# Patient Record
Sex: FEMALE | Race: OTHER | NOT HISPANIC OR LATINO | Employment: OTHER | URBAN - METROPOLITAN AREA
[De-identification: names, ages, dates, MRNs, and addresses within clinical notes are randomized per-mention and may not be internally consistent; named-entity substitution may affect disease eponyms.]

---

## 2017-01-24 ENCOUNTER — TELEPHONE (OUTPATIENT)
Dept: RHEUMATOLOGY | Facility: CLINIC | Age: 62
End: 2017-01-24

## 2017-01-31 ENCOUNTER — TELEPHONE (OUTPATIENT)
Dept: RHEUMATOLOGY | Facility: CLINIC | Age: 62
End: 2017-01-31

## 2017-02-01 ENCOUNTER — PATIENT MESSAGE (OUTPATIENT)
Dept: NEUROLOGY | Facility: CLINIC | Age: 62
End: 2017-02-01

## 2017-03-17 DIAGNOSIS — E55.9 VITAMIN D DEFICIENCY: ICD-10-CM

## 2017-03-17 DIAGNOSIS — H54.7 VISUAL LOSS: ICD-10-CM

## 2017-03-17 DIAGNOSIS — H20.9 UVEITIS: ICD-10-CM

## 2017-03-17 DIAGNOSIS — H54.10 BLINDNESS AND LOW VISION: ICD-10-CM

## 2017-03-17 DIAGNOSIS — H46.9 OPTIC NEURITIS: ICD-10-CM

## 2017-03-17 DIAGNOSIS — E66.01 MORBID OBESITY WITH BMI OF 40.0-44.9, ADULT: ICD-10-CM

## 2017-03-17 DIAGNOSIS — K76.0 FATTY LIVER: ICD-10-CM

## 2017-03-17 DIAGNOSIS — R10.13 EPIGASTRIC ABDOMINAL PAIN: ICD-10-CM

## 2017-03-17 DIAGNOSIS — M31.5 GIANT CELL ARTERITIS WITH POLYMYALGIA RHEUMATICA: ICD-10-CM

## 2017-03-17 RX ORDER — ERGOCALCIFEROL 1.25 MG/1
CAPSULE ORAL
Qty: 4 CAPSULE | Refills: 5 | Status: SHIPPED | OUTPATIENT
Start: 2017-03-17 | End: 2017-05-08 | Stop reason: SDUPTHER

## 2017-03-22 ENCOUNTER — TELEPHONE (OUTPATIENT)
Dept: GASTROENTEROLOGY | Facility: CLINIC | Age: 62
End: 2017-03-22

## 2017-03-23 ENCOUNTER — PATIENT MESSAGE (OUTPATIENT)
Dept: GASTROENTEROLOGY | Facility: CLINIC | Age: 62
End: 2017-03-23

## 2017-03-31 ENCOUNTER — INITIAL CONSULT (OUTPATIENT)
Dept: NEUROLOGY | Facility: CLINIC | Age: 62
End: 2017-03-31
Payer: MEDICARE

## 2017-03-31 DIAGNOSIS — R41.3 MEMORY LOSS: ICD-10-CM

## 2017-03-31 DIAGNOSIS — F32.A DEPRESSION, UNSPECIFIED DEPRESSION TYPE: ICD-10-CM

## 2017-03-31 PROCEDURE — 96118 PR NEUROPSYCH TESTING BY PSYCH/PHYS: CPT | Mod: S$PBB,,, | Performed by: PSYCHIATRY & NEUROLOGY

## 2017-03-31 PROCEDURE — 90791 PSYCH DIAGNOSTIC EVALUATION: CPT | Mod: PBBFAC | Performed by: PSYCHIATRY & NEUROLOGY

## 2017-03-31 PROCEDURE — 96119 PR NEUROPSYCH TESTING BY TECHNICIAN: CPT | Mod: 59,S$PBB,, | Performed by: PSYCHIATRY & NEUROLOGY

## 2017-03-31 PROCEDURE — 96119 PR NEUROPSYCH TESTING BY TECHNICIAN: CPT | Mod: PBBFAC | Performed by: PSYCHIATRY & NEUROLOGY

## 2017-03-31 PROCEDURE — 96118 PR NEUROPSYCH TESTING BY PSYCH/PHYS: CPT | Mod: PBBFAC | Performed by: PSYCHIATRY & NEUROLOGY

## 2017-03-31 PROCEDURE — 99499 UNLISTED E&M SERVICE: CPT | Mod: S$PBB,,, | Performed by: PSYCHIATRY & NEUROLOGY

## 2017-03-31 PROCEDURE — 90791 PSYCH DIAGNOSTIC EVALUATION: CPT | Mod: S$PBB,,, | Performed by: PSYCHIATRY & NEUROLOGY

## 2017-04-04 ENCOUNTER — TELEPHONE (OUTPATIENT)
Dept: NEUROLOGY | Facility: CLINIC | Age: 62
End: 2017-04-04

## 2017-04-04 NOTE — TELEPHONE ENCOUNTER
NEUROPSYCHOLOGICAL EVALUATION - CONFIDENTIAL  FEEDBACK NOTE    On 4/4/17, I provided Ms. Genesis Ugalde the results of her neuropsychological evaluation. Please see her full report for a comprehensive overview of the findings. She was provided a copy of the report and invited to call with additional questions.      Pb Meek Psy.D., ABPP  Board Certified in Clinical Neuropsychology  [facility] - Department of Neurology
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
## 2017-04-13 ENCOUNTER — TELEPHONE (OUTPATIENT)
Dept: RHEUMATOLOGY | Facility: CLINIC | Age: 62
End: 2017-04-13

## 2017-04-13 NOTE — TELEPHONE ENCOUNTER
Received from  [facility]: Gustavo MEDRANO: annual wellness exam echo, accucheck, urine microalbumin    [facility] 3/18/17:      HDL 80  LDL79      Cmp: nl except ALT 34(29) eGFR 85  HbA1c 5.7  TSH 4.14((4.5)  WBC 11.7(10.8)   ANC 6915  ALC 3744    Please tell pt labs are fine except for mildly elevated ALT similar to previous here.Mildly elevated WBC similar to previous.  Await the echo and urinalysis ordered not resulted.

## 2017-04-13 NOTE — TELEPHONE ENCOUNTER
Note      Received from [facility]: Gustavo MEDRANO: annual wellness exam echo, accucheck, urine microalbumin     [facility] labs 3/18/17:       HDL 80  LDL79       Cmp: nl except ALT 34(29) eGFR 85  HbA1c 5.7  TSH 4.14((4.5)  WBC 11.7(10.8) ANC 6915 ALC 3744     Please tell pt labs are fine except for mildly elevated ALT similar to previous here.Mildly elevated WBC similar to previous.  Await the echo and urinalysis ordered not resulted.

## 2017-04-25 RX ORDER — DORZOLAMIDE HCL 20 MG/ML
SOLUTION/ DROPS OPHTHALMIC
Qty: 10 ML | Refills: 7 | OUTPATIENT
Start: 2017-04-25

## 2017-05-08 DIAGNOSIS — E66.01 MORBID OBESITY WITH BMI OF 40.0-44.9, ADULT: ICD-10-CM

## 2017-05-08 DIAGNOSIS — E55.9 VITAMIN D DEFICIENCY: ICD-10-CM

## 2017-05-08 DIAGNOSIS — M31.5 GIANT CELL ARTERITIS WITH POLYMYALGIA RHEUMATICA: ICD-10-CM

## 2017-05-08 DIAGNOSIS — R10.13 EPIGASTRIC ABDOMINAL PAIN: ICD-10-CM

## 2017-05-08 DIAGNOSIS — H54.10 BLINDNESS AND LOW VISION: ICD-10-CM

## 2017-05-08 DIAGNOSIS — H54.7 VISUAL LOSS: ICD-10-CM

## 2017-05-08 DIAGNOSIS — H46.9 OPTIC NEURITIS: ICD-10-CM

## 2017-05-08 DIAGNOSIS — K76.0 FATTY LIVER: ICD-10-CM

## 2017-05-08 DIAGNOSIS — H20.9 UVEITIS: ICD-10-CM

## 2017-05-09 RX ORDER — ERGOCALCIFEROL 1.25 MG/1
CAPSULE ORAL
Qty: 12 CAPSULE | Refills: 2 | Status: SHIPPED | OUTPATIENT
Start: 2017-05-09 | End: 2017-07-10 | Stop reason: SDUPTHER

## 2017-05-18 ENCOUNTER — PATIENT MESSAGE (OUTPATIENT)
Dept: GASTROENTEROLOGY | Facility: CLINIC | Age: 62
End: 2017-05-18

## 2017-05-28 RX ORDER — GABAPENTIN 300 MG/1
300 CAPSULE ORAL 2 TIMES DAILY
Qty: 60 CAPSULE | Refills: 11 | Status: SHIPPED | OUTPATIENT
Start: 2017-05-28 | End: 2018-05-25 | Stop reason: ALTCHOICE

## 2017-06-13 ENCOUNTER — TELEPHONE (OUTPATIENT)
Dept: GASTROENTEROLOGY | Facility: CLINIC | Age: 62
End: 2017-06-13

## 2017-06-13 NOTE — TELEPHONE ENCOUNTER
----- Message from Caitlyn Barrera sent at 6/13/2017 12:52 PM CDT -----  Contact: self - [phone]  Jania - is calling to reschedule appt - please call patient at

## 2017-06-19 ENCOUNTER — OFFICE VISIT (OUTPATIENT)
Dept: OPHTHALMOLOGY | Facility: CLINIC | Age: 62
End: 2017-06-19
Payer: MEDICARE

## 2017-06-19 DIAGNOSIS — H54.10 BLINDNESS AND LOW VISION: ICD-10-CM

## 2017-06-19 DIAGNOSIS — M31.6 TEMPORAL ARTERITIS: ICD-10-CM

## 2017-06-19 DIAGNOSIS — H54.7 VISUAL LOSS: ICD-10-CM

## 2017-06-19 DIAGNOSIS — H40.1131 PRIMARY OPEN ANGLE GLAUCOMA OF BOTH EYES, MILD STAGE: Primary | ICD-10-CM

## 2017-06-19 DIAGNOSIS — H40.033 ANATOMICAL NARROW ANGLE BORDERLINE GLAUCOMA, BILATERAL: ICD-10-CM

## 2017-06-19 DIAGNOSIS — H40.043 STEROID RESPONDER, BILATERAL: ICD-10-CM

## 2017-06-19 DIAGNOSIS — H46.9 INFLAMMATORY OPTIC NEUROPATHY: ICD-10-CM

## 2017-06-19 DIAGNOSIS — M31.5 GIANT CELL ARTERITIS WITH POLYMYALGIA RHEUMATICA: ICD-10-CM

## 2017-06-19 PROCEDURE — 99212 OFFICE O/P EST SF 10 MIN: CPT | Mod: PBBFAC | Performed by: OPHTHALMOLOGY

## 2017-06-19 PROCEDURE — 99999 PR PBB SHADOW E&M-EST. PATIENT-LVL II: CPT | Mod: PBBFAC,,, | Performed by: OPHTHALMOLOGY

## 2017-06-19 PROCEDURE — 92012 INTRM OPH EXAM EST PATIENT: CPT | Mod: S$PBB,,, | Performed by: OPHTHALMOLOGY

## 2017-06-19 RX ORDER — DORZOLAMIDE HCL 20 MG/ML
1 SOLUTION/ DROPS OPHTHALMIC 3 TIMES DAILY
Qty: 10 ML | Refills: 12 | Status: SHIPPED | OUTPATIENT
Start: 2017-06-19 | End: 2018-05-25 | Stop reason: SDUPTHER

## 2017-06-19 RX ORDER — BRIMONIDINE TARTRATE 2 MG/ML
1 SOLUTION/ DROPS OPHTHALMIC 3 TIMES DAILY
Qty: 5 ML | Refills: 12 | Status: SHIPPED | OUTPATIENT
Start: 2017-06-19 | End: 2018-05-25 | Stop reason: SDUPTHER

## 2017-06-19 NOTE — PROGRESS NOTES
HPI     DLS: 9/16/16    Pt here for IOP check;  Pt states Brimonidine was denied by her insurance she is totally out of   it. Pt states she has off and on pain in OS she rates her pain in the OS   today a 5.     Meds: Brimonidine tid ou             Latanoprost qhs ou            Dorzolamide tid ou     1. Inflammatory optic neuropathy  2. Giant cell arteritis with polymyalgia rheumatica  3. Visual field loss  4. Blindness and low vision  5. Nuclear sclerosis, bilateral  6. Primary open angle glaucoma of both eyes, mild stage  7. Anatomical narrow angle borderline glaucoma, bilateral  8. Steroid responder, bilateral     Last edited by Doris Clayton on 6/19/2017  9:46 AM. (History)            Assessment /Plan     For exam results, see Encounter Report.    Primary open angle glaucoma of both eyes, mild stage    Anatomical narrow angle borderline glaucoma, bilateral    Inflammatory optic neuropathy    Giant cell arteritis with polymyalgia rheumatica    Visual loss    Blindness and low vision    Steroid responder, bilateral      1. Autoimmune optic neuropathy OU,   - OS 2004  - OD 2012  - unknown antibiody  - several treatments with IV steroid and IVIG  - vision and VF per [place] records  - PO steroid daily - dose increased by rheum recently to 20mg po daily  - sural nerve and muscle biopsy done - results pending - pt to obtain results form [facility]  - xal qhs ou   - monitor  - rheum monitoring esr/crp  - all previous mri's and lp negative per rheum note  Last ESR 72    2.  Steroid responder in past  - on chronic steroids for #1  iop good today   Continue xal qhs ou    3. POAG - mild vs OHT ou      First HVF   2004   First photos   2004   Treatment / Drops started   2004           Family history    neg        Glaucoma meds    Latanoprost ou        H/O adverse rxn to glaucoma drops    none        LASERS    none        GLAUCOMA SURGERIES    none        OTHER EYE SURGERIES    none        CDR    0.3 w/ pallor / 0.8 w/ pallor         "Tbase    25-27 / 25-27          Tmax    27/27            Ttarget    20/20             HVF    12 test 2004 to 2012  - SALT / IAD od // Ext os                    4 10-2 stim V OD 2012 - 2014 - [facility]    (+ prog from 2012 to 2014 - when followed at [facility])    5 10-2 stim V test done od 12/2014 to 4/2016 - dense SALT and IAD (+prog)         Gonio    +2-3 ou        CCT    591/612        OCT    7 test 2006 to 2016 - RNFL - dec throughout od // dec thru out  os        HRT    5 test 2004 to 2016 - MR - nl od // off-center os // new baseline 1/25/2016 - nl ou -"too good to be true"        Disc photos    2004 - slides // 2008, 2010, 2012, , 2012, 2012, 2012, 2015  - [facility]     - Ttoday    11/10  - Test done today    IOP check     PLAN  CSM - IOP good   C/o left head ace  In and around left eye and with left sided headache and left shoulder pain (9/16/2016)  Last sed rate and c-react prot both very low  Has an appt in 1 week with quinet and will get more testing then   No ocular etiology for eye pain and head ache - ?? Sinus vs the [facility]  HVF 10-2 stim V od (only seeing eye) - progression from 11/2015    Sees Benjie   He is monitoring sed rate and c react prot   Cont PO pred 10 mg a day   Cont actemra  (tocilizumab) 162 mg sc weekly  Cont prilosec (omeprazole)  20mg daily - to protect stomach     Cont brimonidine tid ou  Cont latanoprost qhs ou  Cont dorzolamide tid ou  - IOP was better     -would rec PI's prior to doing and slt's - has some ant bowing and narrowing   - avoid BB - H/O asthma as a child     Currently on prednisone 10 mg q day and decreasing- Benjie  States vision is dimmer and more constricted, has to search for letters on the keyboard unlike before  Also C/O pain in and around the left eye - no iritis or sceritis and IOP good - ?? Sinus vs GCA  Keep appt with quinet in 1 week - he will get sed rate and c react protien then     F/u4 mo 10-2 stim V HVF OD//photos/DFE     I have seen and personally " examined the patient.  I agree with the findings, assessment and plan of the resident and/or fellow.     Tanna Ro MD

## 2017-06-23 DIAGNOSIS — M31.5 GIANT CELL ARTERITIS WITH POLYMYALGIA RHEUMATICA: ICD-10-CM

## 2017-06-23 RX ORDER — PRAVASTATIN SODIUM 40 MG/1
40 TABLET ORAL DAILY
Qty: 90 TABLET | Refills: 3 | Status: SHIPPED | OUTPATIENT
Start: 2017-06-23 | End: 2017-07-10 | Stop reason: SDUPTHER

## 2017-07-10 ENCOUNTER — PATIENT MESSAGE (OUTPATIENT)
Dept: RHEUMATOLOGY | Facility: CLINIC | Age: 62
End: 2017-07-10

## 2017-07-10 ENCOUNTER — LAB VISIT (OUTPATIENT)
Dept: LAB | Facility: HOSPITAL | Age: 62
End: 2017-07-10
Attending: INTERNAL MEDICINE
Payer: MEDICARE

## 2017-07-10 ENCOUNTER — OFFICE VISIT (OUTPATIENT)
Dept: RHEUMATOLOGY | Facility: CLINIC | Age: 62
End: 2017-07-10
Payer: MEDICARE

## 2017-07-10 VITALS
BODY MASS INDEX: 40.97 KG/M2 | SYSTOLIC BLOOD PRESSURE: 142 MMHG | HEIGHT: 65 IN | HEART RATE: 73 BPM | DIASTOLIC BLOOD PRESSURE: 77 MMHG | WEIGHT: 245.88 LBS

## 2017-07-10 DIAGNOSIS — R10.13 EPIGASTRIC ABDOMINAL PAIN: ICD-10-CM

## 2017-07-10 DIAGNOSIS — Z79.899 ENCOUNTER FOR MONITORING TOCILIZUMAB THERAPY: ICD-10-CM

## 2017-07-10 DIAGNOSIS — R41.3 TRANSIENT MEMORY LOSS: ICD-10-CM

## 2017-07-10 DIAGNOSIS — E55.9 VITAMIN D DEFICIENCY: ICD-10-CM

## 2017-07-10 DIAGNOSIS — E24.0 CUSHING'S DISEASE: ICD-10-CM

## 2017-07-10 DIAGNOSIS — M31.5 GIANT CELL ARTERITIS WITH POLYMYALGIA RHEUMATICA: ICD-10-CM

## 2017-07-10 DIAGNOSIS — M25.551 PAIN OF RIGHT HIP JOINT: ICD-10-CM

## 2017-07-10 DIAGNOSIS — M31.6 GIANT CELL ARTERITIS: ICD-10-CM

## 2017-07-10 DIAGNOSIS — G47.33 OSA (OBSTRUCTIVE SLEEP APNEA): ICD-10-CM

## 2017-07-10 DIAGNOSIS — M31.6 GCA (GIANT CELL ARTERITIS): Primary | ICD-10-CM

## 2017-07-10 DIAGNOSIS — K76.0 FATTY LIVER: ICD-10-CM

## 2017-07-10 DIAGNOSIS — Z51.81 ENCOUNTER FOR MONITORING TOCILIZUMAB THERAPY: ICD-10-CM

## 2017-07-10 DIAGNOSIS — D84.9 IMMUNOSUPPRESSION: ICD-10-CM

## 2017-07-10 DIAGNOSIS — E78.5 HYPERLIPIDEMIA, UNSPECIFIED HYPERLIPIDEMIA TYPE: ICD-10-CM

## 2017-07-10 DIAGNOSIS — H46.9 OPTIC NEURITIS: ICD-10-CM

## 2017-07-10 DIAGNOSIS — R93.89 ABNORMAL CHEST CT: ICD-10-CM

## 2017-07-10 DIAGNOSIS — I10 ESSENTIAL HYPERTENSION: ICD-10-CM

## 2017-07-10 DIAGNOSIS — H54.10 BLINDNESS AND LOW VISION: ICD-10-CM

## 2017-07-10 DIAGNOSIS — R16.2 HEPATOSPLENOMEGALY: ICD-10-CM

## 2017-07-10 DIAGNOSIS — E66.01 MORBID OBESITY WITH BMI OF 40.0-44.9, ADULT: ICD-10-CM

## 2017-07-10 DIAGNOSIS — M31.6 TEMPORAL ARTERITIS: ICD-10-CM

## 2017-07-10 DIAGNOSIS — Z79.52 ON PREDNISONE THERAPY: ICD-10-CM

## 2017-07-10 DIAGNOSIS — R91.1 LUNG NODULE: ICD-10-CM

## 2017-07-10 DIAGNOSIS — H54.7 VISUAL LOSS: ICD-10-CM

## 2017-07-10 DIAGNOSIS — M54.31 RIGHT SIDED SCIATICA: ICD-10-CM

## 2017-07-10 DIAGNOSIS — H20.9 UVEITIS: ICD-10-CM

## 2017-07-10 PROCEDURE — 99215 OFFICE O/P EST HI 40 MIN: CPT | Mod: S$PBB,,, | Performed by: INTERNAL MEDICINE

## 2017-07-10 PROCEDURE — 86480 TB TEST CELL IMMUN MEASURE: CPT

## 2017-07-10 PROCEDURE — 99214 OFFICE O/P EST MOD 30 MIN: CPT | Mod: PBBFAC | Performed by: INTERNAL MEDICINE

## 2017-07-10 PROCEDURE — 99999 PR PBB SHADOW E&M-EST. PATIENT-LVL IV: CPT | Mod: PBBFAC,,, | Performed by: INTERNAL MEDICINE

## 2017-07-10 RX ORDER — PREDNISONE 5 MG/1
10 TABLET ORAL DAILY
Qty: 180 TABLET | Refills: 1 | Status: SHIPPED | OUTPATIENT
Start: 2017-07-10 | End: 2017-07-11 | Stop reason: SDUPTHER

## 2017-07-10 RX ORDER — ALENDRONATE SODIUM 35 MG/1
35 TABLET ORAL WEEKLY
Qty: 12 TABLET | Refills: 3 | Status: SHIPPED | OUTPATIENT
Start: 2017-07-10 | End: 2018-06-15 | Stop reason: SDUPTHER

## 2017-07-10 RX ORDER — VALACYCLOVIR HYDROCHLORIDE 500 MG/1
500 TABLET, FILM COATED ORAL DAILY
Qty: 90 TABLET | Refills: 3 | Status: SHIPPED | OUTPATIENT
Start: 2017-07-10 | End: 2018-07-09 | Stop reason: SDUPTHER

## 2017-07-10 RX ORDER — SULFAMETHOXAZOLE AND TRIMETHOPRIM 800; 160 MG/1; MG/1
1 TABLET ORAL
Qty: 36 TABLET | Refills: 1 | Status: SHIPPED | OUTPATIENT
Start: 2017-07-10 | End: 2018-03-06 | Stop reason: SDUPTHER

## 2017-07-10 RX ORDER — PRAVASTATIN SODIUM 40 MG/1
40 TABLET ORAL DAILY
Qty: 90 TABLET | Refills: 3 | Status: SHIPPED | OUTPATIENT
Start: 2017-07-10 | End: 2017-09-26 | Stop reason: SDUPTHER

## 2017-07-10 RX ORDER — ERGOCALCIFEROL 1.25 MG/1
CAPSULE ORAL
Qty: 12 CAPSULE | Refills: 3 | Status: SHIPPED | OUTPATIENT
Start: 2017-07-10 | End: 2018-01-16 | Stop reason: SDUPTHER

## 2017-07-10 ASSESSMENT — ROUTINE ASSESSMENT OF PATIENT INDEX DATA (RAPID3)
TOTAL RAPID3 SCORE: 4.33
MDHAQ FUNCTION SCORE: .6
WHEN YOU AWAKENED IN THE MORNING OVER THE LAST WEEK, PLEASE INDICATE THE AMOUNT OF TIME IT TAKES UNTIL YOU ARE AS LIMBER AS YOU WILL BE FOR THE DAY: 5 MINS
PATIENT GLOBAL ASSESSMENT SCORE: 5
PAIN SCORE: 6
PSYCHOLOGICAL DISTRESS SCORE: 3.3
FATIGUE SCORE: 8
AM STIFFNESS SCORE: 1, YES

## 2017-07-10 NOTE — PROGRESS NOTES
Subjective:       Patient ID: Genesis Ugalde is a 61 y.o. female.    Chief Complaint: No chief complaint on file.    HPI   She tapered off prednisone from 17.5mg since November by 2.5mg q several weeks, until 4 wks ago, stopped completely for 2.5 wks, couldn't move, then resumed prednisone 25mg immediately better, then decreased to 10mg daily which she continues. Had concurrent with this severe HSV. Wasn't taking Valtrex prophylaxis x months prior to outbreak. Needs new Rx.  No fever, + chills. Headaches not as severe, still daily. Rarely has to take anything. Above going on when she saw Dr. Ro. + myalgias(fibromyalgia) Had episode of dehydration with syncope, didn't go to ED.         Saw Dr. Ro 6/19: PLAN  CSM - IOP good   C/o left head ace  In and around left eye and with left sided headache and left shoulder pain (9/16/2016)  Last sed rate and c-react prot both very low  Has an appt in 1 week with quinet and will get more testing then   No ocular etiology for eye pain and head ache - ?? Sinus vs the GCA  HVF 10-2 stim V od (only seeing eye) - progression from 11/2015     Sees Benjie   He is monitoring sed rate and c react prot   Cont PO pred 10 mg a day   Cont actemra  (tocilizumab) 162 mg sc weekly  Cont prilosec (omeprazole)  20mg daily - to protect stomach      Cont brimonidine tid ou  Cont latanoprost qhs ou  Cont dorzolamide tid ou  - IOP was better      -would rec PI's prior to doing and slt's - has some ant bowing and narrowing   - avoid BB - H/O asthma as a child      Currently on prednisone 10 mg q day and decreasing- Quinet  States vision is dimmer and more constricted, has to search for letters on the keyboard unlike before  Also C/O pain in and around the left eye - no iritis or sceritis and IOP good - ?? Sinus vs GCA  Keep appt with quinet in 1 week - he will get sed rate and c react protien then      F/u4 mo 10-2 stim V HVF OD//photos/DFE      I have seen and personally examined the  "patient.  I agree with the findings, assessment and plan of the resident and/or fellow.      Tanna Ro MD  Review of Systems   Constitutional: Negative for appetite change, fatigue, fever and unexpected weight change.   HENT: Positive for trouble swallowing. Negative for mouth sores.         Dry mouth   Eyes: Negative for visual disturbance.   Respiratory: Negative for cough, shortness of breath and wheezing.    Cardiovascular: Negative for chest pain and palpitations.   Gastrointestinal: Positive for diarrhea. Negative for abdominal pain, anal bleeding, blood in stool, constipation, nausea and vomiting.        Heartburn   Genitourinary: Positive for dysuria and genital sores. Negative for frequency and urgency.   Musculoskeletal: Negative for arthralgias, back pain, gait problem, joint swelling, myalgias, neck pain and neck stiffness.   Skin: Negative for rash.   Neurological: Positive for headaches. Negative for weakness and numbness.   Hematological: Negative for adenopathy. Does not bruise/bleed easily.   Psychiatric/Behavioral: Negative for sleep disturbance. The patient is not nervous/anxious.          Objective:   BP (!) 142/77   Pulse 73   Ht 5' 4.8" (1.646 m)   Wt 111.5 kg (245 lb 14.4 oz)   BMI 41.17 kg/m²      Physical Exam   Constitutional: She is oriented to person, place, and time and well-developed, well-nourished, and in no distress.   HENT:   Head: Normocephalic and atraumatic.   Mouth/Throat: Oropharynx is clear and moist.   Tenderness both TAs   Eyes: Conjunctivae and EOM are normal.   External strabismus, blind OS   Neck: Normal range of motion. Neck supple. No thyromegaly present.   Skin tags   Cardiovascular: Normal rate, regular rhythm, normal heart sounds and intact distal pulses.  Exam reveals no gallop and no friction rub.    No murmur heard.  Pulmonary/Chest: Breath sounds normal. She has no wheezes. She has no rales. She exhibits no tenderness.   Abdominal: Soft. She " exhibits no distension and no mass. There is no tenderness.   obese       Right Side Rheumatological Exam     Examination finds the shoulder, elbow, wrist, knee, 1st PIP, 1st MCP, 2nd PIP, 2nd MCP, 3rd PIP, 3rd MCP, 4th PIP, 4th MCP, 5th PIP and 5th MCP normal.    Left Side Rheumatological Exam     Examination finds the shoulder, elbow, wrist, knee, 1st PIP, 1st MCP, 2nd PIP, 2nd MCP, 3rd PIP, 3rd MCP, 4th PIP, 4th MCP, 5th PIP and 5th MCP normal.      Lymphadenopathy:     She has no cervical adenopathy.   Neurological: She is alert and oriented to person, place, and time. She displays normal reflexes. Gait normal.   Nl motor strength UE and LE bilateral   Skin: No rash noted. No erythema. No pallor.     Psychiatric: Mood, memory, affect and judgment normal.   Musculoskeletal: She exhibits no edema.           The bone density remains normal but has decreased compared with prior. Would therefore continue alendronate(Fosamax) until prednisone dose much lower. RJQ   External Result Report     External Result Report   Narrative     : 1955 ORDERING PHYSICIAN: JOSE Bonilla LOCATION: [facility]    HISTORY: 60 y/o female with a hx of several fractures. She had surgical menopause at 37 y/o. Pts Mother had a wrist fx. Pt is taking Calcium, Vit D, Fosamax and 17.5mg of Prednisone. She is taking 50,000 units of Vit D once a week. She has a hx of Rheumatoid Arthritis, Asthma, Diabetes and Kidney Stones. She does not exercise or smoke.    TECHNIQUE: Bone Mineral Density performed using Hologic Horizon A (S/N [id number]) reveals good positioning of lumbar spine and hip.    BONE MINERAL DENSITY RESULTS:  Lumbar Spine: Lumbar bone mineral density L1-L4 is 0.933g/cm2, which is a t-score of -1.0. The z-score is 0.3.    Total Hip: The total hip bone mineral density is 0.900g/cm2.  The t-score is -0.3, and the z-score is 0.5.  Femoral neck BMD is 0.748g/cm2 and the t-score is -0.9.    COMPARISONS:  Date Location BMD  T-score  12/19/14 L-spine 0.944 -0.9  Total Hip 0.949 0.1   Impression      Normal BMD of the hip with a significant decrease of 5.1% compared with the prior study. Normal BMD of the lumbar spine without significant change compared with the prior study. The estimated 10 year risk of hip fracture is 0.84%(low risk)  and of a major osteoporotic fracture, 13.8%(moderate risk) respectively using FRAX to decide on GIOP therapy.    RECOMMENDATIONS:  1. Adequate calcium and Vitamin D, 1200 mg/day(elemental dietary only, not tablets,  given h/o kidney stones) and 800 units/day, respectively.  2. Consider continuing alendronate based on FRAX if no contraindications. Keep prednisone dose to a minimum.  3. Repeat BMD in 2 years.    EXPLANATION OF RESULTS:  The t-score compares this results to the bone density of a 25 year old of the same gender. The z-score compares this result to the average bone density to people of the same age and gender. The amounts indicate the number of standard deviations above or below the mean.  * Osteoporosis is generally defined as having a t-score between less than -2.5.  * Osteopenia is generally defined as having a t-score between -1 and -2.5.  * The normal range is generally defined as having a t-score between -1 to 1.      Electronically signed by: AKHIL GARCIA MD  Date: 12/27/16  Time: 12:51     Encounter     View Encounter          Reviewed By     Gray Bonilla DO on 1/4/2017 14:34   Received from [facility]: Gustavo MEDRANO: annual wellness exam echo, accucheck, urine microalbumin     [facility] 3/18/17:       HDL 80  LDL79       Cmp: nl except ALT 34(29) eGFR 85  HbA1c 5.7  TSH 4.14((4.5)  WBC 11.7(10.8) ANC 6915 ALC 3744     Please tell pt labs are fine except for mildly elevated ALT similar to previous here.Mildly elevated WBC similar to previous.  Await the echo and urinalysis ordered not resulted.  The hip x-rays appear normal. There is some  degenerative arthritis of the right L4-5 facet joint. RJQ   External Result Report     External Result Report   Narrative     2 views of the right hip were obtained, with an AP pelvis and a lateral projection of the right hip submitted.      Hip joint spaces appear adequately maintained, without significant narrowing on either side.  No conventional radiographic evidence to specifically suggest avascular necrosis of either femoral head.  Remaining osseous structures are unremarkable as well, with no evidence of recent or healing fracture or lytic destructive process noted.  SI joints appear unremarkable.  Note is made of some particularly prominent facet arthropathy at L4-5 on the right side.   Impression      As above.      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 Uveitis. Tocilixumab acting as steroid sparing agent as she feels uncomfortable taking < 20mg prednisone daily and usually incurs flare in right eye doing so. She has failed methotrexate, azathioprine,, pulse SoluMedrol, plasma exchange, and cyclophosphamide IV. Remaining options: tocilizumab I would favor based on small series(RCT ongoing) in refractory GCA cases, rather than mycophenolate or rituximab which would be other options. 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 Lujan 8/27/15, plans f/u CT chest in 6months as ordered  S/p EGD + H. Pylori, treated, epigastric discomfort much improved  Episodic memory loss, ? Absence attack ? Petit mal  Right sciatica, agrees to PT, not interested in injections, surgery  Morbid obesity, lost another 12#  LOYDA EJH, no CPAP  Plan:     cbc, cmp, esr, crp, u/a upcr today and q 3 months   QG-TB today and q 12 months   vitamin D  f/u Dr. Lujan after f/u CT chest he ordered 8/27/15  Cont prednisone 10mg daily for now.  Cont tocilizumab 162mg sc weekly  Cont alendronate 35mg po once a week  Vitamin D3 2000 units daily and vitamiin D2 50,000 units weekly  F/u Dr. Dykes for f/u H. Pylori has appt 7/11/17(tomorrow)  Ref to Sleep Disorders for LOYDA, previously diagnosed EJH, never treated  RTC 3 months with standing labs

## 2017-07-11 ENCOUNTER — TELEPHONE (OUTPATIENT)
Dept: RHEUMATOLOGY | Facility: CLINIC | Age: 62
End: 2017-07-11

## 2017-07-11 ENCOUNTER — LAB VISIT (OUTPATIENT)
Dept: LAB | Facility: HOSPITAL | Age: 62
End: 2017-07-11
Attending: INTERNAL MEDICINE
Payer: MEDICARE

## 2017-07-11 ENCOUNTER — OFFICE VISIT (OUTPATIENT)
Dept: GASTROENTEROLOGY | Facility: CLINIC | Age: 62
End: 2017-07-11
Payer: MEDICARE

## 2017-07-11 ENCOUNTER — CLINICAL SUPPORT (OUTPATIENT)
Dept: INFECTIOUS DISEASES | Facility: CLINIC | Age: 62
End: 2017-07-11
Payer: MEDICARE

## 2017-07-11 VITALS
BODY MASS INDEX: 41.67 KG/M2 | DIASTOLIC BLOOD PRESSURE: 80 MMHG | SYSTOLIC BLOOD PRESSURE: 144 MMHG | HEART RATE: 65 BPM | WEIGHT: 244.06 LBS | HEIGHT: 64 IN

## 2017-07-11 DIAGNOSIS — K76.0 FATTY LIVER: Primary | ICD-10-CM

## 2017-07-11 DIAGNOSIS — K76.0 FATTY LIVER: ICD-10-CM

## 2017-07-11 DIAGNOSIS — N39.0 URINARY TRACT INFECTION WITHOUT HEMATURIA, SITE UNSPECIFIED: Primary | ICD-10-CM

## 2017-07-11 DIAGNOSIS — N32.9 BLADDER DISORDER: ICD-10-CM

## 2017-07-11 DIAGNOSIS — M31.5 GIANT CELL ARTERITIS WITH POLYMYALGIA RHEUMATICA: ICD-10-CM

## 2017-07-11 DIAGNOSIS — R30.0 DYSURIA: ICD-10-CM

## 2017-07-11 LAB
MITOGEN NIL: 7.92 IU/ML
NIL: 0.03 IU/ML
TB ANTIGEN NIL: 0.03 IU/ML
TB ANTIGEN: 0.06 IU/ML
TB GOLD: NEGATIVE

## 2017-07-11 PROCEDURE — 99999 PR PBB SHADOW E&M-EST. PATIENT-LVL IV: CPT | Mod: PBBFAC,,, | Performed by: INTERNAL MEDICINE

## 2017-07-11 PROCEDURE — 99213 OFFICE O/P EST LOW 20 MIN: CPT | Mod: S$PBB,,, | Performed by: INTERNAL MEDICINE

## 2017-07-11 PROCEDURE — 99999 PR PBB SHADOW E&M-EST. PATIENT-LVL III: CPT | Mod: PBBFAC,,,

## 2017-07-11 PROCEDURE — 36415 COLL VENOUS BLD VENIPUNCTURE: CPT

## 2017-07-11 RX ORDER — NITROFURANTOIN 25; 75 MG/1; MG/1
100 CAPSULE ORAL 2 TIMES DAILY
Qty: 10 CAPSULE | Refills: 0 | Status: SHIPPED | OUTPATIENT
Start: 2017-07-11 | End: 2017-07-16

## 2017-07-11 RX ORDER — PREDNISONE 5 MG/1
10 TABLET ORAL DAILY
Qty: 180 TABLET | Refills: 1 | Status: SHIPPED | OUTPATIENT
Start: 2017-07-11 | End: 2017-11-30 | Stop reason: SDUPTHER

## 2017-07-11 NOTE — TELEPHONE ENCOUNTER
Please tell pt the prednisone dose is 10mg: two 5mg tablets daily, new Rx sent to pharmacy. For the urine, suggest she hold the Bactrim this week, and taking Macrobid 100mg twice daily for 5 days. Please double check on allergies. Will also place referral to Urology, please schedule.  Next week resume Bactrim DS M-W-F.  No Actemra until the urinary symptoms resolved and antibiotics completed. Thanks. BELIA

## 2017-07-11 NOTE — PROGRESS NOTES
CHIEF COMPLAINT: follow up dysphagia and diarrhea.      HISTORY OF PRESENT ILLNESS:  This is a 61-year-old female who is blind.  She    said she had temporal arteritis.  She also has fatty liver followed by hepatotogy.  She knows that    fatty liver in some patients can progress to liver cancer, liver cirrhosis and a   need for liver transplant.  She has a small lesion in her liver, which is 1.7    cm.  It has been stable.  It is suspected hemangioma.  The patient knows that    fatty liver can result in liver cirrhosis, death and need for transplant.     Recommend gradual weight loss, avoid raw oysters and raw clams.  Otherwise, she    is doing well. Recently she has had intermittent dysphagia for solid which improved with last EGD and loose stools. No blood in stool.      REVIEW OF SYSTEMS:  CONSTITUTIONAL:  No fever, fatigue but positive for about 15 pounds of intentional weight loss.  ENT:  No odynophagia and no sore throat.  CARDIOVASCULAR:  No chest pain or palpitations.  RESPIRATORY:  No shortness of breath or cough.  GENITOURINARY:  No dysuria, urgency or frequency.  MUSCULOSKELETAL:  Arthritis in the knees.  SKIN:  No itching or rash.  NEUROLOGIC:  No syncope or stroke.  PSYCHIATRIC:  No uncontrolled depression or anxiety.  ENDOCRINE:  No cold or heat intolerance.  LYMPHATICS:  No lymphadenopathy.  GASTROINTESTINAL:  No vomiting, no heartburn, no abdominal pain.  Has two or   three bowel movements a week.  No change in bowel habits.      RISK FACTORS FOR LIVER DISEASE:  No blood transfusion, no IV drugs, no tattoos,    no nasal drug use.      PAST MEDICAL HISTORY:  Type 2 diabetes since 2012, asthma, hypertension and    hyperlipidemia.      PAST SURGICAL HISTORY:  Cholecystectomy, hysterectomy, cataract surgery, ankle    surgery, colonoscopy.      FAMILY HISTORY:  Negative for celiac sprue.  Nobody with colon cancer.  Nobody    with advanced colon adenomatous polyps before the age of 60.  No FAP, no  "   attenuated FAP, no MAP, no Walker syndrome.  Nobody with celiac sprue or    inflammatory bowel disease.  A brother with stomach cancer at 58.  The patient    has a recent EGD looking for H. pylori.      SOCIAL HISTORY:  Nonsmoker, nondrinker, disabled.      PHYSICAL EXAMINATION:  BP (!) 144/80   Pulse 65   Ht 5' 4" (1.626 m)   Wt 110.7 kg (244 lb 0.8 oz)   BMI 41.89 kg/m²   GENERAL APPEARANCE:  Well nourished, well developed, not in any acute distress.  ABDOMEN:  Soft, no guarding, no rebound, no tenderness, no palpable    organomegaly, no bruits, no pulsatile masses, no stigmata of chronic liver    disease, no appreciative ascites or hernias.  Normoactive bowel sounds.  Abdomen   is obese.  CARDIOVASCULAR:  S1 and S2 without murmurs.  RESPIRATORY:  Clear to auscultation bilaterally without wheezes, rhonchi or    rales.  SKIN:  No petechiae or rash on exposed skin areas.  NEUROLOGIC:  Alert and oriented x4.  PSYCHIATRIC:  Normal speech, mentation and affect.  LYMPHATICS:  No cervical or supraclavicular lymphadenopathy.      MEDICAL DECISION MAKING:  As above.  Fatty liver talk given.  GERD talk given.     Vaccination talk given, EGD talk discuss.EGD and pathology reviewed and endoscopy images reviewed.      IMPRESSION AND PLAN:  1. Fatty liver followed by hepatology. Continue gradual weight loss and vaccinate for Hepatitis A and B again since she didn't not achieve immunity.  2. History of colon polyps in past next colonoscopy in 9/2018  3. RTC once yearly.  "

## 2017-07-12 ENCOUNTER — TELEPHONE (OUTPATIENT)
Dept: PHARMACY | Facility: CLINIC | Age: 62
End: 2017-07-12

## 2017-07-20 LAB
UGT, FULL GENE SEQUENCING: NORMAL
UGT1A1 GENE MUT ANL BLD/T: NORMAL
UGT1A1 INTERPRETATION: NORMAL
UGT1A1 REVIEWED BY: NORMAL

## 2017-07-25 ENCOUNTER — PATIENT MESSAGE (OUTPATIENT)
Dept: RHEUMATOLOGY | Facility: CLINIC | Age: 62
End: 2017-07-25

## 2017-07-25 NOTE — TELEPHONE ENCOUNTER
Actemra Assistance Approved   7/24/17-7/23/18  [facility] to [facility]  Case #: [id number]

## 2017-08-07 ENCOUNTER — TELEPHONE (OUTPATIENT)
Dept: GASTROENTEROLOGY | Facility: CLINIC | Age: 62
End: 2017-08-07

## 2017-08-07 ENCOUNTER — PATIENT MESSAGE (OUTPATIENT)
Dept: GASTROENTEROLOGY | Facility: CLINIC | Age: 62
End: 2017-08-07

## 2017-08-07 NOTE — TELEPHONE ENCOUNTER
"----- Message from Alok Dykes MD sent at 8/5/2017  4:43 PM CDT -----  Dorothea - please tell Genesis that her slight elevation of her bilirubin is from a benign condition called Gilbert's.  Below is some information if she would like to read about it.    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     Patient information: Gilbert syndrome (Beyond the Basics)   Authors  Abigail Nichole, PhD  Quincy Nichole MD, [facility]  Section   Pedro Bolaños MD  [place]   Chanell Lin MD, MSc, FAC, AGA  Contributor disclosures    All topics are updated as new evidence becomes available and our peer review process is complete.   Literature review current through: Apr 2016.  This topic last updated: Sep 23, 2015.     GILBERT SYNDROME OVERVIEW  Gilbert syndrome, also known as constitutional hepatic dysfunction or familial nonhemolytic jaundice, is an inherited disorder of the liver that results in an overabundance of a substance known as bilirubin. While some people with Gilbert syndrome develop yellowing of the skin or eyes, most people have no symptoms at all. Gilbert syndrome is not dangerous and does not cause long-term problems, so it is not necessary to treat Gilbert syndrome.    WHAT IS BILIRUBIN?  Bilirubin is normally present in the blood in small amounts. It is a by-product of the breakdown of hemoglobin in old red blood cells, and it is usually converted by the liver into a form that can be excreted from the body in stool. Abnormalities in this process can cause blood levels of bilirubin to rise above normal.  People with Gilbert syndrome have an inherited abnormality that causes reduced production of an enzyme involved in processing bilirubin. (See "Gilbert syndrome and unconjugated hyperbilirubinemia due to bilirubin overproduction".)  Every affected person has two " "copies of the abnormal gene responsible for Gilbert syndrome. Because over half of the people in the general population have at least one abnormal copy of the gene, inheriting two abnormal copies (one from the mother and one from the father) is not uncommon. Interestingly, people who have only one copy may have slightly higher levels of unconjugated bilirubin but do not have Gilbert syndrome. For reasons that are not entirely understood, not all people who have two copies of the abnormal gene develop blood bilirubin levels that are high enough for the diagnosis of Gilbert syndrome.    GILBERT SYNDROME SYMPTOMS  Most patients with Gilbert syndrome have no symptoms. The disorder is frequently diagnosed incidentally when a lab test done for another reason (such as a life insurance examination) shows an abnormally high level of unconjugated bilirubin.  When the level of unconjugated bilirubin rises beyond a certain point, the bilirubin pigment begins to discolor the whites of the eyes (making them appear light yellow). With even higher levels, the skin may also become yellow (jaundice). This potentially alarming appearance makes many people with Gilbert syndrome see their healthcare provider.  Levels of bilirubin can fluctuate in people with Gilbert syndrome. They may be highest during an infection (such as the flu), following periods of fasting, and during menstrual periods in some women. In addition, newborns with Gilbert syndrome may have higher levels of bilirubin and more persistent jaundice than newborns without. (See "Patient information: Jaundice in  infants (Beyond the Basics)".)    Gilbert syndrome may also become apparent when an affected person takes certain drugs that require the enzyme involved in bilirubin processing in the liver. A cancer drug called irinotecan is one example.    GILBERT SYNDROME DIAGNOSIS  The diagnosis of Gilbert syndrome is suspected in people who have persistently slightly " elevated levels of unconjugated bilirubin without any other apparent cause. A clinician may order several blood tests and possibly an ultrasound of the liver to make sure that there is no other cause of the high bilirubin levels.  Genetic testing can also confirm the diagnosis; however, genetic testing is not widely available and is generally not required.    GILBERT SYNDROME TREATMENT  No specific treatment is required for people with Gilbert syndrome. However, there is an increased risk of side effects from certain drugs that are broken down by the liver (such as acetaminophen and the chemotherapy drug, irinotecan). Check with a healthcare provider before taking any new medications. Do not take more than the recommended amount of acetaminophen (Tylenol).    WHERE TO GET MORE INFORMATION  Your healthcare provider is the best source of information for questions and concerns related to your medical problem.  This article will be updated as needed on our website ([email or web address]). Related topics for patients, as well as selected articles written for healthcare professionals, are also available. Some of the most relevant are listed below.  Patient level information  [employer] offers two types of patient education materials.    Gilbert syndrome and unconjugated hyperbilirubinemia due to bilirubin overproduction  The following organizations also provide reliable health information:  ?National Library of Medicine  (www.nlm.nih.gov/medlineplus/healthtopics.html)  ?[place] [employer]  (www.[place]ishlivertrust.org.uk)  ?[employer] [place] [employer]  (www.liver.ca/liver-disease/types/gilberts-syndrome.aspx)

## 2017-08-11 ENCOUNTER — CLINICAL SUPPORT (OUTPATIENT)
Dept: INFECTIOUS DISEASES | Facility: CLINIC | Age: 62
End: 2017-08-11
Payer: MEDICARE

## 2017-08-11 ENCOUNTER — PATIENT MESSAGE (OUTPATIENT)
Dept: RHEUMATOLOGY | Facility: CLINIC | Age: 62
End: 2017-08-11

## 2017-08-11 DIAGNOSIS — K76.0 FATTY LIVER: ICD-10-CM

## 2017-08-11 PROCEDURE — 90636 HEP A/HEP B VACC ADULT IM: CPT | Mod: PBBFAC

## 2017-09-25 ENCOUNTER — OFFICE VISIT (OUTPATIENT)
Dept: OPHTHALMOLOGY | Facility: CLINIC | Age: 62
End: 2017-09-25
Payer: MEDICARE

## 2017-09-25 ENCOUNTER — CLINICAL SUPPORT (OUTPATIENT)
Dept: OPHTHALMOLOGY | Facility: CLINIC | Age: 62
End: 2017-09-25
Payer: MEDICARE

## 2017-09-25 DIAGNOSIS — H46.9 INFLAMMATORY OPTIC NEUROPATHY: Primary | ICD-10-CM

## 2017-09-25 DIAGNOSIS — H46.9 OPTIC NEURITIS: ICD-10-CM

## 2017-09-25 DIAGNOSIS — H40.1131 PRIMARY OPEN ANGLE GLAUCOMA OF BOTH EYES, MILD STAGE: ICD-10-CM

## 2017-09-25 DIAGNOSIS — H40.043 STEROID RESPONDER, BILATERAL: ICD-10-CM

## 2017-09-25 DIAGNOSIS — H54.10 BLINDNESS AND LOW VISION: ICD-10-CM

## 2017-09-25 DIAGNOSIS — H25.13 NUCLEAR SCLEROSIS, BILATERAL: ICD-10-CM

## 2017-09-25 DIAGNOSIS — M31.5 GIANT CELL ARTERITIS WITH POLYMYALGIA RHEUMATICA: ICD-10-CM

## 2017-09-25 PROCEDURE — 92014 COMPRE OPH EXAM EST PT 1/>: CPT | Mod: S$PBB,,, | Performed by: OPHTHALMOLOGY

## 2017-09-25 PROCEDURE — 99999 PR PBB SHADOW E&M-EST. PATIENT-LVL II: CPT | Mod: PBBFAC,,, | Performed by: OPHTHALMOLOGY

## 2017-09-25 PROCEDURE — 92083 EXTENDED VISUAL FIELD XM: CPT | Mod: 26,S$PBB,, | Performed by: OPHTHALMOLOGY

## 2017-09-25 PROCEDURE — 92083 EXTENDED VISUAL FIELD XM: CPT | Mod: PBBFAC

## 2017-09-25 PROCEDURE — 99212 OFFICE O/P EST SF 10 MIN: CPT | Mod: PBBFAC | Performed by: OPHTHALMOLOGY

## 2017-09-25 PROCEDURE — 92250 FUNDUS PHOTOGRAPHY W/I&R: CPT | Mod: PBBFAC | Performed by: OPHTHALMOLOGY

## 2017-09-25 NOTE — PROGRESS NOTES
HPI     Glaucoma    Additional comments: HVF review today           Comments   DLS: 6/19/17    1) Autoimmune Optic Neuropathy OU  2) POAG  3) GCA with PMR  4) Type 2 DM  5) Steroid Responder    MEDS:  Dorzolamide TID OU  Brimonidine TID OU  Latanoprost QHS OU  Prednisone 10mg QDAY PO       Last edited by Sydnee Galvez MA on 9/25/2017 10:04 AM. (History)            Assessment /Plan     For exam results, see Encounter Report.    Inflammatory optic neuropathy    Giant cell arteritis with polymyalgia rheumatica    Optic neuritis    Primary open angle glaucoma of both eyes, mild stage    Blindness and low vision    Steroid responder, bilateral    Nuclear sclerosis, bilateral        1. Autoimmune optic neuropathy OU,   - OS 2004  - OD 2012  - unknown antibiody  - several treatments with IV steroid and IVIG  - vision and VF per [place] records  - PO steroid daily - dose increased by rheum recently to 20mg po daily  - sural nerve and muscle biopsy done - results pending - pt to obtain results form [facility]  - xal qhs ou   - monitor  - rheum monitoring esr/crp  - all previous mri's and lp negative per rheum note  Last ESR 72    2.  Steroid responder in past  - on chronic steroids for #1  iop good today   Continue xal qhs ou    3. POAG - mild vs OHT ou      First HVF   2004   First photos   2004   Treatment / Drops started   2004           Family history    neg        Glaucoma meds    Latanoprost ou        H/O adverse rxn to glaucoma drops    none        LASERS    none        GLAUCOMA SURGERIES    none        OTHER EYE SURGERIES    none        CDR    0.3 w/ pallor / 0.8 w/ pallor        Tbase    25-27 / 25-27          Tmax    27/27            Ttarget    20/20             HVF    12 test 2004 to 2012  - SALT / IAD od // Ext os                    4 10-2 stim V OD 2012 - 2014 - [facility]    (+ prog from 2012 to 2014 - when followed at [facility])    7- test-  10-2 stim V test done od 12/2014 to 2017 - dense SALT and IAD (+stable)  "        Gonio    +2-3 ou        CCT    591/612        OCT    7 test 2006 to 2016 - RNFL - dec throughout od // dec thru out  os        HRT    5 test 2004 to 2016 - MR - nl od // off-center os // new baseline 1/25/2016 - nl ou -"too good to be true"        Disc photos    2004 - slides // 2008, 2010, 2012, , 2012, 2012, 2012, 2015, 2017   - OIS     - Ttoday    12/13  - Test done today    HVF - 10-2 stim V OD only // DFE // photos     PLAN  CSM - IOP good     Sees Benjie   He is monitoring sed rate and c react prot   Cont PO pred 10 mg a day   Cont actemra  (tocilizumab) 162 mg sc weekly  Cont prilosec (omeprazole)  20mg daily - to protect stomach     Cont brimonidine tid ou  Cont latanoprost qhs ou  Cont dorzolamide tid ou  - IOP was better     -would rec PI's prior to doing and slt's - has some mild  ant bowing and narrowing   - avoid BB - H/O asthma as a child     Currently on prednisone 10 mg q day and decreasing- Benjie  States vision is dimmer and more constricted, has to search for letters on the keyboard unlike before  Also C/O pain in and around the left eye - no iritis or sceritis and IOP good - ?? Sinus vs GCA - on going intermittent pain     F/u 4 mo with HRT / gonio     I have seen and personally examined the patient.  I agree with the findings, assessment and plan of the resident and/or fellow.     Tanna Ro MD          "

## 2017-09-26 DIAGNOSIS — M31.5 GIANT CELL ARTERITIS WITH POLYMYALGIA RHEUMATICA: ICD-10-CM

## 2017-09-26 RX ORDER — PRAVASTATIN SODIUM 40 MG/1
40 TABLET ORAL NIGHTLY
Qty: 90 TABLET | Refills: 3 | Status: SHIPPED | OUTPATIENT
Start: 2017-09-26 | End: 2018-09-25 | Stop reason: SDUPTHER

## 2017-10-03 DIAGNOSIS — F51.01 PRIMARY INSOMNIA: Primary | ICD-10-CM

## 2017-10-23 ENCOUNTER — PATIENT MESSAGE (OUTPATIENT)
Dept: RHEUMATOLOGY | Facility: CLINIC | Age: 62
End: 2017-10-23

## 2017-11-03 ENCOUNTER — PATIENT MESSAGE (OUTPATIENT)
Dept: RHEUMATOLOGY | Facility: CLINIC | Age: 62
End: 2017-11-03

## 2017-11-30 ENCOUNTER — TELEPHONE (OUTPATIENT)
Dept: RHEUMATOLOGY | Facility: CLINIC | Age: 62
End: 2017-11-30

## 2017-11-30 DIAGNOSIS — M31.5 GIANT CELL ARTERITIS WITH POLYMYALGIA RHEUMATICA: ICD-10-CM

## 2017-11-30 RX ORDER — PREDNISONE 5 MG/1
10 TABLET ORAL DAILY
Qty: 180 TABLET | Refills: 1 | Status: SHIPPED | OUTPATIENT
Start: 2017-11-30 | End: 2018-03-06 | Stop reason: SDUPTHER

## 2017-11-30 NOTE — TELEPHONE ENCOUNTER
Please schedule overdue standing labs this week or early next and overdue f/u visit with fellow and myself. Thanks BELIA

## 2017-12-07 ENCOUNTER — PATIENT MESSAGE (OUTPATIENT)
Dept: RHEUMATOLOGY | Facility: CLINIC | Age: 62
End: 2017-12-07

## 2017-12-26 ENCOUNTER — PATIENT MESSAGE (OUTPATIENT)
Dept: RHEUMATOLOGY | Facility: CLINIC | Age: 62
End: 2017-12-26

## 2017-12-27 ENCOUNTER — LAB VISIT (OUTPATIENT)
Dept: LAB | Facility: HOSPITAL | Age: 62
End: 2017-12-27
Attending: INTERNAL MEDICINE
Payer: MEDICARE

## 2017-12-27 DIAGNOSIS — M31.6 GCA (GIANT CELL ARTERITIS): ICD-10-CM

## 2017-12-27 LAB
ALBUMIN SERPL BCP-MCNC: 3.6 G/DL
ALP SERPL-CCNC: 50 U/L
ALT SERPL W/O P-5'-P-CCNC: 40 U/L
ANION GAP SERPL CALC-SCNC: 9 MMOL/L
AST SERPL-CCNC: 26 U/L
BASOPHILS # BLD AUTO: 0.05 K/UL
BASOPHILS NFR BLD: 0.5 %
BILIRUB SERPL-MCNC: 1.2 MG/DL
BUN SERPL-MCNC: 15 MG/DL
CALCIUM SERPL-MCNC: 9.4 MG/DL
CHLORIDE SERPL-SCNC: 111 MMOL/L
CO2 SERPL-SCNC: 23 MMOL/L
CREAT SERPL-MCNC: 0.8 MG/DL
CRP SERPL-MCNC: 1.4 MG/L
DIFFERENTIAL METHOD: ABNORMAL
EOSINOPHIL # BLD AUTO: 0.4 K/UL
EOSINOPHIL NFR BLD: 3.9 %
ERYTHROCYTE [DISTWIDTH] IN BLOOD BY AUTOMATED COUNT: 12.7 %
ERYTHROCYTE [SEDIMENTATION RATE] IN BLOOD BY WESTERGREN METHOD: 1 MM/HR
EST. GFR  (AFRICAN AMERICAN): >60 ML/MIN/1.73 M^2
EST. GFR  (NON AFRICAN AMERICAN): >60 ML/MIN/1.73 M^2
GLUCOSE SERPL-MCNC: 100 MG/DL
HCT VFR BLD AUTO: 38.6 %
HGB BLD-MCNC: 12.7 G/DL
IMM GRANULOCYTES # BLD AUTO: 0.07 K/UL
IMM GRANULOCYTES NFR BLD AUTO: 0.7 %
LYMPHOCYTES # BLD AUTO: 3 K/UL
LYMPHOCYTES NFR BLD: 27.6 %
MCH RBC QN AUTO: 31.9 PG
MCHC RBC AUTO-ENTMCNC: 32.9 G/DL
MCV RBC AUTO: 97 FL
MONOCYTES # BLD AUTO: 0.8 K/UL
MONOCYTES NFR BLD: 7 %
NEUTROPHILS # BLD AUTO: 6.5 K/UL
NEUTROPHILS NFR BLD: 60.3 %
NRBC BLD-RTO: 0 /100 WBC
PLATELET # BLD AUTO: 239 K/UL
PMV BLD AUTO: 11.5 FL
POTASSIUM SERPL-SCNC: 4.2 MMOL/L
PROT SERPL-MCNC: 6.2 G/DL
RBC # BLD AUTO: 3.98 M/UL
SODIUM SERPL-SCNC: 143 MMOL/L
WBC # BLD AUTO: 10.72 K/UL

## 2017-12-27 PROCEDURE — 80053 COMPREHEN METABOLIC PANEL: CPT

## 2017-12-27 PROCEDURE — 86140 C-REACTIVE PROTEIN: CPT

## 2017-12-27 PROCEDURE — 85025 COMPLETE CBC W/AUTO DIFF WBC: CPT

## 2017-12-27 PROCEDURE — 85651 RBC SED RATE NONAUTOMATED: CPT

## 2017-12-27 PROCEDURE — 36415 COLL VENOUS BLD VENIPUNCTURE: CPT | Mod: PO

## 2017-12-28 ENCOUNTER — TELEPHONE (OUTPATIENT)
Dept: RHEUMATOLOGY | Facility: CLINIC | Age: 62
End: 2017-12-28

## 2018-01-08 ENCOUNTER — PATIENT MESSAGE (OUTPATIENT)
Dept: RHEUMATOLOGY | Facility: CLINIC | Age: 63
End: 2018-01-08

## 2018-01-09 ENCOUNTER — PATIENT MESSAGE (OUTPATIENT)
Dept: RHEUMATOLOGY | Facility: CLINIC | Age: 63
End: 2018-01-09

## 2018-01-11 ENCOUNTER — CLINICAL SUPPORT (OUTPATIENT)
Dept: INFECTIOUS DISEASES | Facility: CLINIC | Age: 63
End: 2018-01-11
Payer: MEDICARE

## 2018-01-11 DIAGNOSIS — K76.0 FATTY LIVER: ICD-10-CM

## 2018-01-11 PROCEDURE — 90471 IMMUNIZATION ADMIN: CPT | Mod: PBBFAC

## 2018-01-11 NOTE — PROGRESS NOTES
Pt received the final dose of her Twinrix vaccination. Pt tolerated the injection well. Pt left the unit in NAD.

## 2018-01-15 DIAGNOSIS — H20.9 UVEITIS: ICD-10-CM

## 2018-01-15 DIAGNOSIS — M31.5 GIANT CELL ARTERITIS WITH POLYMYALGIA RHEUMATICA: ICD-10-CM

## 2018-01-15 DIAGNOSIS — H46.9 OPTIC NEURITIS: ICD-10-CM

## 2018-01-15 DIAGNOSIS — E66.01 MORBID OBESITY WITH BMI OF 40.0-44.9, ADULT: ICD-10-CM

## 2018-01-15 DIAGNOSIS — H54.10 BLINDNESS AND LOW VISION: ICD-10-CM

## 2018-01-15 DIAGNOSIS — M31.6 TEMPORAL ARTERITIS: ICD-10-CM

## 2018-01-15 DIAGNOSIS — M31.6 GCA (GIANT CELL ARTERITIS): ICD-10-CM

## 2018-01-15 DIAGNOSIS — K76.0 FATTY LIVER: ICD-10-CM

## 2018-01-15 DIAGNOSIS — R10.13 EPIGASTRIC ABDOMINAL PAIN: ICD-10-CM

## 2018-01-15 DIAGNOSIS — H54.7 VISUAL LOSS: ICD-10-CM

## 2018-01-15 DIAGNOSIS — E55.9 VITAMIN D DEFICIENCY: ICD-10-CM

## 2018-01-16 ENCOUNTER — TELEPHONE (OUTPATIENT)
Dept: RHEUMATOLOGY | Facility: CLINIC | Age: 63
End: 2018-01-16

## 2018-01-16 RX ORDER — ERGOCALCIFEROL 1.25 MG/1
CAPSULE ORAL
Qty: 12 CAPSULE | Refills: 3 | Status: SHIPPED | OUTPATIENT
Start: 2018-01-16 | End: 2018-11-21 | Stop reason: SDUPTHER

## 2018-01-18 NOTE — PROGRESS NOTES
"        Assessment /Plan     For exam results, see Encounter Report.    Inflammatory optic neuropathy    Giant cell arteritis with polymyalgia rheumatica    Optic neuritis    Blindness and low vision    Steroid responder, bilateral    Nuclear sclerosis, bilateral        1. Autoimmune optic neuropathy OU,   - OS 2004  - OD 2012  - unknown antibiody  - several treatments with IV steroid and IVIG  - vision and VF per [place] records  - PO steroid daily - dose increased by rheum recently to 20mg po daily  - sural nerve and muscle biopsy done - results pending - pt [facility] obtain results form [facility]  - xal qhs ou   - monitor  - rheum monitoring esr/crp  - all previous mri's and lp negative per rheum note  Last ESR 70    2.  Steroid responder in past  - on chronic steroids for #1  iop good today   Continue xal qhs ou    3. POAG - mild vs OHT ou      First HVF   2004   First photos   2004   Treatment / Drops started   2004           Family history    neg        Glaucoma meds    Latanoprost ou        H/O adverse rxn to glaucoma drops    none        LASERS    None         GLAUCOMA SURGERIES    none        OTHER EYE SURGERIES    none        CDR    0.3 w/ pallor / 0.8 w/ pallor        Tbase    25-27 / 25-27          Tmax    27/27            Ttarget    20/20             HVF    12 test 2004 to 2012  - SALT / IAD od // Ext os                    4 10-2 stim V OD 2012 - 2014 - [facility]    (+ prog from 2012 to 2014 - when followed at [facility])    7- test-  10-2 stim V test done od 12/2014 to 2017 - dense SALT and IAD (+stable)         Gonio    +2-3 ou        CCT    591/612        OCT    7 test 2006 to 2016 - RNFL - dec throughout od // dec thru out  os        HRT    6 test 2004 to 2018 - MR - nl od // off-center os // new baseline 1/25/2016 - nl ou -"too good to be true"        Disc photos    2004 - slides // 2008, 2010, 2012, , 2012, 2012, 2012, 2015, 2017   - OIS     - Ttoday    14/14  - Test done today   Attempted HRT - poor " "study "too good to be true od " // high std dev os // gonio     PLAN  CSM - IOP good     Sees Benjie   He is monitoring sed rate and c react prot   Cont PO pred 10 mg a day   Cont actemra  (tocilizumab) 162 mg sc weekly  Cont prilosec (omeprazole)  20mg daily - to protect stomach     Cont brimonidine tid ou  Cont latanoprost qhs ou  Cont dorzolamide tid ou  - IOP was better     -would rec PI's prior to doing and slt's - has some mild  ant bowing and narrowing   - avoid BB - H/O asthma as a child     Currently on prednisone 10 mg q day and decreasing- Benjie  States vision is dimmer and more constricted, has to search for letters on the keyboard unlike before  Also C/O pain in and around the left eye - no iritis or sceritis and IOP good - ?? Sinus vs GCA - on going intermittent pain     F/u 4 mo gonio - monitor - phacomorphic narrowing - may need PI's if narrows further     I have seen and personally examined the patient.  I agree with the findings, assessment and plan of the resident and/or fellow.     Tanna Ro MD  "

## 2018-01-19 ENCOUNTER — OFFICE VISIT (OUTPATIENT)
Dept: OPHTHALMOLOGY | Facility: CLINIC | Age: 63
End: 2018-01-19
Payer: MEDICARE

## 2018-01-19 ENCOUNTER — CLINICAL SUPPORT (OUTPATIENT)
Dept: OPHTHALMOLOGY | Facility: CLINIC | Age: 63
End: 2018-01-19
Payer: MEDICARE

## 2018-01-19 DIAGNOSIS — H40.043 STEROID RESPONDER, BILATERAL: ICD-10-CM

## 2018-01-19 DIAGNOSIS — H40.033 ANATOMICAL NARROW ANGLE BORDERLINE GLAUCOMA, BILATERAL: ICD-10-CM

## 2018-01-19 DIAGNOSIS — H25.13 NUCLEAR SCLEROSIS, BILATERAL: ICD-10-CM

## 2018-01-19 DIAGNOSIS — H40.1131 PRIMARY OPEN ANGLE GLAUCOMA OF BOTH EYES, MILD STAGE: ICD-10-CM

## 2018-01-19 DIAGNOSIS — M31.5 GIANT CELL ARTERITIS WITH POLYMYALGIA RHEUMATICA: ICD-10-CM

## 2018-01-19 DIAGNOSIS — H54.10 BLINDNESS AND LOW VISION: ICD-10-CM

## 2018-01-19 DIAGNOSIS — H46.9 OPTIC NEURITIS: ICD-10-CM

## 2018-01-19 DIAGNOSIS — H46.9 INFLAMMATORY OPTIC NEUROPATHY: Primary | ICD-10-CM

## 2018-01-19 PROCEDURE — 99999 PR PBB SHADOW E&M-EST. PATIENT-LVL II: CPT | Mod: PBBFAC,,, | Performed by: OPHTHALMOLOGY

## 2018-01-19 PROCEDURE — 92012 INTRM OPH EXAM EST PATIENT: CPT | Mod: S$PBB,,, | Performed by: OPHTHALMOLOGY

## 2018-01-19 PROCEDURE — 92134 CPTRZ OPH DX IMG PST SGM RTA: CPT | Mod: 26,S$PBB,, | Performed by: OPHTHALMOLOGY

## 2018-01-19 PROCEDURE — 99212 OFFICE O/P EST SF 10 MIN: CPT | Mod: PBBFAC,25 | Performed by: OPHTHALMOLOGY

## 2018-01-19 PROCEDURE — 92020 GONIOSCOPY: CPT | Mod: PBBFAC | Performed by: OPHTHALMOLOGY

## 2018-01-19 PROCEDURE — 92134 CPTRZ OPH DX IMG PST SGM RTA: CPT | Mod: 50,PBBFAC

## 2018-01-19 PROCEDURE — 92020 GONIOSCOPY: CPT | Mod: S$PBB,,, | Performed by: OPHTHALMOLOGY

## 2018-01-19 RX ORDER — LATANOPROST 50 UG/ML
1 SOLUTION/ DROPS OPHTHALMIC NIGHTLY
Qty: 1 BOTTLE | Refills: 12 | Status: SHIPPED | OUTPATIENT
Start: 2018-01-19 | End: 2018-05-25 | Stop reason: SDUPTHER

## 2018-03-02 ENCOUNTER — LAB VISIT (OUTPATIENT)
Dept: LAB | Facility: HOSPITAL | Age: 63
End: 2018-03-02
Attending: INTERNAL MEDICINE
Payer: MEDICARE

## 2018-03-02 DIAGNOSIS — E55.9 VITAMIN D DEFICIENCY: ICD-10-CM

## 2018-03-02 DIAGNOSIS — M31.6 GCA (GIANT CELL ARTERITIS): ICD-10-CM

## 2018-03-02 LAB
25(OH)D3+25(OH)D2 SERPL-MCNC: 25 NG/ML
ALBUMIN SERPL BCP-MCNC: 3.5 G/DL
ALP SERPL-CCNC: 45 U/L
ALT SERPL W/O P-5'-P-CCNC: 35 U/L
ANION GAP SERPL CALC-SCNC: 12 MMOL/L
AST SERPL-CCNC: 23 U/L
BASOPHILS # BLD AUTO: 0.03 K/UL
BASOPHILS NFR BLD: 0.3 %
BILIRUB SERPL-MCNC: 1.4 MG/DL
BUN SERPL-MCNC: 11 MG/DL
CALCIUM SERPL-MCNC: 9 MG/DL
CHLORIDE SERPL-SCNC: 112 MMOL/L
CO2 SERPL-SCNC: 24 MMOL/L
CREAT SERPL-MCNC: 0.8 MG/DL
CRP SERPL-MCNC: 1.1 MG/L
DIFFERENTIAL METHOD: ABNORMAL
EOSINOPHIL # BLD AUTO: 0.3 K/UL
EOSINOPHIL NFR BLD: 3.4 %
ERYTHROCYTE [DISTWIDTH] IN BLOOD BY AUTOMATED COUNT: 13.3 %
ERYTHROCYTE [SEDIMENTATION RATE] IN BLOOD BY WESTERGREN METHOD: 0 MM/HR
EST. GFR  (AFRICAN AMERICAN): >60 ML/MIN/1.73 M^2
EST. GFR  (NON AFRICAN AMERICAN): >60 ML/MIN/1.73 M^2
GLUCOSE SERPL-MCNC: 93 MG/DL
HCT VFR BLD AUTO: 36.9 %
HGB BLD-MCNC: 12.2 G/DL
IMM GRANULOCYTES # BLD AUTO: 0.04 K/UL
IMM GRANULOCYTES NFR BLD AUTO: 0.4 %
LYMPHOCYTES # BLD AUTO: 3.3 K/UL
LYMPHOCYTES NFR BLD: 35.3 %
MCH RBC QN AUTO: 31.4 PG
MCHC RBC AUTO-ENTMCNC: 33.1 G/DL
MCV RBC AUTO: 95 FL
MONOCYTES # BLD AUTO: 0.7 K/UL
MONOCYTES NFR BLD: 6.9 %
NEUTROPHILS # BLD AUTO: 5 K/UL
NEUTROPHILS NFR BLD: 53.7 %
NRBC BLD-RTO: 0 /100 WBC
PLATELET # BLD AUTO: 228 K/UL
PMV BLD AUTO: 11.3 FL
POTASSIUM SERPL-SCNC: 3.7 MMOL/L
PROT SERPL-MCNC: 5.9 G/DL
RBC # BLD AUTO: 3.88 M/UL
SODIUM SERPL-SCNC: 148 MMOL/L
WBC # BLD AUTO: 9.36 K/UL

## 2018-03-02 PROCEDURE — 85025 COMPLETE CBC W/AUTO DIFF WBC: CPT

## 2018-03-02 PROCEDURE — 82306 VITAMIN D 25 HYDROXY: CPT

## 2018-03-02 PROCEDURE — 85651 RBC SED RATE NONAUTOMATED: CPT

## 2018-03-02 PROCEDURE — 86140 C-REACTIVE PROTEIN: CPT

## 2018-03-02 PROCEDURE — 36415 COLL VENOUS BLD VENIPUNCTURE: CPT | Mod: PO

## 2018-03-02 PROCEDURE — 80053 COMPREHEN METABOLIC PANEL: CPT

## 2018-03-06 ENCOUNTER — OFFICE VISIT (OUTPATIENT)
Dept: RHEUMATOLOGY | Facility: CLINIC | Age: 63
End: 2018-03-06
Payer: MEDICARE

## 2018-03-06 VITALS
BODY MASS INDEX: 42.57 KG/M2 | SYSTOLIC BLOOD PRESSURE: 133 MMHG | HEIGHT: 65 IN | HEART RATE: 82 BPM | WEIGHT: 255.5 LBS | DIASTOLIC BLOOD PRESSURE: 80 MMHG

## 2018-03-06 DIAGNOSIS — E55.9 VITAMIN D DEFICIENCY: ICD-10-CM

## 2018-03-06 DIAGNOSIS — E24.0 CUSHING'S DISEASE: ICD-10-CM

## 2018-03-06 DIAGNOSIS — Z09 ENCOUNTER FOR FOLLOW-UP EXAMINATION AFTER COMPLETED TREATMENT FOR CONDITIONS OTHER THAN MALIGNANT NEOPLASM: ICD-10-CM

## 2018-03-06 DIAGNOSIS — I77.9 AORTIC DISEASE: ICD-10-CM

## 2018-03-06 DIAGNOSIS — Z86.79 PERSONAL HISTORY OF OTHER DISEASES OF THE CIRCULATORY SYSTEM: ICD-10-CM

## 2018-03-06 DIAGNOSIS — M31.5 GIANT CELL ARTERITIS WITH POLYMYALGIA RHEUMATICA: ICD-10-CM

## 2018-03-06 DIAGNOSIS — E66.01 MORBID OBESITY: Primary | ICD-10-CM

## 2018-03-06 PROCEDURE — 99215 OFFICE O/P EST HI 40 MIN: CPT | Mod: PBBFAC | Performed by: INTERNAL MEDICINE

## 2018-03-06 PROCEDURE — 99214 OFFICE O/P EST MOD 30 MIN: CPT | Mod: S$PBB,,, | Performed by: INTERNAL MEDICINE

## 2018-03-06 PROCEDURE — 99999 PR PBB SHADOW E&M-EST. PATIENT-LVL V: CPT | Mod: PBBFAC,,, | Performed by: INTERNAL MEDICINE

## 2018-03-06 RX ORDER — SULFAMETHOXAZOLE AND TRIMETHOPRIM 800; 160 MG/1; MG/1
1 TABLET ORAL
Qty: 36 TABLET | Refills: 1 | Status: SHIPPED | OUTPATIENT
Start: 2018-03-07 | End: 2018-07-17 | Stop reason: SDUPTHER

## 2018-03-06 RX ORDER — PREDNISONE 5 MG/1
7.5 TABLET ORAL DAILY
Qty: 135 TABLET | Refills: 1 | Status: SHIPPED | OUTPATIENT
Start: 2018-03-06 | End: 2018-06-19 | Stop reason: SDUPTHER

## 2018-03-06 ASSESSMENT — ROUTINE ASSESSMENT OF PATIENT INDEX DATA (RAPID3)
MDHAQ FUNCTION SCORE: .7
PAIN SCORE: 4
AM STIFFNESS SCORE: 1, YES
PATIENT GLOBAL ASSESSMENT SCORE: 8
PSYCHOLOGICAL DISTRESS SCORE: 4.4
WHEN YOU AWAKENED IN THE MORNING OVER THE LAST WEEK, PLEASE INDICATE THE AMOUNT OF TIME IT TAKES UNTIL YOU ARE AS LIMBER AS YOU WILL BE FOR THE DAY: 10 MINS
FATIGUE SCORE: 3
TOTAL RAPID3 SCORE: 4.78

## 2018-03-06 NOTE — ASSESSMENT & PLAN NOTE
ESR 0 CRP 1.1      MRA chest as her insurance will not cover PET-CT to look for large vessel vasculitis  TA ultrasound to help gauge activity of GCA on tocilizumab  Cont Actemra 162mg sc once  A week  Decrease prednisone 7.5mg daily x 1 month,  Then 5mg daily x 1 month, then 2.5mg daily and cont  RTC 3 months with standing labs

## 2018-03-06 NOTE — PROGRESS NOTES
"Subjective:       Patient ID: Genesis Ugalde is a 62 y.o. female.    Chief Complaint: GCA, inflammatory optic neuropathy  HPI continues on tocilizumab 162mg sc weekly through combination [employer] and [employer]. Remains on prednisone 10mg daily, and alendronate 35mg weekly still pressure sensations in head, no severe headaches. Eyes stable but very limited vision. No fever, PMR Symptoms.   Review of Systems   Constitutional: Positive for fatigue. Negative for appetite change, fever and unexpected weight change.   HENT: Positive for mouth sores.    Eyes: Negative for visual disturbance.   Respiratory: Positive for cough. Negative for shortness of breath and wheezing.    Cardiovascular: Negative for chest pain and palpitations.   Gastrointestinal: Positive for diarrhea. Negative for abdominal pain, anal bleeding, blood in stool, constipation, nausea and vomiting.        Heartburn   Genitourinary: Negative for dysuria, frequency and urgency.   Musculoskeletal: Negative for arthralgias, back pain, gait problem, joint swelling, myalgias, neck pain and neck stiffness.   Skin: Negative for rash.   Neurological: Positive for headaches. Negative for weakness and numbness.   Hematological: Negative for adenopathy. Does not bruise/bleed easily.   Psychiatric/Behavioral: Negative for sleep disturbance. The patient is not nervous/anxious.          Objective:   /80   Pulse 82   Ht 5' 4.8" (1.646 m)   Wt 115.9 kg (255 lb 8 oz)   BMI 42.78 kg/m²      Physical Exam   Constitutional: She is oriented to person, place, and time and well-developed, well-nourished, and in no distress.   HENT:   Head: Normocephalic and atraumatic.   Mouth/Throat: Oropharynx is clear and moist.   Eyes: Conjunctivae and EOM are normal.   blind   Neck: Normal range of motion. Neck supple. No thyromegaly present.   Cardiovascular: Normal rate, regular rhythm, normal heart sounds and intact distal pulses.  Exam reveals no gallop and no friction rub.  "   No murmur heard.  Pulmonary/Chest: Breath sounds normal. She has no wheezes. She has no rales. She exhibits no tenderness.   Abdominal: Soft. She exhibits no distension and no mass. There is no tenderness.   obese       Right Side Rheumatological Exam     Examination finds the shoulder, elbow, wrist, knee, 1st MCP, 2nd PIP, 2nd MCP, 3rd PIP, 3rd MCP, 4th PIP, 4th MCP, 5th PIP and 5th MCP normal.    The patient has an enlarged 1st PIP, 1st CMC, 2nd DIP, 3rd DIP and 4th DIP    Left Side Rheumatological Exam     Examination finds the shoulder, elbow, wrist, knee, 1st MCP, 2nd PIP, 2nd MCP, 3rd PIP, 3rd MCP, 4th PIP, 4th MCP, 5th PIP and 5th MCP normal.    The patient has an enlarged 1st PIP, 1st CMC, 2nd DIP, 3rd DIP, 4th DIP and 5th DIP.      Lymphadenopathy:     She has no cervical adenopathy.   Neurological: She is alert and oriented to person, place, and time. She displays normal reflexes. Gait normal.   Nl motor strength UE and LE bilateral   Skin: No rash noted. No erythema. No pallor.     Psychiatric: Mood, memory, affect and judgment normal.   Musculoskeletal: She exhibits no edema.         Results for [facility], ANN V (MRN [id number]) as of 3/6/2018 08:15   Ref. Range 3/2/2018 09:12   WBC Latest Ref Range: 3.90 - 12.70 K/uL 9.36   RBC Latest Ref Range: 4.00 - 5.40 M/uL 3.88 (L)   Hemoglobin Latest Ref Range: 12.0 - 16.0 g/dL 12.2   Hematocrit Latest Ref Range: 37.0 - 48.5 % 36.9 (L)   MCV Latest Ref Range: 82 - 98 fL 95   MCH Latest Ref Range: 27.0 - 31.0 pg 31.4 (H)   MCHC Latest Ref Range: 32.0 - 36.0 g/dL 33.1   RDW Latest Ref Range: 11.5 - 14.5 % 13.3   Platelets Latest Ref Range: 150 - 350 K/uL 228   MPV Latest Ref Range: 9.2 - 12.9 fL 11.3   Gran% Latest Ref Range: 38.0 - 73.0 % 53.7   Gran # (ANC) Latest Ref Range: 1.8 - 7.7 K/uL 5.0   Immature Granulocytes Latest Ref Range: 0.0 - 0.5 % 0.4   Immature Grans (Abs) Latest Ref Range: 0.00 - 0.04 K/uL 0.04   Lymph% Latest Ref Range: 18.0 - 48.0 % 35.3    Lymph # Latest Ref Range: 1.0 - 4.8 K/uL 3.3   Mono% Latest Ref Range: 4.0 - 15.0 % 6.9   Mono # Latest Ref Range: 0.3 - 1.0 K/uL 0.7   Eosinophil% Latest Ref Range: 0.0 - 8.0 % 3.4   Eos # Latest Ref Range: 0.0 - 0.5 K/uL 0.3   Basophil% Latest Ref Range: 0.0 - 1.9 % 0.3   Baso # Latest Ref Range: 0.00 - 0.20 K/uL 0.03   nRBC Latest Ref Range: 0 /100 WBC 0   Sed Rate Latest Ref Range: 0 - 20 mm/Hr 0   Sodium Latest Ref Range: 136 - 145 mmol/L 148 (H)   Potassium Latest Ref Range: 3.5 - 5.1 mmol/L 3.7   Chloride Latest Ref Range: 95 - 110 mmol/L 112 (H)   CO2 Latest Ref Range: 23 - 29 mmol/L 24   Anion Gap Latest Ref Range: 8 - 16 mmol/L 12   BUN, Bld Latest Ref Range: 6 - 20 mg/dL 11   Creatinine Latest Ref Range: 0.5 - 1.4 mg/dL 0.8   eGFR if non African American Latest Ref Range: >60 mL/min/1.73 m^2 >60.0   eGFR if African American Latest Ref Range: >60 mL/min/1.73 m^2 >60.0   Glucose Latest Ref Range: 70 - 110 mg/dL 93   Calcium Latest Ref Range: 8.7 - 10.5 mg/dL 9.0   Alkaline Phosphatase Latest Ref Range: 55 - 135 U/L 45 (L)   Total Protein Latest Ref Range: 6.0 - 8.4 g/dL 5.9 (L)   Albumin Latest Ref Range: 3.5 - 5.2 g/dL 3.5   Total Bilirubin Latest Ref Range: 0.1 - 1.0 mg/dL 1.4 (H)   AST Latest Ref Range: 10 - 40 U/L 23   ALT Latest Ref Range: 10 - 44 U/L 35   CRP Latest Ref Range: 0.0 - 8.2 mg/L 1.1   Vit D, 25-Hydroxy Latest Ref Range: 30 - 96 ng/mL 25 (L)   Differential Method Unknown Automated     Assessment/Plan         Problem List Items Addressed This Visit     Giant cell arteritis with polymyalgia rheumatica    Overview     CSM - IOP good      Sees Benjie   He is monitoring sed rate and c react prot   Cont PO pred 10 mg a day   Cont actemra  (tocilizumab) 162 mg sc weekly  Cont prilosec (omeprazole)  20mg daily - to protect stomach      Cont brimonidine tid ou  Cont latanoprost qhs ou  Cont dorzolamide tid ou  - IOP was better      -would rec PI's prior to doing and slt's - has some mild  ant  bowing and narrowing   - avoid BB - H/O asthma as a child      Currently on prednisone 10 mg q day and decreasing- Benjie  States vision is dimmer and more constricted, has to search for letters on the keyboard unlike before  Also C/O pain in and around the left eye - no iritis or sceritis and IOP good - ?? Sinus vs GCA - on going intermittent pain      F/u 4 mo gonio - monitor - phacomorphic narrowing - may need PI's if narrows further             Current Assessment & Plan     ESR 0 CRP 1.1      MRA chest as her insurance will not cover PET-CT to look for large vessel vasculitis  TA ultrasound to help gauge activity of GCA on tocilizumab  Cont Actemra 162mg sc once  A week  Decrease prednisone 7.5mg daily x 1 month,  Then 5mg daily x 1 month, then 2.5mg daily and cont  RTC 3 months with standing labs         Relevant Medications    sulfamethoxazole-trimethoprim 800-160mg (BACTRIM DS) 800-160 mg Tab (Start on 3/7/2018)    predniSONE (DELTASONE) 5 MG tablet    Other Relevant Orders    US Temporal Artery Bilateral    Vitamin D deficiency    Current Assessment & Plan     Cont Vitamin D2 50,000 units once a week         Cushing's disease    Overview     The bone density remains normal but has decreased compared with prior. Would therefore continue alendronate(Fosamax) until prednisone dose much lower. RJQ   External Result Report     External Result Report   Narrative     : 1955 ORDERING PHYSICIAN: JOSE Bonilla LOCATION: [facility]    HISTORY: 60 y/o female with a hx of several fractures. She had surgical menopause at 39 y/o. Pts Mother had a wrist fx. Pt is taking Calcium, Vit D, Fosamax and 17.5mg of Prednisone. She is taking 50,000 units of Vit D once a week. She has a hx of Rheumatoid Arthritis, Asthma, Diabetes and Kidney Stones. She does not exercise or smoke.    TECHNIQUE: Bone Mineral Density performed using Hologic Horizon A (S/N [id number]) reveals good positioning of lumbar spine and hip.    BONE  MINERAL DENSITY RESULTS:  Lumbar Spine: Lumbar bone mineral density L1-L4 is 0.933g/cm2, which is a t-score of -1.0. The z-score is 0.3.    Total Hip: The total hip bone mineral density is 0.900g/cm2.  The t-score is -0.3, and the z-score is 0.5.  Femoral neck BMD is 0.748g/cm2 and the t-score is -0.9.    COMPARISONS:  Date Location BMD T-score  12/19/14 L-spine 0.944 -0.9  Total Hip 0.949 0.1   Impression      Normal BMD of the hip with a significant decrease of 5.1% compared with the prior study. Normal BMD of the lumbar spine without significant change compared with the prior study. The estimated 10 year risk of hip fracture is 0.84%(low risk)  and of a major osteoporotic fracture, 13.8%(moderate risk) respectively using FRAX to decide on GIOP therapy.    RECOMMENDATIONS:  1. Adequate calcium and Vitamin D, 1200 mg/day(elemental dietary only, not tablets,  given h/o kidney stones) and 800 units/day, respectively.  2. Consider continuing alendronate based on FRAX if no contraindications. Keep prednisone dose to a minimum.  3. Repeat BMD in 2 years.    EXPLANATION OF RESULTS:  The t-score compares this results to the bone density of a 25 year old of the same gender. The z-score compares this result to the average bone density to people of the same age and gender. The amounts indicate the number of standard deviations above or below the mean.  * Osteoporosis is generally defined as having a t-score between less than -2.5.  * Osteopenia is generally defined as having a t-score between -1 and -2.5.  * The normal range is generally defined as having a t-score between -1 to 1.      Electronically signed by: AKHIL GARCIA MD  Date: 12/27/16  Time: 12:51     Encounter     View Encounter                     Current Assessment & Plan     Cont alendronate 35mg po weekly until prednisone tapered to 2.5mg daily  Cont vitamin D2 50,000 units once a week             Other Visit Diagnoses     Morbid obesity    -  Primary     Relevant Orders    Ambulatory Referral to Bariatric Medicine    Encounter for follow-up examination after completed treatment for conditions other than malignant neoplasm         Relevant Orders    US Temporal Artery Bilateral    Aortic disease        Relevant Orders    MRA Chest    Personal history of other diseases of the circulatory system         Relevant Orders    MRA Chest

## 2018-03-06 NOTE — ASSESSMENT & PLAN NOTE
Cont alendronate 35mg po weekly until prednisone tapered to 2.5mg daily  Cont vitamin D2 50,000 units once a week

## 2018-03-20 ENCOUNTER — HOSPITAL ENCOUNTER (OUTPATIENT)
Dept: RADIOLOGY | Facility: HOSPITAL | Age: 63
Discharge: HOME | End: 2018-03-20
Attending: INTERNAL MEDICINE
Payer: MEDICARE

## 2018-03-20 DIAGNOSIS — I77.9 AORTIC DISEASE: ICD-10-CM

## 2018-03-20 DIAGNOSIS — Z86.79 PERSONAL HISTORY OF OTHER DISEASES OF THE CIRCULATORY SYSTEM: ICD-10-CM

## 2018-03-20 PROCEDURE — 25500020 PHARM REV CODE 255: Performed by: INTERNAL MEDICINE

## 2018-03-20 PROCEDURE — 71552 MRI CHEST W/O & W/DYE: CPT | Mod: TC

## 2018-03-20 PROCEDURE — 71552 MRI CHEST W/O & W/DYE: CPT | Mod: 26,,, | Performed by: RADIOLOGY

## 2018-03-20 PROCEDURE — A9585 GADOBUTROL INJECTION: HCPCS | Performed by: INTERNAL MEDICINE

## 2018-03-20 PROCEDURE — 71555 MRI ANGIO CHEST W OR W/O DYE: CPT | Mod: TC

## 2018-03-20 PROCEDURE — 71555 MRI ANGIO CHEST W OR W/O DYE: CPT | Mod: 26,,, | Performed by: RADIOLOGY

## 2018-03-20 RX ORDER — GADOBUTROL 604.72 MG/ML
14 INJECTION INTRAVENOUS
Status: COMPLETED | OUTPATIENT
Start: 2018-03-20 | End: 2018-03-20

## 2018-03-20 RX ADMIN — GADOBUTROL 14 ML: 604.72 INJECTION INTRAVENOUS at 09:03

## 2018-04-02 ENCOUNTER — TELEPHONE (OUTPATIENT)
Dept: RHEUMATOLOGY | Facility: CLINIC | Age: 63
End: 2018-04-02

## 2018-04-03 ENCOUNTER — PATIENT MESSAGE (OUTPATIENT)
Dept: RHEUMATOLOGY | Facility: CLINIC | Age: 63
End: 2018-04-03

## 2018-04-03 DIAGNOSIS — M31.6 TEMPORAL ARTERITIS: ICD-10-CM

## 2018-04-03 DIAGNOSIS — M31.6 GCA (GIANT CELL ARTERITIS): ICD-10-CM

## 2018-05-14 ENCOUNTER — TELEPHONE (OUTPATIENT)
Dept: RHEUMATOLOGY | Facility: CLINIC | Age: 63
End: 2018-05-14

## 2018-05-14 ENCOUNTER — PATIENT MESSAGE (OUTPATIENT)
Dept: RHEUMATOLOGY | Facility: CLINIC | Age: 63
End: 2018-05-14

## 2018-05-14 DIAGNOSIS — M17.0 PRIMARY OSTEOARTHRITIS OF BOTH KNEES: ICD-10-CM

## 2018-05-14 DIAGNOSIS — M47.816 SPONDYLOSIS OF LUMBAR SPINE: Primary | ICD-10-CM

## 2018-05-14 DIAGNOSIS — M47.812 SPONDYLOSIS OF CERVICAL REGION WITHOUT MYELOPATHY OR RADICULOPATHY: ICD-10-CM

## 2018-05-17 ENCOUNTER — TELEPHONE (OUTPATIENT)
Dept: RHEUMATOLOGY | Facility: CLINIC | Age: 63
End: 2018-05-17

## 2018-05-17 RX ORDER — OMEPRAZOLE 20 MG/1
CAPSULE, DELAYED RELEASE ORAL
Qty: 60 CAPSULE | Refills: 11 | Status: SHIPPED | OUTPATIENT
Start: 2018-05-17 | End: 2019-05-27 | Stop reason: SDUPTHER

## 2018-05-18 ENCOUNTER — PATIENT MESSAGE (OUTPATIENT)
Dept: RHEUMATOLOGY | Facility: CLINIC | Age: 63
End: 2018-05-18

## 2018-05-21 ENCOUNTER — LAB VISIT (OUTPATIENT)
Dept: LAB | Facility: HOSPITAL | Age: 63
End: 2018-05-21
Attending: INTERNAL MEDICINE
Payer: MEDICARE

## 2018-05-21 ENCOUNTER — TELEPHONE (OUTPATIENT)
Dept: RHEUMATOLOGY | Facility: CLINIC | Age: 63
End: 2018-05-21

## 2018-05-21 DIAGNOSIS — M31.6 GCA (GIANT CELL ARTERITIS): ICD-10-CM

## 2018-05-21 DIAGNOSIS — R74.01 ELEVATED TRANSAMINASE LEVEL: Primary | ICD-10-CM

## 2018-05-21 LAB
ALBUMIN SERPL BCP-MCNC: 3.8 G/DL
ALP SERPL-CCNC: 52 U/L
ALT SERPL W/O P-5'-P-CCNC: 56 U/L
ANION GAP SERPL CALC-SCNC: 9 MMOL/L
AST SERPL-CCNC: 45 U/L
BASOPHILS # BLD AUTO: 0.04 K/UL
BASOPHILS NFR BLD: 0.3 %
BILIRUB SERPL-MCNC: 1.1 MG/DL
BUN SERPL-MCNC: 11 MG/DL
CALCIUM SERPL-MCNC: 9.5 MG/DL
CHLORIDE SERPL-SCNC: 113 MMOL/L
CO2 SERPL-SCNC: 24 MMOL/L
CREAT SERPL-MCNC: 0.8 MG/DL
CRP SERPL-MCNC: 1.1 MG/L
DIFFERENTIAL METHOD: ABNORMAL
EOSINOPHIL # BLD AUTO: 0.6 K/UL
EOSINOPHIL NFR BLD: 4.9 %
ERYTHROCYTE [DISTWIDTH] IN BLOOD BY AUTOMATED COUNT: 12.8 %
ERYTHROCYTE [SEDIMENTATION RATE] IN BLOOD BY WESTERGREN METHOD: 2 MM/HR
EST. GFR  (AFRICAN AMERICAN): >60 ML/MIN/1.73 M^2
EST. GFR  (NON AFRICAN AMERICAN): >60 ML/MIN/1.73 M^2
GLUCOSE SERPL-MCNC: 94 MG/DL
HCT VFR BLD AUTO: 39.1 %
HGB BLD-MCNC: 12.9 G/DL
IMM GRANULOCYTES # BLD AUTO: 0.05 K/UL
IMM GRANULOCYTES NFR BLD AUTO: 0.4 %
LYMPHOCYTES # BLD AUTO: 3.8 K/UL
LYMPHOCYTES NFR BLD: 30 %
MCH RBC QN AUTO: 32.2 PG
MCHC RBC AUTO-ENTMCNC: 33 G/DL
MCV RBC AUTO: 98 FL
MONOCYTES # BLD AUTO: 0.8 K/UL
MONOCYTES NFR BLD: 6.5 %
NEUTROPHILS # BLD AUTO: 7.3 K/UL
NEUTROPHILS NFR BLD: 57.9 %
NRBC BLD-RTO: 0 /100 WBC
PLATELET # BLD AUTO: 250 K/UL
PMV BLD AUTO: 11.6 FL
POTASSIUM SERPL-SCNC: 3.9 MMOL/L
PROT SERPL-MCNC: 6.1 G/DL
RBC # BLD AUTO: 4.01 M/UL
SODIUM SERPL-SCNC: 146 MMOL/L
WBC # BLD AUTO: 12.54 K/UL

## 2018-05-21 PROCEDURE — 85651 RBC SED RATE NONAUTOMATED: CPT

## 2018-05-21 PROCEDURE — 80053 COMPREHEN METABOLIC PANEL: CPT

## 2018-05-21 PROCEDURE — 36415 COLL VENOUS BLD VENIPUNCTURE: CPT | Mod: PO

## 2018-05-21 PROCEDURE — 85025 COMPLETE CBC W/AUTO DIFF WBC: CPT

## 2018-05-21 PROCEDURE — 86140 C-REACTIVE PROTEIN: CPT

## 2018-05-25 ENCOUNTER — OFFICE VISIT (OUTPATIENT)
Dept: OPHTHALMOLOGY | Facility: CLINIC | Age: 63
End: 2018-05-25
Payer: MEDICARE

## 2018-05-25 DIAGNOSIS — H46.9 OPTIC NEURITIS: ICD-10-CM

## 2018-05-25 DIAGNOSIS — H25.13 NUCLEAR SCLEROSIS, BILATERAL: ICD-10-CM

## 2018-05-25 DIAGNOSIS — H40.043 STEROID RESPONDER, BILATERAL: ICD-10-CM

## 2018-05-25 DIAGNOSIS — H54.7 VISUAL LOSS: ICD-10-CM

## 2018-05-25 DIAGNOSIS — H54.10 BLINDNESS AND LOW VISION: Primary | ICD-10-CM

## 2018-05-25 DIAGNOSIS — M31.5 GIANT CELL ARTERITIS WITH POLYMYALGIA RHEUMATICA: ICD-10-CM

## 2018-05-25 DIAGNOSIS — H46.9 INFLAMMATORY OPTIC NEUROPATHY: ICD-10-CM

## 2018-05-25 DIAGNOSIS — H40.1131 PRIMARY OPEN ANGLE GLAUCOMA OF BOTH EYES, MILD STAGE: ICD-10-CM

## 2018-05-25 PROCEDURE — 99212 OFFICE O/P EST SF 10 MIN: CPT | Mod: PBBFAC | Performed by: OPHTHALMOLOGY

## 2018-05-25 PROCEDURE — 92012 INTRM OPH EXAM EST PATIENT: CPT | Mod: S$PBB,,, | Performed by: OPHTHALMOLOGY

## 2018-05-25 PROCEDURE — 99999 PR PBB SHADOW E&M-EST. PATIENT-LVL II: CPT | Mod: PBBFAC,,, | Performed by: OPHTHALMOLOGY

## 2018-05-25 RX ORDER — LATANOPROST 50 UG/ML
1 SOLUTION/ DROPS OPHTHALMIC NIGHTLY
Qty: 1 BOTTLE | Refills: 12 | Status: SHIPPED | OUTPATIENT
Start: 2018-05-25 | End: 2019-06-15 | Stop reason: SDUPTHER

## 2018-05-25 RX ORDER — GABAPENTIN 600 MG/1
600 TABLET ORAL 3 TIMES DAILY
COMMUNITY
End: 2019-06-03

## 2018-05-25 RX ORDER — DORZOLAMIDE HCL 20 MG/ML
1 SOLUTION/ DROPS OPHTHALMIC 3 TIMES DAILY
Qty: 10 ML | Refills: 12 | Status: SHIPPED | OUTPATIENT
Start: 2018-05-25 | End: 2019-06-15 | Stop reason: SDUPTHER

## 2018-05-25 RX ORDER — BRIMONIDINE TARTRATE 2 MG/ML
1 SOLUTION/ DROPS OPHTHALMIC 3 TIMES DAILY
Qty: 5 ML | Refills: 12 | Status: SHIPPED | OUTPATIENT
Start: 2018-05-25 | End: 2019-02-01 | Stop reason: SDUPTHER

## 2018-05-25 NOTE — PROGRESS NOTES
HPI     Glaucoma    Additional comments: IOP ck today           Comments   DLS: 1/19/18  **Pt states she had a flare up about 3 weeks ago which caused swelling   painful left eye.    1) Autoimmune Optic Neuropathy OU  2) POAG vs OHT  3) GCA with PMR  4) Type 2 DM   5) Steroid Responder  6) NS OU    MEDS:  Dorzolamide TID OU  Brimonidine TID OU  Latanoprost QHS OU  Prednisone 5mg QDAY PO       Last edited by Sydnee Galvez MA on 5/25/2018  9:28 AM. (History)            Assessment /Plan     For exam results, see Encounter Report.    Blindness and low vision    Inflammatory optic neuropathy    Giant cell arteritis with polymyalgia rheumatica    Primary open angle glaucoma of both eyes, mild stage    Optic neuritis    Steroid responder, bilateral    Nuclear sclerosis, bilateral    Visual loss          1. Autoimmune optic neuropathy OU,   - OS 2004  - OD 2012  - unknown antibiody  - several treatments with IV steroid and IVIG  - vision and VF per [place] records  - PO steroid daily - dose increased by rheum recently to 20mg po daily  - sural nerve and muscle biopsy done - results pending - [facility]  - xal qhs ou   - monitor  - rheum monitoring esr/crp  - all previous mri's and lp negative per rheum note  Last ESR 70    2.  Steroid responder in past  - on chronic steroids for #1  iop good today   Continue xal qhs ou    3. POAG - mild vs OHT ou      First HVF   2004   First photos   2004   Treatment / Drops started   2004           Family history    neg        Glaucoma meds    Latanoprost ou        H/O adverse rxn to glaucoma drops    none        LASERS    None         GLAUCOMA SURGERIES    none        OTHER EYE SURGERIES    none        CDR    0.3 w/ pallor / 0.8 w/ pallor        Tbase    25-27 / 25-27          Tmax    27/27            Ttarget    20/20             HVF    12 test 2004 to 2012  - SALT / IAD od // Ext os                    4 10-2 stim V OD 2012 - 2014 - [facility]    (+ prog from  "2012 to 2014 - when followed at [facility])    7- test-  10-2 stim V test done od 12/2014 to 2017 - dense SALT and IAD (+stable)         Gonio    +2-3 ou (very slight narrowing)         CCT    591/612        OCT    7 test 2006 to 2016 - RNFL - dec throughout od // dec thru out  os        HRT    6 test 2004 to 2018 - MR - nl od // off-center os // new baseline 1/25/2016 - nl ou -"too good to be true"        Disc photos    2004 - slides // 2008, 2010, 2012, , 2012, 2012, 2012, 2015, 2017   - OIS     - Ttoday    14/14  - Test done today   IOP check // fill out disability forms     PLAN  CSM - IOP good     Sees Benjie   He is monitoring sed rate and c react prot   Cont PO pred 10 mg a day   Cont actemra  (tocilizumab) 162 mg sc weekly  Cont prilosec (omeprazole)  20mg daily - to protect stomach     Cont brimonidine tid ou  Cont latanoprost qhs ou  Cont dorzolamide tid ou  - IOP was better     -would rec PI's prior to doing and slt's - has some mild  ant bowing and narrowing   - avoid BB - H/O asthma as a child     Currently on prednisone 10 mg q day and decreasing- Benjie  States vision is dimmer and more constricted, has to search for letters on the keyboard unlike before  Also C/O pain in and around the left eye - no iritis or sceritis and IOP good - ?? Sinus vs GCA - on going intermittent pain     F/u 4 mo HVF 10-2 stim V od only // DFE // OCT     I have seen and personally examined the patient.  I agree with the findings, assessment and plan of the resident and/or fellow.     Tanna Ro MD  "

## 2018-06-14 ENCOUNTER — LAB VISIT (OUTPATIENT)
Dept: LAB | Facility: HOSPITAL | Age: 63
End: 2018-06-14
Attending: INTERNAL MEDICINE
Payer: MEDICARE

## 2018-06-14 DIAGNOSIS — M31.6 GCA (GIANT CELL ARTERITIS): ICD-10-CM

## 2018-06-14 LAB
ALBUMIN SERPL BCP-MCNC: 4 G/DL
ALP SERPL-CCNC: 48 U/L
ALT SERPL W/O P-5'-P-CCNC: 55 U/L
ANION GAP SERPL CALC-SCNC: 10 MMOL/L
AST SERPL-CCNC: 38 U/L
BASOPHILS # BLD AUTO: 0.04 K/UL
BASOPHILS NFR BLD: 0.4 %
BILIRUB SERPL-MCNC: 1.2 MG/DL
BUN SERPL-MCNC: 15 MG/DL
CALCIUM SERPL-MCNC: 9.4 MG/DL
CHLORIDE SERPL-SCNC: 112 MMOL/L
CO2 SERPL-SCNC: 22 MMOL/L
CREAT SERPL-MCNC: 0.8 MG/DL
CRP SERPL-MCNC: 1.3 MG/L
DIFFERENTIAL METHOD: ABNORMAL
EOSINOPHIL # BLD AUTO: 0.4 K/UL
EOSINOPHIL NFR BLD: 3.7 %
ERYTHROCYTE [DISTWIDTH] IN BLOOD BY AUTOMATED COUNT: 13.1 %
ERYTHROCYTE [SEDIMENTATION RATE] IN BLOOD BY WESTERGREN METHOD: 0 MM/HR
EST. GFR  (AFRICAN AMERICAN): >60 ML/MIN/1.73 M^2
EST. GFR  (NON AFRICAN AMERICAN): >60 ML/MIN/1.73 M^2
GLUCOSE SERPL-MCNC: 114 MG/DL
HCT VFR BLD AUTO: 38.6 %
HGB BLD-MCNC: 12.9 G/DL
IMM GRANULOCYTES # BLD AUTO: 0.05 K/UL
IMM GRANULOCYTES NFR BLD AUTO: 0.5 %
LYMPHOCYTES # BLD AUTO: 2.3 K/UL
LYMPHOCYTES NFR BLD: 23.1 %
MCH RBC QN AUTO: 32.4 PG
MCHC RBC AUTO-ENTMCNC: 33.4 G/DL
MCV RBC AUTO: 97 FL
MONOCYTES # BLD AUTO: 0.6 K/UL
MONOCYTES NFR BLD: 5.8 %
NEUTROPHILS # BLD AUTO: 6.5 K/UL
NEUTROPHILS NFR BLD: 66.5 %
NRBC BLD-RTO: 0 /100 WBC
PLATELET # BLD AUTO: 240 K/UL
PMV BLD AUTO: 11.8 FL
POTASSIUM SERPL-SCNC: 4.5 MMOL/L
PROT SERPL-MCNC: 6.2 G/DL
RBC # BLD AUTO: 3.98 M/UL
SODIUM SERPL-SCNC: 144 MMOL/L
WBC # BLD AUTO: 9.75 K/UL

## 2018-06-14 PROCEDURE — 80053 COMPREHEN METABOLIC PANEL: CPT

## 2018-06-14 PROCEDURE — 85651 RBC SED RATE NONAUTOMATED: CPT

## 2018-06-14 PROCEDURE — 86140 C-REACTIVE PROTEIN: CPT

## 2018-06-14 PROCEDURE — 85025 COMPLETE CBC W/AUTO DIFF WBC: CPT

## 2018-06-14 PROCEDURE — 36415 COLL VENOUS BLD VENIPUNCTURE: CPT | Mod: PO

## 2018-06-15 ENCOUNTER — TELEPHONE (OUTPATIENT)
Dept: RHEUMATOLOGY | Facility: CLINIC | Age: 63
End: 2018-06-15

## 2018-06-15 DIAGNOSIS — Z79.52 ON PREDNISONE THERAPY: ICD-10-CM

## 2018-06-15 DIAGNOSIS — K76.0 NAFLD (NONALCOHOLIC FATTY LIVER DISEASE): Primary | ICD-10-CM

## 2018-06-15 RX ORDER — ALENDRONATE SODIUM 35 MG/1
35 TABLET ORAL WEEKLY
Qty: 12 TABLET | Refills: 3 | Status: SHIPPED | OUTPATIENT
Start: 2018-06-15 | End: 2019-06-03

## 2018-06-19 ENCOUNTER — OFFICE VISIT (OUTPATIENT)
Dept: RHEUMATOLOGY | Facility: CLINIC | Age: 63
End: 2018-06-19
Payer: MEDICARE

## 2018-06-19 VITALS
DIASTOLIC BLOOD PRESSURE: 85 MMHG | WEIGHT: 251.5 LBS | HEART RATE: 78 BPM | BODY MASS INDEX: 41.9 KG/M2 | HEIGHT: 65 IN | SYSTOLIC BLOOD PRESSURE: 139 MMHG

## 2018-06-19 DIAGNOSIS — M31.6 TEMPORAL ARTERITIS: ICD-10-CM

## 2018-06-19 DIAGNOSIS — K76.0 FATTY LIVER: ICD-10-CM

## 2018-06-19 DIAGNOSIS — I10 ESSENTIAL HYPERTENSION: ICD-10-CM

## 2018-06-19 DIAGNOSIS — M31.5 GIANT CELL ARTERITIS WITH POLYMYALGIA RHEUMATICA: ICD-10-CM

## 2018-06-19 DIAGNOSIS — E55.9 VITAMIN D DEFICIENCY: ICD-10-CM

## 2018-06-19 DIAGNOSIS — M31.6 GCA (GIANT CELL ARTERITIS): ICD-10-CM

## 2018-06-19 DIAGNOSIS — E78.5 HYPERLIPIDEMIA, UNSPECIFIED HYPERLIPIDEMIA TYPE: ICD-10-CM

## 2018-06-19 DIAGNOSIS — E78.5 DYSLIPIDEMIA: ICD-10-CM

## 2018-06-19 DIAGNOSIS — I77.6 VASCULITIS, CNS: ICD-10-CM

## 2018-06-19 DIAGNOSIS — E66.01 MORBID OBESITY WITH BMI OF 40.0-44.9, ADULT: ICD-10-CM

## 2018-06-19 DIAGNOSIS — Z79.52 CURRENT CHRONIC USE OF SYSTEMIC STEROIDS: ICD-10-CM

## 2018-06-19 DIAGNOSIS — H54.7 VISUAL LOSS: ICD-10-CM

## 2018-06-19 PROCEDURE — 99215 OFFICE O/P EST HI 40 MIN: CPT | Mod: PBBFAC | Performed by: INTERNAL MEDICINE

## 2018-06-19 PROCEDURE — 99999 PR PBB SHADOW E&M-EST. PATIENT-LVL V: CPT | Mod: PBBFAC,,, | Performed by: INTERNAL MEDICINE

## 2018-06-19 PROCEDURE — 99214 OFFICE O/P EST MOD 30 MIN: CPT | Mod: S$PBB,,, | Performed by: INTERNAL MEDICINE

## 2018-06-19 RX ORDER — PREDNISONE 5 MG/1
10 TABLET ORAL DAILY
Qty: 180 TABLET | Refills: 1 | Status: SHIPPED | OUTPATIENT
Start: 2018-06-19 | End: 2018-11-21

## 2018-06-19 ASSESSMENT — ROUTINE ASSESSMENT OF PATIENT INDEX DATA (RAPID3)
WHEN YOU AWAKENED IN THE MORNING OVER THE LAST WEEK, PLEASE INDICATE THE AMOUNT OF TIME IT TAKES UNTIL YOU ARE AS LIMBER AS YOU WILL BE FOR THE DAY: 1 HR
PATIENT GLOBAL ASSESSMENT SCORE: 2.5
TOTAL RAPID3 SCORE: 3.17
PAIN SCORE: 3
PSYCHOLOGICAL DISTRESS SCORE: 5.5
MDHAQ FUNCTION SCORE: 1.2
FATIGUE SCORE: 2.5
AM STIFFNESS SCORE: 1, YES

## 2018-06-19 NOTE — PROGRESS NOTES
Rapid3 Question Responses and Scores 6/16/2018   MDHAQ Score 1.2   Psychologic Score 5.5   Pain Score 3   When you awakened in the morning OVER THE LAST WEEK, did you feel stiff? Yes   If Yes, please indicate the number of hours until you are as limber as you will be for the day 1   Fatigue Score 2.5   Global Health Score 2.5   RAPID3 Score 3.16       Answers for HPI/ROS submitted by the patient on 6/16/2018   fever: No  eye redness: No  headaches: Yes  shortness of breath: Yes  chest pain: Yes  trouble swallowing: No  diarrhea: Yes  constipation: No  unexpected weight change: No  genital sore: Yes  dysuria: No  During the last 3 days, have you had a skin rash?: No  Bruises or bleeds easily: No  cough: No

## 2018-06-19 NOTE — PROGRESS NOTES
"Subjective:       Patient ID: Genesis Ugalde is a 62 y.o. female.    Chief Complaint: GCA  HPI she had severe pain neck, shoulder girdle and temporal  headache several weeks ago which responded greatly to increasing prednisone from 2.5mg daily to 5mg daily but not completely. Felt better when on prednisone 10mg daily. Tocilizumab weekly sc, no co-pay. Saw Dr. Ro last month, note reviewed. No fever. + some jaw pain before increasing prednisone dose. Arthralgias and "knots" pips and dips.  Review of Systems   Constitutional: Negative for appetite change, fatigue, fever and unexpected weight change.   HENT: Negative for mouth sores and trouble swallowing.    Eyes: Negative for redness and visual disturbance.   Respiratory: Positive for shortness of breath. Negative for cough and wheezing.    Cardiovascular: Positive for chest pain. Negative for palpitations.   Gastrointestinal: Positive for diarrhea. Negative for abdominal pain, anal bleeding, blood in stool, constipation, nausea and vomiting.   Genitourinary: Positive for genital sores. Negative for dysuria, frequency and urgency.   Musculoskeletal: Negative for arthralgias, back pain, gait problem, joint swelling, myalgias, neck pain and neck stiffness.   Skin: Negative for rash.   Neurological: Positive for headaches. Negative for weakness and numbness.   Hematological: Negative for adenopathy. Does not bruise/bleed easily.   Psychiatric/Behavioral: Negative for sleep disturbance. The patient is not nervous/anxious.          Objective:   /85   Pulse 78   Ht 5' 4.8" (1.646 m)   Wt 114.1 kg (251 lb 8 oz)   BMI 42.11 kg/m²      Physical Exam   Constitutional: She is oriented to person, place, and time and well-developed, well-nourished, and in no distress.   Morbidly obese   HENT:   Head: Normocephalic and atraumatic.   Mouth/Throat: Oropharynx is clear and moist.   Pulsatile, non-tender TAs   Eyes: Conjunctivae and EOM are normal.   Strabismus, blind " OS   Neck: Normal range of motion. Neck supple. No thyromegaly present.   Cardiovascular: Normal rate, regular rhythm, normal heart sounds and intact distal pulses.  Exam reveals no gallop and no friction rub.    No murmur heard.  Pulmonary/Chest: Breath sounds normal. She has no wheezes. She has no rales. She exhibits no tenderness.   Abdominal: Soft. She exhibits no distension and no mass. There is no tenderness.   obese       Right Side Rheumatological Exam     Examination finds the shoulder, elbow, wrist, knee, 1st PIP, 1st MCP, 2nd PIP, 2nd MCP, 3rd PIP, 3rd MCP, 4th PIP, 4th MCP, 5th PIP and 5th MCP normal.    Left Side Rheumatological Exam     Examination finds the shoulder, elbow, wrist, knee, 1st PIP, 1st MCP, 2nd PIP, 2nd MCP, 3rd PIP, 3rd MCP, 4th PIP, 4th MCP, 5th PIP and 5th MCP normal.      Lymphadenopathy:     She has no cervical adenopathy.   Neurological: She is alert and oriented to person, place, and time. She displays normal reflexes. Gait normal.   Nl motor strength UE and LE bilateral   Skin: No rash noted. No erythema. No pallor.     Psychiatric: Mood, memory, affect and judgment normal.   Musculoskeletal: She exhibits no edema.       Results for EGJERSON, ANN RIDLEY (MRN [id number]) as of 6/19/2018 08:18   Ref. Range 6/14/2018 10:10   WBC Latest Ref Range: 3.90 - 12.70 K/uL 9.75   RBC Latest Ref Range: 4.00 - 5.40 M/uL 3.98 (L)   Hemoglobin Latest Ref Range: 12.0 - 16.0 g/dL 12.9   Hematocrit Latest Ref Range: 37.0 - 48.5 % 38.6   MCV Latest Ref Range: 82 - 98 fL 97   MCH Latest Ref Range: 27.0 - 31.0 pg 32.4 (H)   MCHC Latest Ref Range: 32.0 - 36.0 g/dL 33.4   RDW Latest Ref Range: 11.5 - 14.5 % 13.1   Platelets Latest Ref Range: 150 - 350 K/uL 240   MPV Latest Ref Range: 9.2 - 12.9 fL 11.8   Gran% Latest Ref Range: 38.0 - 73.0 % 66.5   Gran # (ANC) Latest Ref Range: 1.8 - 7.7 K/uL 6.5   Immature Granulocytes Latest Ref Range: 0.0 - 0.5 % 0.5   Immature Grans (Abs) Latest Ref Range: 0.00 - 0.04  K/uL 0.05 (H)   Lymph% Latest Ref Range: 18.0 - 48.0 % 23.1   Lymph # Latest Ref Range: 1.0 - 4.8 K/uL 2.3   Mono% Latest Ref Range: 4.0 - 15.0 % 5.8   Mono # Latest Ref Range: 0.3 - 1.0 K/uL 0.6   Eosinophil% Latest Ref Range: 0.0 - 8.0 % 3.7   Eos # Latest Ref Range: 0.0 - 0.5 K/uL 0.4   Basophil% Latest Ref Range: 0.0 - 1.9 % 0.4   Baso # Latest Ref Range: 0.00 - 0.20 K/uL 0.04   nRBC Latest Ref Range: 0 /100 WBC 0   Sed Rate Latest Ref Range: 0 - 20 mm/Hr 0   Sodium Latest Ref Range: 136 - 145 mmol/L 144   Potassium Latest Ref Range: 3.5 - 5.1 mmol/L 4.5   Chloride Latest Ref Range: 95 - 110 mmol/L 112 (H)   CO2 Latest Ref Range: 23 - 29 mmol/L 22 (L)   Anion Gap Latest Ref Range: 8 - 16 mmol/L 10   BUN, Bld Latest Ref Range: 6 - 20 mg/dL 15   Creatinine Latest Ref Range: 0.5 - 1.4 mg/dL 0.8   eGFR if non African American Latest Ref Range: >60 mL/min/1.73 m^2 >60.0   eGFR if African American Latest Ref Range: >60 mL/min/1.73 m^2 >60.0   Glucose Latest Ref Range: 70 - 110 mg/dL 114 (H)   Calcium Latest Ref Range: 8.7 - 10.5 mg/dL 9.4   Alkaline Phosphatase Latest Ref Range: 55 - 135 U/L 48 (L)   Total Protein Latest Ref Range: 6.0 - 8.4 g/dL 6.2   Albumin Latest Ref Range: 3.5 - 5.2 g/dL 4.0   Total Bilirubin Latest Ref Range: 0.1 - 1.0 mg/dL 1.2 (H)   AST Latest Ref Range: 10 - 40 U/L 38   ALT Latest Ref Range: 10 - 44 U/L 55 (H)   CRP Latest Ref Range: 0.0 - 8.2 mg/L 1.3   Differential Method Unknown Automated        Assessment/Plan         Problem List Items Addressed This Visit     Giant cell arteritis with polymyalgia rheumatica    Overview          The MRA of the chest is normal, no evidence of large vessel vasculitis.RJQ          PACS Images     Show images for MRA Chest   Reviewed By Tonya Waldrop MD on 3/20/2018 14:21   Patient Result Comments     Viewed by Genesis Ugalde V on 3/20/2018  5:33 PM   Written by Forrest Waldrop MD on 3/20/2018  2:20 PM   The MRA of the chest is normal, no  evidence of large vessel vasculitis.RJQ   External Result Report     External Result Report   Narrative     EXAMINATION:  MRA CHEST    CLINICAL HISTORY:  Aortic dz, non-traumatic, known or suspectPersonal history of other diseases of the circulatory system    TECHNIQUE:  Multiplanar, multisequence imaging was performed through the chest to evaluate the aorta.  This exam is performed prior to and following the administration of 14 mL of intravenous Gadavist.    COMPARISON:  CT chest 02/19/2016    FINDINGS:  The thoracic aorta is normal in caliber, contour, and course.  There are 3 branch vessels from the aortic arch.  No evidence of stenosis or aneurysm.  No evidence of abnormal wall thickening or surrounding inflammatory change.    The visualized lungs are without significant abnormality.  The heart is within normal limits.    The visualized upper abdominal structures demonstrate hepatomegaly.    The osseous structures are without evidence for acute fracture.  Subcutaneous tissues are unremarkable.   Impression       Unremarkable MRA chest exam.    Electronically signed by resident: Jeannie Hdz  Date: 03/20/2018  Time: 10:36    Electronically signed by: Herrera Rodriguez MD  Date: 03/20/2018  Time: 11:16    Encounter     View Encounter          Signed by Resident          Cont brimonidine tid ou  Cont latanoprost qhs ou  Cont dorzolamide tid ou  - IOP was better      -would rec PI's prior to doing and slt's - has some mild  ant bowing and narrowing   - avoid BB - H/O asthma as a child      Currently on prednisone 10 mg q day and decreasing- Quinet  States vision is dimmer and more constricted, has to search for letters on the keyboard unlike before  Also C/O pain in and around the left eye - no iritis or sceritis and IOP good - ?? Sinus vs GCA - on going intermittent pain      F/u 4 mo HVF 10-2 stim V od only // DFE // OCT      I have seen and personally examined the patient.  I agree with the findings, assessment  and plan of the resident and/or fellow.      Tanna Ro MD    CSM - IOP good      Sees Benjie   He is monitoring sed rate and c react prot   Cont PO pred 10 mg a day   Cont actemra  (tocilizumab) 162 mg sc weekly  Cont prilosec (omeprazole)  20mg daily - to protect stomach      Cont brimonidine tid ou  Cont latanoprost qhs ou  Cont dorzolamide tid ou  - IOP was better      -would rec PI's prior to doing and slt's - has some mild  ant bowing and narrowing   - avoid BB - H/O asthma as a child      Currently on prednisone 10 mg q day and decreasing- Quinet  States vision is dimmer and more constricted, has to search for letters on the keyboard unlike before  Also C/O pain in and around the left eye - no iritis or sceritis and IOP good - ?? Sinus vs GCA - on going intermittent pain      F/u 4 mo gonio - monitor - phacomorphic narrowing - may need PI's if narrows further            Current Assessment & Plan     ESR 0 CRP 1.3  MRI/MRA chest 3/20/18 neg    *Shingrix,   Schedule previously ordered TA US  CTA neck and head   No metformin  For 48 hrs after study  Cont tocilizumab 162mg sc weekly  Increase prednisone back to 10mg daily  Cont asa 325mg daily  Consult with Vascular Neurology other etiologies of vascular headache.  RTC 3 months with standing labs         Relevant Medications    predniSONE (DELTASONE) 5 MG tablet    Other Relevant Orders    (In Office Administered) Zoster Recombinant Vaccine    CTA Head and Neck (xpd)    Ambulatory Referral to Vascular Neurology    Morbid obesity with BMI of 40.0-44.9, adult    Current Assessment & Plan     Lost 4#  Re- Ref to Bariatric Medicine         Relevant Orders    Ambulatory Referral to Bariatric Medicine    Hypertension    Current Assessment & Plan     139/85    Cont lisinopril 40mg daily  Cont metoprolol succinate 200mg daily         Visual loss    Current Assessment & Plan     F/u Dr. Ro         Vitamin D deficiency    Overview     Results for EGBI,  ANN RIDLEY (MRN [id number]) as of 2018 08:18   Ref. Range 3/2/2018 09:12   Vit D, 25-Hydroxy Latest Ref Range: 30 - 96 ng/mL 25 (L)            Current Assessment & Plan     Vitamin D ordered  Cont vitamin D2 50,000 units once  A week         Relevant Orders    Vitamin D    Fatty liver    Current Assessment & Plan     F/u Dr. Blackwell         Dyslipidemia    Overview     Results for ANN WHITNEY (MRN [id number]) as of 2018 08:18   Ref. Range 7/10/2017 11:00   Cholesterol Latest Ref Range: 120 - 199 mg/dL 168   HDL Latest Ref Range: 40 - 75 mg/dL 72   LDL Cholesterol Latest Ref Range: 63.0 - 159.0 mg/dL 66.4   Total Cholesterol/HDL Ratio Latest Ref Range: 2.0 - 5.0  2.3   Triglycerides Latest Ref Range: 30 - 150 mg/dL 148            Current Assessment & Plan     Lipid panel ordered  Cont pravastatin 40mg nightly         Current chronic use of systemic steroids    Overview     The bone density remains normal but has decreased compared with prior. Would therefore continue alendronate(Fosamax) until prednisone dose much lower. RJQ   External Result Report     External Result Report   Narrative     : 1955 ORDERING PHYSICIAN: JOSE Bonilla LOCATION: [facility]    HISTORY: 62 y/o female with a hx of several fractures. She had surgical menopause at 39 y/o. Pts Mother had a wrist fx. Pt is taking Calcium, Vit D, Fosamax and 17.5mg of Prednisone. She is taking 50,000 units of Vit D once a week. She has a hx of Rheumatoid Arthritis, Asthma, Diabetes and Kidney Stones. She does not exercise or smoke.    TECHNIQUE: Bone Mineral Density performed using Hologic Horizon A (S/N [id number]) reveals good positioning of lumbar spine and hip.    BONE MINERAL DENSITY RESULTS:  Lumbar Spine: Lumbar bone mineral density L1-L4 is 0.933g/cm2, which is a t-score of -1.0. The z-score is 0.3.    Total Hip: The total hip bone mineral density is 0.900g/cm2.  The t-score is -0.3, and the z-score is 0.5.  Femoral neck BMD is  0.748g/cm2 and the t-score is -0.9.    COMPARISONS:  Date Location BMD T-score  12/19/14 L-spine 0.944 -0.9  Total Hip 0.949 0.1   Impression      Normal BMD of the hip with a significant decrease of 5.1% compared with the prior study. Normal BMD of the lumbar spine without significant change compared with the prior study. The estimated 10 year risk of hip fracture is 0.84%(low risk)  and of a major osteoporotic fracture, 13.8%(moderate risk) respectively using FRAX to decide on GIOP therapy.    RECOMMENDATIONS:  1. Adequate calcium and Vitamin D, 1200 mg/day(elemental dietary only, not tablets,  given h/o kidney stones) and 800 units/day, respectively.  2. Consider continuing alendronate based on FRAX if no contraindications. Keep prednisone dose to a minimum.        FRAX major 12% hip 0.2%(moderate risk)         Current Assessment & Plan     Cont alendronate 35 mg once a week   Repeat DXA 12/27/18         Hyperlipidemia    Relevant Orders    (In Office Administered) Zoster Recombinant Vaccine    LIPID PANEL      Other Visit Diagnoses     Vasculitis, CNS        Relevant Orders    CTA Head and Neck (xpd)    Temporal arteritis        Relevant Medications    tocilizumab (ACTEMRA) 162 mg/0.9 mL Syrg    GCA (giant cell arteritis)        Relevant Medications    tocilizumab (ACTEMRA) 162 mg/0.9 mL Syrg

## 2018-06-19 NOTE — ASSESSMENT & PLAN NOTE
ESR 0 CRP 1.3  MRI/MRA chest 3/20/18 neg    *Shingrix,   Schedule previously ordered TA US  CTA neck and head   No metformin  For 48 hrs after study  Cont tocilizumab 162mg sc weekly  Increase prednisone back to 10mg daily  Cont asa 325mg daily  Consult with Vascular Neurology other etiologies of vascular headache.  RTC 3 months with standing labs

## 2018-06-21 ENCOUNTER — TELEPHONE (OUTPATIENT)
Dept: RHEUMATOLOGY | Facility: CLINIC | Age: 63
End: 2018-06-21

## 2018-06-21 ENCOUNTER — HOSPITAL ENCOUNTER (OUTPATIENT)
Dept: RADIOLOGY | Facility: HOSPITAL | Age: 63
Discharge: HOME | End: 2018-06-21
Attending: INTERNAL MEDICINE
Payer: MEDICARE

## 2018-06-21 DIAGNOSIS — Z09 ENCOUNTER FOR FOLLOW-UP EXAMINATION AFTER COMPLETED TREATMENT FOR CONDITIONS OTHER THAN MALIGNANT NEOPLASM: ICD-10-CM

## 2018-06-21 DIAGNOSIS — M31.5 GIANT CELL ARTERITIS WITH POLYMYALGIA RHEUMATICA: ICD-10-CM

## 2018-06-21 PROCEDURE — 93880 EXTRACRANIAL BILAT STUDY: CPT | Mod: TC

## 2018-06-21 PROCEDURE — 93880 EXTRACRANIAL BILAT STUDY: CPT | Mod: 26,,, | Performed by: RADIOLOGY

## 2018-06-27 NOTE — PROGRESS NOTES
Subjective:       Patient ID: Genesis Ugalde is a 62 y.o. female.    Chief Complaint: Consult    CC:Weight     Current attempts at weight loss: New pt to me, referred by Forrest Waldrop MD  [address] [place] [address] , with Patient Active Problem List:     Giant cell arteritis with polymyalgia rheumatica     Morbid obesity with BMI of 40.0-44.9, adult     Hypertension     Visual loss     Optic neuritis     Type 2 diabetes mellitus     Kidney stones     Uveitis     Vitamin D deficiency     Hx of colonic polyps     Blindness and low vision- reason she is disabled.      Cushing's disease     Recurrent genital herpes     Hepatosplenomegaly     Fatty liver     Primary open angle glaucoma, both eyes     Lung nodule     Epigastric abdominal pain     Absence attack     Transient memory loss     Right sided sciatica     Diarrhea     Esophageal dysphagia     Immunosuppression     Loose stools     Dyslipidemia     Medication monitoring encounter     Claustrophobia     Pain of right hip joint     LOYDA (obstructive sleep apnea)     Current chronic use of systemic steroids     Hyperlipidemia     No results found for: HGBA1C  Lab Results       Component                Value               Date                       GLUF                     79                  01/06/2011                 LDLCALC                  66.4                07/10/2017                 CREATININE               0.8                 06/14/2018               Her prednisone dose has been decreased, on 10 mg daily currently.  No other efforts currently.     Previous diet attempts: Starvation diet to get into  when younger.     History of medication for loss: Denies.   checked today     Heaviest weight: 292#    Lightest weight: 135#    Goal weight: Under 200#      Last eye exam:    + glaucoma.                         Provider:    Typical eating patterns:  Disabled. Lives with 2 brothers that disabled, and niece and son. Pt cooks some.  "  Breakfast: eggs, grits and meat-ham, duarte, sausage. Grilled cheese.     Lunch: skips.     Dinner: 3-4 pm. Hamburger, rice and beans, baked chicken, jambalaya, spaghetti, gumbo, sandwiches.     Snacks: fruit.     Beverages: water, milk, gatorade. Denies ETOH.     Willingness to change:  8/10    EKG:Normal sinus rhythm  Normal ECG  When compared with ECG of 17-JAN-2012 08:44,  No significant change was found    BMR: [id number]      Review of Systems   Constitutional: Positive for chills. Negative for fever.   Respiratory: Negative for shortness of breath.         Does not use CPAP. Says mix up about getting machine, and has not followed up about it.    Cardiovascular: Positive for chest pain. Negative for leg swelling.        Mostly after meals.    Gastrointestinal: Positive for diarrhea. Negative for constipation.        + GERD   Genitourinary: Negative for difficulty urinating and dysuria.   Musculoskeletal: Positive for arthralgias and myalgias.   Neurological: Negative for dizziness and light-headedness.   Psychiatric/Behavioral: Positive for dysphoric mood. The patient is nervous/anxious.        Objective:     /62   Pulse 96   Ht 5' 3" (1.6 m)   Wt 113.3 kg (249 lb 12.5 oz)   BMI 44.25 kg/m²     Physical Exam   Constitutional: She is oriented to person, place, and time. She appears well-developed. No distress.   Morbidly obese     HENT:   Head: Normocephalic and atraumatic.   Eyes: EOM are normal. Pupils are equal, round, and reactive to light. No scleral icterus.   Neck: Normal range of motion. Neck supple. No thyromegaly present.   Cardiovascular: Normal rate and normal heart sounds.  Exam reveals no gallop and no friction rub.    No murmur heard.  Pulmonary/Chest: Effort normal and breath sounds normal. No respiratory distress. She has no wheezes.   Abdominal: Soft. Bowel sounds are normal. She exhibits no distension. There is tenderness in the right upper quadrant.   Musculoskeletal: Normal range of " motion. She exhibits no edema.   Neurological: She is alert and oriented to person, place, and time. No cranial nerve deficit.   Skin: Skin is warm and dry. No erythema.   Psychiatric: She has a normal mood and affect. Her behavior is normal. Judgment normal.   Vitals reviewed.      Assessment:       1. Class 3 severe obesity due to excess calories with serious comorbidity and body mass index (BMI) of 40.0 to 44.9 in adult    2. LOYDA (obstructive sleep apnea)    3. Fatty liver    4. Type 2 diabetes mellitus without complication, without long-term current use of insulin    5. Giant cell arteritis with polymyalgia rheumatica    6. Dyslipidemia    7. Kidney stones    8. Primary open angle glaucoma (POAG) of both eyes, mild stage    9. Transient memory loss        Plan:         1. Class 3 severe obesity due to excess calories with serious comorbidity and body mass index (BMI) of 40.0 to 44.9 in adult  Unlimited green vegetables, tomatoes, mushrooms, spaghetti squash, cauliflower, meat, poultry, seafood, eggs and hard cheeses.   Milk and plain yogurt  Dressings, seasonings, condiments, etc should have less than 2 g sugars.   Beans (1-1.5 cups) or nuts (1/4 cup) can have 1 x a day.   1-2 servings of citrus fruits, berries, pineapple or melon a day (1/2 cup)  Avoid fried foods    No grains, rice, pasta, potatoes, bread, corn, peas, oatmeal, grits, tortillas, crackers, chips    No soda, sweet tea, juices, sports drinks or lemonade    [email or web address] for recipes. Moderate carb intake.        Protein and fiber in every meal.       Sit and Be Fit exercise    Meal ideas and spring recipes given    2. LOYDA (obstructive sleep apnea)  Discussed importance of compliance with treatment, and that LOYDA does require getting closer to normal BMI range to see improvement that some other co-morbidities. Continue with CPAP.     3. Fatty liver  Discussed with patient that the only treatment for fatty liver is to lose weight.  Without doing  so, it may progress to cirrhosis, permanent liver damage and possibly liver failure. Expect improvement with weight loss.      4. Type 2 diabetes mellitus without complication, without long-term current use of insulin  Start Trulicity once a week.     Decrease portions as soon as you start Trulicity. Some nausea in the first 2 weeks is not unusual.     If you get pain across the upper abdomen and around to your back, please call the office.      5. Giant cell arteritis with polymyalgia rheumatica      6. Dyslipidemia  Expect improvement with weight loss. Recheck after 10% TBW lost.      7. Kidney stones  Avoid topiramate    8. Primary open angle glaucoma (POAG) of both eyes, mild stage  Avoid Topiramate    9. Transient memory loss  Avoid Topiramate

## 2018-06-28 ENCOUNTER — HOSPITAL ENCOUNTER (OUTPATIENT)
Dept: RADIOLOGY | Facility: HOSPITAL | Age: 63
Discharge: HOME | End: 2018-06-28
Attending: INTERNAL MEDICINE
Payer: MEDICARE

## 2018-06-28 ENCOUNTER — INITIAL CONSULT (OUTPATIENT)
Dept: BARIATRICS | Facility: CLINIC | Age: 63
End: 2018-06-28
Payer: MEDICARE

## 2018-06-28 VITALS
BODY MASS INDEX: 44.26 KG/M2 | HEART RATE: 96 BPM | HEIGHT: 63 IN | SYSTOLIC BLOOD PRESSURE: 130 MMHG | WEIGHT: 249.81 LBS | DIASTOLIC BLOOD PRESSURE: 62 MMHG

## 2018-06-28 DIAGNOSIS — N20.0 KIDNEY STONES: ICD-10-CM

## 2018-06-28 DIAGNOSIS — M31.5 GIANT CELL ARTERITIS WITH POLYMYALGIA RHEUMATICA: ICD-10-CM

## 2018-06-28 DIAGNOSIS — E78.5 DYSLIPIDEMIA: ICD-10-CM

## 2018-06-28 DIAGNOSIS — G47.33 OSA (OBSTRUCTIVE SLEEP APNEA): ICD-10-CM

## 2018-06-28 DIAGNOSIS — R41.3 TRANSIENT MEMORY LOSS: ICD-10-CM

## 2018-06-28 DIAGNOSIS — E66.01 CLASS 3 SEVERE OBESITY DUE TO EXCESS CALORIES WITH SERIOUS COMORBIDITY AND BODY MASS INDEX (BMI) OF 40.0 TO 44.9 IN ADULT: Primary | ICD-10-CM

## 2018-06-28 DIAGNOSIS — H40.1131 PRIMARY OPEN ANGLE GLAUCOMA (POAG) OF BOTH EYES, MILD STAGE: ICD-10-CM

## 2018-06-28 DIAGNOSIS — I77.6 VASCULITIS, CNS: ICD-10-CM

## 2018-06-28 DIAGNOSIS — K76.0 FATTY LIVER: ICD-10-CM

## 2018-06-28 DIAGNOSIS — E11.9 TYPE 2 DIABETES MELLITUS WITHOUT COMPLICATION, WITHOUT LONG-TERM CURRENT USE OF INSULIN: ICD-10-CM

## 2018-06-28 PROCEDURE — 70496 CT ANGIOGRAPHY HEAD: CPT | Mod: 26,,, | Performed by: RADIOLOGY

## 2018-06-28 PROCEDURE — 25500020 PHARM REV CODE 255: Performed by: INTERNAL MEDICINE

## 2018-06-28 PROCEDURE — 99205 OFFICE O/P NEW HI 60 MIN: CPT | Mod: S$PBB,,, | Performed by: INTERNAL MEDICINE

## 2018-06-28 PROCEDURE — 70496 CT ANGIOGRAPHY HEAD: CPT | Mod: TC

## 2018-06-28 PROCEDURE — 70498 CT ANGIOGRAPHY NECK: CPT | Mod: 26,,, | Performed by: RADIOLOGY

## 2018-06-28 PROCEDURE — 99213 OFFICE O/P EST LOW 20 MIN: CPT | Mod: PBBFAC,25 | Performed by: INTERNAL MEDICINE

## 2018-06-28 PROCEDURE — 99999 PR PBB SHADOW E&M-EST. PATIENT-LVL III: CPT | Mod: PBBFAC,,, | Performed by: INTERNAL MEDICINE

## 2018-06-28 RX ADMIN — IOHEXOL 100 ML: 350 INJECTION, SOLUTION INTRAVENOUS at 08:06

## 2018-06-28 NOTE — PATIENT INSTRUCTIONS
program on WLAE, GreenerU or youtube 3-4 times a week this month Start Trulicity once a week.     Decrease portions as soon as you start Trulicity. Some nausea in the first 2 weeks is not unusual.     If you get pain across the upper abdomen and around to your back, please call the office.     3-4 small meals a day made up of the following:  Unlimited green vegetables, tomatoes, mushrooms, spaghetti squash, cauliflower, meat, poultry, seafood, eggs and hard cheeses.   Milk and plain yogurt  Dressings, seasonings, condiments, etc should have less than 2 g sugars.   Beans (1-1.5 cups) or nuts (1/4 cup) can have 1 x a day.   1-2 servings of citrus fruits, berries, pineapple or melon a day (1/2 cup)  Avoid fried foods    No grains, rice, pasta, potatoes, bread, corn, peas, oatmeal, grits, tortillas, crackers, chips    No soda, sweet tea, juices, sports drinks or lemonade    [email or web address] for recipes. Moderate carb intake.        Protein and fiber in every meal.       Sit and Be Fit exercise      Meal Ideas for Regular Bariatric Diet  *Recipes and products available at [email or web address]      Breakfast: (15-20g protein)    - Egg white omelet: 2 egg whites or ½ cup Egg Beaters. (Optional proteins: cheese, shrimp, black beans, chicken, sliced turkey) (Optional veggies: tomatoes, salsa, spinach, mushrooms, onions, green peppers, or small slice avocado)     - Egg and sausage: 1 egg or ¼ cup Egg Beaters (any variety), with 1 mt or 2 links of Turkey sausage or Veggie breakfast sausage ([employer] or [employer])    - Crust-less breakfast quiche: To make a glass pie dish, mix 4oz part skim Ricotta, 1 cup skim milk, and 2 eggs as your base. Add protein: shredded cheese, sliced lean ham or turkey, turkey duarte/sausage. Add veggies: tomato, onion, green onion, mushroom, green pepper, spinach, etc.    - Yogurt parfait: Mix 1 - 6oz container Dannon Light N Fit vanilla yogurt, with ¼ cup Kashi Go Lean cereal    - Cottage  cheese and fruit: ½ cup part-skim cottage cheese or ricotta cheese topped with fresh fruit or sugar free preserves     - Grace Mercer's Vanilla Egg custard* (add 2 Tbsp instant coffee granules to make Cappuccino Custard*)    - Hi-Protein café latte (skim milk, decaf coffee, 1 scoop protein powder). Optional to add Sugar free syrup or extract flavoring.    Lunch: (20-30g protein)    - ½ cup Black bean soup (Homemade or Progresso), with ¼ cup shredded low-fat cheese. Top with chopped tomato or fresh salsa.     - Lean deli turkey breast and low-fat sliced cheese, mustard or light villatoro to moisten, rolled up together, or wrapped in a Flaco lettuce leaf    - Chicken salad made from dinner leftovers, moisten with low-fat salad dressing or light villatoro. Also try leftover salmon, shrimp, tuna or boiled eggs. Serve ½ cup over dark green salad    - Fat-free canned refried beans, topped with ¼ cup shredded low-fat cheese. Top with chopped tomato or fresh salsa.     - Greek salad: Top mixed greens with 1-2oz grilled chicken, tomatoes, red onions, 2-3 kalamata olives, and sprinkle lightly with feta cheese. Spritz with Balsamic vinegar to taste.     - Crust-less lunch quiche: To make a glass pie dish, mix 4oz part skim Ricotta, 1 cup skim milk, and 2 eggs as your base. Add protein: shredded cheese, sliced lean ham or turkey, shrimp, chicken. Add veggies: tomato, onion, green onion, mushroom, green pepper, spinach, artichoke, broccoli, etc.    - Pizza bake: tomato sauce, low-fat shredded mozzarella and turkey pepperoni or Chilean duarte. Add any veggies.    - Cucumber crab bites: Spread ¼ cup crab dip (lump crabmeat + light cream cheese and green onions) over sliced cucumber.     - Chicken with light spinach and artichoke dip*: Puree in : 6oz cooked and drained spinach, 2 cloves garlic, 1 can cannelloni beans, ½ cup chopped green onions, 1 can drained artichoke hearts (not marinated in oil), lemon juice and basil. Mix  in 2oz chopped up chicken.    Supper: (20-30g protein)    - Serve grilled fish over dark green salad tossed with low-fat dressing, served with grilled asparagus rivero     - Rotisserie chicken salad: served with sliced strawberries, walnuts, fat-free feta cheese crumbles and 1 tbsp Seamans Own Light Raspberry [place] Vinaigrette    - Shrimp cocktail: Dip cold boiled shrimp in homemade low-sugar cocktail sauce (1/2 cup Eleanor One Carb ketchup, 2 tbsp horseradish, 1/4 tsp hot sauce, 1 tsp Worcestershire sauce, 1 tbsp freshly-squeezed lemon juice). Serve with dark green salad, walnuts, and crumbled blue cheese drizzled with olive oil and Balsamic vinegar    - Tuna Melt: Spread tuna salad onto 2 thick slices of tomato. Top with low-fat cheese and broil until cheese is melted. May also be made with chicken salad of shrimp salad. [place] with different types of cheeses.    - Homemade low-fat Chili using extra lean ground beef or ground turkey. Top with shredded cheese and salsa as desired. May add dollop fat-free sour cream if desired    - Dinner Omelet with shrimp or chicken and onion, green peppers and chives.    - No noodle lasagna: Use sliced zucchini or eggplant in place of noodles.  Layer with part skim ricotta cheese and low sugar meat sauce (use very lean ground beef or ground turkey).    - Mexican chicken bake: Bake chunks of chicken breast or thigh with taco seasoning, Pace brand enchilada sauce, green onions and low-fat cheese. Serve with ¼ cup black beans or fat free refried beans topped with chopped tomatoes or salsa.    - Jeovany frozen meatballs, simmered in Classico Marinara sauce. Different flavors of salsa or spaghetti sauce create different dishes! Sprinkle with parmesan cheese. Serve with grilled or steamed veggies, or a dark green salad.    - Simmer boneless skinless chicken thigh chunks in Classico Marinara sauce or roasted salsa until tender with chopped onion, bell pepper, garlic, mushrooms,  spinach, etc.     - Hamburger, without the bun, dressed the way you like. Served with grilled or steamed veggies.    - Eggplant parmesan: Bake slices of eggplant at 350 degrees for 15 minutes. Layer tomato sauce, sliced eggplant and low-fat mozzarella cheese in a baking dish and cover with foil. Bake 30-40 more minutes or until bubbly. Uncover and bake at 400 degrees for about 15 more minutes, or until top is slightly crisp.    - Fish tacos: grilled/baked white fish, wrapped in Flaco lettuce leaf, topped with salsa, shredded low-fat cheese, and light coleslaw.    Snacks: (100-200 calories; >5g protein)    - 1 low-fat cheese stick with 8 cherry tomatoes or 1 serving fresh fruit  - 4 thin slices fat-free turkey breast and 1 slice low-fat cheese  - 4 thin slices fat-free honey ham with wedge of melon  - 1/4 cup unsalted nuts with ½ cup fruit  - 6-oz container Dannon Light n Fit vanilla yogurt, topped with 1oz unsalted nuts         - apple, celery or baby carrots spread with 2 Tbsp natural peanut butter or almond butter   - apple slices with 1 oz slice low-fat cheese  - celery, cucumber, bell pepper or baby carrots dipped in ¼ cup hummus bean spread or light spinach and artichoke dip (*recipe in lunch section)  - 100 calorie bag microwave light popcorn with 3 tbsp grated parmesan cheese  - Brian Links Beef Steak - 14g protein! (similar to beef jerky)  - 2 wedges [place] - Light Herb & Garlic Cheese with sliced cucumber or green bell pepper  - 1/2 cup low-fat cottage cheese with ¼ cup fruit or ¼ cup salsa  - RTD Protein drinks: Atkins, Low Carb Slim Fast, EAS light, Muscle Milk Light, etc.  - Homemade Protein drinks: GNC Soy95, Isopure, Nectar, UNJURY, Whey Gourmet, etc. Mix 1 scoop powder with 8oz skim/1% milk or light soymilk.  - Protein bars: Atkins, EAS, Pure Protein, Think Thin, Detour, etc. Must have 0-4 grams sugar - Read the label.    Takeout Options: No more than twice/week  Deli - Salads (no pasta or  "rice), meats, cheeses. Roasted chicken. Lox (salmon)    Mexican - Platters which don't include tortillas, chips, or rice. Go easy on the beans. Example: Fajitas without the tortillas. Ask the  not to bring chips to the table if they are too tempting.    Greek - Meat or fish and vegetable, but no bread or rice. Including hummus, baba ganoush, etc, is OK. Most sit-down Greek restaurants can provide you with cucumber slices for dipping instead of raman bread.    Fast Food (Avoid as much as possible) - Salads (no croutons and limit salad dressing to 2 tbsp), grilled chicken sandwich without the bun and ask for no villatoro. Vickis low fat chili or Taco Bell pintos and cheese.    BBQ - The meats are fine if you ask for sauces on the side, but most of the traditional side dishes are loaded with carbs. Ramirez slaw, baked beans and BBQ sauce are typically made with sugar.    Chinese - Nothing deep-fried, no rice or noodles. Many Chinese sauces have starch and sugar in them, so you'll have to use your judgement. If you find that these sauces trigger cravings, or cause Dumping, you can ask for the sauce to be made without sugar or just use soy sauce.        Spring Recipes:    [place] Shake:     Ingredients  ½ cup low fat cottage cheese  ¼ cup vanilla protein powder  1/8 tsp mint extract  2-3 packets of stevia or artificial sweetener of choice  5-10 ice cubes (more or less depending on how thick you would like it)  4 oz of water  2-3 drops of green food coloring OR a small handful of spinach to make it green    Put all ingredients in  and  until desired consistency.      "Unstuffed" Cabbage Rolls:    Ingredients:  1 1/2 to 2 pounds lean ground beef or turkey  1 large onion, chopped  1 clove garlic, minced  1 small cabbage, chopped  2 cans (14.5 ounces each) diced tomatoes  1 can (8 ounces) tomato sauce  ¼ - ½ cup water depending on desired consistency  1 teaspoon ground black pepper, salt to taste    Spray large " skillet with nonstick cookspray. Heat pan on medium heat. Sauté the onions until tender, and then add the ground beef (or turkey) and cook until the meat is browned.    Add the garlic, cook an additional minute before adding the remaining ingredients. Cover, reduce the heat and simmer about 25 minutes (or until the cabbage is  fork tender)        Not so Chan's Pie:    Ingredients  2 (or more) pounds of lean ground beef, or turkey  2 heads of cauliflower or 3 bags of frozen cauliflower, chopped  1 bag of frozen mixed veggies          (NO Corn, Peas or Potatoes!)  1-2 onions  1 egg  1 teaspoon each of basil, garlic powder, pepper, oregano and a little cayenne  4-5 sprays of I cant believe its not butter spray  4 ounces of fat free cream cheese (optional)  Low fat Cheese to top (optional)    Roast cauliflower in oven on 350* F for about 15-20 minutes, until browned and fork tender. (begin lisa meat while cauliflower is cooking). Place cooked cauliflower in . Add 4 ounces of fat free cream cheese, 4-5 sprays of I cant believe its not butter SPRAY, and spices and puree until creamy.     While cauliflower is roasting, brown meat in large skillet and season to taste. Set aside. Sauté diced onion in skillet until somewhat soft. Set aside with meat. Pour mixed veggies in the skillet to heat on low heat.     Mix the meat, onions, mixed veggies, raw egg and any additional seasonings and put in bottom of 9x13 baking dish. Spread mashed cauliflower mixture over it until smooth.    Bake at 350 for approximately 30 minutes. Add cheese and bake 5 additional minutes (optional). Serve warm (or reheat later).    Crock Pot Balsamic Pork Tenderloin:    Ingredients:  1 tsp dried thyme  1 tsp ground pepper  ½ tsp salt  3 tbsp dried minced onion  2 tsp dried basil  ¼ cup low sodium broth  ½ cup balsamic vinegar  2 cloves garlic, minced  2 lbs Pork Tenderloin    Mix first 5 ingredients together. Rub pork tenderloin with  dry seasoning mixture.  In a large sauté pan, heat ¼ cup balsamic vinegar and garlic on medium heat. Add pork to sauté pagan and sear, lisa all sides. Add low sodium broth, 1 tbsp at a time to keep tenderloin from sticking or use nonstick cookspray.     Transfer meat to crock pot. Pour remaining balsamic vinegar into sauté pan and continue  to stir for a few minutes to deglaze the pan. Pour juices over the top of the tenderloin in crockpot. Cook on high for 3 hours or until meat thermometer says 170*. Let rest 5-10 minutes and then slice.       Side Dish: Steamed carrots w/ garlic and noemi    Ingredients:  2 garlic cloves, minced  1 lb baby carrots  5-6 sprays of I cant believe its not butter SPRAY  1 tsp minced peeled fresh noemi  1 tbsp chopped fresh cilantro  ½ tsp grated lime rind  1 tbsp fresh lime juice   ¼ tsp salt    Prepare garlic; let stand 10 minutes. Steam carrots, covered in pot, about 10 minutes or until tender.     In a nonstick skillet over medium heat, spray pan w/ I cant believe its not butter SPRAY. Add garlic and noemi and cook 1 minute, stirring constantly. Remove from  heat; stir in carrots, cilantro and remaining ingredients.         Side Dish: Green Bean Almondine    Ingredients:  1 pound fresh green beans, trimmed  1 tbsp ezra vinegar  1 ½ tsp water  1 tsp Anthony Mustard  ¼ tsp salt  ¼ tsp freshly ground black pepper  1 tbsp sliced almonds, toasted    Cook green beans in boiling water for 4 minutes or until crisp-tender. Drain and plunge beans into ice water. Drain well. Pat beans dry w/ paper towel.     Combine vinegar, water, mustard, salt and pepper in a medium bowl. Add beans to vinegar mixture; toss to coat well. Sprinkle with toasted almonds.      How to Cook the Perfect Hard Boiled Egg:    Steam in water: Fill a large pot with 1 inch of water. Place steamer insert inside, cover, and bring to a boil over high heat. Add eggs to steamer basket, cover, and continue cooking 12  minute. If serving cold, immediately place eggs in a bowl of ice water and allow to cool for at least 15 minutes before peeling under cool running water. Store in the refrigerator for up to 5 days.    OR    Purchase Dash Go Rapid Egg Boiler: available @ Amazon, [employer], [employer], [employer] for ~$15-$20      Classic Egg Salad Recipe:    Ingredients:  8 hard cooked eggs, peeled and coarsely chopped  4-6 tbsp of low fat or fat free villatoro  2 tbsp celery, chopped  2 tsp feliz mustard  Dash of cayenne pepper (for spice)  Black pepper, salt to taste    In a medium bowl, combine villatoro, celery, mustard and cayenne pepper with chopped eggs. Season w/ pepper and salt. Serve over Lettuce leaves.     Get Creative! Add chopped turkey duarte and tomato and serve on lettuce leaves for an egg-cellent BLT egg salad or mix lean diced ham, low fat cheddar and tomatoes for  egg salad    Tuna Deviled Eggs:    Ingredients:  12 hard boiled eggs  1 can of tuna packed in water  1 rib celery, diced  ¼ cup low fat villatoro  1 tsp mustard  Garlic powder, black pepper to taste  Dash of paprika (for finish)    Cut eggs in half lengthwise. Remove yolk and mash in bowl. In separate bowl, mix tuna, celery, low fat villatoro, mustard and seasonings.  Stir in egg yolks until blended. Stuff eggs and sprinkle dash of paprika      Duarte Jalapeno Deviled Eggs:    Ingredients:  12 hard boiled eggs, peeled  ½ cup low fat villatoro  1 ½ tsp rice vinegar  ¾ tsp ground mustard  ½ packet splenda  2 jalapenos, seeded and chopped, 6 pieces turkey duarte, cooked crisp and crumbled  Dash of paprika (for finish)    Cut eggs in half lengthwise. Remove yolk and mash in bowl. Add villatoro, vinegar, spices and Splenda until well combined. Mix in jalapenos and duarte. Stuff eggs and sprinkle w/ dash of paprika      Sugar-Free High Protein Brownie Recipe:    Ingredients:  2 cups of pureed zucchini  4 oz fat free cream cheese  1 large egg  2 scoops chocolate protein powder  1  tbsp pure vanilla extract  1/3 cup unsweetened cocoa powder    ¼ tsp salt  2 tbsp chopped nuts  *8-9 packets of splenda or artificial sweetener of choice    Preheat oven to 350* F.     Line an 8x8 pan w/ parchment paper or spray with nonstick cookspray.     In a medium bowl, blend the fat free cream cheese with egg until creamy. Add chocolate protein powder and cocoa powder until creamy. Add vanilla extract. Stir in pureed zucchini and salt.     The batter will be thick, but not dry. If batter seems dry, add 1-2 tbsp water. Fold in Nuts. Pour batter in prepared pan.     Bake for 30 minutes. Allow to cool completely. Cut into squares and chill to semi-set.     *best if eaten within 2 days but may last 3-4 days in fridge.         Lemon Whip:    Ingredients:  Small box of sugar-free vanilla instant pudding mix  1 small packet of crystal light lemonade mix  2 cups skim milk or fat free fairlife milk  Sugar-free, fat free cool whip     Make sugar-free pudding using 2 cups skim milk according to package. Stir in 1 small packet of crystal light lemonade mix.  Fold in a dollop of sugar-free, fat free cool whip and stir to combine.     Home-made Sugar-free Pudding Pops:    Ingredients:  1 small pkg of sugar-free instant pudding mix (flavor of choice!)  2 cups fat free milk     Beat ingredients with whisk for 2 minutes. Pour into 6 paper or plastic cups or into a popsicle mold. Insert wooden pop stick in center of each cup.     Freeze for 5 hours or until firm. Peel off paper or pull out of popsicle mold.     Variations: add sugar-free syrups to change up the flavor, such as sugar-free chocolate pudding + sugar free raspberry syrup = chocolate raspberry fudgesicle.

## 2018-06-28 NOTE — LETTER
July 1, 2018      Forrest Waldrop MD  [address]  [facility] [address]           [facility]  [address]  [facility] [address]  Phone: [phone]  Fax: [phone]          Patient: Genesis Ugalde   MR Number: [id number]   YOB: 1955   Date of Visit: 6/28/2018       Dear Dr. Forrest Waldrop:    Thank you for referring Genesis Ugalde to me for evaluation. Attached you will find relevant portions of my assessment and plan of care.    If you have questions, please do not hesitate to call me. I look forward to following Genesis Ugalde along with you.    Sincerely,    Jaylyn Hanna MD    Enclosure  CC:  No Recipients    If you would like to receive this communication electronically, please contact [email or web address] or [phone] to request more information on [employer] Link access.    For providers and/or their staff who would like to refer a patient to [facility], please contact us through our one-stop-shop provider referral line, [facility], at [phone].    If you feel you have received this communication in error or would no longer like to receive these types of communications, please e-mail [email or web address]

## 2018-06-30 ENCOUNTER — TELEPHONE (OUTPATIENT)
Dept: RHEUMATOLOGY | Facility: CLINIC | Age: 63
End: 2018-06-30

## 2018-06-30 DIAGNOSIS — E04.1 RIGHT THYROID NODULE: ICD-10-CM

## 2018-06-30 DIAGNOSIS — R91.8 GROUND GLASS OPACITY PRESENT ON IMAGING OF LUNG: ICD-10-CM

## 2018-06-30 DIAGNOSIS — R06.02 SHORTNESS OF BREATH: ICD-10-CM

## 2018-06-30 DIAGNOSIS — E04.1 THYROID NODULE: Primary | ICD-10-CM

## 2018-07-01 ENCOUNTER — PATIENT MESSAGE (OUTPATIENT)
Dept: RHEUMATOLOGY | Facility: CLINIC | Age: 63
End: 2018-07-01

## 2018-07-02 ENCOUNTER — TELEPHONE (OUTPATIENT)
Dept: RHEUMATOLOGY | Facility: CLINIC | Age: 63
End: 2018-07-02

## 2018-07-09 ENCOUNTER — HOSPITAL ENCOUNTER (OUTPATIENT)
Dept: RADIOLOGY | Facility: HOSPITAL | Age: 63
Discharge: HOME | End: 2018-07-09
Attending: INTERNAL MEDICINE
Payer: MEDICARE

## 2018-07-09 DIAGNOSIS — E04.1 THYROID NODULE: ICD-10-CM

## 2018-07-09 DIAGNOSIS — M31.5 GIANT CELL ARTERITIS WITH POLYMYALGIA RHEUMATICA: ICD-10-CM

## 2018-07-09 DIAGNOSIS — E04.1 RIGHT THYROID NODULE: ICD-10-CM

## 2018-07-09 DIAGNOSIS — R91.8 GROUND GLASS OPACITY PRESENT ON IMAGING OF LUNG: ICD-10-CM

## 2018-07-09 DIAGNOSIS — R06.02 SHORTNESS OF BREATH: ICD-10-CM

## 2018-07-09 DIAGNOSIS — M25.551 PAIN OF RIGHT HIP JOINT: ICD-10-CM

## 2018-07-09 DIAGNOSIS — M31.6 GIANT CELL ARTERITIS: ICD-10-CM

## 2018-07-09 PROCEDURE — 76536 US EXAM OF HEAD AND NECK: CPT | Mod: 26,,, | Performed by: RADIOLOGY

## 2018-07-09 PROCEDURE — 71250 CT THORAX DX C-: CPT | Mod: TC

## 2018-07-09 PROCEDURE — 76536 US EXAM OF HEAD AND NECK: CPT | Mod: TC

## 2018-07-09 PROCEDURE — 71250 CT THORAX DX C-: CPT | Mod: 26,,, | Performed by: RADIOLOGY

## 2018-07-09 RX ORDER — VALACYCLOVIR HYDROCHLORIDE 500 MG/1
500 TABLET, FILM COATED ORAL DAILY
Qty: 90 TABLET | Refills: 3 | Status: SHIPPED | OUTPATIENT
Start: 2018-07-09 | End: 2019-02-21

## 2018-07-10 ENCOUNTER — OFFICE VISIT (OUTPATIENT)
Dept: NEUROLOGY | Facility: CLINIC | Age: 63
End: 2018-07-10
Payer: MEDICARE

## 2018-07-10 VITALS
DIASTOLIC BLOOD PRESSURE: 89 MMHG | SYSTOLIC BLOOD PRESSURE: 141 MMHG | BODY MASS INDEX: 43.86 KG/M2 | HEIGHT: 63 IN | HEART RATE: 77 BPM | WEIGHT: 247.56 LBS

## 2018-07-10 DIAGNOSIS — G89.29 CHRONIC NONINTRACTABLE HEADACHE, UNSPECIFIED HEADACHE TYPE: Primary | ICD-10-CM

## 2018-07-10 DIAGNOSIS — R51.9 CHRONIC NONINTRACTABLE HEADACHE, UNSPECIFIED HEADACHE TYPE: Primary | ICD-10-CM

## 2018-07-10 PROCEDURE — 99999 PR PBB SHADOW E&M-EST. PATIENT-LVL IV: CPT | Mod: PBBFAC,,, | Performed by: PSYCHIATRY & NEUROLOGY

## 2018-07-10 PROCEDURE — 99214 OFFICE O/P EST MOD 30 MIN: CPT | Mod: PBBFAC,PO | Performed by: PSYCHIATRY & NEUROLOGY

## 2018-07-10 PROCEDURE — 99214 OFFICE O/P EST MOD 30 MIN: CPT | Mod: S$PBB,,, | Performed by: PSYCHIATRY & NEUROLOGY

## 2018-07-10 NOTE — LETTER
July 12, 2018      Forrest Waldrop MD  [address]  [facility] [address]           [facility]  [address] [place] [address] Melani  Sean MARTINEZ [address]  Phone: [phone]  Fax: [phone]          Patient: Genesis Ugalde   MR Number: [id number]   YOB: 1955   Date of Visit: 7/10/2018       Dear Dr. Forrest Waldrop:    Thank you for referring Genesis Ugalde to me for evaluation. Attached you will find relevant portions of my assessment and plan of care.    If you have questions, please do not hesitate to call me. I look forward to following Genesis Ugalde along with you.    Sincerely,    Dustin Kearney MD    Enclosure  CC:  No Recipients    If you would like to receive this communication electronically, please contact [email or web address] or [phone] to request more information on [employer] Link access.    For providers and/or their staff who would like to refer a patient to [facility], please contact us through our one-stop-shop provider referral line, [facility], at [phone].    If you feel you have received this communication in error or would no longer like to receive these types of communications, please e-mail [email or web address]

## 2018-07-12 ENCOUNTER — TELEPHONE (OUTPATIENT)
Dept: RHEUMATOLOGY | Facility: CLINIC | Age: 63
End: 2018-07-12

## 2018-07-12 ENCOUNTER — PATIENT MESSAGE (OUTPATIENT)
Dept: RHEUMATOLOGY | Facility: CLINIC | Age: 63
End: 2018-07-12

## 2018-07-12 DIAGNOSIS — G47.33 OSA (OBSTRUCTIVE SLEEP APNEA): Primary | ICD-10-CM

## 2018-07-12 NOTE — PROGRESS NOTES
Genesis Ugalde is a 62 y.o. year old female that  presents with complains of HA.  Chief Complaint   Patient presents with    rheumyalgia   .     HPI:  Mrs Ugalde has Giant cell arteritis, Polymyalgia rheumatica, HTN, DM, asthma, LOYDA no compliant with CPAP, and anxiety.  She comes in complaining of daily, mainly R sided HA, mild in intensity, dull/sharp in quality, no associated with nausea, vomiting, photophobia, phonophobia, vertigo, focal weakness, confusion, slurred speech, or language impairment. Further, denies periorbital swelling, tearing, nasal congestion, or conjunctival injection. No recent fever, chills, head or neck trauma.  The HA typically appears early in the morning and persists off and on throughout the day. No specific provoking or alleviating factors.   She indicated that the HA really becomes quite severe when she experiences a flare up of GCA.  Denies excessive use of analgesics.   CTA head and neck done 6/19 showed no arterial involvement intracranially and no hemodynamically significant R ICA stenosis.  She is prednisone 10 mg daily and Tocilizumab weekly sc.    Past Medical History:   Diagnosis Date    Anxiety     Asthma     Cataract     Depression     Diabetes mellitus     Disease of immune system     Giant cell arteritis     Glaucoma     Hypertension     Polymyalgia rheumatica     Uveitis      Social History     Social History    Marital status: Single     Spouse name: N/A    Number of children: N/A    Years of education: N/A     Occupational History    Not on file.     Social History Main Topics    Smoking status: Never Smoker    Smokeless tobacco: Never Used    Alcohol use No    Drug use: No    Sexual activity: Not on file     Other Topics Concern    Not on file     Social History Narrative    No narrative on file     Past Surgical History:   Procedure Laterality Date    ANKLE SURGERY      CHOLECYSTECTOMY      COLONOSCOPY N/A 9/28/2015    Procedure: COLONOSCOPY;  " Surgeon: Alok Dykes MD;  Location: [facility] ([address] [place] [address]);  Service: Endoscopy;  Laterality: N/A;    CYST REMOVAL      right lung    HYSTERECTOMY       Family History   Problem Relation Age of Onset    Coronary artery disease Mother     Diabetes Mother     Hypertension Mother     Stroke Mother     Coronary artery disease Father     Emphysema Father     Diabetes Father     Emphysema Sister     Heart attack Sister     Cancer Brother 58        stomach    Stomach cancer Brother     Glaucoma Neg Hx     Asthma Neg Hx     Celiac disease Neg Hx     Colon cancer Neg Hx     Colon polyps Neg Hx     Esophageal cancer Neg Hx     Inflammatory bowel disease Neg Hx     Rectal cancer Neg Hx     Ulcerative colitis Neg Hx     Macular degeneration Neg Hx     Retinal detachment Neg Hx     Strabismus Neg Hx     Amblyopia Neg Hx     Blindness Neg Hx     Cataracts Neg Hx            Review of Systems  General ROS: negative for chills, fever or weight loss  Psychological ROS: negative for hallucination, depression or suicidal ideation  Ophthalmic ROS: negative for blurry vision, photophobia or eye pain  ENT ROS: negative for epistaxis, sore throat or rhinorrhea  Respiratory ROS: no cough, shortness of breath, or wheezing  Cardiovascular ROS: no chest pain or dyspnea on exertion  Gastrointestinal ROS: no abdominal pain, change in bowel habits, or black/ bloody stools  Genito-Urinary ROS: no dysuria, trouble voiding, or hematuria  Musculoskeletal ROS: negative for gait disturbance or muscular weakness  Neurological ROS: no syncope or seizures; no ataxia  Dermatological ROS: negative for pruritis, rash and jaundice      Physical Exam:  BP (!) 141/89   Pulse 77   Ht 5' 3" (1.6 m)   Wt 112.3 kg (247 lb 9.2 oz)   BMI 43.86 kg/m²   General appearance: alert, cooperative, no distress  Constitutional:Oriented to person, place, and time.appears well-developed and well-nourished.   HEENT: Normocephalic, atraumatic, " neck symmetrical, no nasal discharge   Eyes: conjunctivae/corneas clear, PERRL, EOM's intact  Lungs: clear to auscultation bilaterally, no dullness to percussion bilaterally  Heart: regular rate and rhythm without rub; no displacement of the PMI   Abdomen: soft, non-tender; bowel sounds normoactive; no organomegaly  Extremities: extremities symmetric; no clubbing, cyanosis, or edema  Integument: Skin color, texture, turgor normal; no rashes; hair distrubution normal  Neurologic:   Mental status: alert and awake, oriented x 4, comprehension, naming, and repetition intact. No right to left confusion. Performs serial 7's without difficulty .No dysarthria.  CN 2-12: pupils 4 mm bilaterally, reactive to light on the R only. Fundi without papilledema. 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 VF impaired bilaterally . EOM full without nystagmus. Face sensation normal in all distributions. Face symmetric. Hearing grossly intact. Palate elevates well. Tongue midline without atrophy or fasciculations.  Motor: 5/5 all over  Sensory: intact in all modalities.  DTR's: 2+ all over.  Plantars: no tested.  Coordination: finger to nose and heel-knee-shin intact.  Gait: no ataxia or bradykinesia  Psychiatric: no pressured speech; normal affect; no evidence of impaired cognition     LABS:    Complete Blood Count  Lab Results   Component Value Date    RBC 3.98 (L) 06/14/2018    HGB 12.9 06/14/2018    HCT 38.6 06/14/2018    MCV 97 06/14/2018    MCH 32.4 (H) 06/14/2018    MCHC 33.4 06/14/2018    RDW 13.1 06/14/2018     06/14/2018    MPV 11.8 06/14/2018    GRAN 6.5 06/14/2018    GRAN 66.5 06/14/2018    LYMPH 2.3 06/14/2018    LYMPH 23.1 06/14/2018    MONO 0.6 06/14/2018    MONO 5.8 06/14/2018    EOS 0.4 06/14/2018    BASO 0.04 06/14/2018    EOSINOPHIL 3.7 06/14/2018    BASOPHIL 0.4 06/14/2018    DIFFMETHOD Automated 06/14/2018       Comprehensive Metabolic Panel  Lab Results   Component Value Date     (H)  06/14/2018    BUN 15 06/14/2018    CREATININE 0.8 06/14/2018     06/14/2018    K 4.5 06/14/2018     (H) 06/14/2018    PROT 6.2 06/14/2018    ALBUMIN 4.0 06/14/2018    BILITOT 1.2 (H) 06/14/2018    AST 38 06/14/2018    ALKPHOS 48 (L) 06/14/2018    CO2 22 (L) 06/14/2018    ALT 55 (H) 06/14/2018    ANIONGAP 10 06/14/2018    EGFRNONAA >60.0 06/14/2018    ESTGFRAFRICA >60.0 06/14/2018       TSH  Lab Results   Component Value Date    TSH 1.655 02/22/2016         Assessment: 61 y/o with Giant cell arteritis, Polymyalgia rheumatica, HTN, DM, asthma, morbid obesity, LOYDA no compliant with CPAP, and anxiety.  Has persistent HA, mildly intense, no lateralizing neurological signs on exam, recent CTA head showed no intracranial arterial involvement or other vascular abnormality, CTA head with no hemodynamically significant carotid disease.  She said that she hasn't been using her CPAP, and certainly untreated LOYDA could be a contributing factor to her HA.  She has no evidence of lateralizing neurological signs or increased ICP on fundoscopic exam.   No diagnosis found.  There were no encounter diagnoses.      Plan:  1)Persistent HA, most likely due to underlying GCA, but concern that untreated LOYDA may be a contributing factor to perpetuation of her HA and thus recommended resuming CPAP.  Further, recommended MRI/MRV brain searching for another cause of secondary HA.  2) GCA, follow by rheumatology, on prednisone and Tocilizumab.  3) OK, as per rheumatology  4) HTN, managed by IM  5) DM, as per IM  6) LOYDA, strongly recommended resuming CPAP  7) Asthma, as per IM  8) Anxiety  9) Morbid obesity, as per bariatrics.          No orders of the defined types were placed in this encounter.          Dustin Kearney MD

## 2018-07-17 DIAGNOSIS — M31.5 GIANT CELL ARTERITIS WITH POLYMYALGIA RHEUMATICA: ICD-10-CM

## 2018-07-17 RX ORDER — SULFAMETHOXAZOLE AND TRIMETHOPRIM 800; 160 MG/1; MG/1
1 TABLET ORAL
Qty: 36 TABLET | Refills: 1 | Status: SHIPPED | OUTPATIENT
Start: 2018-07-18 | End: 2018-10-09 | Stop reason: ALTCHOICE

## 2018-08-01 ENCOUNTER — OFFICE VISIT (OUTPATIENT)
Dept: HEPATOLOGY | Facility: CLINIC | Age: 63
End: 2018-08-01
Payer: MEDICARE

## 2018-08-01 VITALS
HEART RATE: 68 BPM | SYSTOLIC BLOOD PRESSURE: 147 MMHG | BODY MASS INDEX: 44.1 KG/M2 | OXYGEN SATURATION: 95 % | DIASTOLIC BLOOD PRESSURE: 67 MMHG | RESPIRATION RATE: 18 BRPM | TEMPERATURE: 98 F | WEIGHT: 248.88 LBS | HEIGHT: 63 IN

## 2018-08-01 DIAGNOSIS — K76.9 LIVER LESION: Primary | ICD-10-CM

## 2018-08-01 DIAGNOSIS — K76.0 FATTY LIVER: ICD-10-CM

## 2018-08-01 DIAGNOSIS — E66.01 MORBID OBESITY WITH BMI OF 40.0-44.9, ADULT: ICD-10-CM

## 2018-08-01 PROCEDURE — 99999 PR PBB SHADOW E&M-EST. PATIENT-LVL V: CPT | Mod: PBBFAC,,, | Performed by: INTERNAL MEDICINE

## 2018-08-01 PROCEDURE — 99215 OFFICE O/P EST HI 40 MIN: CPT | Mod: PBBFAC | Performed by: INTERNAL MEDICINE

## 2018-08-01 PROCEDURE — 99214 OFFICE O/P EST MOD 30 MIN: CPT | Mod: S$PBB,,, | Performed by: INTERNAL MEDICINE

## 2018-08-01 NOTE — PROGRESS NOTES
Subjective:       Patient ID: Genesis Ugalde is a 62 y.o. female.    Chief Complaint: Fatty Liver    HPI  I saw this 62 y.o. lady who came o the hepatology clinic with her son. 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  I last saw her in 2016.    She has mildly elevated LFTs but I note a mildly elevated naveed. Her imaging shows a small liver lesion likely to be a hemangioma but also showed fatty liver disease.  She is well apart from RUQ pain.    On trulicity and having weight loss    HBV/HCV neg  Ferritin normal.  Body mass index is 44.09 kg/m².     Platelets- normal  Fibroscan 2016- stage 3 fibrosis and fibrosure stage 1      CT abdo: 5/25/2015  The liver is normal in size demonstrating diffuse hypoattenuation in keeping with hepatic steatosis. Additionally, there is a stable 1.7-cm area of hyperenhancement within the inferior right hepatic lobe which is nonspecific, however such stability over  time reflects a benign etiology, perhaps hemangioma. The portal, splenic, and proximal mesenteric veins are patent. The patient is status post cholecystectomy. No evidence of biliary ductal dilatation.    MRI 12/21/2015  The posterior right lobe liver lesion is vascular but not a typical hemangioma. The second one is not seen. There has been no change. Spleen, stomach, duodenum, pancreas, bile ducts, and adrenal glands show nothing unusual. No adenopathy or masses seen. Lung bases are clear and the heart shows nothing unusual. No change in size     PMH:  Hypertension  Giant cell arteritis- blindness  PMR  DM- 2012  Hysterectomy  Cholecystectomy- open- 1993    SH:  Non smoker  No alcohol    FH:  Nil liver  Ca stomach  IHD    Review of Systems   Constitutional: Negative for fever, chills, activity change, appetite change, fatigue and unexpected weight change.   HENT: Negative for ear pain, hearing loss, nosebleeds, sore throat and trouble swallowing.    Eyes: Negative for redness and visual disturbance.    Respiratory: Negative for cough, chest tightness, shortness of breath and wheezing.    Cardiovascular: Negative for chest pain and palpitations.   Gastrointestinal: Negative for nausea, vomiting, abdominal pain, diarrhea, constipation, blood in stool and abdominal distention.   Genitourinary: Negative for dysuria, urgency, frequency, hematuria and difficulty urinating.   Musculoskeletal: Negative for myalgias, back pain, joint swelling, arthralgias and gait problem.   Skin: Negative for rash.   Neurological: Negative for tremors, seizures, speech difficulty, weakness and headaches.   Hematological: Negative for adenopathy.   Psychiatric/Behavioral: Negative for confusion, sleep disturbance and decreased concentration. The patient is not nervous/anxious.          Lab Results   Component Value Date    ALT 55 (H) 06/14/2018    AST 38 06/14/2018    GGT 32 02/04/2016    ALKPHOS 48 (L) 06/14/2018    BILITOT 1.2 (H) 06/14/2018     Past Medical History:   Diagnosis Date    Anxiety     Asthma     Cataract     Depression     Diabetes mellitus     Disease of immune system     Giant cell arteritis     Glaucoma     Hypertension     Polymyalgia rheumatica     Uveitis      Past Surgical History:   Procedure Laterality Date    ANKLE SURGERY      CHOLECYSTECTOMY      COLONOSCOPY N/A 9/28/2015    Procedure: COLONOSCOPY;  Surgeon: Alok Dykes MD;  Location: [address]);  Service: Endoscopy;  Laterality: N/A;    CYST REMOVAL      right lung    HYSTERECTOMY       Current Outpatient Prescriptions   Medication Sig    alendronate (FOSAMAX) 35 MG tablet TAKE 1 TABLET (35 MG TOTAL) BY MOUTH ONCE A WEEK.    aspirin (ECOTRIN) 325 MG EC tablet Take 325 mg by mouth once daily.    brimonidine 0.2% (ALPHAGAN) 0.2 % Drop Place 1 drop into both eyes 3 (three) times daily.    clonazePAM (KLONOPIN) 0.5 MG tablet Take 0.5 mg by mouth once daily.    dorzolamide (TRUSOPT) 2 % ophthalmic solution Place 1 drop into both  eyes 3 (three) times daily.    dulaglutide (TRULICITY) 0.75 mg/0.5 mL PnIj Inject 0.5 mLs (0.75 mg total) into the skin every 7 days.    ergocalciferol (VITAMIN D2) 50,000 unit Cap TAKE 1 CAPSULE (50,000 UNITS TOTAL) BY MOUTH EVERY 7 DAYS.    gabapentin (NEURONTIN) 600 MG tablet Take 600 mg by mouth 3 (three) times daily.    latanoprost 0.005 % ophthalmic solution Place 1 drop into both eyes every evening.    lisinopril (PRINIVIL,ZESTRIL) 40 MG tablet Take 40 mg by mouth once daily.    metformin (GLUCOPHAGE) 1000 MG tablet Take 1,000 mg by mouth 2 (two) times daily.    metoprolol succinate (TOPROL-XL) 200 MG 24 hr tablet Take 200 mg by mouth once daily.    omeprazole (PRILOSEC) 20 MG capsule TAKE 1 CAPSULE (20 MG TOTAL) BY MOUTH 2 (TWO) TIMES DAILY.    pravastatin (PRAVACHOL) 40 MG tablet Take 1 tablet (40 mg total) by mouth every evening.    predniSONE (DELTASONE) 5 MG tablet Take 2 tablets (10 mg total) by mouth once daily.    sulfamethoxazole-trimethoprim 800-160mg (BACTRIM DS) 800-160 mg Tab TAKE 1 TABLET BY MOUTH EVERY MON, WED, FRI.    tocilizumab (ACTEMRA) 162 mg/0.9 mL Syrg Inject 162 mg into the skin every 7 days.    tramadol (ULTRAM) 50 mg tablet Take 50 mg by mouth every 6 (six) hours as needed for Pain.    valACYclovir (VALTREX) 500 MG tablet TAKE 1 TABLET (500 MG TOTAL) BY MOUTH ONCE DAILY.     Current Facility-Administered Medications   Medication    diphenhydrAMINE injection 50 mg       Objective:      Physical Exam   Constitutional: She appears well-nourished. No distress.   HENT:   Head: Normocephalic.   Eyes: Pupils are equal, round, and reactive to light.   Neck: No JVD present. No tracheal deviation present. No thyromegaly present.   Cardiovascular: Normal rate, regular rhythm and normal heart sounds.    No murmur heard.  Pulmonary/Chest: Effort normal and breath sounds normal. No stridor.   Abdominal: Soft.   Lymphadenopathy:        Head (right side): No submental, no  submandibular, no tonsillar, no preauricular, no posterior auricular and no occipital adenopathy present.        Head (left side): No submental, no submandibular, no tonsillar, no preauricular, no posterior auricular and no occipital adenopathy present.     She has no cervical adenopathy.     She has no axillary adenopathy.   Neurological: She is alert. She has normal strength. She is not disoriented. No cranial nerve deficit or sensory deficit.   Skin: Skin is intact. No cyanosis.       Assessment:       1. Liver lesion    2. Morbid obesity with BMI of 40.0-44.9, adult    3. Fatty liver        Plan:   Her liver lesion is almost certainly a hemangioma and has demonstrated size stability on imaging. However she has not had a scan for over 2 years and I therfore think we should repeat her MRI.    She does have NAFLD and non-invasive testing has shown some degree of liver fibrosis although there was disagreement between the fibroscan and fibrosure (F3 vs F1)    - repeat MRI  - clinic in 3 months with fibroscan

## 2018-08-01 NOTE — LETTER
August 1, 2018      Forrest Waldrop MD  [address] [facility] [address]           [facility] - Hepatology  [address]  [place] [address]  Phone: [phone]  Fax: [phone]          Patient: Genesis Ugalde   MR Number: [id number]   YOB: 1955   Date of Visit: 8/1/2018       Dear Dr. Forrest Waldrop:    Thank you for referring Genesis Ugalde to me for evaluation. Attached you will find relevant portions of my assessment and plan of care.    If you have questions, please do not hesitate to call me. I look forward to following Genesis Ugalde along with you.    Sincerely,    Esteban Blackwell MD    Enclosure  CC:  No Recipients    If you would like to receive this communication electronically, please contact [email or web address] or [phone] to request more information on "[employer] Link access.    For providers and/or their staff who would like to refer a patient to [facility], please contact us through our one-stop-shop provider referral line, [facility], at [phone].    If you feel you have received this communication in error or would no longer like to receive these types of communications, please e-mail [email or web address]

## 2018-08-08 ENCOUNTER — HOSPITAL ENCOUNTER (OUTPATIENT)
Dept: RADIOLOGY | Facility: HOSPITAL | Age: 63
Discharge: HOME | End: 2018-08-08
Attending: INTERNAL MEDICINE
Payer: MEDICARE

## 2018-08-08 DIAGNOSIS — K76.9 LIVER LESION: ICD-10-CM

## 2018-08-08 LAB
CREAT SERPL-MCNC: 0.7 MG/DL (ref 0.5–1.4)
SAMPLE: NORMAL

## 2018-08-08 PROCEDURE — 25500020 PHARM REV CODE 255: Performed by: INTERNAL MEDICINE

## 2018-08-08 PROCEDURE — 74183 MRI ABD W/O CNTR FLWD CNTR: CPT | Mod: TC

## 2018-08-08 PROCEDURE — 74183 MRI ABD W/O CNTR FLWD CNTR: CPT | Mod: 26,,, | Performed by: RADIOLOGY

## 2018-08-08 PROCEDURE — A9585 GADOBUTROL INJECTION: HCPCS | Performed by: INTERNAL MEDICINE

## 2018-08-08 RX ORDER — GADOBUTROL 604.72 MG/ML
10 INJECTION INTRAVENOUS
Status: COMPLETED | OUTPATIENT
Start: 2018-08-08 | End: 2018-08-08

## 2018-08-08 RX ADMIN — GADOBUTROL 10 ML: 604.72 INJECTION INTRAVENOUS at 11:08

## 2018-08-24 ENCOUNTER — TELEPHONE (OUTPATIENT)
Dept: OPHTHALMOLOGY | Facility: CLINIC | Age: 63
End: 2018-08-24

## 2018-09-05 ENCOUNTER — OFFICE VISIT (OUTPATIENT)
Dept: SLEEP MEDICINE | Facility: CLINIC | Age: 63
End: 2018-09-05
Payer: MEDICARE

## 2018-09-05 ENCOUNTER — LAB VISIT (OUTPATIENT)
Dept: LAB | Facility: HOSPITAL | Age: 63
End: 2018-09-05
Attending: PSYCHIATRY & NEUROLOGY
Payer: MEDICARE

## 2018-09-05 VITALS
DIASTOLIC BLOOD PRESSURE: 74 MMHG | SYSTOLIC BLOOD PRESSURE: 120 MMHG | BODY MASS INDEX: 43.57 KG/M2 | HEART RATE: 84 BPM | HEIGHT: 63 IN | WEIGHT: 245.88 LBS

## 2018-09-05 DIAGNOSIS — G25.81 RLS (RESTLESS LEGS SYNDROME): ICD-10-CM

## 2018-09-05 DIAGNOSIS — E61.1 IRON DEFICIENCY: ICD-10-CM

## 2018-09-05 DIAGNOSIS — G47.30 SLEEP APNEA, UNSPECIFIED TYPE: Primary | ICD-10-CM

## 2018-09-05 LAB
FERRITIN SERPL-MCNC: 39 NG/ML
IRON SERPL-MCNC: 121 UG/DL
SATURATED IRON: 28 %
TOTAL IRON BINDING CAPACITY: 435 UG/DL
TRANSFERRIN SERPL-MCNC: 294 MG/DL

## 2018-09-05 PROCEDURE — 99999 PR PBB SHADOW E&M-EST. PATIENT-LVL III: CPT | Mod: PBBFAC,,, | Performed by: PSYCHIATRY & NEUROLOGY

## 2018-09-05 PROCEDURE — 99215 OFFICE O/P EST HI 40 MIN: CPT | Mod: S$PBB,,, | Performed by: PSYCHIATRY & NEUROLOGY

## 2018-09-05 PROCEDURE — 99213 OFFICE O/P EST LOW 20 MIN: CPT | Mod: PBBFAC,PO | Performed by: PSYCHIATRY & NEUROLOGY

## 2018-09-05 PROCEDURE — 83540 ASSAY OF IRON: CPT

## 2018-09-05 PROCEDURE — 36415 COLL VENOUS BLD VENIPUNCTURE: CPT | Mod: PO

## 2018-09-05 PROCEDURE — 82728 ASSAY OF FERRITIN: CPT

## 2018-09-05 NOTE — LETTER
September 5, 2018      Forrest Waldrop MD  [address]  [facility] [address]           [facility]  [address] [facility] [address]  Phone: [phone]  Fax: [phone]          Patient: Genesis Ugalde   MR Number: [id number]   YOB: 1955   Date of Visit: 9/5/2018       Dear Dr. Forrest Waldrop:    Thank you for referring Genesis Ugalde to me for evaluation. Attached you will find relevant portions of my assessment and plan of care.    If you have questions, please do not hesitate to call me. I look forward to following Genesis Ugalde along with you.    Sincerely,    Azucena Lares MD    Enclosure  CC:  No Recipients    If you would like to receive this communication electronically, please contact [email or web address] or [phone] to request more information on [employer] Link access.    For providers and/or their staff who would like to refer a patient to [facility], please contact us through our one-stop-shop provider referral line, [facility] , at [phone].    If you feel you have received this communication in error or would no longer like to receive these types of communications, please e-mail [email or web address]

## 2018-09-05 NOTE — PROGRESS NOTES
Genesis Ugalde  was seen at the request of  Forrest Waldrop MD for sleep evaluation.    09/05/2018 INITIAL HISTORY OF PRESENT ILLNESS:  Genesis Ugalde is a 62 y.o. female is here to be evaluated for a sleep disorder.       CHIEF COMPLAINT:      The patient's complaints include excessive daytime sleepiness, excessive daytime fatigue, snoring,  witnessed breathing pauses,  gasping for air in sleep and interrupted sleep since  [id number] when she was diagnosed with LOYDA EJ - never treated.    Still taking Prednisone for Giant Cell arteritis for 12 years. Gained wt in the process.    Her biggest concerned - ongoing headache, worse in AM despite successful GCA treatment.   Gabapentin 600 mg for DM neuropathy and Clonazepam as needed for anxiety.    Lost vision in both eyes.      EPWORTH SLEEPINESS SCALE 8/31/2018   Sitting and reading 2   Watching TV 2   Sitting, inactive in a public place (e.g. a theatre or a meeting) 2   As a passenger in a car for an hour without a break 1   Lying down to rest in the afternoon when circumstances permit 3   Sitting and talking to someone 1   Sitting quietly after a lunch without alcohol 1   In a car, while stopped for a few minutes in traffic 1   Total score 13       SLEEP ROUTINE AND LIFESTYLE 09/05/2018 :  Sleep Clinic New Patient 8/31/2018   What time do you go to bed on a week day? (Give a range) 8 p.m.  - 10 p.m.   What time do you go to bed on a day off? (Give a range) N/A   How long does it take you to fall asleep? (Give a range) 1 hour+   On average, how many times per night do you wake up? 5 - 10   How long does it take you to fall back into sleep? (Give a range) 15 - 30 minutes   What time do you wake up to start your day on a week day? (Give a range) 5 a.m. - 7 a.m.   What time do you wake up to start your day on a day off? (Give a range) 5 a.m. - 7 a.m.   What time do you get out of bed? (Give a range) 5 a.m. - 7 a.m.   On average, how many hours do you sleep? 4 - 5  hours   On average, how many naps do you take per day? One   Rate your sleep quality from 0 to 5 (0-poor, 5-great). 0   Have you experienced:  N/a   How much weight have you lost or gained (in lbs.) in the last year? N/A   On average, how many times per night do you go to the bathroom?  1 - 2   Have you ever had a sleep study/CPAP machine/surgery for sleep apnea? Yes, but no rx   Have you ever had a CPAP machine for sleep apnea? No   Have you ever had surgery for sleep apnea? No       Sleep Clinic ROS  8/31/2018   Difficulty breathing through the nose?  No   Sore throat or dry mouth in the morning? Sometimes   Irregular or very fast heart beat?  Yes - at night   Shortness of breath?  Sometimes   Acid reflux? Yes   Body aches and pains?  Yes   Morning headaches? Yes   Dizziness? Yes   Mood changes?  Yes   Do you exercise?  Sometimes   Do you feel like moving your legs a lot?  Sometimes; urge to move legs in the evening; worse with insensitivity; Gabapentin takes the edge off.          Denies symptoms concerning for parasomnia            Social History:      Occupationdisability  Bed partner:   Exercise routine: no - used to walk - vision limiting  Caffeine:       PREVIOUS SLEEP STUDIES:   EJ - 2012 -2015      DME:       PAST MEDICAL HISTORY:    Active Ambulatory Problems     Diagnosis Date Noted    Giant cell arteritis with polymyalgia rheumatica 09/04/2014    Morbid obesity with BMI of 40.0-44.9, adult 09/04/2014    Hypertension 09/04/2014    Visual loss 09/04/2014    Optic neuritis 09/04/2014    Type 2 diabetes mellitus 09/04/2014    Kidney stones 09/04/2014    Uveitis 09/04/2014    Vitamin D deficiency 09/04/2014    Hx of colonic polyps 09/04/2014    Blindness and low vision 09/04/2014    Cushing's disease 09/04/2014    Recurrent genital herpes 09/04/2014    Hepatosplenomegaly 09/04/2014    Fatty liver 09/04/2014    Primary open angle glaucoma, both eyes 10/06/2014    Lung nodule 08/27/2015     Epigastric abdominal pain 10/26/2015    Absence attack 02/01/2016    Transient memory loss 02/01/2016    Right sided sciatica 02/01/2016    Diarrhea 04/08/2016    Esophageal dysphagia 04/08/2016    Immunosuppression 05/04/2016    Loose stools 09/06/2016    Dyslipidemia 09/27/2016    Medication monitoring encounter 09/27/2016    Claustrophobia 10/06/2016    Pain of right hip joint 11/29/2016    LOYDA (obstructive sleep apnea) 07/10/2017    Current chronic use of systemic steroids 06/19/2018    Hyperlipidemia 06/19/2018     Resolved Ambulatory Problems     Diagnosis Date Noted    PMR (polymyalgia rheumatica) 09/04/2014    Transaminitis 09/04/2014     Past Medical History:   Diagnosis Date    Anxiety     Asthma     Cataract     Depression     Diabetes mellitus     Disease of immune system     Giant cell arteritis     Glaucoma     Hypertension     Polymyalgia rheumatica     Uveitis                 PAST SURGICAL HISTORY:    Past Surgical History:   Procedure Laterality Date    ANKLE SURGERY      CHOLECYSTECTOMY      CYST REMOVAL      right lung    HYSTERECTOMY           FAMILY HISTORY:                Family History   Problem Relation Age of Onset    Coronary artery disease Mother     Diabetes Mother     Hypertension Mother     Stroke Mother     Coronary artery disease Father     Emphysema Father     Diabetes Father     Emphysema Sister     Heart attack Sister     Cancer Brother 58        stomach    Stomach cancer Brother     Glaucoma Neg Hx     Asthma Neg Hx     Celiac disease Neg Hx     Colon cancer Neg Hx     Colon polyps Neg Hx     Esophageal cancer Neg Hx     Inflammatory bowel disease Neg Hx     Rectal cancer Neg Hx     Ulcerative colitis Neg Hx     Macular degeneration Neg Hx     Retinal detachment Neg Hx     Strabismus Neg Hx     Amblyopia Neg Hx     Blindness Neg Hx     Cataracts Neg Hx        SOCIAL HISTORY:          Tobacco:   Social History     Tobacco Use    Smoking Status Never Smoker   Smokeless Tobacco Never Used       alcohol use:    Social History     Substance and Sexual Activity   Alcohol Use No    Alcohol/week: 0.0 oz                   ALLERGIES:    Review of patient's allergies indicates:   Allergen Reactions    Percocet [oxycodone-acetaminophen] Shortness Of Breath    Seroquel [quetiapine] Shortness Of Breath     Akathisia    Percodan [oxycodone hcl-oxycodone-asa] Rash       CURRENT MEDICATIONS:    Current Outpatient Medications   Medication Sig Dispense Refill    alendronate (FOSAMAX) 35 MG tablet TAKE 1 TABLET (35 MG TOTAL) BY MOUTH ONCE A WEEK. 12 tablet 3    aspirin (ECOTRIN) 325 MG EC tablet Take 325 mg by mouth once daily.      brimonidine 0.2% (ALPHAGAN) 0.2 % Drop Place 1 drop into both eyes 3 (three) times daily. 5 mL 12    clonazePAM (KLONOPIN) 0.5 MG tablet Take 0.5 mg by mouth once daily.      dorzolamide (TRUSOPT) 2 % ophthalmic solution Place 1 drop into both eyes 3 (three) times daily. 10 mL 12    dulaglutide (TRULICITY) 0.75 mg/0.5 mL PnIj Inject 0.5 mLs (0.75 mg total) into the skin every 7 days. 2 mL 3    ergocalciferol (VITAMIN D2) 50,000 unit Cap TAKE 1 CAPSULE (50,000 UNITS TOTAL) BY MOUTH EVERY 7 DAYS. 12 capsule 3    gabapentin (NEURONTIN) 600 MG tablet Take 600 mg by mouth 3 (three) times daily.      latanoprost 0.005 % ophthalmic solution Place 1 drop into both eyes every evening. 1 Bottle 12    lisinopril (PRINIVIL,ZESTRIL) 40 MG tablet Take 40 mg by mouth once daily.      metformin (GLUCOPHAGE) 1000 MG tablet Take 1,000 mg by mouth 2 (two) times daily.  1    metoprolol succinate (TOPROL-XL) 200 MG 24 hr tablet Take 200 mg by mouth once daily.      omeprazole (PRILOSEC) 20 MG capsule TAKE 1 CAPSULE (20 MG TOTAL) BY MOUTH 2 (TWO) TIMES DAILY. 60 capsule 11    pravastatin (PRAVACHOL) 40 MG tablet Take 1 tablet (40 mg total) by mouth every evening. 90 tablet 3    predniSONE (DELTASONE) 5 MG tablet Take 2 tablets (10  "mg total) by mouth once daily. 180 tablet 1    sulfamethoxazole-trimethoprim 800-160mg (BACTRIM DS) 800-160 mg Tab TAKE 1 TABLET BY MOUTH EVERY MON, WED, FRI. 36 tablet 1    tocilizumab (ACTEMRA) 162 mg/0.9 mL Syrg Inject 162 mg into the skin every 7 days. 4 Syringe 2    tramadol (ULTRAM) 50 mg tablet Take 50 mg by mouth every 6 (six) hours as needed for Pain.      valACYclovir (VALTREX) 500 MG tablet TAKE 1 TABLET (500 MG TOTAL) BY MOUTH ONCE DAILY. 90 tablet 3     Current Facility-Administered Medications   Medication Dose Route Frequency Provider Last Rate Last Dose    diphenhydrAMINE injection 50 mg  50 mg Intravenous PRN Forrest Waldrop MD   50 mg at 01/16/15 1010                        PHYSICAL EXAM:  /74   Pulse 84   Ht 5' 3" (1.6 m)   Wt 111.5 kg (245 lb 14.4 oz)   BMI 43.56 kg/m²   GENERAL: Overweight body habitus, well groomed.  HEENT:   HEENT:  Conjunctivae are non-erythematous; Pupils equal, round, and reactive to light; Nose is symmetrical; Nasal mucosa is pink and moist; Septum is midline; Inferior turbinates are hypertrophied; Nasal airflow is diminished on the right; Posterior pharynx is pink; Modified Mallampati:III-IV; Posterior palate is low; Tonsils not visualized; Uvula is wide and elongated;Tongue is enlarged; Dentition is fair; No TMJ tenderness; Jaw opening and protrusion without click and without discomfort.  NECK: Supple. Neck circumference is 16 inches. No thyromegaly. No palpable nodes.     SKIN: On face and neck: No abrasions, no rashes, no lesions.  No subcutaneous nodules are palpable.  RESPIRATORY: Chest is clear to auscultation.  Normal chest expansion and non-labored breathing at rest.  CARDIOVASCULAR: Normal S1, S2.  No murmurs, gallops or rubs. No carotid bruits bilaterally.  No edema. No clubbing. No cyanosis.    NEURO: Oriented to time, place and person. Normal attention span and concentration. Gait normal.    PSYCH: Affect is full. Mood is " "normal  MUSCULOSKELETAL: Moves 4 extremities. Gait normal.         Using [facility]: yes      ASSESSMENT:    1. Sleep Apnea NEC. The patient symptomatically has  excessive daytime sleepiness, snoring,  witnessed breathing pauses, excessive daytime fatigue, gasping for air in sleep, interrupted sleep and nocturia  with exam findings of "a crowded oral airway and elevated body mass index. The patient has medical co-morbidities of cognitive dysfunction,  which can be worsened by LOYDA. This warrants further investigation for possible obstructive sleep apnea.      2. RLS - iron status unknown; Gabapentin 600 mg TID also for DM Nueropathy    PLAN:    Diagnostic: Polysomnogram in lab - try to Split. The nature of this procedure and its indication was discussed with the patient. she would  like to come discuss PSG results.    Weight loss strategies were discussed in detail     For RLS - will check Iron and Ferritin  Will still try to get PSG from     More than 25 minutes of this 45 minutes visit was spent in counseling: during our discussion today, we talked about the etiology of  LOYDA as well as the potential ramifications of untreated sleep apnea, which could include daytime sleepiness, hypertension, heart disease and/or stroke.  We discussed potential treatment options, which could include weight loss, body positioning, continuous positive airway pressure (CPAP), or referral for surgical consideration. Meanwhile, she  is urged to avoid supine sleep, weight gain and alcoholic beverages since all of these can worsen LOYDA.     Precautions: The patient was advised to abstain from driving should he feel sleepy or drowsy.    Follow up: MD/NP  after the sleep study has been completed.     Thank you for allowing me the opportunity to participate in the care of your patient.    This visit summary will be sent to referring provider via inbasket      "

## 2018-09-05 NOTE — PATIENT INSTRUCTIONS
SLEEP LAB ([facility] or [facility]) will contact you to schedulethe sleep study. Their number is [phone] (ext 2). Please call them if you do not hear from them in 10 business days from now.  The [facility] is located on 7th floor of the [facility]; [place] lab is located in [facility].    SLEEP CLINIC (my assistant) will call you when the sleep study results are ready - if you have not heard from us by 2 weeks from the date of the study, please call [phone] (ext 1) or you can use [facility] to contact me.    You are advised to abstain from driving should you feel sleepy or drowsy.

## 2018-09-07 ENCOUNTER — TELEPHONE (OUTPATIENT)
Dept: RHEUMATOLOGY | Facility: CLINIC | Age: 63
End: 2018-09-07

## 2018-09-07 DIAGNOSIS — M31.6 TEMPORAL ARTERITIS: ICD-10-CM

## 2018-09-07 DIAGNOSIS — M31.6 GCA (GIANT CELL ARTERITIS): ICD-10-CM

## 2018-09-07 RX ORDER — TOCILIZUMAB 180 MG/ML
INJECTION, SOLUTION SUBCUTANEOUS
Qty: 12 SYRINGE | Refills: 0 | Status: SHIPPED | OUTPATIENT
Start: 2018-09-07 | End: 2018-11-21 | Stop reason: SDUPTHER

## 2018-09-10 ENCOUNTER — TELEPHONE (OUTPATIENT)
Dept: SLEEP MEDICINE | Facility: CLINIC | Age: 63
End: 2018-09-10

## 2018-09-10 NOTE — TELEPHONE ENCOUNTER
"Patricia,    Please informt he patient that her iron test results are slightly low for RLS - I would suggest a 3 months course for iron replacement with OTC.    I will send the following text on her [facility]    Please inform her to look for it.    TY!        Dear Ms. Ugalde      Your iron and Ferritin was borderline-low on your recent test,  please start 3 months iron supplementation.  If regular iron is to hard on your stomack, please get  organic (anything but Iron Sulfate) - i.e. You may use Iron Gluconate, Iron  Byglycinnate, Iron Fumarate and follow the instructions on the bottle.      Sincerely,    Azucena Lares MD        ---------------------------    If  you need iron (I will call you in that case), please get organic (anything but Iron Sulfate) - i.e. You may use Iron Gluconate, Iron  Byglycinnate, Iron Fumarate...   I.e [facility] has "slow iron" - already has vitamin C in it to improve absorption.       ---------------------------------------------------------------------------------------------      Your iron was on the lower side (not enough to cause anemia, but low enough to aggravate RLS).  Consider daily iron - if you usually get gastrointestinal disturbance form using regular iron, consider  organic iron (anything but Iron Sulfate) - i.e. You may use Iron Gluconate, Iron  Byglycinnate, Iron Fumarate...     I.e [facility] has "slow iron" - already has vitamin C in it to improve absorption.           "

## 2018-09-14 ENCOUNTER — LAB VISIT (OUTPATIENT)
Dept: LAB | Facility: HOSPITAL | Age: 63
End: 2018-09-14
Attending: INTERNAL MEDICINE
Payer: MEDICARE

## 2018-09-14 DIAGNOSIS — M31.6 GCA (GIANT CELL ARTERITIS): ICD-10-CM

## 2018-09-14 LAB
ALBUMIN SERPL BCP-MCNC: 4 G/DL
ALP SERPL-CCNC: 52 U/L
ALT SERPL W/O P-5'-P-CCNC: 41 U/L
ANION GAP SERPL CALC-SCNC: 11 MMOL/L
AST SERPL-CCNC: 20 U/L
BASOPHILS # BLD AUTO: 0.04 K/UL
BASOPHILS NFR BLD: 0.3 %
BILIRUB SERPL-MCNC: 1.5 MG/DL
BUN SERPL-MCNC: 15 MG/DL
CALCIUM SERPL-MCNC: 9.8 MG/DL
CHLORIDE SERPL-SCNC: 111 MMOL/L
CO2 SERPL-SCNC: 23 MMOL/L
CREAT SERPL-MCNC: 0.9 MG/DL
CRP SERPL-MCNC: 0.7 MG/L
DIFFERENTIAL METHOD: ABNORMAL
EOSINOPHIL # BLD AUTO: 0.4 K/UL
EOSINOPHIL NFR BLD: 2.9 %
ERYTHROCYTE [DISTWIDTH] IN BLOOD BY AUTOMATED COUNT: 13.2 %
ERYTHROCYTE [SEDIMENTATION RATE] IN BLOOD BY WESTERGREN METHOD: <2 MM/HR
EST. GFR  (AFRICAN AMERICAN): >60 ML/MIN/1.73 M^2
EST. GFR  (NON AFRICAN AMERICAN): >60 ML/MIN/1.73 M^2
GLUCOSE SERPL-MCNC: 99 MG/DL
HCT VFR BLD AUTO: 41.1 %
HGB BLD-MCNC: 13.6 G/DL
IMM GRANULOCYTES # BLD AUTO: 0.07 K/UL
IMM GRANULOCYTES NFR BLD AUTO: 0.6 %
LYMPHOCYTES # BLD AUTO: 4.3 K/UL
LYMPHOCYTES NFR BLD: 34.2 %
MCH RBC QN AUTO: 32.7 PG
MCHC RBC AUTO-ENTMCNC: 33.1 G/DL
MCV RBC AUTO: 99 FL
MONOCYTES # BLD AUTO: 0.7 K/UL
MONOCYTES NFR BLD: 5.6 %
NEUTROPHILS # BLD AUTO: 7 K/UL
NEUTROPHILS NFR BLD: 56.4 %
NRBC BLD-RTO: 0 /100 WBC
PLATELET # BLD AUTO: 245 K/UL
PMV BLD AUTO: 11.5 FL
POTASSIUM SERPL-SCNC: 4 MMOL/L
PROT SERPL-MCNC: 6.5 G/DL
RBC # BLD AUTO: 4.16 M/UL
SODIUM SERPL-SCNC: 145 MMOL/L
WBC # BLD AUTO: 12.43 K/UL

## 2018-09-14 PROCEDURE — 85025 COMPLETE CBC W/AUTO DIFF WBC: CPT

## 2018-09-14 PROCEDURE — 36415 COLL VENOUS BLD VENIPUNCTURE: CPT | Mod: PO

## 2018-09-14 PROCEDURE — 80053 COMPREHEN METABOLIC PANEL: CPT

## 2018-09-14 PROCEDURE — 85652 RBC SED RATE AUTOMATED: CPT

## 2018-09-14 PROCEDURE — 86140 C-REACTIVE PROTEIN: CPT

## 2018-09-20 ENCOUNTER — TELEPHONE (OUTPATIENT)
Dept: SLEEP MEDICINE | Facility: OTHER | Age: 63
End: 2018-09-20

## 2018-09-21 ENCOUNTER — TELEPHONE (OUTPATIENT)
Dept: SLEEP MEDICINE | Facility: OTHER | Age: 63
End: 2018-09-21

## 2018-09-25 DIAGNOSIS — M31.5 GIANT CELL ARTERITIS WITH POLYMYALGIA RHEUMATICA: ICD-10-CM

## 2018-09-25 RX ORDER — PRAVASTATIN SODIUM 40 MG/1
40 TABLET ORAL NIGHTLY
Qty: 90 TABLET | Refills: 3 | Status: SHIPPED | OUTPATIENT
Start: 2018-09-25 | End: 2019-09-16 | Stop reason: SDUPTHER

## 2018-09-28 ENCOUNTER — OFFICE VISIT (OUTPATIENT)
Dept: OPHTHALMOLOGY | Facility: CLINIC | Age: 63
End: 2018-09-28
Payer: MEDICARE

## 2018-09-28 ENCOUNTER — CLINICAL SUPPORT (OUTPATIENT)
Dept: OPHTHALMOLOGY | Facility: CLINIC | Age: 63
End: 2018-09-28
Payer: MEDICARE

## 2018-09-28 DIAGNOSIS — H46.9 OPTIC NEURITIS: ICD-10-CM

## 2018-09-28 DIAGNOSIS — H46.9 INFLAMMATORY OPTIC NEUROPATHY: ICD-10-CM

## 2018-09-28 DIAGNOSIS — H40.1131 PRIMARY OPEN ANGLE GLAUCOMA OF BOTH EYES, MILD STAGE: ICD-10-CM

## 2018-09-28 DIAGNOSIS — H40.033 ANATOMICAL NARROW ANGLE BORDERLINE GLAUCOMA, BILATERAL: ICD-10-CM

## 2018-09-28 DIAGNOSIS — H40.043 STEROID RESPONDER, BILATERAL: ICD-10-CM

## 2018-09-28 DIAGNOSIS — H54.7 VISUAL LOSS: ICD-10-CM

## 2018-09-28 DIAGNOSIS — H54.10 BLINDNESS AND LOW VISION: Primary | ICD-10-CM

## 2018-09-28 DIAGNOSIS — M31.5 GIANT CELL ARTERITIS WITH POLYMYALGIA RHEUMATICA: ICD-10-CM

## 2018-09-28 DIAGNOSIS — H25.13 NUCLEAR SCLEROSIS, BILATERAL: ICD-10-CM

## 2018-09-28 PROCEDURE — 99212 OFFICE O/P EST SF 10 MIN: CPT | Mod: PBBFAC,25 | Performed by: OPHTHALMOLOGY

## 2018-09-28 PROCEDURE — 92014 COMPRE OPH EXAM EST PT 1/>: CPT | Mod: S$PBB,,, | Performed by: OPHTHALMOLOGY

## 2018-09-28 PROCEDURE — 92083 EXTENDED VISUAL FIELD XM: CPT | Mod: 26,S$PBB,, | Performed by: OPHTHALMOLOGY

## 2018-09-28 PROCEDURE — 92083 EXTENDED VISUAL FIELD XM: CPT | Mod: PBBFAC

## 2018-09-28 PROCEDURE — 99999 PR PBB SHADOW E&M-EST. PATIENT-LVL II: CPT | Mod: PBBFAC,,, | Performed by: OPHTHALMOLOGY

## 2018-09-28 PROCEDURE — 92133 CPTRZD OPH DX IMG PST SGM ON: CPT | Mod: PBBFAC | Performed by: OPHTHALMOLOGY

## 2018-09-28 RX ORDER — METOPROLOL SUCCINATE 100 MG/1
100 TABLET, EXTENDED RELEASE ORAL DAILY
Refills: 3 | COMMUNITY
Start: 2018-07-09 | End: 2020-01-27

## 2018-09-28 NOTE — PROGRESS NOTES
"        Assessment /Plan     For exam results, see Encounter Report.    Blindness and low vision    Inflammatory optic neuropathy    Giant cell arteritis with polymyalgia rheumatica    Primary open angle glaucoma of both eyes, mild stage    Optic neuritis    Steroid responder, bilateral    Nuclear sclerosis, bilateral    Visual loss    Anatomical narrow angle borderline glaucoma, bilateral            1. Autoimmune optic neuropathy OU,   - OS 2004  - OD 2012  - unknown antibiody  - several treatments with IV steroid and IVIG  - vision and VF per [place] records  - PO steroid daily - 10 mg a day   - sural nerve and muscle biopsy done - results pending - pt to obtain results form [facility]  - xal qhs ou   - monitor  - rheum monitoring esr/crp  - all previous mri's and lp negative per rheum note  Last ESR / and c-react prot - both very low     2.  Steroid responder in past  - on chronic steroids for #1  iop good today   Continue xal qhs ou    3. POAG - mild vs OHT ou      First HVF   2004   First photos   2004   Treatment / Drops started   2004           Family history    neg        Glaucoma meds    Latanoprost ou        H/O adverse rxn to glaucoma drops    none        LASERS    None         GLAUCOMA SURGERIES    none        OTHER EYE SURGERIES    none        CDR    0.9 / 0.9 - +++ pallor ou         Tbase    25-27 / 25-27          Tmax    27/27            Ttarget    20/20             HVF    12 test 2004 to 2012  - SALT / IAD od // Ext os                    4 10-2 stim V OD 2012 - 2014 - [place]    (+ prog from 2012 to 2014 - when followed at [facility])    7- test-  10-2 stim V test done od 12/2014 to 2017 - dense SALT and IAD (+stable)         Gonio    +2-3 ou (very slight narrowing)         CCT    591/612        OCT    7 test 2006 to 2016 - RNFL - dec throughout od // dec thru out  os        HRT    6 test 2004 to 2018 - MR - nl od // off-center os // new baseline 1/25/2016 - nl ou -"too good to be true"        Disc " photos    2004 - slides // 2008, 2010, 2012, , 2012, 2012, 2012, 2015, 2017   - OIS     - Ttoday    12/13  - Test done today   IOP check // HVF - 10-2 stim V od - nearly ext // DFE // OCT     PLAN  CSM - IOP good     Sees Luhet   He is monitoring sed rate and c react prot   Cont PO pred 10 mg a day   Cont actemra  (tocilizumab) 162 mg sc weekly  Cont prilosec (omeprazole)  20mg daily - to protect stomach     Cont brimonidine tid ou  Cont latanoprost qhs ou  Cont dorzolamide tid ou  - IOP was better     -would rec PI's prior to doing and slt's - has some mild  ant bowing and narrowing   - avoid BB - H/O asthma as a child     Currently on prednisone 10 mg q day and decreasing- Benjie  States vision is dimmer and more constricted, has to search for letters on the keyboard unlike before  Also C/O pain in and around the left eye - no iritis or sceritis and IOP good - ?? Sinus vs GCA - on going intermittent pain   Continues with slow decline in vision - with good IOP's and low sed rate and c react protein  Nothing more to be done to try and stabilize things     NO MORE VF testing - essentially  EXT ou      F/u 4 mo - monitor IOP and VA - but IOP is good and there is not anything more I cand do to stablize the vision     I have seen and personally examined the patient.  I agree with the findings, assessment and plan of the resident and/or fellow.     Tanna Ro MD

## 2018-10-09 ENCOUNTER — OFFICE VISIT (OUTPATIENT)
Dept: RHEUMATOLOGY | Facility: CLINIC | Age: 63
End: 2018-10-09
Payer: MEDICARE

## 2018-10-09 VITALS
WEIGHT: 246 LBS | HEART RATE: 82 BPM | DIASTOLIC BLOOD PRESSURE: 75 MMHG | BODY MASS INDEX: 40.98 KG/M2 | SYSTOLIC BLOOD PRESSURE: 133 MMHG | HEIGHT: 65 IN

## 2018-10-09 DIAGNOSIS — K76.0 FATTY LIVER: ICD-10-CM

## 2018-10-09 DIAGNOSIS — R22.41 ANKLE MASS, RIGHT: Primary | ICD-10-CM

## 2018-10-09 DIAGNOSIS — R91.1 LUNG NODULE: ICD-10-CM

## 2018-10-09 DIAGNOSIS — Z86.010 HX OF COLONIC POLYPS: ICD-10-CM

## 2018-10-09 DIAGNOSIS — M31.5 GIANT CELL ARTERITIS WITH POLYMYALGIA RHEUMATICA: ICD-10-CM

## 2018-10-09 DIAGNOSIS — E55.9 VITAMIN D DEFICIENCY: ICD-10-CM

## 2018-10-09 DIAGNOSIS — E04.2 MULTINODULAR GOITER: ICD-10-CM

## 2018-10-09 PROCEDURE — 99214 OFFICE O/P EST MOD 30 MIN: CPT | Mod: S$PBB,,, | Performed by: INTERNAL MEDICINE

## 2018-10-09 PROCEDURE — 99999 PR PBB SHADOW E&M-EST. PATIENT-LVL IV: CPT | Mod: PBBFAC,,, | Performed by: INTERNAL MEDICINE

## 2018-10-09 PROCEDURE — 99214 OFFICE O/P EST MOD 30 MIN: CPT | Mod: PBBFAC | Performed by: INTERNAL MEDICINE

## 2018-10-09 ASSESSMENT — ROUTINE ASSESSMENT OF PATIENT INDEX DATA (RAPID3)
WHEN YOU AWAKENED IN THE MORNING OVER THE LAST WEEK, PLEASE INDICATE THE AMOUNT OF TIME IT TAKES UNTIL YOU ARE AS LIMBER AS YOU WILL BE FOR THE DAY: 4 HR
PATIENT GLOBAL ASSESSMENT SCORE: 3
PAIN SCORE: 2
AM STIFFNESS SCORE: 1, YES
TOTAL RAPID3 SCORE: 3.33
PSYCHOLOGICAL DISTRESS SCORE: 3.3
MDHAQ FUNCTION SCORE: 1.5
FATIGUE SCORE: 3

## 2018-10-09 NOTE — ASSESSMENT & PLAN NOTE
Due for colonoscopy per Dr. Dykes given h/o polyps recommended surveillance in 3 years which is now

## 2018-10-09 NOTE — PROGRESS NOTES
"Subjective:       Patient ID: Genesis Ugalde is a 62 y.o. female.    Chief Complaint: GCA; optic neuritis/atrophy  HPI saw Dr. Ro last week, who felt she will continue gradually progressing to blindness right eye as well. No uveitis or scleritis. Had flare of pmr past week, resolved today. Remains on tocilizumab weekly and prednisone 10mg daily Notes small mass right lateral Achilles x 7-10 days which is tender  Review of Systems   Constitutional: Negative for fever and unexpected weight change.   HENT: Positive for trouble swallowing.    Eyes: Negative for redness.   Respiratory: Negative for cough and shortness of breath.    Cardiovascular: Positive for chest pain.   Gastrointestinal: Negative for constipation and diarrhea.   Genitourinary: Negative for dysuria and genital sores.   Skin: Negative for rash.   Neurological: Positive for headaches.   Hematological: Does not bruise/bleed easily.         Objective:   /75   Pulse 82   Ht 5' 4.8" (1.646 m)   Wt 111.6 kg (246 lb)   BMI 41.19 kg/m²      Physical Exam   Constitutional: She is oriented to person, place, and time and well-developed, well-nourished, and in no distress.   obese   HENT:   Head: Normocephalic and atraumatic.   Mouth/Throat: Oropharynx is clear and moist.   Eyes: Conjunctivae and EOM are normal.   Blind with external strabismus   Neck: Normal range of motion. Neck supple. No thyromegaly present.   Cardiovascular: Normal rate, regular rhythm, normal heart sounds and intact distal pulses.  Exam reveals no gallop and no friction rub.    No murmur heard.  Pulmonary/Chest: Breath sounds normal. She has no wheezes. She has no rales. She exhibits no tenderness.   Abdominal: Soft. She exhibits no distension and no mass. There is no tenderness.   obese   Lymphadenopathy:     She has no cervical adenopathy.   Neurological: She is alert and oriented to person, place, and time. She displays normal reflexes. Gait normal.   Nl motor strength " UE and LE bilateral   Musculoskeletal: She exhibits no edema.   Tender 1.5 cm nodule lateral to retrocalcaneal bursa         Results for ANN WHITNEY (MRN [id number]) as of 10/9/2018 11:41   Ref. Range 9/5/2018 10:03 9/14/2018 07:55   WBC Latest Ref Range: 3.90 - 12.70 K/uL  12.43   RBC Latest Ref Range: 4.00 - 5.40 M/uL  4.16   Hemoglobin Latest Ref Range: 12.0 - 16.0 g/dL  13.6   Hematocrit Latest Ref Range: 37.0 - 48.5 %  41.1   MCV Latest Ref Range: 82 - 98 fL  99 (H)   MCH Latest Ref Range: 27.0 - 31.0 pg  32.7 (H)   MCHC Latest Ref Range: 32.0 - 36.0 g/dL  33.1   RDW Latest Ref Range: 11.5 - 14.5 %  13.2   Platelets Latest Ref Range: 150 - 350 K/uL  245   MPV Latest Ref Range: 9.2 - 12.9 fL  11.5   Gran% Latest Ref Range: 38.0 - 73.0 %  56.4   Gran # (ANC) Latest Ref Range: 1.8 - 7.7 K/uL  7.0   Immature Granulocytes Latest Ref Range: 0.0 - 0.5 %  0.6 (H)   Immature Grans (Abs) Latest Ref Range: 0.00 - 0.04 K/uL  0.07 (H)   Lymph% Latest Ref Range: 18.0 - 48.0 %  34.2   Lymph # Latest Ref Range: 1.0 - 4.8 K/uL  4.3   Mono% Latest Ref Range: 4.0 - 15.0 %  5.6   Mono # Latest Ref Range: 0.3 - 1.0 K/uL  0.7   Eosinophil% Latest Ref Range: 0.0 - 8.0 %  2.9   Eos # Latest Ref Range: 0.0 - 0.5 K/uL  0.4   Basophil% Latest Ref Range: 0.0 - 1.9 %  0.3   Baso # Latest Ref Range: 0.00 - 0.20 K/uL  0.04   nRBC Latest Ref Range: 0 /100 WBC  0   Iron Latest Ref Range: 30 - 160 ug/dL 121    TIBC Latest Ref Range: 250 - 450 ug/dL 435    Saturated Iron Latest Ref Range: 20 - 50 % 28    Transferrin Latest Ref Range: 200 - 375 mg/dL 294    Ferritin Latest Ref Range: 20.0 - 300.0 ng/mL 39    Sed Rate Latest Ref Range: 0 - 36 mm/Hr  <2   Sodium Latest Ref Range: 136 - 145 mmol/L  145   Potassium Latest Ref Range: 3.5 - 5.1 mmol/L  4.0   Chloride Latest Ref Range: 95 - 110 mmol/L  111 (H)   CO2 Latest Ref Range: 23 - 29 mmol/L  23   Anion Gap Latest Ref Range: 8 - 16 mmol/L  11   BUN, Bld Latest Ref Range: 6 - 20 mg/dL  15    Creatinine Latest Ref Range: 0.5 - 1.4 mg/dL  0.9   eGFR if non African American Latest Ref Range: >60 mL/min/1.73 m^2  >60.0   eGFR if African American Latest Ref Range: >60 mL/min/1.73 m^2  >60.0   Glucose Latest Ref Range: 70 - 110 mg/dL  99   Calcium Latest Ref Range: 8.7 - 10.5 mg/dL  9.8   Alkaline Phosphatase Latest Ref Range: 55 - 135 U/L  52 (L)   Total Protein Latest Ref Range: 6.0 - 8.4 g/dL  6.5   Albumin Latest Ref Range: 3.5 - 5.2 g/dL  4.0   Total Bilirubin Latest Ref Range: 0.1 - 1.0 mg/dL  1.5 (H)   AST Latest Ref Range: 10 - 40 U/L  20   ALT Latest Ref Range: 10 - 44 U/L  41   CRP Latest Ref Range: 0.0 - 8.2 mg/L  0.7   Differential Method Unknown  Automated     Assessment/Plan         Problem List Items Addressed This Visit     Giant cell arteritis with polymyalgia rheumatica    Overview          The MRA of the chest is normal, no evidence of large vessel vasculitis.[facility]          PACS Images     Show images for MRA Chest   Reviewed By Tonya Waldrop MD on 3/20/2018 14:21   Patient Result Comments     Viewed by Genesis Ugalde V on 3/20/2018  5:33 PM   Written by Forrest Waldrop MD on 3/20/2018  2:20 PM   The MRA of the chest is normal, no evidence of large vessel vasculitis.RJQ   External Result Report     External Result Report   Narrative     EXAMINATION:  MRA CHEST    CLINICAL HISTORY:  Aortic dz, non-traumatic, known or suspectPersonal history of other diseases of the circulatory system    TECHNIQUE:  Multiplanar, multisequence imaging was performed through the chest to evaluate the aorta.  This exam is performed prior to and following the administration of 14 mL of intravenous Gadavist.    COMPARISON:  CT chest 02/19/2016    FINDINGS:  The thoracic aorta is normal in caliber, contour, and course.  There are 3 branch vessels from the aortic arch.  No evidence of stenosis or aneurysm.  No evidence of abnormal wall thickening or surrounding inflammatory change.    The visualized  lungs are without significant abnormality.  The heart is within normal limits.    The visualized upper abdominal structures demonstrate hepatomegaly.    The osseous structures are without evidence for acute fracture.  Subcutaneous tissues are unremarkable.   Impression       Unremarkable MRA chest exam.    Electronically signed by resident: Jeannie Hdz  Date: 03/20/2018  Time: 10:36    Electronically signed by: Herrera Rodriguez MD  Date: 03/20/2018  Time: 11:16    Encounter     View Encounter          Signed by Resident   The CTA head and neck shows no evidence of active vasculitis. There is moderate atherosclerotic disease right carotid. Continue aspirin 325mg daily and pravastatin. There is incidentally noted thryoid nodule and some changes in the lung bases.Will need thyroid ultrasound and CT chest.Cristina will schedule. [facility]          PACS Images      Show images for CTA Head and Neck (xpd)   Reviewed By Tonya Miller MD on 6/28/2018 21:58   Result Notes for CTA Head and Neck (xpd)     Notes recorded by Forrest Waldrop MD on 6/30/2018 at 9:33 PM CDT  The CTA head and neck shows no evidence of active vasculitis. There is moderate atherosclerotic disease right carotid. Continue aspirin 325mg daily and pravastatin. There is incidentally noted thryoid nodule and some changes in the lung bases.Will need thyroid ultrasound and CT chest.Cristina will schedule. [facility]   CTA Head and Neck (xpd)   Order: [id number]   Status:  Final result   Visible to patient:  Yes (Patient Portal)   Next appt:  10/11/2018 at 07:30 PM in Sleep Medicine (LAB, SLEEP [place])   Dx:  Vasculitis, CNS; Giant cell arteritis...   Details     Reading Physician Reading Date Result Priority   Diomedes Morris DO 6/28/2018       Narrative     EXAMINATION:  CTA HEAD AND NECK (XPD)    CLINICAL HISTORY:  Acute visual deficit;Vasculitis, CNS;h/o giant cell arteritis, r/o cervical vessel involvement;Arteritis, unspecified    TECHNIQUE:  5 mm  axial images of the head pre and post contrast with 0.625 mm axial CTA images of the head neck postcontrast.  Coronal and sagittal MPR and MIP imaging was performed 100 ml of Omnipaque 350 contrast was injected intravenously    COMPARISON:  CT head 03/27/2016    FINDINGS:  CT head with and  without contrast: There is no evidence for acute intracranial hemorrhage or sulcal effacement.  The ventricles are normal in size and configuration without evidence for hydrocephalus.  There is no midline shift or mass effect.  Allowing for CT technique there is no abnormal parenchymal enhancement.  The visualized paranasal sinuses and mastoid air cells are clear.    CTA head:    Anterior circulation: The bilateral distal cervical, petrous, cavernous, and supraclinoid segments of the ICAs are patent without significant focal stenosis or aneurysm.    The anterior middle cerebral arteries are patent without focal stenosis or aneurysm.    Posterior circulation: The distal vertebral arteries, basilar artery and posterior cerebral arteries are patent without focal stenosis or aneurysm.    CTA neck: Origin the right brachiocephalic, left common carotid and left subclavian arteries from the arch are within normal limits..    The right vertebral artery origin and proximal course is obscured by venous contamination.  The left vertebral artery origin is within normal limits.  Remaining vertebral arteries are patent throughout their course without focal stenosis...    Right carotid: The right common carotid artery, carotid bifurcation and extracranial portions of the internal carotid artery are patent there is atherosclerotic plaquing in the right carotid bifurcation and proximal ICA with less than 50% proximal ICA stenosis by NASCET criteria..    Left carotid: The left common carotid artery, carotid bifurcation and extracranial portions of the internal carotid arteries are patent without significant focal stenosis.    There is  medialization the carotid arteries in the retropharyngeal soft tissues slightly greater on the right.    Pharynx/larynx: Evaluation limited by scatter artifact from dental metal and motion allowing for limitation the nasopharynx, oropharynx, hypopharynx larynx and proximal trachea are within normal limits allowing for motion limitation    Oral cavity    Glands: Bilateral parotid and submandibular glands are within normal limits. Subcentimeter hypodense nodule medial aspect of the mid right thyroid measuring 5 mm overall indeterminate.    No evidence for adenopathy throughout the neck by size criteria.    There is grade 1 anterolisthesis of C3 on C4 and C4 on C5.  There is no evidence for acute fracture cervical spine..  Ill-defined ground-glass patchy opacification in the visualized lungs which may represent in part atelectasis underlying small airway disease to be considered.      Impression       CTA head: Unremarkable CTA of the head specifically without evidence for proximal significant stenosis or occlusion.    CTA neck: Atherosclerotic plaquing right carotid bifurcation and proximal ICA with less than 50% proximal ICA stenosis by NASCET criteria..    CT head: No evidence for acute intracranial hemorrhage or definite abnormal parenchymal enhancement.    Please see above for additional details.      Electronically signed by: Diomedes Morris DO  Date: 06/28/2018  Time: 09:33             Last Resulted: 06/28/18 09:33 Order Details View Encounter Lab and Collection Details Routing Result History            Patient Result Comments     Viewed by Genesis Ugalde on 7/1/2018  7:47 PM   Written by Forrest Waldrop MD on 6/30/2018  9:33 PM   The CTA head and neck shows no evidence of active vasculitis. There is moderate atherosclerotic disease right carotid. Continue aspirin 325mg daily and pravastatin. There is incidentally noted thryoid nodule and some changes in the lung bases.Will need thyroid ultrasound and CT  chest.Cristina will schedule. RJQ   Notes recorded by Forrest Waldrop MD on 6/21/2018 at 2:52 PM CDT  The temporal artery ultrasound is normal [facility]          PACS Images      Show images for US Temporal Artery Bilateral   Reviewed By Tonya Waldrop MD on 6/21/2018 14:52   Result Notes for US Temporal Artery Bilateral     Notes recorded by Forrest Waldrop MD on 6/21/2018 at 2:52 PM CDT  The temporal artery ultrasound is normal [facility]   US Temporal Artery Bilateral   Order: [id number]   Status:  Final result   Visible to patient:  Yes (Patient Portal)   Next appt:  10/11/2018 at 07:30 PM in Sleep Medicine (LAB, SLEEP [place])   Dx:  Giant cell arteritis with polymyalgia...   Details     Reading Physician Reading Date Result Priority   Herrera Rodriguez MD 6/21/2018    Steven Rodrigues MD 6/21/2018       Narrative     EXAMINATION:  US TEMPORAL ARTERY BILATERAL    CLINICAL HISTORY:  Encounter for follow-up examination after completed treatment for conditions other than malignant neoplasm    TECHNIQUE:  Limited grayscale and color flow ultrasound evaluation of the bilateral temporal arteries.    COMPARISON:  None    FINDINGS:  Bilateral temporal arteries are normal in diameter without focal stenosis or dilatation.  No hypoechoic wall thickening (halo sign).      Impression       Normal temporal arteries without evidence to suggest temporal arteritis.    Electronically signed by resident: Steven Rodrigues  Date: 06/21/2018  Time: 14:27    Electronically signed by: Herrera Rodriguez MD  Date: 06/21/2018  Time: 14:37              The MRA of the chest is normal, no evidence of large vessel vasculitis.[facility]          PACS Images      Show images for MRA Chest   Reviewed By Tonya Waldrop MD on 3/20/2018 14:21   Result Notes for MRA Chest     Notes recorded by Forrest Waldrop MD on 3/20/2018 at 2:20 PM CDT  The MRA of the chest is normal, no evidence of large vessel vasculitis.[facility]   MRA Chest   Order: [id number]    Status:  Final result   Visible to patient:  Yes (Patient Portal)   Next appt:  10/11/2018 at 07:30 PM in Sleep Medicine (LAB, SLEEP [facility])   Dx:  Aortic disease; Personal history of o...   Details     Reading Physician Reading Date Result Priority   Jeannie Hdz MD 3/20/2018    Herrera Rodriguez MD 3/20/2018       Narrative     EXAMINATION:  MRA CHEST    CLINICAL HISTORY:  Aortic dz, non-traumatic, known or suspectPersonal history of other diseases of the circulatory system    TECHNIQUE:  Multiplanar, multisequence imaging was performed through the chest to evaluate the aorta.  This exam is performed prior to and following the administration of 14 mL of intravenous Gadavist.    COMPARISON:  CT chest 02/19/2016    FINDINGS:  The thoracic aorta is normal in caliber, contour, and course.  There are 3 branch vessels from the aortic arch.  No evidence of stenosis or aneurysm.  No evidence of abnormal wall thickening or surrounding inflammatory change.    The visualized lungs are without significant abnormality.  The heart is within normal limits.    The visualized upper abdominal structures demonstrate hepatomegaly.    The osseous structures are without evidence for acute fracture.  Subcutaneous tissues are unremarkable.      Impression       Unremarkable MRA chest exam.    Electronically signed by resident: Jeannie Hdz  Date: 03/20/2018  Time: 10:36    Electronically signed by: Herrera Rodriguez MD  Date: 03/20/2018  Time: 11:16                          Current Assessment & Plan     ESR <2 CRP 0.7      Flu shot today  *Shingrix as previously ordered  Continue tocilizumab 162mg sc weekly  Cont asa 81mg daily  Cont pravastatin 40mg nightly  Can stop Bactrim-DS prophylaxis  Was planning to resume prednisone taper but given recent pmr flare will keep @ 10mg daily and reassess in 6 wks           Vitamin D deficiency    Overview     Results for EGBI, ANN V (MRN [id number]) as of 6/19/2018 08:18   Ref. Range 3/2/2018  09:12   Vit D, 25-Hydroxy Latest Ref Range: 30 - 96 ng/mL 25 (L)            Current Assessment & Plan     Vitamin D level    Cont vitamin D2 50,000 units once a week         Hx of colonic polyps    Current Assessment & Plan     Due for colonoscopy per Dr. Dykes given h/o polyps recommended surveillance in 3 years which is now         Fatty liver    Overview     Elroy Reddy MD 8/8/2018    Cooper Jansen MD 8/8/2018       Narrative     EXAMINATION:  MRI ABDOMEN W WO CONTRAST    CLINICAL HISTORY:  Liver lesion, >1cm, normal liver, no known malignancy;  Liver disease, unspecified    TECHNIQUE:  Multiplanar, multi-sequence MR imaging of the abdomen was performed before and after administration of 10 cc of Gadavist gadolinium-based intravenous contrast per the liver protocol.    COMPARISON:  MRI abdomen with and without contrast dated 04/18/2016    FINDINGS:  Pulmonary Bases: No pleural effusion.  7 mm right lower lobe nodule (series 3, image 16).    Liver: The liver is grossly steatotic appears normal morphology.  There is a stable 1.3 cm hyperenhancing focus within the posterior right hepatic segment VI (series 11, image 39).  This focus demonstrates intrinsic T2 hyperintensity and demonstrates persistent enhancement on subsequent post-contrast sequences.  Hepatic vasculature is patent.    Bile Ducts: normal    Gallbladder: Surgically absent    Pancreas: normal.    Spleen: normal.    Proximal Gastrointestinal tract: normal.    Mesentery: normal    Adrenal Glands: normal.    Kidneys: normal.    Aorta and Abdominal Vasculature: normal.    Lymph nodes: no pathologically enlarged lymph nodes are seen.    Body wall: normal    Other: No free fluid.  No abnormal marrow enhancing lesion.      Impression       Findings suggestive of a small right hepatic lobe hemangioma, not significantly changed from the 04/18/2016 exam.  No new liver findings.    Hepatic steatosis.    Right basilar 7 mm nodule, better  characterized on recent prior CT chest.    Electronically signed by resident: Cooper Jansen  Date: 08/08/2018  Time: 12:39    Electronically signed by: Elroy Reddy MD  Date: 08/08/2018  Time: 15:04              Lung nodule    Overview     Notes recorded by Forrest Waldrop MD on 7/9/2018 at 5:49 PM CDT  The CT chest shows no evidence of lung disease  2 nodules right lower lung, one of which is smaller than previous, the other of which is stable. Radiology feels no further surveillance of these needed. [facility]          PACS Images      Show images for CT Chest Without Contrast   Reviewed By Tonya Waldrop MD on 7/9/2018 17:49   Result Notes for CT Chest Without Contrast     Notes recorded by Forrest Waldrop MD on 7/9/2018 at 5:49 PM CDT  The CT chest shows no evidence of lung disease  2 nodules right lower lung, one of which is smaller than previous, the other of which is stable. Radiology feels no further surveillance of these needed. [facility]   CT Chest Without Contrast   Order: [id number]   Status:  Final result   Visible to patient:  Yes (Patient Portal)   Next appt:  10/11/2018 at 07:30 PM in Sleep Medicine (LAB, SLEEP [facility])   Dx:  Shortness of breath; Ground glass opa...   Details     Reading Physician Reading Date Result Priority   Jin Pizano MD 7/9/2018    Veto Meléndez MD 7/9/2018       Narrative     EXAMINATION:  CT chest without contrast    CLINICAL HISTORY:  Shortness of breath    TECHNIQUE:  Low dose axial images, sagittal and coronal reformations were obtained from the thoracic inlet to the lung bases. Contrast was not administered.    COMPARISON:  CT chest without contrast: 02/19/2016, 07/24/2003.    FINDINGS:  The structures at the base of the neck reveal no convincing evidence of disease.    There is a left-sided aortic arch with three branch vessels.  The thoracic aorta maintains normal caliber, contour, and course without significant atherosclerotic calcification within  its course.    The heart is not enlarged and there is no evidence of pericardial effusion.    The pulmonary arteries distribute normally.  There are four pulmonary veins.  Systemic and pulmonary venoatrial connections are concordant.    There is no axillary or mediastinal lymph node enlargement.  The hilar contours are unremarkable on this non-contrast examination.    The esophagus maintains a normal course and caliber.    There is sclerosis of the 5th rib. Retrospectively this has been present since chest CT dated 07/24/2003. Given that this has been stable for over a decade, this is likely of benign etiology which could represent a bone island or previous bone infarct. There is age-appropriate degenerative joint disease.    Limited views of the abdomen demonstrate surgical clips in the gallbladder fossa consistent with prior cholecystectomy. There is hepatic steatosis.    The trachea and proximal airways are patent. The lungs are symmetrically expanded and without evidence of consolidation, pneumothorax, pleural fluid or significant pleural thickening. There is a stable 0.9 cm nodule (axial series 2, image 67) in the posterior basal segment of the right lower lobe, previously 1.3 cm on CT chest dated 02/19/2016. There is a stable micronodule in the superior aspect of the right lower lobe. Given that these nodules have been stable for over 2 years, these are likely of benign etiology and no further surveillance is warranted.  The high-resolution lung images do not show emphysema or diffuse interstitial lung disease.      Impression       There is no evidence of interstitial lung disease as clinically questioned.    There is a stable 0.9 cm nodule in the posterior basal segment of the right lower lobe, previously 1.3 cm on CT chest dated 02/19/2016.  There is also a stable micro nodule in the superior aspect of the right lower lobe.  Given that these nodules have been stable for over 24 months, these are likely of  benign etiology and no further surveillance is warranted.    Additional findings as above.    Electronically signed by resident: Veto Meléndez  Date: 07/09/2018  Time: 17:18    Electronically signed by: Jin Pizano MD  Date: 07/09/2018  Time: 17:28             Last Resulted: 07/09/18 17:28 Order Details View Encounter Lab and Collection Details Routing Result History            Patient Result Comments     Viewed by Genesis Ugalde on 7/10/2018  4:24 PM   Written by Forrest Waldrop MD on 7/9/2018  5:49 PM   The CT chest shows no evidence of lung disease  2 nodules right lower lung, one of which is smaller than previous, the other of which is stable. Radiology feels no further surveillance of these needed. RJQ   External Result Report     External Result Report   Narrative     EXAMINATION:  CT chest without contrast    CLINICAL HISTORY:  Shortness of breath    TECHNIQUE:  Low dose axial images, sagittal and coronal reformations were obtained from the thoracic inlet to the lung bases. Contrast was not administered.    COMPARISON:  CT chest without contrast: 02/19/2016, 07/24/2003.    FINDINGS:  The structures at the base of the neck reveal no convincing evidence of disease.    There is a left-sided aortic arch with three branch vessels.  The thoracic aorta maintains normal caliber, contour, and course without significant atherosclerotic calcification within its course.    The heart is not enlarged and there is no evidence of pericardial effusion.    The pulmonary arteries distribute normally.  There are four pulmonary veins.  Systemic and pulmonary venoatrial connections are concordant.    There is no axillary or mediastinal lymph node enlargement.  The hilar contours are unremarkable on this non-contrast examination.    The esophagus maintains a normal course and caliber.    There is sclerosis of the 5th rib. Retrospectively this has been present since chest CT dated 07/24/2003. Given that this has been  stable for over a decade, this is likely of benign etiology which could represent a bone island or previous bone infarct. There is age-appropriate degenerative joint disease.    Limited views of the abdomen demonstrate surgical clips in the gallbladder fossa consistent with prior cholecystectomy. There is hepatic steatosis.    The trachea and proximal airways are patent. The lungs are symmetrically expanded and without evidence of consolidation, pneumothorax, pleural fluid or significant pleural thickening. There is a stable 0.9 cm nodule (axial series 2, image 67) in the posterior basal segment of the right lower lobe, previously 1.3 cm on CT chest dated 02/19/2016. There is a stable micronodule in the superior aspect of the right lower lobe. Given that these nodules have been stable for over 2 years, these are likely of benign etiology and no further surveillance is warranted.  The high-resolution lung images do not show emphysema or diffuse interstitial lung disease.   Impression       There is no evidence of interstitial lung disease as clinically questioned.    There is a stable 0.9 cm nodule in the posterior basal segment of the right lower lobe, previously 1.3 cm on CT chest dated 02/19/2016.  There is also a stable micro nodule in the superior aspect of the right lower lobe.  Given that these nodules have been stable for over 24 months, these are likely of benign etiology and no further surveillance is warranted.    Additional findings as above.    Electronically signed by resident: Veto Meléndez  Date: 07/09/2018  Time: 17:18    Electronically signed by: Jin Pizano MD  Date: 07/09/2018  Time: 17:28    Encounter     View Encounter          Signed by Resident     Veto Meléndez MD          Attestation     As the supervising and teaching physician, I personally reviewed the images and resident interpretation and I agree with the findings.   Signed by     Signed Credentials Date/Time  Phone Pager    FRANCI CASAREZ MD 7/09/2018 17:28 [phone]   Reviewed By     Forrest Waldrop MD on 7/9/2018 17:49   Exam Details     Performed Procedure Technologist Supporting Staff Performing Physician   CT Chest Without Contrast Jeannie Cyr        Appointment Date/Status Modality Department    7/9/2018     Completed [facility] OIC-CT1 500 LB LIMIT [facility] CT [facility]       Begin Exam End Exam  End Exam Questionnaires   7/9/2018  3:11 PM 7/9/2018  5:09 PM  IMAGING END ALL      Reason For Exam   Priority: Routine   Shortness of breath; bibasilar densities, ? small airways disease; giant cell arteritis   Dx: Shortness of breath [R06.02 (ICD-10-CM)]; Ground glass opacity present on imaging of lung [R91.8 (ICD-10-CM)]   Comments: Please confirm pregnancy status if applicable.  Please do HIGH RESOLUTION protocol for this study.  Please USE CONTRAST if radiologist thinks it is indicated.   Order Report     Order Details            Multinodular goiter    Overview     The thyroid ultrasound shows multiple nodules, none of which meet criteria for fine needle aspiration and no additional f/u is recommended. [facility]          PACS Images      Show images for US Soft Tissue Head Neck Thyroid   Reviewed By List     Forrest Waldrop MD on 7/9/2018 18:41   Result Notes for US Soft Tissue Head Neck Thyroid     Notes recorded by Forrest Waldrop MD on 7/9/2018 at 6:41 PM CDT  The thyroid ultrasound shows multiple nodules, none of which meet criteria for fine needle aspiration and no additional f/u is recommended. [facility]   US Soft Tissue Head Neck Thyroid   Order: [id number]   Status:  Final result   Visible to patient:  Yes (Patient Portal)   Next appt:  10/11/2018 at 07:30 PM in Sleep Medicine (LAB, [facility])   Dx:  Thyroid nodule; Right thyroid nodule   Details     Reading Physician Reading Date Result Priority   Lonnie Alexis MD 7/9/2018    Daniel Whitaker MD 7/9/2018       Narrative     EXAMINATION:  US SOFT  TISSUE HEAD NECK THYROID    CLINICAL HISTORY:  .  Nontoxic single thyroid nodule    TECHNIQUE:  Ultrasound of the thyroid and cervical lymph nodes was performed.    COMPARISON:  CTA head neck 06/28/2018.  Ultrasound head neck thyroid 06/10/2008.    FINDINGS:  The thyroid is normal in size.  Right lobe of the thyroid measures 3.8 x 1.5 x 1.4 cm.  Left lobe of the thyroid measures 3.5 x 0.9 x 1.4 cm.    Stable cystic nodule in the right lobe of the thyroid gland measuring 0.7 x 0.6 x 0.5 cm.  Additional tiny hypoechoic/cystic nodule measuring 0.2 x 0.1 x 0.1 cm in the right lobe of the thyroid gland.    Two nodules are seen in the left lobe of the thyroid gland.  The larger nodule is mixed solid and predominantly cystic and measures 1.4 x 1.4 x 0.8 cm.  The 2nd nodule is solid, isoechoic and measures 0.9 x 0.4 x 0.4 cm.    Cervical lymph nodes demonstrate normal morphology and size.      Impression       Multinodular thyroid gland, none of which meet criteria for fine-needle aspiration at this time.  No further follow-up is recommended.    Electronically signed by resident: Daniel Whitaker  Date: 07/09/2018  Time: 15:23    Electronically signed by: Lonnie Alexis MD  Date: 07/09/2018  Time: 15:26                    Ankle mass, right - Primary    Relevant Orders    MRI Ankle W WO Contrast Right

## 2018-10-09 NOTE — ASSESSMENT & PLAN NOTE
ESR <2 CRP 0.7      Flu shot today  *Shingrix as previously ordered  Continue tocilizumab 162mg sc weekly  Cont asa 81mg daily  Cont pravastatin 40mg nightly  Can stop Bactrim-DS prophylaxis  Was planning to resume prednisone taper but given recent pmr flare will keep @ 10mg daily and reassess in 6 wks

## 2018-10-10 ENCOUNTER — TELEPHONE (OUTPATIENT)
Dept: SLEEP MEDICINE | Facility: OTHER | Age: 63
End: 2018-10-10

## 2018-10-11 ENCOUNTER — HOSPITAL ENCOUNTER (OUTPATIENT)
Dept: SLEEP MEDICINE | Facility: HOSPITAL | Age: 63
Discharge: HOME | End: 2018-10-11
Attending: PSYCHIATRY & NEUROLOGY
Payer: MEDICARE

## 2018-10-11 DIAGNOSIS — G47.30 SLEEP APNEA, UNSPECIFIED TYPE: ICD-10-CM

## 2018-10-11 PROCEDURE — 95811 POLYSOM 6/>YRS CPAP 4/> PARM: CPT

## 2018-10-12 PROBLEM — G47.30 SLEEP APNEA: Status: ACTIVE | Noted: 2017-07-10

## 2018-10-12 NOTE — PROGRESS NOTES
"Education was done via explanation of sleep study process and procedure. All questions were observed.    Pt. tolerated CPAP well. Optimal pressure of 9 was obtained in side position and appeared to have eliminated most respiratory events. Supine sleep was encouraged and requested    Low sat of 93% was observed in study. EKG revealed rare PAC and rare PVC. Small Simplus Full Face Mask was used during CPAP titration. Pt. response to titration in a.m.  "That's not going to work"             Thank you letter was given in a.m.  "

## 2018-10-17 ENCOUNTER — TELEPHONE (OUTPATIENT)
Dept: SLEEP MEDICINE | Facility: CLINIC | Age: 63
End: 2018-10-17

## 2018-10-17 DIAGNOSIS — G47.30 SLEEP APNEA, UNSPECIFIED TYPE: Primary | ICD-10-CM

## 2018-10-17 NOTE — TELEPHONE ENCOUNTER
I have discussed study results with the patient and the fact that titration was done instead of diagnostic study by mistake.    She is willing to re-do baseline PSG in [place] lab.     Will place a new order.

## 2018-10-22 ENCOUNTER — TELEPHONE (OUTPATIENT)
Dept: SLEEP MEDICINE | Facility: OTHER | Age: 63
End: 2018-10-22

## 2018-10-22 ENCOUNTER — HOSPITAL ENCOUNTER (OUTPATIENT)
Dept: SLEEP MEDICINE | Facility: HOSPITAL | Age: 63
Discharge: HOME | End: 2018-10-22
Attending: PSYCHIATRY & NEUROLOGY
Payer: MEDICARE

## 2018-10-22 DIAGNOSIS — G47.30 SLEEP APNEA, UNSPECIFIED TYPE: ICD-10-CM

## 2018-10-22 DIAGNOSIS — G47.33 OSA (OBSTRUCTIVE SLEEP APNEA): ICD-10-CM

## 2018-10-22 PROCEDURE — 95810 POLYSOM 6/> YRS 4/> PARAM: CPT | Mod: 26,,, | Performed by: PSYCHIATRY & NEUROLOGY

## 2018-10-22 PROCEDURE — 95810 POLYSOM 6/> YRS 4/> PARAM: CPT

## 2018-10-22 PROCEDURE — 95810 PR POLYSOMNOGRAPHY, 4 OR MORE: ICD-10-PCS | Mod: 26,,, | Performed by: PSYCHIATRY & NEUROLOGY

## 2018-10-23 NOTE — PROGRESS NOTES
This is a Screen study for 62 year old Genesis Ugalde.  The procedure was explained to the patient upon arrival. This included the set-up process and what to expect during the night.   It was also explained to the patient that the test will be interpreted by a physician and the results will be sent to her PCP.  Her EKG appeared to be NSR.  Sleep disorder breathing most significant in REM sleep.   She did not meet the medicare criteria in time for a split night study.  A thank you letter was given to the patient upon leaving the sleep lab that explains the next steps regarding the sleep study and the treatment, if needed.

## 2018-10-25 ENCOUNTER — TELEPHONE (OUTPATIENT)
Dept: SLEEP MEDICINE | Facility: CLINIC | Age: 63
End: 2018-10-25

## 2018-10-25 NOTE — TELEPHONE ENCOUNTER
Patricia,    Please inform Genesis Ugalde  - recent study was positive for significant sleep apnea.    Does she was  follow up with MD / NP  to review sleep study results, or shall we order CPAP machine for her?      Thanks!

## 2018-10-26 ENCOUNTER — TELEPHONE (OUTPATIENT)
Dept: SLEEP MEDICINE | Facility: CLINIC | Age: 63
End: 2018-10-26

## 2018-10-26 NOTE — TELEPHONE ENCOUNTER
----- Message from Lamar Fitzgerald sent at 10/26/2018  9:16 AM CDT -----  Contact: pt            Name of Who is Calling: pt      What is the request in detail: is returning a call      Can the clinic reply by [facility]: no      [facility] [phone]

## 2018-10-26 NOTE — TELEPHONE ENCOUNTER
Spoke with the patient and informed her of the sleep study results. Patient stated that she didn't mind having the CPAP ordered, but she wanted to see the DrAime Also. Schedule patient, who is blind, for 11/07.

## 2018-10-29 ENCOUNTER — PATIENT MESSAGE (OUTPATIENT)
Dept: RHEUMATOLOGY | Facility: CLINIC | Age: 63
End: 2018-10-29

## 2018-10-29 ENCOUNTER — TELEPHONE (OUTPATIENT)
Dept: RHEUMATOLOGY | Facility: CLINIC | Age: 63
End: 2018-10-29

## 2018-10-29 DIAGNOSIS — M79.661 RIGHT CALF PAIN: Primary | ICD-10-CM

## 2018-11-02 ENCOUNTER — HOSPITAL ENCOUNTER (OUTPATIENT)
Dept: VASCULAR SURGERY | Facility: CLINIC | Age: 63
Discharge: HOME | End: 2018-11-02
Attending: INTERNAL MEDICINE
Payer: MEDICARE

## 2018-11-02 ENCOUNTER — TELEPHONE (OUTPATIENT)
Dept: RHEUMATOLOGY | Facility: CLINIC | Age: 63
End: 2018-11-02

## 2018-11-02 DIAGNOSIS — I82.541: Primary | ICD-10-CM

## 2018-11-02 DIAGNOSIS — I82.551 CHRONIC DEEP VEIN THROMBOSIS (DVT) OF RIGHT PERONEAL VEIN: ICD-10-CM

## 2018-11-02 DIAGNOSIS — M79.661 RIGHT CALF PAIN: ICD-10-CM

## 2018-11-02 DIAGNOSIS — I77.6 VASCULITIS: Primary | ICD-10-CM

## 2018-11-02 PROCEDURE — 93923 UPR/LXTR ART STDY 3+ LVLS: CPT | Mod: 26,S$PBB,, | Performed by: SURGERY

## 2018-11-02 PROCEDURE — 93971 EXTREMITY STUDY: CPT | Mod: 26,S$PBB,, | Performed by: SURGERY

## 2018-11-02 PROCEDURE — 93923 UPR/LXTR ART STDY 3+ LVLS: CPT | Mod: PBBFAC | Performed by: SURGERY

## 2018-11-02 PROCEDURE — 93971 EXTREMITY STUDY: CPT | Mod: PBBFAC | Performed by: SURGERY

## 2018-11-02 NOTE — TELEPHONE ENCOUNTER
Please tell pt the venous ultrasound shows chronic nonocclusive old thrombus right peroneal and posterior tibial veins and would like her to see  Vasc Med, Please schedule. Thanks BELIA

## 2018-11-04 ENCOUNTER — PATIENT MESSAGE (OUTPATIENT)
Dept: RHEUMATOLOGY | Facility: CLINIC | Age: 63
End: 2018-11-04

## 2018-11-06 ENCOUNTER — LAB VISIT (OUTPATIENT)
Dept: LAB | Facility: HOSPITAL | Age: 63
End: 2018-11-06
Attending: INTERNAL MEDICINE
Payer: MEDICARE

## 2018-11-06 ENCOUNTER — HOSPITAL ENCOUNTER (OUTPATIENT)
Dept: RADIOLOGY | Facility: HOSPITAL | Age: 63
Discharge: HOME | End: 2018-11-06
Attending: INTERNAL MEDICINE
Payer: MEDICARE

## 2018-11-06 DIAGNOSIS — R22.41 ANKLE MASS, RIGHT: ICD-10-CM

## 2018-11-06 DIAGNOSIS — I77.6 VASCULITIS: ICD-10-CM

## 2018-11-06 LAB
CREAT SERPL-MCNC: 0.7 MG/DL (ref 0.5–1.4)
CREAT SERPL-MCNC: 0.8 MG/DL
EST. GFR  (AFRICAN AMERICAN): >60 ML/MIN/1.73 M^2
EST. GFR  (NON AFRICAN AMERICAN): >60 ML/MIN/1.73 M^2
SAMPLE: NORMAL

## 2018-11-06 PROCEDURE — 82565 ASSAY OF CREATININE: CPT

## 2018-11-06 PROCEDURE — A9585 GADOBUTROL INJECTION: HCPCS | Performed by: INTERNAL MEDICINE

## 2018-11-06 PROCEDURE — 73723 MRI JOINT LWR EXTR W/O&W/DYE: CPT | Mod: TC,RT

## 2018-11-06 PROCEDURE — 36415 COLL VENOUS BLD VENIPUNCTURE: CPT | Mod: PO

## 2018-11-06 PROCEDURE — 25500020 PHARM REV CODE 255: Performed by: INTERNAL MEDICINE

## 2018-11-06 PROCEDURE — 73723 MRI JOINT LWR EXTR W/O&W/DYE: CPT | Mod: 26,RT,, | Performed by: RADIOLOGY

## 2018-11-06 RX ORDER — GADOBUTROL 604.72 MG/ML
10 INJECTION INTRAVENOUS
Status: COMPLETED | OUTPATIENT
Start: 2018-11-06 | End: 2018-11-06

## 2018-11-06 RX ADMIN — GADOBUTROL 10 ML: 604.72 INJECTION INTRAVENOUS at 04:11

## 2018-11-12 ENCOUNTER — OFFICE VISIT (OUTPATIENT)
Dept: VASCULAR SURGERY | Facility: CLINIC | Age: 63
End: 2018-11-12
Payer: MEDICARE

## 2018-11-12 VITALS
SYSTOLIC BLOOD PRESSURE: 124 MMHG | TEMPERATURE: 98 F | HEIGHT: 63 IN | DIASTOLIC BLOOD PRESSURE: 60 MMHG | WEIGHT: 246.94 LBS | HEART RATE: 81 BPM | BODY MASS INDEX: 43.75 KG/M2

## 2018-11-12 DIAGNOSIS — R22.41 ANKLE MASS, RIGHT: Primary | ICD-10-CM

## 2018-11-12 DIAGNOSIS — E66.01 MORBID OBESITY WITH BMI OF 40.0-44.9, ADULT: ICD-10-CM

## 2018-11-12 PROCEDURE — 99999 PR PBB SHADOW E&M-EST. PATIENT-LVL IV: CPT | Mod: PBBFAC,,, | Performed by: SURGERY

## 2018-11-12 PROCEDURE — 99214 OFFICE O/P EST MOD 30 MIN: CPT | Mod: PBBFAC | Performed by: SURGERY

## 2018-11-12 PROCEDURE — 99204 OFFICE O/P NEW MOD 45 MIN: CPT | Mod: S$PBB,,, | Performed by: SURGERY

## 2018-11-12 NOTE — PROGRESS NOTES
Patient ID: Genesis Ugalde is a 62 y.o. female.    I. HISTORY     Chief Complaint: No chief complaint on file.      HPI Genesis Ugalde is a 62 y.o. female referred from Dr. Forrest Waldrop for evaluation and management of chronic right tibial vein thrombosis and right ankle pain. Right calf pain has been present for 2 months and is stable. No calf muscle pain with walking. No leg swelling. No claudication or history of DVT. Has an autoimmune disorder and underwent a sural nerve and muscle biopsy from her right ankle in . Noted a tender mass near her scar 2-3 weeks ago. It has not changed over the past few weeks.    Has progressive blindness and follows with Dr Waldrop.    Father and Brother  of complications from DM and PAD    Review of Systems   Eyes: Positive for blurred vision, vision loss in left eye and vision loss in right eye.   Cardiovascular: Negative for claudication and leg swelling.   Respiratory: Negative.    Hematologic/Lymphatic: Negative.    Skin: Negative.    Musculoskeletal: Positive for muscle cramps, myalgias and stiffness.   Neurological: Negative.    Psychiatric/Behavioral: Negative.        II. PHYSICAL EXAM   Right Arm BP - Sittin/60 (18 0905)  Left Arm BP - Sittin/58 (18 09)  Pulse: 81  Temp: 98.3 °F (36.8 °C)  Body mass index is 43.74 kg/m².      Physical Exam   Constitutional: She is oriented to person, place, and time. She appears well-developed and well-nourished.   HENT:   Head: Normocephalic and atraumatic.   Cardiovascular: Normal rate and intact distal pulses.   Pulses:       Dorsalis pedis pulses are 2+ on the right side, and 2+ on the left side.   Pulmonary/Chest: Effort normal. No respiratory distress.   Musculoskeletal: Normal range of motion. She exhibits no edema.   Neurological: She is alert and oriented to person, place, and time.   Skin: Skin is warm and dry.   Psychiatric: She has a normal mood and affect.   Vitals reviewed.    Firm,  slightly tender nodule palpated on the medial right ankle just lateral to her achilles and just deep to biopsy scar.    III. ASSESSMENT & PLAN (MEDICAL DECISION MAKING)     1. Ankle mass, right    2. Morbid obesity with BMI of 40.0-44.9, adult        Imaging Results:  Venous duplex - chronic, non-occlusive tibial DVT    MRI - reviewed, possible peroneal vein thrombosis at ankle      Assessment/Diagnosis and Plan:  Genesis Ugalde is a 62 y.o. female with a small right ankle mass and non-occlusive chronic tibial DVT     - ankle mass is in the region of her Sural nerve biopsy. This may represent scar tissue. Monitor for growth or changes. Soft tissue US is mass persists   - tibial DVT are chronic. Would not recommend anticoagulation. Continue exercise as tolerated.     See me back as needed.    Maria D Hagan MD FACS [facility]  Vascular & Endovascular Surgery

## 2018-11-12 NOTE — LETTER
November 12, 2018      Forrest Waldrop MD  [address]  [facility] [address]           [facility]  [address]  [facility] [address]  Phone: [phone]  Fax: [phone]          Patient: Genesis Ugalde   MR Number: [id number]   YOB: 1955   Date of Visit: 11/12/2018       Dear Dr. Forrest Waldrop:    Thank you for referring Genesis Ugalde to me for evaluation. Attached you will find relevant portions of my assessment and plan of care.    If you have questions, please do not hesitate to call me. I look forward to following Genesis Ugalde along with you.    Sincerely,    Maria D Hagan MD    Enclosure  CC:  No Recipients    If you would like to receive this communication electronically, please contact [email or web address] or [phone] to request more information on [employer] access.    For providers and/or their staff who would like to refer a patient to [facility], please contact us through our one-stop-shop provider referral line, [facility] , at [phone].    If you feel you have received this communication in error or would no longer like to receive these types of communications, please e-mail [email or web address]

## 2018-11-19 ENCOUNTER — LAB VISIT (OUTPATIENT)
Dept: LAB | Facility: HOSPITAL | Age: 63
End: 2018-11-19
Attending: INTERNAL MEDICINE
Payer: MEDICARE

## 2018-11-19 DIAGNOSIS — M31.6 GCA (GIANT CELL ARTERITIS): ICD-10-CM

## 2018-11-19 LAB
CRP SERPL-MCNC: 0.8 MG/L
ERYTHROCYTE [SEDIMENTATION RATE] IN BLOOD BY WESTERGREN METHOD: <2 MM/HR

## 2018-11-19 PROCEDURE — 85652 RBC SED RATE AUTOMATED: CPT

## 2018-11-19 PROCEDURE — 86140 C-REACTIVE PROTEIN: CPT

## 2018-11-19 PROCEDURE — 36415 COLL VENOUS BLD VENIPUNCTURE: CPT | Mod: PO

## 2018-11-21 ENCOUNTER — OFFICE VISIT (OUTPATIENT)
Dept: RHEUMATOLOGY | Facility: CLINIC | Age: 63
End: 2018-11-21
Payer: MEDICARE

## 2018-11-21 VITALS
BODY MASS INDEX: 40.98 KG/M2 | DIASTOLIC BLOOD PRESSURE: 83 MMHG | HEART RATE: 79 BPM | SYSTOLIC BLOOD PRESSURE: 140 MMHG | HEIGHT: 65 IN | WEIGHT: 246 LBS

## 2018-11-21 DIAGNOSIS — H20.9 UVEITIS: ICD-10-CM

## 2018-11-21 DIAGNOSIS — E55.9 VITAMIN D DEFICIENCY: ICD-10-CM

## 2018-11-21 DIAGNOSIS — M31.6 GCA (GIANT CELL ARTERITIS): ICD-10-CM

## 2018-11-21 DIAGNOSIS — H46.9 OPTIC NEURITIS: ICD-10-CM

## 2018-11-21 DIAGNOSIS — H54.7 VISUAL LOSS: ICD-10-CM

## 2018-11-21 DIAGNOSIS — R22.41 ANKLE MASS, RIGHT: ICD-10-CM

## 2018-11-21 DIAGNOSIS — K76.0 FATTY LIVER: ICD-10-CM

## 2018-11-21 DIAGNOSIS — M31.6 TEMPORAL ARTERITIS: ICD-10-CM

## 2018-11-21 DIAGNOSIS — E66.01 MORBID OBESITY WITH BMI OF 40.0-44.9, ADULT: ICD-10-CM

## 2018-11-21 DIAGNOSIS — M17.11 PRIMARY OSTEOARTHRITIS OF RIGHT KNEE: Primary | ICD-10-CM

## 2018-11-21 DIAGNOSIS — I10 ESSENTIAL HYPERTENSION: ICD-10-CM

## 2018-11-21 DIAGNOSIS — R10.13 EPIGASTRIC ABDOMINAL PAIN: ICD-10-CM

## 2018-11-21 DIAGNOSIS — H54.10 BLINDNESS AND LOW VISION: ICD-10-CM

## 2018-11-21 DIAGNOSIS — M31.5 GIANT CELL ARTERITIS WITH POLYMYALGIA RHEUMATICA: ICD-10-CM

## 2018-11-21 PROCEDURE — 99214 OFFICE O/P EST MOD 30 MIN: CPT | Mod: S$PBB,,, | Performed by: INTERNAL MEDICINE

## 2018-11-21 PROCEDURE — 99999 PR PBB SHADOW E&M-EST. PATIENT-LVL IV: CPT | Mod: PBBFAC,,, | Performed by: INTERNAL MEDICINE

## 2018-11-21 PROCEDURE — 99214 OFFICE O/P EST MOD 30 MIN: CPT | Mod: PBBFAC | Performed by: INTERNAL MEDICINE

## 2018-11-21 RX ORDER — PREDNISONE 5 MG/1
5 TABLET ORAL DAILY
Qty: 90 TABLET | Refills: 1 | Status: SHIPPED | OUTPATIENT
Start: 2018-11-21 | End: 2019-04-01

## 2018-11-21 RX ORDER — PREDNISONE 1 MG/1
4 TABLET ORAL DAILY
Qty: 360 TABLET | Refills: 1 | Status: SHIPPED | OUTPATIENT
Start: 2018-11-21 | End: 2019-05-10 | Stop reason: SDUPTHER

## 2018-11-21 RX ORDER — ERGOCALCIFEROL 1.25 MG/1
CAPSULE ORAL
Qty: 12 CAPSULE | Refills: 3 | Status: SHIPPED | OUTPATIENT
Start: 2018-11-21 | End: 2019-09-16 | Stop reason: SDUPTHER

## 2018-11-21 ASSESSMENT — ROUTINE ASSESSMENT OF PATIENT INDEX DATA (RAPID3)
TOTAL RAPID3 SCORE: 2
PAIN SCORE: 3
PSYCHOLOGICAL DISTRESS SCORE: 6.6
FATIGUE SCORE: 5
PATIENT GLOBAL ASSESSMENT SCORE: 0
AM STIFFNESS SCORE: 1, YES
WHEN YOU AWAKENED IN THE MORNING OVER THE LAST WEEK, PLEASE INDICATE THE AMOUNT OF TIME IT TAKES UNTIL YOU ARE AS LIMBER AS YOU WILL BE FOR THE DAY: 1 HR
MDHAQ FUNCTION SCORE: .9

## 2018-11-21 NOTE — PROGRESS NOTES
"Subjective:       Patient ID: Genesis Ugalde is a 62 y.o. female.    Chief Complaint: GCA/PMR ; h/o optic neuritis/atrophy  HPI had flare PMR symptoms first week of Oct and prednisone increased to 10mg daily and continued  tocilizumab 162mg sc.weekly. Felt much better when prednisone increased to 10mg. Still some headaches. No recent changes in vision.  Review of Systems   Constitutional: Positive for fatigue. Negative for appetite change, fever and unexpected weight change.   HENT: Positive for mouth sores and trouble swallowing.         Dry mouth   Eyes: Negative for visual disturbance.   Respiratory: Negative for cough, shortness of breath and wheezing.    Cardiovascular: Positive for chest pain. Negative for palpitations.   Gastrointestinal: Positive for diarrhea. Negative for abdominal pain, anal bleeding, blood in stool, constipation, nausea and vomiting.        Heartburn     Genitourinary: Negative for dysuria, frequency and urgency.   Musculoskeletal: Negative for arthralgias, back pain, gait problem, joint swelling, myalgias, neck pain and neck stiffness.   Skin: Negative for rash.   Neurological: Positive for headaches. Negative for weakness and numbness.   Hematological: Negative for adenopathy. Does not bruise/bleed easily.   Psychiatric/Behavioral: Negative for sleep disturbance. The patient is not nervous/anxious.          Objective:   BP (!) 140/83   Pulse 79   Ht 5' 4.8" (1.646 m)   Wt 111.6 kg (246 lb)   BMI 41.19 kg/m²      Physical Exam   Constitutional: She is oriented to person, place, and time and well-developed, well-nourished, and in no distress.   HENT:   Head: Normocephalic and atraumatic.   Mouth/Throat: Oropharynx is clear and moist.   Non-tender TAs   Eyes: Conjunctivae and EOM are normal.   Strabismus no pupillary reaction to light   Neck: Normal range of motion. Neck supple. No thyromegaly present.   No carotid or supraclavicular bruits   Cardiovascular: Normal rate, regular " rhythm, normal heart sounds and intact distal pulses.  Exam reveals no gallop and no friction rub.    No murmur heard.  Pulmonary/Chest: Breath sounds normal. She has no wheezes. She has no rales. She exhibits no tenderness.   Abdominal: Soft. She exhibits no distension and no mass. There is no tenderness.   obese       Right Side Rheumatological Exam     Examination finds the shoulder, elbow, wrist, knee, 1st PIP, 1st MCP, 2nd PIP, 2nd MCP, 3rd PIP, 3rd MCP, 4th PIP, 4th MCP, 5th PIP and 5th MCP normal.    Left Side Rheumatological Exam     Examination finds the shoulder, elbow, wrist, knee, 1st PIP, 1st MCP, 2nd PIP, 2nd MCP, 3rd PIP, 3rd MCP, 4th PIP, 4th MCP, 5th PIP and 5th MCP normal.      Lymphadenopathy:     She has no cervical adenopathy.   Neurological: She is alert and oriented to person, place, and time. She displays normal reflexes. Gait normal.   Nl motor strength UE and LE bilateral   Skin: No rash noted. No erythema. No pallor.     Psychiatric: Mood, memory, affect and judgment normal.   Musculoskeletal: She exhibits no edema.           Assessment/Plan         Problem List Items Addressed This Visit     Giant cell arteritis with polymyalgia rheumatica    Overview          The MRA of the chest is normal, no evidence of large vessel vasculitis.[facility]          PACS Images     Show images for MRA Chest   Reviewed By Tonya Waldrop MD on 3/20/2018 14:21   Patient Result Comments     Viewed by Genesis Ugalde V on 3/20/2018  5:33 PM   Written by Forrest Waldrop MD on 3/20/2018  2:20 PM   The MRA of the chest is normal, no evidence of large vessel vasculitis.Q   External Result Report     External Result Report   Narrative     EXAMINATION:  MRA CHEST    CLINICAL HISTORY:  Aortic dz, non-traumatic, known or suspectPersonal history of other diseases of the circulatory system    TECHNIQUE:  Multiplanar, multisequence imaging was performed through the chest to evaluate the aorta.  This exam is  performed prior to and following the administration of 14 mL of intravenous Gadavist.    COMPARISON:  CT chest 02/19/2016    FINDINGS:  The thoracic aorta is normal in caliber, contour, and course.  There are 3 branch vessels from the aortic arch.  No evidence of stenosis or aneurysm.  No evidence of abnormal wall thickening or surrounding inflammatory change.    The visualized lungs are without significant abnormality.  The heart is within normal limits.    The visualized upper abdominal structures demonstrate hepatomegaly.    The osseous structures are without evidence for acute fracture.  Subcutaneous tissues are unremarkable.   Impression       Unremarkable MRA chest exam.    Electronically signed by resident: Jeannie Hdz  Date: 03/20/2018  Time: 10:36    Electronically signed by: Herrera Rodriguez MD  Date: 03/20/2018  Time: 11:16    Encounter     View Encounter          Signed by Resident   The CTA head and neck shows no evidence of active vasculitis. There is moderate atherosclerotic disease right carotid. Continue aspirin 325mg daily and pravastatin. There is incidentally noted thryoid nodule and some changes in the lung bases.Will need thyroid ultrasound and CT chest.Cristina will schedule. Algramo          PACS Images      Show images for CTA Head and Neck (xpd)   Reviewed By Tonya Miller MD on 6/28/2018 21:58   Result Notes for CTA Head and Neck (xpd)     Notes recorded by Forrest Waldrop MD on 6/30/2018 at 9:33 PM CDT  The CTA head and neck shows no evidence of active vasculitis. There is moderate atherosclerotic disease right carotid. Continue aspirin 325mg daily and pravastatin. There is incidentally noted thryoid nodule and some changes in the lung bases.Will need thyroid ultrasound and CT chest.Cristina will schedule. Bitbar   CTA Head and Neck (xpd)   Order: [id number]   Status:  Final result   Visible to patient:  Yes (Patient Portal)   Next appt:  10/11/2018 at 07:30 PM in Sleep  Medicine (LAB, SLEEP [place])   Dx:  Vasculitis, CNS; Giant cell arteritis...   Details     Reading Physician Reading Date Result Priority   Diomedes Morris, DO 6/28/2018       Narrative     EXAMINATION:  CTA HEAD AND NECK (XPD)    CLINICAL HISTORY:  Acute visual deficit;Vasculitis, CNS;h/o giant cell arteritis, r/o cervical vessel involvement;Arteritis, unspecified    TECHNIQUE:  5 mm axial images of the head pre and post contrast with 0.625 mm axial CTA images of the head neck postcontrast.  Coronal and sagittal MPR and MIP imaging was performed 100 ml of Omnipaque 350 contrast was injected intravenously    COMPARISON:  CT head 03/27/2016    FINDINGS:  CT head with and  without contrast: There is no evidence for acute intracranial hemorrhage or sulcal effacement.  The ventricles are normal in size and configuration without evidence for hydrocephalus.  There is no midline shift or mass effect.  Allowing for CT technique there is no abnormal parenchymal enhancement.  The visualized paranasal sinuses and mastoid air cells are clear.    CTA head:    Anterior circulation: The bilateral distal cervical, petrous, cavernous, and supraclinoid segments of the ICAs are patent without significant focal stenosis or aneurysm.    The anterior middle cerebral arteries are patent without focal stenosis or aneurysm.    Posterior circulation: The distal vertebral arteries, basilar artery and posterior cerebral arteries are patent without focal stenosis or aneurysm.    CTA neck: Origin the right brachiocephalic, left common carotid and left subclavian arteries from the arch are within normal limits..    The right vertebral artery origin and proximal course is obscured by venous contamination.  The left vertebral artery origin is within normal limits.  Remaining vertebral arteries are patent throughout their course without focal stenosis...    Right carotid: The right common carotid artery, carotid bifurcation and extracranial portions  of the internal carotid artery are patent there is atherosclerotic plaquing in the right carotid bifurcation and proximal ICA with less than 50% proximal ICA stenosis by NASCET criteria..    Left carotid: The left common carotid artery, carotid bifurcation and extracranial portions of the internal carotid arteries are patent without significant focal stenosis.    There is medialization the carotid arteries in the retropharyngeal soft tissues slightly greater on the right.    Pharynx/larynx: Evaluation limited by scatter artifact from dental metal and motion allowing for limitation the nasopharynx, oropharynx, hypopharynx larynx and proximal trachea are within normal limits allowing for motion limitation    Oral cavity    Glands: Bilateral parotid and submandibular glands are within normal limits. Subcentimeter hypodense nodule medial aspect of the mid right thyroid measuring 5 mm overall indeterminate.    No evidence for adenopathy throughout the neck by size criteria.    There is grade 1 anterolisthesis of C3 on C4 and C4 on C5.  There is no evidence for acute fracture cervical spine..  Ill-defined ground-glass patchy opacification in the visualized lungs which may represent in part atelectasis underlying small airway disease to be considered.      Impression       CTA head: Unremarkable CTA of the head specifically without evidence for proximal significant stenosis or occlusion.    CTA neck: Atherosclerotic plaquing right carotid bifurcation and proximal ICA with less than 50% proximal ICA stenosis by NASCET criteria..    CT head: No evidence for acute intracranial hemorrhage or definite abnormal parenchymal enhancement.    Please see above for additional details.      Electronically signed by: Diomedes Morris DO  Date: 06/28/2018  Time: 09:33             Last Resulted: 06/28/18 09:33 Order Details View Encounter Lab and Collection Details Routing Result History            Patient Result Comments     Viewed by  Genesis Arceobi on 7/1/2018  7:47 PM   Written by Forrest Waldrop MD on 6/30/2018  9:33 PM   The CTA head and neck shows no evidence of active vasculitis. There is moderate atherosclerotic disease right carotid. Continue aspirin 325mg daily and pravastatin. There is incidentally noted thryoid nodule and some changes in the lung bases.Will need thyroid ultrasound and CT chest.Cristina will schedule. RJQ   Notes recorded by Forrest Waldrop MD on 6/21/2018 at 2:52 PM CDT  The temporal artery ultrasound is normal RJQ          PACS Images      Show images for US Temporal Artery Bilateral   Reviewed By Tonya Waldrop MD on 6/21/2018 14:52   Result Notes for US Temporal Artery Bilateral     Notes recorded by Forrest Waldrop MD on 6/21/2018 at 2:52 PM CDT  The temporal artery ultrasound is normal RJ   US Temporal Artery Bilateral   Order: [id number]   Status:  Final result   Visible to patient:  Yes (Patient Portal)   Next appt:  10/11/2018 at 07:30 PM in Sleep Medicine (LAB, SLEEP [place])   Dx:  Giant cell arteritis with polymyalgia...   Details     Reading Physician Reading Date Result Priority   Herrera Rodriguez MD 6/21/2018    Steven Rodrigues MD 6/21/2018       Narrative     EXAMINATION:  US TEMPORAL ARTERY BILATERAL    CLINICAL HISTORY:  Encounter for follow-up examination after completed treatment for conditions other than malignant neoplasm    TECHNIQUE:  Limited grayscale and color flow ultrasound evaluation of the bilateral temporal arteries.    COMPARISON:  None    FINDINGS:  Bilateral temporal arteries are normal in diameter without focal stenosis or dilatation.  No hypoechoic wall thickening (halo sign).      Impression       Normal temporal arteries without evidence to suggest temporal arteritis.    Electronically signed by resident: Steven Rodrigues  Date: 06/21/2018  Time: 14:27    Electronically signed by: Herrera Rodriguez MD  Date: 06/21/2018  Time: 14:37              The MRA of the chest is  normal, no evidence of large vessel vasculitis.[facility]          PACS Images      Show images for MRA Chest   Reviewed By List     Forrest Waldrop MD on 3/20/2018 14:21   Result Notes for MRA Chest     Notes recorded by Forrest Waldrop MD on 3/20/2018 at 2:20 PM CDT  The MRA of the chest is normal, no evidence of large vessel vasculitis.[facility]   MRA Chest   Order: [id number]   Status:  Final result   Visible to patient:  Yes (Patient Portal)   Next appt:  10/11/2018 at 07:30 PM in Sleep Medicine (LAB, SLEEP [place])   Dx:  Aortic disease; Personal history of o...   Details     Reading Physician Reading Date Result Priority   Jeannie Hdz MD 3/20/2018    Herrera Rodriguez MD 3/20/2018       Narrative     EXAMINATION:  MRA CHEST    CLINICAL HISTORY:  Aortic dz, non-traumatic, known or suspectPersonal history of other diseases of the circulatory system    TECHNIQUE:  Multiplanar, multisequence imaging was performed through the chest to evaluate the aorta.  This exam is performed prior to and following the administration of 14 mL of intravenous Gadavist.    COMPARISON:  CT chest 02/19/2016    FINDINGS:  The thoracic aorta is normal in caliber, contour, and course.  There are 3 branch vessels from the aortic arch.  No evidence of stenosis or aneurysm.  No evidence of abnormal wall thickening or surrounding inflammatory change.    The visualized lungs are without significant abnormality.  The heart is within normal limits.    The visualized upper abdominal structures demonstrate hepatomegaly.    The osseous structures are without evidence for acute fracture.  Subcutaneous tissues are unremarkable.      Impression       Unremarkable MRA chest exam.    Electronically signed by resident: Jeannie Hdz  Date: 03/20/2018  Time: 10:36    Electronically signed by: Herrera Rodriguez MD  Date: 03/20/2018  Time: 11:16                          Current Assessment & Plan     Results for EGBI, ANN V (MRN [id number]) as of 11/21/2018 08:43    Ref. Range 11/19/2018 10:26   Sed Rate Latest Ref Range: 0 - 36 mm/Hr <2   CRP Latest Ref Range: 0.0 - 8.2 mg/L 0.8     Cont tocilizumab 162 mg sc weekly, refill sent to [facility]  Decrease prednisone 9mg daily x 1 mo., then 8mg daily x 1 mo. Then 7mg daily and cont Rx sent to local [facility]  Cont alendronate 35mg po once a week  Resume vitamin D2 50,000 units once a week  *flu vaccine @ [facility]           Relevant Medications    ergocalciferol (VITAMIN D2) 50,000 unit Cap    predniSONE (DELTASONE) 5 MG tablet    Blindness and low vision    Relevant Medications    ergocalciferol (VITAMIN D2) 50,000 unit Cap    Morbid obesity with BMI of 40.0-44.9, adult    Current Assessment & Plan     No change in weight         Relevant Medications    ergocalciferol (VITAMIN D2) 50,000 unit Cap    Hypertension    Current Assessment & Plan     140/83  F/u Dr. Mercado her primary         Visual loss    Relevant Medications    ergocalciferol (VITAMIN D2) 50,000 unit Cap    Optic neuritis    Relevant Medications    ergocalciferol (VITAMIN D2) 50,000 unit Cap    Uveitis    Relevant Medications    ergocalciferol (VITAMIN D2) 50,000 unit Cap    Vitamin D deficiency    Overview     Results for EGBI, ANN V (MRN [id number]) as of 6/19/2018 08:18   Ref. Range 3/2/2018 09:12   Vit D, 25-Hydroxy Latest Ref Range: 30 - 96 ng/mL 25 (L)            Relevant Medications    ergocalciferol (VITAMIN D2) 50,000 unit Cap    Fatty liver    Overview     Elroy Reddy MD 8/8/2018    Cooper Jansen MD 8/8/2018       Narrative     EXAMINATION:  MRI ABDOMEN W WO CONTRAST    CLINICAL HISTORY:  Liver lesion, >1cm, normal liver, no known malignancy;  Liver disease, unspecified    TECHNIQUE:  Multiplanar, multi-sequence MR imaging of the abdomen was performed before and after administration of 10 cc of Gadavist gadolinium-based intravenous contrast per the liver protocol.    COMPARISON:  MRI abdomen with and without contrast dated  04/18/2016    FINDINGS:  Pulmonary Bases: No pleural effusion.  7 mm right lower lobe nodule (series 3, image 16).    Liver: The liver is grossly steatotic appears normal morphology.  There is a stable 1.3 cm hyperenhancing focus within the posterior right hepatic segment VI (series 11, image 39).  This focus demonstrates intrinsic T2 hyperintensity and demonstrates persistent enhancement on subsequent post-contrast sequences.  Hepatic vasculature is patent.    Bile Ducts: normal    Gallbladder: Surgically absent    Pancreas: normal.    Spleen: normal.    Proximal Gastrointestinal tract: normal.    Mesentery: normal    Adrenal Glands: normal.    Kidneys: normal.    Aorta and Abdominal Vasculature: normal.    Lymph nodes: no pathologically enlarged lymph nodes are seen.    Body wall: normal    Other: No free fluid.  No abnormal marrow enhancing lesion.      Impression       Findings suggestive of a small right hepatic lobe hemangioma, not significantly changed from the 04/18/2016 exam.  No new liver findings.    Hepatic steatosis.    Right basilar 7 mm nodule, better characterized on recent prior CT chest.    Electronically signed by resident: Cooper Jansen  Date: 08/08/2018  Time: 12:39    Electronically signed by: Elroy Reddy MD  Date: 08/08/2018  Time: 15:04              Relevant Medications    ergocalciferol (VITAMIN D2) 50,000 unit Cap    Epigastric abdominal pain    Relevant Medications    ergocalciferol (VITAMIN D2) 50,000 unit Cap    Ankle mass, right    Overview     TECHNIQUE:  Multiplanar multi sequences images of the right ankle.  Pre and post-contrast images acquired.  The patient received 10 cc of Gadavist IV contrast.   marker in the region of interest is seen.    COMPARISON:  None    FINDINGS:  The tibiotalar joint appears normal.  The distal tibia, distal fibula, talus, navicular, cuboid and cuneiforms appear normal.  Minimal subchondral cystic changes at the subtalar joint within the calcaneus  consistent with degenerative change.  The Achilles tendon and plantar fascia appear normal.  The anterior and posterior tibiofibular ligaments and talofibular ligaments are intact.  The deltoid ligament complex appear normal.  The flexor and extensor tendons are normal.  The peroneus brevis and longus tendons are normal.  Post-contrast images demonstrate no areas of abnormal enhancement to suggest an active inflammatory, infectious or tumor process.  Focal dilation of  what appears to be a small superficial vessel possibly a small vein about 6.5 cm proximal to the insertion of the Achilles tendon, just underneath the marker, lateral to the Achilles tendon, it measures about 8 mm diameter possibly a superficial venous thrombosis or small venous varix.      Impression       Minimal degenerative change at the subtalar joint.    Small oval soft tissue signal just lateral to the Achilles tendon 6.5 cm proximal to its insertion on the calcaneus could represent a small superficial venous thrombosis or small venous varix.      Electronically signed by: Macrina Flores MD  Date: 2018  Time: 08:15     The venous ultrasound shows old chronic non-occluding thrombosis of the posterior tibial and peroneal veins of the calf. Would suggest consult with Vascular Medicine. Cristina malcolm schedule.RJQ   VAS US Venous Leg Right   Order: [id number]   Status:  Final result   Visible to patient:  Yes (Patient Portal) Next appt:  2019 at 02:00 PM in Sleep Medicine (Azucena Lares MD) Dx:  Right calf pain   Details        Narrative     PAT NAME: ANN WHITNEY  MED REC#: [id number]  :      1955  SEX:      F  EXAM PATRICIO: 2018 09:46  REF PHYS: AKHIL GARCIA      Indication:  right calf pain.  Results:  Right Leg:        Compression       Color fill        Thrombosis    Common Femoral    complete          complete          none  Femoral prox      complete          complete          none  Femoral mid       complete           complete          none  Femoral distal    complete          complete          none  Popliteal AK      complete          complete          none  Popliteal Fossa   complete          complete          none  Popliteal BK      complete          complete          none  Posterior Tibial  partial           partial           old, chronic non occlusive thrombosis  Peroneal          partial           partial           old, chronic non occlusive thrombosis  GSV               complete          complete          none  SFJ               complete          complete          none    Left Leg:         Compression       Color fill        Thrombosis    Common Femoral    complete          complete          none  SFJ               complete          complete          none      Report Summary:  Impression:   Right Leg: Color flow evaluation of the lower extremity demonstrates old, chronic non-occlusive thrombus in the peroneals veins and one of the paired PTVs. There is no evidence of venous thrombosis in the remaining deep veins.     Left Leg: CFV and SFJ patent.    Sonographer: Sylvie Guzmán  RVS    Electronically Signed by:  Inderjit Merida MD [[id number]]                        On: 11/02/2018 13:16                      Current Assessment & Plan     Saw Maria D Hagan MD 11/12/18 in [facility] :    Genesis Ugalde is a 62 y.o. female with a small right ankle mass and non-occlusive chronic tibial DVT      - ankle mass is in the region of her Sural nerve biopsy. This may represent scar tissue. Monitor for growth or changes. Soft tissue US is mass persists   - tibial DVT are chronic. Would not recommend anticoagulation. Continue exercise as tolerated.      See me back as needed.     Maria D Hagan MD FACS [facility]  Vascular & Endovascular Surgery              Other Visit Diagnoses     Primary osteoarthritis of right knee    -  Primary    Temporal arteritis        Relevant Medications    tocilizumab (ACTEMRA) 162 mg/0.9 mL Syrg    predniSONE  (DELTASONE) 1 MG tablet    GCA (giant cell arteritis)        Relevant Medications    tocilizumab (ACTEMRA) 162 mg/0.9 mL Syrg    predniSONE (DELTASONE) 1 MG tablet

## 2018-11-29 DIAGNOSIS — M31.6 GCA (GIANT CELL ARTERITIS): ICD-10-CM

## 2018-11-29 DIAGNOSIS — M31.6 TEMPORAL ARTERITIS: ICD-10-CM

## 2018-11-29 RX ORDER — TOCILIZUMAB 180 MG/ML
INJECTION, SOLUTION SUBCUTANEOUS
Qty: 12 SYRINGE | Refills: 1 | Status: SHIPPED | OUTPATIENT
Start: 2018-11-29 | End: 2019-05-15 | Stop reason: SDUPTHER

## 2019-01-23 ENCOUNTER — PATIENT MESSAGE (OUTPATIENT)
Dept: SLEEP MEDICINE | Facility: CLINIC | Age: 64
End: 2019-01-23

## 2019-01-23 ENCOUNTER — OFFICE VISIT (OUTPATIENT)
Dept: SLEEP MEDICINE | Facility: CLINIC | Age: 64
End: 2019-01-23
Payer: MEDICARE

## 2019-01-23 VITALS
SYSTOLIC BLOOD PRESSURE: 138 MMHG | WEIGHT: 250.81 LBS | BODY MASS INDEX: 42.82 KG/M2 | HEIGHT: 64 IN | HEART RATE: 82 BPM | DIASTOLIC BLOOD PRESSURE: 92 MMHG

## 2019-01-23 DIAGNOSIS — G47.33 OSA (OBSTRUCTIVE SLEEP APNEA): Primary | ICD-10-CM

## 2019-01-23 DIAGNOSIS — G25.81 RLS (RESTLESS LEGS SYNDROME): ICD-10-CM

## 2019-01-23 PROCEDURE — 99214 OFFICE O/P EST MOD 30 MIN: CPT | Mod: S$PBB,,, | Performed by: PSYCHIATRY & NEUROLOGY

## 2019-01-23 PROCEDURE — 99214 OFFICE O/P EST MOD 30 MIN: CPT | Mod: PBBFAC,PO | Performed by: PSYCHIATRY & NEUROLOGY

## 2019-01-23 PROCEDURE — 99999 PR PBB SHADOW E&M-EST. PATIENT-LVL IV: ICD-10-PCS | Mod: PBBFAC,,, | Performed by: PSYCHIATRY & NEUROLOGY

## 2019-01-23 PROCEDURE — 99214 PR OFFICE/OUTPT VISIT, EST, LEVL IV, 30-39 MIN: ICD-10-PCS | Mod: S$PBB,,, | Performed by: PSYCHIATRY & NEUROLOGY

## 2019-01-23 PROCEDURE — 99999 PR PBB SHADOW E&M-EST. PATIENT-LVL IV: CPT | Mod: PBBFAC,,, | Performed by: PSYCHIATRY & NEUROLOGY

## 2019-01-23 NOTE — PATIENT INSTRUCTIONS
Medicare rules;    90 days to meet compliance:    30 consecutive  days where you have at least 4 hours every night)  ----------------------------------  30 days to like or dislike the mask - can swapped    DME [facility]:  [phone] - [place]:  or [phone] (ext [phone])- [place]    ----------------------    I will see you after 30-60 days - please bring bring your machine, power cord and all supplies.  At that point it will be too late to swap the mask.          Please resume iron and Gabapentin

## 2019-01-23 NOTE — PROGRESS NOTES
Genesis Ugalde  was seen at the request of  No ref. provider found for sleep evaluation.    09/05/2018 INITIAL HISTORY OF PRESENT ILLNESS:  Genesis Ugalde is a 63 y.o. female is here to be evaluated for a sleep disorder.       CHIEF COMPLAINT:      The patient's complaints include excessive daytime sleepiness, excessive daytime fatigue, snoring,  witnessed breathing pauses,  gasping for air in sleep and interrupted sleep since  [id number] when she was diagnosed with LOYDA EJ - never treated.    Still taking Prednisone for Giant Cell arteritis for 12 years. Gained wt in the process.    Her biggest concerned - ongoing headache, worse in AM despite successful GCA treatment.   Gabapentin 600 mg for DM neuropathy and Clonazepam as needed for anxiety.    Lost vision in both eyes.      EPWORTH SLEEPINESS SCALE 8/31/2018   Sitting and reading 2   Watching TV 2   Sitting, inactive in a public place (e.g. a theatre or a meeting) 2   As a passenger in a car for an hour without a break 1   Lying down to rest in the afternoon when circumstances permit 3   Sitting and talking to someone 1   Sitting quietly after a lunch without alcohol 1   In a car, while stopped for a few minutes in traffic 1   Total score 13       SLEEP ROUTINE AND LIFESTYLE 01/23/2019 :  Sleep Clinic New Patient 8/31/2018   What time do you go to bed on a week day? (Give a range) 8 p.m.  - 10 p.m.   What time do you go to bed on a day off? (Give a range) N/A   How long does it take you to fall asleep? (Give a range) 1 hour+   On average, how many times per night do you wake up? 5 - 10   How long does it take you to fall back into sleep? (Give a range) 15 - 30 minutes   What time do you wake up to start your day on a week day? (Give a range) 5 a.m. - 7 a.m.   What time do you wake up to start your day on a day off? (Give a range) 5 a.m. - 7 a.m.   What time do you get out of bed? (Give a range) 5 a.m. - 7 a.m.   On average, how many hours do you sleep? 4 - 5  hours   On average, how many naps do you take per day? One   Rate your sleep quality from 0 to 5 (0-poor, 5-great). 0   Have you experienced:  N/a   How much weight have you lost or gained (in lbs.) in the last year? N/A   On average, how many times per night do you go to the bathroom?  1 - 2   Have you ever had a sleep study/CPAP machine/surgery for sleep apnea? Yes, but no rx   Have you ever had a CPAP machine for sleep apnea? No   Have you ever had surgery for sleep apnea? No       Sleep Clinic ROS  8/31/2018   Difficulty breathing through the nose?  No   Sore throat or dry mouth in the morning? Sometimes   Irregular or very fast heart beat?  Yes - at night   Shortness of breath?  Sometimes   Acid reflux? Yes   Body aches and pains?  Yes   Morning headaches? Yes   Dizziness? Yes   Mood changes?  Yes   Do you exercise?  Sometimes   Do you feel like moving your legs a lot?  Sometimes; urge to move legs in the evening; worse with insensitivity; Gabapentin takes the edge off.          Denies symptoms concerning for parasomnia      INTERVAL HISTORY:    01/23/2019:  The patient has not presented any new complaints since the previous visit.   Comes to discuss PSG and titration.  Would like to start CPAP use. Gabapentin has been switched to tier 2 and now is not very affordable to her.  She is now taking iron supplementation (Ferritin was 40 found on recent blood work)    Social History:      Occupationdisability  Bed partner:   Exercise routine: no - used to walk - vision limiting  Caffeine:       PREVIOUS SLEEP STUDIES:   EJ - 2012 -2015  PSG 10/22/18: Significant Obstructive sleep apnea (LOYDA) with AHI (apnea hypopnea Index) of 13.2; RDI 18; SaO2 min 82%  (weight  246 lbs).  CPAP titration on 10/16/18: Effective control of respiratory events was achieved at 9 cm of H2O.     DME:       PAST MEDICAL HISTORY:    Active Ambulatory Problems     Diagnosis Date Noted    Giant cell arteritis with polymyalgia rheumatica  09/04/2014    Morbid obesity with BMI of 40.0-44.9, adult 09/04/2014    Hypertension 09/04/2014    Visual loss 09/04/2014    Optic neuritis 09/04/2014    Type 2 diabetes mellitus 09/04/2014    Kidney stones 09/04/2014    Uveitis 09/04/2014    Vitamin D deficiency 09/04/2014    Hx of colonic polyps 09/04/2014    Blindness and low vision 09/04/2014    Cushing's disease 09/04/2014    Recurrent genital herpes 09/04/2014    Hepatosplenomegaly 09/04/2014    Fatty liver 09/04/2014    Primary open angle glaucoma, both eyes 10/06/2014    Lung nodule 08/27/2015    Epigastric abdominal pain 10/26/2015    Absence attack 02/01/2016    Transient memory loss 02/01/2016    Right sided sciatica 02/01/2016    Diarrhea 04/08/2016    Esophageal dysphagia 04/08/2016    Immunosuppression 05/04/2016    Loose stools 09/06/2016    Dyslipidemia 09/27/2016    Medication monitoring encounter 09/27/2016    Claustrophobia 10/06/2016    Pain of right hip joint 11/29/2016    LOYDA (obstructive sleep apnea) 07/10/2017    Current chronic use of systemic steroids 06/19/2018    Hyperlipidemia 06/19/2018    Multinodular goiter 10/09/2018    Ankle mass, right 10/09/2018    Right calf pain      Resolved Ambulatory Problems     Diagnosis Date Noted    Transaminitis 09/04/2014     Past Medical History:   Diagnosis Date    Anxiety     Asthma     Cataract     Depression     Diabetes mellitus     Disease of immune system     Giant cell arteritis     Glaucoma     Hypertension     Polymyalgia rheumatica     Uveitis                 PAST SURGICAL HISTORY:    Past Surgical History:   Procedure Laterality Date    ANKLE SURGERY      CHOLECYSTECTOMY      COLONOSCOPY N/A 9/28/2015    Performed by [facility] Dykes [facility] at [facility] (4TH FLR)    CYST REMOVAL      right lung    ESOPHAGOGASTRODUODENOSCOPY (EGD) N/A 5/11/2016    Performed [facility] at [facility] (4TH FLR)    ESOPHAGOGASTRODUODENOSCOPY (EGD) N/A  10/26/2015    [facility] ([facility]    HYSTERECTOMY           FAMILY HISTORY:                Family History   Problem Relation Age of Onset    Coronary artery disease Mother     Diabetes Mother     Hypertension Mother     Stroke Mother     Coronary artery disease Father     Emphysema Father     Diabetes Father     Emphysema Sister     Heart attack Sister     Cancer Brother 58        stomach    Stomach cancer Brother     Glaucoma Neg Hx     Asthma Neg Hx     Celiac disease Neg Hx     Colon cancer Neg Hx     Colon polyps Neg Hx     Esophageal cancer Neg Hx     Inflammatory bowel disease Neg Hx     Rectal cancer Neg Hx     Ulcerative colitis Neg Hx     Macular degeneration Neg Hx     Retinal detachment Neg Hx     Strabismus Neg Hx     Amblyopia Neg Hx     Blindness Neg Hx     Cataracts Neg Hx        SOCIAL HISTORY:          Tobacco:   Social History     Tobacco Use   Smoking Status Never Smoker   Smokeless Tobacco Never Used       alcohol use:    Social History     Substance and Sexual Activity   Alcohol Use No    Alcohol/week: 0.0 oz                   ALLERGIES:    Review of patient's allergies indicates:   Allergen Reactions    Percocet [oxycodone-acetaminophen] Shortness Of Breath    Seroquel [quetiapine] Shortness Of Breath     Akathisia    Percodan [oxycodone hcl-oxycodone-asa] Rash       CURRENT MEDICATIONS:    Current Outpatient Medications   Medication Sig Dispense Refill    ACTEMRA 162 mg/0.9 mL Syrg INJECT ONE SYRINGE (162 MG) INTO THE SKIN EVERY 7 DAYS. REFRIGERATE DONOT FREEZE. 12 Syringe 1    alendronate (FOSAMAX) 35 MG tablet TAKE 1 TABLET (35 MG TOTAL) BY MOUTH ONCE A WEEK. 12 tablet 3    aspirin (ECOTRIN) 325 MG EC tablet Take 325 mg by mouth once daily.      brimonidine 0.2% (ALPHAGAN) 0.2 % Drop Place 1 drop into both eyes 3 (three) times daily. 5 mL 12    clonazePAM (KLONOPIN) 0.5 MG tablet Take 0.5 mg by mouth once daily.       "dorzolamide (TRUSOPT) 2 % ophthalmic solution Place 1 drop into both eyes 3 (three) times daily. 10 mL 12    ergocalciferol (VITAMIN D2) 50,000 unit Cap TAKE 1 CAPSULE (50,000 UNITS TOTAL) BY MOUTH EVERY 7 DAYS. 12 capsule 3    gabapentin (NEURONTIN) 600 MG tablet Take 600 mg by mouth 3 (three) times daily.      latanoprost 0.005 % ophthalmic solution Place 1 drop into both eyes every evening. 1 Bottle 12    lisinopril (PRINIVIL,ZESTRIL) 40 MG tablet Take 40 mg by mouth once daily.      metformin (GLUCOPHAGE) 1000 MG tablet Take 1,000 mg by mouth 2 (two) times daily.  1    metoprolol succinate (TOPROL-XL) 100 MG 24 hr tablet Take 100 mg by mouth once daily.  3    omeprazole (PRILOSEC) 20 MG capsule TAKE 1 CAPSULE (20 MG TOTAL) BY MOUTH 2 (TWO) TIMES DAILY. 60 capsule 11    pravastatin (PRAVACHOL) 40 MG tablet TAKE 1 TABLET (40 MG TOTAL) BY MOUTH EVERY EVENING. 90 tablet 3    predniSONE (DELTASONE) 1 MG tablet Take 4 tablets (4 mg total) by mouth once daily. After 1mo. Decrease to 3 tabs daily x 1 mo, then decrease to 2 tabs daily and cont 360 tablet 1    predniSONE (DELTASONE) 5 MG tablet Take 1 tablet (5 mg total) by mouth once daily. 90 tablet 1    tocilizumab (ACTEMRA) 162 mg/0.9 mL Syrg INJECT ONE SYRINGE (162 MG) INTO THE SKIN EVERY 7 DAYS. REFRIGERATE DONOT FREEZE. 12 Syringe 0    tramadol (ULTRAM) 50 mg tablet Take 50 mg by mouth every 6 (six) hours as needed for Pain.      valACYclovir (VALTREX) 500 MG tablet TAKE 1 TABLET (500 MG TOTAL) BY MOUTH ONCE DAILY. 90 tablet 3     No current facility-administered medications for this visit.                         PHYSICAL EXAM:  BP (!) 138/92   Pulse 82   Ht 5' 4" (1.626 m)   Wt 113.8 kg (250 lb 12.8 oz)   BMI 43.05 kg/m²   GENERAL: Overweight body habitus, well groomed.  HEENT:   HEENT:  Conjunctivae are non-erythematous; Pupils equal, round, and reactive to light; Nose is symmetrical; Nasal mucosa is pink and moist; Septum is midline; Inferior " "turbinates are hypertrophied; Nasal airflow is diminished on the right; Posterior pharynx is pink; Modified Mallampati:III-IV; Posterior palate is low; Tonsils not visualized; Uvula is wide and elongated;Tongue is enlarged; Dentition is fair; No TMJ tenderness; Jaw opening and protrusion without click and without discomfort.  NECK: Supple. Neck circumference is 16 inches. No thyromegaly. No palpable nodes.     SKIN: On face and neck: No abrasions, no rashes, no lesions.  No subcutaneous nodules are palpable.  RESPIRATORY: Chest is clear to auscultation.  Normal chest expansion and non-labored breathing at rest.  CARDIOVASCULAR: Normal S1, S2.  No murmurs, gallops or rubs. No carotid bruits bilaterally.  No edema. No clubbing. No cyanosis.    NEURO: Oriented to time, place and person. Normal attention span and concentration. Gait normal.    PSYCH: Affect is full. Mood is normal  MUSCULOSKELETAL: Moves 4 extremities. Gait normal.         Using [facility]: yes      ASSESSMENT:    1. Sleep Apnea NEC. The patient symptomatically has  excessive daytime sleepiness, snoring,  witnessed breathing pauses, excessive daytime fatigue, gasping for air in sleep, interrupted sleep and nocturia  with exam findings of "a crowded oral airway and elevated body mass index. The patient has medical co-morbidities of cognitive dysfunction,  which can be worsened by LOYDA. This warrants further investigation for possible obstructive sleep apnea.      2. RLS - iron status unknown; Gabapentin 600 mg TID also for DM Nueropathy    PLAN:    Will order APAP 8 - 18 cm with a nasal mask  Resume iron supplementation  Will increase amount of pills on her next rx.        More than 25 minutes of this 45 minutes visit was spent in counseling: during our discussion today, we talked about the etiology of  LOYDA as well as the potential ramifications of untreated sleep apnea, which could include daytime sleepiness, hypertension, heart disease and/or stroke.  " We discussed potential treatment options, which could include weight loss, body positioning, continuous positive airway pressure (CPAP), or referral for surgical consideration. Meanwhile, she  is urged to avoid supine sleep, weight gain and alcoholic beverages since all of these can worsen LOYDA.     Precautions: The patient was advised to abstain from driving should he feel sleepy or drowsy.    Follow up: MD/NP  after the sleep study has been completed.     Thank you for allowing me the opportunity to participate in the care of your patient.    This visit summary will be sent to referring provider via [facility]

## 2019-02-01 DIAGNOSIS — H40.1131 PRIMARY OPEN ANGLE GLAUCOMA OF BOTH EYES, MILD STAGE: ICD-10-CM

## 2019-02-01 RX ORDER — BRIMONIDINE TARTRATE 2 MG/ML
1 SOLUTION/ DROPS OPHTHALMIC 3 TIMES DAILY
Qty: 10 ML | Refills: 12 | Status: SHIPPED | OUTPATIENT
Start: 2019-02-01 | End: 2020-03-23

## 2019-02-14 ENCOUNTER — PATIENT MESSAGE (OUTPATIENT)
Dept: SLEEP MEDICINE | Facility: CLINIC | Age: 64
End: 2019-02-14

## 2019-02-14 ENCOUNTER — TELEPHONE (OUTPATIENT)
Dept: SLEEP MEDICINE | Facility: CLINIC | Age: 64
End: 2019-02-14

## 2019-02-18 ENCOUNTER — LAB VISIT (OUTPATIENT)
Dept: LAB | Facility: HOSPITAL | Age: 64
End: 2019-02-18
Attending: INTERNAL MEDICINE
Payer: MEDICARE

## 2019-02-18 DIAGNOSIS — M31.6 GCA (GIANT CELL ARTERITIS): ICD-10-CM

## 2019-02-18 LAB
ALBUMIN SERPL BCP-MCNC: 3.8 G/DL
ALP SERPL-CCNC: 55 U/L
ALT SERPL W/O P-5'-P-CCNC: 45 U/L
ANION GAP SERPL CALC-SCNC: 8 MMOL/L
AST SERPL-CCNC: 23 U/L
BASOPHILS # BLD AUTO: 0.04 K/UL
BASOPHILS NFR BLD: 0.4 %
BILIRUB SERPL-MCNC: 1.4 MG/DL
BUN SERPL-MCNC: 17 MG/DL
CALCIUM SERPL-MCNC: 9.5 MG/DL
CHLORIDE SERPL-SCNC: 109 MMOL/L
CO2 SERPL-SCNC: 28 MMOL/L
CREAT SERPL-MCNC: 0.8 MG/DL
CRP SERPL-MCNC: 0.9 MG/L
DIFFERENTIAL METHOD: ABNORMAL
EOSINOPHIL # BLD AUTO: 0.4 K/UL
EOSINOPHIL NFR BLD: 3.7 %
ERYTHROCYTE [DISTWIDTH] IN BLOOD BY AUTOMATED COUNT: 12.6 %
ERYTHROCYTE [SEDIMENTATION RATE] IN BLOOD BY WESTERGREN METHOD: <2 MM/HR
EST. GFR  (AFRICAN AMERICAN): >60 ML/MIN/1.73 M^2
EST. GFR  (NON AFRICAN AMERICAN): >60 ML/MIN/1.73 M^2
GLUCOSE SERPL-MCNC: 84 MG/DL
HCT VFR BLD AUTO: 40.6 %
HGB BLD-MCNC: 13.2 G/DL
IMM GRANULOCYTES # BLD AUTO: 0.04 K/UL
IMM GRANULOCYTES NFR BLD AUTO: 0.4 %
LYMPHOCYTES # BLD AUTO: 3.3 K/UL
LYMPHOCYTES NFR BLD: 32.5 %
MCH RBC QN AUTO: 32 PG
MCHC RBC AUTO-ENTMCNC: 32.5 G/DL
MCV RBC AUTO: 99 FL
MONOCYTES # BLD AUTO: 0.8 K/UL
MONOCYTES NFR BLD: 7.9 %
NEUTROPHILS # BLD AUTO: 5.6 K/UL
NEUTROPHILS NFR BLD: 55.1 %
NRBC BLD-RTO: 0 /100 WBC
PLATELET # BLD AUTO: 224 K/UL
PMV BLD AUTO: 11.7 FL
POTASSIUM SERPL-SCNC: 4 MMOL/L
PROT SERPL-MCNC: 6.2 G/DL
RBC # BLD AUTO: 4.12 M/UL
SODIUM SERPL-SCNC: 145 MMOL/L
WBC # BLD AUTO: 10.12 K/UL

## 2019-02-18 PROCEDURE — 36415 COLL VENOUS BLD VENIPUNCTURE: CPT | Mod: PO

## 2019-02-18 PROCEDURE — 80053 COMPREHEN METABOLIC PANEL: CPT

## 2019-02-18 PROCEDURE — 85652 RBC SED RATE AUTOMATED: CPT

## 2019-02-18 PROCEDURE — 86140 C-REACTIVE PROTEIN: CPT

## 2019-02-18 PROCEDURE — 85025 COMPLETE CBC W/AUTO DIFF WBC: CPT

## 2019-02-19 ENCOUNTER — TELEPHONE (OUTPATIENT)
Dept: RHEUMATOLOGY | Facility: CLINIC | Age: 64
End: 2019-02-19

## 2019-02-19 NOTE — TELEPHONE ENCOUNTER
Cristina, please schedule overdue f/u with Dr. Blacwkell in Hepatology for abnormal bilirubin and ALT, fatty liver and hepatic hemangioma. Thanks BELIA

## 2019-02-21 ENCOUNTER — OFFICE VISIT (OUTPATIENT)
Dept: RHEUMATOLOGY | Facility: CLINIC | Age: 64
End: 2019-02-21
Payer: MEDICARE

## 2019-02-21 VITALS
DIASTOLIC BLOOD PRESSURE: 77 MMHG | HEART RATE: 78 BPM | SYSTOLIC BLOOD PRESSURE: 137 MMHG | BODY MASS INDEX: 41.15 KG/M2 | WEIGHT: 247 LBS | HEIGHT: 65 IN

## 2019-02-21 DIAGNOSIS — H53.123 TRANSIENT VISUAL LOSS OF BOTH EYES: ICD-10-CM

## 2019-02-21 DIAGNOSIS — Z78.0 MENOPAUSE: Primary | ICD-10-CM

## 2019-02-21 DIAGNOSIS — I77.9 AORTIC DISEASE: ICD-10-CM

## 2019-02-21 DIAGNOSIS — Z09 ENCOUNTER FOR FOLLOW-UP EXAMINATION AFTER COMPLETED TREATMENT FOR CONDITIONS OTHER THAN MALIGNANT NEOPLASM: ICD-10-CM

## 2019-02-21 DIAGNOSIS — I82.91 CHRONIC EMBOLISM AND THROMBOSIS OF VEIN: ICD-10-CM

## 2019-02-21 DIAGNOSIS — M31.6 GCA (GIANT CELL ARTERITIS): ICD-10-CM

## 2019-02-21 DIAGNOSIS — I82.90 VENOUS THROMBOSIS OF EXTREMITY: ICD-10-CM

## 2019-02-21 PROCEDURE — 99999 PR PBB SHADOW E&M-EST. PATIENT-LVL V: ICD-10-PCS | Mod: PBBFAC,,, | Performed by: INTERNAL MEDICINE

## 2019-02-21 PROCEDURE — 99214 PR OFFICE/OUTPT VISIT, EST, LEVL IV, 30-39 MIN: ICD-10-PCS | Mod: S$PBB,,, | Performed by: INTERNAL MEDICINE

## 2019-02-21 PROCEDURE — 99999 PR PBB SHADOW E&M-EST. PATIENT-LVL V: CPT | Mod: PBBFAC,,, | Performed by: INTERNAL MEDICINE

## 2019-02-21 PROCEDURE — 99215 OFFICE O/P EST HI 40 MIN: CPT | Mod: PBBFAC | Performed by: INTERNAL MEDICINE

## 2019-02-21 PROCEDURE — 99214 OFFICE O/P EST MOD 30 MIN: CPT | Mod: S$PBB,,, | Performed by: INTERNAL MEDICINE

## 2019-02-21 RX ORDER — SERTRALINE HYDROCHLORIDE 50 MG/1
50 TABLET, FILM COATED ORAL DAILY
COMMUNITY
End: 2022-02-24 | Stop reason: SDUPTHER

## 2019-02-21 ASSESSMENT — ROUTINE ASSESSMENT OF PATIENT INDEX DATA (RAPID3)
PAIN SCORE: 4
FATIGUE SCORE: 6
WHEN YOU AWAKENED IN THE MORNING OVER THE LAST WEEK, PLEASE INDICATE THE AMOUNT OF TIME IT TAKES UNTIL YOU ARE AS LIMBER AS YOU WILL BE FOR THE DAY: 30 MINS
TOTAL RAPID3 SCORE: 4.78
MDHAQ FUNCTION SCORE: 1.6
PATIENT GLOBAL ASSESSMENT SCORE: 5
AM STIFFNESS SCORE: 1, YES
PSYCHOLOGICAL DISTRESS SCORE: 5.5

## 2019-02-21 NOTE — PROGRESS NOTES
"I have personally taken the history and examined the patient and agree with the resident,s note as stated above       GCA: ESR <2, CRP 0.9 s/p "flare" 3 wks ago lasting 3-4 days with severe bitemporal headache, myalgias  progression of vision loss right eye which has not returned. No more headaches or myalgia. + bilateral TA tenderness  Vitamin D deficiency  Liver lesion, prob hemangioma, stable  NAFLD with fibrosis, due for fibroscan  Hyperlipidemia, due for lipid panel las 7/10/17 LDL 66.4 with pravastatin 40mg q pm  H/o colon polyps last colonoscopy 9/28/15:non-bleeding int hemorrhoids, diverticulosis sigmoid, descending, transverse and ascending colon no polyps but Dr. Dykes recommended 3 yr f/u  Chronic thrombus posterior tibial and peroneal veins right saw Dr. Hagan in Vasc Surg 11/12/18   OA right ankle  Pulmonary nodules stable, no f/u recommended by Radiology  Hypertension 137/77, on lisinopril 40mg daily,   T2 DM      Bilateral TA US  F/u Dr. Ro in Ophthalmology as scheduled.  MRA chest  Lipid panel, vitamin D, hypercoagulation panel, APS panel  today  *Shingrix x 2 when available  DXA  Cont tocilizumab 162mg sc q 7 days  Cont asa 325mg daily  Keep Prednisone 7mg daily pending TA  US  Alendronate 35mg po once a week  Vitamin D2 50,000 units once a week  Schedule overdue f/u with Dr. Blackwell with fibroscan for NALFD  F/u Dr. Dykes in Gastro for colonoscopy  F/u Dr. Mercado to transition from lisinopril to ARB and T2 DM  RTC 3months with standing labs    "
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
## 2019-02-21 NOTE — PATIENT INSTRUCTIONS
-Bloodwork today  -Temporal artery ultrasound today  -Chest MRA before next appointment  -Follow up with Dr. Ro  -Follow up with Dr. Blackwell  -Follow up with Dr. Mercado  -Follow up with Dr. Giordano for colonoscopy  -RTC in 3 months

## 2019-02-22 ENCOUNTER — HOSPITAL ENCOUNTER (OUTPATIENT)
Dept: RADIOLOGY | Facility: HOSPITAL | Age: 64
Discharge: HOME | End: 2019-02-22
Attending: STUDENT IN AN ORGANIZED HEALTH CARE EDUCATION/TRAINING PROGRAM
Payer: MEDICARE

## 2019-02-22 ENCOUNTER — HOSPITAL ENCOUNTER (OUTPATIENT)
Dept: RADIOLOGY | Facility: HOSPITAL | Age: 64
Discharge: HOME | End: 2019-02-22
Attending: INTERNAL MEDICINE
Payer: MEDICARE

## 2019-02-22 ENCOUNTER — TELEPHONE (OUTPATIENT)
Dept: RHEUMATOLOGY | Facility: CLINIC | Age: 64
End: 2019-02-22

## 2019-02-22 DIAGNOSIS — H53.123 TRANSIENT VISUAL LOSS OF BOTH EYES: ICD-10-CM

## 2019-02-22 DIAGNOSIS — R51.9 TEMPORAL HEADACHE: ICD-10-CM

## 2019-02-22 DIAGNOSIS — M31.6 GCA (GIANT CELL ARTERITIS): ICD-10-CM

## 2019-02-22 DIAGNOSIS — Z09 ENCOUNTER FOR FOLLOW-UP EXAMINATION AFTER COMPLETED TREATMENT FOR CONDITIONS OTHER THAN MALIGNANT NEOPLASM: ICD-10-CM

## 2019-02-22 DIAGNOSIS — I77.9 AORTIC DISEASE: ICD-10-CM

## 2019-02-22 PROCEDURE — A9585 GADOBUTROL INJECTION: HCPCS | Performed by: INTERNAL MEDICINE

## 2019-02-22 PROCEDURE — 93880 EXTRACRANIAL BILAT STUDY: CPT | Mod: TC

## 2019-02-22 PROCEDURE — 93880 US TEMPORAL ARTERY BILATERAL: ICD-10-PCS | Mod: 26,,, | Performed by: RADIOLOGY

## 2019-02-22 PROCEDURE — 71555 MRA CHEST: ICD-10-PCS | Mod: 26,,, | Performed by: RADIOLOGY

## 2019-02-22 PROCEDURE — 71555 MRI ANGIO CHEST W OR W/O DYE: CPT | Mod: TC

## 2019-02-22 PROCEDURE — 71555 MRI ANGIO CHEST W OR W/O DYE: CPT | Mod: 26,,, | Performed by: RADIOLOGY

## 2019-02-22 PROCEDURE — 93880 EXTRACRANIAL BILAT STUDY: CPT | Mod: 26,,, | Performed by: RADIOLOGY

## 2019-02-22 PROCEDURE — 25500020 PHARM REV CODE 255: Performed by: INTERNAL MEDICINE

## 2019-02-22 RX ORDER — GADOBUTROL 604.72 MG/ML
10 INJECTION INTRAVENOUS
Status: COMPLETED | OUTPATIENT
Start: 2019-02-22 | End: 2019-02-22

## 2019-02-22 RX ADMIN — GADOBUTROL 10 ML: 604.72 INJECTION INTRAVENOUS at 04:02

## 2019-02-22 NOTE — TELEPHONE ENCOUNTER
The lab only reported the IgM anticardiolipin antibody. Need the IgG anticardiolipin antibody as well.  Please check with lab about this. Thanks BELIA

## 2019-02-22 NOTE — TELEPHONE ENCOUNTER
"Cristina,  Please tell pt that given her recent symptoms with normal labs, normal temporal artery ultrasound and normal MRA chest, would suggest getting new MRI technique to image the temporal arteries to be absolutely certain no ongoing inflammation.. Please schedule MRI of the brain "superficial vessel imaging" Dr. Aggarwal protocol. Thanks BELIA  "

## 2019-03-07 ENCOUNTER — HOSPITAL ENCOUNTER (OUTPATIENT)
Dept: RADIOLOGY | Facility: HOSPITAL | Age: 64
Discharge: HOME | End: 2019-03-07
Attending: INTERNAL MEDICINE
Payer: MEDICARE

## 2019-03-07 DIAGNOSIS — R51.9 TEMPORAL HEADACHE: ICD-10-CM

## 2019-03-07 PROCEDURE — 70553 MRI BRAIN W WO CONTRAST: ICD-10-PCS | Mod: 26,,, | Performed by: RADIOLOGY

## 2019-03-07 PROCEDURE — A9585 GADOBUTROL INJECTION: HCPCS | Performed by: INTERNAL MEDICINE

## 2019-03-07 PROCEDURE — 70553 MRI BRAIN STEM W/O & W/DYE: CPT | Mod: 26,,, | Performed by: RADIOLOGY

## 2019-03-07 PROCEDURE — 25500020 PHARM REV CODE 255: Performed by: INTERNAL MEDICINE

## 2019-03-07 PROCEDURE — 70553 MRI BRAIN STEM W/O & W/DYE: CPT | Mod: TC

## 2019-03-07 RX ORDER — GADOBUTROL 604.72 MG/ML
10 INJECTION INTRAVENOUS
Status: COMPLETED | OUTPATIENT
Start: 2019-03-07 | End: 2019-03-07

## 2019-03-07 RX ADMIN — GADOBUTROL 10 ML: 604.72 INJECTION INTRAVENOUS at 11:03

## 2019-03-31 NOTE — PROGRESS NOTES
"        Assessment /Plan     For exam results, see Encounter Report.    Blindness and low vision    Primary open angle glaucoma of both eyes, mild stage    Giant cell arteritis with polymyalgia rheumatica    Inflammatory optic neuropathy    Optic neuritis    Steroid responder, bilateral    Nuclear sclerosis, bilateral    Anatomical narrow angle borderline glaucoma, bilateral          1. Autoimmune optic neuropathy OU,   - OS 2004  - OD 2012  - unknown antibiody  - several treatments with IV steroid and IVIG  - vision and VF per [place] records  - PO steroid daily - 10 mg a day   - sural nerve and muscle biopsy done - results pending - pt to obtain results form [facility]  - xal qhs ou   - monitor  - rheum monitoring esr/crp  - all previous mri's and lp negative per rheum note  Last ESR / and c-react prot - both very low     2.  Steroid responder in past  - on chronic steroids for #1  iop good today   Continue xal qhs ou    3. POAG - mild vs OHT ou      First HVF   2004   First photos   2004   Treatment / Drops started   2004           Family history    neg        Glaucoma meds    Latanoprost ou        H/O adverse rxn to glaucoma drops    none        LASERS    None         GLAUCOMA SURGERIES    none        OTHER EYE SURGERIES    none        CDR    0.9 / 0.9 - +++ pallor ou         Tbase    25-27 / 25-27          Tmax    27/27            Ttarget    20/20             HVF    12 test 2004 to 2012  - SALT / IAD od // Ext os                    4 10-2 stim V OD 2012 - 2014 - [place]    (+ prog from 2012 to 2014 - when followed at [place])    7- test-  10-2 stim V test done od 12/2014 to 2017 - dense SALT and IAD (+stable)         Gonio    +2-3 ou (very slight narrowing)         CCT    591/612        OCT    7 test 2006 to 2016 - RNFL - dec throughout od // dec thru out  os        HRT    6 test 2004 to 2018 - MR - nl od // off-center os // new baseline 1/25/2016 - nl ou -"too good to be true"        Disc photos    2004 - " slides // 2008, 2010, 2012, , 2012, 2012, 2012, 2015, 2017   - OIS     - Ttoday    19/18  - Test done today  monitor IOP and VA     PLAN  CSM - IOP good     Sees Benjie   He is monitoring sed rate and c react prot   Cont PO pred 6 mg a day   Cont actemra  (tocilizumab) 162 mg sc weekly  Cont prilosec (omeprazole)  20mg daily - to protect stomach     Cont brimonidine tid ou  Cont latanoprost qhs ou  Cont dorzolamide tid ou  - IOP was better     -would rec PI's prior to doing and slt's - has some mild  ant bowing and narrowing   - avoid BB - H/O asthma as a child     Currently on prednisone 6 mg q day and decreasing- Benjie  States vision is dimmer and more constricted, has to search for letters on the keyboard unlike before  Also C/O pain in and around the left eye - no iritis or sceritis and IOP good - ?? Sinus vs GCA - on going intermittent pain   Continues with slow decline in vision - with good IOP's and low sed rate and c react protein  Nothing more to be done to try and stabilize things     NO MORE VF testing - essentially  EXT ou      F/u 6 mo - DFE/OCT - but IOP is good and there is not anything more I can do to stablize the vision     I have seen and personally examined the patient.  I agree with the findings, assessment and plan of the resident and/or fellow.     Tanna Ro MD

## 2019-04-01 ENCOUNTER — OFFICE VISIT (OUTPATIENT)
Dept: OPHTHALMOLOGY | Facility: CLINIC | Age: 64
End: 2019-04-01
Payer: MEDICARE

## 2019-04-01 DIAGNOSIS — H25.13 NUCLEAR SCLEROSIS, BILATERAL: ICD-10-CM

## 2019-04-01 DIAGNOSIS — H40.043 STEROID RESPONDER, BILATERAL: ICD-10-CM

## 2019-04-01 DIAGNOSIS — H40.033 ANATOMICAL NARROW ANGLE BORDERLINE GLAUCOMA, BILATERAL: ICD-10-CM

## 2019-04-01 DIAGNOSIS — H46.9 OPTIC NEURITIS: ICD-10-CM

## 2019-04-01 DIAGNOSIS — M31.5 GIANT CELL ARTERITIS WITH POLYMYALGIA RHEUMATICA: ICD-10-CM

## 2019-04-01 DIAGNOSIS — H40.1131 PRIMARY OPEN ANGLE GLAUCOMA OF BOTH EYES, MILD STAGE: ICD-10-CM

## 2019-04-01 DIAGNOSIS — H54.10 BLINDNESS AND LOW VISION: Primary | ICD-10-CM

## 2019-04-01 DIAGNOSIS — H46.9 INFLAMMATORY OPTIC NEUROPATHY: ICD-10-CM

## 2019-04-01 PROCEDURE — 99212 OFFICE O/P EST SF 10 MIN: CPT | Mod: PBBFAC | Performed by: OPHTHALMOLOGY

## 2019-04-01 PROCEDURE — 99999 PR PBB SHADOW E&M-EST. PATIENT-LVL II: CPT | Mod: PBBFAC,,, | Performed by: OPHTHALMOLOGY

## 2019-04-01 PROCEDURE — 99999 PR PBB SHADOW E&M-EST. PATIENT-LVL II: ICD-10-PCS | Mod: PBBFAC,,, | Performed by: OPHTHALMOLOGY

## 2019-04-01 PROCEDURE — 92012 INTRM OPH EXAM EST PATIENT: CPT | Mod: S$PBB,,, | Performed by: OPHTHALMOLOGY

## 2019-04-01 PROCEDURE — 92012 PR EYE EXAM, EST PATIENT,INTERMED: ICD-10-PCS | Mod: S$PBB,,, | Performed by: OPHTHALMOLOGY

## 2019-04-01 RX ORDER — PREDNISONE 5 MG/1
5 TABLET ORAL DAILY
COMMUNITY
Start: 2014-06-18 | End: 2019-06-03

## 2019-04-03 ENCOUNTER — TELEPHONE (OUTPATIENT)
Dept: HEPATOLOGY | Facility: CLINIC | Age: 64
End: 2019-04-03

## 2019-04-03 NOTE — TELEPHONE ENCOUNTER
----- Message from Colette Romero sent at 4/3/2019  3:14 PM CDT -----  Contact: self  Pt is calling in regards to scheduling an appt.    She would like a call back at [phone] to schedule.    Thank you

## 2019-04-08 NOTE — TELEPHONE ENCOUNTER
Called patient to schedule with TSANLEY per Dr. Blackwell and patient doesn't want to be seen by anyone else but him. So she said she will wait and call back at the end of the month to schedule.

## 2019-05-10 DIAGNOSIS — M31.6 TEMPORAL ARTERITIS: ICD-10-CM

## 2019-05-10 DIAGNOSIS — M31.6 GCA (GIANT CELL ARTERITIS): ICD-10-CM

## 2019-05-10 RX ORDER — PREDNISONE 1 MG/1
2 TABLET ORAL DAILY
Qty: 180 TABLET | Refills: 1 | Status: SHIPPED | OUTPATIENT
Start: 2019-05-10 | End: 2019-06-03

## 2019-05-15 DIAGNOSIS — M31.6 GCA (GIANT CELL ARTERITIS): ICD-10-CM

## 2019-05-15 DIAGNOSIS — M31.6 TEMPORAL ARTERITIS: ICD-10-CM

## 2019-05-15 RX ORDER — TOCILIZUMAB 180 MG/ML
INJECTION, SOLUTION SUBCUTANEOUS
Qty: 12 SYRINGE | Refills: 1 | Status: SHIPPED | OUTPATIENT
Start: 2019-05-15 | End: 2019-10-24 | Stop reason: SDUPTHER

## 2019-05-27 RX ORDER — OMEPRAZOLE 20 MG/1
CAPSULE, DELAYED RELEASE ORAL
Qty: 60 CAPSULE | Refills: 10 | Status: SHIPPED | OUTPATIENT
Start: 2019-05-27 | End: 2020-03-22

## 2019-06-03 ENCOUNTER — TELEPHONE (OUTPATIENT)
Dept: RHEUMATOLOGY | Facility: CLINIC | Age: 64
End: 2019-06-03

## 2019-06-03 ENCOUNTER — HOSPITAL ENCOUNTER (OUTPATIENT)
Dept: RADIOLOGY | Facility: CLINIC | Age: 64
Discharge: HOME | End: 2019-06-03
Attending: INTERNAL MEDICINE
Payer: MEDICARE

## 2019-06-03 ENCOUNTER — OFFICE VISIT (OUTPATIENT)
Dept: RHEUMATOLOGY | Facility: CLINIC | Age: 64
End: 2019-06-03
Payer: MEDICARE

## 2019-06-03 VITALS
BODY MASS INDEX: 41.77 KG/M2 | HEART RATE: 70 BPM | DIASTOLIC BLOOD PRESSURE: 70 MMHG | HEIGHT: 64 IN | WEIGHT: 244.69 LBS | SYSTOLIC BLOOD PRESSURE: 158 MMHG

## 2019-06-03 DIAGNOSIS — R16.0 LIVER MASS: ICD-10-CM

## 2019-06-03 DIAGNOSIS — R07.9 LEFT SIDED CHEST PAIN: Primary | ICD-10-CM

## 2019-06-03 DIAGNOSIS — E66.01 MORBID OBESITY WITH BMI OF 40.0-44.9, ADULT: ICD-10-CM

## 2019-06-03 DIAGNOSIS — E83.52 HYPERCALCEMIA: Primary | ICD-10-CM

## 2019-06-03 DIAGNOSIS — E78.5 DYSLIPIDEMIA: ICD-10-CM

## 2019-06-03 DIAGNOSIS — R07.89 LEFT-SIDED CHEST WALL PAIN: ICD-10-CM

## 2019-06-03 DIAGNOSIS — Z79.52 CURRENT CHRONIC USE OF SYSTEMIC STEROIDS: ICD-10-CM

## 2019-06-03 DIAGNOSIS — Z78.0 MENOPAUSE: ICD-10-CM

## 2019-06-03 DIAGNOSIS — Z86.010 HX OF COLONIC POLYPS: ICD-10-CM

## 2019-06-03 DIAGNOSIS — R13.19 ESOPHAGEAL DYSPHAGIA: ICD-10-CM

## 2019-06-03 DIAGNOSIS — I10 ESSENTIAL HYPERTENSION: ICD-10-CM

## 2019-06-03 DIAGNOSIS — M31.5 GIANT CELL ARTERITIS WITH POLYMYALGIA RHEUMATICA: ICD-10-CM

## 2019-06-03 DIAGNOSIS — R74.01 ELEVATED TRANSAMINASE LEVEL: ICD-10-CM

## 2019-06-03 DIAGNOSIS — G47.33 OSA (OBSTRUCTIVE SLEEP APNEA): ICD-10-CM

## 2019-06-03 DIAGNOSIS — E55.9 VITAMIN D DEFICIENCY: ICD-10-CM

## 2019-06-03 PROCEDURE — 77080 DXA BONE DENSITY AXIAL: CPT | Mod: TC

## 2019-06-03 PROCEDURE — 99999 PR PBB SHADOW E&M-EST. PATIENT-LVL III: CPT | Mod: PBBFAC,,, | Performed by: INTERNAL MEDICINE

## 2019-06-03 PROCEDURE — 77080 DXA BONE DENSITY AXIAL: CPT | Mod: 26,,, | Performed by: INTERNAL MEDICINE

## 2019-06-03 PROCEDURE — 99214 OFFICE O/P EST MOD 30 MIN: CPT | Mod: S$PBB,,, | Performed by: INTERNAL MEDICINE

## 2019-06-03 PROCEDURE — 99213 OFFICE O/P EST LOW 20 MIN: CPT | Mod: PBBFAC,25 | Performed by: INTERNAL MEDICINE

## 2019-06-03 PROCEDURE — 77080 DEXA BONE DENSITY SPINE HIP: ICD-10-PCS | Mod: 26,,, | Performed by: INTERNAL MEDICINE

## 2019-06-03 PROCEDURE — 99214 PR OFFICE/OUTPT VISIT, EST, LEVL IV, 30-39 MIN: ICD-10-PCS | Mod: S$PBB,,, | Performed by: INTERNAL MEDICINE

## 2019-06-03 PROCEDURE — 99999 PR PBB SHADOW E&M-EST. PATIENT-LVL III: ICD-10-PCS | Mod: PBBFAC,,, | Performed by: INTERNAL MEDICINE

## 2019-06-03 RX ORDER — GABAPENTIN 600 MG/1
600 TABLET ORAL 2 TIMES DAILY
Qty: 180 TABLET | Refills: 1
Start: 2019-06-03 | End: 2020-01-27

## 2019-06-03 ASSESSMENT — ROUTINE ASSESSMENT OF PATIENT INDEX DATA (RAPID3)
MDHAQ FUNCTION SCORE: 1.1
TOTAL RAPID3 SCORE: 5.89
PAIN SCORE: 6
PATIENT GLOBAL ASSESSMENT SCORE: 8
PSYCHOLOGICAL DISTRESS SCORE: 9.9
AM STIFFNESS SCORE: 1, YES
WHEN YOU AWAKENED IN THE MORNING OVER THE LAST WEEK, PLEASE INDICATE THE AMOUNT OF TIME IT TAKES UNTIL YOU ARE AS LIMBER AS YOU WILL BE FOR THE DAY: 30 MINS
FATIGUE SCORE: 7

## 2019-06-03 NOTE — PATIENT INSTRUCTIONS
Labs today  Shingrix when available  Stop Fosamax when supply used  F/u Dr. Blackwell for liver mass  F/u with Dr. Dykes for swallowing and need for colonoscopy  X-rays of chest, ribs

## 2019-06-03 NOTE — ASSESSMENT & PLAN NOTE
Has appt with Dr. Dykes next month  Overdue for colonoscopy, especially with 68 yo brother diagnosed with colon cancer requiring resection   S Plasty Text: Given the location and shape of the defect, and the orientation of relaxed skin tension lines, an S-plasty was deemed most appropriate for repair.  Using a sterile surgical marker, the appropriate outline of the S-plasty was drawn, incorporating the defect and placing the expected incisions within the relaxed skin tension lines where possible.  The area thus outlined was incised deep to adipose tissue with a #15 scalpel blade.  The skin margins were undermined to an appropriate distance in all directions utilizing iris scissors. The skin flaps were advanced over the defect.  The opposing margins were then approximated with interrupted buried subcutaneous sutures.

## 2019-06-03 NOTE — TELEPHONE ENCOUNTER
Please add ck  And ggt to current labs. Also schedule calcium, ionized calcium, PTH, Mg, PO4, SIFE, TSH in 2 wks. Thank you. BELIA

## 2019-06-03 NOTE — ASSESSMENT & PLAN NOTE
In remission off prednisone x 6 wks after taper      Standing labs, QG-TB  *Shingrix x 2 when available  Cont tocilizumab 162 mg sc weekly, refill sent to [facility] 5/15/19  RTC 3 months with standing labs

## 2019-06-03 NOTE — PROGRESS NOTES
"Subjective:       Patient ID: Genesis Ugalde is a 63 y.o. female.    Chief Complaint: GCA/PMR; blindness with h/o optic neuritis/atrophy/uveitis  HPI + fatigue, brother just in hospital with surgery for colon cancer. + odynophagia with associated chest pain.  No change in eyes. Headaches less severe since consistently CPAP x 3 months. + scalp tenderness + shoulder and neck pain. Off prednisone x 6 wks. Taking tocilizumab 162mg sc weekly. + fibromyalgia.   Review of Systems   Constitutional: Positive for fatigue. Negative for appetite change, fever and unexpected weight change.   HENT: Positive for trouble swallowing. Negative for mouth sores.         Dry mouth  Swollen glands   Eyes: Negative for visual disturbance.   Respiratory: Negative for cough, shortness of breath and wheezing.    Cardiovascular: Positive for chest pain. Negative for palpitations.   Gastrointestinal: Positive for diarrhea. Negative for abdominal pain, anal bleeding, blood in stool, constipation, nausea and vomiting.   Genitourinary: Positive for dysuria. Negative for frequency and urgency.   Musculoskeletal: Negative for arthralgias, back pain, gait problem, joint swelling, myalgias, neck pain and neck stiffness.   Skin: Negative for rash.   Neurological: Positive for headaches. Negative for weakness and numbness.   Hematological: Negative for adenopathy. Bruises/bleeds easily.   Psychiatric/Behavioral: Negative for sleep disturbance. The patient is not nervous/anxious.          Objective:   BP (!) 158/70   Pulse 70   Ht 5' 3.6" (1.615 m)   Wt 111 kg (244 lb 11.2 oz)   BMI 42.53 kg/m²      Physical Exam   Constitutional: She is oriented to person, place, and time and well-developed, well-nourished, and in no distress.   obese   HENT:   Head: Normocephalic and atraumatic.   Mouth/Throat: Oropharynx is clear and moist.        Eyes: Conjunctivae and EOM are normal.   External strabismus  NLP  No pupillary reaction to light   Neck: Normal " range of motion. Neck supple. No thyromegaly present.   No carotid or supraclavicular bruits  + left clavicle deformity  Left upper ant chest wall tenderness   Cardiovascular: Normal rate, regular rhythm, normal heart sounds and intact distal pulses.  Exam reveals no gallop and no friction rub.    No murmur heard.  Pulmonary/Chest: Breath sounds normal. She has no wheezes. She has no rales. She exhibits no tenderness.   Abdominal: Soft. She exhibits no distension and no mass. There is no tenderness. There is no rebound and no guarding.       Right Side Rheumatological Exam     Examination finds the shoulder, elbow, wrist, knee, 1st PIP, 1st MCP, 2nd PIP, 2nd MCP, 3rd PIP, 3rd MCP, 4th PIP, 4th MCP, 5th PIP and 5th MCP normal.    Left Side Rheumatological Exam     Examination finds the shoulder, elbow, wrist, knee, 1st PIP, 1st MCP, 2nd PIP, 2nd MCP, 3rd PIP, 3rd MCP, 4th PIP, 4th MCP, 5th PIP and 5th MCP normal.      Lymphadenopathy:     She has no cervical adenopathy.   Neurological: She is alert and oriented to person, place, and time. She displays normal reflexes. Gait normal.   Nl motor strength UE and LE bilateral   Skin: No rash noted. No erythema. No pallor.     Psychiatric: Mood, memory, affect and judgment normal.   Musculoskeletal: She exhibits no edema.           Assessment/Plan         Problem List Items Addressed This Visit     Giant cell arteritis with polymyalgia rheumatica    Overview          The MRA of the chest is normal, no evidence of large vessel vasculitis.[facility]          PACS Images     Show images for MRA Chest   Reviewed By Tonya Waldrop MD on 3/20/2018 14:21   Patient Result Comments     Viewed by Genesis Ugalde V on 3/20/2018  5:33 PM   Written by Forrest Waldrop MD on 3/20/2018  2:20 PM   The MRA of the chest is normal, no evidence of large vessel vasculitis.RJQ   External Result Report     External Result Report   Narrative     EXAMINATION:  MRA CHEST    CLINICAL  HISTORY:  Aortic dz, non-traumatic, known or suspectPersonal history of other diseases of the circulatory system    TECHNIQUE:  Multiplanar, multisequence imaging was performed through the chest to evaluate the aorta.  This exam is performed prior to and following the administration of 14 mL of intravenous Gadavist.    COMPARISON:  CT chest 02/19/2016    FINDINGS:  The thoracic aorta is normal in caliber, contour, and course.  There are 3 branch vessels from the aortic arch.  No evidence of stenosis or aneurysm.  No evidence of abnormal wall thickening or surrounding inflammatory change.    The visualized lungs are without significant abnormality.  The heart is within normal limits.    The visualized upper abdominal structures demonstrate hepatomegaly.    The osseous structures are without evidence for acute fracture.  Subcutaneous tissues are unremarkable.   Impression       Unremarkable MRA chest exam.    Electronically signed by resident: Jeannie Hdz  Date: 03/20/2018  Time: 10:36    Electronically signed by: Herrera Rodriguez MD  Date: 03/20/2018  Time: 11:16    Encounter     View Encounter          Signed by Resident   The CTA head and neck shows no evidence of active vasculitis. There is moderate atherosclerotic disease right carotid. Continue aspirin 325mg daily and pravastatin. There is incidentally noted thryoid nodule and some changes in the lung bases.Will need thyroid ultrasound and CT chest.Cristina will schedule. [facility]          PACS Images      Show images for CTA Head and Neck (xpd)   Reviewed By Tonya Miller MD on 6/28/2018 21:58   Result Notes for CTA Head and Neck (xpd)     Notes recorded by Forrest Waldrop MD on 6/30/2018 at 9:33 PM CDT  The CTA head and neck shows no evidence of active vasculitis. There is moderate atherosclerotic disease right carotid. Continue aspirin 325mg daily and pravastatin. There is incidentally noted thryoid nodule and some changes in the lung  bases.Will need thyroid ultrasound and CT chest.Cristina will schedule. RJQ   CTA Head and Neck (xpd)   Order: [id number]   Status:  Final result   Visible to patient:  Yes (Patient Portal)   Next appt:  10/11/2018 at 07:30 PM in Sleep Medicine (LAB, SLEEP [facility])   Dx:  Vasculitis, CNS; Giant cell arteritis...   Details     Reading Physician Reading Date Result Priority   Diomedes Morris, DO 6/28/2018       Narrative     EXAMINATION:  CTA HEAD AND NECK (XPD)    CLINICAL HISTORY:  Acute visual deficit;Vasculitis, CNS;h/o giant cell arteritis, r/o cervical vessel involvement;Arteritis, unspecified    TECHNIQUE:  5 mm axial images of the head pre and post contrast with 0.625 mm axial CTA images of the head neck postcontrast.  Coronal and sagittal MPR and MIP imaging was performed 100 ml of Omnipaque 350 contrast was injected intravenously    COMPARISON:  CT head 03/27/2016    FINDINGS:  CT head with and  without contrast: There is no evidence for acute intracranial hemorrhage or sulcal effacement.  The ventricles are normal in size and configuration without evidence for hydrocephalus.  There is no midline shift or mass effect.  Allowing for CT technique there is no abnormal parenchymal enhancement.  The visualized paranasal sinuses and mastoid air cells are clear.    CTA head:    Anterior circulation: The bilateral distal cervical, petrous, cavernous, and supraclinoid segments of the ICAs are patent without significant focal stenosis or aneurysm.    The anterior middle cerebral arteries are patent without focal stenosis or aneurysm.    Posterior circulation: The distal vertebral arteries, basilar artery and posterior cerebral arteries are patent without focal stenosis or aneurysm.    CTA neck: Origin the right brachiocephalic, left common carotid and left subclavian arteries from the arch are within normal limits..    The right vertebral artery origin and proximal course is obscured by venous contamination.  The left  vertebral artery origin is within normal limits.  Remaining vertebral arteries are patent throughout their course without focal stenosis...    Right carotid: The right common carotid artery, carotid bifurcation and extracranial portions of the internal carotid artery are patent there is atherosclerotic plaquing in the right carotid bifurcation and proximal ICA with less than 50% proximal ICA stenosis by NASCET criteria..    Left carotid: The left common carotid artery, carotid bifurcation and extracranial portions of the internal carotid arteries are patent without significant focal stenosis.    There is medialization the carotid arteries in the retropharyngeal soft tissues slightly greater on the right.    Pharynx/larynx: Evaluation limited by scatter artifact from dental metal and motion allowing for limitation the nasopharynx, oropharynx, hypopharynx larynx and proximal trachea are within normal limits allowing for motion limitation    Oral cavity    Glands: Bilateral parotid and submandibular glands are within normal limits. Subcentimeter hypodense nodule medial aspect of the mid right thyroid measuring 5 mm overall indeterminate.    No evidence for adenopathy throughout the neck by size criteria.    There is grade 1 anterolisthesis of C3 on C4 and C4 on C5.  There is no evidence for acute fracture cervical spine..  Ill-defined ground-glass patchy opacification in the visualized lungs which may represent in part atelectasis underlying small airway disease to be considered.      Impression       CTA head: Unremarkable CTA of the head specifically without evidence for proximal significant stenosis or occlusion.    CTA neck: Atherosclerotic plaquing right carotid bifurcation and proximal ICA with less than 50% proximal ICA stenosis by NASCET criteria..    CT head: No evidence for acute intracranial hemorrhage or definite abnormal parenchymal enhancement.    Please see above for additional  details.      Electronically signed by: Diomedes DO Arturo  Date: 06/28/2018  Time: 09:33             Last Resulted: 06/28/18 09:33 Order Details View Encounter Lab and Collection Details Routing Result History            Patient Result Comments     Viewed by Genesis Ugalde on 7/1/2018  7:47 PM   Written by Forrest Waldrop MD on 6/30/2018  9:33 PM   The CTA head and neck shows no evidence of active vasculitis. There is moderate atherosclerotic disease right carotid. Continue aspirin 325mg daily and pravastatin. There is incidentally noted thryoid nodule and some changes in the lung bases.Will need thyroid ultrasound and CT chest.Cristina will schedule. RJQ   Notes recorded by Forrest Waldrop MD on 6/21/2018 at 2:52 PM CDT  The temporal artery ultrasound is normal [facility]          PACS Images      Show images for US Temporal Artery Bilateral   Reviewed By List     Forrest Waldrop MD on 6/21/2018 14:52   Result Notes for US Temporal Artery Bilateral     Notes recorded by Forrest Waldrop MD on 6/21/2018 at 2:52 PM CDT  The temporal artery ultrasound is normal [facility]   US Temporal Artery Bilateral   Order: [id number]   Status:  Final result   Visible to patient:  Yes (Patient Portal)   Next appt:  10/11/2018 at 07:30 PM in Sleep Medicine (LAB, SLEEP [facility])   Dx:  Giant cell arteritis with polymyalgia...   Details     Reading Physician Reading Date Result Priority   Herrera Rodriguez MD 6/21/2018    Steven Rodrigues MD 6/21/2018       Narrative     EXAMINATION:  US TEMPORAL ARTERY BILATERAL    CLINICAL HISTORY:  Encounter for follow-up examination after completed treatment for conditions other than malignant neoplasm    TECHNIQUE:  Limited grayscale and color flow ultrasound evaluation of the bilateral temporal arteries.    COMPARISON:  None    FINDINGS:  Bilateral temporal arteries are normal in diameter without focal stenosis or dilatation.  No hypoechoic wall thickening (halo sign).      Impression       Normal  temporal arteries without evidence to suggest temporal arteritis.    Electronically signed by resident: Steven Rodrigues  Date: 06/21/2018  Time: 14:27    Electronically signed by: Herrera Rodriguez MD  Date: 06/21/2018  Time: 14:37              The MRA of the chest is normal, no evidence of large vessel vasculitis.[facility]          PACS Images      Show images for MRA Chest   Reviewed By List     Forrest Waldrop MD on 3/20/2018 14:21   Result Notes for MRA Chest     Notes recorded by Forrest Waldrop MD on 3/20/2018 at 2:20 PM CDT  The MRA of the chest is normal, no evidence of large vessel vasculitis.[facility]   MRA Chest   Order: [id number]   Status:  Final result   Visible to patient:  Yes (Patient Portal)   Next appt:  10/11/2018 at 07:30 PM in Sleep Medicine (LAB, SLEEP [facility])   Dx:  Aortic disease; Personal history of o...   Details     Reading Physician Reading Date Result Priority   Jeannie Hdz MD 3/20/2018    Herrera Rodriguez MD 3/20/2018       Narrative     EXAMINATION:  MRA CHEST    CLINICAL HISTORY:  Aortic dz, non-traumatic, known or suspectPersonal history of other diseases of the circulatory system    TECHNIQUE:  Multiplanar, multisequence imaging was performed through the chest to evaluate the aorta.  This exam is performed prior to and following the administration of 14 mL of intravenous Gadavist.    COMPARISON:  CT chest 02/19/2016    FINDINGS:  The thoracic aorta is normal in caliber, contour, and course.  There are 3 branch vessels from the aortic arch.  No evidence of stenosis or aneurysm.  No evidence of abnormal wall thickening or surrounding inflammatory change.    The visualized lungs are without significant abnormality.  The heart is within normal limits.    The visualized upper abdominal structures demonstrate hepatomegaly.    The osseous structures are without evidence for acute fracture.  Subcutaneous tissues are unremarkable.      Impression       Unremarkable MRA chest exam.    Electronically  signed by resident: Jeannie Hdz  Date: 03/20/2018  Time: 10:36    Electronically signed by: Herrera Rodriguez MD  Date: 03/20/2018  Time: 11:16                 Reading Physician Reading Date Result Priority   Delvin Martinez MD 3/7/2019       Narrative     EXAMINATION:  MRI BRAIN W WO CONTRAST    CLINICAL HISTORY:  Headache, temporal arteritis suspected;superficial vessel imaging, Dr. Aggarwal protocol;Headache.  Normal ESR and CRP.    TECHNIQUE:  3D time-of-flight noncontrast MRA of the intracranial vasculature.  Contrast enhanced MRA of the upper cervical and cranial vasculature.  MPR and MIP reconstructions.  10 min following the administration of 10 mL of Gadavist intravenous contrast, vessel wall imaging of the superficial cranial arteries performed via high-resolution thin section T1 fat sat images.    COMPARISON:  No direct priors.  Correlation is made with a temporal artery ultrasound dated 02/22/2019.    FINDINGS:  The visualized internal carotid arteries are normal in course and caliber.  The vertebrobasilar system is unremarkable.  The ACAs, MCAs and PCAs appear within normal limits.  No high-grade stenosis, major branch occlusion, or intracranial aneurysm identified.    The frontal and parietal branches of the superficial temporal artery and the occipital arteries assessed bilaterally.  Vessel wall imaging demonstrates no  significant mural thickening or enhancement to specifically indicate active vascular inflammation.      Impression       No compelling MR evidence of vascular inflammation involving the superficial cranial arteries.      Electronically signed by: Delvin Martinez MD  Date: 03/07/2019  Time: 14:08     Notes recorded by Forrest Waldrop MD on 2/22/2019 at 5:04 PM CST  The temporal artery ultrasound is also negative. RJQ          PACS Images for [employer] Viewer      Show images for US Temporal Artery Bilateral   PACS Images for [employer] [place] Viewer (Includes [facility] and  [place] Images)      Show images for US Temporal Artery Bilateral   All Reviewers List     Forrest Waldrop MD on 2/22/2019 17:09   Result Notes for US Temporal Artery Bilateral     Notes recorded by Forrest Waldrop MD on 2/22/2019 at 5:09 PM CST  The temporal artery ultrasound is negative. Would also suggest new MRI brain technique to visualize the temporal arteries. Cristina will schedule. RJQ  ------    Notes recorded by Forrest Waldrop MD on 2/22/2019 at 5:04 PM CST  The temporal artery ultrasound is also negative. RJQ   US Temporal Artery Bilateral   Order: [id number]   Status:  Final result   Visible to patient:  Yes (Patient Portal) Next appt:  07/05/2019 at 10:30 AM in Gastroenterology (Alok Dykes MD) Dx:  GCA (giant cell arteritis); Transient...   Details     Reading Physician Reading Date Result Priority   Herrera Rodriguez MD 2/22/2019    Larissa Collins MD 2/22/2019       Narrative     EXAMINATION:  US TEMPORAL ARTERY BILATERAL    CLINICAL HISTORY:  Transient visual loss, bilateral    TECHNIQUE:  Grayscale and Doppler were used to evaluate the temporal arteries for temporal arteritis.    COMPARISON:  None    FINDINGS:  No ultrasound evidence of temporal arteries wall thickening to suggest temporal arteritis.  The temporal arteries are patent.      Impression       No ultrasound evidence of temporal arteritis.    Electronically signed by resident: Larissa Collins  Date: 02/22/2019  Time: 16:43    Electronically signed by: Herrera Rodriguez MD  Date: 02/22/2019  Time: 16:48             Last Resulted: 02/22/19 16:48 Order Details View Encounter Lab and Collection Details Routing Result History - Result Edited            Patient Result Comments     Viewed by Genesis Ugalde on 2/22/2019  8:00 PM   Written by Forrest Waldrop MD on 2/22/2019  5:09 PM   The temporal artery ultrasound is also negative. RJQ   The temporal artery ultrasound is negative. Would also suggest new MRI brain  technique to visualize the temporal arteries. Cristina will schedule. RJQ   External Result Report     External Result Report   Narrative     EXAMINATION:  US TEMPORAL ARTERY BILATERAL    CLINICAL HISTORY:  Transient visual loss, bilateral    TECHNIQUE:  Grayscale and Doppler were used to evaluate the temporal arteries for temporal arteritis.    COMPARISON:  None    FINDINGS:  No ultrasound evidence of temporal arteries wall thickening to suggest temporal arteritis.  The temporal arteries are patent.   Impression       No ultrasound evidence of temporal arteritis.    Electronically signed by resident: Larissa Collins  Date: 02/22/2019  Time: 16:43    Electronically signed by: Herrera Rodriguez MD  Date: 02/22/2019  Time: 16:48    Encounter             The MRA of the chest shows no evidence of large vessel vasculitis as might be seen with giant cell/temporal arteritis. The hyperintense area in the liver is unchanged from 2016 and you will be seeing Dr. Blackwell in Hepatology to review as well in April. [facility]          PACS Images for [employer] Viewer      Show images for MRA Chest   PACS Images for [employer] Viewer (Includes [facility] and [facility] Images)      Show images for MRA Chest   All Reviewers List     Forrest Waldrop MD on 2/22/2019 17:03   Result Notes for MRA Chest     Notes recorded by Forrest Waldrop MD on 2/22/2019 at 5:03 PM CST  The MRA of the chest shows no evidence of large vessel vasculitis as might be seen with giant cell/temporal arteritis. The hyperintense area in the liver is unchanged from 2016 and you will be seeing Dr. Blackwell in Hepatology to review as well in April. [facility]   MRA Chest   Order: [id number]   Status:  Final result   Visible to patient:  Yes (Patient Portal) Next appt:  07/05/2019 at 10:30 AM in Gastroenterology (Alok Dykes MD) Dx:  GCA (giant cell arteritis); Aortic di...   Details     Reading Physician Reading Date Result Priority   Marco WONG  MD Norberto 2/22/2019       Narrative     EXAMINATION:  MRA CHEST    CLINICAL HISTORY:  Aortic dz, non-traumatic, known or suspect;Encounter for follow-up examination after completed treatment for conditions other than malignant neoplasm    TECHNIQUE:  Multiplanar, multisequence imaging was performed through the chest to evaluate the aorta.  This exam is performed prior to and following the administration of 10 mL of intravenous gadobutrol.    COMPARISON:  MRA chest 03/20/2018.  MRI abdomen 04/18/2016.    FINDINGS:  There is a left-sided aortic arch with 3 branch vessels.  The thoracic aorta tapers normally without atherosclerotic calcification.  No dissection.    Heart is normal in size.  Pulmonary arteries distribute normally.    No axillary or mediastinal lymph node enlargement.  Hilar contours are unremarkable.    Esophagus is normal in caliber and course.    Visualized lung parenchyma demonstrates no gross abnormalities noting limited evaluation due to technique.    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  Subcentimeter left renal cysts identified.  Remaining visualized upper abdominal structures demonstrate no significant abnormalities.    Subcutaneous soft tissues are within normal limits.  There is fatty atrophy of the right rectus abdominus muscle.    No marrow signal abnormality to suggest an infiltrative process.      Impression       1. No aortic dissection.  2. 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      Electronically signed by: Marco Thornton MD  Date: 02/22/2019  Time: 16:49                         Current Assessment & Plan     In remission off prednisone x 6 wks after taper      Standing labs, QG-TB  *Shingrix x 2 when  available  Cont tocilizumab 162 mg sc weekly, refill sent to [facility] 5/15/19  RTC 3 months with standing labs         Morbid obesity with BMI of 40.0-44.9, adult    Current Assessment & Plan     Lost another 3#         Liver mass    Current Assessment & Plan     Still hasn't seen Dr. Blackwell in Hepatology, schedule         Hypertension    Current Assessment & Plan     158/70    Cont lisinopril 40mg daily, will discuss change with Dr. Mercado her primary         Vitamin D deficiency    Overview     Results for ANN WHITNEY (MRN [id number]) as of 6/19/2018 08:18   Ref. Range 3/2/2018 09:12   Vit D, 25-Hydroxy Latest Ref Range: 30 - 96 ng/mL 25 (L)            Current Assessment & Plan     Vitamin D today  Cont vitamin D2 50,000 units once a week         Hx of colonic polyps    Current Assessment & Plan     Has appt with Dr. Dykes next month  Overdue for colonoscopy, especially with 68 yo brother diagnosed with colon cancer requiring resection         Esophageal dysphagia    Current Assessment & Plan     Has appt with Dr. Dykes next month         Dyslipidemia    Overview     Results for ANN WHITNEY (MRN [id number]) as of 6/19/2018 08:18   Ref. Range 7/10/2017 11:00   Cholesterol Latest Ref Range: 120 - 199 mg/dL 168   HDL Latest Ref Range: 40 - 75 mg/dL 72   LDL Cholesterol Latest Ref Range: 63.0 - 159.0 mg/dL 66.4   Total Cholesterol/HDL Ratio Latest Ref Range: 2.0 - 5.0  2.3   Triglycerides Latest Ref Range: 30 - 150 mg/dL 148            Current Assessment & Plan     Lipid panel today  Cont pravastatin 40mg daily         LOYDA (obstructive sleep apnea)    Current Assessment & Plan     Much improved am headaches since using CPAP         RESOLVED: Current chronic use of systemic steroids    Overview     DXA 6/3/19: normal with significant increase of 6.1% in the lumbar BMD.     FRAX: major 15% hip 0.3%(moderate risk)         Left-sided chest wall pain    Current Assessment & Plan     Chest x-ray  left rib x-rays              Other Visit Diagnoses     Left sided chest pain    -  Primary    Relevant Orders    X-Ray Chest PA And Lateral    X-Ray Ribs 2 View Left

## 2019-06-11 ENCOUNTER — HOSPITAL ENCOUNTER (OUTPATIENT)
Dept: RADIOLOGY | Facility: HOSPITAL | Age: 64
Discharge: HOME | End: 2019-06-11
Attending: INTERNAL MEDICINE
Payer: MEDICARE

## 2019-06-11 DIAGNOSIS — R07.9 LEFT SIDED CHEST PAIN: ICD-10-CM

## 2019-06-11 PROCEDURE — 71100 XR RIBS 2 VIEW LEFT: ICD-10-PCS | Mod: 26,LT,, | Performed by: RADIOLOGY

## 2019-06-11 PROCEDURE — 71046 X-RAY EXAM CHEST 2 VIEWS: CPT | Mod: TC,FY,PO

## 2019-06-11 PROCEDURE — 71046 XR CHEST PA AND LATERAL: ICD-10-PCS | Mod: 26,,, | Performed by: RADIOLOGY

## 2019-06-11 PROCEDURE — 71100 X-RAY EXAM RIBS UNI 2 VIEWS: CPT | Mod: 26,LT,, | Performed by: RADIOLOGY

## 2019-06-11 PROCEDURE — 71046 X-RAY EXAM CHEST 2 VIEWS: CPT | Mod: 26,,, | Performed by: RADIOLOGY

## 2019-06-11 PROCEDURE — 71100 X-RAY EXAM RIBS UNI 2 VIEWS: CPT | Mod: TC,FY,PO,LT

## 2019-06-15 DIAGNOSIS — H40.1131 PRIMARY OPEN ANGLE GLAUCOMA OF BOTH EYES, MILD STAGE: ICD-10-CM

## 2019-06-17 ENCOUNTER — LAB VISIT (OUTPATIENT)
Dept: LAB | Facility: HOSPITAL | Age: 64
End: 2019-06-17
Attending: INTERNAL MEDICINE
Payer: MEDICARE

## 2019-06-17 DIAGNOSIS — M31.6 GCA (GIANT CELL ARTERITIS): ICD-10-CM

## 2019-06-17 DIAGNOSIS — E83.52 HYPERCALCEMIA: ICD-10-CM

## 2019-06-17 DIAGNOSIS — R74.01 ELEVATED TRANSAMINASE LEVEL: ICD-10-CM

## 2019-06-17 LAB
BASOPHILS # BLD AUTO: 0.04 K/UL (ref 0–0.2)
BASOPHILS NFR BLD: 0.4 % (ref 0–1.9)
CA-I BLDV-SCNC: 1.23 MMOL/L (ref 1.06–1.42)
CALCIUM SERPL-MCNC: 9.8 MG/DL (ref 8.7–10.5)
CK SERPL-CCNC: 78 U/L (ref 20–180)
DIFFERENTIAL METHOD: ABNORMAL
EOSINOPHIL # BLD AUTO: 1 K/UL (ref 0–0.5)
EOSINOPHIL NFR BLD: 8.4 % (ref 0–8)
ERYTHROCYTE [DISTWIDTH] IN BLOOD BY AUTOMATED COUNT: 13.5 % (ref 11.5–14.5)
GGT SERPL-CCNC: 50 U/L (ref 8–55)
HCT VFR BLD AUTO: 38.6 % (ref 37–48.5)
HGB BLD-MCNC: 12.7 G/DL (ref 12–16)
IMM GRANULOCYTES # BLD AUTO: 0.06 K/UL (ref 0–0.04)
IMM GRANULOCYTES NFR BLD AUTO: 0.5 % (ref 0–0.5)
LYMPHOCYTES # BLD AUTO: 2.9 K/UL (ref 1–4.8)
LYMPHOCYTES NFR BLD: 25.5 % (ref 18–48)
MAGNESIUM SERPL-MCNC: 1.6 MG/DL (ref 1.6–2.6)
MCH RBC QN AUTO: 31.1 PG (ref 27–31)
MCHC RBC AUTO-ENTMCNC: 32.9 G/DL (ref 32–36)
MCV RBC AUTO: 95 FL (ref 82–98)
MONOCYTES # BLD AUTO: 0.6 K/UL (ref 0.3–1)
MONOCYTES NFR BLD: 5.6 % (ref 4–15)
NEUTROPHILS # BLD AUTO: 6.8 K/UL (ref 1.8–7.7)
NEUTROPHILS NFR BLD: 59.6 % (ref 38–73)
NRBC BLD-RTO: 0 /100 WBC
PHOSPHATE SERPL-MCNC: 4.1 MG/DL (ref 2.7–4.5)
PLATELET # BLD AUTO: 245 K/UL (ref 150–350)
PMV BLD AUTO: 11.9 FL (ref 9.2–12.9)
PTH-INTACT SERPL-MCNC: 63 PG/ML (ref 9–77)
RBC # BLD AUTO: 4.08 M/UL (ref 4–5.4)
TSH SERPL DL<=0.005 MIU/L-ACNC: 2.77 UIU/ML (ref 0.4–4)
WBC # BLD AUTO: 11.42 K/UL (ref 3.9–12.7)

## 2019-06-17 PROCEDURE — 85025 COMPLETE CBC W/AUTO DIFF WBC: CPT

## 2019-06-17 PROCEDURE — 82330 ASSAY OF CALCIUM: CPT

## 2019-06-17 PROCEDURE — 83970 ASSAY OF PARATHORMONE: CPT

## 2019-06-17 PROCEDURE — 82550 ASSAY OF CK (CPK): CPT

## 2019-06-17 PROCEDURE — 36415 COLL VENOUS BLD VENIPUNCTURE: CPT | Mod: PO

## 2019-06-17 PROCEDURE — 86334 IMMUNOFIX E-PHORESIS SERUM: CPT | Mod: 26,,, | Performed by: PATHOLOGY

## 2019-06-17 PROCEDURE — 86334 IMMUNOFIX E-PHORESIS SERUM: CPT

## 2019-06-17 PROCEDURE — 82310 ASSAY OF CALCIUM: CPT

## 2019-06-17 PROCEDURE — 84100 ASSAY OF PHOSPHORUS: CPT

## 2019-06-17 PROCEDURE — 83735 ASSAY OF MAGNESIUM: CPT

## 2019-06-17 PROCEDURE — 86334 PATHOLOGIST INTERPRETATION IFE: ICD-10-PCS | Mod: 26,,, | Performed by: PATHOLOGY

## 2019-06-17 PROCEDURE — 84443 ASSAY THYROID STIM HORMONE: CPT

## 2019-06-17 PROCEDURE — 82977 ASSAY OF GGT: CPT

## 2019-06-17 RX ORDER — DORZOLAMIDE HCL 20 MG/ML
1 SOLUTION/ DROPS OPHTHALMIC 3 TIMES DAILY
Qty: 10 ML | Refills: 8 | Status: SHIPPED | OUTPATIENT
Start: 2019-06-17 | End: 2019-12-18 | Stop reason: SDUPTHER

## 2019-06-17 RX ORDER — LATANOPROST 50 UG/ML
1 SOLUTION/ DROPS OPHTHALMIC NIGHTLY
Qty: 2.5 ML | Refills: 8 | Status: SHIPPED | OUTPATIENT
Start: 2019-06-17 | End: 2019-08-05 | Stop reason: SDUPTHER

## 2019-06-18 ENCOUNTER — TELEPHONE (OUTPATIENT)
Dept: RHEUMATOLOGY | Facility: CLINIC | Age: 64
End: 2019-06-18

## 2019-06-18 ENCOUNTER — PATIENT MESSAGE (OUTPATIENT)
Dept: RHEUMATOLOGY | Facility: CLINIC | Age: 64
End: 2019-06-18

## 2019-06-18 DIAGNOSIS — I77.6 VASCULITIS: Primary | ICD-10-CM

## 2019-06-18 LAB — INTERPRETATION SERPL IFE-IMP: NORMAL

## 2019-06-19 ENCOUNTER — LAB VISIT (OUTPATIENT)
Dept: LAB | Facility: HOSPITAL | Age: 64
End: 2019-06-19
Attending: INTERNAL MEDICINE
Payer: MEDICARE

## 2019-06-19 DIAGNOSIS — I77.6 VASCULITIS: ICD-10-CM

## 2019-06-19 LAB
BACTERIA #/AREA URNS AUTO: NORMAL /HPF
BILIRUB UR QL STRIP: NEGATIVE
CLARITY UR REFRACT.AUTO: CLEAR
COLOR UR AUTO: YELLOW
CREAT UR-MCNC: 219 MG/DL (ref 15–325)
GLUCOSE UR QL STRIP: NEGATIVE
HGB UR QL STRIP: NEGATIVE
KETONES UR QL STRIP: NEGATIVE
LEUKOCYTE ESTERASE UR QL STRIP: ABNORMAL
MICROSCOPIC COMMENT: NORMAL
NITRITE UR QL STRIP: NEGATIVE
PATHOLOGIST INTERPRETATION IFE: NORMAL
PH UR STRIP: 5 [PH] (ref 5–8)
PROT UR QL STRIP: NEGATIVE
PROT UR-MCNC: 15 MG/DL (ref 0–15)
PROT/CREAT UR: 0.07 MG/G{CREAT} (ref 0–0.2)
RBC #/AREA URNS AUTO: 1 /HPF (ref 0–4)
SP GR UR STRIP: 1.02 (ref 1–1.03)
SQUAMOUS #/AREA URNS AUTO: 2 /HPF
URN SPEC COLLECT METH UR: ABNORMAL
WBC #/AREA URNS AUTO: 5 /HPF (ref 0–5)

## 2019-06-19 PROCEDURE — 81001 URINALYSIS AUTO W/SCOPE: CPT

## 2019-06-19 PROCEDURE — 84156 ASSAY OF PROTEIN URINE: CPT

## 2019-07-05 ENCOUNTER — OFFICE VISIT (OUTPATIENT)
Dept: GASTROENTEROLOGY | Facility: CLINIC | Age: 64
End: 2019-07-05
Payer: MEDICARE

## 2019-07-05 ENCOUNTER — TELEPHONE (OUTPATIENT)
Dept: ENDOSCOPY | Facility: HOSPITAL | Age: 64
End: 2019-07-05

## 2019-07-05 VITALS
DIASTOLIC BLOOD PRESSURE: 87 MMHG | HEART RATE: 75 BPM | SYSTOLIC BLOOD PRESSURE: 169 MMHG | RESPIRATION RATE: 16 BRPM | WEIGHT: 235 LBS | BODY MASS INDEX: 41.64 KG/M2 | HEIGHT: 63 IN

## 2019-07-05 DIAGNOSIS — Z12.11 SPECIAL SCREENING FOR MALIGNANT NEOPLASMS, COLON: Primary | ICD-10-CM

## 2019-07-05 DIAGNOSIS — R19.7 DIARRHEA, UNSPECIFIED TYPE: Primary | ICD-10-CM

## 2019-07-05 DIAGNOSIS — Z86.010 HISTORY OF COLON POLYPS: ICD-10-CM

## 2019-07-05 PROCEDURE — 99999 PR PBB SHADOW E&M-EST. PATIENT-LVL IV: CPT | Mod: PBBFAC,,, | Performed by: INTERNAL MEDICINE

## 2019-07-05 PROCEDURE — 99214 OFFICE O/P EST MOD 30 MIN: CPT | Mod: PBBFAC | Performed by: INTERNAL MEDICINE

## 2019-07-05 PROCEDURE — 99213 PR OFFICE/OUTPT VISIT, EST, LEVL III, 20-29 MIN: ICD-10-PCS | Mod: S$PBB,,, | Performed by: INTERNAL MEDICINE

## 2019-07-05 PROCEDURE — 99213 OFFICE O/P EST LOW 20 MIN: CPT | Mod: S$PBB,,, | Performed by: INTERNAL MEDICINE

## 2019-07-05 PROCEDURE — 99999 PR PBB SHADOW E&M-EST. PATIENT-LVL IV: ICD-10-PCS | Mod: PBBFAC,,, | Performed by: INTERNAL MEDICINE

## 2019-07-05 RX ORDER — POLYETHYLENE GLYCOL 3350, SODIUM SULFATE ANHYDROUS, SODIUM BICARBONATE, SODIUM CHLORIDE, POTASSIUM CHLORIDE 236; 22.74; 6.74; 5.86; 2.97 G/4L; G/4L; G/4L; G/4L; G/4L
4 POWDER, FOR SOLUTION ORAL ONCE
Qty: 4000 ML | Refills: 0 | Status: SHIPPED | OUTPATIENT
Start: 2019-07-05 | End: 2019-07-05

## 2019-07-05 NOTE — PROGRESS NOTES
[facility] Consultation Note    Reason for Consult:  The primary encounter diagnosis was Diarrhea, unspecified type. A diagnosis of History of colon polyps was also pertinent to this visit.    PCP:   Jason Mercado       Referring MD:  No referring provider defined for this encounter.    Initial History of Present Illness (HPI):  This is a 63 y.o. female here for evaluation of loose stools and surveillance colonoscopy for history of colon polyps.  Patient's past do on recommendation for surveillance for colonoscopy.  Etiology of her chronic intermittent diarrhea unknown it has been stable no history of C diff stool studies have been checked in the past.  No blood in her stool however her vision is not excellent and she is not sure if she would be able to tell if she did.  She is willing to schedule her colonoscopy.  She feels like she gets great benefit from her PPI even know we have told her that some patients can have diarrhea from proton pump inhibitors.  She would like to continue with it.    Abdominal pain - as above  Reflux - well controlled  Dysphagia - no   Bowel habits - diarrhea  GI bleeding - none  NSAID usage - none    Interval HPI 07/05/2019:  The patient's last visit with me was on 7/11/2017.      ROS:  Constitutional: No fevers, chills, No weight loss  ENT:  No heartburn no dysphagia no odynophagia no hoarseness  CV: No chest pain, no palpitation  Pulm: No cough, No shortness of breath, no wheezing  Ophtho: No vision changes  GI: see HPI  Derm: No rash, no itching  Heme: No lymphadenopathy, No easy bruising  MSK: No significant arthritis  : No dysuria, No hematuria  Endo: No hot or cold intolerance  Neuro: No syncope, No seizure, no strokes  Psych: No uncontrolled anxiety, No uncontrolled depression    Medical History:  has a past medical history of Anxiety, Asthma, Blind left eye, Cataract, Depression, Diabetes mellitus, Disease of immune system, Giant cell arteritis,  Glaucoma, Hypertension, Polymyalgia rheumatica, and Uveitis.    Surgical History:  has a past surgical history that includes Cyst Removal; Hysterectomy; Cholecystectomy; Ankle surgery; and Colonoscopy (N/A, 9/28/2015).    Family History: family history includes Cancer (age of onset: 58) in her brother; Colon polyps in her brother; Coronary artery disease in her father and mother; Diabetes in her father and mother; Emphysema in her father and sister; Heart attack in her sister; Hypertension in her mother; Stomach cancer in her brother; Stroke in her mother..     Social History:  reports that she has never smoked. She has never used smokeless tobacco. She reports that she does not drink alcohol or use drugs.    Review of patient's allergies indicates:   Allergen Reactions    Oxycontin [oxycodone] Hallucinations     Immobile     Percocet [oxycodone-acetaminophen] Shortness Of Breath    Percodan [oxycodone hcl-oxycodone-asa] Shortness Of Breath    Seroquel [quetiapine] Shortness Of Breath     Akathisia       Medication List with Changes/Refills   New Medications    POLYETHYLENE GLYCOL (GOLYTELY,NULYTELY) 236-22.74-6.74 -5.86 GRAM SUSPENSION    Take 4,000 mLs (4 L total) by mouth once. for 1 dose   Current Medications    ACTEMRA 162 MG/0.9 ML SYRG    INJECT ONE SYRINGE SUBCUTANEOUSLY EVERY 7 DAYS. KEEP REFRIGERATED.    ASPIRIN (ECOTRIN) 325 MG EC TABLET    Take 325 mg by mouth once daily.    BRIMONIDINE 0.2% (ALPHAGAN) 0.2 % DROP    Place 1 drop into both eyes 3 (three) times daily.    CLONAZEPAM (KLONOPIN) 0.5 MG TABLET    Take 0.5 mg by mouth once daily.    DORZOLAMIDE (TRUSOPT) 2 % OPHTHALMIC SOLUTION    PLACE 1 DROP INTO BOTH EYES 3 (THREE) TIMES DAILY.    ERGOCALCIFEROL (VITAMIN D2) 50,000 UNIT CAP    TAKE 1 CAPSULE (50,000 UNITS TOTAL) BY MOUTH EVERY 7 DAYS.    GABAPENTIN (NEURONTIN) 600 MG TABLET    Take 1 tablet (600 mg total) by mouth 2 (two) times daily.    LATANOPROST 0.005 % OPHTHALMIC SOLUTION    PLACE 1  "DROP INTO BOTH EYES EVERY EVENING.    LISINOPRIL (PRINIVIL,ZESTRIL) 40 MG TABLET    Take 40 mg by mouth once daily.    METFORMIN (GLUCOPHAGE) 1000 MG TABLET    Take 1,000 mg by mouth 2 (two) times daily.    METOPROLOL SUCCINATE (TOPROL-XL) 100 MG 24 HR TABLET    Take 100 mg by mouth once daily.    OMEPRAZOLE (PRILOSEC) 20 MG CAPSULE    TAKE 1 CAPSULE (20 MG TOTAL) BY MOUTH 2 (TWO) TIMES DAILY.    PRAVASTATIN (PRAVACHOL) 40 MG TABLET    TAKE 1 TABLET (40 MG TOTAL) BY MOUTH EVERY EVENING.    SERTRALINE (ZOLOFT) 50 MG TABLET    Take 50 mg by mouth once daily.         Objective Findings:    Vital Signs:  BP (!) 169/87 (BP Location: Left arm, Patient Position: Sitting)   Pulse 75   Resp 16   Ht 5' 3" (1.6 m)   Wt 106.6 kg (235 lb 0.2 oz)   BMI 41.63 kg/m²   Body mass index is 41.63 kg/m².    Physical Exam:  General Appearance: Well appearing in no acute distress  Eyes:    No scleral icterus  ENT:  No lesions or masses   Lungs: CTA bilaterally, no wheezes, no rhonchi, no rales  Heart:  S1, S2 normal, no murmurs heard  Abdomen:  Non distended, soft, no guarding, no rebound, no tenderness, no appreciated ascites, no bruits, no hepatosplenomegaly,  No CVA tenderness, no appreciated hernias  Skin: No petechiae or rash on exposed skin areas  Neurologic:  Alert and oriented x4  Psychiatric:  Normal speech mentation and affect    Labs:  Lab Results   Component Value Date    WBC 11.42 06/17/2019    HGB 12.7 06/17/2019    HCT 38.6 06/17/2019     06/17/2019    CHOL 181 06/03/2019    TRIG 266 (H) 06/03/2019    HDL 53 06/03/2019    ALT 60 (H) 06/03/2019    ALT 60 (H) 06/03/2019    AST 44 (H) 06/03/2019    AST 44 (H) 06/03/2019     06/03/2019    K 4.4 06/03/2019     (H) 06/03/2019    CREATININE 0.9 06/03/2019    BUN 15 06/03/2019    CO2 23 06/03/2019    TSH 2.770 06/17/2019    INR 1.0 02/04/2016    GLUF 79 01/06/2011       Medical Decision Making:    PPI talk given.  Colonoscopy talk given.    Endoscopy:  "   Prior EGD and colonoscopy reports reviewed.          Assessment:  1. Diarrhea, unspecified type    2. History of colon polyps         Recommendations:   1.  Recommend colonoscopy for history of colon polyps and diarrhea stool studies have been unrevealing.  2.  Return GI clinic p.r.n. follow-up per primary care doctor.    No follow-ups on file.      Order summary:  Orders Placed This Encounter    Case request GI: COLONOSCOPY         Thank you so much for allowing me to participate in the care of Genesis Dykes MD

## 2019-08-04 NOTE — PROGRESS NOTES
HPI     DLS: 4/01/19    Pt here for 4 month check/DFE;  Pt states she needs refills on her Latanoprost eye drops.     Meds: Dorzolamide TID OU   Brimonidine TID OU   Latanoprost QHS OU       1) Autoimmune Optic Neuropathy OU   2) POAG vs OHT   3) GCA with PMR   4) Type 2 DM   5) Steroid Responder   6) NS OU   7) APD OD     Last edited by Doris Clayton on 8/5/2019  9:44 AM. (History)              Assessment /Plan     For exam results, see Encounter Report.    Giant cell arteritis with polymyalgia rheumatica    Inflammatory optic neuropathy    Optic neuritis    Primary open angle glaucoma of both eyes, mild stage    Blindness and low vision    Steroid responder, bilateral    Anatomical narrow angle borderline glaucoma, bilateral            1. Autoimmune optic neuropathy OU,   - OS 2004  - OD 2012  - unknown antibiody  - several treatments with IV steroid and IVIG  - vision and VF per [place] records  - PO steroid daily - 10 mg a day   - sural nerve and muscle biopsy done - results pending - pt to obtain results form [facility]  - xal qhs ou   - monitor  - rheum monitoring esr/crp  - all previous mri's and lp negative per rheum note  Last ESR / and c-react prot - both very low     2.  Steroid responder in past  - on chronic steroids for #1  iop good today   Continue xal qhs ou    3. POAG - mild vs OHT ou      First HVF   2004   First photos   2004   Treatment / Drops started   2004           Family history    neg        Glaucoma meds    Latanoprost ou        H/O adverse rxn to glaucoma drops    none        LASERS    None         GLAUCOMA SURGERIES    none        OTHER EYE SURGERIES    none        CDR    0.9 / 0.9 - +++ pallor ou         Tbase    25-27 / 25-27          Tmax    27/27            Ttarget    20/20             HVF    12 test 2004 to 2012  - SALT / IAD od // Ext os                    4 10-2 stim V OD 2012 - 2014 - [facility]    (+ prog from 2012 to 2014 - when followed [facility])    7- test-  10-2 stim V  "test done od 12/2014 to 2017 - dense SALT and IAD (+stable)         Gonio    +2-3 ou (very slight narrowing)         CCT    591/612        OCT    7 test 2006 to 2016 - RNFL - dec throughout od // dec thru out  os        HRT    6 test 2004 to 2018 - MR - nl od // off-center os // new baseline 1/25/2016 - nl ou -"too good to be true"        Disc photos    2004 - slides // 2008, 2010, 2012, , 2012, 2012, 2012, 2015, 2017   - OIS     - Ttoday    17/17  - Test done today  monitor IOP and VA, DFE     PLAN  CSM - IOP good     Sees Quinet   He is monitoring sed rate and c react prot   Cont PO pred 6 mg a day   Cont actemra  (tocilizumab) 162 mg sc weekly  Cont prilosec (omeprazole)  20mg daily - to protect stomach     Cont brimonidine tid ou  Cont latanoprost qhs ou  Cont dorzolamide tid ou  - IOP was better     -would rec PI's prior to doing and slt's - has some mild  ant bowing and narrowing   - avoid BB - H/O asthma as a child     Currently on prednisone 6 mg q day and decreasing- Quinet    NO MORE VF testing - essentially  EXT ou      F/u 6 mos--IOP    Insurance forms filled out again (yearly)  8/5/2019     I have seen and personally examined the patient.  I agree with the findings, assessment and plan of the resident and/or fellow.     Tanna Ro MD  "

## 2019-08-05 ENCOUNTER — OFFICE VISIT (OUTPATIENT)
Dept: OPHTHALMOLOGY | Facility: CLINIC | Age: 64
End: 2019-08-05
Payer: MEDICARE

## 2019-08-05 ENCOUNTER — TELEPHONE (OUTPATIENT)
Dept: OPHTHALMOLOGY | Facility: CLINIC | Age: 64
End: 2019-08-05

## 2019-08-05 DIAGNOSIS — H40.043 STEROID RESPONDER, BILATERAL: ICD-10-CM

## 2019-08-05 DIAGNOSIS — H40.1131 PRIMARY OPEN ANGLE GLAUCOMA OF BOTH EYES, MILD STAGE: ICD-10-CM

## 2019-08-05 DIAGNOSIS — H40.033 ANATOMICAL NARROW ANGLE BORDERLINE GLAUCOMA, BILATERAL: ICD-10-CM

## 2019-08-05 DIAGNOSIS — H46.9 INFLAMMATORY OPTIC NEUROPATHY: ICD-10-CM

## 2019-08-05 DIAGNOSIS — H46.9 OPTIC NEURITIS: ICD-10-CM

## 2019-08-05 DIAGNOSIS — H54.10 BLINDNESS AND LOW VISION: ICD-10-CM

## 2019-08-05 DIAGNOSIS — M31.5 GIANT CELL ARTERITIS WITH POLYMYALGIA RHEUMATICA: Primary | ICD-10-CM

## 2019-08-05 PROCEDURE — 92014 PR EYE EXAM, EST PATIENT,COMPREHESV: ICD-10-PCS | Mod: S$PBB,,, | Performed by: OPHTHALMOLOGY

## 2019-08-05 PROCEDURE — 92014 COMPRE OPH EXAM EST PT 1/>: CPT | Mod: S$PBB,,, | Performed by: OPHTHALMOLOGY

## 2019-08-05 PROCEDURE — 99212 OFFICE O/P EST SF 10 MIN: CPT | Mod: PBBFAC | Performed by: OPHTHALMOLOGY

## 2019-08-05 PROCEDURE — 99999 PR PBB SHADOW E&M-EST. PATIENT-LVL II: ICD-10-PCS | Mod: PBBFAC,,, | Performed by: OPHTHALMOLOGY

## 2019-08-05 PROCEDURE — 99999 PR PBB SHADOW E&M-EST. PATIENT-LVL II: CPT | Mod: PBBFAC,,, | Performed by: OPHTHALMOLOGY

## 2019-08-05 RX ORDER — LATANOPROST 50 UG/ML
1 SOLUTION/ DROPS OPHTHALMIC NIGHTLY
Qty: 3 BOTTLE | Refills: 4 | Status: SHIPPED | OUTPATIENT
Start: 2019-08-05 | End: 2020-09-21 | Stop reason: SDUPTHER

## 2019-08-05 NOTE — TELEPHONE ENCOUNTER
Left a voice message for the pt regarding the forms that Dr. Ro filled out are complete and she can pick them up at the .

## 2019-09-06 ENCOUNTER — ANESTHESIA (OUTPATIENT)
Dept: ENDOSCOPY | Facility: HOSPITAL | Age: 64
End: 2019-09-06
Payer: MEDICARE

## 2019-09-06 ENCOUNTER — HOSPITAL ENCOUNTER (OUTPATIENT)
Facility: HOSPITAL | Age: 64
Discharge: HOME | End: 2019-09-06
Attending: INTERNAL MEDICINE | Admitting: INTERNAL MEDICINE
Payer: MEDICARE

## 2019-09-06 ENCOUNTER — ANESTHESIA EVENT (OUTPATIENT)
Dept: ENDOSCOPY | Facility: HOSPITAL | Age: 64
End: 2019-09-06
Payer: MEDICARE

## 2019-09-06 VITALS
TEMPERATURE: 97 F | OXYGEN SATURATION: 94 % | WEIGHT: 225 LBS | HEIGHT: 64 IN | BODY MASS INDEX: 38.41 KG/M2 | RESPIRATION RATE: 20 BRPM | SYSTOLIC BLOOD PRESSURE: 165 MMHG | HEART RATE: 82 BPM | DIASTOLIC BLOOD PRESSURE: 88 MMHG

## 2019-09-06 DIAGNOSIS — R19.7 DIARRHEA, UNSPECIFIED TYPE: ICD-10-CM

## 2019-09-06 LAB — POCT GLUCOSE: 97 MG/DL (ref 70–110)

## 2019-09-06 PROCEDURE — 45380 COLONOSCOPY AND BIOPSY: CPT | Performed by: INTERNAL MEDICINE

## 2019-09-06 PROCEDURE — E9220 PRA ENDO ANESTHESIA: ICD-10-PCS | Mod: ,,, | Performed by: NURSE ANESTHETIST, CERTIFIED REGISTERED

## 2019-09-06 PROCEDURE — 27201012 HC FORCEPS, HOT/COLD, DISP: Performed by: INTERNAL MEDICINE

## 2019-09-06 PROCEDURE — 88305 TISSUE SPECIMEN TO PATHOLOGY - SURGERY: ICD-10-PCS | Mod: 26,,, | Performed by: PATHOLOGY

## 2019-09-06 PROCEDURE — 82962 GLUCOSE BLOOD TEST: CPT | Performed by: INTERNAL MEDICINE

## 2019-09-06 PROCEDURE — 45380 PR COLONOSCOPY,BIOPSY: ICD-10-PCS | Mod: ,,, | Performed by: INTERNAL MEDICINE

## 2019-09-06 PROCEDURE — 37000008 HC ANESTHESIA 1ST 15 MINUTES: Performed by: INTERNAL MEDICINE

## 2019-09-06 PROCEDURE — 88305 TISSUE EXAM BY PATHOLOGIST: CPT | Performed by: PATHOLOGY

## 2019-09-06 PROCEDURE — 63600175 PHARM REV CODE 636 W HCPCS: Performed by: NURSE ANESTHETIST, CERTIFIED REGISTERED

## 2019-09-06 PROCEDURE — E9220 PRA ENDO ANESTHESIA: HCPCS | Mod: ,,, | Performed by: NURSE ANESTHETIST, CERTIFIED REGISTERED

## 2019-09-06 PROCEDURE — 63600175 PHARM REV CODE 636 W HCPCS: Performed by: INTERNAL MEDICINE

## 2019-09-06 PROCEDURE — 37000009 HC ANESTHESIA EA ADD 15 MINS: Performed by: INTERNAL MEDICINE

## 2019-09-06 PROCEDURE — 45380 COLONOSCOPY AND BIOPSY: CPT | Mod: ,,, | Performed by: INTERNAL MEDICINE

## 2019-09-06 RX ORDER — LIDOCAINE HCL/PF 100 MG/5ML
SYRINGE (ML) INTRAVENOUS
Status: DISCONTINUED | OUTPATIENT
Start: 2019-09-06 | End: 2019-09-06

## 2019-09-06 RX ORDER — SODIUM CHLORIDE 0.9 % (FLUSH) 0.9 %
10 SYRINGE (ML) INJECTION
Status: DISCONTINUED | OUTPATIENT
Start: 2019-09-06 | End: 2019-09-06 | Stop reason: HOSPADM

## 2019-09-06 RX ORDER — SODIUM CHLORIDE 9 MG/ML
INJECTION, SOLUTION INTRAVENOUS CONTINUOUS
Status: DISCONTINUED | OUTPATIENT
Start: 2019-09-06 | End: 2019-09-06 | Stop reason: HOSPADM

## 2019-09-06 RX ORDER — PROPOFOL 10 MG/ML
VIAL (ML) INTRAVENOUS
Status: DISCONTINUED | OUTPATIENT
Start: 2019-09-06 | End: 2019-09-06

## 2019-09-06 RX ORDER — PROPOFOL 10 MG/ML
VIAL (ML) INTRAVENOUS CONTINUOUS PRN
Status: DISCONTINUED | OUTPATIENT
Start: 2019-09-06 | End: 2019-09-06

## 2019-09-06 RX ADMIN — PROPOFOL 60 MG: 10 INJECTION, EMULSION INTRAVENOUS at 10:09

## 2019-09-06 RX ADMIN — PROPOFOL 20 MG: 10 INJECTION, EMULSION INTRAVENOUS at 10:09

## 2019-09-06 RX ADMIN — SODIUM CHLORIDE: 0.9 INJECTION, SOLUTION INTRAVENOUS at 10:09

## 2019-09-06 RX ADMIN — LIDOCAINE HYDROCHLORIDE 40 MG: 20 INJECTION, SOLUTION INTRAVENOUS at 10:09

## 2019-09-06 RX ADMIN — PROPOFOL 150 MCG/KG/MIN: 10 INJECTION, EMULSION INTRAVENOUS at 10:09

## 2019-09-06 NOTE — TRANSFER OF CARE
"Anesthesia Transfer of Care Note    Patient: Genesis V Egbi    Procedure(s) Performed: Procedure(s) (LRB):  COLONOSCOPY (N/A)    Patient location: GI    Anesthesia Type: general    Transport from OR: Transported from OR on room air with adequate spontaneous ventilation    Post pain: adequate analgesia    Post assessment: no apparent anesthetic complications and tolerated procedure well    Post vital signs: stable    Level of consciousness: responds to stimulation    Nausea/Vomiting: no nausea/vomiting    Complications: none    Transfer of care protocol was followed      Last vitals:   Visit Vitals  BP (!) 154/77   Pulse 96   Temp 36.3 °C (97.3 °F)   Resp 16   Ht 5' 4" (1.626 m)   Wt 102.1 kg (225 lb)   SpO2 97%   Breastfeeding? No   BMI 38.62 kg/m²     "

## 2019-09-06 NOTE — ANESTHESIA PREPROCEDURE EVALUATION
09/06/2019  Genesis Ugalde is a 63 y.o., female.    Anesthesia Evaluation    I have reviewed the Patient Summary Reports.     I have reviewed the Medications.     Review of Systems  Anesthesia Hx:  History of prior surgery of interest to airway management or planning:  Denies Personal Hx of Anesthesia complications.   Cardiovascular:   Hypertension hyperlipidemia    Pulmonary:   Asthma Sleep Apnea    Renal/:   renal calculi    Hepatic/GI:   Liver Disease,    Neurological:   Seizures    Endocrine:   Diabetes Obesity   Psych:   depression          Physical Exam  General:  Obesity    Airway/Jaw/Neck:  Airway Findings: Mouth Opening: Normal Tongue: Normal  General Airway Assessment: Adult  Mallampati: III  Improves to II with phonation.  TM Distance: Normal, at least 6 cm  Jaw/Neck Findings:  Neck ROM: Normal ROM     Eyes/Ears/Nose:  Eyes/Ears/Nose Findings: Patient essentially blind     Chest/Lungs:  Chest/Lungs Findings: Normal Respiratory Rate     Heart/Vascular:  Heart Findings: Rate: Normal        Mental Status:  Mental Status Findings:  Alert and Oriented         Anesthesia Plan  Type of Anesthesia, risks & benefits discussed:  Anesthesia Type:  general  Patient's Preference: General   Intra-op Monitoring Plan: standard ASA monitors  Intra-op Monitoring Plan Comments:   Post Op Pain Control Plan: IV/PO Opioids PRN  Post Op Pain Control Plan Comments:   Induction:   IV  Beta Blocker:  Patient is on a Beta-Blocker and has received one dose within the past 24 hours (No further documentation required).       Informed Consent: Patient understands risks and agrees with Anesthesia plan.  Questions answered. Anesthesia consent signed with patient.  ASA Score: 3     Day of Surgery Review of History & Physical:    H&P update referred to the surgeon.     Anesthesia Plan Notes: NPO confirmed.   Patient is blind  BMI  > 40  LOYDA noted        Ready For Surgery From Anesthesia Perspective.

## 2019-09-06 NOTE — H&P
[facility]  History & Physical    Subjective:      Chief Complaint/Reason for Admission:     Colonoscopy    Genesis Ugalde is a 63 y.o. female.    Past Medical History:   Diagnosis Date    Anxiety     Asthma     Blind left eye     can see silhouettes in right eye    Cataract     Depression     Diabetes mellitus     Disease of immune system     Giant cell arteritis     Glaucoma     Hypertension     Polymyalgia rheumatica     Uveitis      Past Surgical History:   Procedure Laterality Date    ANKLE SURGERY      CHOLECYSTECTOMY      COLONOSCOPY N/A 9/28/2015    Performed by Alok [facility]kes MD at [facility] ENDO (4TH FLR)    CYST REMOVAL      right lung    ESOPHAGOGASTRODUODENOSCOPY (EGD) N/A 5/11/2016    Performed by [facility]kes [facility] at [facility] ENDO (4TH FLR)    ESOPHAGOGASTRODUODENOSCOPY (EGD) N/A 10/26/2015    Performed by [facility]kes MD at [facility] ENDO (4TH FLR)    HYSTERECTOMY       Family History   Problem Relation Age of Onset    Coronary artery disease Mother     Diabetes Mother     Hypertension Mother     Stroke Mother     Coronary artery disease Father     Emphysema Father     Diabetes Father     Emphysema Sister     Heart attack Sister     Cancer Brother 58        stomach    Stomach cancer Brother     Colon polyps Brother         1 foot of colon removed    Glaucoma Neg Hx     Asthma Neg Hx     Celiac disease Neg Hx     Colon cancer Neg Hx     Esophageal cancer Neg Hx     Inflammatory bowel disease Neg Hx     Rectal cancer Neg Hx     Ulcerative colitis Neg Hx     Macular degeneration Neg Hx     Retinal detachment Neg Hx     Strabismus Neg Hx     Amblyopia Neg Hx     Blindness Neg Hx     Cataracts Neg Hx      Social History     Tobacco Use    Smoking status: Never Smoker    Smokeless tobacco: Never Used   Substance Use Topics    Alcohol use: No     Alcohol/week: 0.0 oz    Drug use: No       PTA Medications   Medication Sig    ACTEMRA 162  mg/0.9 mL Syrg INJECT ONE SYRINGE SUBCUTANEOUSLY EVERY 7 DAYS. KEEP REFRIGERATED.    aspirin (ECOTRIN) 325 MG EC tablet Take 325 mg by mouth once daily.    brimonidine 0.2% (ALPHAGAN) 0.2 % Drop Place 1 drop into both eyes 3 (three) times daily.    clonazePAM (KLONOPIN) 0.5 MG tablet Take 0.5 mg by mouth once daily.    dorzolamide (TRUSOPT) 2 % ophthalmic solution PLACE 1 DROP INTO BOTH EYES 3 (THREE) TIMES DAILY.    ergocalciferol (VITAMIN D2) 50,000 unit Cap TAKE 1 CAPSULE (50,000 UNITS TOTAL) BY MOUTH EVERY 7 DAYS.    gabapentin (NEURONTIN) 600 MG tablet Take 1 tablet (600 mg total) by mouth 2 (two) times daily.    latanoprost 0.005 % ophthalmic solution Place 1 drop into both eyes every evening.    lisinopril (PRINIVIL,ZESTRIL) 40 MG tablet Take 40 mg by mouth once daily.    metformin (GLUCOPHAGE) 1000 MG tablet Take 1,000 mg by mouth 2 (two) times daily.    metoprolol succinate (TOPROL-XL) 100 MG 24 hr tablet Take 100 mg by mouth once daily.    omeprazole (PRILOSEC) 20 MG capsule TAKE 1 CAPSULE (20 MG TOTAL) BY MOUTH 2 (TWO) TIMES DAILY.    pravastatin (PRAVACHOL) 40 MG tablet TAKE 1 TABLET (40 MG TOTAL) BY MOUTH EVERY EVENING.    sertraline (ZOLOFT) 50 MG tablet Take 50 mg by mouth once daily.     Review of patient's allergies indicates:   Allergen Reactions    Oxycontin [oxycodone] Hallucinations     Immobile     Percocet [oxycodone-acetaminophen] Shortness Of Breath    Percodan [oxycodone hcl-oxycodone-asa] Shortness Of Breath    Seroquel [quetiapine] Shortness Of Breath     Akathisia        Review of Systems   Constitutional: Negative for chills, fever and weight loss.   Respiratory: Negative for shortness of breath and wheezing.    Cardiovascular: Negative for chest pain.   Gastrointestinal: Positive for diarrhea. Negative for abdominal pain.       Objective:      Vital Signs (Most Recent)  Temp: 97.5 °F (36.4 °C) (09/06/19 1007)  Pulse: 103 (09/06/19 1007)  Resp: 20 (09/06/19 1007)  BP:  (!) 162/76 (09/06/19 1007)  SpO2: 96 % (09/06/19 1007)    Vital Signs Range (Last 24H):  Temp:  [97.5 °F (36.4 °C)]   Pulse:  [103]   Resp:  [20]   BP: (162)/(76)   SpO2:  [96 %]     Physical Exam   Constitutional: She appears well-developed and well-nourished.   Cardiovascular: Normal rate.   Pulmonary/Chest: Effort normal.   Abdominal: Soft.   Skin: Skin is warm and dry.   Psychiatric: She has a normal mood and affect. Her behavior is normal. Judgment and thought content normal.         Assessment:      Active Hospital Problems    Diagnosis  POA    Diarrhea [R19.7]  Yes      Resolved Hospital Problems   No resolved problems to display.       Plan:    Colonoscopy for diarrhea history of colon polyps

## 2019-09-06 NOTE — ANESTHESIA POSTPROCEDURE EVALUATION
Anesthesia Post Evaluation    Patient: Genesis V Egbi    Procedure(s) Performed: Procedure(s) (LRB):  COLONOSCOPY (N/A)    Final Anesthesia Type: general  Patient location during evaluation: GI PACU  Patient participation: Yes- Able to Participate  Level of consciousness: awake and alert  Post-procedure vital signs: reviewed and stable  Pain management: adequate  Airway patency: patent  PONV status at discharge: No PONV  Anesthetic complications: no      Cardiovascular status: blood pressure returned to baseline  Respiratory status: unassisted  Hydration status: euvolemic  Follow-up not needed.          Vitals Value Taken Time   /88 9/6/2019 11:33 AM   Temp 36.3 °C (97.3 °F) 9/6/2019 11:03 AM   Pulse 82 9/6/2019 11:33 AM   Resp 12 9/6/2019 11:33 AM   SpO2 94 % 9/6/2019 11:33 AM         Event Time     Out of Recovery 11:35:00          Pain/Jose Elias Score: Jose Elias Score: 10 (9/6/2019 11:33 AM)

## 2019-09-06 NOTE — [employer] PATIENT INSTRUCTIONS
Discharge Summary/Instructions after an Endoscopic Procedure  Patient Name: Genesis Ugalde  Patient MRN: [id number]  Patient YOB: 1955 Friday, September 06, 2019  Alok Dykes MD  RESTRICTIONS:  During your procedure today, you received medications for sedation.  These   medications may affect your judgment, balance and coordination.  Therefore,   for 24 hours, you have the following restrictions:   - DO NOT drive a car, operate machinery, make legal/financial decisions,   sign important papers or drink alcohol.    ACTIVITY:  Today: no heavy lifting, straining or running due to procedural   sedation/anesthesia.  The following day: return to full activity including work.  DIET:  Eat and drink normally unless instructed otherwise.     TREATMENT FOR COMMON SIDE EFFECTS:  - Mild abdominal pain, nausea, belching, bloating or excessive gas:  rest,   eat lightly and use a heating pad.  - Sore Throat: treat with throat lozenges and/or gargle with warm salt   water.  - Because air was used during the procedure, expelling large amounts of air   from your rectum or belching is normal.  - If a bowel prep was taken, you may not have a bowel movement for 1-3 days.    This is normal.  SYMPTOMS TO WATCH FOR AND REPORT TO YOUR PHYSICIAN:  1. Abdominal pain or bloating, other than gas cramps.  2. Chest pain.  3. Back pain.  4. Signs of infection such as: chills or fever occurring within 24 hours   after the procedure.  5. Rectal bleeding, which would show as bright red, maroon, or black stools.   (A tablespoon of blood from the rectum is not serious, especially if   hemorrhoids are present.)  6. Vomiting.  7. Weakness or dizziness.  GO DIRECTLY TO THE NEAREST EMERGENCY ROOM IF YOU HAVE ANY OF THE FOLLOWING:      Difficulty breathing              Chills and/or fever over 101 F   Persistent vomiting and/or vomiting blood   Severe abdominal pain   Severe chest pain   Black, tarry stools   Bleeding- more than one  tablespoon   Any other symptom or condition that you feel may need urgent attention  Your doctor recommends these additional instructions:  If any biopsies were taken, your doctors clinic will contact you in 1 to 2   weeks with any results.  - Discharge patient to home.   - Repeat colonoscopy in 5 years for surveillance.   - The findings and recommendations were discussed with the patient.   - Return to primary care physician.   - Await pathology results.   - Telephone endoscopist for pathology results in 2 weeks.   - The findings and recommendations were discussed with the patient.  For questions, problems or results please call your physician - Alok Dykes MD at Work:  [phone].  [facility], EMERGENCY ROOM PHONE NUMBER: [phone]  IF A COMPLICATION OR EMERGENCY SITUATION ARISES AND YOU ARE UNABLE TO REACH   YOUR PHYSICIAN - GO DIRECTLY TO THE EMERGENCY ROOM.  Alok Dykes MD  9/6/2019 11:06:57 AM  This report has been verified and signed electronically.  [employer]

## 2019-09-13 ENCOUNTER — LAB VISIT (OUTPATIENT)
Dept: LAB | Facility: HOSPITAL | Age: 64
End: 2019-09-13
Attending: INTERNAL MEDICINE
Payer: MEDICARE

## 2019-09-13 ENCOUNTER — TELEPHONE (OUTPATIENT)
Dept: ENDOSCOPY | Facility: HOSPITAL | Age: 64
End: 2019-09-13

## 2019-09-13 ENCOUNTER — TELEPHONE (OUTPATIENT)
Dept: GASTROENTEROLOGY | Facility: CLINIC | Age: 64
End: 2019-09-13

## 2019-09-13 DIAGNOSIS — M31.6 GCA (GIANT CELL ARTERITIS): ICD-10-CM

## 2019-09-13 LAB
BILIRUB UR QL STRIP: NEGATIVE
CLARITY UR REFRACT.AUTO: CLEAR
COLOR UR AUTO: YELLOW
CREAT UR-MCNC: 85 MG/DL (ref 15–325)
GLUCOSE UR QL STRIP: NEGATIVE
HGB UR QL STRIP: NEGATIVE
KETONES UR QL STRIP: NEGATIVE
LEUKOCYTE ESTERASE UR QL STRIP: NEGATIVE
NITRITE UR QL STRIP: NEGATIVE
PH UR STRIP: 6 [PH] (ref 5–8)
PROT UR QL STRIP: NEGATIVE
PROT UR-MCNC: <7 MG/DL (ref 0–15)
PROT/CREAT UR: NORMAL MG/G{CREAT} (ref 0–0.2)
SP GR UR STRIP: 1.01 (ref 1–1.03)
URN SPEC COLLECT METH UR: NORMAL

## 2019-09-13 PROCEDURE — 81003 URINALYSIS AUTO W/O SCOPE: CPT

## 2019-09-13 PROCEDURE — 82570 ASSAY OF URINE CREATININE: CPT

## 2019-09-13 NOTE — TELEPHONE ENCOUNTER
Called and relayed message to pt.  Pt states she is extremely happy of the results and greatly appreciates the call.

## 2019-09-13 NOTE — TELEPHONE ENCOUNTER
----- Message from Alok Dykes MD sent at 9/12/2019  5:57 PM CDT -----  Mariangel please tell Aftab that her colonoscopy pathology was benign and no inflammatory bowel disease in no microscopic colitis.    SPECIMEN  1) Terminal ileum, rule out IBD.  2) Random colon biopsies, rule out microscopic colitis.  FINAL PATHOLOGIC DIAGNOSIS  1. Terminal ileum, biopsy:  Small bowel mucosa without significant pathologic changes.  Negative for enteritis, granulomas, dysplasia or malignancy.  2. Random colon, biopsy:  Colonic mucosa without significant pathologic changes.  Negative for colitis, granulomas, dysplasia or malignancy.  Diagnosed by: Johanna Schneider M.D.

## 2019-09-16 ENCOUNTER — OFFICE VISIT (OUTPATIENT)
Dept: RHEUMATOLOGY | Facility: CLINIC | Age: 64
End: 2019-09-16
Payer: MEDICARE

## 2019-09-16 VITALS
SYSTOLIC BLOOD PRESSURE: 136 MMHG | WEIGHT: 229.31 LBS | HEART RATE: 67 BPM | BODY MASS INDEX: 39.15 KG/M2 | DIASTOLIC BLOOD PRESSURE: 79 MMHG | HEIGHT: 64 IN

## 2019-09-16 DIAGNOSIS — H20.9 UVEITIS: ICD-10-CM

## 2019-09-16 DIAGNOSIS — H46.9 OPTIC NEURITIS: ICD-10-CM

## 2019-09-16 DIAGNOSIS — I10 ESSENTIAL HYPERTENSION: ICD-10-CM

## 2019-09-16 DIAGNOSIS — R91.8 LUNG NODULES: Primary | ICD-10-CM

## 2019-09-16 DIAGNOSIS — M31.6 GCA (GIANT CELL ARTERITIS): ICD-10-CM

## 2019-09-16 DIAGNOSIS — H54.10 BLINDNESS AND LOW VISION: ICD-10-CM

## 2019-09-16 DIAGNOSIS — Z86.010 HX OF COLONIC POLYPS: ICD-10-CM

## 2019-09-16 DIAGNOSIS — H40.1131 PRIMARY OPEN ANGLE GLAUCOMA OF BOTH EYES, MILD STAGE: ICD-10-CM

## 2019-09-16 DIAGNOSIS — E78.5 DYSLIPIDEMIA: ICD-10-CM

## 2019-09-16 DIAGNOSIS — M31.6 TEMPORAL ARTERITIS: ICD-10-CM

## 2019-09-16 DIAGNOSIS — E66.01 MORBID OBESITY WITH BMI OF 40.0-44.9, ADULT: ICD-10-CM

## 2019-09-16 DIAGNOSIS — R10.13 EPIGASTRIC ABDOMINAL PAIN: ICD-10-CM

## 2019-09-16 DIAGNOSIS — K76.0 FATTY LIVER: ICD-10-CM

## 2019-09-16 DIAGNOSIS — M31.5 GIANT CELL ARTERITIS WITH POLYMYALGIA RHEUMATICA: ICD-10-CM

## 2019-09-16 DIAGNOSIS — R16.0 LIVER MASS: ICD-10-CM

## 2019-09-16 DIAGNOSIS — E55.9 VITAMIN D DEFICIENCY: ICD-10-CM

## 2019-09-16 DIAGNOSIS — H54.7 VISUAL LOSS: ICD-10-CM

## 2019-09-16 PROCEDURE — 99214 PR OFFICE/OUTPT VISIT, EST, LEVL IV, 30-39 MIN: ICD-10-PCS | Mod: S$PBB,,, | Performed by: INTERNAL MEDICINE

## 2019-09-16 PROCEDURE — 99999 PR PBB SHADOW E&M-EST. PATIENT-LVL IV: ICD-10-PCS | Mod: PBBFAC,,, | Performed by: INTERNAL MEDICINE

## 2019-09-16 PROCEDURE — 99214 OFFICE O/P EST MOD 30 MIN: CPT | Mod: PBBFAC | Performed by: INTERNAL MEDICINE

## 2019-09-16 PROCEDURE — 99999 PR PBB SHADOW E&M-EST. PATIENT-LVL IV: CPT | Mod: PBBFAC,,, | Performed by: INTERNAL MEDICINE

## 2019-09-16 PROCEDURE — 99214 OFFICE O/P EST MOD 30 MIN: CPT | Mod: S$PBB,,, | Performed by: INTERNAL MEDICINE

## 2019-09-16 RX ORDER — CHOLECALCIFEROL (VITAMIN D3) 25 MCG
1000 TABLET ORAL DAILY
COMMUNITY
End: 2020-01-27

## 2019-09-16 RX ORDER — PRAVASTATIN SODIUM 40 MG/1
40 TABLET ORAL NIGHTLY
Qty: 90 TABLET | Refills: 3 | Status: SHIPPED | OUTPATIENT
Start: 2019-09-16 | End: 2020-11-23 | Stop reason: SDUPTHER

## 2019-09-16 RX ORDER — ERGOCALCIFEROL 1.25 MG/1
CAPSULE ORAL
Qty: 12 CAPSULE | Refills: 3 | Status: SHIPPED | OUTPATIENT
Start: 2019-09-16 | End: 2020-11-23 | Stop reason: SDUPTHER

## 2019-09-16 RX ORDER — ERGOCALCIFEROL 1.25 MG/1
CAPSULE ORAL
Qty: 12 CAPSULE | Refills: 3 | Status: SHIPPED | OUTPATIENT
Start: 2019-09-16 | End: 2019-09-16 | Stop reason: SDUPTHER

## 2019-09-16 ASSESSMENT — ROUTINE ASSESSMENT OF PATIENT INDEX DATA (RAPID3)
PSYCHOLOGICAL DISTRESS SCORE: 5.5
PATIENT GLOBAL ASSESSMENT SCORE: 4
MDHAQ FUNCTION SCORE: 1.5
TOTAL RAPID3 SCORE: 4.5
FATIGUE SCORE: 3
AM STIFFNESS SCORE: 1, YES
WHEN YOU AWAKENED IN THE MORNING OVER THE LAST WEEK, PLEASE INDICATE THE AMOUNT OF TIME IT TAKES UNTIL YOU ARE AS LIMBER AS YOU WILL BE FOR THE DAY: 1HR
PAIN SCORE: 4.5

## 2019-09-16 NOTE — PROGRESS NOTES
"Subjective:       Patient ID: Genesis Ugalde is a 63 y.o. female.    Chief Complaint: GCA  HPI tearful. States had "emotional crisis" 3-4 wks ago and stopped all meds, back on x 1 wk or so. Lost 15#. Headaches improved since wearing CPAP. Had episode of low back and leg pain after eating peanut butter. He states this has happened before. Is able to eat peanuts themselves without any problem.   Review of Systems   Constitutional: Negative for fever and unexpected weight change.   HENT: Negative for trouble swallowing.    Eyes: Negative for redness.   Respiratory: Negative for cough and shortness of breath.    Cardiovascular: Positive for chest pain.   Gastrointestinal: Negative for constipation and diarrhea.   Genitourinary: Negative for dysuria and genital sores.   Skin: Negative for rash.   Neurological: Negative for headaches.   Hematological: Does not bruise/bleed easily.         Objective:   /79   Pulse 67   Ht 5' 3.6" (1.615 m)   Wt 104 kg (229 lb 4.8 oz)   BMI 39.86 kg/m²      Physical Exam   Constitutional: She is oriented to person, place, and time and well-developed, well-nourished, and in no distress.   HENT:   Head: Normocephalic and atraumatic.   Mouth/Throat: Oropharynx is clear and moist.   Eyes: Conjunctivae and EOM are normal.   strabismus   Neck: Normal range of motion. Neck supple. No thyromegaly present.   Cardiovascular: Normal rate, regular rhythm, normal heart sounds and intact distal pulses.  Exam reveals no gallop and no friction rub.    No murmur heard.  Pulmonary/Chest: Breath sounds normal. She has no wheezes. She has no rales. She exhibits no tenderness.   Abdominal: Soft. She exhibits no distension and no mass. There is no tenderness.       Right Side Rheumatological Exam     The patient is tender to palpation of the knee and 1st CMC    The patient has an enlarged 1st CMC    Knee Exam     Range of Motion   The patient has normal right knee ROM.  Patellofemoral Crepitus: " positive  Effusion: negative  Warmth: negative    Left Side Rheumatological Exam     The patient is tender to palpation of the knee and 1st CMC.    The patient has an enlarged 1st CMC.    Knee Exam     Patellofemoral Crepitus: positive  Effusion: negative  Warmth: negative      Lymphadenopathy:     She has no cervical adenopathy.   Neurological: She is alert and oriented to person, place, and time. She displays normal reflexes. Gait normal.   Nl motor strength UE and LE bilateral   Musculoskeletal: She exhibits no edema.         Results for EGANN ARTHUR (MRN [id number]) as of 9/16/2019 09:23   Ref. Range 9/13/2019 08:17   WBC Latest Ref Range: 3.90 - 12.70 K/uL 8.86   RBC Latest Ref Range: 4.00 - 5.40 M/uL 4.12   Hemoglobin Latest Ref Range: 12.0 - 16.0 g/dL 12.6   Hematocrit Latest Ref Range: 37.0 - 48.5 % 40.1   MCV Latest Ref Range: 82 - 98 fL 97   MCH Latest Ref Range: 27.0 - 31.0 pg 30.6   MCHC Latest Ref Range: 32.0 - 36.0 g/dL 31.4 (L)   RDW Latest Ref Range: 11.5 - 14.5 % 13.0   Platelets Latest Ref Range: 150 - 350 K/uL 236   MPV Latest Ref Range: 9.2 - 12.9 fL 11.6   Gran% Latest Ref Range: 38.0 - 73.0 % 56.9   Gran # (ANC) Latest Ref Range: 1.8 - 7.7 K/uL 5.0   Lymph% Latest Ref Range: 18.0 - 48.0 % 30.2   Lymph # Latest Ref Range: 1.0 - 4.8 K/uL 2.7   Mono% Latest Ref Range: 4.0 - 15.0 % 6.9   Mono # Latest Ref Range: 0.3 - 1.0 K/uL 0.6   Eosinophil% Latest Ref Range: 0.0 - 8.0 % 5.2   Eos # Latest Ref Range: 0.0 - 0.5 K/uL 0.5   Basophil% Latest Ref Range: 0.0 - 1.9 % 0.5   Baso # Latest Ref Range: 0.00 - 0.20 K/uL 0.04   nRBC Latest Ref Range: 0 /100 WBC 0   Differential Method Unknown Automated   Immature Grans (Abs) Latest Ref Range: 0.00 - 0.04 K/uL 0.03   Immature Granulocytes Latest Ref Range: 0.0 - 0.5 % 0.3   Sed Rate Latest Ref Range: 0 - 36 mm/Hr <2   Sodium Latest Ref Range: 136 - 145 mmol/L 143   Potassium Latest Ref Range: 3.5 - 5.1 mmol/L 4.1   Chloride Latest Ref Range: 95 - 110 mmol/L  109   CO2 Latest Ref Range: 23 - 29 mmol/L 26   Anion Gap Latest Ref Range: 8 - 16 mmol/L 8   BUN, Bld Latest Ref Range: 8 - 23 mg/dL 8   Creatinine Latest Ref Range: 0.5 - 1.4 mg/dL 0.7   eGFR if non African American Latest Ref Range: >60 mL/min/1.73 m^2 >60.0   eGFR if African American Latest Ref Range: >60 mL/min/1.73 m^2 >60.0   Glucose Latest Ref Range: 70 - 110 mg/dL 95   Calcium Latest Ref Range: 8.7 - 10.5 mg/dL 9.8   Alkaline Phosphatase Latest Ref Range: 55 - 135 U/L 62   PROTEIN TOTAL Latest Ref Range: 6.0 - 8.4 g/dL 6.4   Albumin Latest Ref Range: 3.5 - 5.2 g/dL 4.1   BILIRUBIN TOTAL Latest Ref Range: 0.1 - 1.0 mg/dL 2.1 (H)   AST Latest Ref Range: 10 - 40 U/L 32   ALT Latest Ref Range: 10 - 44 U/L 42   CRP Latest Ref Range: 0.0 - 8.2 mg/L 0.5   ANCA Proteinase 3 Latest Ref Range: <0.4 (Negative) U <0.2   Specimen UA Unknown Urine, Clean Catch   Color, UA Latest Ref Range: Yellow, Straw, Lisa  Yellow   Appearance, UA Latest Ref Range: Clear  Clear   Specific [place], UA Latest Ref Range: 1.005 - 1.030  1.010   pH, UA Latest Ref Range: 5.0 - 8.0  6.0   Protein, UA Latest Ref Range: Negative  Negative   Glucose, UA Latest Ref Range: Negative  Negative   Ketones, UA Latest Ref Range: Negative  Negative   Occult Blood UA Latest Ref Range: Negative  Negative   NITRITE UA Latest Ref Range: Negative  Negative   Bilirubin (UA) Latest Ref Range: Negative  Negative   Leukocytes, UA Latest Ref Range: Negative  Negative   Prot/Creat Ratio, Ur Latest Ref Range: 0.00 - 0.20  Unable to calculate   Protein, Urine Random Latest Ref Range: 0 - 15 mg/dL <7   Creatinine, Random Ur Latest Ref Range: 15.0 - 325.0 mg/dL 85.0     Assessment/Plan         Lung nodules  -     CT Chest Without Contrast; Future; Expected date: 09/16/2019    Giant cell arteritis with polymyalgia rheumatica  -     Discontinue: ergocalciferol (VITAMIN D2) 50,000 unit Cap; TAKE 1 CAPSULE (50,000 UNITS TOTAL) BY MOUTH EVERY 7 DAYS.  Dispense: 12  capsule; Refill: 3  -     pravastatin (PRAVACHOL) 40 MG tablet; Take 1 tablet (40 mg total) by mouth every evening.  Dispense: 90 tablet; Refill: 3  -     ergocalciferol (VITAMIN D2) 50,000 unit Cap; TAKE 1 CAPSULE (50,000 UNITS TOTAL) BY MOUTH EVERY 7 DAYS.  Dispense: 12 capsule; Refill: 3  -     CT Chest Without Contrast; Future; Expected date: 09/16/2019    Essential hypertension    Dyslipidemia    Morbid obesity with BMI of 40.0-44.9, adult  -     Discontinue: ergocalciferol (VITAMIN D2) 50,000 unit Cap; TAKE 1 CAPSULE (50,000 UNITS TOTAL) BY MOUTH EVERY 7 DAYS.  Dispense: 12 capsule; Refill: 3  -     ergocalciferol (VITAMIN D2) 50,000 unit Cap; TAKE 1 CAPSULE (50,000 UNITS TOTAL) BY MOUTH EVERY 7 DAYS.  Dispense: 12 capsule; Refill: 3    Liver mass    Fatty liver  -     Discontinue: ergocalciferol (VITAMIN D2) 50,000 unit Cap; TAKE 1 CAPSULE (50,000 UNITS TOTAL) BY MOUTH EVERY 7 DAYS.  Dispense: 12 capsule; Refill: 3  -     ergocalciferol (VITAMIN D2) 50,000 unit Cap; TAKE 1 CAPSULE (50,000 UNITS TOTAL) BY MOUTH EVERY 7 DAYS.  Dispense: 12 capsule; Refill: 3    Hx of colonic polyps    Temporal arteritis  -     tocilizumab (ACTEMRA) 162 mg/0.9 mL Syrg; INJECT ONE SYRINGE SUBCUTANEOUSLY EVERY 7 DAYS. KEEP REFRIGERATED.  Dispense: 12 Syringe; Refill: 1    GCA (giant cell arteritis)  -     tocilizumab (ACTEMRA) 162 mg/0.9 mL Syrg; INJECT ONE SYRINGE SUBCUTANEOUSLY EVERY 7 DAYS. KEEP REFRIGERATED.  Dispense: 12 Syringe; Refill: 1    Optic neuritis  -     Discontinue: ergocalciferol (VITAMIN D2) 50,000 unit Cap; TAKE 1 CAPSULE (50,000 UNITS TOTAL) BY MOUTH EVERY 7 DAYS.  Dispense: 12 capsule; Refill: 3  -     ergocalciferol (VITAMIN D2) 50,000 unit Cap; TAKE 1 CAPSULE (50,000 UNITS TOTAL) BY MOUTH EVERY 7 DAYS.  Dispense: 12 capsule; Refill: 3    Visual loss  -     Discontinue: ergocalciferol (VITAMIN D2) 50,000 unit Cap; TAKE 1 CAPSULE (50,000 UNITS TOTAL) BY MOUTH EVERY 7 DAYS.  Dispense: 12 capsule; Refill: 3  -      ergocalciferol (VITAMIN D2) 50,000 unit Cap; TAKE 1 CAPSULE (50,000 UNITS TOTAL) BY MOUTH EVERY 7 DAYS.  Dispense: 12 capsule; Refill: 3    Uveitis  -     Discontinue: ergocalciferol (VITAMIN D2) 50,000 unit Cap; TAKE 1 CAPSULE (50,000 UNITS TOTAL) BY MOUTH EVERY 7 DAYS.  Dispense: 12 capsule; Refill: 3  -     ergocalciferol (VITAMIN D2) 50,000 unit Cap; TAKE 1 CAPSULE (50,000 UNITS TOTAL) BY MOUTH EVERY 7 DAYS.  Dispense: 12 capsule; Refill: 3    Vitamin D deficiency  -     Discontinue: ergocalciferol (VITAMIN D2) 50,000 unit Cap; TAKE 1 CAPSULE (50,000 UNITS TOTAL) BY MOUTH EVERY 7 DAYS.  Dispense: 12 capsule; Refill: 3  -     ergocalciferol (VITAMIN D2) 50,000 unit Cap; TAKE 1 CAPSULE (50,000 UNITS TOTAL) BY MOUTH EVERY 7 DAYS.  Dispense: 12 capsule; Refill: 3    Blindness and low vision  -     Discontinue: ergocalciferol (VITAMIN D2) 50,000 unit Cap; TAKE 1 CAPSULE (50,000 UNITS TOTAL) BY MOUTH EVERY 7 DAYS.  Dispense: 12 capsule; Refill: 3  -     ergocalciferol (VITAMIN D2) 50,000 unit Cap; TAKE 1 CAPSULE (50,000 UNITS TOTAL) BY MOUTH EVERY 7 DAYS.  Dispense: 12 capsule; Refill: 3    Epigastric abdominal pain  -     Discontinue: ergocalciferol (VITAMIN D2) 50,000 unit Cap; TAKE 1 CAPSULE (50,000 UNITS TOTAL) BY MOUTH EVERY 7 DAYS.  Dispense: 12 capsule; Refill: 3  -     ergocalciferol (VITAMIN D2) 50,000 unit Cap; TAKE 1 CAPSULE (50,000 UNITS TOTAL) BY MOUTH EVERY 7 DAYS.  Dispense: 12 capsule; Refill: 3    Primary open angle glaucoma of both eyes, mild stage    Other orders  -     Influenza - High Dose (65+) (PF) (IM)       Problem List Items Addressed This Visit     Giant cell arteritis with polymyalgia rheumatica    Overview          The MRA of the chest is normal, no evidence of large vessel vasculitis.RJQ          PACS Images     Show images for MRA Chest   Reviewed By Tonya Waldrop MD on 3/20/2018 14:21   Patient Result Comments     Viewed by Genesis Ugalde V on 3/20/2018  5:33 PM   Written  by Forrest Waldrop MD on 3/20/2018  2:20 PM   The MRA of the chest is normal, no evidence of large vessel vasculitis.[facility]   External Result Report     External Result Report   Narrative     EXAMINATION:  MRA CHEST    CLINICAL HISTORY:  Aortic dz, non-traumatic, known or suspectPersonal history of other diseases of the circulatory system    TECHNIQUE:  Multiplanar, multisequence imaging was performed through the chest to evaluate the aorta.  This exam is performed prior to and following the administration of 14 mL of intravenous Gadavist.    COMPARISON:  CT chest 02/19/2016    FINDINGS:  The thoracic aorta is normal in caliber, contour, and course.  There are 3 branch vessels from the aortic arch.  No evidence of stenosis or aneurysm.  No evidence of abnormal wall thickening or surrounding inflammatory change.    The visualized lungs are without significant abnormality.  The heart is within normal limits.    The visualized upper abdominal structures demonstrate hepatomegaly.    The osseous structures are without evidence for acute fracture.  Subcutaneous tissues are unremarkable.   Impression       Unremarkable MRA chest exam.    Electronically signed by resident: Jeannie Hdz  Date: 03/20/2018  Time: 10:36    Electronically signed by: Herrera Rodriguez MD  Date: 03/20/2018  Time: 11:16    Encounter     View Encounter          Signed by Resident   The CTA head and neck shows no evidence of active vasculitis. There is moderate atherosclerotic disease right carotid. Continue aspirin 325mg daily and pravastatin. There is incidentally noted thryoid nodule and some changes in the lung bases.Will need thyroid ultrasound and CT chest.Cristina will schedule. [facility]          PACS Images      Show images for CTA Head and Neck (xpd)   Reviewed By Tonya Miller MD on 6/28/2018 21:58   Result Notes for CTA Head and Neck (xpd)     Notes recorded by Forrest Waldrop MD on 6/30/2018 at 9:33 PM CDT  The CTA head and  neck shows no evidence of active vasculitis. There is moderate atherosclerotic disease right carotid. Continue aspirin 325mg daily and pravastatin. There is incidentally noted thryoid nodule and some changes in the lung bases.Will need thyroid ultrasound and CT chest.Cristina will schedule. RJQ   CTA Head and Neck (xpd)   Order: [id number]   Status:  Final result   Visible to patient:  Yes (Patient Portal)   Next appt:  10/11/2018 at 07:30 PM in Sleep Medicine (LAB, SLEEP [facility])   Dx:  Vasculitis, CNS; Giant cell arteritis...   Details     Reading Physician Reading Date Result Priority   Diomedes Morris, DO 6/28/2018       Narrative     EXAMINATION:  CTA HEAD AND NECK (XPD)    CLINICAL HISTORY:  Acute visual deficit;Vasculitis, CNS;h/o giant cell arteritis, r/o cervical vessel involvement;Arteritis, unspecified    TECHNIQUE:  5 mm axial images of the head pre and post contrast with 0.625 mm axial CTA images of the head neck postcontrast.  Coronal and sagittal MPR and MIP imaging was performed 100 ml of Omnipaque 350 contrast was injected intravenously    COMPARISON:  CT head 03/27/2016    FINDINGS:  CT head with and  without contrast: There is no evidence for acute intracranial hemorrhage or sulcal effacement.  The ventricles are normal in size and configuration without evidence for hydrocephalus.  There is no midline shift or mass effect.  Allowing for CT technique there is no abnormal parenchymal enhancement.  The visualized paranasal sinuses and mastoid air cells are clear.    CTA head:    Anterior circulation: The bilateral distal cervical, petrous, cavernous, and supraclinoid segments of the ICAs are patent without significant focal stenosis or aneurysm.    The anterior middle cerebral arteries are patent without focal stenosis or aneurysm.    Posterior circulation: The distal vertebral arteries, basilar artery and posterior cerebral arteries are patent without focal stenosis or aneurysm.    CTA neck: Origin the  right brachiocephalic, left common carotid and left subclavian arteries from the arch are within normal limits..    The right vertebral artery origin and proximal course is obscured by venous contamination.  The left vertebral artery origin is within normal limits.  Remaining vertebral arteries are patent throughout their course without focal stenosis...    Right carotid: The right common carotid artery, carotid bifurcation and extracranial portions of the internal carotid artery are patent there is atherosclerotic plaquing in the right carotid bifurcation and proximal ICA with less than 50% proximal ICA stenosis by NASCET criteria..    Left carotid: The left common carotid artery, carotid bifurcation and extracranial portions of the internal carotid arteries are patent without significant focal stenosis.    There is medialization the carotid arteries in the retropharyngeal soft tissues slightly greater on the right.    Pharynx/larynx: Evaluation limited by scatter artifact from dental metal and motion allowing for limitation the nasopharynx, oropharynx, hypopharynx larynx and proximal trachea are within normal limits allowing for motion limitation    Oral cavity    Glands: Bilateral parotid and submandibular glands are within normal limits. Subcentimeter hypodense nodule medial aspect of the mid right thyroid measuring 5 mm overall indeterminate.    No evidence for adenopathy throughout the neck by size criteria.    There is grade 1 anterolisthesis of C3 on C4 and C4 on C5.  There is no evidence for acute fracture cervical spine..  Ill-defined ground-glass patchy opacification in the visualized lungs which may represent in part atelectasis underlying small airway disease to be considered.      Impression       CTA head: Unremarkable CTA of the head specifically without evidence for proximal significant stenosis or occlusion.    CTA neck: Atherosclerotic plaquing right carotid bifurcation and proximal ICA with less  than 50% proximal ICA stenosis by NASCET criteria..    CT head: No evidence for acute intracranial hemorrhage or definite abnormal parenchymal enhancement.    Please see above for additional details.      Electronically signed by: Diomedes DO Arturo  Date: 06/28/2018  Time: 09:33             Last Resulted: 06/28/18 09:33 Order Details View Encounter Lab and Collection Details Routing Result History            Patient Result Comments     Viewed by Genesis Ugalde on 7/1/2018  7:47 PM   Written by Forrest Waldrop MD on 6/30/2018  9:33 PM   The CTA head and neck shows no evidence of active vasculitis. There is moderate atherosclerotic disease right carotid. Continue aspirin 325mg daily and pravastatin. There is incidentally noted thryoid nodule and some changes in the lung bases.Will need thyroid ultrasound and CT chest.Cristina will schedule. RJQ   Notes recorded by Forrest Waldrop MD on 6/21/2018 at 2:52 PM CDT  The temporal artery ultrasound is normal RJQ          PACS Images      Show images for US Temporal Artery Bilateral   Reviewed By Tonya Waldrop MD on 6/21/2018 14:52   Result Notes for US Temporal Artery Bilateral     Notes recorded by Forrest Waldrop MD on 6/21/2018 at 2:52 PM CDT  The temporal artery ultrasound is normal RJQ   US Temporal Artery Bilateral   Order: [id number]   Status:  Final result   Visible to patient:  Yes (Patient Portal)   Next appt:  10/11/2018 at 07:30 PM in Sleep Medicine (LAB, SLEEP [facility])   Dx:  Giant cell arteritis with polymyalgia...   Details     Reading Physician Reading Date Result Priority   Herrera Rodriguez MD 6/21/2018    Steven Rodrigues MD 6/21/2018       Narrative     EXAMINATION:  US TEMPORAL ARTERY BILATERAL    CLINICAL HISTORY:  Encounter for follow-up examination after completed treatment for conditions other than malignant neoplasm    TECHNIQUE:  Limited grayscale and color flow ultrasound evaluation of the bilateral temporal  arteries.    COMPARISON:  None    FINDINGS:  Bilateral temporal arteries are normal in diameter without focal stenosis or dilatation.  No hypoechoic wall thickening (halo sign).      Impression       Normal temporal arteries without evidence to suggest temporal arteritis.    Electronically signed by resident: Steven Rordigues  Date: 06/21/2018  Time: 14:27    Electronically signed by: Herrera Rodriguez MD  Date: 06/21/2018  Time: 14:37              The MRA of the chest is normal, no evidence of large vessel vasculitis.[facility]          PACS Images      Show images for MRA Chest   Reviewed By List     Forrest Waldrop MD on 3/20/2018 14:21   Result Notes for MRA Chest     Notes recorded by Forrest Waldrop MD on 3/20/2018 at 2:20 PM CDT  The MRA of the chest is normal, no evidence of large vessel vasculitis.[facility]   MRA Chest   Order: [id number]   Status:  Final result   Visible to patient:  Yes (Patient Portal)   Next appt:  10/11/2018 at 07:30 PM in Sleep Medicine (LAB, SLEEP [facility])   Dx:  Aortic disease; Personal history of o...   Details     Reading Physician Reading Date Result Priority   Jeannie Hdz MD 3/20/2018    Herrera Rodriguez MD 3/20/2018       Narrative     EXAMINATION:  MRA CHEST    CLINICAL HISTORY:  Aortic dz, non-traumatic, known or suspectPersonal history of other diseases of the circulatory system    TECHNIQUE:  Multiplanar, multisequence imaging was performed through the chest to evaluate the aorta.  This exam is performed prior to and following the administration of 14 mL of intravenous Gadavist.    COMPARISON:  CT chest 02/19/2016    FINDINGS:  The thoracic aorta is normal in caliber, contour, and course.  There are 3 branch vessels from the aortic arch.  No evidence of stenosis or aneurysm.  No evidence of abnormal wall thickening or surrounding inflammatory change.    The visualized lungs are without significant abnormality.  The heart is within normal limits.    The visualized upper abdominal  structures demonstrate hepatomegaly.    The osseous structures are without evidence for acute fracture.  Subcutaneous tissues are unremarkable.      Impression       Unremarkable MRA chest exam.    Electronically signed by resident: Jeannie Hdz  Date: 03/20/2018  Time: 10:36    Electronically signed by: Herrera Rodriguez MD  Date: 03/20/2018  Time: 11:16                 Reading Physician Reading Date Result Priority   Delvin Martinez MD 3/7/2019       Narrative     EXAMINATION:  MRI BRAIN W WO CONTRAST    CLINICAL HISTORY:  Headache, temporal arteritis suspected;superficial vessel imaging, Dr. Aggarwal protocol;Headache.  Normal ESR and CRP.    TECHNIQUE:  3D time-of-flight noncontrast MRA of the intracranial vasculature.  Contrast enhanced MRA of the upper cervical and cranial vasculature.  MPR and MIP reconstructions.  10 min following the administration of 10 mL of Gadavist intravenous contrast, vessel wall imaging of the superficial cranial arteries performed via high-resolution thin section T1 fat sat images.    COMPARISON:  No direct priors.  Correlation is made with a temporal artery ultrasound dated 02/22/2019.    FINDINGS:  The visualized internal carotid arteries are normal in course and caliber.  The vertebrobasilar system is unremarkable.  The ACAs, MCAs and PCAs appear within normal limits.  No high-grade stenosis, major branch occlusion, or intracranial aneurysm identified.    The frontal and parietal branches of the superficial temporal artery and the occipital arteries assessed bilaterally.  Vessel wall imaging demonstrates no  significant mural thickening or enhancement to specifically indicate active vascular inflammation.      Impression       No compelling MR evidence of vascular inflammation involving the superficial cranial arteries.      Electronically signed by: Delvin Martinez MD  Date: 03/07/2019  Time: 14:08     Notes recorded by Forrest Waldrop MD on 2/22/2019 at 5:04 PM CST  The  temporal artery ultrasound is also negative. [facility]          PACS Images for [email or web address] Viewer      Show images for US Temporal Artery Bilateral   PACS Images for [employer] Viewer (Includes [facility] and [place] Images)      Show images for US Temporal Artery Bilateral   All Reviewers List     Forrest Waldrop MD on 2/22/2019 17:09   Result Notes for US Temporal Artery Bilateral     Notes recorded by Forrest Waldrop MD on 2/22/2019 at 5:09 PM CST  The temporal artery ultrasound is negative. Would also suggest new MRI brain technique to visualize the temporal arteries. Cristina will schedule. RJQ  ------    Notes recorded by Forrest Waldrop MD on 2/22/2019 at 5:04 PM CST  The temporal artery ultrasound is also negative. [facility]   US Temporal Artery Bilateral   Order: [id number]   Status:  Final result   Visible to patient:  Yes (Patient Portal) Next appt:  07/05/2019 at 10:30 AM in Gastroenterology (Alok Dykes MD) Dx:  GCA (giant cell arteritis); Transient...   Details     Reading Physician Reading Date Result Priority   Herrera Rodriguez MD 2/22/2019    Larissa Collins MD 2/22/2019       Narrative     EXAMINATION:  US TEMPORAL ARTERY BILATERAL    CLINICAL HISTORY:  Transient visual loss, bilateral    TECHNIQUE:  Grayscale and Doppler were used to evaluate the temporal arteries for temporal arteritis.    COMPARISON:  None    FINDINGS:  No ultrasound evidence of temporal arteries wall thickening to suggest temporal arteritis.  The temporal arteries are patent.      Impression       No ultrasound evidence of temporal arteritis.    Electronically signed by resident: Larissa Collins  Date: 02/22/2019  Time: 16:43    Electronically signed by: Herrera Rodriguez MD  Date: 02/22/2019  Time: 16:48             Last Resulted: 02/22/19 16:48 Order Details View Encounter Lab and Collection Details Routing Result History - Result Edited            Patient Result Comments     Viewed by Genesis Ugalde  on 2/22/2019  8:00 PM   Written by Forrest Waldrop MD on 2/22/2019  5:09 PM   The temporal artery ultrasound is also negative. RJ   The temporal artery ultrasound is negative. Would also suggest new MRI brain technique to visualize the temporal arteries. Cristina will schedule. RJQ   External Result Report     External Result Report   Narrative     EXAMINATION:  US TEMPORAL ARTERY BILATERAL    CLINICAL HISTORY:  Transient visual loss, bilateral    TECHNIQUE:  Grayscale and Doppler were used to evaluate the temporal arteries for temporal arteritis.    COMPARISON:  None    FINDINGS:  No ultrasound evidence of temporal arteries wall thickening to suggest temporal arteritis.  The temporal arteries are patent.   Impression       No ultrasound evidence of temporal arteritis.    Electronically signed by resident: Larissa Collins  Date: 02/22/2019  Time: 16:43    Electronically signed by: Herrera Rodriguez MD  Date: 02/22/2019  Time: 16:48    Encounter             The MRA of the chest shows no evidence of large vessel vasculitis as might be seen with giant cell/temporal arteritis. The hyperintense area in the liver is unchanged from 2016 and you will be seeing Dr. Blackwell in Hepatology to review as well in April. RJQ          PACS Images for [place] Viewer      Show images for MRA Chest   PACS Images for [facility] [place] Viewer (Includes [facility] and [facility] Images)      Show images for MRA Chest   All Reviewers List     Forrest Waldrop MD on 2/22/2019 17:03   Result Notes for MRA Chest     Notes recorded by Forrest Waldrop MD on 2/22/2019 at 5:03 PM CST  The MRA of the chest shows no evidence of large vessel vasculitis as might be seen with giant cell/temporal arteritis. The hyperintense area in the liver is unchanged from 2016 and you will be seeing Dr. Blackwell in Hepatology to review as well in April. RJQ   MRA Chest   Order: [id number]   Status:  Final result   Visible to patient:  Yes  (Patient Portal) Next appt:  07/05/2019 at 10:30 AM in Gastroenterology (Alok Dykes MD) Dx:  GCA (giant cell arteritis); Aortic di...   Details     Reading Physician Reading Date Result Priority   Marco Thornton MD 2/22/2019       Narrative     EXAMINATION:  MRA CHEST    CLINICAL HISTORY:  Aortic dz, non-traumatic, known or suspect;Encounter for follow-up examination after completed treatment for conditions other than malignant neoplasm    TECHNIQUE:  Multiplanar, multisequence imaging was performed through the chest to evaluate the aorta.  This exam is performed prior to and following the administration of 10 mL of intravenous gadobutrol.    COMPARISON:  MRA chest 03/20/2018.  MRI abdomen 04/18/2016.    FINDINGS:  There is a left-sided aortic arch with 3 branch vessels.  The thoracic aorta tapers normally without atherosclerotic calcification.  No dissection.    Heart is normal in size.  Pulmonary arteries distribute normally.    No axillary or mediastinal lymph node enlargement.  Hilar contours are unremarkable.    Esophagus is normal in caliber and course.    Visualized lung parenchyma demonstrates no gross abnormalities noting limited evaluation due to technique.    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  Subcentimeter left renal cysts identified.  Remaining visualized upper abdominal structures demonstrate no significant abnormalities.    Subcutaneous soft tissues are within normal limits.  There is fatty atrophy of the right rectus abdominus muscle.    No marrow signal abnormality to suggest an infiltrative process.      Impression       1. No aortic dissection.  2. 1.4 cm hyperenhancing lesion within the posterior right hepatic lobe, unchanged when compared to MRI dated 04/18/2016 and most consistent with a flash filling  hemangioma.      Electronically signed by: Marco Thornton MD  Date: 02/22/2019  Time: 16:49                         Current Assessment & Plan     ESR <2 CRP 0.5      Resumed tocilizumab 162mg sc weekly  RTC 3 months with standing labs         Relevant Medications    pravastatin (PRAVACHOL) 40 MG tablet    ergocalciferol (VITAMIN D2) 50,000 unit Cap    Other Relevant Orders    CT Chest Without Contrast    Morbid obesity with BMI of 40.0-44.9, adult    Current Assessment & Plan     Lost 15#         Relevant Medications    ergocalciferol (VITAMIN D2) 50,000 unit Cap    Blindness and low vision    Current Assessment & Plan     F/u Dr. Ro         Relevant Medications    ergocalciferol (VITAMIN D2) 50,000 unit Cap    Liver mass    Current Assessment & Plan     F/u Dr. Blackwell in Hepatology         Hypertension    Current Assessment & Plan     136/75         Visual loss    Relevant Medications    ergocalciferol (VITAMIN D2) 50,000 unit Cap    Optic neuritis    Relevant Medications    ergocalciferol (VITAMIN D2) 50,000 unit Cap    Uveitis    Relevant Medications    ergocalciferol (VITAMIN D2) 50,000 unit Cap    Vitamin D deficiency    Overview     Results for EGBI, ANN V (MRN [id number]) as of 6/19/2018 08:18   Ref. Range 3/2/2018 09:12   Vit D, 25-Hydroxy Latest Ref Range: 30 - 96 ng/mL 25 (L)            Relevant Medications    ergocalciferol (VITAMIN D2) 50,000 unit Cap    Hx of colonic polyps    Overview     Colonoscopy 9/6/19    Impression:           - The examined portion of the ileum was normal.                         Biopsied.                        - Diverticulosis in the recto-sigmoid colon, in the                         sigmoid colon, in the descending colon, in the                         transverse colon and in the ascending colon.                        - Non-bleeding internal hemorrhoids.                        - The entire examined colon is normal. Biopsied.  Recommendation:       - Discharge  patient to home.                        - Repeat colonoscopy in 5 years for surveillance.         Fatty liver    Overview     Elroy Reddy MD 8/8/2018    Cooper Jansen MD 8/8/2018       Narrative     EXAMINATION:  MRI ABDOMEN W WO CONTRAST    CLINICAL HISTORY:  Liver lesion, >1cm, normal liver, no known malignancy;  Liver disease, unspecified    TECHNIQUE:  Multiplanar, multi-sequence MR imaging of the abdomen was performed before and after administration of 10 cc of Gadavist gadolinium-based intravenous contrast per the liver protocol.    COMPARISON:  MRI abdomen with and without contrast dated 04/18/2016    FINDINGS:  Pulmonary Bases: No pleural effusion.  7 mm right lower lobe nodule (series 3, image 16).    Liver: The liver is grossly steatotic appears normal morphology.  There is a stable 1.3 cm hyperenhancing focus within the posterior right hepatic segment VI (series 11, image 39).  This focus demonstrates intrinsic T2 hyperintensity and demonstrates persistent enhancement on subsequent post-contrast sequences.  Hepatic vasculature is patent.    Bile Ducts: normal    Gallbladder: Surgically absent    Pancreas: normal.    Spleen: normal.    Proximal Gastrointestinal tract: normal.    Mesentery: normal    Adrenal Glands: normal.    Kidneys: normal.    Aorta and Abdominal Vasculature: normal.    Lymph nodes: no pathologically enlarged lymph nodes are seen.    Body wall: normal    Other: No free fluid.  No abnormal marrow enhancing lesion.      Impression       Findings suggestive of a small right hepatic lobe hemangioma, not significantly changed from the 04/18/2016 exam.  No new liver findings.    Hepatic steatosis.    Right basilar 7 mm nodule, better characterized on recent prior CT chest.    Electronically signed by resident: Cooper Jansen  Date: 08/08/2018  Time: 12:39    Electronically signed by: Elroy Reddy MD  Date: 08/08/2018  Time: 15:04              Current Assessment & Plan     F/u   [facility] in Hepatology         Relevant Medications    ergocalciferol (VITAMIN D2) 50,000 unit Cap    Epigastric abdominal pain    Relevant Medications    ergocalciferol (VITAMIN D2) 50,000 unit Cap    Dyslipidemia    Overview     Results for ANN WHITNEY (MRN [id number]) as of 9/16/2019 10:13   Ref. Range 6/3/2019 10:22   Cholesterol Latest Ref Range: 120 - 199 mg/dL 181   HDL Latest Ref Range: 40 - 75 mg/dL 53   Hdl/Cholesterol Ratio Latest Ref Range: 20.0 - 50.0 % 29.3   LDL Cholesterol External Latest Ref Range: 63.0 - 159.0 mg/dL 74.8   Non-HDL Cholesterol Latest Units: mg/dL 128   Total Cholesterol/HDL Ratio Latest Ref Range: 2.0 - 5.0  3.4   Triglycerides Latest Ref Range: 30 - 150 mg/dL 266 (H)              Other Visit Diagnoses     Lung nodules    -  Primary    Relevant Orders    CT Chest Without Contrast    Temporal arteritis        Relevant Medications    tocilizumab (ACTEMRA) 162 mg/0.9 mL Syrg    GCA (giant cell arteritis)        Relevant Medications    tocilizumab (ACTEMRA) 162 mg/0.9 mL Syrg    Primary open angle glaucoma of both eyes, mild stage

## 2019-09-17 ENCOUNTER — TELEPHONE (OUTPATIENT)
Dept: HEPATOLOGY | Facility: CLINIC | Age: 64
End: 2019-09-17

## 2019-09-17 NOTE — TELEPHONE ENCOUNTER
Patient scheduled appointment with Dr. Blackwell on 10/29/19 for a follow up. Patient did not want to see any other provider as Dr. Blackwell requested so she may be seen sooner, she wants to wait until Dr. Blackwell is available.

## 2019-10-09 ENCOUNTER — TELEPHONE (OUTPATIENT)
Dept: RHEUMATOLOGY | Facility: CLINIC | Age: 64
End: 2019-10-09

## 2019-10-09 DIAGNOSIS — N90.89 LESION OF LABIA: Primary | ICD-10-CM

## 2019-10-17 ENCOUNTER — TELEPHONE (OUTPATIENT)
Dept: HEPATOLOGY | Facility: CLINIC | Age: 64
End: 2019-10-17

## 2019-10-24 DIAGNOSIS — M31.6 GCA (GIANT CELL ARTERITIS): ICD-10-CM

## 2019-10-24 DIAGNOSIS — M31.6 TEMPORAL ARTERITIS: ICD-10-CM

## 2019-10-24 RX ORDER — TOCILIZUMAB 180 MG/ML
INJECTION, SOLUTION SUBCUTANEOUS
Qty: 12 SYRINGE | Refills: 0 | Status: SHIPPED | OUTPATIENT
Start: 2019-10-24 | End: 2020-01-24

## 2019-12-18 ENCOUNTER — PATIENT MESSAGE (OUTPATIENT)
Dept: OPHTHALMOLOGY | Facility: CLINIC | Age: 64
End: 2019-12-18

## 2019-12-18 DIAGNOSIS — H40.1131 PRIMARY OPEN ANGLE GLAUCOMA OF BOTH EYES, MILD STAGE: ICD-10-CM

## 2019-12-19 RX ORDER — DORZOLAMIDE HCL 20 MG/ML
1 SOLUTION/ DROPS OPHTHALMIC 3 TIMES DAILY
Qty: 10 ML | Refills: 12 | Status: SHIPPED | OUTPATIENT
Start: 2019-12-19 | End: 2020-09-21 | Stop reason: SDUPTHER

## 2020-01-23 ENCOUNTER — LAB VISIT (OUTPATIENT)
Dept: LAB | Facility: HOSPITAL | Age: 65
End: 2020-01-23
Attending: INTERNAL MEDICINE
Payer: MEDICARE

## 2020-01-23 DIAGNOSIS — M31.6 GCA (GIANT CELL ARTERITIS): ICD-10-CM

## 2020-01-23 DIAGNOSIS — M31.6 TEMPORAL ARTERITIS: ICD-10-CM

## 2020-01-23 LAB
ALBUMIN SERPL BCP-MCNC: 4.4 G/DL (ref 3.5–5.2)
ALP SERPL-CCNC: 76 U/L (ref 55–135)
ALT SERPL W/O P-5'-P-CCNC: 33 U/L (ref 10–44)
ANION GAP SERPL CALC-SCNC: 9 MMOL/L (ref 8–16)
AST SERPL-CCNC: 29 U/L (ref 10–40)
BASOPHILS # BLD AUTO: 0.02 K/UL (ref 0–0.2)
BASOPHILS NFR BLD: 0.3 % (ref 0–1.9)
BILIRUB SERPL-MCNC: 1.8 MG/DL (ref 0.1–1)
BUN SERPL-MCNC: 14 MG/DL (ref 8–23)
CALCIUM SERPL-MCNC: 10.1 MG/DL (ref 8.7–10.5)
CHLORIDE SERPL-SCNC: 111 MMOL/L (ref 95–110)
CO2 SERPL-SCNC: 26 MMOL/L (ref 23–29)
CREAT SERPL-MCNC: 0.8 MG/DL (ref 0.5–1.4)
CRP SERPL-MCNC: 0.4 MG/L (ref 0–8.2)
DIFFERENTIAL METHOD: NORMAL
EOSINOPHIL # BLD AUTO: 0.5 K/UL (ref 0–0.5)
EOSINOPHIL NFR BLD: 6.5 % (ref 0–8)
ERYTHROCYTE [DISTWIDTH] IN BLOOD BY AUTOMATED COUNT: 12.6 % (ref 11.5–14.5)
ERYTHROCYTE [SEDIMENTATION RATE] IN BLOOD BY WESTERGREN METHOD: <2 MM/HR (ref 0–36)
EST. GFR  (AFRICAN AMERICAN): >60 ML/MIN/1.73 M^2
EST. GFR  (NON AFRICAN AMERICAN): >60 ML/MIN/1.73 M^2
GLUCOSE SERPL-MCNC: 101 MG/DL (ref 70–110)
HCT VFR BLD AUTO: 41.2 % (ref 37–48.5)
HGB BLD-MCNC: 13.2 G/DL (ref 12–16)
IMM GRANULOCYTES # BLD AUTO: 0.03 K/UL (ref 0–0.04)
IMM GRANULOCYTES NFR BLD AUTO: 0.4 % (ref 0–0.5)
LYMPHOCYTES # BLD AUTO: 2.5 K/UL (ref 1–4.8)
LYMPHOCYTES NFR BLD: 32.8 % (ref 18–48)
MCH RBC QN AUTO: 30.7 PG (ref 27–31)
MCHC RBC AUTO-ENTMCNC: 32 G/DL (ref 32–36)
MCV RBC AUTO: 96 FL (ref 82–98)
MONOCYTES # BLD AUTO: 0.6 K/UL (ref 0.3–1)
MONOCYTES NFR BLD: 7.5 % (ref 4–15)
NEUTROPHILS # BLD AUTO: 4 K/UL (ref 1.8–7.7)
NEUTROPHILS NFR BLD: 52.5 % (ref 38–73)
NRBC BLD-RTO: 0 /100 WBC
PLATELET # BLD AUTO: 226 K/UL (ref 150–350)
PMV BLD AUTO: 11.6 FL (ref 9.2–12.9)
POTASSIUM SERPL-SCNC: 3.9 MMOL/L (ref 3.5–5.1)
PROT SERPL-MCNC: 7 G/DL (ref 6–8.4)
RBC # BLD AUTO: 4.3 M/UL (ref 4–5.4)
SODIUM SERPL-SCNC: 146 MMOL/L (ref 136–145)
WBC # BLD AUTO: 7.57 K/UL (ref 3.9–12.7)

## 2020-01-23 PROCEDURE — 86140 C-REACTIVE PROTEIN: CPT

## 2020-01-23 PROCEDURE — 80053 COMPREHEN METABOLIC PANEL: CPT

## 2020-01-23 PROCEDURE — 36415 COLL VENOUS BLD VENIPUNCTURE: CPT | Mod: PO

## 2020-01-23 PROCEDURE — 85025 COMPLETE CBC W/AUTO DIFF WBC: CPT

## 2020-01-23 PROCEDURE — 85652 RBC SED RATE AUTOMATED: CPT

## 2020-01-24 RX ORDER — TOCILIZUMAB 180 MG/ML
INJECTION, SOLUTION SUBCUTANEOUS
Qty: 12 SYRINGE | Refills: 0 | Status: SHIPPED | OUTPATIENT
Start: 2020-01-24 | End: 2020-03-02 | Stop reason: SDUPTHER

## 2020-01-27 ENCOUNTER — OFFICE VISIT (OUTPATIENT)
Dept: RHEUMATOLOGY | Facility: CLINIC | Age: 65
End: 2020-01-27
Payer: MEDICARE

## 2020-01-27 VITALS
BODY MASS INDEX: 38.02 KG/M2 | DIASTOLIC BLOOD PRESSURE: 78 MMHG | HEART RATE: 72 BPM | SYSTOLIC BLOOD PRESSURE: 124 MMHG | WEIGHT: 222.69 LBS | HEIGHT: 64 IN

## 2020-01-27 DIAGNOSIS — M67.911 DISORDER OF ROTATOR CUFF OF BOTH SHOULDERS: ICD-10-CM

## 2020-01-27 DIAGNOSIS — R07.89 OTHER CHEST PAIN: ICD-10-CM

## 2020-01-27 DIAGNOSIS — M31.6 GCA (GIANT CELL ARTERITIS): Primary | ICD-10-CM

## 2020-01-27 DIAGNOSIS — M54.9 CHRONIC NECK AND BACK PAIN: ICD-10-CM

## 2020-01-27 DIAGNOSIS — G89.29 CHRONIC NECK AND BACK PAIN: ICD-10-CM

## 2020-01-27 DIAGNOSIS — M54.2 CHRONIC NECK AND BACK PAIN: ICD-10-CM

## 2020-01-27 DIAGNOSIS — R16.0 LIVER MASS: ICD-10-CM

## 2020-01-27 DIAGNOSIS — K76.0 NAFLD (NONALCOHOLIC FATTY LIVER DISEASE): ICD-10-CM

## 2020-01-27 DIAGNOSIS — I77.89 OTHER SPECIFIED DISORDERS OF ARTERIES AND ARTERIOLES: ICD-10-CM

## 2020-01-27 DIAGNOSIS — M65.4 TENOSYNOVITIS, DE QUERVAIN: ICD-10-CM

## 2020-01-27 DIAGNOSIS — M67.912 DISORDER OF ROTATOR CUFF OF BOTH SHOULDERS: ICD-10-CM

## 2020-01-27 PROCEDURE — 99999 PR PBB SHADOW E&M-EST. PATIENT-LVL V: ICD-10-PCS | Mod: PBBFAC,,, | Performed by: INTERNAL MEDICINE

## 2020-01-27 PROCEDURE — 99214 OFFICE O/P EST MOD 30 MIN: CPT | Mod: S$PBB,,, | Performed by: INTERNAL MEDICINE

## 2020-01-27 PROCEDURE — 99215 OFFICE O/P EST HI 40 MIN: CPT | Mod: PBBFAC | Performed by: INTERNAL MEDICINE

## 2020-01-27 PROCEDURE — 99214 PR OFFICE/OUTPT VISIT, EST, LEVL IV, 30-39 MIN: ICD-10-PCS | Mod: S$PBB,,, | Performed by: INTERNAL MEDICINE

## 2020-01-27 PROCEDURE — 99999 PR PBB SHADOW E&M-EST. PATIENT-LVL V: CPT | Mod: PBBFAC,,, | Performed by: INTERNAL MEDICINE

## 2020-01-27 RX ORDER — AMLODIPINE AND BENAZEPRIL HYDROCHLORIDE 10; 40 MG/1; MG/1
1 CAPSULE ORAL DAILY
COMMUNITY

## 2020-01-27 RX ORDER — HYDROXYZINE HYDROCHLORIDE 25 MG/1
25 TABLET, FILM COATED ORAL 3 TIMES DAILY
COMMUNITY
End: 2021-09-16

## 2020-01-27 ASSESSMENT — ROUTINE ASSESSMENT OF PATIENT INDEX DATA (RAPID3)
TOTAL RAPID3 SCORE: 4.66
AM STIFFNESS SCORE: 1, YES
PATIENT GLOBAL ASSESSMENT SCORE: 7
WHEN YOU AWAKENED IN THE MORNING OVER THE LAST WEEK, PLEASE INDICATE THE AMOUNT OF TIME IT TAKES UNTIL YOU ARE AS LIMBER AS YOU WILL BE FOR THE DAY: 24 HRS
FATIGUE SCORE: 5
MDHAQ FUNCTION SCORE: 1.5
PSYCHOLOGICAL DISTRESS SCORE: 6.6
PAIN SCORE: 2

## 2020-01-27 NOTE — PROGRESS NOTES
"Subjective:       Patient ID: Genesis Ugalde is a 64 y.o. female.    Chief Complaint: PMR w GCA    Genesis Ugalde returns today for scheduled follow up. She reports being in good health since her last appointment. She still has generalized MSK pain in the neck, back, and shoulders. She also complains of b/l thumb pain, especially with extension. She denies numbness or tingling of the thumbs or hand. She has had a decrease in the frequency of her headaches. She says her vision is stable and she continues to follow up regularly with her ophthalmologist. She also follows up with her PCP regularly and was able to stop several medications recently (Klonopin, Neurontin, Lisinopril, Metofomin, Toprol) due to improved health and weight loss. She is not exercising but cares for her 6m old grandchild every day.     She has been compliant with her weekly Vit D 50,000, her daily pravastatin, and her weekly Actemra.        Review of Systems   Constitutional: Negative for fatigue and unexpected weight change.   HENT: Negative for ear pain, facial swelling, sore throat and trouble swallowing.    Eyes: Negative for pain, redness and visual disturbance.   Respiratory: Negative for chest tightness and shortness of breath.    Cardiovascular: Negative for chest pain and palpitations.   Gastrointestinal: Negative for abdominal pain, constipation, diarrhea, nausea and vomiting.   Genitourinary: Negative for dysuria.   Musculoskeletal: Positive for arthralgias, back pain, myalgias and neck pain.   Skin: Negative for pallor and rash.         Objective:   /78   Pulse 72   Ht 5' 3.6" (1.615 m)   Wt 101 kg (222 lb 11.2 oz)   BMI 38.71 kg/m²       Physical Exam   Vitals reviewed.  Constitutional: Oriented to person, place, and time and well-developed, well-nourished, and in no distress.   HENT:   Head: Normocephalic and atraumatic.   Right Ear: External ear normal.   Left Ear: External ear normal.   Nose: Nose normal.   Mouth/Throat: " Oropharynx is clear and moist. No oropharyngeal exudate.   Eyes: Conjunctivae and EOM are normal. Pupils are equal, round, and reactive to light. Right eye exhibits no discharge. Left eye exhibits no discharge. No scleral icterus.   Neck: Normal range of motion. Neck supple.   Cardiovascular: Normal rate, regular rhythm and normal heart sounds.    Pulmonary/Chest: Effort normal and breath sounds normal.   Abdominal: Soft. There is no tenderness.   Physical Exam       Right Side Rheumatological Exam     Shoulder Exam   Tenderness Location: glenohumeral    Range of Motion   The patient has normal right shoulder ROM.    Muscle Strength (0-5 scale):  Deltoid:  4.8  Biceps: 4.8/5   Triceps:  4.8  : 4.6/5   Iliopsoas: 4.8  Quadriceps:  4.8   Distal Lower Extremity: 4.8    Left Side Rheumatological Exam     Shoulder Exam   Tenderness Location: glenohumeral    Range of Motion   The patient has normal left shoulder ROM.    Muscle Strength (0-5 scale):  Deltoid:  4.8  Biceps: 4.8/5   Triceps:  4.8  :  4.6/5   Iliopsoas: 4.8  Quadriceps:  4.8   Distal Lower Extremity: 4.8      Positive Hawkin's, Speed's,Speed's b/l shoulders  Positive Finklestein b/l  Negative Tinel's, Galeano b/l wrists    Assessment/Plan   Diagnoses and all orders for this visit:    GCA (giant cell arteritis)  -     US Temporal Artery Bilateral; Future  -     MRA Chest; Future    Other specified disorders of arteries and arterioles   -     US Temporal Artery Bilateral; Future  -     MRA Chest; Future    Other chest pain   -     MRA Chest; Future    Liver mass  -     Ambulatory Referral to Hepatology    NAFLD (nonalcoholic fatty liver disease)  -     Ambulatory Referral to Hepatology    Disorder of rotator cuff of both shoulders  -     Ambulatory referral to Occupational Therapy    Tenosynovitis, de Quervain  -     ORTHOPEDIC BRACING FOR HOME USE - UPPER EXTREMITY  -     Ambulatory referral to Occupational Therapy    Chronic neck and back pain  -      Ambulatory referral to Physical Therapy    Other orders  -     varicella-zoster [id number], PF, (SHINGRIX, PF,) 50 mcg/0.5 mL injection; Inject 0.5 mLs into the muscle once. for 1 dose          -     Cont tocilizumab 162mg sc q wk        -     Cont vitamin D2 50,000 units once a week        -     Cont pravastatin 40mg q pm    RTC 3m or sooner if problems arise.     Georgi Pavon MD  [employer] [facility], PGY-1

## 2020-01-27 NOTE — PROGRESS NOTES
I have personally taken the history and examined the patient and agree with the resident,s note as stated above     GCA with PMR ESR <2  CRP 0.4  Optic neuritis, uveitis, visual loss  Lung nodules  EH  Dyslipidemia  Morbid obesity  Liver mass, fatty liver  H/o colon polyps  Bilateral rotator cuff tendinitis  Cervical myofascial pain  Left deQuervain's    Ref to PT/OT  UA  UPCR  *Shingrix series  F/u Dr. Blackwell in Hepatology for f/u liver mass and NAFLD  MRA chest  TA  US  CT chest for pulmonary nodules  Cont tocilizumab 162mg sc q wk  Cont vitamin D2 50,000 units once a week  Cont pravastatin 40mg q pm  RTC 3 months

## 2020-01-30 ENCOUNTER — TELEPHONE (OUTPATIENT)
Dept: RHEUMATOLOGY | Facility: CLINIC | Age: 65
End: 2020-01-30

## 2020-03-02 DIAGNOSIS — M31.6 GCA (GIANT CELL ARTERITIS): ICD-10-CM

## 2020-03-02 DIAGNOSIS — M31.6 TEMPORAL ARTERITIS: ICD-10-CM

## 2020-03-22 DIAGNOSIS — H40.1131 PRIMARY OPEN ANGLE GLAUCOMA OF BOTH EYES, MILD STAGE: ICD-10-CM

## 2020-03-22 RX ORDER — OMEPRAZOLE 20 MG/1
CAPSULE, DELAYED RELEASE ORAL
Qty: 180 CAPSULE | Refills: 3 | Status: SHIPPED | OUTPATIENT
Start: 2020-03-22 | End: 2020-07-05 | Stop reason: DRUGHIGH

## 2020-03-23 DIAGNOSIS — M31.6 GCA (GIANT CELL ARTERITIS): ICD-10-CM

## 2020-03-23 DIAGNOSIS — M31.6 TEMPORAL ARTERITIS: ICD-10-CM

## 2020-03-23 RX ORDER — BRIMONIDINE TARTRATE 2 MG/ML
SOLUTION/ DROPS OPHTHALMIC
Qty: 30 ML | Refills: 4 | Status: SHIPPED | OUTPATIENT
Start: 2020-03-23 | End: 2020-09-21 | Stop reason: SDUPTHER

## 2020-03-31 DIAGNOSIS — M31.6 GCA (GIANT CELL ARTERITIS): ICD-10-CM

## 2020-03-31 DIAGNOSIS — M31.6 TEMPORAL ARTERITIS: ICD-10-CM

## 2020-04-09 ENCOUNTER — PATIENT MESSAGE (OUTPATIENT)
Dept: RHEUMATOLOGY | Facility: CLINIC | Age: 65
End: 2020-04-09

## 2020-04-27 ENCOUNTER — LAB VISIT (OUTPATIENT)
Dept: LAB | Facility: HOSPITAL | Age: 65
End: 2020-04-27
Attending: INTERNAL MEDICINE
Payer: MEDICARE

## 2020-04-27 DIAGNOSIS — M31.6 GCA (GIANT CELL ARTERITIS): ICD-10-CM

## 2020-04-27 LAB
ALBUMIN SERPL BCP-MCNC: 3.8 G/DL (ref 3.5–5.2)
ALP SERPL-CCNC: 98 U/L (ref 55–135)
ALT SERPL W/O P-5'-P-CCNC: 46 U/L (ref 10–44)
ANION GAP SERPL CALC-SCNC: 8 MMOL/L (ref 8–16)
AST SERPL-CCNC: 49 U/L (ref 10–40)
BASOPHILS # BLD AUTO: 0.04 K/UL (ref 0–0.2)
BASOPHILS NFR BLD: 0.5 % (ref 0–1.9)
BILIRUB SERPL-MCNC: 0.6 MG/DL (ref 0.1–1)
BUN SERPL-MCNC: 16 MG/DL (ref 8–23)
CALCIUM SERPL-MCNC: 9.9 MG/DL (ref 8.7–10.5)
CHLORIDE SERPL-SCNC: 110 MMOL/L (ref 95–110)
CO2 SERPL-SCNC: 25 MMOL/L (ref 23–29)
CREAT SERPL-MCNC: 0.8 MG/DL (ref 0.5–1.4)
CRP SERPL-MCNC: 3.3 MG/L (ref 0–8.2)
DIFFERENTIAL METHOD: ABNORMAL
EOSINOPHIL # BLD AUTO: 0.6 K/UL (ref 0–0.5)
EOSINOPHIL NFR BLD: 7.6 % (ref 0–8)
ERYTHROCYTE [DISTWIDTH] IN BLOOD BY AUTOMATED COUNT: 12.9 % (ref 11.5–14.5)
ERYTHROCYTE [SEDIMENTATION RATE] IN BLOOD BY WESTERGREN METHOD: 30 MM/HR (ref 0–36)
EST. GFR  (AFRICAN AMERICAN): >60 ML/MIN/1.73 M^2
EST. GFR  (NON AFRICAN AMERICAN): >60 ML/MIN/1.73 M^2
GLUCOSE SERPL-MCNC: 121 MG/DL (ref 70–110)
HCT VFR BLD AUTO: 40 % (ref 37–48.5)
HGB BLD-MCNC: 12.8 G/DL (ref 12–16)
IMM GRANULOCYTES # BLD AUTO: 0.03 K/UL (ref 0–0.04)
IMM GRANULOCYTES NFR BLD AUTO: 0.4 % (ref 0–0.5)
LYMPHOCYTES # BLD AUTO: 2.3 K/UL (ref 1–4.8)
LYMPHOCYTES NFR BLD: 29.5 % (ref 18–48)
MCH RBC QN AUTO: 30.5 PG (ref 27–31)
MCHC RBC AUTO-ENTMCNC: 32 G/DL (ref 32–36)
MCV RBC AUTO: 96 FL (ref 82–98)
MONOCYTES # BLD AUTO: 0.6 K/UL (ref 0.3–1)
MONOCYTES NFR BLD: 7.1 % (ref 4–15)
NEUTROPHILS # BLD AUTO: 4.4 K/UL (ref 1.8–7.7)
NEUTROPHILS NFR BLD: 54.9 % (ref 38–73)
NRBC BLD-RTO: 0 /100 WBC
PLATELET # BLD AUTO: 305 K/UL (ref 150–350)
PMV BLD AUTO: 11 FL (ref 9.2–12.9)
POTASSIUM SERPL-SCNC: 4.2 MMOL/L (ref 3.5–5.1)
PROT SERPL-MCNC: 7.2 G/DL (ref 6–8.4)
RBC # BLD AUTO: 4.19 M/UL (ref 4–5.4)
SODIUM SERPL-SCNC: 143 MMOL/L (ref 136–145)
WBC # BLD AUTO: 7.91 K/UL (ref 3.9–12.7)

## 2020-04-27 PROCEDURE — 85652 RBC SED RATE AUTOMATED: CPT

## 2020-04-27 PROCEDURE — 85025 COMPLETE CBC W/AUTO DIFF WBC: CPT

## 2020-04-27 PROCEDURE — 80053 COMPREHEN METABOLIC PANEL: CPT

## 2020-04-27 PROCEDURE — 86140 C-REACTIVE PROTEIN: CPT

## 2020-04-27 PROCEDURE — 36415 COLL VENOUS BLD VENIPUNCTURE: CPT | Mod: PO

## 2020-04-29 ENCOUNTER — TELEPHONE (OUTPATIENT)
Dept: RHEUMATOLOGY | Facility: CLINIC | Age: 65
End: 2020-04-29

## 2020-04-29 NOTE — PROGRESS NOTES
Subjective:   The patient location is: Home  The chief complaint leading to consultation is: GCA w/PMR  Visit type: audiovisual  Total time spent with patient: 30 minutes  Each patient to whom he or she provides medical services by telemedicine is:  (1) informed of the relationship between the physician and patient and the respective role of any other health care provider with respect to management of the patient; and (2) notified that he or she may decline to receive medical services by telemedicine and may withdraw from such care at any time.     Patient ID: Genesis Ugalde is a 64 y.o. female.    Chief Complaint: GCA w/PMR    Mrs. Ugalde is a 63 y/o woman with PMHx of GCA w/PMR, HTN, HLD, DM2, Vitamin D def, Morbid Obesity, fatty liver, and LOYDA presenting today for a 3 month follow up. She was doing well but ran out of Actemra for 4 weeks. 2 wks ago fainted on her way to the bathroom and woke up with left side of face swollen, lips swollen, sores in mouth and sore throat. She called her PCP the next day and got a Zpack. She does not know why she fainted and said no work up was done. Neck is still a little swollen and she still has a sore throat. Joints also swelled around the same time. She had chills, unsure if she had a fever. She restarted the Actemra last Friday. Her Headaches came back, temporal b/l, daily with worsening severity. She is occasionally getting jaw claudication. Positive for scalp tenderness. Her vision has also decreased in her good eye (right eye) when all of this happened. It is blurry now. Generalized weakness also increased.    She has lost 2 additional pounds since our last visit. She has been compliant with her weekly Vit D 50,000, her daily pravastatin, and her weekly Actemra. At the last visit she presented with bilateral rotator cuff tendinitis, Cervical myofascial pain, and Left deQuervain's. She was to start PT and OT. She was unable to do any therapy due to home situation and having  to watch her grandbaby. She was to follow up with hepatology due to liver mass and NAFLD but again was unable to make an appointment.     Review of Systems   Constitutional: Positive for chills. Negative for fever and unexpected weight change.   HENT: Positive for mouth sores, sore throat and trouble swallowing.    Eyes: Negative for redness.   Respiratory: Negative for cough and shortness of breath.    Cardiovascular: Positive for palpitations. Negative for chest pain.   Gastrointestinal: Negative for constipation and diarrhea.   Genitourinary: Negative for dysuria and genital sores.   Musculoskeletal: Positive for back pain and joint swelling.   Skin: Negative for rash.   Neurological: Positive for syncope, light-headedness and headaches.   Hematological: Does not bruise/bleed easily.         Objective:   There were no vitals taken for this visit.     Physical Exam   Constitutional: She is well-developed, well-nourished, and in no distress.   HENT:   Head: Normocephalic and atraumatic.   Tenderness to temporal areas   Eyes: No scleral icterus.   Pulmonary/Chest: Effort normal.       Right Side Rheumatological Exam     She has swelling of the elbow, wrist and knee    Left Side Rheumatological Exam     She has swelling of the elbow, wrist and knee      Skin: No rash noted. No erythema.          Results for ANN WHITNEY (MRN [id number]) as of 4/30/2020 09:59   Ref. Range 4/27/2020 08:27   WBC Latest Ref Range: 3.90 - 12.70 K/uL 7.91   RBC Latest Ref Range: 4.00 - 5.40 M/uL 4.19   Hemoglobin Latest Ref Range: 12.0 - 16.0 g/dL 12.8   Hematocrit Latest Ref Range: 37.0 - 48.5 % 40.0   MCV Latest Ref Range: 82 - 98 fL 96   MCH Latest Ref Range: 27.0 - 31.0 pg 30.5   MCHC Latest Ref Range: 32.0 - 36.0 g/dL 32.0   RDW Latest Ref Range: 11.5 - 14.5 % 12.9   Platelets Latest Ref Range: 150 - 350 K/uL 305   MPV Latest Ref Range: 9.2 - 12.9 fL 11.0   Gran% Latest Ref Range: 38.0 - 73.0 % 54.9   Gran # (ANC) Latest Ref Range:  1.8 - 7.7 K/uL 4.4   Lymph% Latest Ref Range: 18.0 - 48.0 % 29.5   Lymph # Latest Ref Range: 1.0 - 4.8 K/uL 2.3   Mono% Latest Ref Range: 4.0 - 15.0 % 7.1   Mono # Latest Ref Range: 0.3 - 1.0 K/uL 0.6   Eosinophil% Latest Ref Range: 0.0 - 8.0 % 7.6   Eos # Latest Ref Range: 0.0 - 0.5 K/uL 0.6 (H)   Basophil% Latest Ref Range: 0.0 - 1.9 % 0.5   Baso # Latest Ref Range: 0.00 - 0.20 K/uL 0.04   nRBC Latest Ref Range: 0 /100 WBC 0   Differential Method Unknown Automated   Immature Grans (Abs) Latest Ref Range: 0.00 - 0.04 K/uL 0.03   Immature Granulocytes Latest Ref Range: 0.0 - 0.5 % 0.4   Sed Rate Latest Ref Range: 0 - 36 mm/Hr 30   Sodium Latest Ref Range: 136 - 145 mmol/L 143   Potassium Latest Ref Range: 3.5 - 5.1 mmol/L 4.2   Chloride Latest Ref Range: 95 - 110 mmol/L 110   CO2 Latest Ref Range: 23 - 29 mmol/L 25   Anion Gap Latest Ref Range: 8 - 16 mmol/L 8   BUN, Bld Latest Ref Range: 8 - 23 mg/dL 16   Creatinine Latest Ref Range: 0.5 - 1.4 mg/dL 0.8   eGFR if non African American Latest Ref Range: >60 mL/min/1.73 m^2 >60.0   eGFR if African American Latest Ref Range: >60 mL/min/1.73 m^2 >60.0   Glucose Latest Ref Range: 70 - 110 mg/dL 121 (H)   Calcium Latest Ref Range: 8.7 - 10.5 mg/dL 9.9   Alkaline Phosphatase Latest Ref Range: 55 - 135 U/L 98   PROTEIN TOTAL Latest Ref Range: 6.0 - 8.4 g/dL 7.2   Albumin Latest Ref Range: 3.5 - 5.2 g/dL 3.8   BILIRUBIN TOTAL Latest Ref Range: 0.1 - 1.0 mg/dL 0.6   AST Latest Ref Range: 10 - 40 U/L 49 (H)   ALT Latest Ref Range: 10 - 44 U/L 46 (H)   CRP Latest Ref Range: 0.0 - 8.2 mg/L 3.3        Assessment:       1. Liver mass    2. Fatty liver    3. Temporal arteritis    4. GCA (giant cell arteritis)    5. Vitamin D deficiency            Plan:       GCA (giant cell arteritis) with PMR  ESR 30  CRP 3.3, Optic neuritis, uveitis, visual loss   She was off Actemra for 4 weeks and has worsening symptoms, headaches, jaw pain, blurry vision in her only good eye, swelling and  generalized weakness. Having a flare.  Temporal artery ultrasound is negative 2/22/19.   The MRA of the chest 2/22/19 shows no evidence of large vessel vasculitis.   3/07/2019 MRI evaluation of the superficial arteries including the temporal and occipital arteries was normal.  Due to Prednisone use was on Fosamax but recent DXA 6/2019 was normal and med was d/c'd.  -Due for repeat US Temporal Artery Bilateral; Future  -Due for MRA Chest; Future  -Cont tocilizumab 162mg sc q wk  -2 days of infusions of Solu Medrol 1g  -Start Prednisone 60mg (three 20mg tabs) today and continue for 2 weeks. DO NOT take on the days you get the infusions.  -Follow up in 2 weeks    Liver mass and NAFLD  MRA Chest 2/22/2019: The hyperintense area in the liver is unchanged from 2016   Ref. Range 4/27/2020 08:27   AST Latest Ref Range: 10 - 40 U/L 49 (H)   ALT Latest Ref Range: 10 - 44 U/L 46 (H)   -Last saw Dr. Blackwell 8/01/2018: lesion is likely to be a hemangioma and has been stable on imaging, HBV/HCV-   -Overdue to see Dr. Blackwell in Hepatology, reminded pt again    Disorder of rotator cuff of both shoulders  -     Still needs to see Occupational Therapy     Tenosynovitis, de Quervain  -     Continue with BRACING FOR HOME USE - UPPER EXTREMITY  -     Still needs to see Occupational Therapy     Chronic neck and back pain  -     Still needs to see Physical Therapy     Dyslipidemia   Ref. Range 6/3/2019 10:22   Cholesterol Latest Ref Range: 120 - 199 mg/dL 181   HDL Latest Ref Range: 40 - 75 mg/dL 53   Hdl/Cholesterol Ratio Latest Ref Range: 20.0 - 50.0 % 29.3   LDL Cholesterol External Latest Ref Range: 63.0 - 159.0 mg/dL 74.8   Non-HDL Cholesterol Latest Units: mg/dL 128   Total Cholesterol/HDL Ratio Latest Ref Range: 2.0 - 5.0  3.4   Triglycerides Latest Ref Range: 30 - 150 mg/dL 266 (H)   -Cont pravastatin 40mg q pm    Vitamin D Deficiency    Ref. Range 10/27/2016 09:32 3/2/2018 09:12 6/3/2019 10:22   Vit D, 25-Hydroxy Latest  Ref Range: 30 - 96 ng/mL 23 (L) 25 (L) 23 (L)   -Cont vitamin D2 50,000 units once a week    Syncope  Pt needs to follow up with PCP Dr. Mercado about this for a full workup  Pt reports some palpitations, chest pain and resolution with a beta blocker but is taking Metoprolol which was prescribed and occasionally her daughters Propranolol.    -UA  UPCR  *Shingrix series  *Still needs CT chest for pulmonary nodules   RTC 2 weeks or sooner if problems arise.   Alda Garza MD  [employer] [facility], PGY-1

## 2020-04-30 ENCOUNTER — TELEPHONE (OUTPATIENT)
Dept: RHEUMATOLOGY | Facility: CLINIC | Age: 65
End: 2020-04-30

## 2020-04-30 ENCOUNTER — OFFICE VISIT (OUTPATIENT)
Dept: RHEUMATOLOGY | Facility: CLINIC | Age: 65
End: 2020-04-30
Payer: MEDICARE

## 2020-04-30 ENCOUNTER — TELEPHONE (OUTPATIENT)
Dept: INFECTIOUS DISEASES | Facility: HOSPITAL | Age: 65
End: 2020-04-30

## 2020-04-30 ENCOUNTER — TELEPHONE (OUTPATIENT)
Dept: PHARMACY | Facility: CLINIC | Age: 65
End: 2020-04-30

## 2020-04-30 ENCOUNTER — LAB VISIT (OUTPATIENT)
Dept: INTERNAL MEDICINE | Facility: CLINIC | Age: 65
End: 2020-04-30
Payer: MEDICARE

## 2020-04-30 ENCOUNTER — DOCUMENTATION ONLY (OUTPATIENT)
Dept: TRANSPLANT | Facility: CLINIC | Age: 65
End: 2020-04-30

## 2020-04-30 ENCOUNTER — TELEPHONE (OUTPATIENT)
Dept: HEPATOLOGY | Facility: CLINIC | Age: 65
End: 2020-04-30

## 2020-04-30 DIAGNOSIS — R16.0 LIVER MASS: Primary | ICD-10-CM

## 2020-04-30 DIAGNOSIS — D84.9 IMMUNOSUPPRESSION: Primary | ICD-10-CM

## 2020-04-30 DIAGNOSIS — M31.6 GCA (GIANT CELL ARTERITIS): ICD-10-CM

## 2020-04-30 DIAGNOSIS — K76.0 FATTY LIVER: ICD-10-CM

## 2020-04-30 DIAGNOSIS — E55.9 VITAMIN D DEFICIENCY: ICD-10-CM

## 2020-04-30 DIAGNOSIS — M31.6 TEMPORAL ARTERITIS: ICD-10-CM

## 2020-04-30 DIAGNOSIS — D84.9 IMMUNOSUPPRESSION: ICD-10-CM

## 2020-04-30 LAB — SARS-COV-2 RNA RESP QL NAA+PROBE: NOT DETECTED

## 2020-04-30 PROCEDURE — 99213 PR OFFICE/OUTPT VISIT, EST, LEVL III, 20-29 MIN: ICD-10-PCS | Mod: 95,,, | Performed by: INTERNAL MEDICINE

## 2020-04-30 PROCEDURE — U0002 COVID-19 LAB TEST NON-CDC: HCPCS

## 2020-04-30 PROCEDURE — 99213 OFFICE O/P EST LOW 20 MIN: CPT | Mod: 95,,, | Performed by: INTERNAL MEDICINE

## 2020-04-30 RX ORDER — METOPROLOL TARTRATE 100 MG/1
100 TABLET ORAL DAILY
COMMUNITY
End: 2022-07-19

## 2020-04-30 RX ORDER — PREDNISONE 20 MG/1
60 TABLET ORAL DAILY
Qty: 90 TABLET | Refills: 1 | Status: SHIPPED | OUTPATIENT
Start: 2020-04-30 | End: 2020-07-16

## 2020-04-30 RX ORDER — HEPARIN 100 UNIT/ML
500 SYRINGE INTRAVENOUS
Status: CANCELLED | OUTPATIENT
Start: 2020-04-30

## 2020-04-30 RX ORDER — SODIUM CHLORIDE 0.9 % (FLUSH) 0.9 %
10 SYRINGE (ML) INJECTION
Status: CANCELLED | OUTPATIENT
Start: 2020-04-30

## 2020-04-30 ASSESSMENT — ROUTINE ASSESSMENT OF PATIENT INDEX DATA (RAPID3)
PSYCHOLOGICAL DISTRESS SCORE: 4.4
WHEN YOU AWAKENED IN THE MORNING OVER THE LAST WEEK, PLEASE INDICATE THE AMOUNT OF TIME IT TAKES UNTIL YOU ARE AS LIMBER AS YOU WILL BE FOR THE DAY: 2
MDHAQ FUNCTION SCORE: 1.4
PAIN SCORE: 5
FATIGUE SCORE: 3
TOTAL RAPID3 SCORE: 4.55
AM STIFFNESS SCORE: 1, YES
PATIENT GLOBAL ASSESSMENT SCORE: 4

## 2020-04-30 NOTE — PROGRESS NOTES
Pre-charting done. Debbie Mehta MA  Answers for HPI/ROS submitted by the patient on 4/29/2020   fever: No  eye redness: No  headaches: Yes  shortness of breath: No  chest pain: No  trouble swallowing: Yes  diarrhea: No  constipation: No  unexpected weight change: No  genital sore: No  dysuria: No  During the last 3 days, have you had a skin rash?: No  Bruises or bleeds easily: No  cough: No

## 2020-04-30 NOTE — PATIENT INSTRUCTIONS
-2 days of infusions of Solu Medrol 1g  -Start Prednisone 60mg (three 20mg tabs) today and continue for 2 weeks. DO NOT take on the days you get the infusions.  -Follow up in 2 weeks    Many of our rheumatology patients are concerned about the novel corona virus, Covid-19. Rheumatology patients are at increased risk of complications from seasonal influenza as well as other viruses including Covid-19. Appropriate precautions include frequent thorough handwashing, avoiding hand-face and hand-mouth contact, and keeping a distance from friends and family who are sick with fever. You should avoid travel to high risk areas as noted on the Center for Disease Control website ( https://www.cdc.gov/coronavirus/2019-ncov/index.html).   If you develop flu-like symptoms with fever, you should call your primary care physician for guidance, or seek help at an urgent care center. Tell them you have fever when you first check in so that they will treat you appropriately.     If any member of your  household is diagnosed with Covid-19, please stop all immunosuppressive medications except prednisone and contact your primary care physician (PCP) immediately.       Most rheumatology medicines can be temporarily stopped for any illness with fever, except for prednisone. If you are unsure about your rheumatology medicine please ask  your rheumatologist. At this time you should continue your prescribed medicine while you are well.     The importance of hand washing cannot be stressed enough during this COVID 19 pandemic. Here is a YouTube video that shows you the proper 20 sec technique for hand washing.   [email or web address].com/[email or web address]

## 2020-04-30 NOTE — TELEPHONE ENCOUNTER
Orders in for solumedrol infusion.    Pt denied any contact with anyone who is sick and denied any recent travel.  Advised patient on temperature screening upon arrival and restrictions on visitor policy.     Discussed the following with the patient:     In an effort to protect our immunocompromised patients from potential exposure to COVID-19, [facility] will now require all patients receiving an infusion or injection to be tested for COVID-19 prior to their appointment.  All patients must be tested within 72 hours of their appointment.     Pt verbalized understanding and COVID-19 screening appt scheduled.  Will contact patient with results prior to scheduled infusion.

## 2020-04-30 NOTE — PROGRESS NOTES
I have personally taken the history and  and agree with the resident,s note as stated above     The patient location is: home  The chief complaint leading to consultation is: GCA/PMR  Visit type: audiovisual  Total time spent with patient: 20 min  Each patient to whom he or she provides medical services by telemedicine is:  (1) informed of the relationship between the physician and patient and the respective role of any other health care provider with respect to management of the patient; and (2) notified that he or she may decline to receive medical services by telemedicine and may withdraw from such care at any time.    Notes: Out of Actemra x 4 wks, back on x 1wk       f lare of GCA with visual changes, increased frontotemporal headaches off tocilizumab x 4 wkswith PMR ESR 30  CRP 0.4  Optic neuritis, uveitis, visual loss  Lung nodules  EH  Dyslipidemia  Morbid obesity  Liver mass, fatty liver  H/o colon polyps  Bilateral rotator cuff tendinitis  Cervical myofascial pain  Left deQuervain's        Solu-Medrol 1 g IV today(Thurs) and tomorrow(Friday), [facility] infusion unit  Prednisone 60mg daily starting Sat  Contact Dr. Ro for Ophthalmologic exam  Contact Dr. Mercado for syncope evaluation  UA  UPCR  *Shingrix series  F/u Dr. Blackwell in Hepatology for f/u liver mass and NAFLD  MRA chest as previously ordered 1/27/20 when possible  TA  US as previously ordered 1/27/20 when possible  CT chest for pulmonary nodules as previously ordered 1/27/20 when possible  Cont tocilizumab 162mg sc q wk  Cont vitamin D2 50,000 units once a week  Cont pravastatin 40mg q pm  Telemedicine visit 2 wks to begin prednisone taper  RTC 3 months with standing labs, vitamin D, lipid panel  Answers for HPI/ROS submitted by the patient on 4/29/2020   fever: No  eye redness: No  headaches: Yes  shortness of breath: No  chest pain: No  trouble swallowing: Yes  diarrhea: No  constipation: No  unexpected weight change: No  genital sore:  No  dysuria: No  During the last 3 days, have you had a skin rash?: No  Bruises or bleeds easily: No  cough: No

## 2020-04-30 NOTE — TELEPHONE ENCOUNTER
----- Message from Layla Serna LPN sent at 4/30/2020 12:39 PM CDT -----  Pt records reviewed.   Pt will be referred to Hepatology.  Liver mass  Fatty liver  Initial referral received  from the workque.   Referring Provider/diagnosis  Forrest Waldrop MD

## 2020-04-30 NOTE — NURSING
Pt records reviewed.   Pt will be referred to Hepatology.  Liver mass  Fatty liver  Initial referral received  from the workque.   Referring Provider/diagnosis  Forrest Waldrop MD      Referral letter sent to patient.

## 2020-04-30 NOTE — LETTER
April 30, 2020    Genesis Ugalde  [address] [place] [id number]      Dear Genesis Ugalde:    Your doctor has referred you to the [facility]. We are sending this letter to remind you to make an appointment with us to complete the referral process.     Please call us at [phone] to schedule an appointment. We look forward to seeing you soon.     If you received a call and have been scheduled, please disregard this letter.       Sincerely,        [facility]   [facility] [place] [address]  [phone]

## 2020-04-30 NOTE — TELEPHONE ENCOUNTER
Spoke with the patient. Returning patient of Dr Blackwell. Pt will conduct a Virtual Visit on Fri 5/22/20 at 4 pm. Pt verbalized understanding. Appt letter placed in the mail.

## 2020-04-30 NOTE — TELEPHONE ENCOUNTER
Pt need SARS-CoV-2 today and result prior to Solu-Medrol. She therefore will start prednisone 60mg today and tomorrow, then Solu-Medrol 1 g IV Sat and Mon. Does not  Need to take prednisone on days of Solu-Medrol. After Solu-Medrol resume prednisone 60mg daily, and continue until 2 wk visit.

## 2020-04-30 NOTE — PROGRESS NOTES
Rapid3 Question Responses and Scores 4/29/2020   MDHAQ Score 1.4   Psychologic Score 4.4   Pain Score 5   When you awakened in the morning OVER THE LAST WEEK, did you feel stiff? Yes   If Yes, please indicate the number of hours until you are as limber as you will be for the day 2   Fatigue Score 3   Global Health Score 4   RAPID3 Score 4.55       Answers for HPI/ROS submitted by the patient on 4/29/2020   fever: No  eye redness: No  headaches: Yes  shortness of breath: No  chest pain: No  trouble swallowing: Yes  diarrhea: No  constipation: No  unexpected weight change: No  genital sore: No  dysuria: No  During the last 3 days, have you had a skin rash?: No  Bruises or bleeds easily: No  cough: No

## 2020-05-01 ENCOUNTER — INFUSION (OUTPATIENT)
Dept: INFECTIOUS DISEASES | Facility: HOSPITAL | Age: 65
End: 2020-05-01
Attending: INTERNAL MEDICINE
Payer: MEDICARE

## 2020-05-01 VITALS
OXYGEN SATURATION: 95 % | TEMPERATURE: 99 F | HEIGHT: 63 IN | WEIGHT: 226.06 LBS | DIASTOLIC BLOOD PRESSURE: 71 MMHG | RESPIRATION RATE: 18 BRPM | BODY MASS INDEX: 40.05 KG/M2 | HEART RATE: 100 BPM | SYSTOLIC BLOOD PRESSURE: 149 MMHG

## 2020-05-01 DIAGNOSIS — H46.9 OPTIC NEURITIS: ICD-10-CM

## 2020-05-01 DIAGNOSIS — M31.5 GIANT CELL ARTERITIS WITH POLYMYALGIA RHEUMATICA: Primary | ICD-10-CM

## 2020-05-01 PROCEDURE — 96365 THER/PROPH/DIAG IV INF INIT: CPT

## 2020-05-01 PROCEDURE — 63600175 PHARM REV CODE 636 W HCPCS: Performed by: INTERNAL MEDICINE

## 2020-05-01 RX ORDER — SODIUM CHLORIDE 0.9 % (FLUSH) 0.9 %
10 SYRINGE (ML) INJECTION
Status: CANCELLED | OUTPATIENT
Start: 2020-05-02

## 2020-05-01 RX ORDER — SODIUM CHLORIDE 0.9 % (FLUSH) 0.9 %
10 SYRINGE (ML) INJECTION
Status: DISCONTINUED | OUTPATIENT
Start: 2020-05-01 | End: 2020-05-01 | Stop reason: HOSPADM

## 2020-05-01 RX ORDER — HEPARIN 100 UNIT/ML
500 SYRINGE INTRAVENOUS
Status: DISCONTINUED | OUTPATIENT
Start: 2020-05-01 | End: 2020-05-01 | Stop reason: HOSPADM

## 2020-05-01 RX ORDER — HEPARIN 100 UNIT/ML
500 SYRINGE INTRAVENOUS
Status: CANCELLED | OUTPATIENT
Start: 2020-05-02

## 2020-05-01 RX ADMIN — DEXTROSE: 50 INJECTION, SOLUTION INTRAVENOUS at 02:05

## 2020-05-04 ENCOUNTER — INFUSION (OUTPATIENT)
Dept: INFECTIOUS DISEASES | Facility: HOSPITAL | Age: 65
End: 2020-05-04
Attending: INTERNAL MEDICINE
Payer: MEDICARE

## 2020-05-04 VITALS
WEIGHT: 226.19 LBS | TEMPERATURE: 99 F | SYSTOLIC BLOOD PRESSURE: 139 MMHG | RESPIRATION RATE: 16 BRPM | BODY MASS INDEX: 40.07 KG/M2 | HEART RATE: 76 BPM | OXYGEN SATURATION: 97 % | DIASTOLIC BLOOD PRESSURE: 71 MMHG

## 2020-05-04 DIAGNOSIS — H46.9 OPTIC NEURITIS: ICD-10-CM

## 2020-05-04 DIAGNOSIS — M31.5 GIANT CELL ARTERITIS WITH POLYMYALGIA RHEUMATICA: Primary | ICD-10-CM

## 2020-05-04 PROCEDURE — 63600175 PHARM REV CODE 636 W HCPCS: Performed by: INTERNAL MEDICINE

## 2020-05-04 PROCEDURE — 25000003 PHARM REV CODE 250: Performed by: INTERNAL MEDICINE

## 2020-05-04 PROCEDURE — 96365 THER/PROPH/DIAG IV INF INIT: CPT

## 2020-05-04 RX ORDER — SODIUM CHLORIDE 0.9 % (FLUSH) 0.9 %
10 SYRINGE (ML) INJECTION
Status: CANCELLED | OUTPATIENT
Start: 2020-05-04

## 2020-05-04 RX ORDER — SODIUM CHLORIDE 0.9 % (FLUSH) 0.9 %
10 SYRINGE (ML) INJECTION
Status: DISCONTINUED | OUTPATIENT
Start: 2020-05-04 | End: 2020-05-04 | Stop reason: HOSPADM

## 2020-05-04 RX ORDER — HEPARIN 100 UNIT/ML
500 SYRINGE INTRAVENOUS
Status: CANCELLED | OUTPATIENT
Start: 2020-05-04

## 2020-05-04 RX ORDER — HEPARIN 100 UNIT/ML
500 SYRINGE INTRAVENOUS
Status: DISCONTINUED | OUTPATIENT
Start: 2020-05-04 | End: 2020-05-04 | Stop reason: HOSPADM

## 2020-05-04 RX ADMIN — DEXTROSE: 50 INJECTION, SOLUTION INTRAVENOUS at 03:05

## 2020-05-04 NOTE — PROGRESS NOTES
Pt received Solu-medrol infusion over 30 mins. Pt is blind, daughter accompanied pt, infusion held in injection room with daughter at side. Tolerated infusion well. Left NAD with assistance from daughter.

## 2020-05-18 ENCOUNTER — TELEPHONE (OUTPATIENT)
Dept: HEPATOLOGY | Facility: CLINIC | Age: 65
End: 2020-05-18

## 2020-05-18 DIAGNOSIS — R93.2 ABNORMAL FINDINGS ON DIAGNOSTIC IMAGING OF LIVER: ICD-10-CM

## 2020-05-18 DIAGNOSIS — K76.9 LIVER LESION: Primary | ICD-10-CM

## 2020-05-18 DIAGNOSIS — K76.0 FATTY LIVER: ICD-10-CM

## 2020-05-19 ENCOUNTER — TELEPHONE (OUTPATIENT)
Dept: HEPATOLOGY | Facility: CLINIC | Age: 65
End: 2020-05-19

## 2020-05-19 NOTE — TELEPHONE ENCOUNTER
I tried reaching the pt again to schedule her f/u with labs and us. I left a VM on the days and times things are scheduled and I mailed her a copy of the reminders

## 2020-05-22 ENCOUNTER — PATIENT MESSAGE (OUTPATIENT)
Dept: RHEUMATOLOGY | Facility: CLINIC | Age: 65
End: 2020-05-22

## 2020-06-08 ENCOUNTER — TELEPHONE (OUTPATIENT)
Dept: HEPATOLOGY | Facility: CLINIC | Age: 65
End: 2020-06-08

## 2020-06-08 NOTE — TELEPHONE ENCOUNTER
MA spoke to the pt and confirmed her appointments. I also let her know that we are seeing patients in office now, so I offered her an in office visit if she doesn't want to do the video visit. The pt stated that would be better. She is also blind and will need assistance. I mailed her the new reminders too.

## 2020-06-19 ENCOUNTER — HOSPITAL ENCOUNTER (OUTPATIENT)
Dept: RADIOLOGY | Facility: HOSPITAL | Age: 65
Discharge: HOME | End: 2020-06-19
Attending: INTERNAL MEDICINE
Payer: MEDICARE

## 2020-06-19 DIAGNOSIS — K76.9 LIVER LESION: ICD-10-CM

## 2020-06-19 DIAGNOSIS — K76.0 FATTY LIVER: ICD-10-CM

## 2020-06-19 PROCEDURE — 76700 US EXAM ABDOM COMPLETE: CPT | Mod: 26,,, | Performed by: RADIOLOGY

## 2020-06-19 PROCEDURE — 76700 US ABDOMEN COMPLETE: ICD-10-PCS | Mod: 26,,, | Performed by: RADIOLOGY

## 2020-06-19 PROCEDURE — 76700 US EXAM ABDOM COMPLETE: CPT | Mod: TC

## 2020-06-25 ENCOUNTER — PROCEDURE VISIT (OUTPATIENT)
Dept: HEPATOLOGY | Facility: CLINIC | Age: 65
End: 2020-06-25
Payer: MEDICARE

## 2020-06-25 ENCOUNTER — OFFICE VISIT (OUTPATIENT)
Dept: HEPATOLOGY | Facility: CLINIC | Age: 65
End: 2020-06-25
Payer: MEDICARE

## 2020-06-25 VITALS
SYSTOLIC BLOOD PRESSURE: 146 MMHG | BODY MASS INDEX: 42.17 KG/M2 | HEART RATE: 83 BPM | HEIGHT: 63 IN | RESPIRATION RATE: 18 BRPM | OXYGEN SATURATION: 97 % | DIASTOLIC BLOOD PRESSURE: 79 MMHG | WEIGHT: 238 LBS

## 2020-06-25 DIAGNOSIS — R16.0 LIVER MASS: ICD-10-CM

## 2020-06-25 DIAGNOSIS — K76.0 FATTY LIVER: ICD-10-CM

## 2020-06-25 DIAGNOSIS — K74.60 CIRRHOSIS OF LIVER WITHOUT ASCITES, UNSPECIFIED HEPATIC CIRRHOSIS TYPE: Primary | ICD-10-CM

## 2020-06-25 PROCEDURE — 99999 PR PBB SHADOW E&M-EST. PATIENT-LVL V: ICD-10-PCS | Mod: PBBFAC,,, | Performed by: INTERNAL MEDICINE

## 2020-06-25 PROCEDURE — 99215 OFFICE O/P EST HI 40 MIN: CPT | Mod: PBBFAC | Performed by: INTERNAL MEDICINE

## 2020-06-25 PROCEDURE — 91200 LIVER ELASTOGRAPHY: CPT | Mod: PBBFAC | Performed by: INTERNAL MEDICINE

## 2020-06-25 PROCEDURE — 99999 PR PBB SHADOW E&M-EST. PATIENT-LVL V: CPT | Mod: PBBFAC,,, | Performed by: INTERNAL MEDICINE

## 2020-06-25 PROCEDURE — 99214 PR OFFICE/OUTPT VISIT, EST, LEVL IV, 30-39 MIN: ICD-10-PCS | Mod: S$PBB,,, | Performed by: INTERNAL MEDICINE

## 2020-06-25 PROCEDURE — 91200 FIBROSCAN (VIBRATION CONTROLLED TRANSIENT ELASTOGRAPHY): ICD-10-PCS | Mod: 26,S$PBB,, | Performed by: INTERNAL MEDICINE

## 2020-06-25 PROCEDURE — 99214 OFFICE O/P EST MOD 30 MIN: CPT | Mod: S$PBB,,, | Performed by: INTERNAL MEDICINE

## 2020-06-25 NOTE — PROCEDURES
FibroScan (Vibration Controlled Transient Elastography)    Date/Time: 6/25/2020 3:15 PM  Performed by: Esteban Blackwell MD  Authorized by: Esteban Blackwell MD     Diagnosis:  NAFLD    Probe:  XL    Universal Protocol: Patient's identity, procedure and site were verified, confirmatory pause was performed.  Discussed procedure including risks and potential complications.  Questions answered.  Patient verbalizes understanding and wishes to proceed with VCTE.     Procedure: After providing explanations of the procedure, patient was placed in the supine position with right arm in maximum abduction to allow optimal exposure of right lateral abdomen.  Patient was briefly assessed, Testing was performed in the mid-axillary location, 50Hz Shear Wave pulses were applied and the resulting Shear Wave and Propagation Speed detected with a 3.5 MHz ultrasonic signal, using the FibroScan probe, Skin to liver capsule distance and liver parenchyma were accessed during the entire examination with the FibroScan probe, Patient was instructed to breathe normally and to abstain from sudden movements during the procedure, allowing for random measurements of liver stiffness. At least 10 Shear Waves were produced, Individual measurements of each Shear Wave were calculated.  Patient tolerated the procedure well with no complications.  Meets discharge criteria as was dismissed.  Rates pain 0 out of 10.  Patient will follow up with ordering provider to review results.      Findings  Median liver stiffness score:  15.6  CAP Reading: dB/m:  336    IQR/med %:  15  Interpretation  Fibrosis interpretation is based on medial liver stiffness - Kilopascal (kPa).    Fibrosis Stage:  F4  Steatosis interpretation is based on controlled attenuation parameter - (dB/m).    Steatosis Grade:  S3

## 2020-06-25 NOTE — LETTER
June 25, 2020      Forrest Waldrop MD  [address]  [facility] [address]           [facility] - Hepatology  [address]  [place] [address]  Phone: [phone]  Fax: [phone]          Patient: Genesis Ugalde   MR Number: [id number]   YOB: 1955   Date of Visit: 6/25/2020       Dear Dr. Forrest Waldrop:    Thank you for referring Genesis Ugalde to me for evaluation. Attached you will find relevant portions of my assessment and plan of care.    If you have questions, please do not hesitate to call me. I look forward to following Genesis Ugalde along with you.    Sincerely,    Esteban Blackwell MD    Enclosure  CC:  No Recipients    If you would like to receive this communication electronically, please contact [email or web address] or [phone] to request more information on [employer] access.    For providers and/or their staff who would like to refer a patient to [facility], please contact us through our one-stop-shop provider referral line, [facility], at [phone].    If you feel you have received this communication in error or would no longer like to receive these types of communications, please e-mail [email or web address]

## 2020-06-25 NOTE — PROGRESS NOTES
Subjective:       Patient ID: Genesis Ugalde is a 64 y.o. female.    Chief Complaint: No chief complaint on file.    HPI  I saw this 64 y.o. lady who came o the hepatology clinic with her son. 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  I last saw her in Aug 2018.    She has mildly elevated LFTs but I note a mildly elevated naveed. Her imaging showed a small liver lesion likely to be a hemangioma but also showed fatty liver disease.  She is well apart from RUQ pain.    - some weight loss but now back on prednisone 60 mg daily  - lost vision in April 2020- very poor vision  - side effects    HBV/HCV neg  Ferritin normal.  Body mass index is 42.16 kg/m².     Platelets- normal  Fibroscan 2016- stage 3 fibrosis and fibrosure stage 1  Fibroscan 6/25/2020= stage 4 fibrosis (cirrhosis)      CT abdo: 5/25/2015  The liver is normal in size demonstrating diffuse hypoattenuation in keeping with hepatic steatosis. Additionally, there is a stable 1.7-cm area of hyperenhancement within the inferior right hepatic lobe which is nonspecific, however such stability over  time reflects a benign etiology, perhaps hemangioma. The portal, splenic, and proximal mesenteric veins are patent. The patient is status post cholecystectomy. No evidence of biliary ductal dilatation.    MRI 12/21/2015  The posterior right lobe liver lesion is vascular but not a typical hemangioma. The second one is not seen. There has been no change. Spleen, stomach, duodenum, pancreas, bile ducts, and adrenal glands show nothing unusual. No adenopathy or masses seen. Lung bases are clear and the heart shows nothing unusual. No change in size     Abdo US: 6/19/20  Hepatic steatosis.  Previously seen lesion not evident on this examination    PMH:  Hypertension  Giant cell arteritis- blindness  PMR  DM- 2012  Hysterectomy  Cholecystectomy- open- 1993    SH:  Non smoker  No alcohol    FH:  Nil liver  Ca stomach  IHD    Review of Systems   Constitutional:  Negative for fever, chills, activity change, appetite change, fatigue and unexpected weight change.   HENT: Negative for ear pain, hearing loss, nosebleeds, sore throat and trouble swallowing.    Eyes: Negative for redness and visual disturbance.   Respiratory: Negative for cough, chest tightness, shortness of breath and wheezing.    Cardiovascular: Negative for chest pain and palpitations.   Gastrointestinal: Negative for nausea, vomiting, abdominal pain, diarrhea, constipation, blood in stool and abdominal distention.   Genitourinary: Negative for dysuria, urgency, frequency, hematuria and difficulty urinating.   Musculoskeletal: Negative for myalgias, back pain, joint swelling, arthralgias and gait problem.   Skin: Negative for rash.   Neurological: Negative for tremors, seizures, speech difficulty, weakness and headaches.   Hematological: Negative for adenopathy.   Psychiatric/Behavioral: Negative for confusion, sleep disturbance and decreased concentration. The patient is not nervous/anxious.          Lab Results   Component Value Date    ALT 59 (H) 06/19/2020    AST 29 06/19/2020    GGT 50 06/17/2019    ALKPHOS 73 06/19/2020    BILITOT 1.3 (H) 06/19/2020     Past Medical History:   Diagnosis Date    Anxiety     Asthma     Blind left eye     can see silhouettes in right eye    Cataract     Depression     Diabetes mellitus     Disease of immune system     Giant cell arteritis     Glaucoma     Hypertension     Polymyalgia rheumatica     Uveitis      Past Surgical History:   Procedure Laterality Date    ANKLE SURGERY      CHOLECYSTECTOMY      COLONOSCOPY N/A 9/28/2015    Procedure: COLONOSCOPY;  Surgeon: Alok Dykes MD;  Location: [address]);  Service: Endoscopy;  Laterality: N/A;    COLONOSCOPY N/A 9/6/2019    Procedure: COLONOSCOPY;  Surgeon: Alok Dykes MD;  Location: [address]);  Service: Endoscopy;  Laterality: N/A;    CYST REMOVAL      right lung     HYSTERECTOMY       Current Outpatient Medications   Medication Sig    amlodipine-benazepril (LOTREL) 10-40 mg per capsule Take 1 capsule by mouth once daily.    aspirin (ECOTRIN) 325 MG EC tablet Take 325 mg by mouth once daily.    brimonidine 0.2% (ALPHAGAN) 0.2 % Drop INSTILL 1 DROP INTO BOTH EYES 3 TIMES A DAY    dorzolamide (TRUSOPT) 2 % ophthalmic solution Place 1 drop into both eyes 3 (three) times daily.    ergocalciferol (VITAMIN D2) 50,000 unit Cap TAKE 1 CAPSULE (50,000 UNITS TOTAL) BY MOUTH EVERY 7 DAYS.    latanoprost 0.005 % ophthalmic solution Place 1 drop into both eyes every evening.    metoprolol tartrate (LOPRESSOR) 100 MG tablet Take 100 mg by mouth once daily.    omeprazole (PRILOSEC) 20 MG capsule TAKE 1 CAPSULE (20 MG TOTAL) BY MOUTH 2 (TWO) TIMES DAILY.    pravastatin (PRAVACHOL) 40 MG tablet Take 1 tablet (40 mg total) by mouth every evening.    predniSONE (DELTASONE) 20 MG tablet Take 3 tablets (60 mg total) by mouth once daily. Start taking on Sat May 2nd    sertraline (ZOLOFT) 50 MG tablet Take 50 mg by mouth once daily.    tocilizumab (ACTEMRA) 162 mg/0.9 mL injection Inject 0.9 mLs (162 mg total) into the skin every 7 days.    hydrOXYzine HCl (ATARAX) 25 MG tablet Take 25 mg by mouth 3 (three) times daily.     No current facility-administered medications for this visit.        Objective:      Physical Exam   Constitutional: She appears well-nourished. No distress.   HENT:   Head: Normocephalic.   Eyes: Pupils are equal, round, and reactive to light.   Neck: No JVD present. No tracheal deviation present. No thyromegaly present.   Cardiovascular: Normal rate, regular rhythm and normal heart sounds.    No murmur heard.  Pulmonary/Chest: Effort normal and breath sounds normal. No stridor.   Abdominal: Soft.   Lymphadenopathy:        Head (right side): No submental, no submandibular, no tonsillar, no preauricular, no posterior auricular and no occipital adenopathy present.         Head (left side): No submental, no submandibular, no tonsillar, no preauricular, no posterior auricular and no occipital adenopathy present.     She has no cervical adenopathy.     She has no axillary adenopathy.   Neurological: She is alert. She has normal strength. She is not disoriented. No cranial nerve deficit or sensory deficit.   Skin: Skin is intact. No cyanosis.       Assessment:       1. Liver mass    2. Fatty liver        Plan:   Her imaging is unremarkable but her fibroscan today suggests progression to cirrhosis- likely due to BELLO    - immunized agaisnt HAV/HBV x 2  - advised not to eat raw oysters  - explained need for 6 monthly US  - normal platelets so no need for EGD    Clinic in 6 months

## 2020-06-29 ENCOUNTER — TELEPHONE (OUTPATIENT)
Dept: RHEUMATOLOGY | Facility: CLINIC | Age: 65
End: 2020-06-29

## 2020-07-02 ENCOUNTER — TELEPHONE (OUTPATIENT)
Dept: RHEUMATOLOGY | Facility: CLINIC | Age: 65
End: 2020-07-02

## 2020-07-02 ENCOUNTER — PATIENT MESSAGE (OUTPATIENT)
Dept: GASTROENTEROLOGY | Facility: CLINIC | Age: 65
End: 2020-07-02

## 2020-07-05 DIAGNOSIS — K21.9 GASTROESOPHAGEAL REFLUX DISEASE, ESOPHAGITIS PRESENCE NOT SPECIFIED: ICD-10-CM

## 2020-07-05 DIAGNOSIS — K44.9 HIATAL HERNIA: ICD-10-CM

## 2020-07-05 DIAGNOSIS — Z51.81 ENCOUNTER FOR MONITORING LONG-TERM PROTON PUMP INHIBITOR THERAPY: ICD-10-CM

## 2020-07-05 DIAGNOSIS — K44.9 HIATAL HERNIA: Primary | ICD-10-CM

## 2020-07-05 DIAGNOSIS — Z79.899 ENCOUNTER FOR MONITORING LONG-TERM PROTON PUMP INHIBITOR THERAPY: ICD-10-CM

## 2020-07-05 DIAGNOSIS — K21.9 GASTROESOPHAGEAL REFLUX DISEASE, ESOPHAGITIS PRESENCE NOT SPECIFIED: Primary | ICD-10-CM

## 2020-07-05 RX ORDER — OMEPRAZOLE 40 MG/1
40 CAPSULE, DELAYED RELEASE ORAL EVERY 12 HOURS
Qty: 180 CAPSULE | Refills: 0 | Status: SHIPPED | OUTPATIENT
Start: 2020-07-05 | End: 2020-09-27

## 2020-07-06 DIAGNOSIS — K21.9 GASTROESOPHAGEAL REFLUX DISEASE, ESOPHAGITIS PRESENCE NOT SPECIFIED: Primary | ICD-10-CM

## 2020-07-08 ENCOUNTER — LAB VISIT (OUTPATIENT)
Dept: LAB | Facility: HOSPITAL | Age: 65
End: 2020-07-08
Attending: INTERNAL MEDICINE
Payer: MEDICARE

## 2020-07-08 DIAGNOSIS — Z79.899 ENCOUNTER FOR MONITORING LONG-TERM PROTON PUMP INHIBITOR THERAPY: ICD-10-CM

## 2020-07-08 DIAGNOSIS — Z51.81 ENCOUNTER FOR MONITORING LONG-TERM PROTON PUMP INHIBITOR THERAPY: ICD-10-CM

## 2020-07-08 DIAGNOSIS — K21.9 GASTROESOPHAGEAL REFLUX DISEASE, ESOPHAGITIS PRESENCE NOT SPECIFIED: ICD-10-CM

## 2020-07-08 DIAGNOSIS — K44.9 HIATAL HERNIA: ICD-10-CM

## 2020-07-08 LAB
25(OH)D3+25(OH)D2 SERPL-MCNC: 22 NG/ML (ref 30–96)
ANION GAP SERPL CALC-SCNC: 11 MMOL/L (ref 8–16)
BUN SERPL-MCNC: 14 MG/DL (ref 8–23)
CALCIUM SERPL-MCNC: 9.5 MG/DL (ref 8.7–10.5)
CHLORIDE SERPL-SCNC: 108 MMOL/L (ref 95–110)
CO2 SERPL-SCNC: 24 MMOL/L (ref 23–29)
CREAT SERPL-MCNC: 0.8 MG/DL (ref 0.5–1.4)
EST. GFR  (AFRICAN AMERICAN): >60 ML/MIN/1.73 M^2
EST. GFR  (NON AFRICAN AMERICAN): >60 ML/MIN/1.73 M^2
GLUCOSE SERPL-MCNC: 152 MG/DL (ref 70–110)
MAGNESIUM SERPL-MCNC: 2.2 MG/DL (ref 1.6–2.6)
POTASSIUM SERPL-SCNC: 4.2 MMOL/L (ref 3.5–5.1)
SODIUM SERPL-SCNC: 143 MMOL/L (ref 136–145)
VIT B12 SERPL-MCNC: 246 PG/ML (ref 210–950)

## 2020-07-08 PROCEDURE — 82607 VITAMIN B-12: CPT

## 2020-07-08 PROCEDURE — 83735 ASSAY OF MAGNESIUM: CPT

## 2020-07-08 PROCEDURE — 36415 COLL VENOUS BLD VENIPUNCTURE: CPT | Mod: PO

## 2020-07-08 PROCEDURE — 82306 VITAMIN D 25 HYDROXY: CPT

## 2020-07-08 PROCEDURE — 80048 BASIC METABOLIC PNL TOTAL CA: CPT

## 2020-07-12 DIAGNOSIS — K76.0 FATTY LIVER: ICD-10-CM

## 2020-07-12 DIAGNOSIS — E55.9 VITAMIN D INSUFFICIENCY: Primary | ICD-10-CM

## 2020-07-12 DIAGNOSIS — H54.7 VISUAL LOSS: ICD-10-CM

## 2020-07-12 DIAGNOSIS — R10.13 EPIGASTRIC ABDOMINAL PAIN: ICD-10-CM

## 2020-07-12 DIAGNOSIS — E55.9 VITAMIN D DEFICIENCY: ICD-10-CM

## 2020-07-12 DIAGNOSIS — M31.5 GIANT CELL ARTERITIS WITH POLYMYALGIA RHEUMATICA: ICD-10-CM

## 2020-07-12 DIAGNOSIS — H54.10 BLINDNESS AND LOW VISION: ICD-10-CM

## 2020-07-12 DIAGNOSIS — H46.9 OPTIC NEURITIS: ICD-10-CM

## 2020-07-12 DIAGNOSIS — E66.01 MORBID OBESITY WITH BMI OF 40.0-44.9, ADULT: ICD-10-CM

## 2020-07-12 DIAGNOSIS — H20.9 UVEITIS: ICD-10-CM

## 2020-07-12 RX ORDER — ERGOCALCIFEROL 1.25 MG/1
50000 CAPSULE ORAL
Qty: 12 CAPSULE | Refills: 0 | Status: SHIPPED | OUTPATIENT
Start: 2020-07-12 | End: 2020-09-28

## 2020-07-12 RX ORDER — ACETAMINOPHEN 500 MG
1 TABLET ORAL DAILY
Qty: 90 CAPSULE | Refills: 3 | Status: SHIPPED | OUTPATIENT
Start: 2020-07-12 | End: 2021-07-12

## 2020-07-13 ENCOUNTER — TELEPHONE (OUTPATIENT)
Dept: GASTROENTEROLOGY | Facility: CLINIC | Age: 65
End: 2020-07-13

## 2020-07-13 NOTE — TELEPHONE ENCOUNTER
Left vm instructing pt to call back to receive test result and schedule appt (lab)       ----- Message from Alok Dykes MD sent at 7/12/2020 10:13 AM CDT -----  Anyi - Please tell patient that their Vitamin D level is low and recommend that they take Vitamin D 50,000 units once a week for 12 weeks and then start Vitamin D3 (2,000 units) over-the-counter once daily.     Anyi- please recheck their vitamin D level in 6 months - Orders placed.

## 2020-07-15 ENCOUNTER — TELEPHONE (OUTPATIENT)
Dept: GASTROENTEROLOGY | Facility: CLINIC | Age: 65
End: 2020-07-15

## 2020-07-15 NOTE — TELEPHONE ENCOUNTER
Left vm instructing pt to call back    ----- Message from Janis Doss MA sent at 7/15/2020  9:48 AM CDT -----  Contact: [phone]  Returning a call to [facility] for test results   [phone]

## 2020-07-16 ENCOUNTER — TELEPHONE (OUTPATIENT)
Dept: RHEUMATOLOGY | Facility: CLINIC | Age: 65
End: 2020-07-16

## 2020-07-16 DIAGNOSIS — M31.6 GCA (GIANT CELL ARTERITIS): ICD-10-CM

## 2020-07-16 RX ORDER — PREDNISONE 10 MG/1
40 TABLET ORAL DAILY
Qty: 120 TABLET | Refills: 1 | Status: SHIPPED | OUTPATIENT
Start: 2020-07-16 | End: 2020-07-22

## 2020-07-16 NOTE — TELEPHONE ENCOUNTER
Please schedule esr, crp this week. She should decrease prednisone to 40mg daily x 1wk, then 30mg daily x 1 wk, then 20mg daily and cont until return visit in 3 wks.  Rx sent to [facility] Thanks BELIA

## 2020-07-21 ENCOUNTER — TELEPHONE (OUTPATIENT)
Dept: RHEUMATOLOGY | Facility: CLINIC | Age: 65
End: 2020-07-21

## 2020-07-22 ENCOUNTER — LAB VISIT (OUTPATIENT)
Dept: LAB | Facility: HOSPITAL | Age: 65
End: 2020-07-22
Payer: MEDICARE

## 2020-07-22 ENCOUNTER — OFFICE VISIT (OUTPATIENT)
Dept: RHEUMATOLOGY | Facility: CLINIC | Age: 65
End: 2020-07-22
Payer: MEDICARE

## 2020-07-22 VITALS
DIASTOLIC BLOOD PRESSURE: 81 MMHG | HEIGHT: 64 IN | SYSTOLIC BLOOD PRESSURE: 127 MMHG | BODY MASS INDEX: 43.05 KG/M2 | WEIGHT: 252.19 LBS | HEART RATE: 82 BPM

## 2020-07-22 DIAGNOSIS — H54.7 VISUAL LOSS: ICD-10-CM

## 2020-07-22 DIAGNOSIS — M31.6 GCA (GIANT CELL ARTERITIS): ICD-10-CM

## 2020-07-22 DIAGNOSIS — M31.5 GIANT CELL ARTERITIS WITH POLYMYALGIA RHEUMATICA: Primary | ICD-10-CM

## 2020-07-22 DIAGNOSIS — K76.0 FATTY LIVER: ICD-10-CM

## 2020-07-22 DIAGNOSIS — Z63.9 FAMILY CIRCUMSTANCE: ICD-10-CM

## 2020-07-22 DIAGNOSIS — E78.5 DYSLIPIDEMIA: ICD-10-CM

## 2020-07-22 DIAGNOSIS — M31.5 GIANT CELL ARTERITIS WITH POLYMYALGIA RHEUMATICA: ICD-10-CM

## 2020-07-22 DIAGNOSIS — H46.9 OPTIC NEURITIS: ICD-10-CM

## 2020-07-22 DIAGNOSIS — E55.9 VITAMIN D DEFICIENCY: ICD-10-CM

## 2020-07-22 LAB
ALBUMIN SERPL BCP-MCNC: 3.9 G/DL (ref 3.5–5.2)
ALP SERPL-CCNC: 68 U/L (ref 55–135)
ALT SERPL W/O P-5'-P-CCNC: 48 U/L (ref 10–44)
ANION GAP SERPL CALC-SCNC: 8 MMOL/L (ref 8–16)
AST SERPL-CCNC: 36 U/L (ref 10–40)
BASOPHILS # BLD AUTO: 0.02 K/UL (ref 0–0.2)
BASOPHILS NFR BLD: 0.2 % (ref 0–1.9)
BILIRUB SERPL-MCNC: 1.8 MG/DL (ref 0.1–1)
BUN SERPL-MCNC: 11 MG/DL (ref 8–23)
CALCIUM SERPL-MCNC: 9.8 MG/DL (ref 8.7–10.5)
CHLORIDE SERPL-SCNC: 109 MMOL/L (ref 95–110)
CO2 SERPL-SCNC: 26 MMOL/L (ref 23–29)
CREAT SERPL-MCNC: 0.8 MG/DL (ref 0.5–1.4)
DIFFERENTIAL METHOD: ABNORMAL
EOSINOPHIL # BLD AUTO: 0.2 K/UL (ref 0–0.5)
EOSINOPHIL NFR BLD: 2.7 % (ref 0–8)
ERYTHROCYTE [DISTWIDTH] IN BLOOD BY AUTOMATED COUNT: 13.8 % (ref 11.5–14.5)
ERYTHROCYTE [SEDIMENTATION RATE] IN BLOOD BY WESTERGREN METHOD: <2 MM/HR (ref 0–36)
ERYTHROCYTE [SEDIMENTATION RATE] IN BLOOD BY WESTERGREN METHOD: <2 MM/HR (ref 0–36)
EST. GFR  (AFRICAN AMERICAN): >60 ML/MIN/1.73 M^2
EST. GFR  (NON AFRICAN AMERICAN): >60 ML/MIN/1.73 M^2
GLUCOSE SERPL-MCNC: 114 MG/DL (ref 70–110)
HCT VFR BLD AUTO: 40 % (ref 37–48.5)
HGB BLD-MCNC: 13.3 G/DL (ref 12–16)
IMM GRANULOCYTES # BLD AUTO: 0.06 K/UL (ref 0–0.04)
IMM GRANULOCYTES NFR BLD AUTO: 0.7 % (ref 0–0.5)
LYMPHOCYTES # BLD AUTO: 2.5 K/UL (ref 1–4.8)
LYMPHOCYTES NFR BLD: 29.6 % (ref 18–48)
MCH RBC QN AUTO: 31.7 PG (ref 27–31)
MCHC RBC AUTO-ENTMCNC: 33.3 G/DL (ref 32–36)
MCV RBC AUTO: 95 FL (ref 82–98)
MONOCYTES # BLD AUTO: 0.9 K/UL (ref 0.3–1)
MONOCYTES NFR BLD: 9.9 % (ref 4–15)
NEUTROPHILS # BLD AUTO: 4.9 K/UL (ref 1.8–7.7)
NEUTROPHILS NFR BLD: 56.9 % (ref 38–73)
NRBC BLD-RTO: 0 /100 WBC
PLATELET # BLD AUTO: 203 K/UL (ref 150–350)
PMV BLD AUTO: 11 FL (ref 9.2–12.9)
POTASSIUM SERPL-SCNC: 4 MMOL/L (ref 3.5–5.1)
PROT SERPL-MCNC: 6.8 G/DL (ref 6–8.4)
RBC # BLD AUTO: 4.2 M/UL (ref 4–5.4)
SODIUM SERPL-SCNC: 143 MMOL/L (ref 136–145)
WBC # BLD AUTO: 8.58 K/UL (ref 3.9–12.7)

## 2020-07-22 PROCEDURE — 99999 PR PBB SHADOW E&M-EST. PATIENT-LVL IV: ICD-10-PCS | Mod: PBBFAC,,, | Performed by: INTERNAL MEDICINE

## 2020-07-22 PROCEDURE — 36415 COLL VENOUS BLD VENIPUNCTURE: CPT

## 2020-07-22 PROCEDURE — 85025 COMPLETE CBC W/AUTO DIFF WBC: CPT

## 2020-07-22 PROCEDURE — 99999 PR PBB SHADOW E&M-EST. PATIENT-LVL IV: CPT | Mod: PBBFAC,,, | Performed by: INTERNAL MEDICINE

## 2020-07-22 PROCEDURE — 99213 PR OFFICE/OUTPT VISIT, EST, LEVL III, 20-29 MIN: ICD-10-PCS | Mod: S$PBB,,, | Performed by: INTERNAL MEDICINE

## 2020-07-22 PROCEDURE — 80053 COMPREHEN METABOLIC PANEL: CPT

## 2020-07-22 PROCEDURE — 99214 OFFICE O/P EST MOD 30 MIN: CPT | Mod: PBBFAC | Performed by: INTERNAL MEDICINE

## 2020-07-22 PROCEDURE — 99213 OFFICE O/P EST LOW 20 MIN: CPT | Mod: S$PBB,,, | Performed by: INTERNAL MEDICINE

## 2020-07-22 PROCEDURE — 85652 RBC SED RATE AUTOMATED: CPT

## 2020-07-22 RX ORDER — PREDNISONE 5 MG/1
5 TABLET ORAL DAILY
Qty: 120 TABLET | Refills: 0 | Status: SHIPPED | OUTPATIENT
Start: 2020-07-22 | End: 2020-11-23 | Stop reason: SDUPTHER

## 2020-07-22 RX ORDER — PREDNISONE 1 MG/1
1 TABLET ORAL DAILY
Qty: 70 TABLET | Refills: 0 | Status: SHIPPED | OUTPATIENT
Start: 2020-07-22 | End: 2020-11-23 | Stop reason: SDUPTHER

## 2020-07-22 SDOH — SOCIAL DETERMINANTS OF HEALTH (SDOH): PROBLEM RELATED TO PRIMARY SUPPORT GROUP, UNSPECIFIED: Z63.9

## 2020-07-22 NOTE — PATIENT INSTRUCTIONS
Prednisone Taper:  Week 1: Take 9mg prednisone (one 5mg and four 1mg)  Week 2: Take 8mg prednisone (one 5mg and three 1mg)  Week 3: Take 7mg prednisone (one 5 mg and two 1mg)  Week 4: Take 6mg prednisone (one 5mg and one 1mg)  Week 5: Take 5mg prednisone (one 5mg)  Continue on 5mg every day.    - Return to clinic in 6 weeks  - Repeat Labs  - Obtain Shingrex Vaccine  - Continue tocilizumab, make sure not to miss any doses  - Social work will contact you to begin dialogue and identify potential resources.   - Make appointment to get US Carotid Arteries  - Make appointment to get MRA Chest  - Make appointment to get Endoscopy  - Make appointment to follow up with ophthalmology to assess vision changes and potential treatment options     - Follow up with GI and hepatology

## 2020-07-22 NOTE — ASSESSMENT & PLAN NOTE
The 10-year ASCVD risk score (Cha SINGH Jr., et al., 2013) is: 7%    - Continue pravastatin 40mg qhs

## 2020-07-22 NOTE — ASSESSMENT & PLAN NOTE
Fibro Scan: 6/25/2020  Fibrosis interpretation is based on medial liver stiffness - Kilopascal (kPa).  Fibrosis Stage:  F4  Steatosis interpretation is based on controlled attenuation parameter - (dB/m).  Steatosis Grade:  S3  - Continue to follow up with hepatology and recommendations including monthly ultrasounds for 6 months

## 2020-07-22 NOTE — ASSESSMENT & PLAN NOTE
Recent GCA flare that has led to progression of vision loss.   - resume steroid taper  - Referral ophthalmology

## 2020-07-22 NOTE — ASSESSMENT & PLAN NOTE
Ref. Range 7/8/2020 09:43   Vit D, 25-Hydroxy Latest Ref Range: 30 - 96 ng/mL 22 (L)     - Continue ergocalciferol 50,000 units every week   - Continue Cholecalciferol 50mcg qday  - Repeat Vit D next visit

## 2020-07-22 NOTE — ASSESSMENT & PLAN NOTE
Recent flare after stopping tocilizumab therapy with what may be permanent progression of vision loss. Loss to follow up led to abrupt taper and discontinuing before adequate therapy. Patient has been unable to complete previously ordered imaging.   - MRA chest  - US Carotid Artery  - Prednisone taper starting at 9mg/day and decreasing 1mg each week.   - Follow up in 6 weeks, patient should be at 5mg prednisone/day   - Continue tocilizumab injection 162 mg q week  - Shingrex vaccine for immunocompromising therapy

## 2020-07-22 NOTE — PROGRESS NOTES
Subjective:       Patient ID: Genesis Ugalde is a 64 y.o. female with PMH of GCA and PMR w/optic neuritis and vision loss, HTN, HLD, NAFLD, and Vit D defieicney     Chief Complaint: GCA & PMR w/vision loss; Follow-up    HPI   Last visit, 4/30/2020, presented with GCA flare with visual changes and increased headaches. Was off tocilizumab for 4 weeks due to running out. ESR was 30, CRP was 0.4.  Was treated prescribed 2 days pulse solumedrol and was given a prednisone tapering schedule starting at 60mg with instructions to follow up in 2 weeks to begin taper, but did not make it to follow up. Was instructed to f/u with hep for liver mass and NAFLD, to obtain MRA chest, CT chest, continue tocilizumab.     Interval history: Obtained fibroscan 6/25/2020, steatosis grade S3 and fibrosis F4. Saw hepatology who recommends 6 monthly US     Todays visit:   Reports she was symptomatic with the 60mg prednisone experiencing irregular heartbeat. Reports that there was no 2 week follow up scheduled after the initial visit so was unable to complete the 2 week follow up. Took 60mg prednisone for 2 months, tapered to 40mg/day for a couple weeks, then tapered 20mg for a couple days, then to 0 mg, hasn't taken prednisone for a couple weeks (only able to estimate general time, unsure of exact dates). Previously perminently blind in L eye, had some preserved vision in R eye. Reports that she lost almost complete eye sight of R eye and is near total blindness after missing the tocilizumab. Reports light perception and able to see shilloute of objects with light contrast, but can't see details with R eye. Steroid course did not restore any vision.     Has not visited ophthalmologist, reports her optho doc had surgery and is not in clinic currently.     Reports continued occipital and left facial headaches, bilateral jaw pain, discomfort in L shoulder that descends down arm and hip pain.        Review of Systems   Constitutional: Negative  "for fever and unexpected weight change.   HENT: Positive for trouble swallowing.    Eyes: Negative for redness.   Respiratory: Negative for cough and shortness of breath.    Cardiovascular: Positive for chest pain ( Attributes to stomach, has EGD ordered, trying to schedule). Negative for palpitations ( previously had with prednisone, not currently taking ).   Gastrointestinal: Positive for diarrhea. Negative for constipation.   Genitourinary: Negative for dysuria and genital sores.   Skin: Negative for rash.   Neurological: Positive for headaches.   Hematological: Does not bruise/bleed easily.         Objective:   /81 (BP Location: Right arm, Patient Position: Sitting)   Pulse 82   Ht 5' 4" (1.626 m)   Wt 114.4 kg (252 lb 3.3 oz)   BMI 43.29 kg/m²      Physical Exam   Constitutional: She is well-developed, well-nourished, and in no distress.   Neck: Normal range of motion.   Cardiovascular: Normal rate and regular rhythm.  Exam reveals no gallop and no friction rub.    No murmur heard.  Pulmonary/Chest: Effort normal and breath sounds normal. No respiratory distress. She has no wheezes. She has no rales.       Right Side Rheumatological Exam     Muscle Strength (0-5 scale):  Deltoid:  5  Biceps: 5/5   Triceps:  5  : 5/5   Iliopsoas: 5  Quadriceps:  5   Distal Lower Extremity: 5    Left Side Rheumatological Exam     Muscle Strength (0-5 scale):  Deltoid:  4 (due to pain)  Biceps: 5/5   Triceps:  4 (due to pain)  :  4 (due to pain)/5   Iliopsoas: 5  Quadriceps:  5   Distal Lower Extremity: 5      Skin: She is not diaphoretic.     Psychiatric:   Labile mood, intermittently cheerful and laughing and then crying due to missing tocilizumab, vision loss progression, and family/home crisis, crying intermittently during exam        No data to display     Labs:    Chemistry        Component Value Date/Time     07/08/2020 0943    K 4.2 07/08/2020 0943     07/08/2020 0943    CO2 24 07/08/2020 0943 "    BUN 14 07/08/2020 0943    CREATININE 0.8 07/08/2020 0943     (H) 07/08/2020 0943        Component Value Date/Time    CALCIUM 9.5 07/08/2020 0943    ALKPHOS 73 06/19/2020 0846    AST 29 06/19/2020 0846    ALT 59 (H) 06/19/2020 0846    BILITOT 1.3 (H) 06/19/2020 0846    ESTGFRAFRICA >60.0 07/08/2020 0943    EGFRNONAA >60.0 07/08/2020 0943        Lab Results   Component Value Date    WBC 15.07 (H) 06/19/2020    RBC 4.18 06/19/2020    HGB 12.8 06/19/2020    MCV 94 06/19/2020    MCH 30.6 06/19/2020    MCHC 32.6 06/19/2020    RDW 14.2 06/19/2020     06/19/2020    MPV 10.5 06/19/2020    GRAN 12.4 (H) 06/19/2020    GRAN 82.2 (H) 06/19/2020    LYMPH 1.8 06/19/2020    LYMPH 11.8 (L) 06/19/2020    MONO 0.4 06/19/2020    MONO 2.5 (L) 06/19/2020    EOS 0.0 06/19/2020    BASO 0.04 06/19/2020        Ref. Range 7/8/2020 09:43   Vit D, 25-Hydroxy Latest Ref Range: 30 - 96 ng/mL 22 (L)       Sed Rate Latest Ref Range: 0 - 36 mm/Hr 30     CRP Latest Ref Range: 0.0 - 8.2 mg/L 3.3     The 10-year ASCVD risk score (Cha DC Jr., et al., 2013) is: 7%    Values used to calculate the score:      Age: 64 years      Sex: Female      Is Non- : Yes      Diabetic: No      Tobacco smoker: No      Systolic Blood Pressure: 127 mmHg      Is BP treated: Yes      HDL Cholesterol: 53 mg/dL      Total Cholesterol: 181 mg/dL    Imaging:  Fibro Scan: 6/25/2020  Findings  Median liver stiffness score:  15.6  CAP Reading: dB/m:  336  IQR/med %:  15  Interpretation  Fibrosis interpretation is based on medial liver stiffness - Kilopascal (kPa).  Fibrosis Stage:  F4  Steatosis interpretation is based on controlled attenuation parameter - (dB/m).  Steatosis Grade:  S3  US Abdomen: 5/18/2020  Impression:     Hepatic steatosis.  Previously seen lesion not evident on this examination.  Assessment:       1. Giant cell arteritis with polymyalgia rheumatica    2. Dyslipidemia    3. Optic neuritis    4. Visual loss    5. Fatty  liver    6. Vitamin D deficiency    7. Family circumstance            Plan:       Problem List Items Addressed This Visit        Psychiatric    Family circumstance     Patient had labile mood on exam, from cheerful and laughing to sorrow and crying. Multifactorial, with progression of vision loss after missing treatment but also with family related strains that are affecting her ability to adhere to her treatment  - Contacted social work, social work will contact patient            Ophtho    Visual loss     Recent GCA flare that has led to progression of vision loss.   - resume steroid taper  - Referral ophthalmology          Relevant Orders    Ambulatory referral/consult to Ophthalmology    Sedimentation rate    Optic neuritis    Relevant Orders    Ambulatory referral/consult to Ophthalmology    Sedimentation rate       Cardiac/Vascular    Dyslipidemia     The 10-year ASCVD risk score (Cha SAMANTHA Jr., et al., 2013) is: 7%    - Continue pravastatin 40mg qhs            Immunology/Multi System    Giant cell arteritis with polymyalgia rheumatica - Primary     Recent flare after stopping tocilizumab therapy with what may be permanent progression of vision loss. Loss to follow up led to abrupt taper and discontinuing before adequate therapy. Patient has been unable to complete previously ordered imaging.   - MRA chest  - US Carotid Artery  - Prednisone taper starting at 9mg/day and decreasing 1mg each week.   - Follow up in 6 weeks, patient should be at 5mg prednisone/day   - Continue tocilizumab injection 162 mg q week  - Shingrex vaccine for immunocompromising therapy          Relevant Orders    Ambulatory referral/consult to Ophthalmology    Sedimentation rate       Endocrine    Vitamin D deficiency      Ref. Range 7/8/2020 09:43   Vit D, 25-Hydroxy Latest Ref Range: 30 - 96 ng/mL 22 (L)     - Continue ergocalciferol 50,000 units every week   - Continue Cholecalciferol 50mcg qday  - Repeat Vit D next visit            GI     Fatty liver     Fibro Scan: 6/25/2020  Fibrosis interpretation is based on medial liver stiffness - Kilopascal (kPa).  Fibrosis Stage:  F4  Steatosis interpretation is based on controlled attenuation parameter - (dB/m).  Steatosis Grade:  S3  - Continue to follow up with hepatology and recommendations including monthly ultrasounds for 6 months             I performed a history, review of systems, and physical exam with the patient. The assessment and plan were discussed and agreed upon with Dr. Waldrop, the attending of record, before sharing with the patient. The face to face encounter time, including answering all patient questions, was 1 hour.     Jimmy Parr, PGY 1

## 2020-07-23 NOTE — PROGRESS NOTES
I have personally taken the history and examined the patient and agree with the resident,s note as stated above  Son attempted suicide with gun presence of daughter. Police called and attempt aborted. Lost job and medical insurance. Psychiatrist saw once and prescribed antidepressant and no f/u. Not helping     S/p flare of GCA with visual changes, increased frontotemporal headaches off tocilizumab x 4 wks with PMR ESR 30  CRP 0.4 4/30/20 prior visit. She received pulse Solu-Medrol x 2 then 60mg until one month, decreased to 40mg x 2 wks, then 20mg daily x 2 wks, off x 2 wks. Has remained on Actemra 162mg sc weekly since last visit   Optic neuritis, uveitis, visual loss. Overdue for f/u with Dr. Ro who recently had surgery, returns next week  /81  Dyslipidemia LDL 74.8 6/3/19  Morbid obesity gained 30# since Jan  Liver mass, fatty liver, now cirrhosis on fibroscan,  H/o colon polyps  Bilateral rotator cuff tendinitis  Cervical myofascial pain  Left deQuervain's           Prednisone 9 mg daily x 1wk, and then decrease 1mg weekly down to 5mg daily and continue  Contact Dr. Ro for Ophthalmologic exam  *Shingrix series  F/u Dr. Blackwell in Hepatology   NAFLD and cirrhosis  MRA chest as previously ordered 1/27/20 when possible  TA  US as previously ordered 1/27/20 when possible  CT chest for pulmonary nodules as previously ordered 1/27/20 when possible  MRA as previously ordered to assess for LVV  Cont tocilizumab 162mg sc q wk  Cont vitamin D2 50,000 units once a week  Cont pravastatin 40mg q pm  [facility] contacted  and was told they would call patient to arrange appropriate services for son  RTC 3 months with standing labs, vitamin D, lipid panel    answers for HPI/ROS submitted by the patient on 4/29/2020   fever: No  eye redness: No  headaches: Yes  shortness of breath: No  chest pain: No  trouble swallowing: Yes  diarrhea: No  constipation: No  unexpected weight change:  No  genital sore: No  dysuria: No  During the last 3 days, have you had a skin rash?: No  Bruises or bleeds easily: No  cough: No         Progress Notes      Answers for HPI/ROS submitted by the patient on 7/21/2020   fever: No  eye redness: No  headaches: Yes  shortness of breath: No  chest pain: Yes  trouble swallowing: Yes  diarrhea: Yes  constipation: No  unexpected weight change: No  genital sore: No  dysuria: No  During the last 3 days, have you had a skin rash?: No  Bruises or bleeds easily: No  cough: No

## 2020-07-28 ENCOUNTER — TELEPHONE (OUTPATIENT)
Dept: RHEUMATOLOGY | Facility: CLINIC | Age: 65
End: 2020-07-28

## 2020-07-28 NOTE — TELEPHONE ENCOUNTER
Spoke to Lily at [facility], the Pharmacist that was taking care of patient is gone for the day.   She doesn't see any required information in chart      ----- Message from Radha Gatica sent at 7/28/2020 12:06 PM CDT -----  [facility] would like to receive a call back regarding a shingle prescription with a dx code. Please contact [facility] to advise.    Contact info

## 2020-09-20 NOTE — PROGRESS NOTES
HPI     DLS: 8/05/19    Pt here for Overdue Glaucoma Check;  Pt states she's been having off and on eye pain to her OS. Pt also c/o   headaches.     Meds:   Dorzolamide TID OU   Brimonidine TID OU   Latanoprost QHS OU       1) Autoimmune Optic Neuropathy OU   2) POAG vs OHT   3) GCA with PMR   4) Type 2 DM   5) Steroid Responder   6) NS OU   7) APD OD     Last edited by Doris Clayton on 9/21/2020  8:41 AM. (History)              Assessment /Plan     For exam results, see Encounter Report.    Primary open angle glaucoma of both eyes, mild stage    Steroid responder, bilateral    Giant cell arteritis with polymyalgia rheumatica    Inflammatory optic neuropathy    Optic neuritis    Anatomical narrow angle borderline glaucoma, bilateral    Nuclear sclerosis, bilateral    Visual loss          1. Autoimmune optic neuropathy OU,   - OS 2004  - OD 2012  - unknown antibiody  - several treatments with IV steroid and IVIG  - vision and VF per [place] records  - PO steroid daily - 10 mg a day   - sural nerve and muscle biopsy done - results pending - pt to obtain results form [facility]  - xal qhs ou   - monitor  - rheum monitoring esr/crp  - all previous mri's and lp negative per rheum note  Last ESR / and c-react prot - both very low      Exacerbation - when did not get Actemra - may 2020 - had progression od - stabilized again 9/2020     2.  Steroid responder in past  - on chronic steroids for #1  iop good today   Continue xal qhs ou    3. POAG - mild vs OHT ou      First HVF   2004   First photos   2004   Treatment / Drops started   2004           Family history    neg        Glaucoma meds    Latanoprost ou        H/O adverse rxn to glaucoma drops    none        LASERS    None         GLAUCOMA SURGERIES    none        OTHER EYE SURGERIES    none        CDR    0.9 / 0.9 - +++ pallor ou         Tbase    25-27 / 25-27          Tmax    27/27            Ttarget    20/20             HVF    12 test 2004 to 2012  - SALT / IAD od // Ext os     "                4 10-2 stim V OD 2012 - 2014 - [facility]    (+ prog from 2012 to 2014 - when followed at [facility])    7- test-  10-2 stim V test done od 12/2014 to 2017 - dense SALT and IAD (+stable)         Gonio    +2-3 ou (very slight narrowing)         CCT    591/612        OCT    7 test 2006 to 2016 - RNFL - dec throughout od // dec thru out  os        HRT    6 test 2004 to 2018 - MR - nl od // off-center os // new baseline 1/25/2016 - nl ou -"too good to be true"        Disc photos    2004 - slides // 2008, 2010, 2012, , 2012, 2012, 2012, 2015, 2017   - OIS     - Ttoday    16/16  - Test done today  monitor IOP and VA check     PLAN  CSM - IOP good     Sees Benjie   He is monitoring sed rate and c react prot   Cont PO pred 6 mg a day   Cont actemra  (tocilizumab) 162 mg sc weekly  Cont prilosec (omeprazole)  20mg daily - to protect stomach     Cont brimonidine tid ou  Cont latanoprost qhs ou  Cont dorzolamide tid ou  - IOP was better     -would rec PI's prior to doing and slt's - has some mild  ant bowing and narrowing   - avoid BB - H/O asthma as a child     Currently on prednisone 5 mg q day and decreasing- Quinet    NO MORE VF testing - essentially  EXT ou      F/u 6 mos--IOP // DFE // ? Photos     Insurance forms filled out again (yearly)  8/5/2019     I have seen and personally examined the patient.  I agree with the findings, assessment and plan of the resident and/or fellow.     Tanna Ro MD  "

## 2020-09-21 ENCOUNTER — OFFICE VISIT (OUTPATIENT)
Dept: OPHTHALMOLOGY | Facility: CLINIC | Age: 65
End: 2020-09-21
Payer: MEDICARE

## 2020-09-21 DIAGNOSIS — H54.7 VISUAL LOSS: ICD-10-CM

## 2020-09-21 DIAGNOSIS — H46.9 OPTIC NEURITIS: ICD-10-CM

## 2020-09-21 DIAGNOSIS — H46.9 INFLAMMATORY OPTIC NEUROPATHY: ICD-10-CM

## 2020-09-21 DIAGNOSIS — H40.033 ANATOMICAL NARROW ANGLE BORDERLINE GLAUCOMA, BILATERAL: ICD-10-CM

## 2020-09-21 DIAGNOSIS — M31.5 GIANT CELL ARTERITIS WITH POLYMYALGIA RHEUMATICA: ICD-10-CM

## 2020-09-21 DIAGNOSIS — H40.1131 PRIMARY OPEN ANGLE GLAUCOMA OF BOTH EYES, MILD STAGE: Primary | ICD-10-CM

## 2020-09-21 DIAGNOSIS — H25.13 NUCLEAR SCLEROSIS, BILATERAL: ICD-10-CM

## 2020-09-21 DIAGNOSIS — H40.043 STEROID RESPONDER, BILATERAL: ICD-10-CM

## 2020-09-21 PROCEDURE — 99999 PR PBB SHADOW E&M-EST. PATIENT-LVL III: CPT | Mod: PBBFAC,,, | Performed by: OPHTHALMOLOGY

## 2020-09-21 PROCEDURE — 92012 PR EYE EXAM, EST PATIENT,INTERMED: ICD-10-PCS | Mod: S$PBB,,, | Performed by: OPHTHALMOLOGY

## 2020-09-21 PROCEDURE — 99213 OFFICE O/P EST LOW 20 MIN: CPT | Mod: PBBFAC | Performed by: OPHTHALMOLOGY

## 2020-09-21 PROCEDURE — 99999 PR PBB SHADOW E&M-EST. PATIENT-LVL III: ICD-10-PCS | Mod: PBBFAC,,, | Performed by: OPHTHALMOLOGY

## 2020-09-21 PROCEDURE — 92012 INTRM OPH EXAM EST PATIENT: CPT | Mod: S$PBB,,, | Performed by: OPHTHALMOLOGY

## 2020-09-21 RX ORDER — DORZOLAMIDE HCL 20 MG/ML
1 SOLUTION/ DROPS OPHTHALMIC 3 TIMES DAILY
Qty: 30 ML | Refills: 4 | Status: SHIPPED | OUTPATIENT
Start: 2020-09-21 | End: 2021-09-16 | Stop reason: SDUPTHER

## 2020-09-21 RX ORDER — LATANOPROST 50 UG/ML
1 SOLUTION/ DROPS OPHTHALMIC NIGHTLY
Qty: 3 BOTTLE | Refills: 4 | Status: SHIPPED | OUTPATIENT
Start: 2020-09-21 | End: 2021-09-16 | Stop reason: SDUPTHER

## 2020-09-21 RX ORDER — BRIMONIDINE TARTRATE 2 MG/ML
1 SOLUTION/ DROPS OPHTHALMIC 3 TIMES DAILY
Qty: 30 ML | Refills: 4 | Status: SHIPPED | OUTPATIENT
Start: 2020-09-21 | End: 2021-09-16 | Stop reason: SDUPTHER

## 2020-09-30 ENCOUNTER — TELEPHONE (OUTPATIENT)
Dept: RHEUMATOLOGY | Facility: CLINIC | Age: 65
End: 2020-09-30

## 2020-09-30 ENCOUNTER — PATIENT MESSAGE (OUTPATIENT)
Dept: RHEUMATOLOGY | Facility: CLINIC | Age: 65
End: 2020-09-30

## 2020-09-30 NOTE — TELEPHONE ENCOUNTER
I send the patient a message, and I left her a phone message.  Telling her she needed to come here to the er

## 2020-11-19 ENCOUNTER — LAB VISIT (OUTPATIENT)
Dept: LAB | Facility: HOSPITAL | Age: 65
End: 2020-11-19
Attending: INTERNAL MEDICINE
Payer: MEDICARE

## 2020-11-19 DIAGNOSIS — M31.6 GCA (GIANT CELL ARTERITIS): ICD-10-CM

## 2020-11-19 LAB
ALBUMIN SERPL BCP-MCNC: 3.9 G/DL (ref 3.5–5.2)
ALP SERPL-CCNC: 69 U/L (ref 55–135)
ALT SERPL W/O P-5'-P-CCNC: 47 U/L (ref 10–44)
ANION GAP SERPL CALC-SCNC: 13 MMOL/L (ref 8–16)
AST SERPL-CCNC: 32 U/L (ref 10–40)
BASOPHILS # BLD AUTO: 0.03 K/UL (ref 0–0.2)
BASOPHILS NFR BLD: 0.3 % (ref 0–1.9)
BILIRUB SERPL-MCNC: 1.2 MG/DL (ref 0.1–1)
BUN SERPL-MCNC: 9 MG/DL (ref 8–23)
CALCIUM SERPL-MCNC: 9 MG/DL (ref 8.7–10.5)
CHLORIDE SERPL-SCNC: 110 MMOL/L (ref 95–110)
CO2 SERPL-SCNC: 23 MMOL/L (ref 23–29)
CREAT SERPL-MCNC: 0.9 MG/DL (ref 0.5–1.4)
CRP SERPL-MCNC: 0.8 MG/L (ref 0–8.2)
DIFFERENTIAL METHOD: ABNORMAL
EOSINOPHIL # BLD AUTO: 0.2 K/UL (ref 0–0.5)
EOSINOPHIL NFR BLD: 1.4 % (ref 0–8)
ERYTHROCYTE [DISTWIDTH] IN BLOOD BY AUTOMATED COUNT: 13.7 % (ref 11.5–14.5)
ERYTHROCYTE [SEDIMENTATION RATE] IN BLOOD BY WESTERGREN METHOD: <2 MM/HR (ref 0–36)
EST. GFR  (AFRICAN AMERICAN): >60 ML/MIN/1.73 M^2
EST. GFR  (NON AFRICAN AMERICAN): >60 ML/MIN/1.73 M^2
GLUCOSE SERPL-MCNC: 130 MG/DL (ref 70–110)
HCT VFR BLD AUTO: 41.4 % (ref 37–48.5)
HGB BLD-MCNC: 13.5 G/DL (ref 12–16)
IMM GRANULOCYTES # BLD AUTO: 0.07 K/UL (ref 0–0.04)
IMM GRANULOCYTES NFR BLD AUTO: 0.7 % (ref 0–0.5)
LYMPHOCYTES # BLD AUTO: 2.1 K/UL (ref 1–4.8)
LYMPHOCYTES NFR BLD: 20.2 % (ref 18–48)
MCH RBC QN AUTO: 31.5 PG (ref 27–31)
MCHC RBC AUTO-ENTMCNC: 32.6 G/DL (ref 32–36)
MCV RBC AUTO: 97 FL (ref 82–98)
MONOCYTES # BLD AUTO: 0.6 K/UL (ref 0.3–1)
MONOCYTES NFR BLD: 5.4 % (ref 4–15)
NEUTROPHILS # BLD AUTO: 7.5 K/UL (ref 1.8–7.7)
NEUTROPHILS NFR BLD: 72 % (ref 38–73)
NRBC BLD-RTO: 0 /100 WBC
PLATELET # BLD AUTO: 238 K/UL (ref 150–350)
PMV BLD AUTO: 11.4 FL (ref 9.2–12.9)
POTASSIUM SERPL-SCNC: 4.1 MMOL/L (ref 3.5–5.1)
PROT SERPL-MCNC: 6.7 G/DL (ref 6–8.4)
RBC # BLD AUTO: 4.29 M/UL (ref 4–5.4)
SODIUM SERPL-SCNC: 146 MMOL/L (ref 136–145)
WBC # BLD AUTO: 10.36 K/UL (ref 3.9–12.7)

## 2020-11-19 PROCEDURE — 36415 COLL VENOUS BLD VENIPUNCTURE: CPT | Mod: PO

## 2020-11-19 PROCEDURE — 85652 RBC SED RATE AUTOMATED: CPT

## 2020-11-19 PROCEDURE — 80053 COMPREHEN METABOLIC PANEL: CPT

## 2020-11-19 PROCEDURE — 86140 C-REACTIVE PROTEIN: CPT

## 2020-11-19 PROCEDURE — 85025 COMPLETE CBC W/AUTO DIFF WBC: CPT

## 2020-11-20 ENCOUNTER — TELEPHONE (OUTPATIENT)
Dept: RHEUMATOLOGY | Facility: CLINIC | Age: 65
End: 2020-11-20

## 2020-11-20 ENCOUNTER — PATIENT MESSAGE (OUTPATIENT)
Dept: RHEUMATOLOGY | Facility: CLINIC | Age: 65
End: 2020-11-20

## 2020-11-23 ENCOUNTER — OFFICE VISIT (OUTPATIENT)
Dept: RHEUMATOLOGY | Facility: CLINIC | Age: 65
End: 2020-11-23
Payer: MEDICARE

## 2020-11-23 ENCOUNTER — PATIENT MESSAGE (OUTPATIENT)
Dept: RHEUMATOLOGY | Facility: CLINIC | Age: 65
End: 2020-11-23

## 2020-11-23 VITALS — BODY MASS INDEX: 42.34 KG/M2 | HEIGHT: 64 IN | WEIGHT: 248 LBS

## 2020-11-23 DIAGNOSIS — H54.7 VISUAL LOSS: ICD-10-CM

## 2020-11-23 DIAGNOSIS — H20.9 UVEITIS: ICD-10-CM

## 2020-11-23 DIAGNOSIS — H46.9 OPTIC NEURITIS: ICD-10-CM

## 2020-11-23 DIAGNOSIS — R10.13 EPIGASTRIC ABDOMINAL PAIN: ICD-10-CM

## 2020-11-23 DIAGNOSIS — E55.9 VITAMIN D DEFICIENCY: ICD-10-CM

## 2020-11-23 DIAGNOSIS — M31.5 GIANT CELL ARTERITIS WITH POLYMYALGIA RHEUMATICA: ICD-10-CM

## 2020-11-23 DIAGNOSIS — H54.10 BLINDNESS AND LOW VISION: ICD-10-CM

## 2020-11-23 DIAGNOSIS — E66.01 MORBID OBESITY WITH BMI OF 40.0-44.9, ADULT: ICD-10-CM

## 2020-11-23 DIAGNOSIS — R91.1 SOLITARY PULMONARY NODULE: ICD-10-CM

## 2020-11-23 DIAGNOSIS — K76.0 FATTY LIVER: ICD-10-CM

## 2020-11-23 PROCEDURE — 99443 PR PHYSICIAN TELEPHONE EVALUATION 21-30 MIN: CPT | Mod: 95,,, | Performed by: INTERNAL MEDICINE

## 2020-11-23 PROCEDURE — 99443 PR PHYSICIAN TELEPHONE EVALUATION 21-30 MIN: ICD-10-PCS | Mod: 95,,, | Performed by: INTERNAL MEDICINE

## 2020-11-23 RX ORDER — PRAVASTATIN SODIUM 40 MG/1
40 TABLET ORAL NIGHTLY
Qty: 90 TABLET | Refills: 3 | Status: SHIPPED | OUTPATIENT
Start: 2020-11-23 | End: 2022-02-24 | Stop reason: SDUPTHER

## 2020-11-23 RX ORDER — ERGOCALCIFEROL 1.25 MG/1
CAPSULE ORAL
Qty: 12 CAPSULE | Refills: 3 | Status: SHIPPED | OUTPATIENT
Start: 2020-11-23 | End: 2022-02-24 | Stop reason: SDUPTHER

## 2020-11-23 RX ORDER — PREDNISONE 5 MG/1
5 TABLET ORAL DAILY
Qty: 120 TABLET | Refills: 0 | Status: SHIPPED | OUTPATIENT
Start: 2020-11-23 | End: 2021-03-12 | Stop reason: SDUPTHER

## 2020-11-23 NOTE — PROGRESS NOTES
I have personally taken the history and examined the patient and agree with the resident,s note as stated above       The patient location is: home  The chief complaint leading to consultation is: GCA    Visit type: audiovisual    Face to Face time with patient: 25 min  25 minutes of total time spent on the encounter, which includes face to face time and non-face to face time preparing to see the patient (eg, review of tests), Obtaining and/or reviewing separately obtained history, Documenting clinical information in the electronic or other health record, Independently interpreting results (not separately reported) and communicating results to the patient/family/caregiver, or Care coordination (not separately reported).         Each patient to whom he or she provides medical services by telemedicine is:  (1) informed of the relationship between the physician and patient and the respective role of any other health care provider with respect to management of the patient; and (2) notified that he or she may decline to receive medical services by telemedicine and may withdraw from such care at any time.    Notes:           Prior to last visit, son attempted suicide with gun presence of daughter. Police called and attempt aborted. Lost job and medical insurance. Psychiatrist saw once and prescribed antidepressant and no f/u. Not helping     S/p flare of GCA with visual changes, increased frontotemporal headaches off tocilizumab x 4 wks with PMR ESR 30  CRP 0.4 4/30/20 prior visit. She received pulse Solu-Medrol x 2 then 60mg until one month, decreased to 40mg x 2 wks, then 20mg daily x 2 wks, off x 2 wks. Has remained on Actemra 162mg sc weekly since last visit   Autoimmune Optic neuritis, uveitis, visual loss. Had f/u with Dr. Ro 9/21/20EH 127/81  Dyslipidemia LDL 74.8 6/3/19  Morbid obesity lost 4#  Liver mass, fatty liver, now cirrhosis on fibroscan,  H/o colon polyps  Bilateral rotator cuff  tendinitis  Cervical myofascial pain  Left deQuervain's   vitamin D deficiency 22 7/8/20        Prednisone 5mg daily   *Shingrix series  *high dose flu vaccine  F/u Dr. Blackwell in Hepatology due 12/5/20   NAFLD and cirrhosis  MRA chest as previously ordered 1/27/20 when possible  TA  US as previously ordered 1/27/20 when possible  CT chest for pulmonary nodules as previously ordered 1/27/20 when possible  Cont tocilizumab 162mg sc q wk  Cont vitamin D2 50,000 units once a week  Add vitamin D3 1000 units daily  Cont pravastatin 40mg q pm  [facility] contacted  and was told they would call patient to arrange appropriate services for son  RTC 3 months with standing labs, vitamin D, lipid panel                            Answers for HPI/ROS submitted by the patient on 11/22/2020   fever: No  eye redness: No  mouth sores: No  headaches: Yes  shortness of breath: No  chest pain: Yes  trouble swallowing: Yes  diarrhea: No  constipation: No  unexpected weight change: No  genital sore: No  dysuria: No  During the last 3 days, have you had a skin rash?: No  Bruises or bleeds easily: No  cough: No

## 2020-11-23 NOTE — PROGRESS NOTES
Subjective:   The patient location is: home  The chief complaint leading to consultation is: GCA follow-up    Visit type: audiovisual    Face to Face time with patient: 30 minutes  30 minutes of total time spent on the encounter, which includes face to face time and non-face to face time preparing to see the patient (eg, review of tests), Obtaining and/or reviewing separately obtained history, Documenting clinical information in the electronic or other health record, Independently interpreting results (not separately reported) and communicating results to the patient/family/caregiver, or Care coordination (not separately reported).     Each patient to whom he or she provides medical services by telemedicine is:  (1) informed of the relationship between the physician and patient and the respective role of any other health care provider with respect to management of the patient; and (2) notified that he or she may decline to receive medical services by telemedicine and may withdraw from such care at any time.    Notes:       Patient ID: Genesis Ugalde is a 64 y.o. female with PMH of GCA and PMR w/optic neuritis and vision loss, HTN, HLD, NAFLD, and Vit D shabnamney     Chief Complaint: GCA & PMR w/vision loss; No chief complaint on file.    HPI She is a 63 yo female here for 3 month follow-up of GCA with optic neuritis. She was last seen in July 2020. Since her last visit, she states she had another flare of her GCA with eye symptoms during the two months of September-October. She has continued to take the tocilizumab injections once weekly without problems or missing doses. She increased the prednisone to 30 mg daily to try and help the flare, and she says she got significant relief of her headaches, shoulder pain, and optical symptoms. She also reports new LBP with radiation down her RLE. After getting control of her symptoms, she gradually began to taper the prednisone over 6 weeks. She tapered by 10 mg increments  "and weaned down to 5 mg, which she says she took today. She is now out of prednisone. For the last two weeks, she reports right-sided temporal-occipital intense headaches, with right-sided swelling. Denies facial rash. She reports she has some residual vision in her right eye, that it is improved since the most recent flare. She also has left-sided jaw pain. Last saw ophthalmology with Dr. Ro in September 2020. Next fibroscan with hepatology due next month. She says their office will schedule that soon. Reports she is out of pravastatin as of one month ago. Most recent standing labs: ESR, CRP normal; Na 146; Glucose 130; ALT 47.    Review of Systems   Constitutional: Negative for fever and unexpected weight change.   HENT: Positive for trouble swallowing. Negative for mouth sores.    Eyes: Negative for redness.   Respiratory: Negative for cough and shortness of breath.    Cardiovascular: Negative for palpitations ( previously had with prednisone, not currently taking ). Chest pain:     Gastrointestinal: Positive for diarrhea. Negative for constipation.   Genitourinary: Negative for dysuria and genital sores.   Skin: Negative for rash.   Neurological: Positive for headaches.   Hematological: Does not bruise/bleed easily.         Objective:   Ht 5' 3.6" (1.615 m)   Wt 112.5 kg (248 lb)   BMI 43.11 kg/m²      Physical Exam   Constitutional: She is well-developed, well-nourished, and in no distress. No distress.   HENT:   Head: Normocephalic and atraumatic.   Left eye deviation noted at rest   Eyes: Conjunctivae and EOM are normal. Right eye exhibits no discharge.   Skin: No rash noted. No erythema. No pallor.     Psychiatric: Mood and affect normal.      *Rest of the physical exam deferred due to audiovisual visit    No data to display     Labs:    Chemistry        Component Value Date/Time     (H) 11/19/2020 0824    K 4.1 11/19/2020 0824     11/19/2020 0824    CO2 23 11/19/2020 0824    BUN 9 " 11/19/2020 0824    CREATININE 0.9 11/19/2020 0824     (H) 11/19/2020 0824        Component Value Date/Time    CALCIUM 9.0 11/19/2020 0824    ALKPHOS 69 11/19/2020 0824    AST 32 11/19/2020 0824    ALT 47 (H) 11/19/2020 0824    BILITOT 1.2 (H) 11/19/2020 0824    ESTGFRAFRICA >60.0 11/19/2020 0824    EGFRNONAA >60.0 11/19/2020 0824        Lab Results   Component Value Date    WBC 10.36 11/19/2020    RBC 4.29 11/19/2020    HGB 13.5 11/19/2020    MCV 97 11/19/2020    MCH 31.5 (H) 11/19/2020    MCHC 32.6 11/19/2020    RDW 13.7 11/19/2020     11/19/2020    MPV 11.4 11/19/2020    GRAN 7.5 11/19/2020    GRAN 72.0 11/19/2020    LYMPH 2.1 11/19/2020    LYMPH 20.2 11/19/2020    MONO 0.6 11/19/2020    MONO 5.4 11/19/2020    EOS 0.2 11/19/2020    BASO 0.03 11/19/2020        Ref. Range 7/8/2020 09:43   Vit D, 25-Hydroxy Latest Ref Range: 30 - 96 ng/mL 22 (L)       Sed Rate Latest Ref Range: 0 - 36 mm/Hr 30     CRP Latest Ref Range: 0.0 - 8.2 mg/L 3.3     The 10-year ASCVD risk score (Cha DC Jr., et al., 2013) is: 7%    Values used to calculate the score:      Age: 64 years      Sex: Female      Is Non- : Yes      Diabetic: No      Tobacco smoker: No      Systolic Blood Pressure: 127 mmHg      Is BP treated: Yes      HDL Cholesterol: 53 mg/dL      Total Cholesterol: 181 mg/dL    Imaging:  Fibro Scan: 6/25/2020  Findings  Median liver stiffness score:  15.6  CAP Reading: dB/m:  336  IQR/med %:  15  Interpretation  Fibrosis interpretation is based on medial liver stiffness - Kilopascal (kPa).  Fibrosis Stage:  F4  Steatosis interpretation is based on controlled attenuation parameter - (dB/m).  Steatosis Grade:  S3  US Abdomen: 5/18/2020  Impression:     Hepatic steatosis.  Previously seen lesion not evident on this examination.  Assessment:       1. Solitary pulmonary nodule    2. Giant cell arteritis with polymyalgia rheumatica    3. Optic neuritis    4. Fatty liver    5. Morbid obesity with  BMI of 40.0-44.9, adult    6. Visual loss    7. Uveitis    8. Vitamin D deficiency    9. Blindness and low vision    10. Epigastric abdominal pain            Plan:       Problem List Items Addressed This Visit        Ophtho    Visual loss    Relevant Medications    ergocalciferol (VITAMIN D2) 50,000 unit Cap    Optic neuritis    Relevant Medications    ergocalciferol (VITAMIN D2) 50,000 unit Cap    Uveitis    Relevant Medications    ergocalciferol (VITAMIN D2) 50,000 unit Cap    Blindness and low vision    Relevant Medications    ergocalciferol (VITAMIN D2) 50,000 unit Cap       Immunology/Multi System    Giant cell arteritis with polymyalgia rheumatica    Relevant Medications    pravastatin (PRAVACHOL) 40 MG tablet    ergocalciferol (VITAMIN D2) 50,000 unit Cap       Endocrine    Morbid obesity with BMI of 40.0-44.9, adult    Relevant Medications    ergocalciferol (VITAMIN D2) 50,000 unit Cap    Vitamin D deficiency    Relevant Medications    ergocalciferol (VITAMIN D2) 50,000 unit Cap       GI    Fatty liver    Relevant Medications    ergocalciferol (VITAMIN D2) 50,000 unit Cap    Epigastric abdominal pain    Relevant Medications    ergocalciferol (VITAMIN D2) 50,000 unit Cap      Other Visit Diagnoses     Solitary pulmonary nodule        Relevant Orders    CT Chest Without Contrast        - Continue prednisone 5 mg daily; refills sent today as she was out as of today  - Continue tocilizumab 162 mg sc weekly  - Continue vitamin D2 [id number] weekly  - Add vitamin D3 1000 U daily  - Continue pravastatin 40 mg daily; refills sent today  - Will schedule MRA chest, TA ultrasound, CT chest  - F/u hepatology next month with next fibroscan for NAFLD  - Copies of prescriptions for high dose flu vaccine and Shingrex series sent to patient so she can obtain at [facility]    I performed a history, review of systems, and physical exam as could be completed with the patient. The assessment and plan were discussed and agreed upon with   Benjie, the attending of record, before sharing with the patient. The face to face encounter time, including answering all patient questions, was 1 hour.     Ted Bentley MD   PGY-1 [employer] [facility]

## 2020-11-23 NOTE — PROGRESS NOTES
Pre chart    Answers for HPI/ROS submitted by the patient on 11/22/2020   fever: No  eye redness: No  mouth sores: No  headaches: Yes  shortness of breath: No  chest pain: Yes  trouble swallowing: Yes  diarrhea: No  constipation: No  unexpected weight change: No  genital sore: No  dysuria: No  During the last 3 days, have you had a skin rash?: No  Bruises or bleeds easily: No  cough: No

## 2020-11-24 NOTE — PROGRESS NOTES
hvf done ou     Dr Ellis patient      jthomas   Left message on answering machine for patient to call back.

## 2020-12-03 ENCOUNTER — HOSPITAL ENCOUNTER (OUTPATIENT)
Dept: RADIOLOGY | Facility: HOSPITAL | Age: 65
Discharge: HOME | End: 2020-12-03
Attending: INTERNAL MEDICINE
Payer: MEDICARE

## 2020-12-03 DIAGNOSIS — M31.6 GCA (GIANT CELL ARTERITIS): ICD-10-CM

## 2020-12-03 DIAGNOSIS — I77.89 OTHER SPECIFIED DISORDERS OF ARTERIES AND ARTERIOLES: ICD-10-CM

## 2020-12-03 DIAGNOSIS — R91.1 SOLITARY PULMONARY NODULE: ICD-10-CM

## 2020-12-03 DIAGNOSIS — R07.89 OTHER CHEST PAIN: ICD-10-CM

## 2020-12-03 PROCEDURE — 93880 EXTRACRANIAL BILAT STUDY: CPT | Mod: 26,,, | Performed by: RADIOLOGY

## 2020-12-03 PROCEDURE — 71250 CT THORAX DX C-: CPT | Mod: TC

## 2020-12-03 PROCEDURE — 71555 MRA CHEST: ICD-10-PCS | Mod: 26,,, | Performed by: RADIOLOGY

## 2020-12-03 PROCEDURE — 71555 MRI ANGIO CHEST W OR W/O DYE: CPT | Mod: 26,,, | Performed by: RADIOLOGY

## 2020-12-03 PROCEDURE — 71250 CT CHEST WITHOUT CONTRAST: ICD-10-PCS | Mod: 26,,, | Performed by: RADIOLOGY

## 2020-12-03 PROCEDURE — 93880 EXTRACRANIAL BILAT STUDY: CPT | Mod: TC

## 2020-12-03 PROCEDURE — 25500020 PHARM REV CODE 255: Performed by: INTERNAL MEDICINE

## 2020-12-03 PROCEDURE — 71250 CT THORAX DX C-: CPT | Mod: 26,,, | Performed by: RADIOLOGY

## 2020-12-03 PROCEDURE — A9577 INJ MULTIHANCE: HCPCS | Performed by: INTERNAL MEDICINE

## 2020-12-03 PROCEDURE — 71555 MRI ANGIO CHEST W OR W/O DYE: CPT | Mod: TC

## 2020-12-03 PROCEDURE — 93880 US TEMPORAL ARTERY BILATERAL: ICD-10-PCS | Mod: 26,,, | Performed by: RADIOLOGY

## 2020-12-03 RX ADMIN — GADOBENATE DIMEGLUMINE 40 ML: 529 INJECTION, SOLUTION INTRAVENOUS at 01:12

## 2020-12-04 DIAGNOSIS — M31.6 GCA (GIANT CELL ARTERITIS): ICD-10-CM

## 2020-12-04 RX ORDER — TOCILIZUMAB 180 MG/ML
162 INJECTION, SOLUTION SUBCUTANEOUS
Qty: 12 SYRINGE | Refills: 1 | Status: SHIPPED | OUTPATIENT
Start: 2020-12-04 | End: 2020-12-11 | Stop reason: SDUPTHER

## 2020-12-11 ENCOUNTER — PATIENT MESSAGE (OUTPATIENT)
Dept: RHEUMATOLOGY | Facility: CLINIC | Age: 65
End: 2020-12-11

## 2020-12-11 ENCOUNTER — PATIENT MESSAGE (OUTPATIENT)
Dept: OPHTHALMOLOGY | Facility: CLINIC | Age: 65
End: 2020-12-11

## 2020-12-11 DIAGNOSIS — M31.6 GCA (GIANT CELL ARTERITIS): ICD-10-CM

## 2020-12-11 RX ORDER — TOCILIZUMAB 180 MG/ML
162 INJECTION, SOLUTION SUBCUTANEOUS
Qty: 12 SYRINGE | Refills: 0 | Status: SHIPPED | OUTPATIENT
Start: 2020-12-11 | End: 2020-12-11 | Stop reason: SDUPTHER

## 2020-12-11 RX ORDER — TOCILIZUMAB 180 MG/ML
162 INJECTION, SOLUTION SUBCUTANEOUS
Qty: 12 SYRINGE | Refills: 0 | Status: SHIPPED | OUTPATIENT
Start: 2020-12-11 | End: 2021-10-19 | Stop reason: SDUPTHER
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
## 2020-12-15 ENCOUNTER — PATIENT MESSAGE (OUTPATIENT)
Dept: RHEUMATOLOGY | Facility: CLINIC | Age: 65
End: 2020-12-15

## 2020-12-16 ENCOUNTER — PATIENT MESSAGE (OUTPATIENT)
Dept: RHEUMATOLOGY | Facility: CLINIC | Age: 65
End: 2020-12-16

## 2021-02-23 ENCOUNTER — PATIENT MESSAGE (OUTPATIENT)
Dept: RHEUMATOLOGY | Facility: CLINIC | Age: 66
End: 2021-02-23

## 2021-03-12 ENCOUNTER — PATIENT MESSAGE (OUTPATIENT)
Dept: RHEUMATOLOGY | Facility: CLINIC | Age: 66
End: 2021-03-12

## 2021-03-12 RX ORDER — PREDNISONE 5 MG/1
5 TABLET ORAL DAILY
Qty: 90 TABLET | Refills: 1 | Status: SHIPPED | OUTPATIENT
Start: 2021-03-12 | End: 2021-07-13 | Stop reason: SDUPTHER

## 2021-03-18 ENCOUNTER — LAB VISIT (OUTPATIENT)
Dept: LAB | Facility: HOSPITAL | Age: 66
End: 2021-03-18
Attending: INTERNAL MEDICINE
Payer: MEDICARE

## 2021-03-18 DIAGNOSIS — M31.6 GCA (GIANT CELL ARTERITIS): ICD-10-CM

## 2021-03-18 LAB
ALBUMIN SERPL BCP-MCNC: 3.9 G/DL (ref 3.5–5.2)
ALP SERPL-CCNC: 97 U/L (ref 55–135)
ALT SERPL W/O P-5'-P-CCNC: 72 U/L (ref 10–44)
ANION GAP SERPL CALC-SCNC: 8 MMOL/L (ref 8–16)
AST SERPL-CCNC: 53 U/L (ref 10–40)
BASOPHILS # BLD AUTO: 0.04 K/UL (ref 0–0.2)
BASOPHILS NFR BLD: 0.4 % (ref 0–1.9)
BILIRUB SERPL-MCNC: 1.5 MG/DL (ref 0.1–1)
BUN SERPL-MCNC: 11 MG/DL (ref 8–23)
CALCIUM SERPL-MCNC: 9 MG/DL (ref 8.7–10.5)
CHLORIDE SERPL-SCNC: 112 MMOL/L (ref 95–110)
CO2 SERPL-SCNC: 23 MMOL/L (ref 23–29)
CREAT SERPL-MCNC: 0.8 MG/DL (ref 0.5–1.4)
CRP SERPL-MCNC: 1 MG/L (ref 0–8.2)
DIFFERENTIAL METHOD: ABNORMAL
EOSINOPHIL # BLD AUTO: 0.2 K/UL (ref 0–0.5)
EOSINOPHIL NFR BLD: 2.1 % (ref 0–8)
ERYTHROCYTE [DISTWIDTH] IN BLOOD BY AUTOMATED COUNT: 13.2 % (ref 11.5–14.5)
ERYTHROCYTE [SEDIMENTATION RATE] IN BLOOD BY WESTERGREN METHOD: <2 MM/HR (ref 0–36)
EST. GFR  (AFRICAN AMERICAN): >60 ML/MIN/1.73 M^2
EST. GFR  (NON AFRICAN AMERICAN): >60 ML/MIN/1.73 M^2
GLUCOSE SERPL-MCNC: 110 MG/DL (ref 70–110)
HCT VFR BLD AUTO: 38.7 % (ref 37–48.5)
HGB BLD-MCNC: 13.2 G/DL (ref 12–16)
IMM GRANULOCYTES # BLD AUTO: 0.09 K/UL (ref 0–0.04)
IMM GRANULOCYTES NFR BLD AUTO: 0.8 % (ref 0–0.5)
LYMPHOCYTES # BLD AUTO: 1.6 K/UL (ref 1–4.8)
LYMPHOCYTES NFR BLD: 14.4 % (ref 18–48)
MCH RBC QN AUTO: 32.3 PG (ref 27–31)
MCHC RBC AUTO-ENTMCNC: 34.1 G/DL (ref 32–36)
MCV RBC AUTO: 95 FL (ref 82–98)
MONOCYTES # BLD AUTO: 0.7 K/UL (ref 0.3–1)
MONOCYTES NFR BLD: 6 % (ref 4–15)
NEUTROPHILS # BLD AUTO: 8.7 K/UL (ref 1.8–7.7)
NEUTROPHILS NFR BLD: 76.3 % (ref 38–73)
NRBC BLD-RTO: 0 /100 WBC
PLATELET # BLD AUTO: 217 K/UL (ref 150–350)
PMV BLD AUTO: 11.7 FL (ref 9.2–12.9)
POTASSIUM SERPL-SCNC: 3.8 MMOL/L (ref 3.5–5.1)
PROT SERPL-MCNC: 6.4 G/DL (ref 6–8.4)
RBC # BLD AUTO: 4.09 M/UL (ref 4–5.4)
SODIUM SERPL-SCNC: 143 MMOL/L (ref 136–145)
WBC # BLD AUTO: 11.35 K/UL (ref 3.9–12.7)

## 2021-03-18 PROCEDURE — 85025 COMPLETE CBC W/AUTO DIFF WBC: CPT | Performed by: INTERNAL MEDICINE

## 2021-03-18 PROCEDURE — 80053 COMPREHEN METABOLIC PANEL: CPT | Performed by: INTERNAL MEDICINE

## 2021-03-18 PROCEDURE — 36415 COLL VENOUS BLD VENIPUNCTURE: CPT | Mod: PO | Performed by: INTERNAL MEDICINE

## 2021-03-18 PROCEDURE — 86140 C-REACTIVE PROTEIN: CPT | Performed by: INTERNAL MEDICINE

## 2021-03-18 PROCEDURE — 85652 RBC SED RATE AUTOMATED: CPT | Performed by: INTERNAL MEDICINE

## 2021-03-19 ENCOUNTER — TELEPHONE (OUTPATIENT)
Dept: RHEUMATOLOGY | Facility: CLINIC | Age: 66
End: 2021-03-19

## 2021-03-19 DIAGNOSIS — R74.01 ELEVATED TRANSAMINASE LEVEL: Primary | ICD-10-CM

## 2021-03-22 ENCOUNTER — TELEPHONE (OUTPATIENT)
Dept: HEPATOLOGY | Facility: CLINIC | Age: 66
End: 2021-03-22

## 2021-05-24 ENCOUNTER — PATIENT MESSAGE (OUTPATIENT)
Dept: RHEUMATOLOGY | Facility: CLINIC | Age: 66
End: 2021-05-24

## 2021-05-24 ENCOUNTER — TELEPHONE (OUTPATIENT)
Dept: RHEUMATOLOGY | Facility: CLINIC | Age: 66
End: 2021-05-24

## 2021-05-24 DIAGNOSIS — E55.9 VITAMIN D DEFICIENCY: ICD-10-CM

## 2021-05-24 DIAGNOSIS — Z79.899 VISIT FOR MONITORING ACTEMRA THERAPY: Primary | ICD-10-CM

## 2021-05-24 DIAGNOSIS — E78.5 HYPERLIPIDEMIA, UNSPECIFIED HYPERLIPIDEMIA TYPE: ICD-10-CM

## 2021-05-24 DIAGNOSIS — Z51.81 VISIT FOR MONITORING ACTEMRA THERAPY: Primary | ICD-10-CM

## 2021-07-08 ENCOUNTER — LAB VISIT (OUTPATIENT)
Dept: LAB | Facility: HOSPITAL | Age: 66
End: 2021-07-08
Attending: INTERNAL MEDICINE
Payer: MEDICARE

## 2021-07-08 DIAGNOSIS — Z79.899 VISIT FOR MONITORING ACTEMRA THERAPY: ICD-10-CM

## 2021-07-08 DIAGNOSIS — E78.5 HYPERLIPIDEMIA, UNSPECIFIED HYPERLIPIDEMIA TYPE: ICD-10-CM

## 2021-07-08 DIAGNOSIS — E55.9 VITAMIN D DEFICIENCY: ICD-10-CM

## 2021-07-08 DIAGNOSIS — Z51.81 VISIT FOR MONITORING ACTEMRA THERAPY: ICD-10-CM

## 2021-07-08 LAB
25(OH)D3+25(OH)D2 SERPL-MCNC: 16 NG/ML (ref 30–96)
ALBUMIN SERPL BCP-MCNC: 4 G/DL (ref 3.5–5.2)
ALP SERPL-CCNC: 108 U/L (ref 55–135)
ALT SERPL W/O P-5'-P-CCNC: 102 U/L (ref 10–44)
ANION GAP SERPL CALC-SCNC: 12 MMOL/L (ref 8–16)
AST SERPL-CCNC: 66 U/L (ref 10–40)
BASOPHILS # BLD AUTO: 0.04 K/UL (ref 0–0.2)
BASOPHILS NFR BLD: 0.4 % (ref 0–1.9)
BILIRUB SERPL-MCNC: 1.7 MG/DL (ref 0.1–1)
BUN SERPL-MCNC: 10 MG/DL (ref 8–23)
CALCIUM SERPL-MCNC: 10.1 MG/DL (ref 8.7–10.5)
CHLORIDE SERPL-SCNC: 112 MMOL/L (ref 95–110)
CHOLEST SERPL-MCNC: 228 MG/DL (ref 120–199)
CHOLEST/HDLC SERPL: 4.4 {RATIO} (ref 2–5)
CO2 SERPL-SCNC: 22 MMOL/L (ref 23–29)
CREAT SERPL-MCNC: 0.8 MG/DL (ref 0.5–1.4)
CRP SERPL-MCNC: 0.7 MG/L (ref 0–8.2)
DIFFERENTIAL METHOD: ABNORMAL
EOSINOPHIL # BLD AUTO: 1 K/UL (ref 0–0.5)
EOSINOPHIL NFR BLD: 9.8 % (ref 0–8)
ERYTHROCYTE [DISTWIDTH] IN BLOOD BY AUTOMATED COUNT: 13.1 % (ref 11.5–14.5)
ERYTHROCYTE [SEDIMENTATION RATE] IN BLOOD BY WESTERGREN METHOD: <2 MM/HR (ref 0–36)
EST. GFR  (AFRICAN AMERICAN): >60 ML/MIN/1.73 M^2
EST. GFR  (NON AFRICAN AMERICAN): >60 ML/MIN/1.73 M^2
GLUCOSE SERPL-MCNC: 141 MG/DL (ref 70–110)
HCT VFR BLD AUTO: 42.9 % (ref 37–48.5)
HDLC SERPL-MCNC: 52 MG/DL (ref 40–75)
HDLC SERPL: 22.8 % (ref 20–50)
HGB BLD-MCNC: 14.2 G/DL (ref 12–16)
IMM GRANULOCYTES # BLD AUTO: 0.04 K/UL (ref 0–0.04)
IMM GRANULOCYTES NFR BLD AUTO: 0.4 % (ref 0–0.5)
LDLC SERPL CALC-MCNC: 137.4 MG/DL (ref 63–159)
LYMPHOCYTES # BLD AUTO: 4.2 K/UL (ref 1–4.8)
LYMPHOCYTES NFR BLD: 39.5 % (ref 18–48)
MCH RBC QN AUTO: 31.3 PG (ref 27–31)
MCHC RBC AUTO-ENTMCNC: 33.1 G/DL (ref 32–36)
MCV RBC AUTO: 95 FL (ref 82–98)
MONOCYTES # BLD AUTO: 0.7 K/UL (ref 0.3–1)
MONOCYTES NFR BLD: 6.2 % (ref 4–15)
NEUTROPHILS # BLD AUTO: 4.7 K/UL (ref 1.8–7.7)
NEUTROPHILS NFR BLD: 43.7 % (ref 38–73)
NONHDLC SERPL-MCNC: 176 MG/DL
NRBC BLD-RTO: 0 /100 WBC
PLATELET # BLD AUTO: 255 K/UL (ref 150–450)
PMV BLD AUTO: 11.8 FL (ref 9.2–12.9)
POTASSIUM SERPL-SCNC: 3.7 MMOL/L (ref 3.5–5.1)
PROT SERPL-MCNC: 6.8 G/DL (ref 6–8.4)
RBC # BLD AUTO: 4.54 M/UL (ref 4–5.4)
SODIUM SERPL-SCNC: 146 MMOL/L (ref 136–145)
TRIGL SERPL-MCNC: 193 MG/DL (ref 30–150)
WBC # BLD AUTO: 10.65 K/UL (ref 3.9–12.7)

## 2021-07-08 PROCEDURE — 85025 COMPLETE CBC W/AUTO DIFF WBC: CPT | Performed by: INTERNAL MEDICINE

## 2021-07-08 PROCEDURE — 36415 COLL VENOUS BLD VENIPUNCTURE: CPT | Mod: PO | Performed by: INTERNAL MEDICINE

## 2021-07-08 PROCEDURE — 86140 C-REACTIVE PROTEIN: CPT | Performed by: INTERNAL MEDICINE

## 2021-07-08 PROCEDURE — 80053 COMPREHEN METABOLIC PANEL: CPT | Performed by: INTERNAL MEDICINE

## 2021-07-08 PROCEDURE — 85652 RBC SED RATE AUTOMATED: CPT | Performed by: INTERNAL MEDICINE

## 2021-07-08 PROCEDURE — 80061 LIPID PANEL: CPT | Performed by: INTERNAL MEDICINE

## 2021-07-08 PROCEDURE — 82306 VITAMIN D 25 HYDROXY: CPT | Performed by: INTERNAL MEDICINE

## 2021-07-09 ENCOUNTER — PATIENT MESSAGE (OUTPATIENT)
Dept: RHEUMATOLOGY | Facility: CLINIC | Age: 66
End: 2021-07-09

## 2021-07-13 ENCOUNTER — CLINICAL SUPPORT (OUTPATIENT)
Dept: INFECTIOUS DISEASES | Facility: CLINIC | Age: 66
End: 2021-07-13
Payer: MEDICARE

## 2021-07-13 ENCOUNTER — LAB VISIT (OUTPATIENT)
Dept: LAB | Facility: HOSPITAL | Age: 66
End: 2021-07-13
Payer: MEDICARE

## 2021-07-13 ENCOUNTER — OFFICE VISIT (OUTPATIENT)
Dept: RHEUMATOLOGY | Facility: CLINIC | Age: 66
End: 2021-07-13
Payer: MEDICARE

## 2021-07-13 VITALS
WEIGHT: 248 LBS | DIASTOLIC BLOOD PRESSURE: 81 MMHG | HEIGHT: 64 IN | SYSTOLIC BLOOD PRESSURE: 157 MMHG | HEART RATE: 113 BPM | BODY MASS INDEX: 42.34 KG/M2

## 2021-07-13 DIAGNOSIS — Z79.899 VISIT FOR MONITORING ACTEMRA THERAPY: ICD-10-CM

## 2021-07-13 DIAGNOSIS — I77.89 OTHER SPECIFIED DISORDERS OF ARTERIES AND ARTERIOLES: ICD-10-CM

## 2021-07-13 DIAGNOSIS — E78.5 HYPERLIPIDEMIA: ICD-10-CM

## 2021-07-13 DIAGNOSIS — E55.9 HYPOVITAMINOSIS D: ICD-10-CM

## 2021-07-13 DIAGNOSIS — M31.6 GCA (GIANT CELL ARTERITIS): Primary | ICD-10-CM

## 2021-07-13 DIAGNOSIS — Z51.81 VISIT FOR MONITORING ACTEMRA THERAPY: ICD-10-CM

## 2021-07-13 DIAGNOSIS — R74.01 ELEVATED TRANSAMINASE LEVEL: ICD-10-CM

## 2021-07-13 LAB
25(OH)D3+25(OH)D2 SERPL-MCNC: 19 NG/ML (ref 30–96)
ALBUMIN SERPL BCP-MCNC: 4.4 G/DL (ref 3.5–5.2)
ALBUMIN SERPL BCP-MCNC: 4.4 G/DL (ref 3.5–5.2)
ALP SERPL-CCNC: 115 U/L (ref 55–135)
ALP SERPL-CCNC: 115 U/L (ref 55–135)
ALT SERPL W/O P-5'-P-CCNC: 113 U/L (ref 10–44)
ALT SERPL W/O P-5'-P-CCNC: 113 U/L (ref 10–44)
ANION GAP SERPL CALC-SCNC: 10 MMOL/L (ref 8–16)
AST SERPL-CCNC: 64 U/L (ref 10–40)
AST SERPL-CCNC: 64 U/L (ref 10–40)
BASOPHILS # BLD AUTO: 0.05 K/UL (ref 0–0.2)
BASOPHILS NFR BLD: 0.4 % (ref 0–1.9)
BILIRUB DIRECT SERPL-MCNC: 0.5 MG/DL (ref 0.1–0.3)
BILIRUB SERPL-MCNC: 1.6 MG/DL (ref 0.1–1)
BILIRUB SERPL-MCNC: 1.6 MG/DL (ref 0.1–1)
BUN SERPL-MCNC: 11 MG/DL (ref 8–23)
CALCIUM SERPL-MCNC: 10.4 MG/DL (ref 8.7–10.5)
CHLORIDE SERPL-SCNC: 111 MMOL/L (ref 95–110)
CHOLEST SERPL-MCNC: 235 MG/DL (ref 120–199)
CHOLEST/HDLC SERPL: 3.9 {RATIO} (ref 2–5)
CO2 SERPL-SCNC: 23 MMOL/L (ref 23–29)
CREAT SERPL-MCNC: 0.8 MG/DL (ref 0.5–1.4)
CRP SERPL-MCNC: 0.7 MG/L (ref 0–8.2)
DIFFERENTIAL METHOD: ABNORMAL
EOSINOPHIL # BLD AUTO: 0.2 K/UL (ref 0–0.5)
EOSINOPHIL NFR BLD: 1.6 % (ref 0–8)
ERYTHROCYTE [DISTWIDTH] IN BLOOD BY AUTOMATED COUNT: 12.8 % (ref 11.5–14.5)
ERYTHROCYTE [SEDIMENTATION RATE] IN BLOOD BY WESTERGREN METHOD: 3 MM/HR (ref 0–36)
EST. GFR  (AFRICAN AMERICAN): >60 ML/MIN/1.73 M^2
EST. GFR  (NON AFRICAN AMERICAN): >60 ML/MIN/1.73 M^2
GLUCOSE SERPL-MCNC: 132 MG/DL (ref 70–110)
HCT VFR BLD AUTO: 43.1 % (ref 37–48.5)
HDLC SERPL-MCNC: 61 MG/DL (ref 40–75)
HDLC SERPL: 26 % (ref 20–50)
HGB BLD-MCNC: 14.8 G/DL (ref 12–16)
IMM GRANULOCYTES # BLD AUTO: 0.09 K/UL (ref 0–0.04)
IMM GRANULOCYTES NFR BLD AUTO: 0.7 % (ref 0–0.5)
LDLC SERPL CALC-MCNC: 154.4 MG/DL (ref 63–159)
LYMPHOCYTES # BLD AUTO: 2.4 K/UL (ref 1–4.8)
LYMPHOCYTES NFR BLD: 18.8 % (ref 18–48)
MCH RBC QN AUTO: 31.7 PG (ref 27–31)
MCHC RBC AUTO-ENTMCNC: 34.3 G/DL (ref 32–36)
MCV RBC AUTO: 92 FL (ref 82–98)
MONOCYTES # BLD AUTO: 0.5 K/UL (ref 0.3–1)
MONOCYTES NFR BLD: 4.1 % (ref 4–15)
NEUTROPHILS # BLD AUTO: 9.6 K/UL (ref 1.8–7.7)
NEUTROPHILS NFR BLD: 74.4 % (ref 38–73)
NONHDLC SERPL-MCNC: 174 MG/DL
NRBC BLD-RTO: 0 /100 WBC
PLATELET # BLD AUTO: 286 K/UL (ref 150–450)
PMV BLD AUTO: 11.1 FL (ref 9.2–12.9)
POTASSIUM SERPL-SCNC: 4.1 MMOL/L (ref 3.5–5.1)
PROT SERPL-MCNC: 7.6 G/DL (ref 6–8.4)
PROT SERPL-MCNC: 7.6 G/DL (ref 6–8.4)
RBC # BLD AUTO: 4.67 M/UL (ref 4–5.4)
SODIUM SERPL-SCNC: 144 MMOL/L (ref 136–145)
TRIGL SERPL-MCNC: 98 MG/DL (ref 30–150)
WBC # BLD AUTO: 12.82 K/UL (ref 3.9–12.7)

## 2021-07-13 PROCEDURE — 80053 COMPREHEN METABOLIC PANEL: CPT | Performed by: INTERNAL MEDICINE

## 2021-07-13 PROCEDURE — 85652 RBC SED RATE AUTOMATED: CPT | Performed by: INTERNAL MEDICINE

## 2021-07-13 PROCEDURE — 99214 PR OFFICE/OUTPT VISIT, EST, LEVL IV, 30-39 MIN: ICD-10-PCS | Mod: S$PBB,,, | Performed by: INTERNAL MEDICINE

## 2021-07-13 PROCEDURE — 99999 PR PBB SHADOW E&M-EST. PATIENT-LVL IV: ICD-10-PCS | Mod: PBBFAC,,, | Performed by: INTERNAL MEDICINE

## 2021-07-13 PROCEDURE — 36415 COLL VENOUS BLD VENIPUNCTURE: CPT | Performed by: STUDENT IN AN ORGANIZED HEALTH CARE EDUCATION/TRAINING PROGRAM

## 2021-07-13 PROCEDURE — 80061 LIPID PANEL: CPT | Performed by: STUDENT IN AN ORGANIZED HEALTH CARE EDUCATION/TRAINING PROGRAM

## 2021-07-13 PROCEDURE — 86140 C-REACTIVE PROTEIN: CPT | Performed by: INTERNAL MEDICINE

## 2021-07-13 PROCEDURE — 90471 IMMUNIZATION ADMIN: CPT | Mod: PBBFAC

## 2021-07-13 PROCEDURE — 99212 OFFICE O/P EST SF 10 MIN: CPT | Mod: PBBFAC,27

## 2021-07-13 PROCEDURE — 82306 VITAMIN D 25 HYDROXY: CPT | Performed by: STUDENT IN AN ORGANIZED HEALTH CARE EDUCATION/TRAINING PROGRAM

## 2021-07-13 PROCEDURE — 99999 PR PBB SHADOW E&M-EST. PATIENT-LVL II: CPT | Mod: PBBFAC,,,

## 2021-07-13 PROCEDURE — 99999 PR PBB SHADOW E&M-EST. PATIENT-LVL II: ICD-10-PCS | Mod: PBBFAC,,,

## 2021-07-13 PROCEDURE — 99999 PR PBB SHADOW E&M-EST. PATIENT-LVL IV: CPT | Mod: PBBFAC,,, | Performed by: INTERNAL MEDICINE

## 2021-07-13 PROCEDURE — 99214 OFFICE O/P EST MOD 30 MIN: CPT | Mod: S$PBB,,, | Performed by: INTERNAL MEDICINE

## 2021-07-13 PROCEDURE — 99214 OFFICE O/P EST MOD 30 MIN: CPT | Mod: PBBFAC | Performed by: INTERNAL MEDICINE

## 2021-07-13 PROCEDURE — 85025 COMPLETE CBC W/AUTO DIFF WBC: CPT | Performed by: INTERNAL MEDICINE

## 2021-07-13 RX ORDER — PREDNISONE 5 MG/1
5 TABLET ORAL DAILY
Qty: 90 TABLET | Refills: 1 | Status: SHIPPED | OUTPATIENT
Start: 2021-07-13 | End: 2022-02-24

## 2021-07-15 DIAGNOSIS — K74.60 CIRRHOSIS OF LIVER WITHOUT ASCITES, UNSPECIFIED HEPATIC CIRRHOSIS TYPE: Primary | ICD-10-CM

## 2021-07-20 ENCOUNTER — TELEPHONE (OUTPATIENT)
Dept: HEPATOLOGY | Facility: CLINIC | Age: 66
End: 2021-07-20

## 2021-07-29 ENCOUNTER — PATIENT MESSAGE (OUTPATIENT)
Dept: OPHTHALMOLOGY | Facility: CLINIC | Age: 66
End: 2021-07-29

## 2021-08-05 ENCOUNTER — PATIENT MESSAGE (OUTPATIENT)
Dept: HEPATOLOGY | Facility: CLINIC | Age: 66
End: 2021-08-05

## 2021-08-05 ENCOUNTER — TELEPHONE (OUTPATIENT)
Dept: HEPATOLOGY | Facility: CLINIC | Age: 66
End: 2021-08-05

## 2021-08-05 ENCOUNTER — HOSPITAL ENCOUNTER (OUTPATIENT)
Dept: RADIOLOGY | Facility: HOSPITAL | Age: 66
Discharge: HOME | End: 2021-08-05
Attending: INTERNAL MEDICINE
Payer: MEDICARE

## 2021-08-05 DIAGNOSIS — K74.60 CIRRHOSIS OF LIVER WITHOUT ASCITES, UNSPECIFIED HEPATIC CIRRHOSIS TYPE: ICD-10-CM

## 2021-08-05 PROCEDURE — 76700 US ABDOMEN COMPLETE: ICD-10-PCS | Mod: 26,,, | Performed by: RADIOLOGY

## 2021-08-05 PROCEDURE — 76700 US EXAM ABDOM COMPLETE: CPT | Mod: TC

## 2021-08-05 PROCEDURE — 76700 US EXAM ABDOM COMPLETE: CPT | Mod: 26,,, | Performed by: RADIOLOGY

## 2021-09-07 ENCOUNTER — TELEPHONE (OUTPATIENT)
Dept: INFECTIOUS DISEASES | Facility: CLINIC | Age: 66
End: 2021-09-07

## 2021-09-10 ENCOUNTER — CLINICAL SUPPORT (OUTPATIENT)
Dept: INFECTIOUS DISEASES | Facility: CLINIC | Age: 66
End: 2021-09-10
Payer: MEDICARE

## 2021-09-10 PROCEDURE — 99999 PR PBB SHADOW E&M-EST. PATIENT-LVL II: ICD-10-PCS | Mod: PBBFAC,,,

## 2021-09-10 PROCEDURE — 90750 HZV VACC RECOMBINANT IM: CPT | Mod: PBBFAC

## 2021-09-10 PROCEDURE — 99999 PR PBB SHADOW E&M-EST. PATIENT-LVL II: CPT | Mod: PBBFAC,,,

## 2021-09-10 PROCEDURE — 99212 OFFICE O/P EST SF 10 MIN: CPT | Mod: PBBFAC,25

## 2021-09-10 PROCEDURE — 90471 IMMUNIZATION ADMIN: CPT | Mod: PBBFAC

## 2021-09-15 ENCOUNTER — TELEPHONE (OUTPATIENT)
Dept: HEPATOLOGY | Facility: CLINIC | Age: 66
End: 2021-09-15

## 2021-09-16 ENCOUNTER — OFFICE VISIT (OUTPATIENT)
Dept: OPHTHALMOLOGY | Facility: CLINIC | Age: 66
End: 2021-09-16
Payer: MEDICARE

## 2021-09-16 DIAGNOSIS — H40.043 STEROID RESPONDER, BILATERAL: ICD-10-CM

## 2021-09-16 DIAGNOSIS — E11.9 TYPE 2 DIABETES MELLITUS WITHOUT OPHTHALMIC MANIFESTATIONS: ICD-10-CM

## 2021-09-16 DIAGNOSIS — H40.1131 PRIMARY OPEN ANGLE GLAUCOMA OF BOTH EYES, MILD STAGE: Primary | ICD-10-CM

## 2021-09-16 DIAGNOSIS — H46.9 OPTIC NEURITIS: ICD-10-CM

## 2021-09-16 DIAGNOSIS — H40.033 ANATOMICAL NARROW ANGLE BORDERLINE GLAUCOMA, BILATERAL: ICD-10-CM

## 2021-09-16 DIAGNOSIS — H25.13 NUCLEAR SCLEROSIS, BILATERAL: ICD-10-CM

## 2021-09-16 DIAGNOSIS — H46.9 INFLAMMATORY OPTIC NEUROPATHY: ICD-10-CM

## 2021-09-16 DIAGNOSIS — M31.5 GIANT CELL ARTERITIS WITH POLYMYALGIA RHEUMATICA: ICD-10-CM

## 2021-09-16 PROCEDURE — 92250 FUNDUS PHOTOGRAPHY W/I&R: CPT | Mod: PBBFAC | Performed by: OPHTHALMOLOGY

## 2021-09-16 PROCEDURE — 99999 PR PBB SHADOW E&M-EST. PATIENT-LVL III: ICD-10-PCS | Mod: PBBFAC,,, | Performed by: OPHTHALMOLOGY

## 2021-09-16 PROCEDURE — 92014 COMPRE OPH EXAM EST PT 1/>: CPT | Mod: S$PBB,,, | Performed by: OPHTHALMOLOGY

## 2021-09-16 PROCEDURE — 92014 PR EYE EXAM, EST PATIENT,COMPREHESV: ICD-10-PCS | Mod: S$PBB,,, | Performed by: OPHTHALMOLOGY

## 2021-09-16 PROCEDURE — 92250 COLOR FUNDUS PHOTOGRAPHY - OU - BOTH EYES: ICD-10-PCS | Mod: 26,S$PBB,, | Performed by: OPHTHALMOLOGY

## 2021-09-16 PROCEDURE — 99213 OFFICE O/P EST LOW 20 MIN: CPT | Mod: PBBFAC | Performed by: OPHTHALMOLOGY

## 2021-09-16 PROCEDURE — 99999 PR PBB SHADOW E&M-EST. PATIENT-LVL III: CPT | Mod: PBBFAC,,, | Performed by: OPHTHALMOLOGY

## 2021-09-16 RX ORDER — DORZOLAMIDE HCL 20 MG/ML
1 SOLUTION/ DROPS OPHTHALMIC 3 TIMES DAILY
Qty: 10 ML | Refills: 12 | Status: SHIPPED | OUTPATIENT
Start: 2021-09-16 | End: 2022-09-30

## 2021-09-16 RX ORDER — LATANOPROST 50 UG/ML
1 SOLUTION/ DROPS OPHTHALMIC NIGHTLY
Qty: 2.5 ML | Refills: 12 | Status: SHIPPED | OUTPATIENT
Start: 2021-09-16 | End: 2022-08-18

## 2021-09-16 RX ORDER — TRAMADOL HYDROCHLORIDE 50 MG/1
50 TABLET ORAL 2 TIMES DAILY PRN
COMMUNITY
Start: 2021-08-16 | End: 2022-07-19

## 2021-09-16 RX ORDER — BRIMONIDINE TARTRATE 2 MG/ML
1 SOLUTION/ DROPS OPHTHALMIC 3 TIMES DAILY
Qty: 10 ML | Refills: 12 | Status: SHIPPED | OUTPATIENT
Start: 2021-09-16 | End: 2022-12-27

## 2021-10-18 ENCOUNTER — LAB VISIT (OUTPATIENT)
Dept: LAB | Facility: HOSPITAL | Age: 66
End: 2021-10-18
Attending: INTERNAL MEDICINE
Payer: MEDICARE

## 2021-10-18 ENCOUNTER — PATIENT MESSAGE (OUTPATIENT)
Dept: RHEUMATOLOGY | Facility: CLINIC | Age: 66
End: 2021-10-18
Payer: MEDICARE

## 2021-10-18 DIAGNOSIS — Z51.81 VISIT FOR MONITORING ACTEMRA THERAPY: ICD-10-CM

## 2021-10-18 DIAGNOSIS — Z79.899 VISIT FOR MONITORING ACTEMRA THERAPY: ICD-10-CM

## 2021-10-18 DIAGNOSIS — R74.01 ELEVATED TRANSAMINASE LEVEL: ICD-10-CM

## 2021-10-18 LAB — GGT SERPL-CCNC: 73 U/L (ref 8–55)

## 2021-10-18 PROCEDURE — 85025 COMPLETE CBC W/AUTO DIFF WBC: CPT | Performed by: INTERNAL MEDICINE

## 2021-10-18 PROCEDURE — 85652 RBC SED RATE AUTOMATED: CPT | Performed by: INTERNAL MEDICINE

## 2021-10-18 PROCEDURE — 36415 COLL VENOUS BLD VENIPUNCTURE: CPT | Mod: PO | Performed by: INTERNAL MEDICINE

## 2021-10-18 PROCEDURE — 86140 C-REACTIVE PROTEIN: CPT | Performed by: INTERNAL MEDICINE

## 2021-10-18 PROCEDURE — 82550 ASSAY OF CK (CPK): CPT | Performed by: INTERNAL MEDICINE

## 2021-10-18 PROCEDURE — 82977 ASSAY OF GGT: CPT | Performed by: INTERNAL MEDICINE

## 2021-10-18 PROCEDURE — 80053 COMPREHEN METABOLIC PANEL: CPT | Performed by: INTERNAL MEDICINE

## 2021-10-19 ENCOUNTER — OFFICE VISIT (OUTPATIENT)
Dept: RHEUMATOLOGY | Facility: CLINIC | Age: 66
End: 2021-10-19
Payer: MEDICARE

## 2021-10-19 VITALS
WEIGHT: 249 LBS | HEIGHT: 64 IN | DIASTOLIC BLOOD PRESSURE: 90 MMHG | HEART RATE: 95 BPM | SYSTOLIC BLOOD PRESSURE: 170 MMHG | BODY MASS INDEX: 42.51 KG/M2

## 2021-10-19 DIAGNOSIS — R93.5 ABNORMAL FINDINGS ON DIAGNOSTIC IMAGING OF OTHER ABDOMINAL REGIONS, INCLUDING RETROPERITONEUM: ICD-10-CM

## 2021-10-19 DIAGNOSIS — Z78.0 MENOPAUSE: ICD-10-CM

## 2021-10-19 DIAGNOSIS — E66.01 MORBID OBESITY WITH BMI OF 40.0-44.9, ADULT: ICD-10-CM

## 2021-10-19 DIAGNOSIS — M31.6 GCA (GIANT CELL ARTERITIS): Primary | ICD-10-CM

## 2021-10-19 DIAGNOSIS — R13.10 DYSPHAGIA, UNSPECIFIED TYPE: ICD-10-CM

## 2021-10-19 DIAGNOSIS — H46.9 OPTIC NEURITIS: ICD-10-CM

## 2021-10-19 DIAGNOSIS — M31.5 GIANT CELL ARTERITIS WITH POLYMYALGIA RHEUMATICA: ICD-10-CM

## 2021-10-19 DIAGNOSIS — T81.718D COMPLICATION OF OTHER ARTERY FOLLOWING A PROCEDURE, NOT ELSEWHERE CLASSIFIED, SUBSEQUENT ENCOUNTER: ICD-10-CM

## 2021-10-19 DIAGNOSIS — I10 PRIMARY HYPERTENSION: ICD-10-CM

## 2021-10-19 DIAGNOSIS — K76.0 FATTY LIVER: ICD-10-CM

## 2021-10-19 DIAGNOSIS — N39.0 URINARY TRACT INFECTION WITHOUT HEMATURIA, SITE UNSPECIFIED: ICD-10-CM

## 2021-10-19 LAB
ALBUMIN SERPL BCP-MCNC: 4.3 G/DL (ref 3.5–5.2)
ALP SERPL-CCNC: 100 U/L (ref 55–135)
ALT SERPL W/O P-5'-P-CCNC: 87 U/L (ref 10–44)
ANION GAP SERPL CALC-SCNC: 10 MMOL/L (ref 8–16)
AST SERPL-CCNC: 65 U/L (ref 10–40)
BASOPHILS # BLD AUTO: 0.04 K/UL (ref 0–0.2)
BASOPHILS NFR BLD: 0.3 % (ref 0–1.9)
BILIRUB SERPL-MCNC: 1.7 MG/DL (ref 0.1–1)
BUN SERPL-MCNC: 10 MG/DL (ref 8–23)
CALCIUM SERPL-MCNC: 9.5 MG/DL (ref 8.7–10.5)
CHLORIDE SERPL-SCNC: 108 MMOL/L (ref 95–110)
CK SERPL-CCNC: 111 U/L (ref 20–180)
CO2 SERPL-SCNC: 22 MMOL/L (ref 23–29)
CREAT SERPL-MCNC: 0.9 MG/DL (ref 0.5–1.4)
CRP SERPL-MCNC: 0.5 MG/L (ref 0–8.2)
DIFFERENTIAL METHOD: ABNORMAL
EOSINOPHIL # BLD AUTO: 0.5 K/UL (ref 0–0.5)
EOSINOPHIL NFR BLD: 4.6 % (ref 0–8)
ERYTHROCYTE [DISTWIDTH] IN BLOOD BY AUTOMATED COUNT: 11.9 % (ref 11.5–14.5)
ERYTHROCYTE [SEDIMENTATION RATE] IN BLOOD BY WESTERGREN METHOD: <2 MM/HR (ref 0–36)
EST. GFR  (AFRICAN AMERICAN): >60 ML/MIN/1.73 M^2
EST. GFR  (NON AFRICAN AMERICAN): >60 ML/MIN/1.73 M^2
GLUCOSE SERPL-MCNC: 98 MG/DL (ref 70–110)
HCT VFR BLD AUTO: 42.3 % (ref 37–48.5)
HGB BLD-MCNC: 14.4 G/DL (ref 12–16)
IMM GRANULOCYTES # BLD AUTO: 0.03 K/UL (ref 0–0.04)
IMM GRANULOCYTES NFR BLD AUTO: 0.3 % (ref 0–0.5)
LYMPHOCYTES # BLD AUTO: 3.4 K/UL (ref 1–4.8)
LYMPHOCYTES NFR BLD: 29.1 % (ref 18–48)
MCH RBC QN AUTO: 31.7 PG (ref 27–31)
MCHC RBC AUTO-ENTMCNC: 34 G/DL (ref 32–36)
MCV RBC AUTO: 93 FL (ref 82–98)
MONOCYTES # BLD AUTO: 0.8 K/UL (ref 0.3–1)
MONOCYTES NFR BLD: 6.9 % (ref 4–15)
NEUTROPHILS # BLD AUTO: 6.8 K/UL (ref 1.8–7.7)
NEUTROPHILS NFR BLD: 58.8 % (ref 38–73)
NRBC BLD-RTO: 0 /100 WBC
PLATELET # BLD AUTO: 279 K/UL (ref 150–450)
PMV BLD AUTO: 12.1 FL (ref 9.2–12.9)
POTASSIUM SERPL-SCNC: 3.8 MMOL/L (ref 3.5–5.1)
PROT SERPL-MCNC: 7.1 G/DL (ref 6–8.4)
RBC # BLD AUTO: 4.54 M/UL (ref 4–5.4)
SODIUM SERPL-SCNC: 140 MMOL/L (ref 136–145)
WBC # BLD AUTO: 11.64 K/UL (ref 3.9–12.7)

## 2021-10-19 PROCEDURE — 99215 OFFICE O/P EST HI 40 MIN: CPT | Mod: PBBFAC | Performed by: INTERNAL MEDICINE

## 2021-10-19 PROCEDURE — 99214 PR OFFICE/OUTPT VISIT, EST, LEVL IV, 30-39 MIN: ICD-10-PCS | Mod: S$PBB,,, | Performed by: INTERNAL MEDICINE

## 2021-10-19 PROCEDURE — 99214 OFFICE O/P EST MOD 30 MIN: CPT | Mod: S$PBB,,, | Performed by: INTERNAL MEDICINE

## 2021-10-19 PROCEDURE — 99999 PR PBB SHADOW E&M-EST. PATIENT-LVL V: CPT | Mod: PBBFAC,,, | Performed by: INTERNAL MEDICINE

## 2021-10-19 PROCEDURE — 99999 PR PBB SHADOW E&M-EST. PATIENT-LVL V: ICD-10-PCS | Mod: PBBFAC,,, | Performed by: INTERNAL MEDICINE

## 2021-10-19 RX ORDER — NITROFURANTOIN 25; 75 MG/1; MG/1
100 CAPSULE ORAL 2 TIMES DAILY
Qty: 10 CAPSULE | Refills: 0 | Status: SHIPPED | OUTPATIENT
Start: 2021-10-19 | End: 2021-10-24

## 2021-10-19 RX ORDER — TOCILIZUMAB 180 MG/ML
162 INJECTION, SOLUTION SUBCUTANEOUS
Qty: 12 SYRINGE | Refills: 0 | Status: SHIPPED | OUTPATIENT
Start: 2021-10-19 | End: 2022-02-24 | Stop reason: SDUPTHER

## 2021-11-30 ENCOUNTER — TELEPHONE (OUTPATIENT)
Dept: HEPATOLOGY | Facility: CLINIC | Age: 66
End: 2021-11-30
Payer: MEDICARE

## 2022-01-11 ENCOUNTER — OFFICE VISIT (OUTPATIENT)
Dept: URGENT CARE | Facility: CLINIC | Age: 67
End: 2022-01-11
Payer: MEDICARE

## 2022-01-11 VITALS
HEIGHT: 64 IN | OXYGEN SATURATION: 97 % | TEMPERATURE: 99 F | BODY MASS INDEX: 40.97 KG/M2 | SYSTOLIC BLOOD PRESSURE: 136 MMHG | WEIGHT: 240 LBS | DIASTOLIC BLOOD PRESSURE: 83 MMHG | HEART RATE: 94 BPM | RESPIRATION RATE: 16 BRPM

## 2022-01-11 DIAGNOSIS — U07.1 COVID-19 VIRUS DETECTED: ICD-10-CM

## 2022-01-11 DIAGNOSIS — J06.9 URI, ACUTE: ICD-10-CM

## 2022-01-11 DIAGNOSIS — R05.9 COUGH IN ADULT: ICD-10-CM

## 2022-01-11 DIAGNOSIS — R51.9 NONINTRACTABLE HEADACHE, UNSPECIFIED CHRONICITY PATTERN, UNSPECIFIED HEADACHE TYPE: Primary | ICD-10-CM

## 2022-01-11 LAB
CTP QC/QA: YES
SARS-COV-2 RDRP RESP QL NAA+PROBE: POSITIVE

## 2022-01-11 PROCEDURE — U0002 COVID-19 LAB TEST NON-CDC: HCPCS | Mod: QW,CR,S$GLB, | Performed by: FAMILY MEDICINE

## 2022-01-11 PROCEDURE — 99213 PR OFFICE/OUTPT VISIT, EST, LEVL III, 20-29 MIN: ICD-10-PCS | Mod: S$GLB,,, | Performed by: FAMILY MEDICINE

## 2022-01-11 PROCEDURE — U0002: ICD-10-PCS | Mod: QW,CR,S$GLB, | Performed by: FAMILY MEDICINE

## 2022-01-11 PROCEDURE — 99213 OFFICE O/P EST LOW 20 MIN: CPT | Mod: S$GLB,,, | Performed by: FAMILY MEDICINE

## 2022-01-11 RX ORDER — CLONAZEPAM 1 MG/1
1 TABLET ORAL 2 TIMES DAILY PRN
COMMUNITY
Start: 2021-11-19

## 2022-01-11 RX ORDER — LORATADINE 10 MG/1
10 TABLET ORAL DAILY
Qty: 30 TABLET | Refills: 2 | Status: SHIPPED | OUTPATIENT
Start: 2022-01-11 | End: 2022-04-05

## 2022-01-11 NOTE — PROGRESS NOTES
"Subjective:       Patient ID: Genesis Ugalde is a 66 y.o. female.    Vitals:  height is 5' 4" (1.626 m) and weight is 108.9 kg (240 lb). Her oral temperature is 98.5 °F (36.9 °C). Her blood pressure is 136/83 and her pulse is 94. Her respiration is 16 and oxygen saturation is 97%.     Chief Complaint: Headache    vaccinated for covid not flu      67 y/o female with c/o having sore throat , cough and congestion x 2 days, exposed to family members with covid      Headache   This is a new problem. The current episode started yesterday. The problem occurs constantly. The problem has been unchanged. The pain does not radiate. The pain quality is not similar to prior headaches. Associated symptoms include coughing, a fever and a sore throat. Nothing aggravates the symptoms.       Constitution: Positive for fever.   HENT: Positive for congestion, postnasal drip and sore throat.    Respiratory: Positive for cough.    Neurological: Positive for headaches.       Objective:      Physical Exam   Constitutional: She is oriented to person, place, and time. She appears well-developed and well-nourished. She is cooperative.  Non-toxic appearance. She does not appear ill. No distress.   HENT:   Head: Normocephalic and atraumatic.   Ears:   Right Ear: Hearing, tympanic membrane, external ear and ear canal normal.   Left Ear: Hearing, tympanic membrane, external ear and ear canal normal.   Nose: Nose normal. No mucosal edema, rhinorrhea or nasal deformity. No epistaxis. Right sinus exhibits no maxillary sinus tenderness and no frontal sinus tenderness. Left sinus exhibits no maxillary sinus tenderness and no frontal sinus tenderness.   Mouth/Throat: Uvula is midline, oropharynx is clear and moist and mucous membranes are normal. No trismus in the jaw. Normal dentition. No uvula swelling. No posterior oropharyngeal erythema.   Eyes: Conjunctivae and lids are normal. Pupils are equal, round, and reactive to light. Right eye exhibits no " discharge. Left eye exhibits no discharge. No scleral icterus.      extraocular movement intact   Neck: Trachea normal and phonation normal. Neck supple.   Cardiovascular: Normal rate, regular rhythm, normal heart sounds, intact distal pulses and normal pulses.   Pulmonary/Chest: Effort normal and breath sounds normal. No respiratory distress.   Abdominal: Normal appearance and bowel sounds are normal. She exhibits no distension, no pulsatile midline mass and no mass. Soft. There is no abdominal tenderness.   Musculoskeletal: Normal range of motion.         General: No deformity or edema. Normal range of motion.   Neurological: She is alert and oriented to person, place, and time. She exhibits normal muscle tone. Coordination normal.   Skin: Skin is warm, dry, intact, not diaphoretic and not pale.   Psychiatric: She has a normal mood and affect. Her speech is normal and behavior is normal. Judgment and thought content normal. Cognition and memory  Nursing note and vitals reviewed.        Assessment:       1. Nonintractable headache, unspecified chronicity pattern, unspecified headache type    2. Cough in adult          Plan:         Nonintractable headache, unspecified chronicity pattern, unspecified headache type  -     POCT COVID-19 Rapid Screening    Cough in adult          Results for orders placed or performed in visit on 01/11/22   POCT COVID-19 Rapid Screening   Result Value Ref Range    POC Rapid COVID Positive (A) Negative     Acceptable Yes       Pt advised Quarantine for 5 days    Symptomatic treatment

## 2022-01-11 NOTE — PATIENT INSTRUCTIONS
Please isolate yourself at home.  You may leave home and/or return to work once the following conditions are met:    If you were not hospitalized and are not moderately to severely immunocompromised:    More than 5 days since symptoms first appeared AND   More than 24 hours fever free without medications AND   Symptoms are improving   Continue to wear a mask around others for 5 additional days.    If you were hospitalized OR are moderately to severely immunocompromised:   More than 20 days since symptoms first appeared   More than 24 hours fever free without medications   Symptoms have improved    If you had no symptoms but tested positive:   More than 5 days since the date of the first positive test (20 days if moderately to severely immunocompromised). If you develop symptoms, then use the guidelines above.   Continue to wear a mask around others for 5 additional days.      Contact Tracing    As one of the next steps, you will receive a call or text from the [place] Department of Health ([facility]) COVID-19 Tracing Team. See the contact information below so you know not to ignore the health departments call. It is important that you contact them back immediately so they can help.      Contact Tracer Number:  [phone]  Caller ID for most carriers: [facility]     What is contact tracing?  · Contact tracing is a process that helps identify everyone who has been in close contact with an infected person. Contact tracers let those people know they may have been exposed and guide them on next steps. Confidentiality is important for everyone; no one will be told who may have exposed them to the virus.  · Your involvement is important. The more we know about where and how this virus is spreading, the better chance we have at stopping it from spreading further.  What does exposure mean?  · Exposure means you have been within 6 feet for more than 15 minutes with a person who has or had COVID-19.  What kind of  questions do the contact tracers ask?  · A contact tracer will confirm your basic contact information including name, address, phone number, and next of kin, as well as asking about any symptoms you may have had. Theyll also ask you how you think you may have gotten sick, such as places where you may have been exposed to the virus, and people you were with. Those names will never be shared with anyone outside of that call, and will only be used to help trace and stop the spread of the virus.   I have privacy concerns. How will the state use my information?  · Your privacy about your health is important. All calls are completed using call centers that use the appropriate health privacy protection measures (HIPAA compliance), meaning that your patient information is safe. No one will ever ask you any questions related to immigration status. Your health comes first.   Do I have to participate?  · You do not have to participate, but we strongly encourage you to. Contact tracing can help us catch and control new outbreaks as theyre developing to keep your friends and family safe.   What if I dont hear from anyone?  · If you dont receive a call within 24 hours, you can call the number above right away to inquire about your status. That line is open from 8:00 am - 8:00 p.m., 7 days a week.  Contact tracing saves lives! Together, we have the power to beat this virus and keep our loved ones and neighbors safe.    For more information see CDC link below.      https://www.cdc.gov/coronavirus/2019-ncov/hcp/guidance-prevent-spread.html#precautions        Sources:  [facility] [place], [place] Department [facility] and [facility]

## 2022-01-13 ENCOUNTER — TELEPHONE (OUTPATIENT)
Dept: RHEUMATOLOGY | Facility: CLINIC | Age: 67
End: 2022-01-13
Payer: MEDICARE

## 2022-01-13 ENCOUNTER — PATIENT MESSAGE (OUTPATIENT)
Dept: RHEUMATOLOGY | Facility: CLINIC | Age: 67
End: 2022-01-13
Payer: MEDICARE

## 2022-01-13 DIAGNOSIS — D84.9 COVID-19 IN IMMUNOCOMPROMISED PATIENT: Primary | ICD-10-CM

## 2022-01-13 DIAGNOSIS — U07.1 COVID-19 IN IMMUNOCOMPROMISED PATIENT: Primary | ICD-10-CM

## 2022-01-13 NOTE — TELEPHONE ENCOUNTER
Please tell pt to hold off on restarting Actemra until she has been symptom free for 10 days. Please schedule EUA antibody infusion. BELIA

## 2022-01-14 ENCOUNTER — INFUSION (OUTPATIENT)
Dept: INFECTIOUS DISEASES | Facility: HOSPITAL | Age: 67
End: 2022-01-14
Attending: INTERNAL MEDICINE
Payer: MEDICARE

## 2022-01-14 VITALS
SYSTOLIC BLOOD PRESSURE: 124 MMHG | RESPIRATION RATE: 16 BRPM | TEMPERATURE: 98 F | DIASTOLIC BLOOD PRESSURE: 71 MMHG | WEIGHT: 240 LBS | HEART RATE: 89 BPM | BODY MASS INDEX: 40.97 KG/M2 | OXYGEN SATURATION: 96 % | HEIGHT: 64 IN

## 2022-01-14 DIAGNOSIS — D84.9 COVID-19 IN IMMUNOCOMPROMISED PATIENT: ICD-10-CM

## 2022-01-14 DIAGNOSIS — U07.1 COVID-19 IN IMMUNOCOMPROMISED PATIENT: ICD-10-CM

## 2022-01-14 DIAGNOSIS — U07.1 COVID-19: Primary | ICD-10-CM

## 2022-01-14 PROCEDURE — M0243 CASIRIVI AND IMDEVI INFUSION: HCPCS | Performed by: INTERNAL MEDICINE

## 2022-01-14 PROCEDURE — 25000003 PHARM REV CODE 250: Performed by: INTERNAL MEDICINE

## 2022-01-14 PROCEDURE — 63600175 PHARM REV CODE 636 W HCPCS: Performed by: INTERNAL MEDICINE

## 2022-01-14 RX ORDER — ACETAMINOPHEN 325 MG/1
650 TABLET ORAL ONCE AS NEEDED
Status: DISCONTINUED | OUTPATIENT
Start: 2022-01-14 | End: 2022-07-19

## 2022-01-14 RX ORDER — ONDANSETRON 4 MG/1
4 TABLET, ORALLY DISINTEGRATING ORAL ONCE AS NEEDED
Status: DISCONTINUED | OUTPATIENT
Start: 2022-01-14 | End: 2022-07-19

## 2022-01-14 RX ORDER — ALBUTEROL SULFATE 90 UG/1
2 AEROSOL, METERED RESPIRATORY (INHALATION)
Status: DISCONTINUED | OUTPATIENT
Start: 2022-01-14 | End: 2022-07-19

## 2022-01-14 RX ORDER — EPINEPHRINE 0.3 MG/.3ML
0.3 INJECTION SUBCUTANEOUS
Status: DISCONTINUED | OUTPATIENT
Start: 2022-01-14 | End: 2022-07-19

## 2022-01-14 RX ORDER — SODIUM CHLORIDE 0.9 % (FLUSH) 0.9 %
10 SYRINGE (ML) INJECTION
Status: DISCONTINUED | OUTPATIENT
Start: 2022-01-14 | End: 2022-07-19

## 2022-01-14 RX ORDER — DIPHENHYDRAMINE HYDROCHLORIDE 50 MG/ML
25 INJECTION INTRAMUSCULAR; INTRAVENOUS ONCE AS NEEDED
Status: DISCONTINUED | OUTPATIENT
Start: 2022-01-14 | End: 2022-07-19

## 2022-01-14 RX ADMIN — CASIRIVIMAB AND IMDEVIMAB 600 MG: 600; 600 INJECTION, SOLUTION, CONCENTRATE INTRAVENOUS at 10:01

## 2022-01-14 NOTE — PROGRESS NOTES
Infusion complete. Pt tolerated infusion well. No S/S of infusion reaction noted at present time. One hour observation started.

## 2022-01-14 NOTE — PROGRESS NOTES
Patient arrives for casirivimab/ imdevimab infusion. Ambulatory. Pt AAox3. No distress noted. RR even and unlabored.     Symptoms and onset date:  1/11    Fever, cough, SOB, body aches, sinus congestion    Tested COVID + on 1/12

## 2022-01-26 ENCOUNTER — PATIENT MESSAGE (OUTPATIENT)
Dept: RHEUMATOLOGY | Facility: CLINIC | Age: 67
End: 2022-01-26
Payer: MEDICARE

## 2022-02-21 ENCOUNTER — LAB VISIT (OUTPATIENT)
Dept: LAB | Facility: HOSPITAL | Age: 67
End: 2022-02-21
Attending: INTERNAL MEDICINE
Payer: MEDICARE

## 2022-02-21 DIAGNOSIS — Z79.899 VISIT FOR MONITORING ACTEMRA THERAPY: ICD-10-CM

## 2022-02-21 DIAGNOSIS — Z51.81 VISIT FOR MONITORING ACTEMRA THERAPY: ICD-10-CM

## 2022-02-21 LAB
ALBUMIN SERPL BCP-MCNC: 3.9 G/DL (ref 3.5–5.2)
ALP SERPL-CCNC: 103 U/L (ref 55–135)
ALT SERPL W/O P-5'-P-CCNC: 74 U/L (ref 10–44)
ANION GAP SERPL CALC-SCNC: 10 MMOL/L (ref 8–16)
AST SERPL-CCNC: 53 U/L (ref 10–40)
BASOPHILS # BLD AUTO: 0.04 K/UL (ref 0–0.2)
BASOPHILS NFR BLD: 0.4 % (ref 0–1.9)
BILIRUB SERPL-MCNC: 1.1 MG/DL (ref 0.1–1)
BUN SERPL-MCNC: 13 MG/DL (ref 8–23)
CALCIUM SERPL-MCNC: 10.3 MG/DL (ref 8.7–10.5)
CHLORIDE SERPL-SCNC: 108 MMOL/L (ref 95–110)
CO2 SERPL-SCNC: 25 MMOL/L (ref 23–29)
CREAT SERPL-MCNC: 0.8 MG/DL (ref 0.5–1.4)
CRP SERPL-MCNC: 0.5 MG/L (ref 0–8.2)
DIFFERENTIAL METHOD: ABNORMAL
EOSINOPHIL # BLD AUTO: 0.7 K/UL (ref 0–0.5)
EOSINOPHIL NFR BLD: 7.4 % (ref 0–8)
ERYTHROCYTE [DISTWIDTH] IN BLOOD BY AUTOMATED COUNT: 12.9 % (ref 11.5–14.5)
ERYTHROCYTE [SEDIMENTATION RATE] IN BLOOD BY WESTERGREN METHOD: <2 MM/HR (ref 0–36)
EST. GFR  (AFRICAN AMERICAN): >60 ML/MIN/1.73 M^2
EST. GFR  (NON AFRICAN AMERICAN): >60 ML/MIN/1.73 M^2
GLUCOSE SERPL-MCNC: 103 MG/DL (ref 70–110)
HCT VFR BLD AUTO: 41.4 % (ref 37–48.5)
HGB BLD-MCNC: 14 G/DL (ref 12–16)
IMM GRANULOCYTES # BLD AUTO: 0.03 K/UL (ref 0–0.04)
IMM GRANULOCYTES NFR BLD AUTO: 0.3 % (ref 0–0.5)
LYMPHOCYTES # BLD AUTO: 3.6 K/UL (ref 1–4.8)
LYMPHOCYTES NFR BLD: 39.5 % (ref 18–48)
MCH RBC QN AUTO: 32.2 PG (ref 27–31)
MCHC RBC AUTO-ENTMCNC: 33.8 G/DL (ref 32–36)
MCV RBC AUTO: 95 FL (ref 82–98)
MONOCYTES # BLD AUTO: 0.7 K/UL (ref 0.3–1)
MONOCYTES NFR BLD: 7.1 % (ref 4–15)
NEUTROPHILS # BLD AUTO: 4.1 K/UL (ref 1.8–7.7)
NEUTROPHILS NFR BLD: 45.3 % (ref 38–73)
NRBC BLD-RTO: 0 /100 WBC
PLATELET # BLD AUTO: 212 K/UL (ref 150–450)
PMV BLD AUTO: 11.7 FL (ref 9.2–12.9)
POTASSIUM SERPL-SCNC: 3.8 MMOL/L (ref 3.5–5.1)
PROT SERPL-MCNC: 7.1 G/DL (ref 6–8.4)
RBC # BLD AUTO: 4.35 M/UL (ref 4–5.4)
SODIUM SERPL-SCNC: 143 MMOL/L (ref 136–145)
WBC # BLD AUTO: 9.16 K/UL (ref 3.9–12.7)

## 2022-02-21 PROCEDURE — 86140 C-REACTIVE PROTEIN: CPT | Performed by: INTERNAL MEDICINE

## 2022-02-21 PROCEDURE — 36415 COLL VENOUS BLD VENIPUNCTURE: CPT | Mod: PO | Performed by: INTERNAL MEDICINE

## 2022-02-21 PROCEDURE — 85652 RBC SED RATE AUTOMATED: CPT | Performed by: INTERNAL MEDICINE

## 2022-02-21 PROCEDURE — 85025 COMPLETE CBC W/AUTO DIFF WBC: CPT | Performed by: INTERNAL MEDICINE

## 2022-02-21 PROCEDURE — 80053 COMPREHEN METABOLIC PANEL: CPT | Performed by: INTERNAL MEDICINE

## 2022-02-22 DIAGNOSIS — K74.60 CIRRHOSIS OF LIVER WITHOUT ASCITES, UNSPECIFIED HEPATIC CIRRHOSIS TYPE: Primary | ICD-10-CM

## 2022-02-24 ENCOUNTER — OFFICE VISIT (OUTPATIENT)
Dept: RHEUMATOLOGY | Facility: CLINIC | Age: 67
End: 2022-02-24
Payer: MEDICARE

## 2022-02-24 VITALS
HEART RATE: 89 BPM | WEIGHT: 253 LBS | SYSTOLIC BLOOD PRESSURE: 154 MMHG | BODY MASS INDEX: 42.15 KG/M2 | DIASTOLIC BLOOD PRESSURE: 89 MMHG | HEIGHT: 65 IN

## 2022-02-24 DIAGNOSIS — M31.6 GCA (GIANT CELL ARTERITIS): ICD-10-CM

## 2022-02-24 DIAGNOSIS — M31.5 GIANT CELL ARTERITIS WITH POLYMYALGIA RHEUMATICA: ICD-10-CM

## 2022-02-24 DIAGNOSIS — H54.10 BLINDNESS AND LOW VISION: ICD-10-CM

## 2022-02-24 DIAGNOSIS — R10.13 EPIGASTRIC ABDOMINAL PAIN: ICD-10-CM

## 2022-02-24 DIAGNOSIS — E55.9 VITAMIN D DEFICIENCY: ICD-10-CM

## 2022-02-24 DIAGNOSIS — H54.7 VISUAL LOSS: ICD-10-CM

## 2022-02-24 DIAGNOSIS — H46.9 OPTIC NEURITIS: ICD-10-CM

## 2022-02-24 DIAGNOSIS — H20.9 UVEITIS: ICD-10-CM

## 2022-02-24 DIAGNOSIS — K76.0 FATTY LIVER: ICD-10-CM

## 2022-02-24 DIAGNOSIS — E66.01 MORBID OBESITY WITH BMI OF 40.0-44.9, ADULT: ICD-10-CM

## 2022-02-24 PROCEDURE — 99213 OFFICE O/P EST LOW 20 MIN: CPT | Mod: S$PBB,,, | Performed by: INTERNAL MEDICINE

## 2022-02-24 PROCEDURE — 99999 PR PBB SHADOW E&M-EST. PATIENT-LVL IV: ICD-10-PCS | Mod: PBBFAC,,, | Performed by: INTERNAL MEDICINE

## 2022-02-24 PROCEDURE — 99214 OFFICE O/P EST MOD 30 MIN: CPT | Mod: PBBFAC | Performed by: INTERNAL MEDICINE

## 2022-02-24 PROCEDURE — 99213 PR OFFICE/OUTPT VISIT, EST, LEVL III, 20-29 MIN: ICD-10-PCS | Mod: S$PBB,,, | Performed by: INTERNAL MEDICINE

## 2022-02-24 PROCEDURE — 99999 PR PBB SHADOW E&M-EST. PATIENT-LVL IV: CPT | Mod: PBBFAC,,, | Performed by: INTERNAL MEDICINE

## 2022-02-24 RX ORDER — ERGOCALCIFEROL 1.25 MG/1
CAPSULE ORAL
Qty: 12 CAPSULE | Refills: 3 | Status: SHIPPED | OUTPATIENT
Start: 2022-02-24 | End: 2023-07-19 | Stop reason: SDUPTHER

## 2022-02-24 RX ORDER — PRAVASTATIN SODIUM 40 MG/1
40 TABLET ORAL NIGHTLY
Qty: 90 TABLET | Refills: 3 | Status: SHIPPED | OUTPATIENT
Start: 2022-02-24 | End: 2023-03-21

## 2022-02-24 RX ORDER — TOCILIZUMAB 180 MG/ML
162 INJECTION, SOLUTION SUBCUTANEOUS
Qty: 12 EACH | Refills: 0 | Status: SHIPPED | OUTPATIENT
Start: 2022-02-24 | End: 2022-07-07 | Stop reason: SDUPTHER

## 2022-02-24 RX ORDER — SERTRALINE HYDROCHLORIDE 25 MG/1
50 TABLET, FILM COATED ORAL DAILY
Qty: 30 TABLET | Refills: 2 | Status: SHIPPED | OUTPATIENT
Start: 2022-02-24 | End: 2022-03-03

## 2022-02-24 RX ORDER — AMOXICILLIN 500 MG/1
500 CAPSULE ORAL
COMMUNITY
End: 2022-07-19 | Stop reason: ALTCHOICE

## 2022-02-24 NOTE — PROGRESS NOTES
Pre chart    Answers for HPI/ROS submitted by the patient on 2/23/2022  fever: No  eye redness: No  mouth sores: No  headaches: Yes  shortness of breath: No  chest pain: Yes  trouble swallowing: Yes  diarrhea: No  constipation: No  unexpected weight change: No  genital sore: No  dysuria: No  During the last 3 days, have you had a skin rash?: No  Bruises or bleeds easily: No  cough: No

## 2022-02-24 NOTE — PROGRESS NOTES
Subjective:       Patient ID: Genesis Ugalde is a 66 y.o. female.    Chief Complaint:  GCA:ON with blindness  HPI Covid + 1/11/22: headache, sore throat, cough and congestion starting 1/9/22 and exposed to Covid. Had Regen-Cov IV 1/14/22. Declines Hepatology not satisfied. Off Actemra x 4wks back logged [employer] resumed after Regen-Cov. No prednisone since last year.  Had tooth extracted last week.  Review of Systems   Constitutional: Negative for appetite change, fatigue, fever and unexpected weight change.   HENT: Positive for trouble swallowing. Negative for mouth sores.    Eyes: Negative for redness and visual disturbance.   Respiratory: Negative for cough, shortness of breath and wheezing.    Cardiovascular: Positive for chest pain. Negative for palpitations.   Gastrointestinal: Negative for abdominal pain, anal bleeding, blood in stool, constipation, diarrhea, nausea and vomiting.   Genitourinary: Negative for dysuria, frequency, genital sores and urgency.   Musculoskeletal: Negative for arthralgias, back pain, gait problem, joint swelling, myalgias, neck pain and neck stiffness.   Skin: Negative for rash.   Neurological: Positive for headaches. Negative for weakness and numbness.   Hematological: Negative for adenopathy. Does not bruise/bleed easily.   Psychiatric/Behavioral: Negative for sleep disturbance. The patient is not nervous/anxious.          Objective:   There were no vitals taken for this visit.     Physical Exam   Constitutional: She is oriented to person, place, and time.   HENT:   Head: Normocephalic and atraumatic.   Mouth/Throat: Oropharynx is clear and moist.   Eyes: Conjunctivae are normal.   Neck: No thyromegaly present.   Cardiovascular: Normal rate, regular rhythm, normal heart sounds and normal pulses. Exam reveals no gallop and no friction rub.   No murmur heard.  No carotid, supraclavicular, abdominal bruits   Pulmonary/Chest: Breath sounds normal. She has no wheezes. She has no  rales. She exhibits no tenderness.   Abdominal: Soft. She exhibits no distension and no mass. There is no abdominal tenderness.   Musculoskeletal:      Right shoulder: Normal.      Left shoulder: Normal.      Right elbow: Normal.      Left elbow: Normal.      Right wrist: Normal.      Left wrist: Normal.      Cervical back: Normal range of motion and neck supple.      Right knee: Normal.      Left knee: Normal.   Lymphadenopathy:     She has no cervical adenopathy.   Neurological: She is alert and oriented to person, place, and time. She displays normal reflexes. Gait normal.   Nl motor strength UE and LE bilateral       Right Side Rheumatological Exam     Examination finds the shoulder, elbow, wrist, knee, 1st PIP, 1st MCP, 2nd PIP, 2nd MCP, 3rd PIP, 3rd MCP, 4th PIP, 4th MCP, 5th PIP and 5th MCP normal.    Left Side Rheumatological Exam     Examination finds the shoulder, elbow, wrist, knee, 1st PIP, 1st MCP, 2nd PIP, 2nd MCP, 3rd PIP, 3rd MCP, 4th PIP, 4th MCP, 5th PIP and 5th MCP normal.            7/13/2021   Tender (MILLER-28) 0 / 28    Swollen (MILLER-28) 0 / 28    Provider Global --   Patient Global 50 mm   ESR --   CRP 0.7 mg/L   MILLER-28 (ESR) --   MILLER-28 (CRP) 1.85 (Remission)   CDAI Score --      Latest Reference Range & Units 02/21/22 08:52   WBC 3.90 - 12.70 K/uL 9.16   RBC 4.00 - 5.40 M/uL 4.35   Hemoglobin 12.0 - 16.0 g/dL 14.0   Hematocrit 37.0 - 48.5 % 41.4   MCV 82 - 98 fL 95   MCH 27.0 - 31.0 pg 32.2 (H)   MCHC 32.0 - 36.0 g/dL 33.8   RDW 11.5 - 14.5 % 12.9   Platelets 150 - 450 K/uL 212   MPV 9.2 - 12.9 fL 11.7   Gran % 38.0 - 73.0 % 45.3   Lymph % 18.0 - 48.0 % 39.5   Mono % 4.0 - 15.0 % 7.1   Eosinophil % 0.0 - 8.0 % 7.4   Basophil % 0.0 - 1.9 % 0.4   Immature Granulocytes 0.0 - 0.5 % 0.3   Gran # (ANC) 1.8 - 7.7 K/uL 4.1   Lymph # 1.0 - 4.8 K/uL 3.6   Mono # 0.3 - 1.0 K/uL 0.7   Eos # 0.0 - 0.5 K/uL 0.7 (H)   Baso # 0.00 - 0.20 K/uL 0.04   Immature Grans (Abs) 0.00 - 0.04 K/uL 0.03 [1]   NRBC 0 /100  WBC 0   Differential Method  Automated   Sed Rate 0 - 36 mm/Hr <2   Sodium 136 - 145 mmol/L 143   Potassium 3.5 - 5.1 mmol/L 3.8   Chloride 95 - 110 mmol/L 108   CO2 23 - 29 mmol/L 25   Anion Gap 8 - 16 mmol/L 10   BUN 8 - 23 mg/dL 13   Creatinine 0.5 - 1.4 mg/dL 0.8   EGFR if non African American >60 mL/min/1.73 m^2 >60.0 [2]   EGFR if African American >60 mL/min/1.73 m^2 >60.0   Glucose 70 - 110 mg/dL 103   Calcium 8.7 - 10.5 mg/dL 10.3   Alkaline Phosphatase 55 - 135 U/L 103   PROTEIN TOTAL 6.0 - 8.4 g/dL 7.1   Albumin 3.5 - 5.2 g/dL 3.9   BILIRUBIN TOTAL 0.1 - 1.0 mg/dL 1.1 (H) [3]   AST 10 - 40 U/L 53 (H)   ALT 10 - 44 U/L 74 (H)   CRP 0.0 - 8.2 mg/L 0.5   (H): Data is abnormally high  [1] Mild elevation in immature granulocytes is non specific and    can be seen in a variety of conditions including stress response,    acute inflammation, trauma and pregnancy. Correlation with other    laboratory and clinical findings is essential.     [2] Calculation used to obtain the estimated glomerular filtration   rate (eGFR) is the CKD-EPI equation.      [3] For infants and newborns, interpretation of results should be based   on gestational age, weight and in agreement with clinical   observations.      Premature Infant recommended reference ranges:   Up to 24 hours.............<8.0 mg/dL   Up to 48 hours............<12.0 mg/dL   3-5 days..................<15.0 mg/dL   6-29 days.................<15.0 mg/dL    Assessment:   GCA/PMR  Chronic inflammatory  Optic neuritis with blindness  Primary open angle glaucoma  NAFLD, hepatomegaly  Dysphagia still hasn't seen Gastro  /89  Morbid obesity gained 4#  T2 DM    Plan:   MRA brain(small vessel study), neck  MRA chest/abd/pelvis as previously ordered  DXA  referred previously to Gastro for dysphagia 10/19/21 schedule  Overdue for f/u Hepatology for NALFD referral previously placed(Dr. Blackwell) she wishes to defer  F/u Dr. Ro in Ophthalmology  next month  RTC 3  months with standing labs

## 2022-03-21 ENCOUNTER — HOSPITAL ENCOUNTER (OUTPATIENT)
Dept: RADIOLOGY | Facility: HOSPITAL | Age: 67
Discharge: HOME | End: 2022-03-21
Attending: PHYSICIAN ASSISTANT
Payer: MEDICARE

## 2022-03-21 DIAGNOSIS — K74.60 CIRRHOSIS OF LIVER WITHOUT ASCITES, UNSPECIFIED HEPATIC CIRRHOSIS TYPE: ICD-10-CM

## 2022-03-21 PROCEDURE — 76705 ECHO EXAM OF ABDOMEN: CPT | Mod: 26,,, | Performed by: RADIOLOGY

## 2022-03-21 PROCEDURE — 76705 ECHO EXAM OF ABDOMEN: CPT | Mod: TC

## 2022-03-21 PROCEDURE — 76705 US ABDOMEN LIMITED: ICD-10-PCS | Mod: 26,,, | Performed by: RADIOLOGY

## 2022-04-10 ENCOUNTER — PATIENT MESSAGE (OUTPATIENT)
Dept: RHEUMATOLOGY | Facility: CLINIC | Age: 67
End: 2022-04-10
Payer: MEDICARE

## 2022-04-18 ENCOUNTER — OFFICE VISIT (OUTPATIENT)
Dept: OPHTHALMOLOGY | Facility: CLINIC | Age: 67
End: 2022-04-18
Payer: MEDICARE

## 2022-04-18 DIAGNOSIS — H54.7 VISUAL LOSS: ICD-10-CM

## 2022-04-18 DIAGNOSIS — H46.9 OPTIC NEURITIS: ICD-10-CM

## 2022-04-18 DIAGNOSIS — H46.9 INFLAMMATORY OPTIC NEUROPATHY: ICD-10-CM

## 2022-04-18 DIAGNOSIS — H40.033 ANATOMICAL NARROW ANGLE BORDERLINE GLAUCOMA, BILATERAL: ICD-10-CM

## 2022-04-18 DIAGNOSIS — H40.1131 PRIMARY OPEN ANGLE GLAUCOMA OF BOTH EYES, MILD STAGE: Primary | ICD-10-CM

## 2022-04-18 DIAGNOSIS — M31.5 GIANT CELL ARTERITIS WITH POLYMYALGIA RHEUMATICA: ICD-10-CM

## 2022-04-18 DIAGNOSIS — H40.043 STEROID RESPONDER, BILATERAL: ICD-10-CM

## 2022-04-18 DIAGNOSIS — H25.13 NUCLEAR SCLEROSIS, BILATERAL: ICD-10-CM

## 2022-04-18 DIAGNOSIS — E11.9 TYPE 2 DIABETES MELLITUS WITHOUT OPHTHALMIC MANIFESTATIONS: ICD-10-CM

## 2022-04-18 PROCEDURE — 92012 INTRM OPH EXAM EST PATIENT: CPT | Mod: S$PBB,,, | Performed by: OPHTHALMOLOGY

## 2022-04-18 PROCEDURE — 99213 OFFICE O/P EST LOW 20 MIN: CPT | Mod: PBBFAC | Performed by: OPHTHALMOLOGY

## 2022-04-18 PROCEDURE — 99999 PR PBB SHADOW E&M-EST. PATIENT-LVL III: ICD-10-PCS | Mod: PBBFAC,,, | Performed by: OPHTHALMOLOGY

## 2022-04-18 PROCEDURE — 92020 PR SPECIAL EYE EVAL,GONIOSCOPY: ICD-10-PCS | Mod: S$PBB,,, | Performed by: OPHTHALMOLOGY

## 2022-04-18 PROCEDURE — 92020 GONIOSCOPY: CPT | Mod: PBBFAC | Performed by: OPHTHALMOLOGY

## 2022-04-18 PROCEDURE — 99999 PR PBB SHADOW E&M-EST. PATIENT-LVL III: CPT | Mod: PBBFAC,,, | Performed by: OPHTHALMOLOGY

## 2022-04-18 PROCEDURE — 92012 PR EYE EXAM, EST PATIENT,INTERMED: ICD-10-PCS | Mod: S$PBB,,, | Performed by: OPHTHALMOLOGY

## 2022-04-18 PROCEDURE — 92020 GONIOSCOPY: CPT | Mod: S$PBB,,, | Performed by: OPHTHALMOLOGY

## 2022-04-18 NOTE — PROGRESS NOTES
HPI     DLS: 9/16/2021    Pt here for 6 Month Check;  Pt states no eye pain but feels like she has lost more vision.     Meds;  Dorzolamide TID OU   Brimonidine TID OU   Latanoprost QHS OU   Prednisone 5mg QDAY  PO    1) Autoimmune Optic Neuropathy OU   2) POAG vs OHT   3) GCA with PMR   4) Type 2 DM   5) Steroid Responder   6) NS OU   7) APD OD     Last edited by Doris Clayton on 4/18/2022  8:54 AM. (History)            Assessment /Plan     For exam results, see Encounter Report.    Primary open angle glaucoma of both eyes, mild stage    Steroid responder, bilateral    Giant cell arteritis with polymyalgia rheumatica    Inflammatory optic neuropathy    Optic neuritis    Anatomical narrow angle borderline glaucoma, bilateral    Nuclear sclerosis, bilateral    Type 2 diabetes mellitus without ophthalmic manifestations    Visual loss        1. Autoimmune optic neuropathy OU,   - OS 2004  - OD 2012  - unknown antibiody  - several treatments with IV steroid and IVIG  - vision and VF per [place] records  - PO steroid daily - 10 mg a day   - sural nerve and muscle biopsy done - results pending - pt to obtain results form [facility]  - xal qhs ou   - monitor  - rheum monitoring esr/crp  - all previous mri's and lp negative per rheum note  Last ESR / and c-react prot - both very low      Exacerbation - when did not get Actemra - may 2020 - had progression od - stabilized again 9/2020     2.  Steroid responder in past  - on chronic steroids for #1  iop good today   Continue xal qhs ou    3. POAG - mild vs OHT ou      First HVF   2004   First photos   2004   Treatment / Drops started   2004           Family history    neg        Glaucoma meds    Latanoprost ou        H/O adverse rxn to glaucoma drops    none        LASERS    None         GLAUCOMA SURGERIES    none        OTHER EYE SURGERIES    none        CDR    0.9 / 0.9 - +++ pallor ou         Tbase    25-27 / 25-27          Tmax    27/27            Ttarget    20/20             HVF  "   12 test 2004 to 2012  - SALT / IAD od // Ext os                    4 10-2 stim V OD 2012 - 2014 - [facility]    (+ prog from 2012 to 2014 - when followed at [facility])    7- test-  10-2 stim V test done od 12/2014 to 2017 - dense SALT and IAD (+stable)         Gonio    +2-3 ou (very slight narrowing)         CCT    591/612        OCT    7 test 2006 to 2016 - RNFL - dec throughout od // dec thru out  os        HRT    6 test 2004 to 2018 - MR - nl od // off-center os // new baseline 1/25/2016 - nl ou -"too good to be true"        Disc photos    2004 - slides // 2008, 2010, 2012, , 2012, 2012, 2012, 2015, 2017   - OIS     - Ttoday   13/10   - Test done today  monitor IOP / gonio     PLAN  CSM - IOP good     Sees Benjie   He is monitoring sed rate and c react prot   Cont actemra  (tocilizumab) 162 mg sc weekly  Cont prilosec (omeprazole)  20mg daily - to protect stomach     Cont brimonidine tid ou  Cont latanoprost qhs ou  Cont dorzolamide tid ou  - IOP was better     -would rec PI's prior to doing and slt's - has some mild  ant bowing and narrowing   - avoid BB - H/O asthma as a child     OFF PO prednisone - since dec 2021     NO MORE VF testing - essentially  EXT ou      Photo file updated 4/18/2022    F/u 6 mos--IOP // [facility]    Insurance forms filled out again (yearly)  8/5/2019                  "

## 2022-06-06 ENCOUNTER — TELEPHONE (OUTPATIENT)
Dept: RHEUMATOLOGY | Facility: CLINIC | Age: 67
End: 2022-06-06
Payer: MEDICARE

## 2022-06-06 NOTE — TELEPHONE ENCOUNTER
Please schedule the MRAs, TA US  DXA ordered 10/19/21. Also overdue for standing labs. Received refill for prednisone but she has been off since last year. Please clarify if she is taking or not.  Thank you. BELIA

## 2022-06-09 ENCOUNTER — TELEPHONE (OUTPATIENT)
Dept: RHEUMATOLOGY | Facility: CLINIC | Age: 67
End: 2022-06-09
Payer: MEDICARE

## 2022-06-13 ENCOUNTER — TELEPHONE (OUTPATIENT)
Dept: RHEUMATOLOGY | Facility: CLINIC | Age: 67
End: 2022-06-13
Payer: MEDICARE

## 2022-07-07 DIAGNOSIS — M31.6 GCA (GIANT CELL ARTERITIS): ICD-10-CM

## 2022-07-07 RX ORDER — TOCILIZUMAB 180 MG/ML
162 INJECTION, SOLUTION SUBCUTANEOUS
Qty: 12 EACH | Refills: 0 | Status: SHIPPED | OUTPATIENT
Start: 2022-07-07 | End: 2022-07-08 | Stop reason: SDUPTHER

## 2022-07-08 ENCOUNTER — PATIENT MESSAGE (OUTPATIENT)
Dept: RHEUMATOLOGY | Facility: CLINIC | Age: 67
End: 2022-07-08
Payer: MEDICARE

## 2022-07-08 DIAGNOSIS — M31.6 GCA (GIANT CELL ARTERITIS): ICD-10-CM

## 2022-07-08 RX ORDER — TOCILIZUMAB 180 MG/ML
162 INJECTION, SOLUTION SUBCUTANEOUS
Qty: 12 EACH | Refills: 0 | OUTPATIENT
Start: 2022-07-08 | End: 2022-07-12 | Stop reason: SDUPTHER

## 2022-07-08 RX ORDER — TOCILIZUMAB 180 MG/ML
162 INJECTION, SOLUTION SUBCUTANEOUS
Qty: 12 EACH | Refills: 0 | Status: SHIPPED | OUTPATIENT
Start: 2022-07-08 | End: 2022-07-08 | Stop reason: SDUPTHER

## 2022-07-12 ENCOUNTER — PATIENT MESSAGE (OUTPATIENT)
Dept: RHEUMATOLOGY | Facility: CLINIC | Age: 67
End: 2022-07-12
Payer: MEDICARE

## 2022-07-12 ENCOUNTER — TELEPHONE (OUTPATIENT)
Dept: RHEUMATOLOGY | Facility: CLINIC | Age: 67
End: 2022-07-12
Payer: MEDICARE

## 2022-07-12 DIAGNOSIS — M31.6 GCA (GIANT CELL ARTERITIS): ICD-10-CM

## 2022-07-12 RX ORDER — TOCILIZUMAB 180 MG/ML
162 INJECTION, SOLUTION SUBCUTANEOUS
Qty: 12 EACH | Refills: 0 | Status: SHIPPED | OUTPATIENT
Start: 2022-07-12 | End: 2022-10-18 | Stop reason: SDUPTHER

## 2022-07-15 ENCOUNTER — PATIENT MESSAGE (OUTPATIENT)
Dept: RHEUMATOLOGY | Facility: CLINIC | Age: 67
End: 2022-07-15
Payer: MEDICARE

## 2022-07-15 ENCOUNTER — LAB VISIT (OUTPATIENT)
Dept: LAB | Facility: HOSPITAL | Age: 67
End: 2022-07-15
Attending: INTERNAL MEDICINE
Payer: MEDICARE

## 2022-07-15 DIAGNOSIS — Z79.899 VISIT FOR MONITORING ACTEMRA THERAPY: ICD-10-CM

## 2022-07-15 DIAGNOSIS — Z51.81 VISIT FOR MONITORING ACTEMRA THERAPY: ICD-10-CM

## 2022-07-15 LAB
ALBUMIN SERPL BCP-MCNC: 4.2 G/DL (ref 3.5–5.2)
ALP SERPL-CCNC: 82 U/L (ref 55–135)
ALT SERPL W/O P-5'-P-CCNC: 105 U/L (ref 10–44)
ANION GAP SERPL CALC-SCNC: 10 MMOL/L (ref 8–16)
AST SERPL-CCNC: 80 U/L (ref 10–40)
BASOPHILS # BLD AUTO: 0.04 K/UL (ref 0–0.2)
BASOPHILS NFR BLD: 0.4 % (ref 0–1.9)
BILIRUB SERPL-MCNC: 2.3 MG/DL (ref 0.1–1)
BUN SERPL-MCNC: 14 MG/DL (ref 8–23)
CALCIUM SERPL-MCNC: 9.9 MG/DL (ref 8.7–10.5)
CHLORIDE SERPL-SCNC: 109 MMOL/L (ref 95–110)
CO2 SERPL-SCNC: 22 MMOL/L (ref 23–29)
CREAT SERPL-MCNC: 0.8 MG/DL (ref 0.5–1.4)
CRP SERPL-MCNC: <0.3 MG/L (ref 0–8.2)
DIFFERENTIAL METHOD: ABNORMAL
EOSINOPHIL # BLD AUTO: 0.4 K/UL (ref 0–0.5)
EOSINOPHIL NFR BLD: 4.1 % (ref 0–8)
ERYTHROCYTE [DISTWIDTH] IN BLOOD BY AUTOMATED COUNT: 12.9 % (ref 11.5–14.5)
ERYTHROCYTE [SEDIMENTATION RATE] IN BLOOD BY PHOTOMETRIC METHOD: <2 MM/HR (ref 0–36)
EST. GFR  (AFRICAN AMERICAN): >60 ML/MIN/1.73 M^2
EST. GFR  (NON AFRICAN AMERICAN): >60 ML/MIN/1.73 M^2
GLUCOSE SERPL-MCNC: 106 MG/DL (ref 70–110)
HCT VFR BLD AUTO: 41 % (ref 37–48.5)
HGB BLD-MCNC: 13.7 G/DL (ref 12–16)
IMM GRANULOCYTES # BLD AUTO: 0.04 K/UL (ref 0–0.04)
IMM GRANULOCYTES NFR BLD AUTO: 0.4 % (ref 0–0.5)
LYMPHOCYTES # BLD AUTO: 3.1 K/UL (ref 1–4.8)
LYMPHOCYTES NFR BLD: 30.4 % (ref 18–48)
MCH RBC QN AUTO: 32 PG (ref 27–31)
MCHC RBC AUTO-ENTMCNC: 33.4 G/DL (ref 32–36)
MCV RBC AUTO: 96 FL (ref 82–98)
MONOCYTES # BLD AUTO: 0.7 K/UL (ref 0.3–1)
MONOCYTES NFR BLD: 7.1 % (ref 4–15)
NEUTROPHILS # BLD AUTO: 6 K/UL (ref 1.8–7.7)
NEUTROPHILS NFR BLD: 57.6 % (ref 38–73)
NRBC BLD-RTO: 0 /100 WBC
PLATELET # BLD AUTO: 227 K/UL (ref 150–450)
PMV BLD AUTO: 11.5 FL (ref 9.2–12.9)
POTASSIUM SERPL-SCNC: 4 MMOL/L (ref 3.5–5.1)
PROT SERPL-MCNC: 7 G/DL (ref 6–8.4)
RBC # BLD AUTO: 4.28 M/UL (ref 4–5.4)
SODIUM SERPL-SCNC: 141 MMOL/L (ref 136–145)
WBC # BLD AUTO: 10.33 K/UL (ref 3.9–12.7)

## 2022-07-15 PROCEDURE — 85652 RBC SED RATE AUTOMATED: CPT | Performed by: INTERNAL MEDICINE

## 2022-07-15 PROCEDURE — 85025 COMPLETE CBC W/AUTO DIFF WBC: CPT | Performed by: INTERNAL MEDICINE

## 2022-07-15 PROCEDURE — 86140 C-REACTIVE PROTEIN: CPT | Performed by: INTERNAL MEDICINE

## 2022-07-15 PROCEDURE — 80053 COMPREHEN METABOLIC PANEL: CPT | Performed by: INTERNAL MEDICINE

## 2022-07-15 PROCEDURE — 36415 COLL VENOUS BLD VENIPUNCTURE: CPT | Mod: PO | Performed by: INTERNAL MEDICINE

## 2022-07-16 ENCOUNTER — TELEPHONE (OUTPATIENT)
Dept: RHEUMATOLOGY | Facility: CLINIC | Age: 67
End: 2022-07-16
Payer: MEDICARE

## 2022-07-16 DIAGNOSIS — K74.60 HEPATIC CIRRHOSIS, UNSPECIFIED HEPATIC CIRRHOSIS TYPE, UNSPECIFIED WHETHER ASCITES PRESENT: Primary | ICD-10-CM

## 2022-07-17 ENCOUNTER — TELEPHONE (OUTPATIENT)
Dept: HEPATOLOGY | Facility: CLINIC | Age: 67
End: 2022-07-17
Payer: MEDICARE

## 2022-07-17 NOTE — TELEPHONE ENCOUNTER
Please schedule way overdue f/u with Hepatology for cirrhosis with worsened abnormal liver tests ASAP. Thank you BELIA

## 2022-07-17 NOTE — PROGRESS NOTES
The crp is normal. The  bilirubin is much higher as are the transaminases. You need to f/u with Hepatology ASAP, the liver tests are normal. BELIA

## 2022-07-18 ENCOUNTER — TELEPHONE (OUTPATIENT)
Dept: HEPATOLOGY | Facility: CLINIC | Age: 67
End: 2022-07-18
Payer: MEDICARE

## 2022-07-18 NOTE — TELEPHONE ENCOUNTER
----- Message from Connie Wooten MA sent at 7/18/2022 11:24 AM CDT -----  Regarding: FW: Returning call    ----- Message -----  From: Philippe Jalloh  Sent: 7/18/2022  10:31 AM CDT  To: [facility] Staff  Subject: Returning call                                   Patient states she is returning a missed call attempt from [facility].     Contact: [phone]

## 2022-07-19 ENCOUNTER — OFFICE VISIT (OUTPATIENT)
Dept: RHEUMATOLOGY | Facility: CLINIC | Age: 67
End: 2022-07-19
Payer: MEDICARE

## 2022-07-19 ENCOUNTER — OFFICE VISIT (OUTPATIENT)
Dept: HEPATOLOGY | Facility: CLINIC | Age: 67
End: 2022-07-19
Payer: MEDICARE

## 2022-07-19 ENCOUNTER — PATIENT MESSAGE (OUTPATIENT)
Dept: RESEARCH | Facility: CLINIC | Age: 67
End: 2022-07-19
Payer: MEDICARE

## 2022-07-19 VITALS
HEART RATE: 77 BPM | OXYGEN SATURATION: 96 % | DIASTOLIC BLOOD PRESSURE: 59 MMHG | SYSTOLIC BLOOD PRESSURE: 106 MMHG | HEIGHT: 65 IN | RESPIRATION RATE: 18 BRPM | WEIGHT: 247.56 LBS | TEMPERATURE: 98 F | BODY MASS INDEX: 41.25 KG/M2

## 2022-07-19 VITALS
BODY MASS INDEX: 40.98 KG/M2 | WEIGHT: 246 LBS | HEART RATE: 81 BPM | HEIGHT: 65 IN | DIASTOLIC BLOOD PRESSURE: 77 MMHG | SYSTOLIC BLOOD PRESSURE: 127 MMHG

## 2022-07-19 DIAGNOSIS — E78.5 HYPERLIPIDEMIA, UNSPECIFIED HYPERLIPIDEMIA TYPE: ICD-10-CM

## 2022-07-19 DIAGNOSIS — E66.01 MORBID OBESITY: ICD-10-CM

## 2022-07-19 DIAGNOSIS — Z86.39 HISTORY OF DIABETES MELLITUS: ICD-10-CM

## 2022-07-19 DIAGNOSIS — K21.9 GASTROESOPHAGEAL REFLUX DISEASE: ICD-10-CM

## 2022-07-19 DIAGNOSIS — M31.6 GCA (GIANT CELL ARTERITIS): Primary | ICD-10-CM

## 2022-07-19 DIAGNOSIS — K74.60 HEPATIC CIRRHOSIS, UNSPECIFIED HEPATIC CIRRHOSIS TYPE, UNSPECIFIED WHETHER ASCITES PRESENT: Primary | ICD-10-CM

## 2022-07-19 DIAGNOSIS — K44.9 HIATAL HERNIA: ICD-10-CM

## 2022-07-19 PROCEDURE — 99999 PR PBB SHADOW E&M-EST. PATIENT-LVL IV: ICD-10-PCS | Mod: PBBFAC,,, | Performed by: INTERNAL MEDICINE

## 2022-07-19 PROCEDURE — 99214 OFFICE O/P EST MOD 30 MIN: CPT | Mod: S$PBB,,, | Performed by: INTERNAL MEDICINE

## 2022-07-19 PROCEDURE — 99999 PR PBB SHADOW E&M-EST. PATIENT-LVL IV: CPT | Mod: PBBFAC,,, | Performed by: INTERNAL MEDICINE

## 2022-07-19 PROCEDURE — 99214 PR OFFICE/OUTPT VISIT, EST, LEVL IV, 30-39 MIN: ICD-10-PCS | Mod: S$PBB,,, | Performed by: NURSE PRACTITIONER

## 2022-07-19 PROCEDURE — 99999 PR PBB SHADOW E&M-EST. PATIENT-LVL V: CPT | Mod: PBBFAC,,, | Performed by: NURSE PRACTITIONER

## 2022-07-19 PROCEDURE — 99215 OFFICE O/P EST HI 40 MIN: CPT | Mod: PBBFAC | Performed by: NURSE PRACTITIONER

## 2022-07-19 PROCEDURE — 99999 PR PBB SHADOW E&M-EST. PATIENT-LVL V: ICD-10-PCS | Mod: PBBFAC,,, | Performed by: NURSE PRACTITIONER

## 2022-07-19 PROCEDURE — 99214 PR OFFICE/OUTPT VISIT, EST, LEVL IV, 30-39 MIN: ICD-10-PCS | Mod: S$PBB,,, | Performed by: INTERNAL MEDICINE

## 2022-07-19 PROCEDURE — 99214 OFFICE O/P EST MOD 30 MIN: CPT | Mod: PBBFAC,27 | Performed by: INTERNAL MEDICINE

## 2022-07-19 PROCEDURE — 99214 OFFICE O/P EST MOD 30 MIN: CPT | Mod: S$PBB,,, | Performed by: NURSE PRACTITIONER

## 2022-07-19 RX ORDER — OMEPRAZOLE 40 MG/1
40 CAPSULE, DELAYED RELEASE ORAL EVERY 12 HOURS
Qty: 180 CAPSULE | Refills: 0 | Status: SHIPPED | OUTPATIENT
Start: 2022-07-19 | End: 2022-10-17

## 2022-07-19 NOTE — PROGRESS NOTES
"Subjective:       Patient ID: Genesis Ugalde is a 66 y.o. female.    Chief Complaint: GCA; optic neuritis with blindness  HPI having shoulder problems. Has pain left side of face, Pain shoulders neck too. House now with new roof, new [facility] in June. Son now out of the  House. 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  Review of Systems   Constitutional: Negative for appetite change, fatigue, fever and unexpected weight change.   HENT: Negative for mouth sores.    Eyes: Negative for visual disturbance.   Respiratory: Negative for cough, shortness of breath and wheezing.    Cardiovascular: Negative for chest pain and palpitations.   Gastrointestinal: Negative for abdominal pain, anal bleeding, blood in stool, constipation, diarrhea, nausea and vomiting.   Genitourinary: Negative for dysuria, frequency and urgency.   Musculoskeletal: Negative for arthralgias, back pain, gait problem, joint swelling, myalgias, neck pain and neck stiffness.   Skin: Negative for rash.   Neurological: Negative for weakness, numbness and headaches.   Hematological: Negative for adenopathy. Does not bruise/bleed easily.   Psychiatric/Behavioral: Negative for sleep disturbance. The patient is not nervous/anxious.          Objective:   /77   Pulse 81   Ht 5' 4.8" (1.646 m)   Wt 111.6 kg (246 lb)   BMI 41.19 kg/m²      Physical Exam   Constitutional: She is oriented to person, place, and time. She appears obese.   HENT:   Head: Normocephalic and atraumatic.   Mouth/Throat: Oropharynx is clear and moist.   Eyes: Conjunctivae are normal.   Blind  Crusted eyelids   Neck: Carotid bruit is not present. No thyromegaly present.   Cardiovascular: Normal rate, regular rhythm, normal heart sounds and normal pulses. Exam reveals no gallop and no friction rub.   No murmur heard.  Pulmonary/Chest: Breath sounds normal. She has no wheezes. She has no rales. She exhibits no tenderness.   Abdominal: Soft. She exhibits no distension and no mass. There is " no abdominal tenderness.   Musculoskeletal:      Right shoulder: Tenderness present.      Left shoulder: Tenderness present.      Right elbow: Normal.      Left elbow: Normal.      Right wrist: Normal.      Left wrist: Normal.      Cervical back: Normal range of motion and neck supple.      Right knee: Tenderness present.      Left knee: Normal.   Lymphadenopathy:     She has no cervical adenopathy.   Neurological: She is alert and oriented to person, place, and time. She displays normal reflexes. Gait normal.   Nl motor strength UE and LE bilateral   Psychiatric: Her behavior is normal. Mood, judgment and thought content normal.       Right Side Rheumatological Exam     Examination finds the elbow, wrist, 1st PIP, 1st MCP, 2nd PIP, 2nd MCP, 3rd PIP, 3rd MCP, 4th PIP, 4th MCP, 5th PIP and 5th MCP normal.    The patient is tender to palpation of the shoulder and knee    Left Side Rheumatological Exam     Examination finds the elbow, wrist, knee, 1st PIP, 1st MCP, 2nd PIP, 2nd MCP, 3rd PIP, 3rd MCP, 4th PIP, 4th MCP, 5th PIP and 5th MCP normal.    The patient is tender to palpation of the shoulder.              Latest Reference Range & Units 03/21/22 16:14 07/15/22 12:23   WBC 3.90 - 12.70 K/uL  10.33   RBC 4.00 - 5.40 M/uL  4.28   Hemoglobin 12.0 - 16.0 g/dL  13.7   Hematocrit 37.0 - 48.5 %  41.0   MCV 82 - 98 fL  96   MCH 27.0 - 31.0 pg  32.0 (H)   MCHC 32.0 - 36.0 g/dL  33.4   RDW 11.5 - 14.5 %  12.9   Platelets 150 - 450 K/uL  227   MPV 9.2 - 12.9 fL  11.5   Gran % 38.0 - 73.0 %  57.6   Lymph % 18.0 - 48.0 %  30.4   Mono % 4.0 - 15.0 %  7.1   Eosinophil % 0.0 - 8.0 %  4.1   Basophil % 0.0 - 1.9 %  0.4   Immature Granulocytes 0.0 - 0.5 %  0.4   Gran # (ANC) 1.8 - 7.7 K/uL  6.0   Lymph # 1.0 - 4.8 K/uL  3.1   Mono # 0.3 - 1.0 K/uL  0.7   Eos # 0.0 - 0.5 K/uL  0.4   Baso # 0.00 - 0.20 K/uL  0.04   Immature Grans (Abs) 0.00 - 0.04 K/uL  0.04   nRBC 0 /100 WBC  0   Differential Method   Automated   Sed Rate 0 - 36  mm/Hr  <2   Protime 9.0 - 12.5 sec 11.0    INR 0.8 - 1.2  1.1    Sodium 136 - 145 mmol/L  141   Potassium 3.5 - 5.1 mmol/L  4.0   Chloride 95 - 110 mmol/L  109   CO2 23 - 29 mmol/L  22 (L)   Anion Gap 8 - 16 mmol/L  10   BUN 8 - 23 mg/dL  14   Creatinine 0.5 - 1.4 mg/dL  0.8   eGFR if non African American >60 mL/min/1.73 m^2  >60.0   eGFR if African American >60 mL/min/1.73 m^2  >60.0   Glucose 70 - 110 mg/dL  106   Calcium 8.7 - 10.5 mg/dL  9.9   Alkaline Phosphatase 55 - 135 U/L  82   PROTEIN TOTAL 6.0 - 8.4 g/dL  7.0   Albumin 3.5 - 5.2 g/dL  4.2   BILIRUBIN TOTAL 0.1 - 1.0 mg/dL  2.3 (H)   AST 10 - 40 U/L  80 (H)   ALT 10 - 44 U/L  105 (H)   CRP 0.0 - 8.2 mg/L  <0.3   AFP 0.0 - 8.4 ng/mL 3.0    (H): Data is abnormally high  (L): Data is abnormally low       7/13/2021   Tender (MILLER-28) 0 / 28    Swollen (MILLER-28) 0 / 28    Provider Global --   Patient Global 50 mm   ESR --   CRP 0.7 mg/L   MILLER-28 (ESR) --   MILLER-28 (CRP) 1.85 (Remission)   CDAI Score --        Assessment:       GCA/PMR in clinical remission  Chronic inflammatory  Optic neuritis with blindness stable  Primary open angle glaucoma  Cirrhosis, NAFLD, hepatomegaly saw Dr. Fitzgerald today  Dysphagia still hasn't seen Gastro  /77  Morbid obesity gained 4#  T2 DM      Plan:   MRA brain(small vessel study), neck as previously ordered  MRA chest/abd/pelvis as previously ordered  DXA  Re-referred previously to Gastro for dysphagia 10/19/21 schedule  F/u Dr Fitzgerald in Hepatology q 6 months  F/u Dr. Ro in Ophthalmology  in Oct  Cont tocilizumab 162mg sc weekly just refilled  Cont pravastatin 40mg nightly  Cont vitamin D2 50,000 units once a week  Declines PT for  Neck and shoulders  RTC 3 months with standing labs

## 2022-07-19 NOTE — PATIENT INSTRUCTIONS
Because you have cirrhosis, it is important to attend clinic visits every 6 months with an Ultrasound and blood tests every 6 months to screen for liver cancer (you are at risk of developing liver cancer due to scar tissue in the liver)    Signs and symptoms of worsening liver disease include jaundice, fluid in the belly (ascites), and confusion/disorientation/slowed thought processes due to hepatic encephalopathy (toxins building up because of liver problems).   You should seek medical attention if any of these things occur.    Also, possible bleeding from esophageal varices (blood vessels in the stomach and foodpipe can burst and cause fatal bleeding).  Therefore, if you have symptoms of vomiting blood, blood in your stool, dark or black stools or vomiting coffee ground vomit, YOU SHOULD GO TO THE EMERGENCY ROOM IMMEDIATELY.     Cirrhosis can increase the risk of liver cancer, liver failure, and death. However, we will watch your liver function score (MELD score) closely with each clinic visit. A normal MELD score is 6, highest is 40. Your last one was an 9. We will check this with every clinic visit. A MELD 15 or higher is when we start to consider transplant because MELD 15 or higher indicates that the liver is not functioning as well     Cirrhosis Counseling  - NO alcohol use (includes beer, wine, and/or liquor)  in twice daily for treatment of hepatic encephalopathy (confusion due to high ammonia level)  - avoid non-steroidal anti-inflammatory drugs (NSAIDs) such as ibuprofen, Motrin, naprosyn, Alleve due to the risk of kidney damage  - can take acetaminophen (Tylenol), no more than 2000 mg per day  - low sodium (salt) 2 gram per day diet  - high protein diet: 115 grams per day to prevent muscle mass loss. Drink at least 1 protein shake daily (Premier Protein is best option because it is very high protein and low sugar). Ok to use this as nighttime snack to fit it in   - resistance exercises for muscle  strength  - avoid raw seafoods due to the risk of fatal Vibrio vulnificus infection  - ultrasound of the liver every 6 months for liver cancer screening (you are at risk of developing liver cancer due to scar tissue in the liver)  - Upper endoscopy every 1-2 years to screen for varices in the stomach and foodpipe which can burst and cause fatal bleeding

## 2022-07-19 NOTE — PROGRESS NOTES
Subjective:       Patient ID: Genesis Ugalde is a 66 y.o. female.    Chief Complaint: Cirrhosis    HPI  I saw this 66 y.o. lady who came to the hepatology clinic with her neice. 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 She was last seen in clinic by Dr. Blackwell in 6/2020.  Fibroscan 2016- stage 3 fibrosis and fibrosure stage 1. Fibroscan 6/25/2020= stage 4 fibrosis (cirrhosis).  She has a history of elevated LFTs. Most recent labs show an increase in T. Bili (now 2.3).   Platelet counts remain normal. Her imaging has shown a small liver lesion, likely to be a hemangioma but also showed fatty liver disease.  She c/o intermittent RUQ pain. Last US was in March and showed hepatomegaly, with a nodular contour of the liver and no focal lesions; bile ducts appeared normal and spleen size was also normal.  Her weight has been stable since 10/2021. Viral hepatitis testing has been negative. Ferritin normal on prior labs in 2018.  Last EGD in 2016 showed a 2 cm hiatal hernia and esophagitis. There were no EV, GV or PHG visualized.    Body mass index is 41.45 kg/m².     MELD-Na score: 9 at 8/5/2021  5:41 PM  MELD score: 9 at 8/5/2021  5:41 PM  Calculated from:  Serum Creatinine: 0.8 mg/dL (Using min of 1 mg/dL) at 8/5/2021  5:41 PM  Serum Sodium: 145 mmol/L (Using max of 137 mmol/L) at 8/5/2021  5:41 PM  Total Bilirubin: 2.1 mg/dL at 8/5/2021  5:41 PM  INR(ratio): 1.0 at 8/5/2021  5:41 PM  Age: 65 years    CT abdo: 5/25/2015  The liver is normal in size demonstrating diffuse hypoattenuation in keeping with hepatic steatosis. Additionally, there is a stable 1.7-cm area of hyperenhancement within the inferior right hepatic lobe which is nonspecific, however such stability over  time reflects a benign etiology, perhaps hemangioma. The portal, splenic, and proximal mesenteric veins are patent. The patient is status post cholecystectomy. No evidence of biliary ductal dilatation.    MRI 12/21/2015  The  posterior right lobe liver lesion is vascular but not a typical hemangioma. The second one is not seen. There has been no change. Spleen, stomach, duodenum, pancreas, bile ducts, and adrenal glands show nothing unusual. No adenopathy or masses seen. Lung bases are clear and the heart shows nothing unusual. No change in size     Abdo US: 6/19/20  Hepatic steatosis.  Previously seen lesion not evident on this examination    US Abdomen Limited  Narrative: EXAMINATION:  US ABDOMEN LIMITED    CLINICAL HISTORY:  Unspecified cirrhosis of liver    TECHNIQUE:  Limited ultrasound of the right upper quadrant of the abdomen (including pancreas, liver, gallbladder, common bile duct, and spleen) was performed.    COMPARISON:  None.    FINDINGS:  Liver: Enlarged.  Nodular contour suggesting cirrhosis. No focal hepatic lesions.    Gallbladder: Cholecystectomy    Biliary system: The common duct is normal caliber.  No intrahepatic ductal dilatation.    Spleen: Normal in size and echotexture.    Pancreas: Unremarkable.  Impression: Hepatomegaly and hepatic cirrhosis    Electronically signed by: Luis Miguel Brooks Jr  Date:    03/21/2022  Time:    16:32    PMH:  Hypertension  Giant cell arteritis- blindness  PMR  DM- 2012  Hysterectomy  Cholecystectomy- open- 1993    SH:  Non smoker  No alcohol    FH:  Nil liver  Ca stomach  IHD    Review of Systems   Constitutional: Negative for fever, chills, activity change, appetite change, fatigue and unexpected weight change.   HENT: Negative for ear pain, hearing loss, nosebleeds, sore throat and trouble swallowing.    Eyes: Negative for redness and visual disturbance.   Respiratory: Negative for cough, chest tightness, shortness of breath and wheezing.    Cardiovascular: Negative for chest pain and palpitations.   Gastrointestinal: Negative for blood in stool and abdominal distention. + Intermittent nausea and RUQ pain  Genitourinary: Negative for dysuria, urgency, frequency, hematuria and  difficulty urinating.   Musculoskeletal: Negative for myalgias, back pain, joint swelling, arthralgias and gait problem.   Skin: Negative for rash.   Neurological: Negative for tremors, seizures, speech difficulty, weakness and headaches.   Hematological: Negative for adenopathy.   Psychiatric/Behavioral: Negative for confusion, sleep disturbance and decreased concentration. The patient is not nervous/anxious.          Lab Results   Component Value Date     (H) 07/15/2022    AST 80 (H) 07/15/2022    GGT 73 (H) 10/18/2021    ALKPHOS 82 07/15/2022    BILITOT 2.3 (H) 07/15/2022     Past Medical History:   Diagnosis Date    Anxiety     Asthma     Blind left eye     can see silhouettes in right eye    Cataract     Depression     Diabetes mellitus     Disease of immune system     Giant cell arteritis     Glaucoma     Hypertension     Polymyalgia rheumatica     Uveitis      Past Surgical History:   Procedure Laterality Date    ANKLE SURGERY      CHOLECYSTECTOMY      COLONOSCOPY N/A 9/28/2015    Procedure: COLONOSCOPY;  Surgeon: Alok Dykes MD;  Location: [facility] ([address] [place] [address]);  Service: Endoscopy;  Laterality: N/A;    COLONOSCOPY N/A 9/6/2019    Procedure: COLONOSCOPY;  Surgeon: Alok Dykes MD;  Location: [facility] ([address] [place] [address]);  Service: Endoscopy;  Laterality: N/A;    CYST REMOVAL      right lung    HYSTERECTOMY       Current Outpatient Medications   Medication Sig    amlodipine-benazepril (LOTREL) 10-40 mg per capsule Take 1 capsule by mouth once daily.    aspirin (ECOTRIN) 325 MG EC tablet Take 325 mg by mouth once daily.    brimonidine 0.2% (ALPHAGAN) 0.2 % Drop Place 1 drop into both eyes 3 (three) times daily.    clonazePAM (KLONOPIN) 1 MG tablet Take 1 mg by mouth 2 (two) times daily as needed.    dorzolamide (TRUSOPT) 2 % ophthalmic solution Place 1 drop into both eyes 3 (three) times daily.    ergocalciferol (VITAMIN D2) 50,000 unit Cap TAKE 1 CAPSULE (50,000 UNITS TOTAL) BY  MOUTH EVERY 7 DAYS.    latanoprost 0.005 % ophthalmic solution Place 1 drop into both eyes every evening.    pravastatin (PRAVACHOL) 40 MG tablet Take 1 tablet (40 mg total) by mouth every evening.    sertraline (ZOLOFT) 25 MG tablet Take 2 tablets (50 mg total) by mouth once daily.    tocilizumab (ACTEMRA) 162 mg/0.9 mL injection Inject 0.9 mLs (162 mg total) into the skin every 7 days.    omeprazole (PRILOSEC) 40 MG capsule Take 1 capsule (40 mg total) by mouth every 12 (twelve) hours. Take one pill every morning 45 minutes before breakfast     No current facility-administered medications for this visit.       Objective:      Physical Exam   Constitutional: She appears well-nourished. No distress.   HENT:   Head: Normocephalic.   Eyes: Pupils are equal, round, and reactive to light.   Neck: No JVD present. No tracheal deviation present. No thyromegaly present.   Cardiovascular: Normal rate, regular rhythm and normal heart sounds.    No murmur heard.  Pulmonary/Chest: Effort normal and breath sounds normal. No stridor.   Abdominal: Soft.   Lymphadenopathy:        Head (right side): No submental, no submandibular, no tonsillar, no preauricular, no posterior auricular and no occipital adenopathy present.        Head (left side): No submental, no submandibular, no tonsillar, no preauricular, no posterior auricular and no occipital adenopathy present.     She has no cervical adenopathy.     She has no axillary adenopathy.   Neurological: She is alert. She has normal strength. She is not disoriented. No cranial nerve deficit or sensory deficit.   Skin: Skin is intact. No cyanosis.       Assessment:       1. Liver Cirrhosis secondary to BELLO    2. Hyperlipidemia, unspecified hyperlipidemia type    3. History of diabetes mellitus    4. Morbid obesity    5. Hiatal hernia    6. Gastroesophageal reflux disease        Plan:     Orders Placed This Encounter   Procedures    US Abdomen Complete     Standing Status:    Future     Standing Expiration Date:   7/19/2023    Protime-INR     Standing Status:   Future     Standing Expiration Date:   9/19/2023    Comprehensive Metabolic Panel     Standing Status:   Future     Standing Expiration Date:   9/19/2023    Hemoglobin A1C     Standing Status:   Future     Standing Expiration Date:   9/19/2023    Lipid Panel     Standing Status:   Future     Standing Expiration Date:   9/19/2023    Case Request Endoscopy: EGD (ESOPHAGOGASTRODUODENOSCOPY)     Order Specific Question:   Medical Necessity:     Answer:   Medically Non-Urgent [100]     Order Specific Question:   CPT Code:     Answer:   GA EGD, FLEX, DIAGNOSTIC [11668]     Order Specific Question:   Case Referring Provider     Answer:   THA EM [[id number]]     Order Specific Question:   Is an on-site pathologist required for this procedure?     Answer:   N/A     - Recommend MELD labs every 3 months to monitor liver function.  - Recommend abdominal imaging with AFP tumor marker every 6 months for HCC surveillance.   - Recommend Upper endoscopy every 1-2 years for variceal surveillance.  - Avoid alcohol and non-steroidal anti-inflammatory drugs (NSAIDs).  - Okay to take acetaminophen (Tylenol), up to 2,000 mg per day for fever, headache or pain, NTE 6,000 - 8,000 mg per week.  - Recommend low sodium (2 grams), high protein diet (115 grams per day).  - Recommend resistance exercises for muscle strength.  - Avoid raw seafoods due to the risk of fatal Vibrio vulnificus infection.       Hepatology Nurse Practitioner  [facility]  7/19/2022 @ [id number]

## 2022-08-12 ENCOUNTER — HOSPITAL ENCOUNTER (OUTPATIENT)
Dept: RADIOLOGY | Facility: HOSPITAL | Age: 67
Discharge: HOME | End: 2022-08-12
Attending: NURSE PRACTITIONER
Payer: MEDICARE

## 2022-08-12 DIAGNOSIS — K74.60 HEPATIC CIRRHOSIS, UNSPECIFIED HEPATIC CIRRHOSIS TYPE, UNSPECIFIED WHETHER ASCITES PRESENT: ICD-10-CM

## 2022-08-12 PROCEDURE — 76705 ECHO EXAM OF ABDOMEN: CPT | Mod: TC

## 2022-08-12 PROCEDURE — 76705 ECHO EXAM OF ABDOMEN: CPT | Mod: 26,,, | Performed by: RADIOLOGY

## 2022-08-12 PROCEDURE — 76705 US ABDOMEN LIMITED: ICD-10-PCS | Mod: 26,,, | Performed by: RADIOLOGY

## 2022-08-13 ENCOUNTER — HOSPITAL ENCOUNTER (OUTPATIENT)
Dept: RADIOLOGY | Facility: HOSPITAL | Age: 67
Discharge: HOME | End: 2022-08-13
Attending: INTERNAL MEDICINE
Payer: MEDICARE

## 2022-08-13 DIAGNOSIS — M31.6 GCA (GIANT CELL ARTERITIS): ICD-10-CM

## 2022-08-13 DIAGNOSIS — T81.718D COMPLICATION OF OTHER ARTERY FOLLOWING A PROCEDURE, NOT ELSEWHERE CLASSIFIED, SUBSEQUENT ENCOUNTER: ICD-10-CM

## 2022-08-13 PROCEDURE — 70544 MRA BRAIN WITHOUT CONTRAST: ICD-10-PCS | Mod: 26,,, | Performed by: RADIOLOGY

## 2022-08-13 PROCEDURE — 70547 MR ANGIOGRAPHY NECK W/O DYE: CPT | Mod: 26,,, | Performed by: RADIOLOGY

## 2022-08-13 PROCEDURE — 71555 MRA CHEST W/CONTRAST: ICD-10-PCS | Mod: 26,,, | Performed by: STUDENT IN AN ORGANIZED HEALTH CARE EDUCATION/TRAINING PROGRAM

## 2022-08-13 PROCEDURE — 70547 MRA NECK WITH AND WITHOUT: ICD-10-PCS | Mod: 26,,, | Performed by: RADIOLOGY

## 2022-08-13 PROCEDURE — 71555 MRI ANGIO CHEST W OR W/O DYE: CPT | Mod: TC

## 2022-08-13 PROCEDURE — 70544 MR ANGIOGRAPHY HEAD W/O DYE: CPT | Mod: TC

## 2022-08-13 PROCEDURE — A9585 GADOBUTROL INJECTION: HCPCS | Performed by: INTERNAL MEDICINE

## 2022-08-13 PROCEDURE — 70544 MR ANGIOGRAPHY HEAD W/O DYE: CPT | Mod: 26,,, | Performed by: RADIOLOGY

## 2022-08-13 PROCEDURE — 71555 MRI ANGIO CHEST W OR W/O DYE: CPT | Mod: 26,,, | Performed by: STUDENT IN AN ORGANIZED HEALTH CARE EDUCATION/TRAINING PROGRAM

## 2022-08-13 PROCEDURE — 25500020 PHARM REV CODE 255: Performed by: INTERNAL MEDICINE

## 2022-08-13 PROCEDURE — 70549 MR ANGIOGRAPH NECK W/O&W/DYE: CPT | Mod: TC

## 2022-08-13 RX ORDER — GADOBUTROL 604.72 MG/ML
10 INJECTION INTRAVENOUS
Status: COMPLETED | OUTPATIENT
Start: 2022-08-13 | End: 2022-08-13

## 2022-08-13 RX ADMIN — GADOBUTROL 10 ML: 604.72 INJECTION INTRAVENOUS at 10:08

## 2022-08-19 ENCOUNTER — TELEPHONE (OUTPATIENT)
Dept: HEPATOLOGY | Facility: CLINIC | Age: 67
End: 2022-08-19
Payer: MEDICARE

## 2022-08-19 NOTE — TELEPHONE ENCOUNTER
----- Message from Brooke Fitzgerald NP sent at 8/19/2022  3:18 PM CDT -----  Please contact patient to reschedule missed follow up visit with me. Thanks!

## 2022-09-01 ENCOUNTER — OFFICE VISIT (OUTPATIENT)
Dept: HEPATOLOGY | Facility: CLINIC | Age: 67
End: 2022-09-01
Payer: MEDICARE

## 2022-09-01 DIAGNOSIS — Z08 ENCOUNTER FOR FOLLOW-UP SURVEILLANCE OF LIVER CANCER: ICD-10-CM

## 2022-09-01 DIAGNOSIS — Z85.05 ENCOUNTER FOR FOLLOW-UP SURVEILLANCE OF LIVER CANCER: ICD-10-CM

## 2022-09-01 DIAGNOSIS — K74.60 HEPATIC CIRRHOSIS, UNSPECIFIED HEPATIC CIRRHOSIS TYPE, UNSPECIFIED WHETHER ASCITES PRESENT: Primary | ICD-10-CM

## 2022-09-01 PROCEDURE — 99214 OFFICE O/P EST MOD 30 MIN: CPT | Mod: 95,,, | Performed by: NURSE PRACTITIONER

## 2022-09-01 PROCEDURE — 99214 PR OFFICE/OUTPT VISIT, EST, LEVL IV, 30-39 MIN: ICD-10-PCS | Mod: 95,,, | Performed by: NURSE PRACTITIONER

## 2022-09-01 NOTE — PATIENT INSTRUCTIONS
Because you have cirrhosis, it is important to attend clinic visits every 6 months with an Ultrasound and blood tests every 6 months to screen for liver cancer (you are at risk of developing liver cancer due to scar tissue in the liver)    Signs and symptoms of worsening liver disease include jaundice, fluid in the belly (ascites), and confusion/disorientation/slowed thought processes due to hepatic encephalopathy (toxins building up because of liver problems).   You should seek medical attention if any of these things occur.    Also, possible bleeding from esophageal varices (blood vessels in the stomach and foodpipe can burst and cause fatal bleeding).  Therefore, if you have symptoms of vomiting blood, blood in your stool, dark or black stools or vomiting coffee ground vomit, YOU SHOULD GO TO THE EMERGENCY ROOM IMMEDIATELY.     Cirrhosis can increase the risk of liver cancer, liver failure, and death. However, we will watch your liver function score (MELD score) closely with each clinic visit. A normal MELD score is 6, highest is 40. We will check this with every clinic visit. A MELD 15 or higher is when we start to consider transplant because MELD 15 or higher indicates that the liver is not functioning as well     Cirrhosis Counseling  - NO alcohol use (includes beer, wine, and/or liquor)  - avoid non-steroidal anti-inflammatory drugs (NSAIDs) such as ibuprofen, Motrin, naprosyn, Alleve due to the risk of kidney damage  - can take acetaminophen (Tylenol), no more than 2000 mg per day  - low sodium (salt) 2 gram per day diet  - high protein diet: 115 grams per day to prevent muscle mass loss. Drink at least 1 protein shake daily (Premier Protein is best option because it is very high protein and low sugar). Ok to use this as nighttime snack to fit it in   - resistance exercises for muscle strength  - avoid raw seafoods due to the risk of fatal Vibrio vulnificus infection  - ultrasound of the liver every 6 months  for liver cancer screening (you are at risk of developing liver cancer due to scar tissue in the liver)  - Upper endoscopy every 1-2 years to screen for varices in the stomach and foodpipe which can burst and cause fatal bleeding

## 2022-09-01 NOTE — PROGRESS NOTES
The patient location is: Home ([place], [place]).   The chief complaint leading to consultation is: Cirrhosis    Visit type: audiovisual    Face to Face time with patient: 20  30 minutes of total time spent on the encounter, which includes face to face time and non-face to face time preparing to see the patient (eg, review of tests), Obtaining and/or reviewing separately obtained history, Documenting clinical information in the electronic or other health record, Independently interpreting results (not separately reported) and communicating results to the patient/family/caregiver, or Care coordination (not separately reported).     Each patient to whom he or she provides medical services by telemedicine is:  (1) informed of the relationship between the physician and patient and the respective role of any other health care provider with respect to management of the patient; and (2) notified that he or she may decline to receive medical services by telemedicine and may withdraw from such care at any time.    Notes:     Subjective:       Patient ID: Genesis Ugalde is a 66 y.o. female.    Chief Complaint: Cirrhosis    HPI  I saw this 66 y.o. lady who presents in follow up. She was last seen in clinic by myself in 7/2022. 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   Fibroscan in 2016 was suggestive of stage 3 fibrosis, but Fibrosure was suggestive of stage 1 fibrosis. Repeat Fibroscan in 6/2020 was suggestive of stage 4 fibrosis (cirrhosis).  She also has a history of elevated LFTs. Most recent labs in July showed an increase in T. Bili (now 2.3), Albumin was 4.2; INR was not drawn.     Reassuringly, her platelet counts remain normal. Her imaging has shown a small liver lesion, likely to be a hemangioma, but also showed fatty liver disease.  She also c/o intermittent RUQ pain, improved now. Abdominal US in March showed hepatomegaly, with a nodular contour of the liver and no focal lesions; bile ducts appeared  normal and spleen size was also normal. Repeat US last month for HCC surveillance again showed hepatomegaly (18.9 cm), due to fatty infiltration, with no focal liver lesions or ascites.  Her weight has been stable since at least 10/2021. She is working on weight loss. Viral hepatitis testing has been negative. Ferritin normal on prior labs in 2018.  Last EGD in 2016 showed a 2 cm hiatal hernia and esophagitis. There were no EV, GV or PHG visualized. She is overdue for a follow up exam.    There is no height or weight on file to calculate BMI.     Computed MELD-Na score unavailable. Necessary lab results were not found in the last year.  Computed MELD score unavailable. Necessary lab results were not found in the last year.    CT abdo: 5/25/2015  The liver is normal in size demonstrating diffuse hypoattenuation in keeping with hepatic steatosis. Additionally, there is a stable 1.7-cm area of hyperenhancement within the inferior right hepatic lobe which is nonspecific, however such stability over  time reflects a benign etiology, perhaps hemangioma. The portal, splenic, and proximal mesenteric veins are patent. The patient is status post cholecystectomy. No evidence of biliary ductal dilatation.    MRI 12/21/2015  The posterior right lobe liver lesion is vascular but not a typical hemangioma. The second one is not seen. There has been no change. Spleen, stomach, duodenum, pancreas, bile ducts, and adrenal glands show nothing unusual. No adenopathy or masses seen. Lung bases are clear and the heart shows nothing unusual. No change in size     Abdo US: 6/19/20  Hepatic steatosis.  Previously seen lesion not evident on this examination.    PMH:  Hypertension  Giant cell arteritis- blindness  PMR  DM- 2012  Hysterectomy  Cholecystectomy- open- 1993    SH:  Non smoker  No alcohol    FH:  Nil liver  Ca stomach  IHD    Review of Systems   Constitutional: Negative for fever, chills, activity change, appetite change, fatigue  and unexpected weight change.   HENT: Negative for ear pain, hearing loss, nosebleeds, sore throat and trouble swallowing.    Eyes: Negative for redness and visual disturbance.   Respiratory: Negative for cough, chest tightness, shortness of breath and wheezing.    Cardiovascular: Negative for chest pain and palpitations.   Gastrointestinal: Negative for blood in stool and abdominal distention. + Intermittent nausea and RUQ pain  Genitourinary: Negative for dysuria, urgency, frequency, hematuria and difficulty urinating.   Musculoskeletal: Negative for myalgias, back pain, joint swelling, arthralgias and gait problem.   Skin: Negative for rash.   Neurological: Negative for tremors, seizures, speech difficulty, weakness and headaches.   Hematological: Negative for adenopathy.   Psychiatric/Behavioral: Negative for confusion, sleep disturbance and decreased concentration. The patient is not nervous/anxious.        Lab Results   Component Value Date     (H) 07/15/2022    AST 80 (H) 07/15/2022    GGT 73 (H) 10/18/2021    ALKPHOS 82 07/15/2022    BILITOT 2.3 (H) 07/15/2022     Past Medical History:   Diagnosis Date    Anxiety     Asthma     Blind left eye     can see silhouettes in right eye    Cataract     Depression     Diabetes mellitus     Disease of immune system     Giant cell arteritis     Glaucoma     Hypertension     Polymyalgia rheumatica     Uveitis      Past Surgical History:   Procedure Laterality Date    ANKLE SURGERY      CHOLECYSTECTOMY      COLONOSCOPY N/A 9/28/2015    Procedure: COLONOSCOPY;  Surgeon: Alok Dykes MD;  Location: [address]);  Service: Endoscopy;  Laterality: N/A;    COLONOSCOPY N/A 9/6/2019    Procedure: COLONOSCOPY;  Surgeon: Alok Dykes MD;  Location: [address]);  Service: Endoscopy;  Laterality: N/A;    CYST REMOVAL      right lung    HYSTERECTOMY       Current Outpatient Medications   Medication Sig    amlodipine-benazepril (LOTREL) 10-40 mg per  capsule Take 1 capsule by mouth once daily.    aspirin (ECOTRIN) 325 MG EC tablet Take 325 mg by mouth once daily.    brimonidine 0.2% (ALPHAGAN) 0.2 % Drop Place 1 drop into both eyes 3 (three) times daily.    clonazePAM (KLONOPIN) 1 MG tablet Take 1 mg by mouth 2 (two) times daily as needed.    dorzolamide (TRUSOPT) 2 % ophthalmic solution Place 1 drop into both eyes 3 (three) times daily.    ergocalciferol (VITAMIN D2) 50,000 unit Cap TAKE 1 CAPSULE (50,000 UNITS TOTAL) BY MOUTH EVERY 7 DAYS.    latanoprost 0.005 % ophthalmic solution Place 1 drop into both eyes every evening.    omeprazole (PRILOSEC) 40 MG capsule Take 1 capsule (40 mg total) by mouth every 12 (twelve) hours. Take one pill every morning 45 minutes before breakfast    pravastatin (PRAVACHOL) 40 MG tablet Take 1 tablet (40 mg total) by mouth every evening.    sertraline (ZOLOFT) 25 MG tablet TAKE 2 TABLETS BY MOUTH ONCE DAILY.    tocilizumab (ACTEMRA) 162 mg/0.9 mL injection Inject 0.9 mLs (162 mg total) into the skin every 7 days.     No current facility-administered medications for this visit.     Objective:      Physical Exam   Constitutional: She appears well-nourished. No distress.    Head: Normocephalic.   Eyes: Anicteric sclera.  Neck: No obvious masses.  Pulmonary/Chest: Normal respiratory effort.  Abdominal: Soft, obese abdomen.  Neurological: She is alert. She is not disoriented.   Skin: No obvious rashes noted to exposed skin.      Assessment:       1. Liver Cirrhosis secondary to BELLO    2. Encounter for follow-up surveillance of liver cancer          Plan:     Orders Placed This Encounter   Procedures    US Abdomen Complete     Standing Status:   Future     Standing Expiration Date:   9/1/2023    AFP Tumor Marker     Standing Status:   Future     Standing Expiration Date:   11/1/2023     Order Specific Question:   Release to patient     Answer:   Immediate    Comprehensive Metabolic Panel     Standing Status:   Future     Standing  Expiration Date:   11/1/2023    CBC Auto Differential     Standing Status:   Future     Standing Expiration Date:   11/1/2023    Protime-INR     Standing Status:   Future     Standing Expiration Date:   11/1/2023    Case Request Endoscopy: EGD (ESOPHAGOGASTRODUODENOSCOPY)     Order Specific Question:   Medical Necessity:     Answer:   Medically Non-Urgent [100]     Order Specific Question:   CPT Code:     Answer:   DC EGD, FLEX, DIAGNOSTIC [59247]     Order Specific Question:   Case Referring Provider     Answer:   THA EM [[id number]]     Order Specific Question:   Is an on-site pathologist required for this procedure?     Answer:   N/A     - Recommend MELD labs every 3 months to monitor liver function, due now.  - Recommend abdominal imaging with AFP tumor marker every 6 months for HCC surveillance, next due 2/2023.   - Recommend Upper endoscopy every 1-2 years for variceal surveillance, due now (overdue).  - Avoid alcohol and non-steroidal anti-inflammatory drugs (NSAIDs).  - Okay to take acetaminophen (Tylenol), up to 2,000 mg per day for fever, headache or pain, NTE 6,000 - 8,000 mg per week.  - Recommend low sodium (2 grams), high protein diet (115 grams per day).  - Return to clinic in 6 months, sooner if needed.       Hepatology Nurse Practitioner  [facility]  9/1/2022 @ [id number]

## 2022-09-01 NOTE — Clinical Note
Please arrange labs in October at [facility] (same day as urine lab appt). Patient will also need a repeat US in 6 months with RTC 1 week later to review results. Orders in Epic. Thanks!

## 2022-10-02 DIAGNOSIS — K74.60 HEPATIC CIRRHOSIS, UNSPECIFIED HEPATIC CIRRHOSIS TYPE, UNSPECIFIED WHETHER ASCITES PRESENT: Primary | ICD-10-CM

## 2022-10-18 DIAGNOSIS — M31.6 GCA (GIANT CELL ARTERITIS): ICD-10-CM

## 2022-10-18 RX ORDER — TOCILIZUMAB 180 MG/ML
162 INJECTION, SOLUTION SUBCUTANEOUS
Qty: 12 EACH | Refills: 0 | Status: SHIPPED | OUTPATIENT
Start: 2022-10-18 | End: 2022-10-21 | Stop reason: SDUPTHER

## 2022-10-21 DIAGNOSIS — M31.6 GCA (GIANT CELL ARTERITIS): ICD-10-CM

## 2022-10-21 RX ORDER — TOCILIZUMAB 180 MG/ML
162 INJECTION, SOLUTION SUBCUTANEOUS
Qty: 12 EACH | Refills: 0 | Status: SHIPPED | OUTPATIENT
Start: 2022-10-21 | End: 2023-05-23 | Stop reason: SDUPTHER

## 2022-10-21 NOTE — TELEPHONE ENCOUNTER
----- Message from Manuel Houston sent at 10/21/2022  1:12 PM CDT -----  Contact: pt  Refill request.      tocilizumab (ACTEMRA) 162 mg/0.9 mL injection      Confirmed contact below:  Contact Name:Genesis Ugalde  Phone Number: [phone]      [place] - [address]  [address] [place] [address]  Phone: [phone] Fax: [phone]

## 2022-12-05 PROBLEM — Z85.05 ENCOUNTER FOR FOLLOW-UP SURVEILLANCE OF LIVER CANCER: Status: RESOLVED | Noted: 2022-09-01 | Resolved: 2022-12-05

## 2022-12-05 PROBLEM — Z08 ENCOUNTER FOR FOLLOW-UP SURVEILLANCE OF LIVER CANCER: Status: RESOLVED | Noted: 2022-09-01 | Resolved: 2022-12-05

## 2022-12-28 ENCOUNTER — TELEPHONE (OUTPATIENT)
Dept: OPHTHALMOLOGY | Facility: CLINIC | Age: 67
End: 2022-12-28
Payer: MEDICARE

## 2022-12-28 NOTE — TELEPHONE ENCOUNTER
----- Message from Janis Francis sent at 12/28/2022 11:57 AM CST -----  Contact: Genesis @ [phone]  Pt states she left insurance paperwork about 3 weeks ago at the office and need to know if they were completed. Please give her a call back to further advise.

## 2023-01-30 ENCOUNTER — PATIENT MESSAGE (OUTPATIENT)
Dept: RHEUMATOLOGY | Facility: CLINIC | Age: 68
End: 2023-01-30
Payer: MEDICARE

## 2023-01-30 ENCOUNTER — TELEPHONE (OUTPATIENT)
Dept: RHEUMATOLOGY | Facility: CLINIC | Age: 68
End: 2023-01-30
Payer: MEDICARE

## 2023-01-30 NOTE — TELEPHONE ENCOUNTER
Genesis sorry to hear that.  Would stop Actemra until you have been symptom free for 10 days. Would suggest taking Paxlovid for 5 days. You will need to stop clonazepam for 8 days, and reduce amlodipine-benazepril to every other day for 8 days and monitor BP closely we can do virtual visit on Friday   RJQ  ===View-only below this line===      ----- Message -----       From:Genesis Ugalde       Sent:1/30/2023  8:01 AM CST         To:Forrest Waldrop MD    Subject:Covid    I tested positive for COVID on Friday, January 27th. Entire household also tested positive. Should I reschedule my Rheumatology appt for Friday, February 3rd? Thank you!

## 2023-02-02 ENCOUNTER — TELEPHONE (OUTPATIENT)
Dept: RHEUMATOLOGY | Facility: CLINIC | Age: 68
End: 2023-02-02
Payer: MEDICARE

## 2023-02-02 NOTE — TELEPHONE ENCOUNTER
Received fom [facility]:    US carotid 12/29/22: no evidence of hemodynamically significant stenosis      TTE 12/29/22: EF 76% nl, mild MR/TR EA reversal

## 2023-02-03 ENCOUNTER — OFFICE VISIT (OUTPATIENT)
Dept: RHEUMATOLOGY | Facility: CLINIC | Age: 68
End: 2023-02-03
Payer: MEDICARE

## 2023-02-03 VITALS — WEIGHT: 237 LBS | HEIGHT: 64 IN | BODY MASS INDEX: 40.46 KG/M2

## 2023-02-03 DIAGNOSIS — Z78.0 MENOPAUSE: Primary | ICD-10-CM

## 2023-02-03 DIAGNOSIS — R30.0 DYSURIA: ICD-10-CM

## 2023-02-03 DIAGNOSIS — E78.5 HYPERLIPIDEMIA, UNSPECIFIED HYPERLIPIDEMIA TYPE: ICD-10-CM

## 2023-02-03 DIAGNOSIS — M31.6 GCA (GIANT CELL ARTERITIS): ICD-10-CM

## 2023-02-03 DIAGNOSIS — E11.9 TYPE 2 DIABETES MELLITUS WITHOUT COMPLICATION, WITHOUT LONG-TERM CURRENT USE OF INSULIN: ICD-10-CM

## 2023-02-03 DIAGNOSIS — M54.16 RIGHT LUMBAR RADICULOPATHY: ICD-10-CM

## 2023-02-03 DIAGNOSIS — R13.10 DYSPHAGIA, UNSPECIFIED TYPE: ICD-10-CM

## 2023-02-03 PROCEDURE — 99213 OFFICE O/P EST LOW 20 MIN: CPT | Mod: 95,,, | Performed by: INTERNAL MEDICINE

## 2023-02-03 PROCEDURE — 99213 PR OFFICE/OUTPT VISIT, EST, LEVL III, 20-29 MIN: ICD-10-PCS | Mod: 95,,, | Performed by: INTERNAL MEDICINE

## 2023-02-03 RX ORDER — TRAMADOL HYDROCHLORIDE 50 MG/1
50 TABLET ORAL 2 TIMES DAILY PRN
COMMUNITY
Start: 2022-12-29

## 2023-02-03 RX ORDER — METOPROLOL SUCCINATE 100 MG/1
100 TABLET, EXTENDED RELEASE ORAL DAILY
COMMUNITY
Start: 2022-12-29

## 2023-02-03 NOTE — PROGRESS NOTES
Rapid3 Question Responses and Scores 2/3/2023   MDHAQ Score 1.3   Psychologic Score 5.5   Pain Score 5   When you awakened in the morning OVER THE LAST WEEK, did you feel stiff? Yes   If Yes, please indicate the number of hours until you are as limber as you will be for the day 1   Fatigue Score 7   Global Health Score 5   RAPID3 Score 4.78     Answers submitted by the patient for this visit:  Rheumatology Questionnaire (Submitted on 2/3/2023)  fever: No  eye redness: No  mouth sores: Yes  headaches: Yes  shortness of breath: No  chest pain: No  trouble swallowing: Yes  diarrhea: No  constipation: No  unexpected weight change: No  genital sore: No  dysuria: Yes  During the last 3 days, have you had a skin rash?: No  Bruises or bleeds easily: No  cough: Yes

## 2023-02-03 NOTE — PROGRESS NOTES
Subjective:       Patient ID: Genesis Ugalde is a 67 y.o. female.    Chief Complaint: GCA; optic neuritis with blindness    HPI The patient location is: home  The chief complaint leading to consultation is: GCA  audiovisual  Visit type:     Face to Face time with patient: 20 min  30  minutes of total time spent on the encounter, which includes face to face time and non-face to face time preparing to see the patient (eg, review of tests), Obtaining and/or reviewing separately obtained history, Documenting clinical information in the electronic or other health record, Independently interpreting results (not separately reported) and communicating results to the patient/family/caregiver, or Care coordination (not separately reported).         Each patient to whom he or she provides medical services by telemedicine is:  (1) informed of the relationship between the physician and patient and the respective role of any other health care provider with respect to management of the patient; and (2) notified that he or she may decline to receive medical services by telemedicine and may withdraw from such care at any time.    Notes:      Covid+ much better. Cough is better.  Very tired. Day 4/5 of Paxlovid.     Prior to Covid, facial and shoulder pain.  Not temporal arteries, points to zygoma and ears. Left> right.  Has appt with Dr. Ro next month. Very little vision. In December missed Rx x 3 wks, and then delay in  had resumed 2 wks ago but holding now for Covid. Dropped frozen chicken distal phalanx x-ray showed fracture distal metatarsal Oct 28 . Saw Dr. Lewis Dec. 27th who ordered the x-ray.   + dysphagia + GERD despite omeprazole 40mg twice daily  still hasn't seen Gastro as per prior referral  + lumbar radiculopathy intermittent x several years, worse last 5-6 months  Has f/u with Brooke Fitzgerald 2/10/23 for cirrhosis    Has lost 16# in past year off prednisone  Much more active indoors and out over  past year. Frustrated with barely perceptible vision  Caring for granddaughter several days/wk    Review of Systems   Constitutional:  Negative for fever and unexpected weight change.   HENT:  Positive for mouth sores and trouble swallowing.    Eyes:  Negative for redness.   Respiratory:  Positive for cough. Negative for shortness of breath.    Cardiovascular:  Negative for chest pain.   Gastrointestinal:  Negative for constipation and diarrhea.   Genitourinary:  Positive for dysuria. Negative for genital sores.   Skin:  Negative for rash.   Neurological:  Positive for headaches.   Hematological:  Does not bruise/bleed easily.       Objective:   There were no vitals taken for this visit.     Physical Exam      7/13/2021   Tender (MILLER-28) 0 / 28    Swollen (MILLER-28) 0 / 28    Provider Global --   Patient Global 50 mm   ESR --   CRP 0.7 mg/L   MILLER-28 (ESR) --   MILLER-28 (CRP) 1.85 (Remission)   CDAI Score --   EXAMINATION:  MRA of the neck without contrast.     CLINICAL HISTORY:  GCA, r/o large vessel vasculitis, aortitis;Other giant cell arteritis     TECHNIQUE:  Noncontrast MRA of the neck was performed utilizing time-of-flight technique.  MIP reformats were created.     COMPARISON:  None     Impression:  FINDINGS/  Evaluation is limited by motion artifact.  There is no evidence of significant stenosis at the carotid bifurcations by NASCET criteria.  The vertebral arteries are patent.  No irregularity of the vessels in the neck to suggest  vasculitis is identified.        Electronically signed by: Forrest Bay  Date:                                            08/14/2022  Time:                                           09:18   EXAMINATION:  MRA CHEST W/CONTRAST     CLINICAL HISTORY:  GCA, r/o large vessel involvement, aortitis;  Other giant cell arteritis     TECHNIQUE:  Multiplanar multisequence imaging of the chest performed prior to and following the administration of 10 mL Gadavist intravenous contrast prior  aortitis protocol.     COMPARISON:  MRA chest 12/03/2020; CT chest 12/03/2020     FINDINGS:  Normal left-sided aortic arch with 3 vessel branching pattern.  The thoracic and abdominal aorta appear normal in caliber and course with no aneurysm.  No abnormal aortic wall thickening or enhancement.     Heart appears normal in size.  No pericardial effusion.     Limited evaluation of the upper abdominal structures significant for scattered colonic diverticula.  Multilevel degenerative changes of the visualized spine.     Impression:     No evidence of vasculitis.     Electronically signed by resident: Martin Hoover  Date:                                            08/13/2022  Time:                                           12:42     Electronically signed by: Cooper Arboleda  Date:                                            08/14/2022  Time:                                           14:13    EXAMINATION:  MRA BRAIN WITHOUT CONTRAST     CLINICAL HISTORY:  GCA, small vessel study;Other giant cell arteritis     TECHNIQUE:  Non-contrast 3-D time-of-flight intracranial MR angiography was performed through the [place] with MIP reformatting.  Postcontrast MRA was performed utilizing black blood technique.  10 mL Gadavist intravenous contrast was utilized.     COMPARISON:  None     Impression:  FINDINGS/  There is no evidence of major branch stenosis/occlusion at the [id number].  No evidence of stenosis or irregularity of the proximal intracranial vessels is identified no aneurysm is appreciated.     No vessel wall enhancement is identified involving the intracranial vessels.     Evaluation of the superficial temporal arteries demonstrates them to be markedly small in caliber on the time-of-flight, anatomic MRA.  As a result, it is difficult to characterize for vessel wall enhancement due to the diminutive nature of the vessels.  It is unclear whether this small size is due to chronic inflammation or  developmental/technically artifactual in nature.        Electronically signed by: Forrest Bay  Date:                                            08/15/2022  Time:                                           09:28    Assessment:   GCA/PMR in clinical remission  Chronic inflammatory  Optic neuritis with blindness stable  Primary open angle glaucoma  Cirrhosis, NAFLD, hepatomegaly saw Dr. Fitzgerald today  Dysphagia, GERD still hasn't seen Gastro   127/77  Class 3 obesity has lost 16# past year  T2 DM      Plan:   CBC, CMP, ESR, CRP UA with reflux culture on Monday  DXA   Re-referred previously to Gastro for dysphagia 10/19/21 schedule  F/u Dr Fitzgerald in Hepatology in several days  F/u Dr. Ro in Ophthalmology  in March as scheduled  Cont tocilizumab 162mg sc weekly just refilled  Cont pravastatin 40mg nightly  Cont vitamin D2 50,000 units once a week  Declines PT for  Neck and shoulder  Ref to [facility] for lumbar radiculopathy   RTC 3 months with standing labs

## 2023-02-06 ENCOUNTER — LAB VISIT (OUTPATIENT)
Dept: LAB | Facility: HOSPITAL | Age: 68
End: 2023-02-06
Attending: INTERNAL MEDICINE
Payer: MEDICARE

## 2023-02-06 DIAGNOSIS — M31.6 GCA (GIANT CELL ARTERITIS): ICD-10-CM

## 2023-02-06 DIAGNOSIS — E78.5 HYPERLIPIDEMIA, UNSPECIFIED HYPERLIPIDEMIA TYPE: ICD-10-CM

## 2023-02-06 DIAGNOSIS — E11.9 TYPE 2 DIABETES MELLITUS WITHOUT COMPLICATION, WITHOUT LONG-TERM CURRENT USE OF INSULIN: ICD-10-CM

## 2023-02-06 LAB
ALBUMIN SERPL BCP-MCNC: 3.8 G/DL (ref 3.5–5.2)
ALP SERPL-CCNC: 102 U/L (ref 55–135)
ALT SERPL W/O P-5'-P-CCNC: 93 U/L (ref 10–44)
ANION GAP SERPL CALC-SCNC: 10 MMOL/L (ref 8–16)
AST SERPL-CCNC: 51 U/L (ref 10–40)
BASOPHILS # BLD AUTO: 0.03 K/UL (ref 0–0.2)
BASOPHILS NFR BLD: 0.3 % (ref 0–1.9)
BILIRUB SERPL-MCNC: 0.9 MG/DL (ref 0.1–1)
BUN SERPL-MCNC: 13 MG/DL (ref 8–23)
CALCIUM SERPL-MCNC: 9.5 MG/DL (ref 8.7–10.5)
CHLORIDE SERPL-SCNC: 110 MMOL/L (ref 95–110)
CHOLEST SERPL-MCNC: 199 MG/DL (ref 120–199)
CHOLEST/HDLC SERPL: 4.1 {RATIO} (ref 2–5)
CO2 SERPL-SCNC: 21 MMOL/L (ref 23–29)
CREAT SERPL-MCNC: 0.7 MG/DL (ref 0.5–1.4)
CRP SERPL-MCNC: 0.4 MG/L (ref 0–8.2)
DIFFERENTIAL METHOD: ABNORMAL
EOSINOPHIL # BLD AUTO: 0.4 K/UL (ref 0–0.5)
EOSINOPHIL NFR BLD: 3.5 % (ref 0–8)
ERYTHROCYTE [DISTWIDTH] IN BLOOD BY AUTOMATED COUNT: 13 % (ref 11.5–14.5)
ERYTHROCYTE [SEDIMENTATION RATE] IN BLOOD BY PHOTOMETRIC METHOD: <2 MM/HR (ref 0–36)
EST. GFR  (NO RACE VARIABLE): >60 ML/MIN/1.73 M^2
ESTIMATED AVG GLUCOSE: 111 MG/DL (ref 68–131)
GLUCOSE SERPL-MCNC: 110 MG/DL (ref 70–110)
HBA1C MFR BLD: 5.5 % (ref 4–5.6)
HCT VFR BLD AUTO: 39.9 % (ref 37–48.5)
HDLC SERPL-MCNC: 49 MG/DL (ref 40–75)
HDLC SERPL: 24.6 % (ref 20–50)
HGB BLD-MCNC: 12.9 G/DL (ref 12–16)
IMM GRANULOCYTES # BLD AUTO: 0.06 K/UL (ref 0–0.04)
IMM GRANULOCYTES NFR BLD AUTO: 0.5 % (ref 0–0.5)
LDLC SERPL CALC-MCNC: 80.8 MG/DL (ref 63–159)
LYMPHOCYTES # BLD AUTO: 3.1 K/UL (ref 1–4.8)
LYMPHOCYTES NFR BLD: 27.3 % (ref 18–48)
MCH RBC QN AUTO: 31.3 PG (ref 27–31)
MCHC RBC AUTO-ENTMCNC: 32.3 G/DL (ref 32–36)
MCV RBC AUTO: 97 FL (ref 82–98)
MONOCYTES # BLD AUTO: 0.7 K/UL (ref 0.3–1)
MONOCYTES NFR BLD: 6.6 % (ref 4–15)
NEUTROPHILS # BLD AUTO: 6.9 K/UL (ref 1.8–7.7)
NEUTROPHILS NFR BLD: 61.8 % (ref 38–73)
NONHDLC SERPL-MCNC: 150 MG/DL
NRBC BLD-RTO: 0 /100 WBC
PLATELET # BLD AUTO: 241 K/UL (ref 150–450)
PMV BLD AUTO: 11.2 FL (ref 9.2–12.9)
POTASSIUM SERPL-SCNC: 3.6 MMOL/L (ref 3.5–5.1)
PROT SERPL-MCNC: 6.6 G/DL (ref 6–8.4)
RBC # BLD AUTO: 4.12 M/UL (ref 4–5.4)
SODIUM SERPL-SCNC: 141 MMOL/L (ref 136–145)
TRIGL SERPL-MCNC: 346 MG/DL (ref 30–150)
WBC # BLD AUTO: 11.19 K/UL (ref 3.9–12.7)

## 2023-02-06 PROCEDURE — 83036 HEMOGLOBIN GLYCOSYLATED A1C: CPT | Performed by: INTERNAL MEDICINE

## 2023-02-06 PROCEDURE — 86140 C-REACTIVE PROTEIN: CPT | Performed by: INTERNAL MEDICINE

## 2023-02-06 PROCEDURE — 85652 RBC SED RATE AUTOMATED: CPT | Performed by: INTERNAL MEDICINE

## 2023-02-06 PROCEDURE — 36415 COLL VENOUS BLD VENIPUNCTURE: CPT | Mod: PO | Performed by: INTERNAL MEDICINE

## 2023-02-06 PROCEDURE — 80061 LIPID PANEL: CPT | Performed by: INTERNAL MEDICINE

## 2023-02-06 PROCEDURE — 85025 COMPLETE CBC W/AUTO DIFF WBC: CPT | Performed by: INTERNAL MEDICINE

## 2023-02-06 PROCEDURE — 80053 COMPREHEN METABOLIC PANEL: CPT | Performed by: INTERNAL MEDICINE

## 2023-02-08 ENCOUNTER — TELEPHONE (OUTPATIENT)
Dept: GASTROENTEROLOGY | Facility: CLINIC | Age: 68
End: 2023-02-08
Payer: MEDICARE

## 2023-02-08 NOTE — TELEPHONE ENCOUNTER
Return call to patient. Inform patient of Dr. Dykes next available . Patient request to see another provider. Patient schedule with Fellow Dr. Bush.     Patient verbalized understanding.

## 2023-02-08 NOTE — TELEPHONE ENCOUNTER
----- Message from Chynalakeisha Ivan sent at 2/8/2023  3:48 PM CST -----  Regarding: Appt Access  Contact: [phone]  Mary from Rheumatology is calling to sched pt an appt.  Referral is in [facility] for Dysphagia, unspecified type [R13.10].  No appts avail.  Please call pt.

## 2023-03-17 NOTE — PROGRESS NOTES
HPI     Glaucoma     Additional comments: 6 month ck and DFE today and pt states no changes   since last exam            Comments    DLS: 4/18/22    1) Autoimmune Optic Neuropathy OU  2) POAG vs OHT OU  3) GCA with PMR  4) Type 2 DM no DR  5) Steroid Responder  6) NS OU  7) APD OS    MEDS:  Dorzolamide TID OU  Brimonidine TID OU  Latanoprost QHS OU            Last edited by Sydnee Galvez MA on 3/23/2023  3:39 PM.              Assessment /Plan     For exam results, see Encounter Report.    Primary open angle glaucoma of both eyes, mild stage    Steroid responder, bilateral    Giant cell arteritis with polymyalgia rheumatica    Inflammatory optic neuropathy    Optic neuritis    Anatomical narrow angle borderline glaucoma, bilateral    Nuclear sclerosis, bilateral    Type 2 diabetes mellitus without ophthalmic manifestations    Visual loss        1. Autoimmune optic neuropathy OU,   - OS 2004  - OD 2012  - unknown antibiody  - several treatments with IV steroid and IVIG  - vision and VF per [place] records  - PO steroid daily - 10 mg a day   - sural nerve and muscle biopsy done - results pending - pt to obtain results form [facility]  - xal qhs ou   - monitor  - rheum monitoring esr/crp  - all previous mri's and lp negative per rheum note  Last ESR / and c-react prot - both very low      Exacerbation - when did not get Actemra - may 2020 - had progression od - stabilized again 9/2020     2.  Steroid responder in past  - on chronic steroids for #1  iop good today   Continue xal qhs ou    3. POAG - mild vs OHT ou      First HVF   2004   First photos   2004   Treatment / Drops started   2004           Family history    neg        Glaucoma meds    Latanoprost ou        H/O adverse rxn to glaucoma drops    none        LASERS    None         GLAUCOMA SURGERIES    none        OTHER EYE SURGERIES    none        CDR    0.9 / 0.9 - +++ pallor ou         Tbase    25-27 / 25-27          Tmax    27/27            Ttarget    20/20              "HVF    12 test 2004 to 2012  - SALT / IAD od // Ext os                    4 10-2 stim V OD 2012 - 2014 - [facility]    (+ prog from 2012 to 2014 - when followed at [facility])    7- test-  10-2 stim V test done od 12/2014 to 2017 - dense SALT and IAD (+stable)         Gonio    +2-3 ou (very slight narrowing)         CCT    591/612        OCT    7 test 2006 to 2016 - RNFL - dec throughout od // dec thru out  os        HRT    6 test 2004 to 2018 - MR - nl od // off-center os // new baseline 1/25/2016 - nl ou -"too good to be true"        Disc photos    2004 - slides // 2008, 2010, 2012, , 2012, 2012, 2012, 2015, 2017   - OIS     - Ttoday   11/10  - Test done today -  monitor IOP / gonio     PLAN  CSM - IOP good     Sees Benjie   He is monitoring sed rate and c react prot   Cont actemra  (tocilizumab) 162 mg sc weekly  Cont prilosec (omeprazole)  20mg daily - to protect stomach     Cont brimonidine tid ou  Cont latanoprost qhs ou  Cont dorzolamide tid ou  - IOP was better     -would rec PI's prior to doing and slt's - has some mild  ant bowing and narrowing   - avoid BB - H/O asthma as a child     OFF PO prednisone - since dec 2021     NO MORE VF testing - essentially  EXT ou      Photo file updated 4/18/2022    F/u 6 -9 mos--IOP // [facility] check// gonio     Insurance forms filled out again (yearly)  8/5/2019   "

## 2023-03-23 ENCOUNTER — OFFICE VISIT (OUTPATIENT)
Dept: OPHTHALMOLOGY | Facility: CLINIC | Age: 68
End: 2023-03-23
Payer: MEDICARE

## 2023-03-23 ENCOUNTER — TELEPHONE (OUTPATIENT)
Dept: ENDOSCOPY | Facility: HOSPITAL | Age: 68
End: 2023-03-23
Payer: MEDICARE

## 2023-03-23 ENCOUNTER — OFFICE VISIT (OUTPATIENT)
Dept: GASTROENTEROLOGY | Facility: CLINIC | Age: 68
End: 2023-03-23
Payer: MEDICARE

## 2023-03-23 VITALS — WEIGHT: 243 LBS | BODY MASS INDEX: 41.48 KG/M2 | HEIGHT: 64 IN

## 2023-03-23 VITALS
SYSTOLIC BLOOD PRESSURE: 97 MMHG | WEIGHT: 243.13 LBS | HEART RATE: 73 BPM | BODY MASS INDEX: 41.51 KG/M2 | HEIGHT: 64 IN | DIASTOLIC BLOOD PRESSURE: 55 MMHG

## 2023-03-23 DIAGNOSIS — R13.10 DYSPHAGIA, UNSPECIFIED TYPE: ICD-10-CM

## 2023-03-23 DIAGNOSIS — H54.7 VISUAL LOSS: ICD-10-CM

## 2023-03-23 DIAGNOSIS — R13.10 DYSPHAGIA, UNSPECIFIED TYPE: Primary | ICD-10-CM

## 2023-03-23 DIAGNOSIS — K74.60 HEPATIC CIRRHOSIS, UNSPECIFIED HEPATIC CIRRHOSIS TYPE, UNSPECIFIED WHETHER ASCITES PRESENT: ICD-10-CM

## 2023-03-23 DIAGNOSIS — I85.00 ESOPHAGEAL VARICES WITHOUT BLEEDING, UNSPECIFIED ESOPHAGEAL VARICES TYPE: ICD-10-CM

## 2023-03-23 DIAGNOSIS — H46.9 OPTIC NEURITIS: ICD-10-CM

## 2023-03-23 DIAGNOSIS — H25.13 NUCLEAR SCLEROSIS, BILATERAL: ICD-10-CM

## 2023-03-23 DIAGNOSIS — H40.1131 PRIMARY OPEN ANGLE GLAUCOMA OF BOTH EYES, MILD STAGE: Primary | ICD-10-CM

## 2023-03-23 DIAGNOSIS — H40.043 STEROID RESPONDER, BILATERAL: ICD-10-CM

## 2023-03-23 DIAGNOSIS — E11.9 TYPE 2 DIABETES MELLITUS WITHOUT OPHTHALMIC MANIFESTATIONS: ICD-10-CM

## 2023-03-23 DIAGNOSIS — M31.5 GIANT CELL ARTERITIS WITH POLYMYALGIA RHEUMATICA: ICD-10-CM

## 2023-03-23 DIAGNOSIS — H46.9 INFLAMMATORY OPTIC NEUROPATHY: ICD-10-CM

## 2023-03-23 DIAGNOSIS — H40.033 ANATOMICAL NARROW ANGLE BORDERLINE GLAUCOMA, BILATERAL: ICD-10-CM

## 2023-03-23 PROCEDURE — 99214 OFFICE O/P EST MOD 30 MIN: CPT | Mod: PBBFAC | Performed by: STUDENT IN AN ORGANIZED HEALTH CARE EDUCATION/TRAINING PROGRAM

## 2023-03-23 PROCEDURE — 99214 PR OFFICE/OUTPT VISIT, EST, LEVL IV, 30-39 MIN: ICD-10-PCS | Mod: S$PBB,,, | Performed by: OPHTHALMOLOGY

## 2023-03-23 PROCEDURE — 99999 PR PBB SHADOW E&M-EST. PATIENT-LVL IV: CPT | Mod: PBBFAC,GC,, | Performed by: STUDENT IN AN ORGANIZED HEALTH CARE EDUCATION/TRAINING PROGRAM

## 2023-03-23 PROCEDURE — 99214 OFFICE O/P EST MOD 30 MIN: CPT | Mod: S$PBB,,, | Performed by: OPHTHALMOLOGY

## 2023-03-23 PROCEDURE — 99204 PR OFFICE/OUTPT VISIT, NEW, LEVL IV, 45-59 MIN: ICD-10-PCS | Mod: S$PBB,GC,, | Performed by: STUDENT IN AN ORGANIZED HEALTH CARE EDUCATION/TRAINING PROGRAM

## 2023-03-23 PROCEDURE — 99204 OFFICE O/P NEW MOD 45 MIN: CPT | Mod: S$PBB,GC,, | Performed by: STUDENT IN AN ORGANIZED HEALTH CARE EDUCATION/TRAINING PROGRAM

## 2023-03-23 PROCEDURE — 99999 PR PBB SHADOW E&M-EST. PATIENT-LVL IV: ICD-10-PCS | Mod: PBBFAC,GC,, | Performed by: STUDENT IN AN ORGANIZED HEALTH CARE EDUCATION/TRAINING PROGRAM

## 2023-03-23 PROCEDURE — 99999 PR PBB SHADOW E&M-EST. PATIENT-LVL III: ICD-10-PCS | Mod: PBBFAC,,, | Performed by: OPHTHALMOLOGY

## 2023-03-23 PROCEDURE — 99999 PR PBB SHADOW E&M-EST. PATIENT-LVL III: CPT | Mod: PBBFAC,,, | Performed by: OPHTHALMOLOGY

## 2023-03-23 PROCEDURE — 99213 OFFICE O/P EST LOW 20 MIN: CPT | Mod: PBBFAC,27 | Performed by: OPHTHALMOLOGY

## 2023-03-23 RX ORDER — ACYCLOVIR 400 MG/1
400 TABLET ORAL 2 TIMES DAILY
COMMUNITY
Start: 2023-03-13

## 2023-03-23 NOTE — H&P (VIEW-ONLY)
[facility]    Reason for visit: The encounter diagnosis was Dysphagia, unspecified type.  Referring Provider/PCP: Jason Mercado MD    History of Present Illness:  Genesis Ugalde is a 67 y.o. female with a history of GCA/PMR, BELLO cirrhosis, GERD who is presenting for initial evaluation of GERD with dysphagia.    Longstanding history of GERD characterized by heartburn with some regurgitation. Over past 2 months heartburn has worsened and now has dysphagia to solids along with odynophagia that seems to correlate with episodes of heartburn. Recently switched to OTC omeprazole 20 mg daily after she ran out of her prescription 40 mg and had not refilled it. Symptomatic 2-3 times per week.     Takes Goody powder 3x per week    Brother passed away from stomach cancer diagnosed around age 68        PEndoHx:  EGD 2016  Impression:           - Normal examined duodenum. Biopsied.                         - Erythematous mucosa in the antrum and prepyloric                         region of the stomach. Biopsied.                         - 2 cm hiatus hernia.                         - Esophageal mucosal changes suggestive of                         eosinophilic esophagitis. Biopsied.   Normal histology, no EoE    Review of Systems   Constitutional:  Negative for chills and fever.   HENT:  Negative for congestion and sore throat.    Eyes:  Negative for blurred vision and double vision.   Respiratory:  Negative for cough and shortness of breath.    Cardiovascular:  Negative for chest pain and palpitations.   Gastrointestinal:         See HPI   Genitourinary:  Negative for frequency and urgency.   Musculoskeletal:  Negative for joint pain and myalgias.   Skin:  Negative for itching and rash.   Neurological:  Negative for sensory change and focal weakness.     Medical History:  Past Medical History:   Diagnosis Date    Anxiety     Asthma     Blind left eye     can see silhouettes in right eye    Cataract      Depression     Diabetes mellitus     Disease of immune system     Giant cell arteritis     Glaucoma     Hypertension     Polymyalgia rheumatica     Uveitis        Past Surgical History:   Procedure Laterality Date    ANKLE SURGERY      CHOLECYSTECTOMY      COLONOSCOPY N/A 9/28/2015    Procedure: COLONOSCOPY;  Surgeon: Alok Dykes MD;  [facility]  Service: Endoscopy;  Laterality: N/A;    COLONOSCOPY N/A 9/6/2019    Procedure: COLONOSCOPY;  Surgeon: Alok Dykes MD;  [facility]  Service: Endoscopy;  Laterality: N/A;    CYST REMOVAL      right lung    HYSTERECTOMY         Family History   Problem Relation Age of Onset    Coronary artery disease Mother     Diabetes Mother     Hypertension Mother     Stroke Mother     Coronary artery disease Father     Emphysema Father     Diabetes Father     Emphysema Sister     Heart attack Sister     Cancer Brother 58        stomach    Stomach cancer Brother     Colon polyps Brother         1 foot of colon removed    Glaucoma Neg Hx     Asthma Neg Hx     Celiac disease Neg Hx     Colon cancer Neg Hx     Esophageal cancer Neg Hx     Inflammatory bowel disease Neg Hx     Rectal cancer Neg Hx     Ulcerative colitis Neg Hx     Macular degeneration Neg Hx     Retinal detachment Neg Hx     Strabismus Neg Hx     Amblyopia Neg Hx     Blindness Neg Hx     Cataracts Neg Hx        Social History     Socioeconomic History    Marital status: Single   Tobacco Use    Smoking status: Never    Smokeless tobacco: Never   Substance and Sexual Activity    Alcohol use: No     Alcohol/week: 0.0 standard drinks    Drug use: No       Current Outpatient Medications on File Prior to Visit   Medication Sig Dispense Refill    amlodipine-benazepril (LOTREL) 10-40 mg per capsule Take 1 capsule by mouth once daily.      aspirin (ECOTRIN) 325 MG EC tablet Take 325 mg by mouth once daily.      brimonidine 0.2% (ALPHAGAN) 0.2 % Drop INSTILL 1 DROP INTO BOTH EYES 3  TIMES A DAY 10 mL 12    clonazePAM (KLONOPIN) 1 MG tablet Take 1 mg by mouth 2 (two) times daily as needed.      dorzolamide (TRUSOPT) 2 % ophthalmic solution INSTILL 1 DROP INTO BOTH EYES 3 TIMES A DAY 10 mL 12    ergocalciferol (VITAMIN D2) 50,000 unit Cap TAKE 1 CAPSULE (50,000 UNITS TOTAL) BY MOUTH EVERY 7 DAYS. 12 capsule 3    latanoprost 0.005 % ophthalmic solution Place 1 drop into both eyes every evening. 2.5 mL 12    metoprolol succinate (TOPROL-XL) 100 MG 24 hr tablet Take 100 mg by mouth.      pravastatin (PRAVACHOL) 40 MG tablet TAKE 1 TABLET BY MOUTH EVERY DAY IN THE EVENING 90 tablet 3    sertraline (ZOLOFT) 25 MG tablet TAKE 2 TABLETS BY MOUTH EVERY  tablet 1    tocilizumab (ACTEMRA) 162 mg/0.9 mL injection Inject 0.9 mLs (162 mg total) into the skin every 7 days. 12 each 0    traMADoL (ULTRAM) 50 mg tablet Take 50 mg by mouth 2 (two) times daily as needed.      omeprazole (PRILOSEC) 40 MG capsule TAKE 1 CAPSULE (40 MG TOTAL) BY MOUTH EVERY 12 (TWELVE) HOURS. TAKE ONE PILL EVERY MORNING 45 MINUTES BEFORE BREAKFAST 180 capsule 0     No current facility-administered medications on file prior to visit.       Review of patient's allergies indicates:   Allergen Reactions    Oxycontin [oxycodone] Hallucinations     Immobile     Percocet [oxycodone-acetaminophen] Shortness Of Breath    Percodan [oxycodone hcl-oxycodone-asa] Shortness Of Breath    Seroquel [quetiapine] Shortness Of Breath     Akathisia       Physical Exam:  Constitutional:  not in acute distress and well developed  HENT: Head: Normal, normocephalic  Eyes: conjunctiva clear and sclera nonicteric  Cardiovascular: 2+ distal pulses  Respiratory: normal chest expansion & respiratory effort   and no accessory muscle use  GI: soft, non-tender, without masses or organomegaly  Skin: normal color on visualized skin  Neurological: alert, oriented x3  Psychiatric: mood and affect are within normal limits, pt is a good historian; no memory problems  were noted    Laboratory:  Reviewed labs ordered by another provider: CBC 2/6/23. Notable for Hgb 12.9, AST 51, ALT 93.    Imaging:  No pertinent imaging.    Assessment:  Genesis Ugalde is a 67 y.o. female who is presenting for  GERD with dysphagia and odynophagia.   Suspect switch to lower dose OTC omeprazole may have contributed to reflux esophagitis recently switched off of long-term prednisone and is on tocilizumab so could have component of infectious esophagitis in setting of immunosuppression. Plan for EGD.     Problems:  GERD  Dysphagia  Odynophagia      Plan:  Schedule for EGD  Increase PPI back to omeprazole 40 mg BID as prescribed by PCP  Avoid NSAIDs  Follow up if symptoms worsen or fail to improve.    Prakash Landeros MD  Gastroenterology Fellow

## 2023-03-23 NOTE — TELEPHONE ENCOUNTER
"Prakash Landeros MD  [facility]  Procedure: EGD     Diagnosis: Dysphagia and variceal screening     Procedure Timin-4 weeks     *If within 4 weeks selected, please windy as high priority*     *If greater than 12 weeks, please select "4-12 weeks" and delay sending until 2 months prior to requested date*     Provider: Any GI provider     Location: [address]     Additional Scheduling Information: Cirrhosis     Prep Specifications:Gastroparesis (Extended clear liquid)     Have you attached a patient to this message: yes   "

## 2023-03-23 NOTE — PROGRESS NOTES
[facility]    Reason for visit: The encounter diagnosis was Dysphagia, unspecified type.  Referring Provider/PCP: Jason Mercado MD    History of Present Illness:  Genesis Ugalde is a 67 y.o. female with a history of GCA/PMR, BELLO cirrhosis, GERD who is presenting for initial evaluation of GERD with dysphagia.    Longstanding history of GERD characterized by heartburn with some regurgitation. Over past 2 months heartburn has worsened and now has dysphagia to solids along with odynophagia that seems to correlate with episodes of heartburn. Recently switched to OTC omeprazole 20 mg daily after she ran out of her prescription 40 mg and had not refilled it. Symptomatic 2-3 times per week.     Takes Goody powder 3x per week    Brother passed away from stomach cancer diagnosed around age 68        PEndoHx:  EGD 2016  Impression:           - Normal examined duodenum. Biopsied.                         - Erythematous mucosa in the antrum and prepyloric                         region of the stomach. Biopsied.                         - 2 cm hiatus hernia.                         - Esophageal mucosal changes suggestive of                         eosinophilic esophagitis. Biopsied.   Normal histology, no EoE    Review of Systems   Constitutional:  Negative for chills and fever.   HENT:  Negative for congestion and sore throat.    Eyes:  Negative for blurred vision and double vision.   Respiratory:  Negative for cough and shortness of breath.    Cardiovascular:  Negative for chest pain and palpitations.   Gastrointestinal:         See HPI   Genitourinary:  Negative for frequency and urgency.   Musculoskeletal:  Negative for joint pain and myalgias.   Skin:  Negative for itching and rash.   Neurological:  Negative for sensory change and focal weakness.     Medical History:  Past Medical History:   Diagnosis Date    Anxiety     Asthma     Blind left eye     can see silhouettes in right eye    Cataract      Depression     Diabetes mellitus     Disease of immune system     Giant cell arteritis     Glaucoma     Hypertension     Polymyalgia rheumatica     Uveitis        Past Surgical History:   Procedure Laterality Date    ANKLE SURGERY      CHOLECYSTECTOMY      COLONOSCOPY N/A 9/28/2015    Procedure: COLONOSCOPY;  Surgeon: Alok Dykes MD;  [facility]  Service: Endoscopy;  Laterality: N/A;    COLONOSCOPY N/A 9/6/2019    Procedure: COLONOSCOPY;  Surgeon: Alok Dykes MD;  [facility]  Service: Endoscopy;  Laterality: N/A;    CYST REMOVAL      right lung    HYSTERECTOMY         Family History   Problem Relation Age of Onset    Coronary artery disease Mother     Diabetes Mother     Hypertension Mother     Stroke Mother     Coronary artery disease Father     Emphysema Father     Diabetes Father     Emphysema Sister     Heart attack Sister     Cancer Brother 58        stomach    Stomach cancer Brother     Colon polyps Brother         1 foot of colon removed    Glaucoma Neg Hx     Asthma Neg Hx     Celiac disease Neg Hx     Colon cancer Neg Hx     Esophageal cancer Neg Hx     Inflammatory bowel disease Neg Hx     Rectal cancer Neg Hx     Ulcerative colitis Neg Hx     Macular degeneration Neg Hx     Retinal detachment Neg Hx     Strabismus Neg Hx     Amblyopia Neg Hx     Blindness Neg Hx     Cataracts Neg Hx        Social History     Socioeconomic History    Marital status: Single   Tobacco Use    Smoking status: Never    Smokeless tobacco: Never   Substance and Sexual Activity    Alcohol use: No     Alcohol/week: 0.0 standard drinks    Drug use: No       Current Outpatient Medications on File Prior to Visit   Medication Sig Dispense Refill    amlodipine-benazepril (LOTREL) 10-40 mg per capsule Take 1 capsule by mouth once daily.      aspirin (ECOTRIN) 325 MG EC tablet Take 325 mg by mouth once daily.      brimonidine 0.2% (ALPHAGAN) 0.2 % Drop INSTILL 1 DROP INTO BOTH EYES 3  TIMES A DAY 10 mL 12    clonazePAM (KLONOPIN) 1 MG tablet Take 1 mg by mouth 2 (two) times daily as needed.      dorzolamide (TRUSOPT) 2 % ophthalmic solution INSTILL 1 DROP INTO BOTH EYES 3 TIMES A DAY 10 mL 12    ergocalciferol (VITAMIN D2) 50,000 unit Cap TAKE 1 CAPSULE (50,000 UNITS TOTAL) BY MOUTH EVERY 7 DAYS. 12 capsule 3    latanoprost 0.005 % ophthalmic solution Place 1 drop into both eyes every evening. 2.5 mL 12    metoprolol succinate (TOPROL-XL) 100 MG 24 hr tablet Take 100 mg by mouth.      pravastatin (PRAVACHOL) 40 MG tablet TAKE 1 TABLET BY MOUTH EVERY DAY IN THE EVENING 90 tablet 3    sertraline (ZOLOFT) 25 MG tablet TAKE 2 TABLETS BY MOUTH EVERY  tablet 1    tocilizumab (ACTEMRA) 162 mg/0.9 mL injection Inject 0.9 mLs (162 mg total) into the skin every 7 days. 12 each 0    traMADoL (ULTRAM) 50 mg tablet Take 50 mg by mouth 2 (two) times daily as needed.      omeprazole (PRILOSEC) 40 MG capsule TAKE 1 CAPSULE (40 MG TOTAL) BY MOUTH EVERY 12 (TWELVE) HOURS. TAKE ONE PILL EVERY MORNING 45 MINUTES BEFORE BREAKFAST 180 capsule 0     No current facility-administered medications on file prior to visit.       Review of patient's allergies indicates:   Allergen Reactions    Oxycontin [oxycodone] Hallucinations     Immobile     Percocet [oxycodone-acetaminophen] Shortness Of Breath    Percodan [oxycodone hcl-oxycodone-asa] Shortness Of Breath    Seroquel [quetiapine] Shortness Of Breath     Akathisia       Physical Exam:  Constitutional:  not in acute distress and well developed  HENT: Head: Normal, normocephalic  Eyes: conjunctiva clear and sclera nonicteric  Cardiovascular: 2+ distal pulses  Respiratory: normal chest expansion & respiratory effort   and no accessory muscle use  GI: soft, non-tender, without masses or organomegaly  Skin: normal color on visualized skin  Neurological: alert, oriented x3  Psychiatric: mood and affect are within normal limits, pt is a good historian; no memory problems  were noted    Laboratory:  Reviewed labs ordered by another provider: CBC 2/6/23. Notable for Hgb 12.9, AST 51, ALT 93.    Imaging:  No pertinent imaging.    Assessment:  Genesis Ugalde is a 67 y.o. female who is presenting for  GERD with dysphagia and odynophagia.   Suspect switch to lower dose OTC omeprazole may have contributed to reflux esophagitis recently switched off of long-term prednisone and is on tocilizumab so could have component of infectious esophagitis in setting of immunosuppression. Plan for EGD.     Problems:  GERD  Dysphagia  Odynophagia      Plan:  Schedule for EGD  Increase PPI back to omeprazole 40 mg BID as prescribed by PCP  Avoid NSAIDs  Follow up if symptoms worsen or fail to improve.    Prakash Landeros MD  Gastroenterology Fellow

## 2023-03-23 NOTE — PROGRESS NOTES
I have seen the patient, reviewed Prakash Landeros MD the GI fellow's history and physical, assessment, and plan. I have personally interviewed and examined the patient at bedside and I discussed the case with the GI fellow and I agree with the findings and plan as documented in the fellow's note.

## 2023-03-27 ENCOUNTER — ANESTHESIA EVENT (OUTPATIENT)
Dept: ENDOSCOPY | Facility: HOSPITAL | Age: 68
End: 2023-03-27
Payer: MEDICARE

## 2023-03-28 ENCOUNTER — ANESTHESIA (OUTPATIENT)
Dept: ENDOSCOPY | Facility: HOSPITAL | Age: 68
End: 2023-03-28
Payer: MEDICARE

## 2023-03-28 ENCOUNTER — HOSPITAL ENCOUNTER (OUTPATIENT)
Facility: HOSPITAL | Age: 68
Discharge: HOME | End: 2023-03-28
Attending: INTERNAL MEDICINE | Admitting: INTERNAL MEDICINE
Payer: MEDICARE

## 2023-03-28 VITALS
OXYGEN SATURATION: 96 % | DIASTOLIC BLOOD PRESSURE: 51 MMHG | BODY MASS INDEX: 41.32 KG/M2 | RESPIRATION RATE: 18 BRPM | SYSTOLIC BLOOD PRESSURE: 93 MMHG | HEART RATE: 62 BPM | HEIGHT: 64 IN | WEIGHT: 242 LBS | TEMPERATURE: 98 F

## 2023-03-28 DIAGNOSIS — R13.10 DYSPHAGIA: ICD-10-CM

## 2023-03-28 PROCEDURE — 37000008 HC ANESTHESIA 1ST 15 MINUTES: Performed by: INTERNAL MEDICINE

## 2023-03-28 PROCEDURE — 88305 TISSUE EXAM BY PATHOLOGIST: ICD-10-PCS | Mod: 26,,, | Performed by: PATHOLOGY

## 2023-03-28 PROCEDURE — E9220 PRA ENDO ANESTHESIA: HCPCS | Mod: ,,, | Performed by: NURSE ANESTHETIST, CERTIFIED REGISTERED

## 2023-03-28 PROCEDURE — 88305 TISSUE EXAM BY PATHOLOGIST: CPT | Mod: 26,,, | Performed by: PATHOLOGY

## 2023-03-28 PROCEDURE — E9220 PRA ENDO ANESTHESIA: ICD-10-PCS | Mod: ,,, | Performed by: NURSE ANESTHETIST, CERTIFIED REGISTERED

## 2023-03-28 PROCEDURE — 43239 EGD BIOPSY SINGLE/MULTIPLE: CPT | Performed by: INTERNAL MEDICINE

## 2023-03-28 PROCEDURE — 88342 IMHCHEM/IMCYTCHM 1ST ANTB: CPT | Performed by: PATHOLOGY

## 2023-03-28 PROCEDURE — 27201012 HC FORCEPS, HOT/COLD, DISP: Performed by: INTERNAL MEDICINE

## 2023-03-28 PROCEDURE — 63600175 PHARM REV CODE 636 W HCPCS: Performed by: NURSE ANESTHETIST, CERTIFIED REGISTERED

## 2023-03-28 PROCEDURE — 88342 IMHCHEM/IMCYTCHM 1ST ANTB: CPT | Mod: 26,,, | Performed by: PATHOLOGY

## 2023-03-28 PROCEDURE — 43239 PR EGD, FLEX, W/BIOPSY, SGL/MULTI: ICD-10-PCS | Mod: ,,, | Performed by: INTERNAL MEDICINE

## 2023-03-28 PROCEDURE — 37000009 HC ANESTHESIA EA ADD 15 MINS: Performed by: INTERNAL MEDICINE

## 2023-03-28 PROCEDURE — 88342 CHG IMMUNOCYTOCHEMISTRY: ICD-10-PCS | Mod: 26,,, | Performed by: PATHOLOGY

## 2023-03-28 PROCEDURE — 43239 EGD BIOPSY SINGLE/MULTIPLE: CPT | Mod: ,,, | Performed by: INTERNAL MEDICINE

## 2023-03-28 PROCEDURE — 25000003 PHARM REV CODE 250: Performed by: NURSE ANESTHETIST, CERTIFIED REGISTERED

## 2023-03-28 PROCEDURE — 88305 TISSUE EXAM BY PATHOLOGIST: CPT | Mod: 59 | Performed by: PATHOLOGY

## 2023-03-28 RX ORDER — PROPOFOL 10 MG/ML
VIAL (ML) INTRAVENOUS
Status: DISCONTINUED | OUTPATIENT
Start: 2023-03-28 | End: 2023-03-28

## 2023-03-28 RX ORDER — LIDOCAINE HYDROCHLORIDE 20 MG/ML
INJECTION, SOLUTION EPIDURAL; INFILTRATION; INTRACAUDAL; PERINEURAL
Status: DISCONTINUED | OUTPATIENT
Start: 2023-03-28 | End: 2023-03-28

## 2023-03-28 RX ORDER — SODIUM CHLORIDE 9 MG/ML
INJECTION, SOLUTION INTRAVENOUS CONTINUOUS
Status: DISCONTINUED | OUTPATIENT
Start: 2023-03-28 | End: 2023-03-28 | Stop reason: HOSPADM

## 2023-03-28 RX ORDER — PROPOFOL 10 MG/ML
VIAL (ML) INTRAVENOUS CONTINUOUS PRN
Status: DISCONTINUED | OUTPATIENT
Start: 2023-03-28 | End: 2023-03-28

## 2023-03-28 RX ADMIN — PROPOFOL 80 MG: 10 INJECTION, EMULSION INTRAVENOUS at 09:03

## 2023-03-28 RX ADMIN — LIDOCAINE HYDROCHLORIDE 100 MG: 20 INJECTION, SOLUTION EPIDURAL; INFILTRATION; INTRACAUDAL; PERINEURAL at 09:03

## 2023-03-28 RX ADMIN — SODIUM CHLORIDE: 9 INJECTION, SOLUTION INTRAVENOUS at 09:03

## 2023-03-28 RX ADMIN — Medication 200 MCG/KG/MIN: at 09:03

## 2023-03-28 NOTE — [employer] PATIENT INSTRUCTIONS
Discharge Summary/Instructions after an Endoscopic Procedure  Patient Name: Genesis Ugalde  Patient MRN: [id number]  Patient YOB: 1955 Tuesday, March 28, 2023  Bharat Franklin MD  Dear patient,  As a result of recent federal legislation (The Federal Cures Act), you may   receive lab or pathology results from your procedure in your [facility]   account before your physician is able to contact you. Your physician or   their representative will relay the results to you with their   recommendations at their soonest availability.  Thank you,  RESTRICTIONS:  During your procedure today, you received medications for sedation.  These   medications may affect your judgment, balance and coordination.  Therefore,   for 24 hours, you have the following restrictions:   - DO NOT drive a car, operate machinery, make legal/financial decisions,   sign important papers or drink alcohol.    ACTIVITY:  Today: no heavy lifting, straining or running due to procedural   sedation/anesthesia.  The following day: return to full activity including work.  DIET:  Eat and drink normally unless instructed otherwise.     TREATMENT FOR COMMON SIDE EFFECTS:  - Mild abdominal pain, nausea, belching, bloating or excessive gas:  rest,   eat lightly and use a heating pad.  - Sore Throat: treat with throat lozenges and/or gargle with warm salt   water.  - Because air was used during the procedure, expelling large amounts of air   from your rectum or belching is normal.  - If a bowel prep was taken, you may not have a bowel movement for 1-3 days.    This is normal.  SYMPTOMS TO WATCH FOR AND REPORT TO YOUR PHYSICIAN:  1. Abdominal pain or bloating, other than gas cramps.  2. Chest pain.  3. Back pain.  4. Signs of infection such as: chills or fever occurring within 24 hours   after the procedure.  5. Rectal bleeding, which would show as bright red, maroon, or black stools.   (A tablespoon of blood from the rectum is not serious, especially  if   hemorrhoids are present.)  6. Vomiting.  7. Weakness or dizziness.  GO DIRECTLY TO THE NEAREST EMERGENCY ROOM IF YOU HAVE ANY OF THE FOLLOWING:      Difficulty breathing              Chills and/or fever over 101 F   Persistent vomiting and/or vomiting blood   Severe abdominal pain   Severe chest pain   Black, tarry stools   Bleeding- more than one tablespoon   Any other symptom or condition that you feel may need urgent attention  Your doctor recommends these additional instructions:  If any biopsies were taken, your doctors clinic will contact you in 1 to 2   weeks with any results.  - Discharge patient to home.   - Resume previous diet today.   - Continue present medications.   - Await pathology results.   - Return to referring physician as previously scheduled.  For questions, problems or results please call your physician - Bharat Franklin MD at Work:  [phone].  [facility], EMERGENCY ROOM PHONE NUMBER: [phone]  IF A COMPLICATION OR EMERGENCY SITUATION ARISES AND YOU ARE UNABLE TO REACH   YOUR PHYSICIAN - GO DIRECTLY TO THE EMERGENCY ROOM.  Bharat Franklin MD  3/28/2023 9:49:54 AM  This report has been verified and signed electronically.  Dear patient,  As a result of recent federal legislation (The Federal Cures Act), you may   receive lab or pathology results from your procedure in your [facility]   account before your physician is able to contact you. Your physician or   their representative will relay the results to you with their   recommendations at their soonest availability.  Thank you,  PROVATION

## 2023-03-28 NOTE — ANESTHESIA PREPROCEDURE EVALUATION
03/28/2023  Genesis Ugalde is a 67 y.o., female.  Pre-operative evaluation for Procedure(s) (LRB):  EGD (ESOPHAGOGASTRODUODENOSCOPY) (N/A)    Genesis Ugalde is a 67 y.o. female     Patient Active Problem List   Diagnosis    Giant cell arteritis with polymyalgia rheumatica    Morbid obesity    Hypertension    Optic neuritis    Type 2 diabetes mellitus    Kidney stones    Uveitis    Vitamin D deficiency    Hx of colonic polyps    Blindness and low vision    Cushing's disease    Recurrent genital herpes    Hepatosplenomegaly    Fatty liver    Primary open angle glaucoma, both eyes    Lung nodule    Epigastric abdominal pain    Absence attack    Transient memory loss    Right sided sciatica    Diarrhea    Dysphagia    Immunosuppression    Loose stools    Hyperlipidemia    Medication monitoring encounter    Claustrophobia    Pain of right hip joint    LOYDA (obstructive sleep apnea)    Multinodular goiter    Ankle mass, right    Right calf pain    Liver mass    Left-sided chest wall pain    Family circumstance    UTI (urinary tract infection)    Hepatic cirrhosis    Gastroesophageal reflux disease    Hiatal hernia    History of diabetes mellitus       Review of patient's allergies indicates:   Allergen Reactions    Oxycontin [oxycodone] Hallucinations     Immobile     Percocet [oxycodone-acetaminophen] Shortness Of Breath    Percodan [oxycodone hcl-oxycodone-asa] Shortness Of Breath    Seroquel [quetiapine] Shortness Of Breath     Akathisia       No current facility-administered medications on file prior to encounter.     Current Outpatient Medications on File Prior to Encounter   Medication Sig Dispense Refill    acyclovir (ZOVIRAX) 400 MG tablet Take 400 mg by mouth 2 (two) times daily.      amlodipine-benazepril (LOTREL) 10-40 mg per capsule Take 1 capsule by mouth  once daily.      aspirin (ECOTRIN) 325 MG EC tablet Take 325 mg by mouth once daily.      brimonidine 0.2% (ALPHAGAN) 0.2 % Drop INSTILL 1 DROP INTO BOTH EYES 3 TIMES A DAY 10 mL 12    clonazePAM (KLONOPIN) 1 MG tablet Take 1 mg by mouth 2 (two) times daily as needed.      dorzolamide (TRUSOPT) 2 % ophthalmic solution INSTILL 1 DROP INTO BOTH EYES 3 TIMES A DAY 10 mL 12    ergocalciferol (VITAMIN D2) 50,000 unit Cap TAKE 1 CAPSULE (50,000 UNITS TOTAL) BY MOUTH EVERY 7 DAYS. 12 capsule 3    latanoprost 0.005 % ophthalmic solution Place 1 drop into both eyes every evening. 2.5 mL 12    metoprolol succinate (TOPROL-XL) 100 MG 24 hr tablet Take 100 mg by mouth once daily.      pravastatin (PRAVACHOL) 40 MG tablet TAKE 1 TABLET BY MOUTH EVERY DAY IN THE EVENING 90 tablet 3    sertraline (ZOLOFT) 25 MG tablet TAKE 2 TABLETS BY MOUTH EVERY  tablet 1    tocilizumab (ACTEMRA) 162 mg/0.9 mL injection Inject 0.9 mLs (162 mg total) into the skin every 7 days. 12 each 0    traMADoL (ULTRAM) 50 mg tablet Take 50 mg by mouth 2 (two) times daily as needed.      omeprazole (PRILOSEC) 40 MG capsule TAKE 1 CAPSULE (40 MG TOTAL) BY MOUTH EVERY 12 (TWELVE) HOURS. TAKE ONE PILL EVERY MORNING 45 MINUTES BEFORE BREAKFAST 180 capsule 0       Past Surgical History:   Procedure Laterality Date    ANKLE SURGERY      CHOLECYSTECTOMY      COLONOSCOPY N/A 9/28/2015    Procedure: COLONOSCOPY;  Surgeon: Alok Dykes MD;  Location: [address]);  Service: Endoscopy;  Laterality: N/A;    COLONOSCOPY N/A 9/6/2019    Procedure: COLONOSCOPY;  Surgeon: Alok Dykes MD;  Location: [address]);  Service: Endoscopy;  Laterality: N/A;    CYST REMOVAL      right lung    HYSTERECTOMY             CBC:   Recent Labs     03/28/23  0800   WBC 8.72   RBC 4.27   HGB 13.2   HCT 39.9      MCV 93   MCH 30.9   MCHC 33.1         Pre-op Assessment    I have reviewed the Patient Summary Reports.     I have reviewed  the Nursing Notes. I have reviewed the NPO Status.   I have reviewed the Medications.     Review of Systems  Anesthesia Hx:  No problems with previous Anesthesia    EENT/Dental:   Legally blind secondary to giant cell arteritis   Cardiovascular:   Hypertension, well controlled    Pulmonary:   Asthma Sleep Apnea    Renal/:   renal calculi    Hepatic/GI:   Hiatal Hernia, GERD Liver Disease, cirrhosis       Physical Exam  General: Cooperative    Airway:  Mallampati: III / II  Mouth Opening: Normal  TM Distance: Normal  Tongue: Normal  Neck ROM: Normal ROM    Dental:  Intact        Anesthesia Plan  Type of Anesthesia, risks & benefits discussed:    Anesthesia Type: Gen Natural Airway  Intra-op Monitoring Plan: Standard ASA Monitors  Post Op Pain Control Plan: multimodal analgesia  Induction:  IV  Informed Consent: Informed consent signed with the Patient and all parties understand the risks and agree with anesthesia plan.  All questions answered.   ASA Score: 3  Day of Surgery Review of History & Physical: H&P Update referred to the surgeon/provider.    Ready For Surgery From Anesthesia Perspective.     .

## 2023-03-28 NOTE — TRANSFER OF CARE
"Anesthesia Transfer of Care Note    Patient: Genesis V Egbi    Procedure(s) Performed: Procedure(s) (LRB):  EGD (ESOPHAGOGASTRODUODENOSCOPY) (N/A)    Patient location: PACU    Anesthesia Type: general    Transport from OR: Transported from OR on room air with adequate spontaneous ventilation    Post pain: adequate analgesia    Post assessment: no apparent anesthetic complications    Post vital signs: stable    Level of consciousness: sedated and responds to stimulation    Nausea/Vomiting: no nausea/vomiting    Complications: none    Transfer of care protocol was followed      Last vitals:   Visit Vitals  BP (!) 83/51 (BP Location: Left arm, Patient Position: Lying)   Pulse 70   Temp 36.5 °C (97.7 °F) (Temporal)   Resp 18   Ht 5' 4" (1.626 m)   Wt 109.8 kg (242 lb)   SpO2 (!) 92%   Breastfeeding No   BMI 41.54 kg/m²     "

## 2023-03-28 NOTE — ANESTHESIA POSTPROCEDURE EVALUATION
Anesthesia Post Evaluation    Patient: Genesis V Egbi    Procedure(s) Performed: Procedure(s) (LRB):  EGD (ESOPHAGOGASTRODUODENOSCOPY) (N/A)    Final Anesthesia Type: general      Patient location during evaluation: GI PACU  Patient participation: Yes- Able to Participate  Level of consciousness: awake and alert and oriented  Post-procedure vital signs: reviewed and stable  Pain management: adequate  Airway patency: patent    PONV status at discharge: No PONV  Anesthetic complications: no      Cardiovascular status: stable  Respiratory status: unassisted, spontaneous ventilation and room air  Hydration status: euvolemic  Follow-up not needed.          Vitals Value Taken Time   BP 83/51 03/28/23 0954   Temp 36.5 °C (97.7 °F) 03/28/23 0954   Pulse 70 03/28/23 0954   Resp 18 03/28/23 0954   SpO2 92 % 03/28/23 0954         No case tracking events are documented in the log.      Pain/Jose Elias Score: Jose Elias Score: 10 (3/28/2023  9:56 AM)

## 2023-04-03 LAB
COMMENT: NORMAL
FINAL PATHOLOGIC DIAGNOSIS: NORMAL
GROSS: NORMAL
Lab: NORMAL

## 2023-05-23 ENCOUNTER — TELEPHONE (OUTPATIENT)
Dept: RHEUMATOLOGY | Facility: CLINIC | Age: 68
End: 2023-05-23
Payer: MEDICARE

## 2023-05-23 DIAGNOSIS — M31.6 GCA (GIANT CELL ARTERITIS): ICD-10-CM

## 2023-05-23 RX ORDER — TOCILIZUMAB 180 MG/ML
162 INJECTION, SOLUTION SUBCUTANEOUS
Qty: 12 EACH | Refills: 0 | Status: SHIPPED | OUTPATIENT
Start: 2023-05-23 | End: 2023-05-31 | Stop reason: SDUPTHER

## 2023-05-23 NOTE — TELEPHONE ENCOUNTER
----- Message from Lindsey Lai sent at 5/23/2023 12:33 PM CDT -----  Regarding: New Prescription  Contact: [phone]  Calling in regards to rx tocilizumab (ACTEMRA) 162 mg/0.9 mL injection. [facility] is asking for another prescription. Please send over as soon as possible

## 2023-05-31 DIAGNOSIS — M31.6 GCA (GIANT CELL ARTERITIS): ICD-10-CM

## 2023-05-31 RX ORDER — TOCILIZUMAB 180 MG/ML
162 INJECTION, SOLUTION SUBCUTANEOUS
Qty: 12 EACH | Refills: 0 | Status: SHIPPED | OUTPATIENT
Start: 2023-05-31 | End: 2023-07-19 | Stop reason: SDUPTHER

## 2023-06-08 ENCOUNTER — LAB VISIT (OUTPATIENT)
Dept: LAB | Facility: HOSPITAL | Age: 68
End: 2023-06-08
Attending: INTERNAL MEDICINE
Payer: MEDICARE

## 2023-06-08 DIAGNOSIS — M31.6 GCA (GIANT CELL ARTERITIS): ICD-10-CM

## 2023-06-08 LAB
ALBUMIN SERPL BCP-MCNC: 3.7 G/DL (ref 3.5–5.2)
ALP SERPL-CCNC: 73 U/L (ref 55–135)
ALT SERPL W/O P-5'-P-CCNC: 32 U/L (ref 10–44)
ANION GAP SERPL CALC-SCNC: 9 MMOL/L (ref 8–16)
AST SERPL-CCNC: 30 U/L (ref 10–40)
BASOPHILS # BLD AUTO: 0.04 K/UL (ref 0–0.2)
BASOPHILS NFR BLD: 0.4 % (ref 0–1.9)
BILIRUB SERPL-MCNC: 0.9 MG/DL (ref 0.1–1)
BUN SERPL-MCNC: 11 MG/DL (ref 8–23)
CALCIUM SERPL-MCNC: 10.2 MG/DL (ref 8.7–10.5)
CHLORIDE SERPL-SCNC: 107 MMOL/L (ref 95–110)
CO2 SERPL-SCNC: 26 MMOL/L (ref 23–29)
CREAT SERPL-MCNC: 0.9 MG/DL (ref 0.5–1.4)
CRP SERPL-MCNC: 15.9 MG/L (ref 0–8.2)
DIFFERENTIAL METHOD: ABNORMAL
EOSINOPHIL # BLD AUTO: 0.4 K/UL (ref 0–0.5)
EOSINOPHIL NFR BLD: 3.2 % (ref 0–8)
ERYTHROCYTE [DISTWIDTH] IN BLOOD BY AUTOMATED COUNT: 13.1 % (ref 11.5–14.5)
ERYTHROCYTE [SEDIMENTATION RATE] IN BLOOD BY PHOTOMETRIC METHOD: 26 MM/HR (ref 0–36)
EST. GFR  (NO RACE VARIABLE): >60 ML/MIN/1.73 M^2
GLUCOSE SERPL-MCNC: 126 MG/DL (ref 70–110)
HCT VFR BLD AUTO: 42.7 % (ref 37–48.5)
HGB BLD-MCNC: 13.6 G/DL (ref 12–16)
IMM GRANULOCYTES # BLD AUTO: 0.06 K/UL (ref 0–0.04)
IMM GRANULOCYTES NFR BLD AUTO: 0.5 % (ref 0–0.5)
LYMPHOCYTES # BLD AUTO: 2.6 K/UL (ref 1–4.8)
LYMPHOCYTES NFR BLD: 23.7 % (ref 18–48)
MCH RBC QN AUTO: 30.3 PG (ref 27–31)
MCHC RBC AUTO-ENTMCNC: 31.9 G/DL (ref 32–36)
MCV RBC AUTO: 95 FL (ref 82–98)
MONOCYTES # BLD AUTO: 1 K/UL (ref 0.3–1)
MONOCYTES NFR BLD: 8.5 % (ref 4–15)
NEUTROPHILS # BLD AUTO: 7.1 K/UL (ref 1.8–7.7)
NEUTROPHILS NFR BLD: 63.7 % (ref 38–73)
NRBC BLD-RTO: 0 /100 WBC
PLATELET # BLD AUTO: 329 K/UL (ref 150–450)
PMV BLD AUTO: 11 FL (ref 9.2–12.9)
POTASSIUM SERPL-SCNC: 4.3 MMOL/L (ref 3.5–5.1)
PROT SERPL-MCNC: 7.3 G/DL (ref 6–8.4)
RBC # BLD AUTO: 4.49 M/UL (ref 4–5.4)
SODIUM SERPL-SCNC: 142 MMOL/L (ref 136–145)
WBC # BLD AUTO: 11.13 K/UL (ref 3.9–12.7)

## 2023-06-08 PROCEDURE — 86140 C-REACTIVE PROTEIN: CPT | Performed by: INTERNAL MEDICINE

## 2023-06-08 PROCEDURE — 36415 COLL VENOUS BLD VENIPUNCTURE: CPT | Mod: PO | Performed by: INTERNAL MEDICINE

## 2023-06-08 PROCEDURE — 80053 COMPREHEN METABOLIC PANEL: CPT | Performed by: INTERNAL MEDICINE

## 2023-06-08 PROCEDURE — 85025 COMPLETE CBC W/AUTO DIFF WBC: CPT | Performed by: INTERNAL MEDICINE

## 2023-06-08 PROCEDURE — 85652 RBC SED RATE AUTOMATED: CPT | Performed by: INTERNAL MEDICINE

## 2023-06-13 ENCOUNTER — OFFICE VISIT (OUTPATIENT)
Dept: RHEUMATOLOGY | Facility: CLINIC | Age: 68
End: 2023-06-13
Payer: MEDICARE

## 2023-06-13 VITALS
DIASTOLIC BLOOD PRESSURE: 67 MMHG | HEIGHT: 64 IN | SYSTOLIC BLOOD PRESSURE: 109 MMHG | BODY MASS INDEX: 41.84 KG/M2 | WEIGHT: 245.06 LBS | HEART RATE: 81 BPM

## 2023-06-13 DIAGNOSIS — I77.6 ARTERITIS, UNSPECIFIED: ICD-10-CM

## 2023-06-13 DIAGNOSIS — I77.89 OTHER SPECIFIED DISORDERS OF ARTERIES AND ARTERIOLES: ICD-10-CM

## 2023-06-13 DIAGNOSIS — M31.6 GCA (GIANT CELL ARTERITIS): Primary | ICD-10-CM

## 2023-06-13 DIAGNOSIS — R10.9 ABDOMINAL PAIN, UNSPECIFIED ABDOMINAL LOCATION: ICD-10-CM

## 2023-06-13 PROCEDURE — 99999 PR PBB SHADOW E&M-EST. PATIENT-LVL V: ICD-10-PCS | Mod: PBBFAC,,, | Performed by: INTERNAL MEDICINE

## 2023-06-13 PROCEDURE — 99214 PR OFFICE/OUTPT VISIT, EST, LEVL IV, 30-39 MIN: ICD-10-PCS | Mod: S$PBB,,, | Performed by: INTERNAL MEDICINE

## 2023-06-13 PROCEDURE — 99214 OFFICE O/P EST MOD 30 MIN: CPT | Mod: S$PBB,,, | Performed by: INTERNAL MEDICINE

## 2023-06-13 PROCEDURE — 99215 OFFICE O/P EST HI 40 MIN: CPT | Mod: PBBFAC | Performed by: INTERNAL MEDICINE

## 2023-06-13 PROCEDURE — 99999 PR PBB SHADOW E&M-EST. PATIENT-LVL V: CPT | Mod: PBBFAC,,, | Performed by: INTERNAL MEDICINE

## 2023-06-13 RX ORDER — PREDNISONE 20 MG/1
60 TABLET ORAL DAILY
Qty: 90 TABLET | Refills: 1 | Status: SHIPPED | OUTPATIENT
Start: 2023-06-13 | End: 2023-06-13

## 2023-06-13 RX ORDER — PREDNISONE 20 MG/1
60 TABLET ORAL DAILY
Qty: 90 TABLET | Refills: 1 | Status: SHIPPED | OUTPATIENT
Start: 2023-06-13 | End: 2023-07-19

## 2023-06-13 NOTE — PROGRESS NOTES
Subjective:      Patient ID: Genesis Ugalde is a 67 y.o. female.    Chief Complaint: GCA; optic neuritis  ? Uveitis with blindness    HPI out of Actemra for 5 wks unable to get from . Just resume Friday 6/9/23. Increased myalgias, headaches. Has been off prednisone since last flare 12/2021  Still having dysphagia and GERD. Had EGD 3/28/23 :    Impression:            - Normal esophagus.                          - Z-line regular, 36 cm from the incisors.                          - Gastritis. Biopsied.                          - Erythematous duodenopathy.                          - Biopsies were taken with a cold forceps for                          evaluation of eosinophilic esophagitis.   Dropped frozen chicken on right distal metatarsals Oct. Saw Dr. Mercado PCP in Jan x-ray showed healed fracture.     Review of Systems   Constitutional:  Positive for fever. Negative for appetite change, fatigue and unexpected weight change.   HENT:  Positive for mouth sores and trouble swallowing.    Eyes:  Negative for redness and visual disturbance.   Respiratory:  Negative for cough, shortness of breath and wheezing.    Cardiovascular:  Positive for chest pain. Negative for palpitations.   Gastrointestinal:  Negative for abdominal pain, anal bleeding, blood in stool, constipation, diarrhea, nausea and vomiting.   Genitourinary:  Negative for dysuria, frequency, genital sores and urgency.   Musculoskeletal:  Negative for arthralgias, back pain, gait problem, joint swelling, myalgias, neck pain and neck stiffness.   Skin:  Negative for rash.   Neurological:  Positive for headaches. Negative for weakness and numbness.   Hematological:  Negative for adenopathy. Does not bruise/bleed easily.   Psychiatric/Behavioral:  Negative for sleep disturbance. The patient is not nervous/anxious.       Objective:   There were no vitals taken for this visit.  Physical Exam   Constitutional: She is oriented to person, place, and time.  She appears obese.   HENT:   Head: Normocephalic and atraumatic.   Mouth/Throat: Oropharynx is clear and moist.   Head: Tender TAs bilateral   Eyes: Conjunctivae are normal.   Neck: No thyromegaly present.   Cardiovascular: Normal rate, regular rhythm and normal heart sounds. Exam reveals no gallop and no friction rub.   No murmur heard.  Pulmonary/Chest: Breath sounds normal. She has no wheezes. She has no rales. She exhibits no tenderness.   Abdominal: Soft. She exhibits no distension and no mass. There is abdominal tenderness.   Obese   Musculoskeletal:      Cervical back: Normal range of motion and neck supple.   Lymphadenopathy:     She has no cervical adenopathy.   Neurological: She is alert and oriented to person, place, and time. She displays normal reflexes. Gait normal.   Nl motor strength UE and LE bilateral      7/13/2021   Tender (MILLER-28) 0 / 28    Swollen (MILLER-28) 0 / 28    Provider Global --   Patient Global 50 mm   ESR --   CRP 0.7 mg/L   MILLER-28 (ESR) --   MILLER-28 (CRP) 1.85 (Remission)   CDAI Score --      Latest Reference Range & Units 06/08/23 10:04   WBC 3.90 - 12.70 K/uL 11.13   RBC 4.00 - 5.40 M/uL 4.49   Hemoglobin 12.0 - 16.0 g/dL 13.6   Hematocrit 37.0 - 48.5 % 42.7   MCV 82 - 98 fL 95   MCH 27.0 - 31.0 pg 30.3   MCHC 32.0 - 36.0 g/dL 31.9 (L)   RDW 11.5 - 14.5 % 13.1   Platelets 150 - 450 K/uL 329   MPV 9.2 - 12.9 fL 11.0   Gran % 38.0 - 73.0 % 63.7   Lymph % 18.0 - 48.0 % 23.7   Mono % 4.0 - 15.0 % 8.5   Eosinophil % 0.0 - 8.0 % 3.2   Basophil % 0.0 - 1.9 % 0.4   Immature Granulocytes 0.0 - 0.5 % 0.5   Gran # (ANC) 1.8 - 7.7 K/uL 7.1   Lymph # 1.0 - 4.8 K/uL 2.6   Mono # 0.3 - 1.0 K/uL 1.0   Eos # 0.0 - 0.5 K/uL 0.4   Baso # 0.00 - 0.20 K/uL 0.04   Immature Grans (Abs) 0.00 - 0.04 K/uL 0.06 (H)   nRBC 0 /100 WBC 0   Differential Method  Automated   Sed Rate 0 - 36 mm/Hr 26   Sodium 136 - 145 mmol/L 142   Potassium 3.5 - 5.1 mmol/L 4.3   Chloride 95 - 110 mmol/L 107   CO2 23 - 29 mmol/L 26    Anion Gap 8 - 16 mmol/L 9   BUN 8 - 23 mg/dL 11   Creatinine 0.5 - 1.4 mg/dL 0.9   eGFR >60 mL/min/1.73 m^2 >60.0   Glucose 70 - 110 mg/dL 126 (H)   Calcium 8.7 - 10.5 mg/dL 10.2   Alkaline Phosphatase 55 - 135 U/L 73   PROTEIN TOTAL 6.0 - 8.4 g/dL 7.3   Albumin 3.5 - 5.2 g/dL 3.7   BILIRUBIN TOTAL 0.1 - 1.0 mg/dL 0.9   AST 10 - 40 U/L 30   ALT 10 - 44 U/L 32   CRP 0.0 - 8.2 mg/L 15.9 (H)   (L): Data is abnormally low  (H): Data is abnormally high   EXAMINATION:  MRA BRAIN WITHOUT CONTRAST     CLINICAL HISTORY:  GCA, small vessel study;Other giant cell arteritis     TECHNIQUE:  Non-contrast 3-D time-of-flight intracranial MR angiography was performed through the [place] [facility] [place] with MIP reformatting.  Postcontrast MRA was performed utilizing black blood technique.  10 mL Gadavist intravenous contrast was utilized.     COMPARISON:  None     Impression:  FINDINGS/  There is no evidence of major branch stenosis/occlusion at the [id number].  No evidence of stenosis or irregularity of the proximal intracranial vessels is identified no aneurysm is appreciated.     No vessel wall enhancement is identified involving the intracranial vessels.     Evaluation of the superficial temporal arteries demonstrates them to be markedly small in caliber on the time-of-flight, anatomic MRA.  As a result, it is difficult to characterize for vessel wall enhancement due to the diminutive nature of the vessels.  It is unclear whether this small size is due to chronic inflammation or developmental/technically artifactual in nature.        Electronically signed by: Forrest Bay  Date:                                            08/15/2022  Time:                                           09:28    EXAMINATION:  MRA CHEST W/CONTRAST     CLINICAL HISTORY:  GCA, r/o large vessel involvement, aortitis;  Other giant cell arteritis     TECHNIQUE:  Multiplanar multisequence imaging of the chest performed prior to and following the  administration of 10 mL Gadavist intravenous contrast prior aortitis protocol.     COMPARISON:  MRA chest 12/03/2020; CT chest 12/03/2020     FINDINGS:  Normal left-sided aortic arch with 3 vessel branching pattern.  The thoracic and abdominal aorta appear normal in caliber and course with no aneurysm.  No abnormal aortic wall thickening or enhancement.     Heart appears normal in size.  No pericardial effusion.     Limited evaluation of the upper abdominal structures significant for scattered colonic diverticula.  Multilevel degenerative changes of the visualized spine.     Impression:     No evidence of vasculitis.     Electronically signed by resident: Martin Hoover  Date:                                            08/13/2022  Time:                                           12:42     Electronically signed by: Cooper Arboleda  Date:                                            08/14/2022  Time:                                           14:13      MRA of the neck without contrast.     CLINICAL HISTORY:  GCA, r/o large vessel vasculitis, aortitis;Other giant cell arteritis     TECHNIQUE:  Noncontrast MRA of the neck was performed utilizing time-of-flight technique.  MIP reformats were created.     COMPARISON:  None     Impression:  FINDINGS/  Evaluation is limited by motion artifact.  There is no evidence of significant stenosis at the carotid bifurcations by NASCET criteria.  The vertebral arteries are patent.  No irregularity of the vessels in the neck to suggest  vasculitis is identified.        Electronically signed by: Forrest Bay  Date:                                            08/14/2022  Time:                                           09:18   Saw Brooke Fitzgerald 9/1/22:    - Recommend MELD labs every 3 months to monitor liver function, due now.  - Recommend abdominal imaging with AFP tumor marker every 6 months for HCC surveillance, next due 2/2023.   - Recommend Upper endoscopy every 1-2 years for  variceal surveillance, due now (overdue).  - Avoid alcohol and non-steroidal anti-inflammatory drugs (NSAIDs).  - Okay to take acetaminophen (Tylenol), up to 2,000 mg per day for fever, headache or pain, NTE 6,000 - 8,000 mg per week.  - Recommend low sodium (2 grams), high protein diet (115 grams per day).  - Return to clinic in 6 months, sooner if needed.        Saw Dr. Ro 3/23/23:    Assessment:     GCA/PMR flare off Actemra for 5 wks  Chronic inflammatory  Optic neuritis with blindness stable  Primary open angle glaucoma  Cirrhosis, NAFLD, hepatomegaly saw Dr. Fitzgerald today  Dysphagia, GERD persists needs to f/u with Gastro  /77  Class 3 obesity has lost 16# past year  T2 DM      Plan:   Resume prednisone 60mg daily x 1wk, then 50mg daily x 1 wk, then 40mg daily x 1 wks then 30mg daily and continue until RTC 4 wks.  Cont omeprazole 40mg twice daily   TA US  MRA brain small vessel study for GCA  MRA abdomen  as previously ordered given abdominal pain and to r/o large vessel vasculitis.   DXA  as previously ordered  F/u Dr Fitzgerald in Hepatology overdue  for AFP tumor marker and RTC 6/15/23  F/u Dr Stearns in Gastro for persistent dysphagia GERD  Cont F/u Dr. Ro in Ophthalmology    Cont tocilizumab 162mg sc weekly just resumed  Cont pravastatin 40mg nightly  Cont vitamin D2 50,000 units once a week  RTC 1 month with standing labs

## 2023-06-13 NOTE — PATIENT INSTRUCTIONS
predniSONE (DELTASONE) 20 MG tablet 60 mg, Daily                Dose, Route, Frequency: 60 mg, Oral, Daily  Start: 6/13/2023  Ord/Sold: 6/13/2023 (O)  Pharmacy: [facility]/pharm[facility] #[id number] - [place] Estrada  [address] AT [facility]  Report  Long-term:   Med Dose History       Summary: Take 3 tablets (60 mg total) by mouth once daily. After 1wk decrease to 2.5 tabs(50mg) daily x 1wk, then decrease to 2 tabs(40mg) daily x 1wk, the decrease to 1.5 tabs(30mg) for 1 wk, and continue until 4 wk visit, Starting Tue 6/13/2023, Normal       Patient Sig: Take 3 tablets (60 mg total) by mouth once daily. After 1wk decrease to 2.5 tabs(50mg) daily x 1wk, then decrease to 2 tabs(40mg) daily x 1wk, the decrease to 1.5 tabs(30mg) for 1 wk, and continue until 4 wk visit       Ordered on: 6/13/2023       Authorized by: AKHIL GARCIA       Dispense: 90 tablet       Refills: 1 ordered

## 2023-06-13 NOTE — PROGRESS NOTES
Rapid3 Question Responses and Scores 6/12/2023   MDHAQ Score 1.5   Psychologic Score 8.8   Pain Score 7.5   When you awakened in the morning OVER THE LAST WEEK, did you feel stiff? Yes   If Yes, please indicate the number of hours until you are as limber as you will be for the day 1   Fatigue Score 7   Global Health Score 7.5   RAPID3 Score 6.67     Answers submitted by the patient for this visit:  Rheumatology Questionnaire (Submitted on 6/12/2023)  fever: Yes  eye redness: No  mouth sores: Yes  headaches: Yes  shortness of breath: No  chest pain: Yes  trouble swallowing: Yes  diarrhea: No  constipation: No  unexpected weight change: No  genital sore: No  dysuria: No  During the last 3 days, have you had a skin rash?: No  Bruises or bleeds easily: No  cough: No

## 2023-06-15 ENCOUNTER — OFFICE VISIT (OUTPATIENT)
Dept: HEPATOLOGY | Facility: CLINIC | Age: 68
End: 2023-06-15
Payer: MEDICARE

## 2023-06-15 VITALS — HEIGHT: 64 IN | WEIGHT: 246 LBS | BODY MASS INDEX: 42 KG/M2

## 2023-06-15 DIAGNOSIS — E11.9 TYPE 2 DIABETES MELLITUS WITHOUT COMPLICATION, WITHOUT LONG-TERM CURRENT USE OF INSULIN: ICD-10-CM

## 2023-06-15 DIAGNOSIS — I10 PRIMARY HYPERTENSION: ICD-10-CM

## 2023-06-15 DIAGNOSIS — E78.5 HYPERLIPIDEMIA, UNSPECIFIED HYPERLIPIDEMIA TYPE: ICD-10-CM

## 2023-06-15 DIAGNOSIS — K75.81 LIVER CIRRHOSIS SECONDARY TO NASH: Primary | ICD-10-CM

## 2023-06-15 DIAGNOSIS — E66.01 MORBID OBESITY: ICD-10-CM

## 2023-06-15 DIAGNOSIS — K74.60 LIVER CIRRHOSIS SECONDARY TO NASH: Primary | ICD-10-CM

## 2023-06-15 PROCEDURE — 99214 OFFICE O/P EST MOD 30 MIN: CPT | Mod: S$PBB,,, | Performed by: NURSE PRACTITIONER

## 2023-06-15 PROCEDURE — 99999 PR PBB SHADOW E&M-EST. PATIENT-LVL III: CPT | Mod: PBBFAC,,, | Performed by: NURSE PRACTITIONER

## 2023-06-15 PROCEDURE — 99999 PR PBB SHADOW E&M-EST. PATIENT-LVL III: ICD-10-PCS | Mod: PBBFAC,,, | Performed by: NURSE PRACTITIONER

## 2023-06-15 PROCEDURE — 99213 OFFICE O/P EST LOW 20 MIN: CPT | Mod: PBBFAC | Performed by: NURSE PRACTITIONER

## 2023-06-15 PROCEDURE — 99214 PR OFFICE/OUTPT VISIT, EST, LEVL IV, 30-39 MIN: ICD-10-PCS | Mod: S$PBB,,, | Performed by: NURSE PRACTITIONER

## 2023-06-15 NOTE — PROGRESS NOTES
Subjective:       Patient ID: Genesis Ugalde is a 67 y.o. female.    Chief Complaint: Cirrhosis      HPI  I saw this 67 y.o. lady who presents in follow up. She was last seen in clinic by myself in 9/2022. 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   Fibroscan in 2016 was suggestive of stage 3 fibrosis, but Fibrosure was suggestive of stage 1 fibrosis. Repeat Fibroscan in 6/2020 was suggestive of stage 4 fibrosis (cirrhosis).  She also has a history of elevated LFTs, which have normalized with weight loss (net weight loss of 7 lbs since 2/2022).    Reassuringly, her platelet counts remain normal. Her imaging has shown a small liver lesion, likely to be a hemangioma, but also showed fatty liver disease.  She also c/o intermittent RUQ pain, improved now. Abdominal US in 3/2022 showed hepatomegaly, with a nodular contour of the liver and no focal lesions; bile ducts appeared normal and spleen size was also normal.   Follow up US in 9/2022 for HCC surveillance again showed hepatomegaly (18.9 cm), due to fatty infiltration, with no focal liver lesions or ascites.  Viral hepatitis testing has been negative. Ferritin normal on prior labs in 2018.  EGD in 2016 showed a 2 cm hiatal hernia and esophagitis. There were no EV, GV or PHG visualized. Follow up EGD in 3/2023 showed:    Findings:        The examined esophagus was normal. Biopsies were obtained from the        proximal and distal esophagus with cold forceps for histology of        suspected eosinophilic esophagitis.        The Z-line was regular and was found 36 cm from the incisors.        The cardia and gastric fundus were normal on retroflexion.        Localized moderate inflammation characterized by congestion (edema)        and erythema was found in the gastric antrum. Biopsies were taken        with a cold forceps for histology.        No other significant abnormalities were identified in a careful        examination of the stomach.         Localized mildly erythematous mucosa was found in the duodenal bulb.        The exam of the duodenum was otherwise normal.   Impression:     - Normal esophagus.                          - Z-line regular, 36 cm from the incisors.                          - Gastritis. Biopsied.                          - Erythematous duodenopathy.                          - Biopsies were taken with a cold forceps for                          evaluation of eosinophilic esophagitis.    Body mass index is 42.23 kg/m².     Computed MELD-Na unavailable. Necessary lab results were not found in the last year.  Computed MELD unavailable. Necessary lab results were not found in the last year.    CT abdo: 5/25/2015  The liver is normal in size demonstrating diffuse hypoattenuation in keeping with hepatic steatosis. Additionally, there is a stable 1.7-cm area of hyperenhancement within the inferior right hepatic lobe which is nonspecific, however such stability over  time reflects a benign etiology, perhaps hemangioma. The portal, splenic, and proximal mesenteric veins are patent. The patient is status post cholecystectomy. No evidence of biliary ductal dilatation.    MRI 12/21/2015  The posterior right lobe liver lesion is vascular but not a typical hemangioma. The second one is not seen. There has been no change. Spleen, stomach, duodenum, pancreas, bile ducts, and adrenal glands show nothing unusual. No adenopathy or masses seen. Lung bases are clear and the heart shows nothing unusual. No change in size     Abdo US: 6/19/20  Hepatic steatosis.  Previously seen lesion not evident on this examination.    PMH:  Hypertension  Giant cell arteritis- blindness  PMR  DM- 2012  Hysterectomy  Cholecystectomy- open- 1993    SH:  Non smoker  No alcohol    FH:  Nil liver  Ca stomach  IHD    Review of Systems   Constitutional: Negative for fever, chills, activity change, appetite change, fatigue and unexpected weight change.   HENT: Negative for ear pain,  hearing loss, nosebleeds, sore throat and trouble swallowing.    Eyes: Negative for redness and visual disturbance.   Respiratory: Negative for cough, chest tightness, shortness of breath and wheezing.    Cardiovascular: Negative for chest pain and palpitations.   Gastrointestinal: Negative for blood in stool and abdominal distention. + Intermittent nausea and RUQ pain  Genitourinary: Negative for dysuria, urgency, frequency, hematuria and difficulty urinating.   Musculoskeletal: Negative for myalgias, back pain, joint swelling, arthralgias and gait problem.   Skin: Negative for rash.   Neurological: Negative for tremors, seizures, speech difficulty, weakness and headaches.   Hematological: Negative for adenopathy.   Psychiatric/Behavioral: Negative for confusion, sleep disturbance and decreased concentration. The patient is not nervous/anxious.        Lab Results   Component Value Date    ALT 32 06/08/2023    AST 30 06/08/2023    GGT 73 (H) 10/18/2021    ALKPHOS 73 06/08/2023    BILITOT 0.9 06/08/2023     Past Medical History:   Diagnosis Date    Anxiety     Asthma     Blind left eye     can see silhouettes in right eye    Cataract     Depression     Diabetes mellitus     Disease of immune system     Giant cell arteritis     Glaucoma     Hypertension     Polymyalgia rheumatica     Uveitis      Past Surgical History:   Procedure Laterality Date    ANKLE SURGERY      CHOLECYSTECTOMY      COLONOSCOPY N/A 9/28/2015    Procedure: COLONOSCOPY;  Surgeon: Alok Dykes MD;  Location: [address]);  Service: Endoscopy;  Laterality: N/A;    COLONOSCOPY N/A 9/6/2019    Procedure: COLONOSCOPY;  Surgeon: Alok Dykes MD;  Location: [address]);  Service: Endoscopy;  Laterality: N/A;    CYST REMOVAL      right lung    ESOPHAGOGASTRODUODENOSCOPY N/A 3/28/2023    Procedure: EGD (ESOPHAGOGASTRODUODENOSCOPY);  Surgeon: Bharat Franklin MD;  Location: [facility] ([address] [place] [address]);  Service: Endoscopy;  Laterality:  N/A;  cirrhosis-labs prior, varices surveillance  gastroparesis-full liquid diet x3 days, clear liquid diet x1 day prior to procedure  instructions handed to pt and sent to [facility]    HYSTERECTOMY       Current Outpatient Medications   Medication Sig    acyclovir (ZOVIRAX) 400 MG tablet Take 400 mg by mouth 2 (two) times daily.    amlodipine-benazepril (LOTREL) 10-40 mg per capsule Take 1 capsule by mouth once daily.    aspirin (ECOTRIN) 325 MG EC tablet Take 325 mg by mouth once daily.    brimonidine 0.2% (ALPHAGAN) 0.2 % Drop INSTILL 1 DROP INTO BOTH EYES 3 TIMES A DAY    clonazePAM (KLONOPIN) 1 MG tablet Take 1 mg by mouth 2 (two) times daily as needed.    dorzolamide (TRUSOPT) 2 % ophthalmic solution INSTILL 1 DROP INTO BOTH EYES 3 TIMES A DAY    ergocalciferol (VITAMIN D2) 50,000 unit Cap TAKE 1 CAPSULE (50,000 UNITS TOTAL) BY MOUTH EVERY 7 DAYS.    latanoprost 0.005 % ophthalmic solution Place 1 drop into both eyes every evening.    metoprolol succinate (TOPROL-XL) 100 MG 24 hr tablet Take 100 mg by mouth once daily.    pravastatin (PRAVACHOL) 40 MG tablet TAKE 1 TABLET BY MOUTH EVERY DAY IN THE EVENING    predniSONE (DELTASONE) 20 MG tablet Take 3 tablets (60 mg total) by mouth once daily. After  1wk decrease to 2.5 tabs(50mg) daily x 1wk, then decrease to 2 tabs(40mg) daily x 1wk, the decrease to 1.5 tabs(30mg) for 1 wk, and continue until 4 wk visit    sertraline (ZOLOFT) 25 MG tablet TAKE 2 TABLETS BY MOUTH EVERY DAY    tocilizumab (ACTEMRA) 162 mg/0.9 mL injection Inject 0.9 mLs (162 mg total) into the skin every 7 days.    traMADoL (ULTRAM) 50 mg tablet Take 50 mg by mouth 2 (two) times daily as needed.    omeprazole (PRILOSEC) 40 MG capsule TAKE 1 CAPSULE (40 MG TOTAL) BY MOUTH EVERY 12 (TWELVE) HOURS. TAKE ONE PILL EVERY MORNING 45 MINUTES BEFORE BREAKFAST     No current facility-administered medications for this visit.     Objective:      Physical Exam   Constitutional: She appears  well-nourished. No distress.    Head: Normocephalic.   Eyes: Anicteric sclera.  Neck: No obvious masses.  Pulmonary/Chest: Normal respiratory effort.  Abdominal: Soft, obese abdomen.  Neurological: She is alert. She is not disoriented.   Skin: No obvious rashes noted to exposed skin.      Assessment:       1. Liver cirrhosis secondary to BELLO    2. Morbid obesity    3. Type 2 diabetes mellitus without complication, without long-term current use of insulin    4. Hyperlipidemia, unspecified hyperlipidemia type    5. Primary hypertension          Plan:     Orders Placed This Encounter   Procedures    US Abdomen Complete     Standing Status:   Future     Standing Expiration Date:   12/31/2024    Comprehensive Metabolic Panel     Standing Status:   Future     Standing Expiration Date:   12/31/2024    AFP Tumor Marker     Standing Status:   Future     Standing Expiration Date:   12/31/2024     Order Specific Question:   Release to patient     Answer:   Immediate    Protime-INR     Standing Status:   Future     Standing Expiration Date:   12/31/2024    CBC Auto Differential     Standing Status:   Future     Standing Expiration Date:   12/31/2024     - Recommend MELD labs every 6 months to monitor liver function, next due 12/2023.  - Recommend abdominal imaging with AFP tumor marker every 6 months for HCC surveillance, due now.  - Recommend Upper endoscopy every 2-3 years for variceal surveillance.  - Recommend additional weight loss of 25 lbs, through diet and exercise.  - Recommend low sodium (2 grams), high protein diet (115 grams per day).  - Recommend good control of blood pressure, blood sugar and lipids.  - Return to clinic in 1 year, sooner if needed.       Hepatology Nurse Practitioner  [facility] [place]

## 2023-06-19 ENCOUNTER — HOSPITAL ENCOUNTER (OUTPATIENT)
Dept: RADIOLOGY | Facility: HOSPITAL | Age: 68
Discharge: HOME | End: 2023-06-19
Attending: INTERNAL MEDICINE
Payer: MEDICARE

## 2023-06-19 DIAGNOSIS — I77.6 ARTERITIS, UNSPECIFIED: ICD-10-CM

## 2023-06-19 DIAGNOSIS — M31.6 GCA (GIANT CELL ARTERITIS): ICD-10-CM

## 2023-06-19 PROCEDURE — 93880 EXTRACRANIAL BILAT STUDY: CPT | Mod: TC

## 2023-06-19 PROCEDURE — 93880 EXTRACRANIAL BILAT STUDY: CPT | Mod: 26,,, | Performed by: RADIOLOGY

## 2023-06-19 PROCEDURE — 93880 US TEMPORAL ARTERY BILATERAL: ICD-10-PCS | Mod: 26,,, | Performed by: RADIOLOGY

## 2023-06-22 ENCOUNTER — HOSPITAL ENCOUNTER (OUTPATIENT)
Dept: RADIOLOGY | Facility: HOSPITAL | Age: 68
Discharge: HOME | End: 2023-06-22
Attending: NURSE PRACTITIONER
Payer: MEDICARE

## 2023-06-22 DIAGNOSIS — K74.60 LIVER CIRRHOSIS SECONDARY TO NASH: Primary | ICD-10-CM

## 2023-06-22 DIAGNOSIS — K75.81 LIVER CIRRHOSIS SECONDARY TO NASH: Primary | ICD-10-CM

## 2023-06-22 DIAGNOSIS — E78.5 HYPERLIPIDEMIA, UNSPECIFIED HYPERLIPIDEMIA TYPE: ICD-10-CM

## 2023-06-22 DIAGNOSIS — K74.60 HEPATIC CIRRHOSIS, UNSPECIFIED HEPATIC CIRRHOSIS TYPE, UNSPECIFIED WHETHER ASCITES PRESENT: ICD-10-CM

## 2023-06-22 DIAGNOSIS — I10 PRIMARY HYPERTENSION: ICD-10-CM

## 2023-06-22 DIAGNOSIS — E11.9 TYPE 2 DIABETES MELLITUS WITHOUT COMPLICATION, WITHOUT LONG-TERM CURRENT USE OF INSULIN: ICD-10-CM

## 2023-06-22 DIAGNOSIS — E66.01 MORBID OBESITY: ICD-10-CM

## 2023-06-22 PROCEDURE — 76700 US ABDOMEN COMPLETE: ICD-10-PCS | Mod: 26,,, | Performed by: RADIOLOGY

## 2023-06-22 PROCEDURE — 76700 US EXAM ABDOM COMPLETE: CPT | Mod: 26,,, | Performed by: RADIOLOGY

## 2023-06-22 PROCEDURE — 76700 US EXAM ABDOM COMPLETE: CPT | Mod: TC

## 2023-06-23 ENCOUNTER — TELEPHONE (OUTPATIENT)
Dept: HEPATOLOGY | Facility: CLINIC | Age: 68
End: 2023-06-23
Payer: MEDICARE

## 2023-06-23 NOTE — TELEPHONE ENCOUNTER
----- Message from Brooke Fitzgerald NP sent at 6/22/2023  1:57 PM CDT -----  Please arrange labs and US in 6 months, and again in 1 year, with RTC a few days later in 1 year for a f/u visit with me. Orders in Epic. Thank You!

## 2023-06-27 NOTE — ASSESSMENT & PLAN NOTE
140/83  F/u Dr. Mercado her primary  
Mitesh Hagan MD 11/12/18 in VascSurg :    Genesis Ugalde is a 62 y.o. female with a small right ankle mass and non-occlusive chronic tibial DVT      - ankle mass is in the region of her Sural nerve biopsy. This may represent scar tissue. Monitor for growth or changes. Soft tissue US is mass persists   - tibial DVT are chronic. Would not recommend anticoagulation. Continue exercise as tolerated.      See me back as needed.     Maria D Hagan MD FACS VI  Vascular & Endovascular Surgery     
No change in weight  
Results for EGBIANN (MRN [id number]) as of 11/21/2018 08:43   Ref. Range 11/19/2018 10:26   Sed Rate Latest Ref Range: 0 - 36 mm/Hr <2   CRP Latest Ref Range: 0.0 - 8.2 mg/L 0.8     Cont tocilizumab 162 mg sc weekly, refill sent to [facility]  Decrease prednisone 9mg daily x 1 mo., then 8mg daily x 1 mo. Then 7mg daily and cont Rx sent to local [facility]  Cont alendronate 35mg po once a week  Resume vitamin D2 50,000 units once a week  *flu vaccine @ [facility]    
yes

## 2023-07-02 ENCOUNTER — HOSPITAL ENCOUNTER (OUTPATIENT)
Dept: RADIOLOGY | Facility: HOSPITAL | Age: 68
Discharge: HOME | End: 2023-07-02
Attending: INTERNAL MEDICINE
Payer: MEDICARE

## 2023-07-02 DIAGNOSIS — M31.6 GCA (GIANT CELL ARTERITIS): ICD-10-CM

## 2023-07-02 DIAGNOSIS — I77.6 ARTERITIS, UNSPECIFIED: ICD-10-CM

## 2023-07-02 DIAGNOSIS — I77.89 OTHER SPECIFIED DISORDERS OF ARTERIES AND ARTERIOLES: ICD-10-CM

## 2023-07-02 DIAGNOSIS — R10.9 ABDOMINAL PAIN, UNSPECIFIED ABDOMINAL LOCATION: ICD-10-CM

## 2023-07-02 PROCEDURE — 70542 MRI ORBIT/FACE/NECK W/DYE: CPT | Mod: TC

## 2023-07-02 PROCEDURE — 70546 MR ANGIOGRAPH HEAD W/O&W/DYE: CPT | Mod: 26,,, | Performed by: RADIOLOGY

## 2023-07-02 PROCEDURE — 74185 MRA ABD W OR W/O CNTRST: CPT | Mod: 26,,, | Performed by: RADIOLOGY

## 2023-07-02 PROCEDURE — 74185 MRA ABDOMEN W/WO CONTRAST: ICD-10-PCS | Mod: 26,,, | Performed by: RADIOLOGY

## 2023-07-02 PROCEDURE — 25500020 PHARM REV CODE 255: Performed by: INTERNAL MEDICINE

## 2023-07-02 PROCEDURE — 74185 MRA ABD W OR W/O CNTRST: CPT | Mod: TC

## 2023-07-02 PROCEDURE — 70546 MRA HEAD FOR TEMPORAL ARTERITES W & W/O CONTRAST: ICD-10-PCS | Mod: 26,,, | Performed by: RADIOLOGY

## 2023-07-02 PROCEDURE — A9585 GADOBUTROL INJECTION: HCPCS | Performed by: INTERNAL MEDICINE

## 2023-07-02 RX ORDER — GADOBUTROL 604.72 MG/ML
10 INJECTION INTRAVENOUS
Status: COMPLETED | OUTPATIENT
Start: 2023-07-02 | End: 2023-07-02

## 2023-07-02 RX ADMIN — GADOBUTROL 10 ML: 604.72 INJECTION INTRAVENOUS at 03:07

## 2023-07-04 ENCOUNTER — PATIENT MESSAGE (OUTPATIENT)
Dept: RHEUMATOLOGY | Facility: CLINIC | Age: 68
End: 2023-07-04
Payer: MEDICARE

## 2023-07-19 ENCOUNTER — HOSPITAL ENCOUNTER (OUTPATIENT)
Dept: VASCULAR SURGERY | Facility: CLINIC | Age: 68
Discharge: HOME | End: 2023-07-19
Attending: INTERNAL MEDICINE
Payer: MEDICARE

## 2023-07-19 ENCOUNTER — TELEPHONE (OUTPATIENT)
Dept: ADMINISTRATIVE | Facility: HOSPITAL | Age: 68
End: 2023-07-19
Payer: MEDICARE

## 2023-07-19 ENCOUNTER — OFFICE VISIT (OUTPATIENT)
Dept: RHEUMATOLOGY | Facility: CLINIC | Age: 68
End: 2023-07-19
Payer: MEDICARE

## 2023-07-19 VITALS
HEIGHT: 64 IN | WEIGHT: 250.25 LBS | SYSTOLIC BLOOD PRESSURE: 94 MMHG | HEART RATE: 74 BPM | DIASTOLIC BLOOD PRESSURE: 62 MMHG | BODY MASS INDEX: 42.72 KG/M2

## 2023-07-19 DIAGNOSIS — H54.10 BLINDNESS AND LOW VISION: ICD-10-CM

## 2023-07-19 DIAGNOSIS — R10.13 EPIGASTRIC ABDOMINAL PAIN: ICD-10-CM

## 2023-07-19 DIAGNOSIS — R60.9 EDEMA, UNSPECIFIED TYPE: ICD-10-CM

## 2023-07-19 DIAGNOSIS — M31.6 GCA (GIANT CELL ARTERITIS): ICD-10-CM

## 2023-07-19 DIAGNOSIS — H54.7 VISUAL LOSS: ICD-10-CM

## 2023-07-19 DIAGNOSIS — R60.9 EDEMA, UNSPECIFIED TYPE: Primary | ICD-10-CM

## 2023-07-19 DIAGNOSIS — M31.5 GIANT CELL ARTERITIS WITH POLYMYALGIA RHEUMATICA: ICD-10-CM

## 2023-07-19 DIAGNOSIS — H46.9 OPTIC NEURITIS: ICD-10-CM

## 2023-07-19 DIAGNOSIS — L52 ERYTHEMA NODOSUM: ICD-10-CM

## 2023-07-19 DIAGNOSIS — K76.0 FATTY LIVER: ICD-10-CM

## 2023-07-19 DIAGNOSIS — E55.9 VITAMIN D DEFICIENCY: ICD-10-CM

## 2023-07-19 DIAGNOSIS — H20.9 UVEITIS: ICD-10-CM

## 2023-07-19 DIAGNOSIS — E66.01 MORBID OBESITY WITH BMI OF 40.0-44.9, ADULT: ICD-10-CM

## 2023-07-19 PROCEDURE — 99213 PR OFFICE/OUTPT VISIT, EST, LEVL III, 20-29 MIN: ICD-10-PCS | Mod: S$PBB,,, | Performed by: INTERNAL MEDICINE

## 2023-07-19 PROCEDURE — 99999 PR PBB SHADOW E&M-EST. PATIENT-LVL IV: ICD-10-PCS | Mod: PBBFAC,,, | Performed by: INTERNAL MEDICINE

## 2023-07-19 PROCEDURE — 99999 PR PBB SHADOW E&M-EST. PATIENT-LVL IV: CPT | Mod: PBBFAC,,, | Performed by: INTERNAL MEDICINE

## 2023-07-19 PROCEDURE — 99214 OFFICE O/P EST MOD 30 MIN: CPT | Mod: PBBFAC | Performed by: INTERNAL MEDICINE

## 2023-07-19 PROCEDURE — 93971 EXTREMITY STUDY: CPT | Mod: PBBFAC | Performed by: SURGERY

## 2023-07-19 PROCEDURE — 93971 PR US DUPLEX, UPPER OR LOWER EXT VENOUS,UNILAT OR LTD: ICD-10-PCS | Mod: 26,S$PBB,, | Performed by: SURGERY

## 2023-07-19 PROCEDURE — 99213 OFFICE O/P EST LOW 20 MIN: CPT | Mod: S$PBB,,, | Performed by: INTERNAL MEDICINE

## 2023-07-19 PROCEDURE — 93971 EXTREMITY STUDY: CPT | Mod: 26,S$PBB,, | Performed by: SURGERY

## 2023-07-19 RX ORDER — PREDNISONE 5 MG/1
25 TABLET ORAL DAILY
Qty: 150 TABLET | Refills: 1 | Status: SHIPPED | OUTPATIENT
Start: 2023-07-19 | End: 2023-09-15

## 2023-07-19 RX ORDER — TOCILIZUMAB 180 MG/ML
162 INJECTION, SOLUTION SUBCUTANEOUS
Qty: 12 EACH | Refills: 0 | Status: ACTIVE | OUTPATIENT
Start: 2023-07-19 | End: 2023-09-15 | Stop reason: SDUPTHER

## 2023-07-19 RX ORDER — ERGOCALCIFEROL 1.25 MG/1
CAPSULE ORAL
Qty: 12 CAPSULE | Refills: 3 | Status: SHIPPED | OUTPATIENT
Start: 2023-07-19 | End: 2024-03-22 | Stop reason: SDUPTHER

## 2023-07-19 NOTE — PROGRESS NOTES
I have personally taken the history and examined the patient and agree with the resident,s note as stated above      EXAMINATION:  MRA abdomen with and without     CLINICAL HISTORY:  Rule out large vessel vasculitis.     TECHNIQUE:  Multi-planar multisequence MRI of the abdomen was performed before and after 10 mL of IV Gadavist.     COMPARISON:  Abdominal ultrasound 06/22/2023.     FINDINGS:  Aorta appears normal in caliber and course.  No significant narrowing.  No Aria aortic soft tissue stranding.  No inflammatory change.  No lymphadenopathy.     Partially visualized abdominal content: Tiny bilateral renal T2 hyperintensities.     Impression:     No MRA findings to suggest large vessel vasculitis     Electronically signed by resident: Tde Segovia  Date:                                            07/03/2023  Time:                                           11:17     Electronically signed by: Lonnie Alexis MD  Date:                                            07/04/2023  Time:                                           07:16              EXAMINATION:  MRA HEAD FOR TEMPORAL ARTERITES W & W/O CONTRAST     CLINICAL HISTORY:  GCA;Other giant cell arteritis     TECHNIQUE:  3D time-of-flight MRA of the head postcontrast with 3 dimensional MIP reformats.  In addition thin section T1 imaging postcontrast through the superficial temporal arteries bilaterally.  10 mL intravenous infusion of Gadavist contrast.  Coronal sagittal reformatted imaging from the source imaging.     COMPARISON:  Ultrasound 06/19/2023     FINDINGS:  MRA head:     Anterior circulation:  The bilateral distal cervical, [place], cavernous and supraclinoid segments of the ICA's and the visualized bilateral anterior and middle cerebral arteries are patent without evidence for significant focal stenosis or aneurysm.     Posterior circulation: Distal vertebral arteries, basilar artery and posterior cerebral arteries are patent without significant focal  stenosis or aneurysm.     Vessel wall imaging: Study distorted by motion artifacts.  Within limits of the study no definite evidence for mural thickening associated with the bilaterally superficial temporal arteries to suggest underlying giant cell arteritis..     Impression:     Vessel imaging: Motion distorted examination with poor visualization superficial temporal arteries, within limits of the study no definite evidence for mural thickening associated with the visualized superficial temporal arteries bilaterally to suggest active arteritis.     MRA head: Unremarkable MRA head as detailed above specifically out evidence for proximal stenosis or large vessel occlusion.        Electronically signed by: Diomedes Morris DO  Date:                                            07/02/2023  Time:                                           16:50      EXAMINATION:  US TEMPORAL ARTERY BILATERAL     Bilateral temporal artery color duplex sonography     CLINICAL HISTORY:  68 y/o F referred due to:   Other giant cell arteritis     Reported bilateral symptomatology.     TECHNIQUE:  Standard color duplex sonography technique was used to evaluate the bilateral temporal arteries and axillary arteries.  Longitudinal and transverse planes were evaluated.     COMPARISON:  Ultrasound temporal artery 12/03/2020     FINDINGS:  Suspected bilateral identified temporal arteries are small in caliber.  No abnormal signal in the walls of the vessel on both planes bilaterally.  No hypoechoic wall thickening.  The right temporal artery peak systolic velocity is 23 cm/sec and the left temporal artery velocity is 57 cm/sec.     A bilateral axillary arteries are normal in caliber without abnormal signal.  No hypoechoic wall thickening.  The right axillary artery peak systolic velocity is 165 cm/sec.  The left axillary artery peak systolic velocity is 153 cm/sec.     Impression:     No convincing sonographic evidence suggestive of giant cell arteritis  multi evaluating the temporal arteries given small size.  Further evaluation as warranted with MRI.     Electronically signed by resident: Kev Hayden  Date:                                            06/19/2023  Time:                                           22:21     Electronically signed by: Herrera Rodriguez MD  Date:                                            06/20/2023  Time:                                           06:28      Saw Brooke Fitzgerald  in Hepatology 6/15/23:    1. Liver cirrhosis secondary to BELLO    - Recommend MELD labs every 6 months to monitor liver function, next due 12/2023.  - Recommend abdominal imaging with AFP tumor marker every 6 months for HCC surveillance, due now.  - Recommend Upper endoscopy every 2-3 years for variceal surveillance.  - Recommend additional weight loss of 25 lbs, through diet and exercise.  - Recommend low sodium (2 grams), high protein diet (115 grams per day).  - Recommend good control of blood pressure, blood sugar and lipids.  - Return to clinic in 1 year, sooner if needed.    Saw Dr. Stearns in Gastro 3/23/23:  GERD  Dysphagia  Odynophagia    Schedule for EGD  Increase PPI back to omeprazole 40 mg BID as prescribed by PCP  Avoid NSAIDs  Follow up if symptoms worsen or fail to improve     - Normal esophagus.                          - Z-line regular, 36 cm from the incisors.                          - Gastritis. Biopsied.                          - Erythematous duodenopathy.                          - Biopsies were taken with a cold forceps for                          evaluation of eosinophilic esophagitis.   Recommendation:        - Discharge patient to home.                          - Resume previous diet today.                          - Continue present medications.                          - Await pathology results.                          - Return to referring physician as previously                          scheduled.              S/p GCA/PMR flare off Actemra for 5 wks  Chronic inflammatory  Optic neuritis with blindness stable  Primary open angle glaucoma  Cirrhosis, NAFLD, hepatomegaly saw Dr. Fitzgerald today  Dysphagia, GERD persists needs to f/u with Gastro  /77  Class 3 obesity has lost 16# past year  T2 DM          ESR, CRP, CMP today  Vas venous US right  leg  Ref to Rheum/Derm for erythema nodosum v superficial thrombophlebitis next week   Decrease prednisone 25mg daily x 1wk, then 20mg daily x 1 wk, then 15mg daily x 1 wk, then 12.5mg daily x 2 wks, then 10mg daily and continue  DXA  as previously ordered  F/u Dr Landeros and Jania in Gastro for persistent dysphagia GERD  Cont F/u Dr. Ro in Ophthalmology    Cont tocilizumab 162mg sc weekly just resumed  Cont vitamin D2 50,000 units once a week  RTC 6 wks with standing labs

## 2023-07-19 NOTE — PATIENT INSTRUCTIONS
Decrease prednisone 25mg daily x 1wk, then 20mg daily x 1 wk, then 15mg daily x 1 wk, then 12.5mg daily x 2 wks, then 10mg daily and continue

## 2023-07-19 NOTE — PROGRESS NOTES
Rapid3 Question Responses and Scores 7/18/2023   MDHAQ Score 1.2   Psychologic Score 4.4   Pain Score 4   When you awakened in the morning OVER THE LAST WEEK, did you feel stiff? No   If Yes, please indicate the number of hours until you are as limber as you will be for the day -   Fatigue Score 7   Global Health Score 3   RAPID3 Score 3.67     Answers submitted by the patient for this visit:  Rheumatology Questionnaire (Submitted on 7/18/2023)  fever: No  eye redness: No  mouth sores: No  headaches: Yes  shortness of breath: No  chest pain: No  trouble swallowing: No  diarrhea: No  constipation: No  unexpected weight change: No  genital sore: No  dysuria: No  During the last 3 days, have you had a skin rash?: No  Bruises or bleeds easily: No  cough: No

## 2023-07-19 NOTE — PROGRESS NOTES
Patient ID:  Genesis Ugalde    YOB: 1955     MRN:  [id number]     Subjective:     Chief Complaint:  Disease Management     History of Present Illness:  Patient is a 67 y.o. female with GCA; optic neuritis  ? Uveitis with blindness who presents today for follow up. LCV was 6/13/23. At that time patient was unable to receive Actemra for 5 weeks, had just resumed 6/9/23. Patient was experiencing increased myalgias and headaches. She was initiated on a prednisone taper: 60 mg daily for 1 week, then 50 mg daily for 1 week, then 40 mg daily for 1 week, then continue 30 mg daily until return to clinic.    Today: Patient reports myalgias are much improved. Headaches are still daily, but lower in intensity. Patient reports she was able to taper the steroids to 40 mg daily as scheduled, but had issue refilling to continue the taper approximately 1 week ago. She does note she has had no change in her vision since starting the steroid taper. She has continued on Actemra without missing any doses since the last visit.  Also, she has had recent onset of right lower leg swelling, warmth, pain, and bruising. She reports it is also intermittently hard to the touch.     Denies  hair loss, dry eyes, vision changes, dry mouth, oral/nasal ulcers, new rashes, joint swelling, morning stiffness, or GI disturbances.      Review of Systems   Review of Systems   Constitutional:  Positive for chills (2 nights recently). Negative for fever and unexpected weight change.   HENT:  Negative for mouth sores and trouble swallowing.    Eyes:  Negative for redness.   Respiratory:  Negative for cough and shortness of breath.    Cardiovascular:  Negative for chest pain.   Gastrointestinal:  Negative for abdominal pain, constipation, diarrhea, nausea and vomiting.   Genitourinary:  Negative for dysuria and genital sores.   Skin:  Negative for rash.   Neurological:  Positive for headaches.   Hematological:  Does not bruise/bleed easily.       Current Medications:    Current Outpatient Medications:     acyclovir (ZOVIRAX) 400 MG tablet, Take 400 mg by mouth 2 (two) times daily., Disp: , Rfl:     amlodipine-benazepril (LOTREL) 10-40 mg per capsule, Take 1 capsule by mouth once daily., Disp: , Rfl:     aspirin (ECOTRIN) 325 MG EC tablet, Take 325 mg by mouth once daily., Disp: , Rfl:     brimonidine 0.2% (ALPHAGAN) 0.2 % Drop, INSTILL 1 DROP INTO BOTH EYES 3 TIMES A DAY, Disp: 10 mL, Rfl: 12    clonazePAM (KLONOPIN) 1 MG tablet, Take 1 mg by mouth 2 (two) times daily as needed., Disp: , Rfl:     dorzolamide (TRUSOPT) 2 % ophthalmic solution, INSTILL 1 DROP INTO BOTH EYES 3 TIMES A DAY, Disp: 10 mL, Rfl: 12    latanoprost 0.005 % ophthalmic solution, Place 1 drop into both eyes every evening., Disp: 2.5 mL, Rfl: 12    metoprolol succinate (TOPROL-XL) 100 MG 24 hr tablet, Take 100 mg by mouth once daily., Disp: , Rfl:     pravastatin (PRAVACHOL) 40 MG tablet, TAKE 1 TABLET BY MOUTH EVERY DAY IN THE EVENING, Disp: 90 tablet, Rfl: 3    sertraline (ZOLOFT) 25 MG tablet, TAKE 2 TABLETS BY MOUTH EVERY DAY, Disp: 180 tablet, Rfl: 1    traMADoL (ULTRAM) 50 mg tablet, Take 50 mg by mouth 2 (two) times daily as needed., Disp: , Rfl:     ergocalciferol (VITAMIN D2) 50,000 unit Cap, TAKE 1 CAPSULE (50,000 UNITS TOTAL) BY MOUTH EVERY 7 DAYS., Disp: 12 capsule, Rfl: 3    omeprazole (PRILOSEC) 40 MG capsule, TAKE 1 CAPSULE (40 MG TOTAL) BY MOUTH EVERY 12 (TWELVE) HOURS. TAKE ONE PILL EVERY MORNING 45 MINUTES BEFORE BREAKFAST, Disp: 180 capsule, Rfl: 0    predniSONE (DELTASONE) 5 MG tablet, Take 5 tablets (25 mg total) by mouth once daily. After  1wk decrease to 4 tabs(20mg) daily x 1wk, then decrease to 3 tabs(15mg) daily x 1wk, the decrease to 2.5 tabs(12.5mg) for 2 wks, then decrease to 2 tabs(10mg) daily and continue, Disp: 150 tablet, Rfl: 1    tocilizumab (ACTEMRA) 162 mg/0.9 mL injection, Inject 0.9 mLs (162 mg total) into the skin every 7 days., Disp: 12 each,  Rfl: 0     Objective:     Vitals:    07/19/23 0936   BP: 94/62   Pulse: 74      Body mass index is 42.95 kg/m².     Physical Exam   Constitutional: normal appearance.   HENT:   Head: Normocephalic and atraumatic.   Mouth/Throat: Mucous membranes are moist. Oropharynx is clear.   Cardiovascular: Normal rate, regular rhythm, normal heart sounds and normal pulses.   No murmur heard.  Pulmonary/Chest: Effort normal and breath sounds normal. No respiratory distress.   Musculoskeletal:         General: Swelling (RLE) and tenderness (RLE) present.      Right shoulder: Tenderness present.      Left shoulder: Normal.      Right elbow: Normal.      Left elbow: Normal.      Right wrist: Normal.      Left wrist: Normal.      Cervical back: Normal range of motion.      Right knee: Normal.      Left knee: Normal.   Neurological: She is alert.   Skin: Skin is warm and dry. Bruising (distal RLE, nodular process palpated on anterior shin) noted. No rash noted.   Vitals reviewed.      Right Side Rheumatological Exam     Examination finds the elbow, wrist, knee, 2nd PIP, 2nd MCP, 3rd PIP, 3rd MCP, 4th PIP, 4th MCP, 5th PIP and 5th MCP normal.    The patient is tender to palpation of the shoulder, 1st PIP and 1st MCP    Shoulder Exam   Sensation: normal    Knee Exam   Sensation: normal    Elbow/Wrist Exam   Sensation: normal    Muscle Strength (0-5 scale):  Deltoid:  5  Biceps: 5/5   Triceps:  5  : 5/5   Iliopsoas: 5  Quadriceps:  5   Distal Lower Extremity: 5    Left Side Rheumatological Exam     Examination finds the shoulder, elbow, wrist, knee, 1st MCP, 2nd PIP, 2nd MCP, 3rd PIP, 3rd MCP, 4th PIP, 4th MCP, 5th PIP and 5th MCP normal.    The patient is tender to palpation of the 1st PIP.    Shoulder Exam   Sensation: normal    Knee Exam   Sensation: normal    Elbow/Wrist Exam   Sensation: normal    Muscle Strength (0-5 scale):  Deltoid:  5  Biceps: 5/5   Triceps:  5  :  5/5   Iliopsoas: 5  Quadriceps:  5   Distal Lower  Extremity: 5          6/19/23 US TEMPORAL ARTERY BILATERAL     Bilateral temporal artery color duplex sonography     CLINICAL HISTORY:  68 y/o F referred due to:   Other giant cell arteritis     Reported bilateral symptomatology.     TECHNIQUE:  Standard color duplex sonography technique was used to evaluate the bilateral temporal arteries and axillary arteries.  Longitudinal and transverse planes were evaluated.     COMPARISON:  Ultrasound temporal artery 12/03/2020     FINDINGS:  Suspected bilateral identified temporal arteries are small in caliber.  No abnormal signal in the walls of the vessel on both planes bilaterally.  No hypoechoic wall thickening.  The right temporal artery peak systolic velocity is 23 cm/sec and the left temporal artery velocity is 57 cm/sec.     A bilateral axillary arteries are normal in caliber without abnormal signal.  No hypoechoic wall thickening.  The right axillary artery peak systolic velocity is 165 cm/sec.  The left axillary artery peak systolic velocity is 153 cm/sec.     Impression:     No convincing sonographic evidence suggestive of giant cell arteritis multi evaluating the temporal arteries given small size.  Further evaluation as warranted with MRI.    7/2/23 MRA HEAD FOR TEMPORAL ARTERITES W & W/O CONTRAST     CLINICAL HISTORY:  GCA;Other giant cell arteritis     TECHNIQUE:  3D time-of-flight MRA of the head postcontrast with 3 dimensional MIP reformats.  In addition thin section T1 imaging postcontrast through the superficial temporal arteries bilaterally.  10 mL intravenous infusion of Gadavist contrast.  Coronal sagittal reformatted imaging from the source imaging.     COMPARISON:  Ultrasound 06/19/2023     FINDINGS:  MRA head:     Anterior circulation:  The bilateral distal cervical, [place], cavernous and supraclinoid segments of the ICA's and the visualized bilateral anterior and middle cerebral arteries are patent without evidence for significant focal stenosis or  aneurysm.     Posterior circulation: Distal vertebral arteries, basilar artery and posterior cerebral arteries are patent without significant focal stenosis or aneurysm.     Vessel wall imaging: Study distorted by motion artifacts.  Within limits of the study no definite evidence for mural thickening associated with the bilaterally superficial temporal arteries to suggest underlying giant cell arteritis..     Impression:     Vessel imaging: Motion distorted examination with poor visualization superficial temporal arteries, within limits of the study no definite evidence for mural thickening associated with the visualized superficial temporal arteries bilaterally to suggest active arteritis.     MRA head: Unremarkable MRA head as detailed above specifically out evidence for proximal stenosis or large vessel occlusion.    7/2/23 MRA abdomen with and without     CLINICAL HISTORY:  Rule out large vessel vasculitis.     TECHNIQUE:  Multi-planar multisequence MRI of the abdomen was performed before and after 10 mL of IV Gadavist.     COMPARISON:  Abdominal ultrasound 06/22/2023.     FINDINGS:  Aorta appears normal in caliber and course.  No significant narrowing.  No Aria aortic soft tissue stranding.  No inflammatory change.  No lymphadenopathy.     Partially visualized abdominal content: Tiny bilateral renal T2 hyperintensities.     Impression:     No MRA findings to suggest large vessel vasculitis    Assessment:     1. Edema, unspecified type    2. Giant cell arteritis with polymyalgia rheumatica    3. Optic neuritis    4. Fatty liver    5. Morbid obesity with BMI of 40.0-44.9, adult    6. Visual loss    7. Uveitis    8. Vitamin D deficiency    9. Blindness and low vision    10. Epigastric abdominal pain    11. GCA (giant cell arteritis)    12. Erythema nodosum          Plan:      Problem List Items Addressed This Visit          Ophtho    Optic neuritis    Relevant Medications    ergocalciferol (VITAMIN D2) 50,000 unit  Cap    Uveitis    Relevant Medications    ergocalciferol (VITAMIN D2) 50,000 unit Cap    Blindness and low vision    Relevant Medications    ergocalciferol (VITAMIN D2) 50,000 unit Cap       Immunology/Multi System    Giant cell arteritis with polymyalgia rheumatica    Relevant Medications    ergocalciferol (VITAMIN D2) 50,000 unit Cap       Endocrine    Vitamin D deficiency    Relevant Medications    ergocalciferol (VITAMIN D2) 50,000 unit Cap       GI    Fatty liver    Relevant Medications    ergocalciferol (VITAMIN D2) 50,000 unit Cap    Epigastric abdominal pain    Relevant Medications    ergocalciferol (VITAMIN D2) 50,000 unit Cap     Other Visit Diagnoses       Edema, unspecified type    -  Primary    Relevant Orders    VAS US Venous Leg Right    Morbid obesity with BMI of 40.0-44.9, adult        Relevant Medications    ergocalciferol (VITAMIN D2) 50,000 unit Cap    Visual loss        Relevant Medications    ergocalciferol (VITAMIN D2) 50,000 unit Cap    GCA (giant cell arteritis)        Relevant Medications    tocilizumab (ACTEMRA) 162 mg/0.9 mL injection    Erythema nodosum        Relevant Orders    Ambulatory referral/consult to Dermatology              ESR, CRP, CMP today  Vas venous US right leg  Ref to Rheum/Derm for erythema nodosum v superficial thrombophlebitis next week   Decrease prednisone 25mg daily x 1wk, then 20mg daily x 1 wk, then 15mg daily x 1 wk, then 12.5mg daily x 2 wks, then 10mg daily and continue  Cont omeprazole 40mg twice daily   DXA  as previously ordered  F/u Dr Landeros and Jania in Gastro for persistent dysphagia GERD  Cont F/u Dr. Ro in Ophthalmology    Cont tocilizumab 162mg sc weekly just resumed  Cont pravastatin 40mg daily  Cont vitamin D2 50,000 units once a week  RTC 6 wks with standing labs     Godfrey Acosta M.D.  PGY-1  [employer] [facility]

## 2023-07-21 ENCOUNTER — TELEPHONE (OUTPATIENT)
Dept: PHARMACY | Facility: CLINIC | Age: 68
End: 2023-07-21
Payer: MEDICARE

## 2023-07-21 ENCOUNTER — PATIENT MESSAGE (OUTPATIENT)
Dept: PHARMACY | Facility: CLINIC | Age: 68
End: 2023-07-21
Payer: MEDICARE

## 2023-07-21 ENCOUNTER — SPECIALTY PHARMACY (OUTPATIENT)
Dept: PHARMACY | Facility: CLINIC | Age: 68
End: 2023-07-21
Payer: MEDICARE

## 2023-07-21 NOTE — TELEPHONE ENCOUNTER
Pt returned call and states that she doesn't need assistance and Actemra can be sent to [facility] dispensing pharmacy [facility].

## 2023-07-21 NOTE — TELEPHONE ENCOUNTER
Hello, this is Betzaida Randhawa, clinical pharmacist with [facility] that is part of your care team.  We have begun working on your prescription that your doctor has sent us. Our next steps include:     Working with your insurance company to obtain approval for your medication  Working with you to ensure your medication is affordable     We will be calling you along the way with updates on your medication but if you have any concerns or receive information that you would like to discuss please reach us at [phone].    Welcome call outcome: Left voicemail    Pt get medication through [facility].

## 2023-07-21 NOTE — TELEPHONE ENCOUNTER
Pt returned call and states that she doesn't need assistance and Actemra can be sent to [facility] dispensing pharmacy [facility].

## 2023-07-27 ENCOUNTER — TELEPHONE (OUTPATIENT)
Dept: DERMATOLOGY | Facility: CLINIC | Age: 68
End: 2023-07-27
Payer: MEDICARE

## 2023-07-27 NOTE — TELEPHONE ENCOUNTER
----- Message from Melinda Onofre sent at 7/27/2023 10:28 AM CDT -----  Regarding: appt access  Contact: [phone]  Genesis Ugalde calling regarding Appointment Access  (message) for #Erythema nodosum . Please call

## 2023-09-06 DIAGNOSIS — H40.1131 PRIMARY OPEN ANGLE GLAUCOMA OF BOTH EYES, MILD STAGE: ICD-10-CM

## 2023-09-06 RX ORDER — LATANOPROST 50 UG/ML
1 SOLUTION/ DROPS OPHTHALMIC
Qty: 2.5 ML | Refills: 3 | Status: SHIPPED | OUTPATIENT
Start: 2023-09-06 | End: 2023-12-19

## 2023-09-15 ENCOUNTER — OFFICE VISIT (OUTPATIENT)
Dept: RHEUMATOLOGY | Facility: CLINIC | Age: 68
End: 2023-09-15
Payer: MEDICARE

## 2023-09-15 ENCOUNTER — LAB VISIT (OUTPATIENT)
Dept: LAB | Facility: HOSPITAL | Age: 68
End: 2023-09-15
Attending: INTERNAL MEDICINE
Payer: MEDICARE

## 2023-09-15 VITALS
DIASTOLIC BLOOD PRESSURE: 84 MMHG | BODY MASS INDEX: 46.42 KG/M2 | HEART RATE: 86 BPM | HEIGHT: 63 IN | SYSTOLIC BLOOD PRESSURE: 135 MMHG | WEIGHT: 262 LBS

## 2023-09-15 DIAGNOSIS — N39.0 URINARY TRACT INFECTION WITHOUT HEMATURIA, SITE UNSPECIFIED: Primary | ICD-10-CM

## 2023-09-15 DIAGNOSIS — M31.6 GCA (GIANT CELL ARTERITIS): ICD-10-CM

## 2023-09-15 DIAGNOSIS — N30.01 ACUTE CYSTITIS WITH HEMATURIA: Primary | ICD-10-CM

## 2023-09-15 LAB
ALBUMIN SERPL BCP-MCNC: 4 G/DL (ref 3.5–5.2)
ALP SERPL-CCNC: 63 U/L (ref 55–135)
ALT SERPL W/O P-5'-P-CCNC: 58 U/L (ref 10–44)
ANION GAP SERPL CALC-SCNC: 9 MMOL/L (ref 8–16)
AST SERPL-CCNC: 39 U/L (ref 10–40)
BASOPHILS # BLD AUTO: 0.04 K/UL (ref 0–0.2)
BASOPHILS NFR BLD: 0.5 % (ref 0–1.9)
BILIRUB SERPL-MCNC: 1.5 MG/DL (ref 0.1–1)
BUN SERPL-MCNC: 13 MG/DL (ref 8–23)
CALCIUM SERPL-MCNC: 9.8 MG/DL (ref 8.7–10.5)
CHLORIDE SERPL-SCNC: 112 MMOL/L (ref 95–110)
CO2 SERPL-SCNC: 23 MMOL/L (ref 23–29)
CREAT SERPL-MCNC: 0.8 MG/DL (ref 0.5–1.4)
CRP SERPL-MCNC: <0.3 MG/L (ref 0–8.2)
DIFFERENTIAL METHOD: NORMAL
EOSINOPHIL # BLD AUTO: 0.3 K/UL (ref 0–0.5)
EOSINOPHIL NFR BLD: 3.6 % (ref 0–8)
ERYTHROCYTE [DISTWIDTH] IN BLOOD BY AUTOMATED COUNT: 13.4 % (ref 11.5–14.5)
ERYTHROCYTE [SEDIMENTATION RATE] IN BLOOD BY PHOTOMETRIC METHOD: <2 MM/HR (ref 0–36)
EST. GFR  (NO RACE VARIABLE): >60 ML/MIN/1.73 M^2
GLUCOSE SERPL-MCNC: 116 MG/DL (ref 70–110)
HCT VFR BLD AUTO: 39.7 % (ref 37–48.5)
HGB BLD-MCNC: 13.1 G/DL (ref 12–16)
IMM GRANULOCYTES # BLD AUTO: 0.04 K/UL (ref 0–0.04)
IMM GRANULOCYTES NFR BLD AUTO: 0.5 % (ref 0–0.5)
LYMPHOCYTES # BLD AUTO: 2.7 K/UL (ref 1–4.8)
LYMPHOCYTES NFR BLD: 32.7 % (ref 18–48)
MCH RBC QN AUTO: 30.5 PG (ref 27–31)
MCHC RBC AUTO-ENTMCNC: 33 G/DL (ref 32–36)
MCV RBC AUTO: 92 FL (ref 82–98)
MONOCYTES # BLD AUTO: 0.7 K/UL (ref 0.3–1)
MONOCYTES NFR BLD: 8 % (ref 4–15)
NEUTROPHILS # BLD AUTO: 4.6 K/UL (ref 1.8–7.7)
NEUTROPHILS NFR BLD: 54.7 % (ref 38–73)
NRBC BLD-RTO: 0 /100 WBC
PLATELET # BLD AUTO: 204 K/UL (ref 150–450)
PMV BLD AUTO: 11.2 FL (ref 9.2–12.9)
POTASSIUM SERPL-SCNC: 4.3 MMOL/L (ref 3.5–5.1)
PROT SERPL-MCNC: 6.4 G/DL (ref 6–8.4)
RBC # BLD AUTO: 4.3 M/UL (ref 4–5.4)
SODIUM SERPL-SCNC: 144 MMOL/L (ref 136–145)
WBC # BLD AUTO: 8.33 K/UL (ref 3.9–12.7)

## 2023-09-15 PROCEDURE — 99213 PR OFFICE/OUTPT VISIT, EST, LEVL III, 20-29 MIN: ICD-10-PCS | Mod: S$PBB,,, | Performed by: INTERNAL MEDICINE

## 2023-09-15 PROCEDURE — 99999 PR PBB SHADOW E&M-EST. PATIENT-LVL III: CPT | Mod: PBBFAC,,, | Performed by: INTERNAL MEDICINE

## 2023-09-15 PROCEDURE — 85025 COMPLETE CBC W/AUTO DIFF WBC: CPT | Performed by: INTERNAL MEDICINE

## 2023-09-15 PROCEDURE — 99213 OFFICE O/P EST LOW 20 MIN: CPT | Mod: S$PBB,,, | Performed by: INTERNAL MEDICINE

## 2023-09-15 PROCEDURE — 99213 OFFICE O/P EST LOW 20 MIN: CPT | Mod: PBBFAC | Performed by: INTERNAL MEDICINE

## 2023-09-15 PROCEDURE — 85652 RBC SED RATE AUTOMATED: CPT | Performed by: INTERNAL MEDICINE

## 2023-09-15 PROCEDURE — 36415 COLL VENOUS BLD VENIPUNCTURE: CPT | Performed by: INTERNAL MEDICINE

## 2023-09-15 PROCEDURE — 99999 PR PBB SHADOW E&M-EST. PATIENT-LVL III: ICD-10-PCS | Mod: PBBFAC,,, | Performed by: INTERNAL MEDICINE

## 2023-09-15 PROCEDURE — 86140 C-REACTIVE PROTEIN: CPT | Performed by: INTERNAL MEDICINE

## 2023-09-15 PROCEDURE — 80053 COMPREHEN METABOLIC PANEL: CPT | Performed by: INTERNAL MEDICINE

## 2023-09-15 RX ORDER — PREDNISONE 5 MG/1
5 TABLET ORAL DAILY
Qty: 90 TABLET | Refills: 1 | Status: SHIPPED | OUTPATIENT
Start: 2023-09-15 | End: 2023-09-15

## 2023-09-15 RX ORDER — NITROFURANTOIN 25; 75 MG/1; MG/1
100 CAPSULE ORAL 2 TIMES DAILY
Qty: 10 CAPSULE | Refills: 0 | Status: SHIPPED | OUTPATIENT
Start: 2023-09-15 | End: 2023-09-20

## 2023-09-15 RX ORDER — PREDNISONE 5 MG/1
5 TABLET ORAL
Qty: 90 TABLET | Refills: 1 | Status: SHIPPED | OUTPATIENT
Start: 2023-09-15 | End: 2024-01-17

## 2023-09-15 RX ORDER — TOCILIZUMAB 180 MG/ML
162 INJECTION, SOLUTION SUBCUTANEOUS
Qty: 12 EACH | Refills: 0 | Status: SHIPPED | OUTPATIENT
Start: 2023-09-15 | End: 2024-02-15 | Stop reason: SDUPTHER

## 2023-09-15 RX ORDER — PREDNISONE 1 MG/1
5 TABLET ORAL DAILY
Qty: 360 TABLET | Refills: 1 | Status: SHIPPED | OUTPATIENT
Start: 2023-09-15 | End: 2024-01-17

## 2023-09-15 RX ORDER — PREDNISONE 1 MG/1
4 TABLET ORAL DAILY
Qty: 360 TABLET | Refills: 1 | Status: SHIPPED | OUTPATIENT
Start: 2023-09-15 | End: 2023-09-15

## 2023-09-15 RX ORDER — PREDNISONE 5 MG/1
5 TABLET ORAL DAILY
Qty: 90 TABLET | Refills: 1 | Status: SHIPPED | OUTPATIENT
Start: 2023-09-15 | End: 2023-09-15 | Stop reason: SDUPTHER

## 2023-09-15 RX ORDER — PREDNISONE 1 MG/1
5 TABLET ORAL DAILY
Qty: 360 TABLET | Refills: 1 | Status: SHIPPED | OUTPATIENT
Start: 2023-09-15 | End: 2023-09-15

## 2023-09-15 NOTE — PROGRESS NOTES
Patient ID:  Genesis Ugalde    YOB: 1955     MRN:  [id number]     Subjective:     Chief Complaint: GCA, optic neuritis     History of Present Illness:  Patient is a 67 y.o. female with GCA who presents today for follow up. LCV was 7/2023.     Today: Today, the patient states that she discontinued her steroid taper about 1 week ago. She states that she was unaware that she was supposed to continue at 10 mg daily until her follow up today. Over the past 2 weeks, she endorses bilateral headaches over the side of her temple radiating to her jaw. She also has daily neck, bilateral shoulder, and hip pain. This pain is worse when she moves and it has kept her from attempting any exercise over the past week. Additionally, she began to have dysuria 2 weeks ago and took a home UTI test which was positive. She has had intermittent low grade fevers since this time. She has been taking azo which has somewhat improved her symptoms.      Denies hair loss, dry eyes, vision changes, dry mouth, oral/nasal ulcers, trouble swallowing, new rashes, joint swelling, morning stiffness, or GI disturbances.      Review of Systems   Review of Systems   Constitutional:  Negative for fever and unexpected weight change.   HENT:  Positive for mouth sores and trouble swallowing.    Eyes:  Negative for redness.   Respiratory:  Negative for cough and shortness of breath.    Cardiovascular:  Positive for chest pain.   Gastrointestinal:  Positive for constipation. Negative for diarrhea.   Genitourinary:  Positive for dysuria and genital sores.   Skin:  Negative for rash.   Neurological:  Positive for headaches.   Hematological:  Does not bruise/bleed easily.        Current Medications:    Current Outpatient Medications:     acyclovir (ZOVIRAX) 400 MG tablet, Take 400 mg by mouth 2 (two) times daily., Disp: , Rfl:     amlodipine-benazepril (LOTREL) 10-40 mg per capsule, Take 1 capsule by mouth once daily., Disp: , Rfl:     aspirin  (ECOTRIN) 325 MG EC tablet, Take 325 mg by mouth once daily., Disp: , Rfl:     brimonidine 0.2% (ALPHAGAN) 0.2 % Drop, INSTILL 1 DROP INTO BOTH EYES 3 TIMES A DAY, Disp: 10 mL, Rfl: 12    clonazePAM (KLONOPIN) 1 MG tablet, Take 1 mg by mouth 2 (two) times daily as needed., Disp: , Rfl:     dorzolamide (TRUSOPT) 2 % ophthalmic solution, INSTILL 1 DROP INTO BOTH EYES 3 TIMES A DAY, Disp: 10 mL, Rfl: 12    ergocalciferol (VITAMIN D2) 50,000 unit Cap, TAKE 1 CAPSULE (50,000 UNITS TOTAL) BY MOUTH EVERY 7 DAYS., Disp: 12 capsule, Rfl: 3    latanoprost 0.005 % ophthalmic solution, INSTILL 1 DROP INTO BOTH EYES EVERY EVENING, Disp: 2.5 mL, Rfl: 3    metoprolol succinate (TOPROL-XL) 100 MG 24 hr tablet, Take 100 mg by mouth once daily., Disp: , Rfl:     omeprazole (PRILOSEC) 40 MG capsule, TAKE 1 CAPSULE (40 MG TOTAL) BY MOUTH EVERY 12 (TWELVE) HOURS. TAKE ONE PILL EVERY MORNING 45 MINUTES BEFORE BREAKFAST, Disp: 180 capsule, Rfl: 0    pravastatin (PRAVACHOL) 40 MG tablet, TAKE 1 TABLET BY MOUTH EVERY DAY IN THE EVENING, Disp: 90 tablet, Rfl: 3    predniSONE (DELTASONE) 1 MG tablet, Take 5 tablets (5 mg total) by mouth once daily. After 2 wks decrease to 4 tabs daily x 2 wks, then decrease to 3 tabs daily x 2 wks, then decrease to 2 tabs daily x 2 wks, then decrease to 1 tab  daily x 2 wks and then stop, Disp: 360 tablet, Rfl: 1    predniSONE (DELTASONE) 5 MG tablet, Take 1 tablet (5 mg total) by mouth once daily., Disp: 90 tablet, Rfl: 1    sertraline (ZOLOFT) 25 MG tablet, TAKE 2 TABLETS BY MOUTH EVERY DAY, Disp: 180 tablet, Rfl: 1    tocilizumab (ACTEMRA) 162 mg/0.9 mL injection, Inject 0.9 mLs (162 mg total) into the skin every 7 days., Disp: 12 each, Rfl: 0    traMADoL (ULTRAM) 50 mg tablet, Take 50 mg by mouth 2 (two) times daily as needed., Disp: , Rfl:      Objective:     Vitals:    09/15/23 0936   BP: 135/84   Pulse: 86      Body mass index is 46.41 kg/m².     Physical Exam   Constitutional: She is oriented to  person, place, and time. normal appearance. No distress.   HENT:   Head: Normocephalic and atraumatic.   Eyes: Right eye exhibits no discharge. Left eye exhibits no discharge. No scleral icterus.   Cardiovascular: Normal rate, regular rhythm and normal heart sounds. Exam reveals no gallop and no friction rub.   No murmur heard.  Pulmonary/Chest: No respiratory distress. She has no wheezes. She has no rhonchi. She has no rales.   Abdominal: Soft. She exhibits no distension. There is no abdominal tenderness.   Musculoskeletal:         General: Tenderness present. No swelling.      Right shoulder: Tenderness present.      Left shoulder: Tenderness present.      Right elbow: Tenderness present.      Left elbow: Tenderness present.      Right wrist: Tenderness present.      Left wrist: Tenderness present.      Cervical back: Normal range of motion. No rigidity.      Right knee: Normal.      Left knee: Normal.      Comments: TTP of neck, B/L shoulders, B/L elbows, B/L GTB   Neurological: She is alert and oriented to person, place, and time.   Skin: Skin is warm and dry.       Right Side Rheumatological Exam     Examination finds the knee normal.    The patient is tender to palpation of the shoulder, elbow, wrist, 1st PIP, 1st MCP, 2nd PIP, 2nd MCP, 3rd PIP, 3rd MCP, 4th PIP, 4th MCP, 5th PIP and 5th MCP    Left Side Rheumatological Exam     Examination finds the knee normal.    The patient is tender to palpation of the shoulder, elbow, wrist, 1st PIP, 1st MCP, 2nd PIP, 2nd MCP, 3rd PIP, 3rd MCP, 4th PIP, 4th MCP, 5th PIP and 5th MCP.             7/13/2021 9/15/2023   Tender (MILLER-28) 0 / 28  10 / 28    Swollen (MILLER-28) 0 / 28  0 / 28    Provider Global -- --   Patient Global 50 mm 60 mm   ESR -- --   CRP 0.7 mg/L --   MILLER-28 (ESR) -- --   MILLER-28 (CRP) 1.85 (Remission) --   CDAI Score -- --              Assessment:     S/p GCA/PMR flare off Actemra for 5 wks. She stopped prednisone 1-2 wks ago rather than holding at 10mg  daily  Chronic inflammatory  Optic neuritis with blindness stable  Primary open angle glaucoma  Cirrhosis, NAFLD, hepatomegaly saw Dr. Fitzgerald today  Dysphagia, GERD persists needs to f/u with Gastro  /84  Class 3 obesity gained 17# since last visit  Body mass index is 46.41 kg/m².  T2 DM Hb A1c 5.5 2/6/23  Dysuria     Plan:      UA with reflux culture for UTI symptoms  2. ESR, CRP, CBS CMP today  3. Ref to Rheum/Derm for erythema nodosum v superficial thrombophlebitis in October  4. Resume prednisone 10mg daily x 2 wks, then 9mg daily x 2 wks, then 8mg daily x 2 wks, then 7mg daily x 2 wks, then 6mg daily x 2 wks, then 5mg daily and continue until next appointment  5. DXA  as previously ordered  6. F/u Dr Dykes in Gastro for persistent dysphagia GERD. Continue omeprazole 40 mg daily  7. Cont F/u Dr. Ro in Ophthalmology    8. Cont tocilizumab 162mg sc weekly just resumed  9. Cont vitamin D2 50,000 units once a week  10. Mediterranean diet  11. Exercise as tolerated  12. RTC 6 wks with standing labs     Ted Valdez M.D.  PGY-1  [employer] [facility]

## 2023-09-15 NOTE — PROGRESS NOTES
I have personally taken the history and examined the patient and agree with the resident,s note as stated above      Impression   =========     Right Leg: Duplex imaging of the right lower extremity veins demonstrates patent and compressible veins. Incidental finding of   significant reflux is noted in the Rt. SSV including the Saphenopopliteal Junction (SPJ). No significant reflux noted in the GSV or   Accessory Vein There is no evidence of a venous thrombosis in the deep or superficial veins.     Left Leg: CFV patent.       DATE OF SERVICE: 07/19/2023                                                              S/p GCA/PMR flare off Actemra for 5 wks. She stopped prednisone 1-2 wks rather than holding at 10mg daily  Chronic inflammatory  Optic neuritis with blindness stable  Primary open angle glaucoma  Cirrhosis, NAFLD, hepatomegaly saw Dr. Fitzgerald today  Dysphagia, GERD persists needs to f/u with Gastro  /84  Class 3 obesity gained 17# since last visit  Body mass index is 46.41 kg/m².  T2 DM Hb A1c 5.5 2/6/23  Dysuria      UA with reflux culture  ESR, CRP, CBS CMP today  Ref to Rheum/Derm for erythema nodosum v superficial thrombophlebitis in Oct  Resume prednisone 10mg daily x 2 wks, then 9mg daily x 2 wks, then 8mg daily x 2 wks, then 7mg daily x 2 wks, then 6mg daily x 2 wks, then 5mg daily and continue   DXA  as previously ordered  F/u Dr Landeros and Jania in Gastro for persistent dysphagia GERD  Cont F/u Dr. Ro in Ophthalmology    Cont tocilizumab 162mg sc weekly just resumed  Cont vitamin D2 50,000 units once a week  RTC 6 wks with standing labs

## 2023-09-15 NOTE — PROGRESS NOTES
9/14/2023     8:43 PM   Rapid3 Question Responses and Scores   MDHAQ Score 1.4   Psychologic Score 4.4   Pain Score 6   When you awakened in the morning OVER THE LAST WEEK, did you feel stiff? No   Fatigue Score 8   Global Health Score 6   RAPID3 Score 5.56     Answers submitted by the patient for this visit:  Rheumatology Questionnaire (Submitted on 9/14/2023)  fever: No  eye redness: No  mouth sores: Yes  headaches: Yes  shortness of breath: No  chest pain: Yes  trouble swallowing: Yes  diarrhea: No  constipation: Yes  unexpected weight change: No  genital sore: Yes  dysuria: Yes  During the last 3 days, have you had a skin rash?: No  Bruises or bleeds easily: No  cough: No

## 2023-09-15 NOTE — PATIENT INSTRUCTIONS
predniSONE (DELTASONE) 1 MG tablet 5 mg, Daily          Dose, Route, Frequency: 5 mg, Oral, Daily  Start: 9/15/2023  Ord/Sold: 9/15/2023 (O)  Pharmacy: [facility]/pharmacy #[id number] - [place]HELLE Estrada - [address] AT [facility]  Report  Long-term:   Med Dose History       Summary: Take 5 tablets (5 mg total) by mouth once daily. After 2 wks decrease to 4 tabs daily x 2 wks, then decrease to 3 tabs daily x 2 wks, then decrease to 2 tabs daily x 2 wks, then decrease to 1 tab daily x 2 wks and then stop, Starting Fri 9/15/2023, Normal       Patient Sig: Take 5 tablets (5 mg total) by mouth once daily. After 2 wks decrease to 4 tabs daily x 2 wks, then decrease to 3 tabs daily x 2 wks, then decrease to 2 tabs daily x 2 wks, then decrease to 1 tab daily x 2 wks and then stop       Ordered on: 9/15/2023       Authorized by: AKHIL GARCIA       Dispense: 360 tablet       Refills: 1 ordered       Prior Authorization: Request PA       Admin Instructions: After 2 wks decrease to 4 tabs daily x 2 wks, then decrease to 3 tabs daily x 2 wks, then decrease to 2 tabs daily x 2 wks, then decrease to 1 tab daily x 2 wks and then stop        predniSONE (DELTASONE) 5 MG tablet 5 mg, Daily          Dose, Route, Frequency: 5 mg, Oral, Daily  Start: 9/15/2023  Ord/Sold: 9/15/2023 (O)  Pharmacy: [facility]/pharmacy #[id number] - [place] Estrada [address] AT [facility]  Report  Long-term:   Med Dose History       Summary: Take 1 tablet (5 mg total) by mouth once daily., Starting Fri 9/15/2023, Normal       Patient Sig: Take 1 tablet (5 mg total) by mouth once daily.       Ordered on: 9/15/2023       Authorized by: AKHIL GARCIA       Dispense: 90 tablet       Refills: 1 ordered        Follow up with Dr. Dykes  Get fluad vaccine  Get new covid when available  Hold actemra for 1 week after any vaccine  Standing labs today  Urinalysis today

## 2023-11-25 NOTE — PROGRESS NOTES
HPI    DLS: 03/23/2023 Dr. Ro    1) Autoimmune Optic Neuropathy OU   2) POAG vs OHT OU   3) GCA with PMR   4) Type 2 DM no DR   5) Steroid Responder   6) NS OU   7) APD OS     MEDS:   Dorzolamide TID OU   Brimonidine TID OU   Latanoprost QHS OU     67 y.o. female is here for 6 months IOP check/ gonio. A couple of months   ago experience flashes. Pt states that left side of the face and left eye   is in pain. 2012 notice gradual decrease in vision, right eye. Vision in   left eye has decline 2004.       Last edited by Diane Cruz OA on 12/1/2023 11:45 AM.              Assessment /Plan     For exam results, see Encounter Report.    Primary open angle glaucoma of both eyes, mild stage    Steroid responder, bilateral    Giant cell arteritis with polymyalgia rheumatica    Inflammatory optic neuropathy    Optic neuritis    Nuclear sclerosis, bilateral    Type 2 diabetes mellitus without ophthalmic manifestations    Visual loss        1. Autoimmune optic neuropathy OU,   - OS 2004  - OD 2012  - unknown antibiody  - several treatments with IV steroid and IVIG  - vision and VF per [place] records  - PO steroid daily - 10 mg a day   - sural nerve and muscle biopsy done - results pending - pt to obtain results form [facility]  - xal qhs ou   - monitor  - rheum monitoring esr/crp  - all previous mri's and lp negative per rheum note  Last ESR / and c-react prot - both very low      Exacerbation - when did not get Actemra - may 2020 - had progression od - stabilized again 9/2020     2.  Steroid responder in past  - on chronic steroids for #1  iop good today   Continue xal qhs ou    3. POAG - mild vs OHT ou      First HVF   2004   First photos   2004   Treatment / Drops started   2004           Family history    neg        Glaucoma meds    Latanoprost ou        H/O adverse rxn to glaucoma drops    none        LASERS    None         GLAUCOMA SURGERIES    none        OTHER EYE SURGERIES    none        CDR    0.9 / 0.9 - +++  "pallor ou         Tbase    25-27 / 25-27          Tmax    27/27            Ttarget    20/20             HVF    12 test 2004 to 2012  - SALT / IAD od // Ext os                    4 10-2 stim V OD 2012 - 2014 - [facility]    (+ prog from 2012 to 2014 - when followed at [facility])    7- test-  10-2 stim V test done od 12/2014 to 2017 - dense SALT and IAD (+stable)         Gonio    +2-3 ou (very slight narrowing)         CCT    591/612        OCT    7 test 2006 to 2016 - RNFL - dec throughout od // dec thru out  os        HRT    6 test 2004 to 2018 - MR - nl od // off-center os // new baseline 1/25/2016 - nl ou -"too good to be true"        Disc photos    2004 - slides // 2008, 2010, 2012, , 2012, 2012, 2012, 2015, 2017   - OIS     - Ttoday   11/12  - Test done today - IOP     PLAN  CSM - IOP good     Sees Benjie   He is monitoring sed rate and c react prot   Cont actemra  (tocilizumab) 162 mg sc weekly  Cont prilosec (omeprazole)  20mg daily - to protect stomach     Cont brimonidine tid ou  Cont latanoprost qhs ou  Cont dorzolamide tid ou  - IOP was better     -would rec PI's prior to doing and slt's - has some mild  ant bowing and narrowing   - avoid BB - H/O asthma as a child     OFF PO prednisone - since dec 2021     NO MORE VF testing - essentially  EXT ou      Photo file updated 4/18/2022    F/u 6 -9 mos--IOP // [facility] check// gonio     Insurance forms filled out again (yearly)  8/5/2019     "

## 2023-12-01 ENCOUNTER — HOSPITAL ENCOUNTER (OUTPATIENT)
Dept: RADIOLOGY | Facility: HOSPITAL | Age: 68
Discharge: HOME | End: 2023-12-01
Attending: NURSE PRACTITIONER
Payer: MEDICARE

## 2023-12-01 ENCOUNTER — OFFICE VISIT (OUTPATIENT)
Dept: OPHTHALMOLOGY | Facility: CLINIC | Age: 68
End: 2023-12-01
Payer: MEDICARE

## 2023-12-01 DIAGNOSIS — E78.5 HYPERLIPIDEMIA, UNSPECIFIED HYPERLIPIDEMIA TYPE: ICD-10-CM

## 2023-12-01 DIAGNOSIS — E11.9 TYPE 2 DIABETES MELLITUS WITHOUT COMPLICATION, WITHOUT LONG-TERM CURRENT USE OF INSULIN: ICD-10-CM

## 2023-12-01 DIAGNOSIS — K74.60 LIVER CIRRHOSIS SECONDARY TO NASH: ICD-10-CM

## 2023-12-01 DIAGNOSIS — H46.9 OPTIC NEURITIS: ICD-10-CM

## 2023-12-01 DIAGNOSIS — H46.9 INFLAMMATORY OPTIC NEUROPATHY: ICD-10-CM

## 2023-12-01 DIAGNOSIS — K75.81 LIVER CIRRHOSIS SECONDARY TO NASH: ICD-10-CM

## 2023-12-01 DIAGNOSIS — E66.01 MORBID OBESITY: ICD-10-CM

## 2023-12-01 DIAGNOSIS — I10 PRIMARY HYPERTENSION: ICD-10-CM

## 2023-12-01 DIAGNOSIS — H40.1131 PRIMARY OPEN ANGLE GLAUCOMA OF BOTH EYES, MILD STAGE: Primary | ICD-10-CM

## 2023-12-01 DIAGNOSIS — H40.043 STEROID RESPONDER, BILATERAL: ICD-10-CM

## 2023-12-01 DIAGNOSIS — M31.5 GIANT CELL ARTERITIS WITH POLYMYALGIA RHEUMATICA: ICD-10-CM

## 2023-12-01 DIAGNOSIS — E11.9 TYPE 2 DIABETES MELLITUS WITHOUT OPHTHALMIC MANIFESTATIONS: ICD-10-CM

## 2023-12-01 DIAGNOSIS — H54.7 VISUAL LOSS: ICD-10-CM

## 2023-12-01 DIAGNOSIS — H25.13 NUCLEAR SCLEROSIS, BILATERAL: ICD-10-CM

## 2023-12-01 PROCEDURE — 99999 PR PBB SHADOW E&M-EST. PATIENT-LVL III: CPT | Mod: PBBFAC,HCNC,, | Performed by: OPHTHALMOLOGY

## 2023-12-01 PROCEDURE — 99214 PR OFFICE/OUTPT VISIT, EST, LEVL IV, 30-39 MIN: ICD-10-PCS | Mod: HCNC,S$GLB,, | Performed by: OPHTHALMOLOGY

## 2023-12-01 PROCEDURE — 1159F PR MEDICATION LIST DOCUMENTED IN MEDICAL RECORD: ICD-10-PCS | Mod: HCNC,CPTII,S$GLB, | Performed by: OPHTHALMOLOGY

## 2023-12-01 PROCEDURE — 4010F PR ACE/ARB THEARPY RXD/TAKEN: ICD-10-PCS | Mod: HCNC,CPTII,S$GLB, | Performed by: OPHTHALMOLOGY

## 2023-12-01 PROCEDURE — 1160F RVW MEDS BY RX/DR IN RCRD: CPT | Mod: HCNC,CPTII,S$GLB, | Performed by: OPHTHALMOLOGY

## 2023-12-01 PROCEDURE — 1125F AMNT PAIN NOTED PAIN PRSNT: CPT | Mod: HCNC,CPTII,S$GLB, | Performed by: OPHTHALMOLOGY

## 2023-12-01 PROCEDURE — 4010F ACE/ARB THERAPY RXD/TAKEN: CPT | Mod: HCNC,CPTII,S$GLB, | Performed by: OPHTHALMOLOGY

## 2023-12-01 PROCEDURE — 76700 US EXAM ABDOM COMPLETE: CPT | Mod: TC,HCNC

## 2023-12-01 PROCEDURE — 1159F MED LIST DOCD IN RCRD: CPT | Mod: HCNC,CPTII,S$GLB, | Performed by: OPHTHALMOLOGY

## 2023-12-01 PROCEDURE — 99999 PR PBB SHADOW E&M-EST. PATIENT-LVL III: ICD-10-PCS | Mod: PBBFAC,HCNC,, | Performed by: OPHTHALMOLOGY

## 2023-12-01 PROCEDURE — 1100F PR PT FALLS ASSESS DOC 2+ FALLS/FALL W/INJURY/YR: ICD-10-PCS | Mod: HCNC,CPTII,S$GLB, | Performed by: OPHTHALMOLOGY

## 2023-12-01 PROCEDURE — 1100F PTFALLS ASSESS-DOCD GE2>/YR: CPT | Mod: HCNC,CPTII,S$GLB, | Performed by: OPHTHALMOLOGY

## 2023-12-01 PROCEDURE — 3044F HG A1C LEVEL LT 7.0%: CPT | Mod: HCNC,CPTII,S$GLB, | Performed by: OPHTHALMOLOGY

## 2023-12-01 PROCEDURE — 76700 US ABDOMEN COMPLETE: ICD-10-PCS | Mod: 26,HCNC,, | Performed by: STUDENT IN AN ORGANIZED HEALTH CARE EDUCATION/TRAINING PROGRAM

## 2023-12-01 PROCEDURE — 76700 US EXAM ABDOM COMPLETE: CPT | Mod: 26,HCNC,, | Performed by: STUDENT IN AN ORGANIZED HEALTH CARE EDUCATION/TRAINING PROGRAM

## 2023-12-01 PROCEDURE — 1157F PR ADVANCE CARE PLAN OR EQUIV PRESENT IN MEDICAL RECORD: ICD-10-PCS | Mod: HCNC,CPTII,S$GLB, | Performed by: OPHTHALMOLOGY

## 2023-12-01 PROCEDURE — 99214 OFFICE O/P EST MOD 30 MIN: CPT | Mod: HCNC,S$GLB,, | Performed by: OPHTHALMOLOGY

## 2023-12-01 PROCEDURE — 1157F ADVNC CARE PLAN IN RCRD: CPT | Mod: HCNC,CPTII,S$GLB, | Performed by: OPHTHALMOLOGY

## 2023-12-01 PROCEDURE — 3288F PR FALLS RISK ASSESSMENT DOCUMENTED: ICD-10-PCS | Mod: HCNC,CPTII,S$GLB, | Performed by: OPHTHALMOLOGY

## 2023-12-01 PROCEDURE — 3288F FALL RISK ASSESSMENT DOCD: CPT | Mod: HCNC,CPTII,S$GLB, | Performed by: OPHTHALMOLOGY

## 2023-12-01 PROCEDURE — 1125F PR PAIN SEVERITY QUANTIFIED, PAIN PRESENT: ICD-10-PCS | Mod: HCNC,CPTII,S$GLB, | Performed by: OPHTHALMOLOGY

## 2023-12-01 PROCEDURE — 3044F PR MOST RECENT HEMOGLOBIN A1C LEVEL <7.0%: ICD-10-PCS | Mod: HCNC,CPTII,S$GLB, | Performed by: OPHTHALMOLOGY

## 2023-12-01 PROCEDURE — 1160F PR REVIEW ALL MEDS BY PRESCRIBER/CLIN PHARMACIST DOCUMENTED: ICD-10-PCS | Mod: HCNC,CPTII,S$GLB, | Performed by: OPHTHALMOLOGY

## 2023-12-01 RX ORDER — BRIMONIDINE TARTRATE 2 MG/ML
1 SOLUTION/ DROPS OPHTHALMIC 3 TIMES DAILY
Qty: 30 ML | Refills: 3 | Status: SHIPPED | OUTPATIENT
Start: 2023-12-01 | End: 2024-01-16

## 2023-12-14 ENCOUNTER — LAB VISIT (OUTPATIENT)
Dept: LAB | Facility: HOSPITAL | Age: 68
End: 2023-12-14
Attending: INTERNAL MEDICINE
Payer: MEDICARE

## 2023-12-14 DIAGNOSIS — K75.81 LIVER CIRRHOSIS SECONDARY TO NASH: ICD-10-CM

## 2023-12-14 DIAGNOSIS — K74.60 LIVER CIRRHOSIS SECONDARY TO NASH: ICD-10-CM

## 2023-12-14 DIAGNOSIS — I10 PRIMARY HYPERTENSION: ICD-10-CM

## 2023-12-14 DIAGNOSIS — E66.01 MORBID OBESITY: ICD-10-CM

## 2023-12-14 DIAGNOSIS — E11.9 TYPE 2 DIABETES MELLITUS WITHOUT COMPLICATION, WITHOUT LONG-TERM CURRENT USE OF INSULIN: ICD-10-CM

## 2023-12-14 DIAGNOSIS — M31.6 GCA (GIANT CELL ARTERITIS): ICD-10-CM

## 2023-12-14 DIAGNOSIS — I77.6 VASCULITIS: ICD-10-CM

## 2023-12-14 DIAGNOSIS — E78.5 HYPERLIPIDEMIA, UNSPECIFIED HYPERLIPIDEMIA TYPE: ICD-10-CM

## 2023-12-14 LAB
AFP SERPL-MCNC: 2.6 NG/ML (ref 0–8.4)
ALBUMIN SERPL BCP-MCNC: 4 G/DL (ref 3.5–5.2)
ALP SERPL-CCNC: 66 U/L (ref 55–135)
ALT SERPL W/O P-5'-P-CCNC: 78 U/L (ref 10–44)
ANION GAP SERPL CALC-SCNC: 8 MMOL/L (ref 8–16)
AST SERPL-CCNC: 60 U/L (ref 10–40)
BASOPHILS # BLD AUTO: 0.03 K/UL (ref 0–0.2)
BASOPHILS NFR BLD: 0.4 % (ref 0–1.9)
BILIRUB SERPL-MCNC: 1.8 MG/DL (ref 0.1–1)
BILIRUB UR QL STRIP: NEGATIVE
BUN SERPL-MCNC: 10 MG/DL (ref 8–23)
CALCIUM SERPL-MCNC: 9.5 MG/DL (ref 8.7–10.5)
CHLORIDE SERPL-SCNC: 109 MMOL/L (ref 95–110)
CLARITY UR REFRACT.AUTO: CLEAR
CO2 SERPL-SCNC: 26 MMOL/L (ref 23–29)
COLOR UR AUTO: YELLOW
CREAT SERPL-MCNC: 0.8 MG/DL (ref 0.5–1.4)
CREAT UR-MCNC: 132 MG/DL (ref 15–325)
CRP SERPL-MCNC: 0.3 MG/L (ref 0–8.2)
DIFFERENTIAL METHOD: NORMAL
EOSINOPHIL # BLD AUTO: 0.3 K/UL (ref 0–0.5)
EOSINOPHIL NFR BLD: 3.8 % (ref 0–8)
ERYTHROCYTE [DISTWIDTH] IN BLOOD BY AUTOMATED COUNT: 12.9 % (ref 11.5–14.5)
ERYTHROCYTE [SEDIMENTATION RATE] IN BLOOD BY PHOTOMETRIC METHOD: <2 MM/HR (ref 0–36)
EST. GFR  (NO RACE VARIABLE): >60 ML/MIN/1.73 M^2
GLUCOSE SERPL-MCNC: 122 MG/DL (ref 70–110)
GLUCOSE UR QL STRIP: NEGATIVE
HCT VFR BLD AUTO: 39.2 % (ref 37–48.5)
HGB BLD-MCNC: 13.7 G/DL (ref 12–16)
HGB UR QL STRIP: NEGATIVE
IMM GRANULOCYTES # BLD AUTO: 0.03 K/UL (ref 0–0.04)
IMM GRANULOCYTES NFR BLD AUTO: 0.4 % (ref 0–0.5)
INR PPP: 1 (ref 0.8–1.2)
KETONES UR QL STRIP: NEGATIVE
LEUKOCYTE ESTERASE UR QL STRIP: ABNORMAL
LYMPHOCYTES # BLD AUTO: 2.5 K/UL (ref 1–4.8)
LYMPHOCYTES NFR BLD: 34.1 % (ref 18–48)
MCH RBC QN AUTO: 30.7 PG (ref 27–31)
MCHC RBC AUTO-ENTMCNC: 34.9 G/DL (ref 32–36)
MCV RBC AUTO: 88 FL (ref 82–98)
MICROSCOPIC COMMENT: NORMAL
MONOCYTES # BLD AUTO: 0.6 K/UL (ref 0.3–1)
MONOCYTES NFR BLD: 7.8 % (ref 4–15)
NEUTROPHILS # BLD AUTO: 4 K/UL (ref 1.8–7.7)
NEUTROPHILS NFR BLD: 53.5 % (ref 38–73)
NITRITE UR QL STRIP: NEGATIVE
NRBC BLD-RTO: 0 /100 WBC
PH UR STRIP: 6 [PH] (ref 5–8)
PLATELET # BLD AUTO: 203 K/UL (ref 150–450)
PMV BLD AUTO: 11.2 FL (ref 9.2–12.9)
POTASSIUM SERPL-SCNC: 4.3 MMOL/L (ref 3.5–5.1)
PROT SERPL-MCNC: 6.6 G/DL (ref 6–8.4)
PROT UR QL STRIP: NEGATIVE
PROT UR-MCNC: 9 MG/DL (ref 0–15)
PROT/CREAT UR: 0.07 MG/G{CREAT} (ref 0–0.2)
PROTHROMBIN TIME: 11.2 SEC (ref 9–12.5)
RBC # BLD AUTO: 4.46 M/UL (ref 4–5.4)
RBC #/AREA URNS AUTO: 0 /HPF (ref 0–4)
SODIUM SERPL-SCNC: 143 MMOL/L (ref 136–145)
SP GR UR STRIP: 1.02 (ref 1–1.03)
SQUAMOUS #/AREA URNS AUTO: 3 /HPF
URN SPEC COLLECT METH UR: ABNORMAL
WBC # BLD AUTO: 7.44 K/UL (ref 3.9–12.7)
WBC #/AREA URNS AUTO: 3 /HPF (ref 0–5)

## 2023-12-14 PROCEDURE — 85025 COMPLETE CBC W/AUTO DIFF WBC: CPT | Mod: HCNC | Performed by: NURSE PRACTITIONER

## 2023-12-14 PROCEDURE — 82570 ASSAY OF URINE CREATININE: CPT | Mod: HCNC | Performed by: INTERNAL MEDICINE

## 2023-12-14 PROCEDURE — 80053 COMPREHEN METABOLIC PANEL: CPT | Mod: HCNC | Performed by: NURSE PRACTITIONER

## 2023-12-14 PROCEDURE — 36415 COLL VENOUS BLD VENIPUNCTURE: CPT | Mod: HCNC,PO | Performed by: NURSE PRACTITIONER

## 2023-12-14 PROCEDURE — 85610 PROTHROMBIN TIME: CPT | Mod: HCNC | Performed by: NURSE PRACTITIONER

## 2023-12-14 PROCEDURE — 85652 RBC SED RATE AUTOMATED: CPT | Mod: HCNC | Performed by: INTERNAL MEDICINE

## 2023-12-14 PROCEDURE — 86140 C-REACTIVE PROTEIN: CPT | Mod: HCNC | Performed by: INTERNAL MEDICINE

## 2023-12-14 PROCEDURE — 81001 URINALYSIS AUTO W/SCOPE: CPT | Mod: HCNC | Performed by: INTERNAL MEDICINE

## 2023-12-14 PROCEDURE — 82105 ALPHA-FETOPROTEIN SERUM: CPT | Mod: HCNC | Performed by: NURSE PRACTITIONER

## 2023-12-16 DIAGNOSIS — H40.1131 PRIMARY OPEN ANGLE GLAUCOMA OF BOTH EYES, MILD STAGE: ICD-10-CM

## 2023-12-19 RX ORDER — LATANOPROST 50 UG/ML
SOLUTION/ DROPS OPHTHALMIC
Qty: 2.5 ML | Refills: 3 | Status: SHIPPED | OUTPATIENT
Start: 2023-12-19 | End: 2024-03-25

## 2023-12-23 DIAGNOSIS — H40.1131 PRIMARY OPEN ANGLE GLAUCOMA OF BOTH EYES, MILD STAGE: ICD-10-CM

## 2023-12-27 RX ORDER — DORZOLAMIDE HCL 20 MG/ML
SOLUTION/ DROPS OPHTHALMIC
Qty: 30 ML | Refills: 4 | Status: SHIPPED | OUTPATIENT
Start: 2023-12-27

## 2024-01-11 DIAGNOSIS — Z00.00 ENCOUNTER FOR MEDICARE ANNUAL WELLNESS EXAM: ICD-10-CM

## 2024-01-13 DIAGNOSIS — H40.1131 PRIMARY OPEN ANGLE GLAUCOMA OF BOTH EYES, MILD STAGE: ICD-10-CM

## 2024-01-16 RX ORDER — BRIMONIDINE TARTRATE 2 MG/ML
1 SOLUTION/ DROPS OPHTHALMIC 3 TIMES DAILY
Qty: 10 ML | Refills: 12 | Status: SHIPPED | OUTPATIENT
Start: 2024-01-16

## 2024-01-17 RX ORDER — PREDNISONE 5 MG/1
5 TABLET ORAL DAILY
Qty: 30 TABLET | Refills: 1 | Status: SHIPPED | OUTPATIENT
Start: 2024-01-17 | End: 2024-03-20

## 2024-01-17 RX ORDER — PREDNISONE 5 MG/1
5 TABLET ORAL DAILY
Qty: 30 TABLET | Refills: 1 | Status: SHIPPED | OUTPATIENT
Start: 2024-01-17 | End: 2024-01-17

## 2024-01-23 ENCOUNTER — PATIENT MESSAGE (OUTPATIENT)
Dept: RHEUMATOLOGY | Facility: CLINIC | Age: 69
End: 2024-01-23
Payer: MEDICARE

## 2024-02-09 ENCOUNTER — PATIENT MESSAGE (OUTPATIENT)
Dept: RHEUMATOLOGY | Facility: CLINIC | Age: 69
End: 2024-02-09
Payer: MEDICARE

## 2024-02-15 DIAGNOSIS — M31.6 GCA (GIANT CELL ARTERITIS): ICD-10-CM

## 2024-02-15 RX ORDER — TOCILIZUMAB 180 MG/ML
162 INJECTION, SOLUTION SUBCUTANEOUS
Qty: 12 EACH | Refills: 0 | Status: SHIPPED | OUTPATIENT
Start: 2024-02-15 | End: 2024-02-20 | Stop reason: SDUPTHER

## 2024-02-16 ENCOUNTER — PATIENT MESSAGE (OUTPATIENT)
Dept: RHEUMATOLOGY | Facility: CLINIC | Age: 69
End: 2024-02-16
Payer: MEDICARE

## 2024-02-20 DIAGNOSIS — M31.6 GCA (GIANT CELL ARTERITIS): ICD-10-CM

## 2024-02-20 RX ORDER — TOCILIZUMAB 180 MG/ML
162 INJECTION, SOLUTION SUBCUTANEOUS
Qty: 12 EACH | Refills: 0 | Status: SHIPPED | OUTPATIENT
Start: 2024-02-20 | End: 2024-06-03 | Stop reason: SDUPTHER

## 2024-02-26 ENCOUNTER — TELEPHONE (OUTPATIENT)
Dept: RHEUMATOLOGY | Facility: CLINIC | Age: 69
End: 2024-02-26
Payer: MEDICARE

## 2024-02-26 ENCOUNTER — PATIENT MESSAGE (OUTPATIENT)
Dept: RHEUMATOLOGY | Facility: CLINIC | Age: 69
End: 2024-02-26
Payer: MEDICARE

## 2024-02-26 RX ORDER — PREDNISONE 1 MG/1
5 TABLET ORAL DAILY
Qty: 90 TABLET | Refills: 1 | Status: SHIPPED | OUTPATIENT
Start: 2024-02-26 | End: 2024-03-22

## 2024-02-26 NOTE — TELEPHONE ENCOUNTER
Please find out how much prednisone she is currently taking so prednisone refill request is accurate Thank you BELIA

## 2024-03-15 ENCOUNTER — LAB VISIT (OUTPATIENT)
Dept: LAB | Facility: HOSPITAL | Age: 69
End: 2024-03-15
Attending: INTERNAL MEDICINE
Payer: MEDICARE

## 2024-03-15 DIAGNOSIS — M31.6 GCA (GIANT CELL ARTERITIS): ICD-10-CM

## 2024-03-15 LAB
ALBUMIN SERPL BCP-MCNC: 3.9 G/DL (ref 3.5–5.2)
ALP SERPL-CCNC: 61 U/L (ref 55–135)
ALT SERPL W/O P-5'-P-CCNC: 75 U/L (ref 10–44)
ANION GAP SERPL CALC-SCNC: 9 MMOL/L (ref 8–16)
AST SERPL-CCNC: 50 U/L (ref 10–40)
BASOPHILS # BLD AUTO: 0.05 K/UL (ref 0–0.2)
BASOPHILS NFR BLD: 0.6 % (ref 0–1.9)
BILIRUB SERPL-MCNC: 1.6 MG/DL (ref 0.1–1)
BUN SERPL-MCNC: 13 MG/DL (ref 8–23)
CALCIUM SERPL-MCNC: 9.5 MG/DL (ref 8.7–10.5)
CHLORIDE SERPL-SCNC: 110 MMOL/L (ref 95–110)
CO2 SERPL-SCNC: 24 MMOL/L (ref 23–29)
CREAT SERPL-MCNC: 0.9 MG/DL (ref 0.5–1.4)
CRP SERPL-MCNC: 0.7 MG/L (ref 0–8.2)
DIFFERENTIAL METHOD BLD: NORMAL
EOSINOPHIL # BLD AUTO: 0.3 K/UL (ref 0–0.5)
EOSINOPHIL NFR BLD: 3.1 % (ref 0–8)
ERYTHROCYTE [DISTWIDTH] IN BLOOD BY AUTOMATED COUNT: 12.8 % (ref 11.5–14.5)
ERYTHROCYTE [SEDIMENTATION RATE] IN BLOOD BY PHOTOMETRIC METHOD: <2 MM/HR (ref 0–36)
EST. GFR  (NO RACE VARIABLE): >60 ML/MIN/1.73 M^2
GLUCOSE SERPL-MCNC: 115 MG/DL (ref 70–110)
HCT VFR BLD AUTO: 38.7 % (ref 37–48.5)
HGB BLD-MCNC: 12.9 G/DL (ref 12–16)
IMM GRANULOCYTES # BLD AUTO: 0.04 K/UL (ref 0–0.04)
IMM GRANULOCYTES NFR BLD AUTO: 0.5 % (ref 0–0.5)
LYMPHOCYTES # BLD AUTO: 2.7 K/UL (ref 1–4.8)
LYMPHOCYTES NFR BLD: 33.2 % (ref 18–48)
MCH RBC QN AUTO: 30.9 PG (ref 27–31)
MCHC RBC AUTO-ENTMCNC: 33.3 G/DL (ref 32–36)
MCV RBC AUTO: 93 FL (ref 82–98)
MONOCYTES # BLD AUTO: 0.6 K/UL (ref 0.3–1)
MONOCYTES NFR BLD: 7 % (ref 4–15)
NEUTROPHILS # BLD AUTO: 4.5 K/UL (ref 1.8–7.7)
NEUTROPHILS NFR BLD: 55.6 % (ref 38–73)
NRBC BLD-RTO: 0 /100 WBC
PLATELET # BLD AUTO: 190 K/UL (ref 150–450)
PMV BLD AUTO: 11.1 FL (ref 9.2–12.9)
POTASSIUM SERPL-SCNC: 4.1 MMOL/L (ref 3.5–5.1)
PROT SERPL-MCNC: 6.5 G/DL (ref 6–8.4)
RBC # BLD AUTO: 4.18 M/UL (ref 4–5.4)
SODIUM SERPL-SCNC: 143 MMOL/L (ref 136–145)
WBC # BLD AUTO: 8.05 K/UL (ref 3.9–12.7)

## 2024-03-15 PROCEDURE — 86140 C-REACTIVE PROTEIN: CPT | Mod: HCNC | Performed by: INTERNAL MEDICINE

## 2024-03-15 PROCEDURE — 80053 COMPREHEN METABOLIC PANEL: CPT | Mod: HCNC | Performed by: INTERNAL MEDICINE

## 2024-03-15 PROCEDURE — 85652 RBC SED RATE AUTOMATED: CPT | Mod: HCNC | Performed by: INTERNAL MEDICINE

## 2024-03-15 PROCEDURE — 36415 COLL VENOUS BLD VENIPUNCTURE: CPT | Mod: HCNC | Performed by: INTERNAL MEDICINE

## 2024-03-15 PROCEDURE — 85025 COMPLETE CBC W/AUTO DIFF WBC: CPT | Mod: HCNC | Performed by: INTERNAL MEDICINE

## 2024-03-20 RX ORDER — PREDNISONE 5 MG/1
5 TABLET ORAL
Qty: 30 TABLET | Refills: 1 | Status: SHIPPED | OUTPATIENT
Start: 2024-03-20 | End: 2024-03-22 | Stop reason: SDUPTHER

## 2024-03-22 ENCOUNTER — HOSPITAL ENCOUNTER (OUTPATIENT)
Dept: RADIOLOGY | Facility: CLINIC | Age: 69
Discharge: HOME | End: 2024-03-22
Attending: INTERNAL MEDICINE
Payer: MEDICARE

## 2024-03-22 ENCOUNTER — OFFICE VISIT (OUTPATIENT)
Dept: RHEUMATOLOGY | Facility: CLINIC | Age: 69
End: 2024-03-22
Payer: MEDICARE

## 2024-03-22 VITALS
DIASTOLIC BLOOD PRESSURE: 80 MMHG | WEIGHT: 268.94 LBS | SYSTOLIC BLOOD PRESSURE: 129 MMHG | BODY MASS INDEX: 47.65 KG/M2 | HEART RATE: 82 BPM | HEIGHT: 63 IN

## 2024-03-22 DIAGNOSIS — E66.01 MORBID OBESITY WITH BMI OF 40.0-44.9, ADULT: ICD-10-CM

## 2024-03-22 DIAGNOSIS — H46.9 OPTIC NEURITIS: ICD-10-CM

## 2024-03-22 DIAGNOSIS — H54.7 VISUAL LOSS: ICD-10-CM

## 2024-03-22 DIAGNOSIS — E55.9 VITAMIN D DEFICIENCY: ICD-10-CM

## 2024-03-22 DIAGNOSIS — R10.13 EPIGASTRIC ABDOMINAL PAIN: ICD-10-CM

## 2024-03-22 DIAGNOSIS — K76.0 FATTY LIVER: ICD-10-CM

## 2024-03-22 DIAGNOSIS — H54.10 BLINDNESS AND LOW VISION: ICD-10-CM

## 2024-03-22 DIAGNOSIS — Z78.0 MENOPAUSE: Primary | ICD-10-CM

## 2024-03-22 DIAGNOSIS — R53.83 FATIGUE, UNSPECIFIED TYPE: ICD-10-CM

## 2024-03-22 DIAGNOSIS — D84.9 IMMUNOSUPPRESSION: ICD-10-CM

## 2024-03-22 DIAGNOSIS — E11.9 TYPE 2 DIABETES MELLITUS WITHOUT COMPLICATIONS: ICD-10-CM

## 2024-03-22 DIAGNOSIS — E66.9 OBESITY, UNSPECIFIED CLASSIFICATION, UNSPECIFIED OBESITY TYPE, UNSPECIFIED WHETHER SERIOUS COMORBIDITY PRESENT: ICD-10-CM

## 2024-03-22 DIAGNOSIS — M31.5 GIANT CELL ARTERITIS WITH POLYMYALGIA RHEUMATICA: ICD-10-CM

## 2024-03-22 DIAGNOSIS — H20.9 UVEITIS: ICD-10-CM

## 2024-03-22 DIAGNOSIS — S99.921S FOOT INJURY, RIGHT, SEQUELA: ICD-10-CM

## 2024-03-22 DIAGNOSIS — Z79.52 CURRENT CHRONIC USE OF SYSTEMIC STEROIDS: ICD-10-CM

## 2024-03-22 DIAGNOSIS — E78.5 HYPERLIPIDEMIA, UNSPECIFIED HYPERLIPIDEMIA TYPE: ICD-10-CM

## 2024-03-22 PROBLEM — Z86.39 HISTORY OF DIABETES MELLITUS: Status: RESOLVED | Noted: 2022-07-19 | Resolved: 2024-03-22

## 2024-03-22 PROBLEM — E11.59 HYPERTENSION ASSOCIATED WITH TYPE 2 DIABETES MELLITUS: Status: ACTIVE | Noted: 2024-03-22

## 2024-03-22 PROBLEM — I15.2 HYPERTENSION ASSOCIATED WITH TYPE 2 DIABETES MELLITUS: Status: ACTIVE | Noted: 2024-03-22

## 2024-03-22 PROBLEM — E11.69 TYPE 2 DIABETES MELLITUS WITH HYPERLIPIDEMIA: Status: ACTIVE | Noted: 2024-03-22

## 2024-03-22 PROCEDURE — 1101F PT FALLS ASSESS-DOCD LE1/YR: CPT | Mod: CPTII,S$GLB,, | Performed by: INTERNAL MEDICINE

## 2024-03-22 PROCEDURE — 3074F SYST BP LT 130 MM HG: CPT | Mod: CPTII,S$GLB,, | Performed by: INTERNAL MEDICINE

## 2024-03-22 PROCEDURE — 77080 DXA BONE DENSITY AXIAL: CPT | Mod: 26,,, | Performed by: INTERNAL MEDICINE

## 2024-03-22 PROCEDURE — 3008F BODY MASS INDEX DOCD: CPT | Mod: CPTII,S$GLB,, | Performed by: INTERNAL MEDICINE

## 2024-03-22 PROCEDURE — 4010F ACE/ARB THERAPY RXD/TAKEN: CPT | Mod: CPTII,S$GLB,, | Performed by: INTERNAL MEDICINE

## 2024-03-22 PROCEDURE — 99999 PR PBB SHADOW E&M-EST. PATIENT-LVL V: CPT | Mod: PBBFAC,HCNC,, | Performed by: INTERNAL MEDICINE

## 2024-03-22 PROCEDURE — 99213 OFFICE O/P EST LOW 20 MIN: CPT | Mod: S$GLB,,, | Performed by: INTERNAL MEDICINE

## 2024-03-22 PROCEDURE — 77080 DXA BONE DENSITY AXIAL: CPT | Mod: TC,HCNC

## 2024-03-22 PROCEDURE — 1125F AMNT PAIN NOTED PAIN PRSNT: CPT | Mod: CPTII,S$GLB,, | Performed by: INTERNAL MEDICINE

## 2024-03-22 PROCEDURE — 3288F FALL RISK ASSESSMENT DOCD: CPT | Mod: CPTII,S$GLB,, | Performed by: INTERNAL MEDICINE

## 2024-03-22 PROCEDURE — 3079F DIAST BP 80-89 MM HG: CPT | Mod: CPTII,S$GLB,, | Performed by: INTERNAL MEDICINE

## 2024-03-22 PROCEDURE — 3072F LOW RISK FOR RETINOPATHY: CPT | Mod: CPTII,S$GLB,, | Performed by: INTERNAL MEDICINE

## 2024-03-22 PROCEDURE — 1157F ADVNC CARE PLAN IN RCRD: CPT | Mod: CPTII,S$GLB,, | Performed by: INTERNAL MEDICINE

## 2024-03-22 PROCEDURE — 1159F MED LIST DOCD IN RCRD: CPT | Mod: CPTII,S$GLB,, | Performed by: INTERNAL MEDICINE

## 2024-03-22 RX ORDER — PRAVASTATIN SODIUM 40 MG/1
40 TABLET ORAL NIGHTLY
Qty: 90 TABLET | Refills: 3 | Status: SHIPPED | OUTPATIENT
Start: 2024-03-22

## 2024-03-22 RX ORDER — PREDNISONE 5 MG/1
5 TABLET ORAL DAILY
Qty: 30 TABLET | Refills: 1 | Status: SHIPPED | OUTPATIENT
Start: 2024-03-22

## 2024-03-22 RX ORDER — ERGOCALCIFEROL 1.25 MG/1
CAPSULE ORAL
Qty: 12 CAPSULE | Refills: 3 | Status: SHIPPED | OUTPATIENT
Start: 2024-03-22

## 2024-03-22 NOTE — PROGRESS NOTES
Patient ID:  Genesis Ugalde    YOB: 1955     MRN:  [id number]     Subjective:     Chief Complaint: GCA, optic neuritis     History of Present Illness:  Patient is a 68 y.o. female with GCA (significant loss of vision) who presents today for follow up. LCV was 9/2023. At that time, she was experincing BL temporal headaches with radiation of pain to her jaw, as well as neck, shoulder, and hip pain.     Today: She caught the flu earlier this month and still doesn't feel well with generalized musche aches and pains. Today was her first day back on actemra since having the flu. Otherwise, she endorses taking actemra weekly as prescribed without side effect or concern. She also endorses taking prednisone 5 mg daily as prescribed. She has a DXA scheduled for today. She endorses she experiences headaches everyday, however they fluctuate in severity. They are severe every few weeks during a flare up. She also endorses constant shoulder and neck pain, and intermittent hip pain that primarily occurs with flare ups. She reports shoulder and hand weakness and states she drops things occasionally.     Vaccines: UTD on Covid, PNA, Tdap, Shingles, flu. In need of RSV.   Diet: Denies any formal diet  Exercise: Denies formal exercise    Denies hair loss, dry eyes, oral/nasal ulcers, new rashes, joint swelling, morning stiffness, or GI disturbances.      Review of Systems   Review of Systems   Constitutional:  Negative for fever and unexpected weight change.   HENT:  Positive for trouble swallowing. Negative for mouth sores.         Dry mouth   Eyes:  Negative for redness.   Respiratory:  Negative for cough and shortness of breath.    Cardiovascular:  Negative for chest pain.   Gastrointestinal:  Positive for abdominal pain and constipation. Negative for diarrhea.   Skin:  Negative for rash.   Neurological:  Positive for headaches.   Hematological:  Does not bruise/bleed easily.        Current Medications:    Current  Outpatient Medications:     acyclovir (ZOVIRAX) 400 MG tablet, Take 400 mg by mouth 2 (two) times daily., Disp: , Rfl:     amlodipine-benazepril (LOTREL) 10-40 mg per capsule, Take 1 capsule by mouth once daily., Disp: , Rfl:     aspirin (ECOTRIN) 325 MG EC tablet, Take 325 mg by mouth once daily., Disp: , Rfl:     brimonidine 0.2% (ALPHAGAN) 0.2 % Drop, Place 1 drop into both eyes 3 (three) times daily., Disp: 10 mL, Rfl: 12    clonazePAM (KLONOPIN) 1 MG tablet, Take 1 mg by mouth 2 (two) times daily as needed., Disp: , Rfl:     dorzolamide (TRUSOPT) 2 % ophthalmic solution, INSTILL 1 DROP INTO BOTH EYES 3 TIMES A DAY, Disp: 30 mL, Rfl: 4    latanoprost 0.005 % ophthalmic solution, INSTILL 1 DROP INTO BOTH EYES IN THE EVENING, Disp: 2.5 mL, Rfl: 3    metoprolol succinate (TOPROL-XL) 100 MG 24 hr tablet, Take 100 mg by mouth once daily., Disp: , Rfl:     tocilizumab (ACTEMRA) 162 mg/0.9 mL injection, Inject 0.9 mLs (162 mg total) into the skin every 7 days., Disp: 12 each, Rfl: 0    traMADoL (ULTRAM) 50 mg tablet, Take 50 mg by mouth 2 (two) times daily as needed., Disp: , Rfl:     ergocalciferol (VITAMIN D2) 50,000 unit Cap, TAKE 1 CAPSULE (50,000 UNITS TOTAL) BY MOUTH EVERY 7 DAYS., Disp: 12 capsule, Rfl: 3    omeprazole (PRILOSEC) 40 MG capsule, TAKE 1 CAPSULE (40 MG TOTAL) BY MOUTH EVERY 12 (TWELVE) HOURS. TAKE ONE PILL EVERY MORNING 45 MINUTES BEFORE BREAKFAST, Disp: 180 capsule, Rfl: 0    pravastatin (PRAVACHOL) 40 MG tablet, Take 1 tablet (40 mg total) by mouth every evening., Disp: 90 tablet, Rfl: 3    predniSONE (DELTASONE) 5 MG tablet, Take 1 tablet (5 mg total) by mouth once daily., Disp: 30 tablet, Rfl: 1     Objective:     Vitals:    03/22/24 0827   BP: 129/80   Pulse: 82        Body mass index is 47.64 kg/m².     Physical Exam   Constitutional: She is oriented to person, place, and time. normal appearance. No distress.   HENT:   Head: Normocephalic and atraumatic.   Eyes: Right eye exhibits no  discharge. Left eye exhibits no discharge. No scleral icterus.   Cardiovascular: Normal rate, regular rhythm and normal heart sounds. Exam reveals no gallop and no friction rub.   No murmur heard.  Pulmonary/Chest: No respiratory distress. She has no wheezes. She has no rhonchi. She has no rales.   Abdominal: Soft. She exhibits no distension. There is no abdominal tenderness.   Musculoskeletal:         General: Tenderness present. No swelling.      Right shoulder: Tenderness present.      Left shoulder: Tenderness present.      Right elbow: Tenderness present.      Left elbow: Tenderness present.      Right wrist: Tenderness present.      Left wrist: Tenderness present.      Cervical back: Normal range of motion. No rigidity.      Right knee: Normal.      Left knee: Normal.      Comments: TTP of neck, temporals(L>R), B/L shoulders, B/L elbows, left 1st CMC, B/L GTB   Neurological: She is alert and oriented to person, place, and time.   Skin: Skin is warm and dry.       Right Side Rheumatological Exam     Examination finds the knee normal.    The patient is tender to palpation of the shoulder, elbow, wrist, 1st PIP, 1st MCP, 2nd PIP, 2nd MCP, 3rd PIP, 3rd MCP, 4th PIP, 4th MCP, 5th PIP and 5th MCP    Left Side Rheumatological Exam     Examination finds the knee normal.    The patient is tender to palpation of the shoulder, elbow, wrist, 1st PIP, 1st MCP, 2nd PIP, 2nd MCP, 3rd PIP, 3rd MCP, 4th PIP, 4th MCP, 5th PIP and 5th MCP.             7/13/2021 9/15/2023   Tender (MILLER-28) 0 / 28  26 / 28    Swollen (MILLER-28) 0 / 28  0 / 28    Provider Global -- --   Patient Global 50 mm 60 mm   ESR -- --   CRP 0.7 mg/L --   MILLER-28 (ESR) -- --   MILLER-28 (CRP) 1.85 (Remission) --   CDAI Score -- --        There is currently no information documented on the homunculus. Go to the Rheumatology activity and complete the homunculus joint exam.      Assessment:     S/p GCA/PMR flare off Actemra for 5 wks. She stopped prednisone 1-2 wks  ago rather than holding at 10mg daily  Chronic inflammatory  Optic neuritis with blindness stable  Primary open angle glaucoma  Cirrhosis, NAFLD, hepatomegaly saw Dr. Fitzgerald today  Dysphagia, GERD persists needs to f/u with Gastro  /84  Class 3 obesity gained 17# since last visit  Body mass index is 46.41 kg/m².  T2 DM Hb A1c 5.5 2/6/23  Dysuria     Plan:      2. A1c, TSH, Lipid panel, Vitamin D today  3. Podiatry referral and right foot XR  4. Continue prednisone 5mg daily until next appointment  5. DXA  today  6. F/u Dr Dykes in Gastro for persistent dysphagia GERD. Continue omeprazole 40 mg daily  7. Cont F/u Dr. Ro in Ophthalmology    8. Cont tocilizumab 162mg sc weekly   9. Cont vitamin D2 50,000 units once a week  10. Mediterranean diet  11. Exercise as tolerated  12. RTC 3 mo with standing labs     Elsy Decker, DO  PGY-1  [facility] [facility]

## 2024-03-22 NOTE — PROGRESS NOTES
Answers submitted by the patient for this visit:  Rheumatology Questionnaire (Submitted on 3/21/2024)  fever: No  eye redness: No  mouth sores: No  headaches: Yes  shortness of breath: Yes  chest pain: Yes  trouble swallowing: Yes  diarrhea: No  constipation: No  unexpected weight change: No  genital sore: No  dysuria: No  During the last 3 days, have you had a skin rash?: No  Bruises or bleeds easily: No  cough: No      3/21/2024     8:20 PM   Rapid3 Question Responses and Scores   MDHAQ Score 1.5   Psychologic Score 5.5   Pain Score 5   When you awakened in the morning OVER THE LAST WEEK, did you feel stiff? Yes   If Yes, please indicate the number of hours until you are as limber as you will be for the day 1   Fatigue Score 7   Global Health Score 5   RAPID3 Score 5

## 2024-03-22 NOTE — PROGRESS NOTES
I have personally taken the history and examined the patient and agree with the resident,s note as stated above         Latest Reference Range & Units 03/15/24 10:04   WBC 3.90 - 12.70 K/uL 8.05   RBC 4.00 - 5.40 M/uL 4.18   Hemoglobin 12.0 - 16.0 g/dL 12.9   Hematocrit 37.0 - 48.5 % 38.7   MCV 82 - 98 fL 93   MCH 27.0 - 31.0 pg 30.9   MCHC 32.0 - 36.0 g/dL 33.3   RDW 11.5 - 14.5 % 12.8   Platelet Count 150 - 450 K/uL 190   MPV 9.2 - 12.9 fL 11.1   Gran % 38.0 - 73.0 % 55.6   Lymph % 18.0 - 48.0 % 33.2   Mono % 4.0 - 15.0 % 7.0   Eos % 0.0 - 8.0 % 3.1   Basophil % 0.0 - 1.9 % 0.6   Immature Granulocytes 0.0 - 0.5 % 0.5   Gran # (ANC) 1.8 - 7.7 K/uL 4.5   Lymph # 1.0 - 4.8 K/uL 2.7   Mono # 0.3 - 1.0 K/uL 0.6   Eos # 0.0 - 0.5 K/uL 0.3   Baso # 0.00 - 0.20 K/uL 0.05   Immature Grans (Abs) 0.00 - 0.04 K/uL 0.04   nRBC 0 /100 WBC 0   Differential Method  Automated   Sed Rate 0 - 36 mm/Hr <2   Sodium 136 - 145 mmol/L 143   Potassium 3.5 - 5.1 mmol/L 4.1   Chloride 95 - 110 mmol/L 110   CO2 23 - 29 mmol/L 24   Anion Gap 8 - 16 mmol/L 9   BUN 8 - 23 mg/dL 13   Creatinine 0.5 - 1.4 mg/dL 0.9   eGFR >60 mL/min/1.73 m^2 >60   Glucose 70 - 110 mg/dL 115 (H)   Calcium 8.7 - 10.5 mg/dL 9.5   ALP 55 - 135 U/L 61   PROTEIN TOTAL 6.0 - 8.4 g/dL 6.5   Albumin 3.5 - 5.2 g/dL 3.9   BILIRUBIN TOTAL 0.1 - 1.0 mg/dL 1.6 (H)   AST 10 - 40 U/L 50 (H)   ALT 10 - 44 U/L 75 (H)   CRP 0.0 - 8.2 mg/L 0.7   Specimen UA  Urine, Clean Catch   Color, UA Yellow, Straw, Lisa  Yellow   Appearance, UA Clear  Clear   Specific Gravity, UA 1.005 - 1.030  >1.030 !   pH, UA 5.0 - 8.0  6.0   Protein, UA Negative  Trace !   Glucose, UA Negative  Negative   Ketones, UA Negative  Negative   Blood, UA Negative  1+ !   NITRITE UA Negative  Negative   UROBILINOGEN UA <2.0 EU/dL Negative   Bilirubin (UA) Negative  Negative   Leukocyte Esterase, UA Negative  Negative   RBC, UA 0 - 4 /hpf 1   WBC, UA 0 - 5 /hpf 2   Squam Epithel, UA /hpf 2   Microscopic Comment   SEE COMMENT   Urine Creatinine 15.0 - 325.0 mg/dL 318.9   Urine Protein 0 - 15 mg/dL 16 (H)   Prot/Creat Ratio, Urine 0.00 - 0.20  0.05   (H): Data is abnormally high  !: Data is abnormal          EGD 3/28/23:   Impression:            - Normal esophagus.                          - Z-line regular, 36 cm from the incisors.                          - Gastritis. Biopsied.                          - Erythematous duodenopathy.                          - Biopsies were taken with a cold forceps for                          evaluation of eosinophilic esophagitis.     1. Stomach, biopsy:   - Gastric antral and oxyntic mucosa with moderate chronic focally active   gastritis   - No Helicobacter pylori organisms identified on immunohistochemical stain   - Negative for intestinal metaplasia and dysplasia   - See comment   2. Esophagus, distal, biopsy:   - Squamous mucosa with no diagnostic histopathologic alterations   - Negative for increased intraepithelial eosinophils   3. Esophagus, proximal, biopsy:   - Squamous mucosa with no diagnostic histopathologic alterations   - Negative for increased intraepithelial eosinophils           Saw Brooke Fitzgerald 6/15/23:      Recommend MELD labs every 6 months to monitor liver function, next due 12/2023.  - Recommend abdominal imaging with AFP tumor marker every 6 months for HCC surveillance, due now.  - Recommend Upper endoscopy every 2-3 years for variceal surveillance.  - Recommend additional weight loss of 25 lbs, through diet and exercise.  - Recommend low sodium (2 grams), high protein diet (115 grams per day).  - Recommend good control of blood pressure, blood sugar and lipids.  - Return to clinic in 1 year, sooner if needed.    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 Fitzgerald today  Dysphagia, GERD  odynophagia persists needs to f/u with Dr. Dykes in   Gastro  /80 on metoprolol-XL 100mg daily, amlodipine-benazepril 10-40 daily   Class 3 obesity   Body mass index is 47.64 kg/m². Gained 6# since Sept s023  T2 DM Hb A1c 5.5 2/6/23  Hyperlipidemia LDL 80.8   2/6/23 on pravastatin 40mg nightly   Vitamin D deficiency  19 7/13/21 on vitamin D2 50,000 IU weekly   right dorsal foot pain     X-ray right foot  Ref to Podiatry  Vitamin D, lipid panel TSH, free T4 HbA1c  *Covid vaccine  *RSV vaccine  Prednisone 5mg daily  Tocilizumab 162mg sc weekly  DXA  as previously ordered today  Cont F/u Dr. Ro in Ophthalmology    F/u Dr. Dykes in Gastro for dysphagia, ondynophagia and GERD  Cont vitamin D2 50,000 units once a week  F/u Brooke Fitzgerald in  mid-June per above  [id number] contreras Mediterranean diet, discussed and reviewed  RTC 3 months with standing labs

## 2024-03-22 NOTE — PATIENT INSTRUCTIONS
X-ray right foot  Ref to Podiatry  Vitamin D, lipid panel TSH, free T4 HbA1c  *Covid vaccine  *RSV vaccine  Prednisone 5mg daily  Tocilizumab 162mg sc weekly  DXA  as previously ordered today  Cont F/u Dr. Ro in Ophthalmology    F/u Dr. Dykes in Gastro for dysphagia, ondynophagia and GERD  Cont vitamin D2 50,000 units once a week  F/u Brooke Fitzgerald in  mid-June per above  [id number] contreras Mediterranean diet, discussed and reviewed  RTC 3 months with standing labs

## 2024-03-22 NOTE — PROGRESS NOTES
Genesis Ugalde presented for a  Medicare AWV and comprehensive Health Risk Assessment today. The following components were reviewed and updated:    Medical history  Family History  Social history  Allergies and Current Medications  Health Risk Assessment  Health Maintenance  Care Team         ** See Completed Assessments for Annual Wellness Visit within the encounter summary.**         The following assessments were completed:  Living Situation  CAGE  Depression Screening  Timed Get Up and Go  Whisper Test  Cognitive Function Screening - patient declined testing today   Nutrition Screening  ADL Screening  PAQ Screening      Review for Opioid Screening: Pt does have Rx for Opioids  , takes PRN    Review for Substance Use Disorders: Patient does not use substance        Current Outpatient Medications:     amlodipine-benazepril (LOTREL) 10-40 mg per capsule, Take 1 capsule by mouth once daily., Disp: , Rfl:     aspirin (ECOTRIN) 325 MG EC tablet, Take 325 mg by mouth once daily., Disp: , Rfl:     brimonidine 0.2% (ALPHAGAN) 0.2 % Drop, Place 1 drop into both eyes 3 (three) times daily., Disp: 10 mL, Rfl: 12    clonazePAM (KLONOPIN) 1 MG tablet, Take 1 mg by mouth 2 (two) times daily as needed., Disp: , Rfl:     dorzolamide (TRUSOPT) 2 % ophthalmic solution, INSTILL 1 DROP INTO BOTH EYES 3 TIMES A DAY, Disp: 30 mL, Rfl: 4    ergocalciferol (VITAMIN D2) 50,000 unit Cap, TAKE 1 CAPSULE (50,000 UNITS TOTAL) BY MOUTH EVERY 7 DAYS., Disp: 12 capsule, Rfl: 3    metoprolol succinate (TOPROL-XL) 100 MG 24 hr tablet, Take 100 mg by mouth once daily., Disp: , Rfl:     omeprazole (PRILOSEC) 40 MG capsule, TAKE 1 CAPSULE (40 MG TOTAL) BY MOUTH EVERY 12 (TWELVE) HOURS. TAKE ONE PILL EVERY MORNING 45 MINUTES BEFORE BREAKFAST, Disp: 180 capsule, Rfl: 0    pravastatin (PRAVACHOL) 40 MG tablet, Take 1 tablet (40 mg total) by mouth every evening., Disp: 90 tablet, Rfl: 3    predniSONE (DELTASONE) 5 MG tablet, Take 1 tablet (5 mg total)  "by mouth once daily., Disp: 30 tablet, Rfl: 1    tocilizumab (ACTEMRA) 162 mg/0.9 mL injection, Inject 0.9 mLs (162 mg total) into the skin every 7 days., Disp: 12 each, Rfl: 0    traMADoL (ULTRAM) 50 mg tablet, Take 50 mg by mouth 2 (two) times daily as needed., Disp: , Rfl:     acyclovir (ZOVIRAX) 400 MG tablet, Take 400 mg by mouth 2 (two) times daily., Disp: , Rfl:     latanoprost 0.005 % ophthalmic solution, Place 1 drop into both eyes every evening., Disp: 7.5 mL, Rfl: 4       Vitals:    03/25/24 1346   BP: 124/72   Pulse: 84   SpO2: 96%   Weight: 123 kg (271 lb 2.7 oz)   Height: 5' 3" (1.6 m)   PainSc: 0-No pain      Physical Exam  Vitals reviewed.   Constitutional:       General: She is not in acute distress.     Appearance: Normal appearance. She is obese. She is not ill-appearing.   HENT:      Head: Normocephalic and atraumatic.      Mouth/Throat:      Mouth: Mucous membranes are moist.   Eyes:      General: No scleral icterus.        Right eye: No discharge.         Left eye: No discharge.      Extraocular Movements: Extraocular movements intact.      Conjunctiva/sclera: Conjunctivae normal.   Cardiovascular:      Rate and Rhythm: Normal rate.   Pulmonary:      Effort: Pulmonary effort is normal.   Musculoskeletal:      Cervical back: Normal range of motion.      Right lower leg: No edema.      Left lower leg: No edema.   Skin:     General: Skin is warm and dry.      Findings: No rash.   Neurological:      Mental Status: She is alert and oriented to person, place, and time.   Psychiatric:         Mood and Affect: Mood normal.         Behavior: Behavior normal. Behavior is cooperative.         Cognition and Memory: Cognition and memory normal.             Diagnoses and health risks identified today and associated recommendations/orders:    1. Encounter for preventive health examination  - Chart reviewed. Problem list updated. Discussed current medical diagnosis, current medications, " medical/surgical/family/social history; updated provider list; documented vital signs; identified any cognitive impairment; and updated risk factor list. Addressed any outstanding health maintenance. Provided patient with personalized health advice. Continue to follow up with PCP and any specialists.     2. Hypertension associated with type 2 diabetes mellitus  Chronic; stable on current treatment plan; follow up with PCP  - taking lotrel    3. Giant cell arteritis with polymyalgia rheumatica  Chronic; stable on current treatment plan; follow up with PCP  - follow up with rheumatology and ophthalmology   - taking actemra and prednisone     4. Immunosuppression  Chronic; stable on current treatment plan; follow up with PCP  - follow up with rheumatology  - taking actemra and prednisone     5. Class 3 severe obesity without serious comorbidity with body mass index (BMI) of 45.0 to 49.9 in adult, unspecified obesity type  - Recommendation for healthy diet and increasing exercise as tolerated with goal of 150min/week . Recommend weight loss    6. 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 Type 2 diabetes mellitus without complication, without long-term current use of insulin  Chronic; stable on current treatment plan; follow up with PCP  - diet controlled     8. Type 2 diabetes mellitus with hyperlipidemia  Chronic; stable on current treatment plan; follow up with PCP  - taking statin    9. Primary open angle glaucoma of both eyes, unspecified glaucoma stage  Chronic; stable on current treatment plan; follow up with PCP  - follow up with ophthalmology     10. Blindness and low vision  Chronic; stable on current treatment plan; follow up with PCP  - follow up with ophthalmology     11. Cushing's disease  Chronic; stable on current treatment plan; follow up with PCP    12. Vitamin D deficiency  Chronic; stable on current treatment  plan; follow up with PCP  - taking vit d supplements     13. Gastroesophageal reflux disease, unspecified whether esophagitis present  Chronic; stable on current treatment plan; follow up with PCP  - taking ppi    14. LOYDA (obstructive sleep apnea)  Chronic; stable on current treatment plan; follow up with PCP        Provided Genesis with a 5-10 year written screening schedule and personal prevention plan. Recommendations were developed using the USPSTF age appropriate recommendations. Education, counseling, and referrals were provided as needed. After Visit Summary printed and given to patient which includes a list of additional screenings\tests needed.    Follow up in about 1 year (around 3/25/2025) for your next annual wellness visit.    Harmony Markham, YAHIRP-C    Advance Care Planning     I offered to discuss advanced care planning, including how to pick a person who would make decisions for you if you were unable to make them for yourself, called a health care power of , and what kind of decisions you might make such as use of life sustaining treatments such as ventilators and tube feeding when faced with a life limiting illness recorded on a living will that they will need to know. (How you want to be cared for as you near the end of your natural life)     X  Patient has advanced directives on file, which we reviewed, and they do not wish to make changes.

## 2024-03-25 ENCOUNTER — OFFICE VISIT (OUTPATIENT)
Dept: FAMILY MEDICINE | Facility: CLINIC | Age: 69
End: 2024-03-25
Payer: MEDICARE

## 2024-03-25 ENCOUNTER — HOSPITAL ENCOUNTER (OUTPATIENT)
Dept: RADIOLOGY | Facility: HOSPITAL | Age: 69
Discharge: HOME | End: 2024-03-25
Payer: MEDICARE

## 2024-03-25 ENCOUNTER — PATIENT MESSAGE (OUTPATIENT)
Dept: ADMINISTRATIVE | Facility: OTHER | Age: 69
End: 2024-03-25
Payer: MEDICARE

## 2024-03-25 ENCOUNTER — PATIENT OUTREACH (OUTPATIENT)
Dept: ADMINISTRATIVE | Facility: OTHER | Age: 69
End: 2024-03-25
Payer: MEDICARE

## 2024-03-25 VITALS
OXYGEN SATURATION: 96 % | SYSTOLIC BLOOD PRESSURE: 124 MMHG | DIASTOLIC BLOOD PRESSURE: 72 MMHG | HEART RATE: 84 BPM | WEIGHT: 271.19 LBS | BODY MASS INDEX: 48.05 KG/M2 | HEIGHT: 63 IN

## 2024-03-25 DIAGNOSIS — H54.10 BLINDNESS AND LOW VISION: ICD-10-CM

## 2024-03-25 DIAGNOSIS — M31.5 GIANT CELL ARTERITIS WITH POLYMYALGIA RHEUMATICA: ICD-10-CM

## 2024-03-25 DIAGNOSIS — E55.9 VITAMIN D DEFICIENCY: ICD-10-CM

## 2024-03-25 DIAGNOSIS — S99.921S FOOT INJURY, RIGHT, SEQUELA: ICD-10-CM

## 2024-03-25 DIAGNOSIS — K75.81 LIVER CIRRHOSIS SECONDARY TO NASH: ICD-10-CM

## 2024-03-25 DIAGNOSIS — E11.69 TYPE 2 DIABETES MELLITUS WITH HYPERLIPIDEMIA: ICD-10-CM

## 2024-03-25 DIAGNOSIS — E11.9 TYPE 2 DIABETES MELLITUS WITHOUT COMPLICATION, WITHOUT LONG-TERM CURRENT USE OF INSULIN: ICD-10-CM

## 2024-03-25 DIAGNOSIS — H40.1131 PRIMARY OPEN ANGLE GLAUCOMA OF BOTH EYES, MILD STAGE: ICD-10-CM

## 2024-03-25 DIAGNOSIS — D84.9 IMMUNOSUPPRESSION: ICD-10-CM

## 2024-03-25 DIAGNOSIS — E78.5 TYPE 2 DIABETES MELLITUS WITH HYPERLIPIDEMIA: ICD-10-CM

## 2024-03-25 DIAGNOSIS — E24.0 CUSHING'S DISEASE: ICD-10-CM

## 2024-03-25 DIAGNOSIS — H40.1130 PRIMARY OPEN ANGLE GLAUCOMA OF BOTH EYES, UNSPECIFIED GLAUCOMA STAGE: ICD-10-CM

## 2024-03-25 DIAGNOSIS — G47.33 OSA (OBSTRUCTIVE SLEEP APNEA): ICD-10-CM

## 2024-03-25 DIAGNOSIS — E11.59 HYPERTENSION ASSOCIATED WITH TYPE 2 DIABETES MELLITUS: ICD-10-CM

## 2024-03-25 DIAGNOSIS — I15.2 HYPERTENSION ASSOCIATED WITH TYPE 2 DIABETES MELLITUS: ICD-10-CM

## 2024-03-25 DIAGNOSIS — K74.60 LIVER CIRRHOSIS SECONDARY TO NASH: ICD-10-CM

## 2024-03-25 DIAGNOSIS — Z00.00 ENCOUNTER FOR PREVENTIVE HEALTH EXAMINATION: Primary | ICD-10-CM

## 2024-03-25 DIAGNOSIS — K21.9 GASTROESOPHAGEAL REFLUX DISEASE, UNSPECIFIED WHETHER ESOPHAGITIS PRESENT: ICD-10-CM

## 2024-03-25 DIAGNOSIS — E66.01 CLASS 3 SEVERE OBESITY WITHOUT SERIOUS COMORBIDITY WITH BODY MASS INDEX (BMI) OF 45.0 TO 49.9 IN ADULT, UNSPECIFIED OBESITY TYPE: ICD-10-CM

## 2024-03-25 PROCEDURE — 3074F SYST BP LT 130 MM HG: CPT | Mod: CPTII,S$GLB,, | Performed by: NURSE PRACTITIONER

## 2024-03-25 PROCEDURE — 1170F FXNL STATUS ASSESSED: CPT | Mod: CPTII,S$GLB,, | Performed by: NURSE PRACTITIONER

## 2024-03-25 PROCEDURE — 1126F AMNT PAIN NOTED NONE PRSNT: CPT | Mod: CPTII,S$GLB,, | Performed by: NURSE PRACTITIONER

## 2024-03-25 PROCEDURE — 3078F DIAST BP <80 MM HG: CPT | Mod: CPTII,S$GLB,, | Performed by: NURSE PRACTITIONER

## 2024-03-25 PROCEDURE — 1157F ADVNC CARE PLAN IN RCRD: CPT | Mod: CPTII,S$GLB,, | Performed by: NURSE PRACTITIONER

## 2024-03-25 PROCEDURE — 1159F MED LIST DOCD IN RCRD: CPT | Mod: CPTII,S$GLB,, | Performed by: NURSE PRACTITIONER

## 2024-03-25 PROCEDURE — 1160F RVW MEDS BY RX/DR IN RCRD: CPT | Mod: CPTII,S$GLB,, | Performed by: NURSE PRACTITIONER

## 2024-03-25 PROCEDURE — 99999 PR PBB SHADOW E&M-EST. PATIENT-LVL V: CPT | Mod: PBBFAC,,, | Performed by: NURSE PRACTITIONER

## 2024-03-25 PROCEDURE — 4010F ACE/ARB THERAPY RXD/TAKEN: CPT | Mod: CPTII,S$GLB,, | Performed by: NURSE PRACTITIONER

## 2024-03-25 PROCEDURE — 3288F FALL RISK ASSESSMENT DOCD: CPT | Mod: CPTII,S$GLB,, | Performed by: NURSE PRACTITIONER

## 2024-03-25 PROCEDURE — G0439 PPPS, SUBSEQ VISIT: HCPCS | Mod: S$GLB,,, | Performed by: NURSE PRACTITIONER

## 2024-03-25 PROCEDURE — 73630 X-RAY EXAM OF FOOT: CPT | Mod: 26,RT,, | Performed by: RADIOLOGY

## 2024-03-25 PROCEDURE — 3044F HG A1C LEVEL LT 7.0%: CPT | Mod: CPTII,S$GLB,, | Performed by: NURSE PRACTITIONER

## 2024-03-25 PROCEDURE — 3072F LOW RISK FOR RETINOPATHY: CPT | Mod: CPTII,S$GLB,, | Performed by: NURSE PRACTITIONER

## 2024-03-25 PROCEDURE — 1101F PT FALLS ASSESS-DOCD LE1/YR: CPT | Mod: CPTII,S$GLB,, | Performed by: NURSE PRACTITIONER

## 2024-03-25 PROCEDURE — 73630 X-RAY EXAM OF FOOT: CPT | Mod: TC,FY,PO,RT

## 2024-03-25 RX ORDER — LATANOPROST 50 UG/ML
1 SOLUTION/ DROPS OPHTHALMIC NIGHTLY
Qty: 7.5 ML | Refills: 4 | Status: SHIPPED | OUTPATIENT
Start: 2024-03-25 | End: 2024-05-02 | Stop reason: SDUPTHER

## 2024-03-25 NOTE — PATIENT INSTRUCTIONS
Counseling and Referral of Other Preventative  (Italic type indicates deductible and co-insurance are waived)    Patient Name: Genesis Ugalde  Today's Date: 3/25/2024    Health Maintenance       Date Due Completion Date    Foot Exam Never done ---    Mammogram 06/23/2015 6/23/2014    DEXA Scan 06/03/2021 6/3/2019    Eye Exam 03/23/2024 3/23/2023    Diabetes Urine Screening 03/28/2024 (Originally 12/26/2020) 12/26/2019    RSV Vaccine (Age 60+ and Pregnant patients) (1 - 1-dose 60+ series) 03/29/2024 (Originally 12/22/2017) ---    Colorectal Cancer Screening 09/06/2024 9/6/2019    Hemoglobin A1c 09/22/2024 3/22/2024    TETANUS VACCINE 12/19/2024 12/19/2014    Lipid Panel 03/22/2025 3/22/2024    Pneumococcal Vaccines (Age 65+) (4 of 4 - PPSV23 or PCV20) 07/28/2025 7/28/2020        No orders of the defined types were placed in this encounter.    The following information is provided to all patients.  This information is to help you find resources for any of the problems found today that may be affecting your health:                  Living healthy guide: www.[facility].[place].gov      Understanding Diabetes: www.diabetes.org      Eating healthy: www.cdc.gov/healthyweight      CDC home safety checklist: www.cdc.gov/steadi/patient.html      Agency on Aging: www.goea.[place].[facility]      Alcoholics anonymous (AA): www.aa.org      Physical Activity: www.radha.nih.gov/qa7prmr      Tobacco use: www.quitwithusla.org

## 2024-03-25 NOTE — PROGRESS NOTES
CHW - Outreach Attempt    Community Health Worker left a In Basket message for 1st attempt to contact patient regarding: SDOH- Referral  Community Health Worker to attempt to contact patient on: 4/1  CHW - Outreach Attempt    Community Health Worker left a voicemail message for 2nd attempt to contact patient regarding: SDOH- Referral  Community Health Worker to attempt to contact patient on: 4/17  CHW - Case Closure    This Community Health Worker spoke to patient via telephone today.   Pt/Caregiver reported: Pt denied needing any assistance at this time. Pt stated that she is okay and will call in the future if any assistance is needed. Pt is aware of the [facility] [employer] resource and does not want to use the program at this time. CHW conformed with pt that orders for Cane has been placed by PCP, pt denied wanting the contact information to check on the status of her orders and stated that she will wait. CHW lastly informed pt about the [facility] on Aging program and pt stated that she will think about it. SDOH completed verified updated by CHW  Pt/Caregiver denied any additional needs at this time and agrees with episode closure at this time.  Provided patient with Community Health Worker's contact information and encouraged him/her to contact this Community Health Worker if additional needs arise.

## 2024-03-27 ENCOUNTER — TELEPHONE (OUTPATIENT)
Dept: BARIATRICS | Facility: CLINIC | Age: 69
End: 2024-03-27
Payer: MEDICARE

## 2024-03-27 ENCOUNTER — TELEPHONE (OUTPATIENT)
Dept: RHEUMATOLOGY | Facility: CLINIC | Age: 69
End: 2024-03-27
Payer: MEDICARE

## 2024-03-27 ENCOUNTER — PATIENT MESSAGE (OUTPATIENT)
Dept: RHEUMATOLOGY | Facility: CLINIC | Age: 69
End: 2024-03-27
Payer: MEDICARE

## 2024-04-02 ENCOUNTER — OFFICE VISIT (OUTPATIENT)
Dept: RHEUMATOLOGY | Facility: CLINIC | Age: 69
End: 2024-04-02
Payer: MEDICARE

## 2024-04-02 VITALS
HEART RATE: 107 BPM | HEIGHT: 63 IN | SYSTOLIC BLOOD PRESSURE: 160 MMHG | BODY MASS INDEX: 47.77 KG/M2 | WEIGHT: 269.63 LBS | DIASTOLIC BLOOD PRESSURE: 86 MMHG

## 2024-04-02 DIAGNOSIS — M81.8 OTHER OSTEOPOROSIS WITHOUT CURRENT PATHOLOGICAL FRACTURE: Primary | ICD-10-CM

## 2024-04-02 PROCEDURE — 3008F BODY MASS INDEX DOCD: CPT | Mod: HCNC,CPTII,S$GLB, | Performed by: INTERNAL MEDICINE

## 2024-04-02 PROCEDURE — 1157F ADVNC CARE PLAN IN RCRD: CPT | Mod: HCNC,CPTII,S$GLB, | Performed by: INTERNAL MEDICINE

## 2024-04-02 PROCEDURE — 99213 OFFICE O/P EST LOW 20 MIN: CPT | Mod: HCNC,S$GLB,, | Performed by: INTERNAL MEDICINE

## 2024-04-02 PROCEDURE — 1125F AMNT PAIN NOTED PAIN PRSNT: CPT | Mod: HCNC,CPTII,S$GLB, | Performed by: INTERNAL MEDICINE

## 2024-04-02 PROCEDURE — 99999 PR PBB SHADOW E&M-EST. PATIENT-LVL IV: CPT | Mod: PBBFAC,HCNC,, | Performed by: INTERNAL MEDICINE

## 2024-04-02 PROCEDURE — 4010F ACE/ARB THERAPY RXD/TAKEN: CPT | Mod: HCNC,CPTII,S$GLB, | Performed by: INTERNAL MEDICINE

## 2024-04-02 PROCEDURE — 3077F SYST BP >= 140 MM HG: CPT | Mod: HCNC,CPTII,S$GLB, | Performed by: INTERNAL MEDICINE

## 2024-04-02 PROCEDURE — 3044F HG A1C LEVEL LT 7.0%: CPT | Mod: HCNC,CPTII,S$GLB, | Performed by: INTERNAL MEDICINE

## 2024-04-02 PROCEDURE — 3079F DIAST BP 80-89 MM HG: CPT | Mod: HCNC,CPTII,S$GLB, | Performed by: INTERNAL MEDICINE

## 2024-04-02 PROCEDURE — 1159F MED LIST DOCD IN RCRD: CPT | Mod: HCNC,CPTII,S$GLB, | Performed by: INTERNAL MEDICINE

## 2024-04-02 RX ORDER — HEPARIN 100 UNIT/ML
500 SYRINGE INTRAVENOUS
Status: CANCELLED | OUTPATIENT
Start: 2024-04-16

## 2024-04-02 RX ORDER — ZOLEDRONIC ACID 5 MG/100ML
5 INJECTION, SOLUTION INTRAVENOUS
Status: CANCELLED | OUTPATIENT
Start: 2024-04-16

## 2024-04-02 RX ORDER — ACETAMINOPHEN 325 MG/1
650 TABLET ORAL
Status: CANCELLED | OUTPATIENT
Start: 2024-04-16

## 2024-04-02 RX ORDER — SODIUM CHLORIDE 0.9 % (FLUSH) 0.9 %
10 SYRINGE (ML) INJECTION
Status: CANCELLED | OUTPATIENT
Start: 2024-04-16

## 2024-04-02 NOTE — PATIENT INSTRUCTIONS
Continue vitamin D2 50,000 IU weekly  Add vitamin D 3 1000 IU daily OTC  Discussed zoledronate 5mg IV. Risks and benefits discussed including ONJ, AFF . ACR handout on bisphosphonates provided. Prefer to denosumab or PTH analogs as BMD normal, and the denosumab would need to be continued indefinitely. Oral bisphosphonate not possible with dysphagia severe GERD  Dental check prior to starting zoledronate to be sure no need for extraction  Take 2 ES Tylenol prior to zoledronate admin and prn for flu like like symptoms  16 oz water prior to zoledronate and stay well hydrated with 48-60 oz water daily afterwards  Podiatry 4/23/24  *Covid vaccine  Prednisone 5mg daily  Tocilizumab 162mg sc weekly  Cont F/u Dr. Ro in Ophthalmology    F/u Dr. Dykes in Gastro for dysphagia, ondynophagia and GERD  F/u Brooke Fitzgerald in  mid-June per above  [id number] contreras Mediterranean diet, discussed and reviewed  RTC 3 months with standing labs

## 2024-04-02 NOTE — PROGRESS NOTES
"DXA 3/22/24: normal BMD but FRAX  1.4%  24% MOF, high fracture risk      Vitamin D insufficiency  21  on vitamin D2 50,000 IU weekly   On prednisone 5mg daily  Subjective:      Patient ID: Genesis Ugalde is a 68 y.o. female.    Chief Complaint: GIOP    HPI  here to discuss GIOP prevention    DXA 3/22/24: normal BMD but FRAX  1.4%  24% MOF, high fracture risk  Vitamin D insufficiency  21  on vitamin D2 50,000 IU weekly   On prednisone 5mg daily and unable to taper in near future    Review of Systems   Constitutional:  Negative for appetite change, fatigue, fever and unexpected weight change.   HENT:  Positive for trouble swallowing. Negative for mouth sores.    Eyes:  Negative for redness and visual disturbance.   Respiratory:  Negative for cough, shortness of breath and wheezing.    Cardiovascular:  Positive for chest pain. Negative for palpitations.   Gastrointestinal:  Negative for abdominal pain, anal bleeding, blood in stool, constipation, diarrhea, nausea and vomiting.   Genitourinary:  Negative for dysuria, frequency, genital sores and urgency.   Musculoskeletal:  Negative for arthralgias, back pain, gait problem, joint swelling, myalgias, neck pain and neck stiffness.   Skin:  Negative for rash.   Neurological:  Positive for headaches. Negative for weakness and numbness.   Hematological:  Negative for adenopathy. Does not bruise/bleed easily.   Psychiatric/Behavioral:  Negative for sleep disturbance. The patient is not nervous/anxious.         Objective:   BP (!) 160/86   Pulse 107   Ht 5' 3" (1.6 m)   Wt 122.3 kg (269 lb 10 oz)   BMI 47.76 kg/m²   Physical Exam     7/13/2021 9/15/2023   Tender (MILLER-28) 0 / 28  26 / 28    Swollen (MILLER-28) 0 / 28  0 / 28    Provider Global -- --   Patient Global 50 mm 60 mm   ESR -- --   CRP 0.7 mg/L --   MILLER-28 (ESR) -- --   MILLER-28 (CRP) 1.85 (Remission) --   CDAI Score -- --      Latest Reference Range & Units 03/22/24 09:57   Cholesterol Total 120 - 199 mg/dL 159   HDL " 40 - 75 mg/dL 55   HDL/Cholesterol Ratio 20.0 - 50.0 % 34.6   Non-HDL Cholesterol mg/dL 104   Total Cholesterol/HDL Ratio 2.0 - 5.0  2.9   Triglycerides 30 - 150 mg/dL 130   LDL Cholesterol 63.0 - 159.0 mg/dL 78.0   Vitamin D 30 - 96 ng/mL 21 (L)   Hemoglobin A1C External 4.0 - 5.6 % 6.4 (H)   Estimated Avg Glucose 68 - 131 mg/dL 137 (H)   TSH 0.400 - 4.000 uIU/mL 2.167   Free T4 0.71 - 1.51 ng/dL 0.80   (L): Data is abnormally low  (H): Data is abnormally high       EXAMINATION:  XR FOOT COMPLETE 3 VIEW RIGHT     CLINICAL HISTORY:  . Unspecified injury of right foot, sequela     TECHNIQUE:  AP, lateral, and oblique views of the right foot were performed.     COMPARISON:  None     FINDINGS:  There appears to be an old healed fracture of the distal 5th metacarpal.  Mild degenerative changes seen at the 1st MP joint.  Plantar calcaneal spur is seen and vascular calcifications are noted.     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 Fitzgerald today  Dysphagia, GERD  odynophagia persists needs to f/u with Dr. Dykes in  Gastro  /80 on metoprolol-XL 100mg daily, amlodipine-benazepril 10-40 daily   Body mass index is 47.76 kg/m².    T2 DM Hb A1c 6.4 3/22/24  Hyperlipidemia LDL 78   3/22/24 on pravastatin 40mg nightly   Vitamin D deficiency  19 7/13/21 on vitamin D2 50,000 IU weekly   right dorsal foot pain      Plan:   Continue vitamin D2 50,000 IU weekly  Add vitamin D 3 1000 IU daily OTC  Discussed zoledronate 5mg IV. Risks and benefits discussed including ONJ, AFF .  ACR handout on bisphosphonates provided. Prefer to denosumab or PTH analogs as BMD normal, and the denosumab would need to be continued indefinitely. Oral bisphosphonate not possible with dysphagia severe GERD  Dental check prior to starting zoledronate to be sure no need for extraction  Take 2 ES Tylenol prior to zoledronate admin and prn for flu like like symptoms  16 oz water prior to zoledronate and stay well hydrated with 48-60 oz water daily afterwards  Podiatry 4/23/24  *Covid vaccine  Prednisone 5mg daily  Tocilizumab 162mg sc weekly  Cont F/u Dr. Ro in Ophthalmology    F/u Dr. Dykes in Gastro for dysphagia, ondynophagia and GERD  F/u Brooke Fitzgerald in  mid-June per above  [id number] contreras Mediterranean diet, discussed and reviewed  RTC 3 months with standing labs

## 2024-04-02 NOTE — PROGRESS NOTES
3/28/2024    10:26 AM   Rapid3 Question Responses and Scores   MDHAQ Score 1.5   Psychologic Score 5.5   Pain Score 5   When you awakened in the morning OVER THE LAST WEEK, did you feel stiff? Yes   If Yes, please indicate the number of hours until you are as limber as you will be for the day 1   Fatigue Score 6.5   Global Health Score 5   RAPID3 Score 5     Answers submitted by the patient for this visit:  Rheumatology Questionnaire (Submitted on 3/28/2024)  fever: No  eye redness: No  mouth sores: No  headaches: Yes  shortness of breath: No  chest pain: Yes  trouble swallowing: Yes  diarrhea: No  constipation: No  unexpected weight change: No  genital sore: No  dysuria: No  During the last 3 days, have you had a skin rash?: No  Bruises or bleeds easily: No  cough: No

## 2024-04-09 ENCOUNTER — TELEPHONE (OUTPATIENT)
Dept: INFECTIOUS DISEASES | Facility: HOSPITAL | Age: 69
End: 2024-04-09
Payer: MEDICARE

## 2024-04-09 NOTE — TELEPHONE ENCOUNTER
Tried to reach pt by phone to schedule her RECLAST  infusion. I did not get a response. I then left  and the call back number [phone].

## 2024-04-11 ENCOUNTER — OFFICE VISIT (OUTPATIENT)
Dept: FAMILY MEDICINE | Facility: CLINIC | Age: 69
End: 2024-04-11
Payer: MEDICARE

## 2024-04-11 VITALS
DIASTOLIC BLOOD PRESSURE: 72 MMHG | HEIGHT: 63 IN | BODY MASS INDEX: 48.01 KG/M2 | SYSTOLIC BLOOD PRESSURE: 130 MMHG | HEART RATE: 91 BPM | OXYGEN SATURATION: 95 % | WEIGHT: 270.94 LBS

## 2024-04-11 DIAGNOSIS — K75.81 LIVER CIRRHOSIS SECONDARY TO NASH: ICD-10-CM

## 2024-04-11 DIAGNOSIS — E11.59 HYPERTENSION ASSOCIATED WITH DIABETES: ICD-10-CM

## 2024-04-11 DIAGNOSIS — E66.01 CLASS 3 SEVERE OBESITY DUE TO EXCESS CALORIES WITHOUT SERIOUS COMORBIDITY WITH BODY MASS INDEX (BMI) OF 45.0 TO 49.9 IN ADULT: ICD-10-CM

## 2024-04-11 DIAGNOSIS — Z63.6 CAREGIVER STRESS: ICD-10-CM

## 2024-04-11 DIAGNOSIS — H46.9 OPTIC NEURITIS: ICD-10-CM

## 2024-04-11 DIAGNOSIS — M31.5 GIANT CELL ARTERITIS WITH POLYMYALGIA RHEUMATICA: ICD-10-CM

## 2024-04-11 DIAGNOSIS — H54.10 BLINDNESS AND LOW VISION: ICD-10-CM

## 2024-04-11 DIAGNOSIS — G47.33 OSA (OBSTRUCTIVE SLEEP APNEA): ICD-10-CM

## 2024-04-11 DIAGNOSIS — H40.1130 PRIMARY OPEN ANGLE GLAUCOMA OF BOTH EYES, UNSPECIFIED GLAUCOMA STAGE: ICD-10-CM

## 2024-04-11 DIAGNOSIS — E78.5 HYPERLIPIDEMIA ASSOCIATED WITH TYPE 2 DIABETES MELLITUS: ICD-10-CM

## 2024-04-11 DIAGNOSIS — E11.39 TYPE 2 DIABETES MELLITUS WITH OTHER OPHTHALMIC COMPLICATION, WITHOUT LONG-TERM CURRENT USE OF INSULIN: Primary | ICD-10-CM

## 2024-04-11 DIAGNOSIS — K74.60 LIVER CIRRHOSIS SECONDARY TO NASH: ICD-10-CM

## 2024-04-11 DIAGNOSIS — E11.69 HYPERLIPIDEMIA ASSOCIATED WITH TYPE 2 DIABETES MELLITUS: ICD-10-CM

## 2024-04-11 DIAGNOSIS — I15.2 HYPERTENSION ASSOCIATED WITH DIABETES: ICD-10-CM

## 2024-04-11 PROBLEM — E11.65 TYPE 2 DIABETES MELLITUS WITH HYPERGLYCEMIA, WITHOUT LONG-TERM CURRENT USE OF INSULIN: Status: ACTIVE | Noted: 2024-03-22

## 2024-04-11 PROBLEM — E11.65 TYPE 2 DIABETES MELLITUS WITH HYPERGLYCEMIA, WITHOUT LONG-TERM CURRENT USE OF INSULIN: Status: RESOLVED | Noted: 2024-03-22 | Resolved: 2024-04-11

## 2024-04-11 PROBLEM — N39.0 UTI (URINARY TRACT INFECTION): Status: RESOLVED | Noted: 2021-10-19 | Resolved: 2024-04-11

## 2024-04-11 PROBLEM — R73.03 PREDIABETES: Status: ACTIVE | Noted: 2024-04-11

## 2024-04-11 PROBLEM — I10 PRIMARY HYPERTENSION: Status: ACTIVE | Noted: 2024-03-22

## 2024-04-11 PROCEDURE — 99999 PR PBB SHADOW E&M-EST. PATIENT-LVL IV: CPT | Mod: PBBFAC,HCNC,, | Performed by: FAMILY MEDICINE

## 2024-04-11 PROCEDURE — 1101F PT FALLS ASSESS-DOCD LE1/YR: CPT | Mod: HCNC,CPTII,S$GLB, | Performed by: FAMILY MEDICINE

## 2024-04-11 PROCEDURE — 1159F MED LIST DOCD IN RCRD: CPT | Mod: HCNC,CPTII,S$GLB, | Performed by: FAMILY MEDICINE

## 2024-04-11 PROCEDURE — 99215 OFFICE O/P EST HI 40 MIN: CPT | Mod: HCNC,S$GLB,, | Performed by: FAMILY MEDICINE

## 2024-04-11 PROCEDURE — 1160F RVW MEDS BY RX/DR IN RCRD: CPT | Mod: HCNC,CPTII,S$GLB, | Performed by: FAMILY MEDICINE

## 2024-04-11 PROCEDURE — 3078F DIAST BP <80 MM HG: CPT | Mod: HCNC,CPTII,S$GLB, | Performed by: FAMILY MEDICINE

## 2024-04-11 PROCEDURE — 3044F HG A1C LEVEL LT 7.0%: CPT | Mod: HCNC,CPTII,S$GLB, | Performed by: FAMILY MEDICINE

## 2024-04-11 PROCEDURE — 3008F BODY MASS INDEX DOCD: CPT | Mod: HCNC,CPTII,S$GLB, | Performed by: FAMILY MEDICINE

## 2024-04-11 PROCEDURE — 3288F FALL RISK ASSESSMENT DOCD: CPT | Mod: HCNC,CPTII,S$GLB, | Performed by: FAMILY MEDICINE

## 2024-04-11 PROCEDURE — 1157F ADVNC CARE PLAN IN RCRD: CPT | Mod: HCNC,CPTII,S$GLB, | Performed by: FAMILY MEDICINE

## 2024-04-11 PROCEDURE — 1126F AMNT PAIN NOTED NONE PRSNT: CPT | Mod: HCNC,CPTII,S$GLB, | Performed by: FAMILY MEDICINE

## 2024-04-11 PROCEDURE — 4010F ACE/ARB THERAPY RXD/TAKEN: CPT | Mod: HCNC,CPTII,S$GLB, | Performed by: FAMILY MEDICINE

## 2024-04-11 PROCEDURE — 3075F SYST BP GE 130 - 139MM HG: CPT | Mod: HCNC,CPTII,S$GLB, | Performed by: FAMILY MEDICINE

## 2024-04-11 RX ORDER — TIRZEPATIDE 2.5 MG/.5ML
2.5 INJECTION, SOLUTION SUBCUTANEOUS
Qty: 4 PEN | Refills: 3 | Status: SHIPPED | OUTPATIENT
Start: 2024-04-11

## 2024-04-11 SDOH — SOCIAL DETERMINANTS OF HEALTH (SDOH): DEPENDENT RELATIVE NEEDING CARE AT HOME: Z63.6

## 2024-04-11 NOTE — PROGRESS NOTES
Subjective:         Patient ID: Genesis Ugalde is a 68 y.o. female.    Chief Complaint: Follow-up    Patient Active Problem List   Diagnosis    Giant cell arteritis with polymyalgia rheumatica    Class 3 severe obesity without serious comorbidity with body mass index (BMI) of 45.0 to 49.9 in adult    Optic neuritis    History of kidney stones    Uveitis    Vitamin D deficiency    Hx of colonic polyps    Blindness and low vision    Cushing's disease    Genital herpes simplex    Hepatosplenomegaly    Fatty liver    Primary open angle glaucoma, both eyes    Lung nodule    Transient memory loss    Right sided sciatica    Diarrhea    Dysphagia    Immunosuppression    Loose stools    Hyperlipidemia associated with type 2 diabetes mellitus    Claustrophobia    Pain of right hip joint    LOYDA (obstructive sleep apnea)    Multinodular goiter    Ankle mass, right    Right calf pain    Liver mass    Family circumstance    Liver cirrhosis secondary to BELLO    Gastroesophageal reflux disease    Hiatal hernia    Long-term current use of steroids    Edema    Hypertension associated with diabetes    Other osteoporosis without current pathological fracture    Type 2 diabetes mellitus with ophthalmic complication, without long-term current use of insulin      Follow-up      Genesis is a 68 y.o. female who presents today to establish care.   Patient has limitations with her vision. Optic neuritis with left eye vision lost. Partial vision lost on right. She describes it as being able to see silhouettes and not details.   She does her own ADLs overall, does have niece in home grocery shop. She does cook some dinners.     Patient reports that her DM diagnosis was in the past with elevated A1C and glucose. She is on steroids and has been on it chronically to control her optic neuritis. She finds that her vision is improved while on steroids, managed by Rheumatology.      [employer] [facility] [employer] - encouraged to plug back in.   She states  "she would like a cane and that would be helpful.   She previously had an assessment with [employer] [facility] [employer] (12 yrs ago), but was not interested in how they approached communication and training classes. Has not been through a ADL/IDL training program since further decline in vision.     Patient owns the home she lives in. She is primary caregiver for her 51 yo developmentally challenged brother. Her older brother who was in the home, too passed. Her niece and niece's adult son (22 yo) also lives in the home with her.     Review of Systems   All other systems reviewed and are negative.       Objective:     Vitals:    04/11/24 0951   BP: 130/72   BP Location: Left arm   Patient Position: Sitting   BP Method: Medium (Manual)   Pulse: 91   SpO2: 95%   Weight: 122.9 kg (270 lb 15.1 oz)   Height: 5' 3" (1.6 m)         Physical Exam  Vitals and nursing note reviewed.   Constitutional:       General: She is not in acute distress.     Appearance: Normal appearance. She is not ill-appearing, toxic-appearing or diaphoretic.   HENT:      Head: Normocephalic and atraumatic.   Eyes:      General: No scleral icterus.     Conjunctiva/sclera: Conjunctivae normal.   Cardiovascular:      Rate and Rhythm: Normal rate.   Pulmonary:      Effort: Pulmonary effort is normal. No respiratory distress.   Skin:     Coloration: Skin is not pale.   Neurological:      Mental Status: She is alert. Mental status is at baseline.   Psychiatric:         Attention and Perception: Attention and perception normal.         Mood and Affect: Mood normal. Affect is tearful.         Speech: Speech normal.         Behavior: Behavior normal.         Cognition and Memory: Cognition and memory normal.         Judgment: Judgment normal.       Assessment:       1. Type 2 diabetes mellitus with other ophthalmic complication, without long-term current use of insulin    2. Hypertension associated with diabetes    3. Hyperlipidemia associated with type 2 " diabetes mellitus    4. Primary open angle glaucoma of both eyes, unspecified glaucoma stage    5. Optic neuritis    6. Giant cell arteritis with polymyalgia rheumatica    7. Blindness and low vision    8. Liver cirrhosis secondary to BELLO    9. LOYDA (obstructive sleep apnea)    10. Class 3 severe obesity due to excess calories without serious comorbidity with body mass index (BMI) of 45.0 to 49.9 in adult    11. Caregiver stress          Plan:   Recent relevant labs results reviewed with patient.         1. Type 2 diabetes mellitus with other ophthalmic complication, without long-term current use of insulin  -     tirzepatide (MOUNJARO) 2.5 mg/0.5 mL PnIj; Inject 2.5 mg into the skin every 7 days.  Dispense: 4 Pen; Refill: 3  Trulicity with some GI side effects in past. Mounjaro would be better choice over Ozempic with slow titration.   A1C within goal.     2. Hypertension associated with diabetes  Well controlled at this time. Will further improve with weight loss.     3. Hyperlipidemia associated with type 2 diabetes mellitus  LDL at goal. Continue statin    4. Primary open angle glaucoma of both eyes, unspecified glaucoma stage  5. Optic neuritis  6. Giant cell arteritis with polymyalgia rheumatica  7. Blindness and low vision  -     CANE FOR HOME USE  Stable, per Rheum and Ophthalmology  Encouraged to plug into some functional training classes    8. Liver cirrhosis secondary to BELLO  -     tirzepatide (MOUNJARO) 2.5 mg/0.5 mL PnIj; Inject 2.5 mg into the skin every 7 days.  Dispense: 4 Pen; Refill: 3  Weight loss, dietary changes. Has had fibroscan in past. Follow up with Hepatology    9. LOYDA (obstructive sleep apnea)  10. Class 3 severe obesity due to excess calories without serious comorbidity with body mass index (BMI) of 45.0 to 49.9 in adult  -     tirzepatide (MOUNJARO) 2.5 mg/0.5 mL PnIj; Inject 2.5 mg into the skin every 7 days.  Dispense: 4 Pen; Refill: 3    11. Caregiver stress  Will discuss ongoing  with patient. Unclear if adjustment/ reaction vs more long standing mood disorder/state that may benefit from pharmacological intervention.     Patient's questions answered. Plan reviewed with patient at the end of visit. Relevant precautions to chief complaint and reasons to seek further medical care or to contact the office sooner reviewed with patient.     Follow up in about 6 weeks (around 5/23/2024) for f/u s/p new medication/ titration.        I spent a total of 60 minutes on the day of the visit.  This includes face to face time and non-face to face time preparing to see the patient (eg, review of tests), obtaining and/or reviewing separately obtained history, documenting clinical information in the electronic or other health record, independently interpreting results and communicating results to the patient/family/caregiver, or care coordinator.

## 2024-04-17 DIAGNOSIS — E11.9 TYPE 2 DIABETES MELLITUS WITHOUT COMPLICATION: ICD-10-CM

## 2024-04-22 ENCOUNTER — TELEPHONE (OUTPATIENT)
Dept: PODIATRY | Facility: CLINIC | Age: 69
End: 2024-04-22
Payer: MEDICARE

## 2024-04-23 ENCOUNTER — INFUSION (OUTPATIENT)
Dept: INFECTIOUS DISEASES | Facility: HOSPITAL | Age: 69
End: 2024-04-23
Payer: MEDICARE

## 2024-04-23 ENCOUNTER — OFFICE VISIT (OUTPATIENT)
Dept: PODIATRY | Facility: CLINIC | Age: 69
End: 2024-04-23
Payer: MEDICARE

## 2024-04-23 VITALS
HEART RATE: 87 BPM | WEIGHT: 270.94 LBS | HEIGHT: 63 IN | DIASTOLIC BLOOD PRESSURE: 73 MMHG | BODY MASS INDEX: 48.01 KG/M2 | SYSTOLIC BLOOD PRESSURE: 129 MMHG

## 2024-04-23 DIAGNOSIS — S99.921S FOOT INJURY, RIGHT, SEQUELA: ICD-10-CM

## 2024-04-23 DIAGNOSIS — M81.8 OTHER OSTEOPOROSIS WITHOUT CURRENT PATHOLOGICAL FRACTURE: Primary | ICD-10-CM

## 2024-04-23 DIAGNOSIS — M19.079 INFLAMMATION OF FOOT JOINT: Primary | ICD-10-CM

## 2024-04-23 PROCEDURE — 1125F AMNT PAIN NOTED PAIN PRSNT: CPT | Mod: HCNC,CPTII,S$GLB, | Performed by: PODIATRIST

## 2024-04-23 PROCEDURE — 4010F ACE/ARB THERAPY RXD/TAKEN: CPT | Mod: HCNC,CPTII,S$GLB, | Performed by: PODIATRIST

## 2024-04-23 PROCEDURE — 1157F ADVNC CARE PLAN IN RCRD: CPT | Mod: HCNC,CPTII,S$GLB, | Performed by: PODIATRIST

## 2024-04-23 PROCEDURE — 3074F SYST BP LT 130 MM HG: CPT | Mod: HCNC,CPTII,S$GLB, | Performed by: PODIATRIST

## 2024-04-23 PROCEDURE — 1159F MED LIST DOCD IN RCRD: CPT | Mod: HCNC,CPTII,S$GLB, | Performed by: PODIATRIST

## 2024-04-23 PROCEDURE — 3044F HG A1C LEVEL LT 7.0%: CPT | Mod: HCNC,CPTII,S$GLB, | Performed by: PODIATRIST

## 2024-04-23 PROCEDURE — 3078F DIAST BP <80 MM HG: CPT | Mod: HCNC,CPTII,S$GLB, | Performed by: PODIATRIST

## 2024-04-23 PROCEDURE — 3008F BODY MASS INDEX DOCD: CPT | Mod: HCNC,CPTII,S$GLB, | Performed by: PODIATRIST

## 2024-04-23 PROCEDURE — 96365 THER/PROPH/DIAG IV INF INIT: CPT | Mod: HCNC

## 2024-04-23 PROCEDURE — 63600175 PHARM REV CODE 636 W HCPCS: Mod: HCNC | Performed by: INTERNAL MEDICINE

## 2024-04-23 PROCEDURE — 1100F PTFALLS ASSESS-DOCD GE2>/YR: CPT | Mod: HCNC,CPTII,S$GLB, | Performed by: PODIATRIST

## 2024-04-23 PROCEDURE — 99203 OFFICE O/P NEW LOW 30 MIN: CPT | Mod: HCNC,S$GLB,, | Performed by: PODIATRIST

## 2024-04-23 PROCEDURE — 3288F FALL RISK ASSESSMENT DOCD: CPT | Mod: HCNC,CPTII,S$GLB, | Performed by: PODIATRIST

## 2024-04-23 PROCEDURE — 99999 PR PBB SHADOW E&M-EST. PATIENT-LVL IV: CPT | Mod: PBBFAC,HCNC,, | Performed by: PODIATRIST

## 2024-04-23 RX ORDER — SODIUM CHLORIDE 0.9 % (FLUSH) 0.9 %
10 SYRINGE (ML) INJECTION
Status: DISCONTINUED | OUTPATIENT
Start: 2024-04-23 | End: 2024-04-23 | Stop reason: HOSPADM

## 2024-04-23 RX ORDER — ZOLEDRONIC ACID 5 MG/100ML
5 INJECTION, SOLUTION INTRAVENOUS
Status: COMPLETED | OUTPATIENT
Start: 2024-04-23 | End: 2024-04-23

## 2024-04-23 RX ORDER — ACETAMINOPHEN 325 MG/1
650 TABLET ORAL
Status: DISCONTINUED | OUTPATIENT
Start: 2024-04-23 | End: 2024-04-23 | Stop reason: HOSPADM

## 2024-04-23 RX ORDER — HEPARIN 100 UNIT/ML
500 SYRINGE INTRAVENOUS
OUTPATIENT
Start: 2024-04-23

## 2024-04-23 RX ORDER — ACETAMINOPHEN 325 MG/1
650 TABLET ORAL
OUTPATIENT
Start: 2024-04-23

## 2024-04-23 RX ORDER — HEPARIN 100 UNIT/ML
500 SYRINGE INTRAVENOUS
Status: DISCONTINUED | OUTPATIENT
Start: 2024-04-23 | End: 2024-04-23 | Stop reason: HOSPADM

## 2024-04-23 RX ORDER — SODIUM CHLORIDE 0.9 % (FLUSH) 0.9 %
10 SYRINGE (ML) INJECTION
OUTPATIENT
Start: 2024-04-23

## 2024-04-23 RX ORDER — METHYLPREDNISOLONE 4 MG/1
TABLET ORAL
Qty: 1 EACH | Refills: 0 | Status: SHIPPED | OUTPATIENT
Start: 2024-04-23 | End: 2024-05-14

## 2024-04-23 RX ORDER — ZOLEDRONIC ACID 5 MG/100ML
5 INJECTION, SOLUTION INTRAVENOUS
OUTPATIENT
Start: 2024-04-23

## 2024-04-23 RX ADMIN — ZOLEDRONIC ACID 5 MG: 5 INJECTION, SOLUTION INTRAVENOUS at 10:04

## 2024-04-23 NOTE — PROGRESS NOTES
Pt arrives for Reclast. Will infuse over 15 minutes.    Limited head-to-toe assessment due to privacy issues and visit reason though the opportunity was given for patient to express any concerns

## 2024-04-23 NOTE — PROGRESS NOTES
Subjective:      Patient ID: Genesis Ugalde is a 68 y.o. female.    Chief Complaint: Foot Injury (Dropped 10lb bag of chicken on right foot, pain around toes on top of foot, constant pressure pain)    Genesis is a 68 y.o. female who presents to the podiatry clinic  with complaint of  right foot pain. Onset of the symptoms was several years ago. Precipitating event: injured dropped some chicken on her foot a few years ago and broke 5th met. . Current symptoms include: swelling. Aggravating factors: standing and walking. Symptoms have waxed and waned. Patient has had prior foot problems. Evaluation to date: plain films: old fx site . Treatment to date: none. Patients rates pain 4/10 on pain scale.    Review of Systems   Constitutional: Negative for chills, fever and malaise/fatigue.   HENT:  Negative for hearing loss.    Cardiovascular:  Positive for leg swelling. Negative for claudication.   Respiratory:  Negative for shortness of breath.    Skin:  Negative for flushing and rash.   Musculoskeletal:  Negative for joint pain and myalgias.   Neurological:  Negative for loss of balance, numbness, paresthesias and sensory change.   Psychiatric/Behavioral:  Negative for altered mental status.            Objective:      Physical Exam  Vitals reviewed.   Cardiovascular:      Pulses:           Dorsalis pedis pulses are 2+ on the right side and 2+ on the left side.        Posterior tibial pulses are 2+ on the right side and 2+ on the left side.      Comments: No edema noted b/L  Musculoskeletal:      Comments:        Feet:      Right foot:      Protective Sensation: 5 sites tested.  5 sites sensed.      Left foot:      Protective Sensation: 5 sites tested.  5 sites sensed.   Skin:     Coloration: Skin is not cyanotic.      Findings: No abscess, bruising, erythema, lesion, petechiae or wound.      Comments: Normal skin tugor noted.   No open lesion noted b/L  Skin temp is warm to warm from proximal to distal b/L.  Webspaces  clean, dry, and intact  Localized forefoot swelling   Neurological:      Mental Status: She is alert and oriented to person, place, and time.      Comments: Gross sensation intact b/L               Assessment:       Encounter Diagnoses   Name Primary?    Foot injury, right, sequela     Inflammation of foot joint - Right Foot Yes         Plan:       Genesis was seen today for foot injury.    Diagnoses and all orders for this visit:    Inflammation of foot joint - Right Foot    Foot injury, right, sequela  -     Ambulatory referral/consult to Podiatry  -     HME - OTHER    Other orders  -     methylPREDNISolone (MEDROL DOSEPACK) 4 mg tablet; use as directed      I counseled the patient on her conditions, their implications and medical management.    X-rays and medical records reviewed    Pt advised of old fx of 5th met on right views, no new fx or dislocations  Pt advised that her symptoms may be due to post traumatic arthritis or inflammation  Rx medrol  HME- short CAM boot dispensed    Pt is to f/u in 2 weeks if no improvement for MRI     .

## 2024-05-02 DIAGNOSIS — H40.1131 PRIMARY OPEN ANGLE GLAUCOMA OF BOTH EYES, MILD STAGE: ICD-10-CM

## 2024-05-02 RX ORDER — LATANOPROST 50 UG/ML
1 SOLUTION/ DROPS OPHTHALMIC NIGHTLY
Qty: 7.5 ML | Refills: 4 | Status: SHIPPED | OUTPATIENT
Start: 2024-05-02

## 2024-05-02 RX ORDER — TRAMADOL HYDROCHLORIDE 50 MG/1
50 TABLET ORAL 2 TIMES DAILY PRN
Status: CANCELLED | OUTPATIENT
Start: 2024-05-02

## 2024-05-02 NOTE — TELEPHONE ENCOUNTER
No care due was identified.  [facility] Embedded Care Due Messages. Reference number: [id number].   5/02/2024 2:49:46 PM CDT

## 2024-05-03 ENCOUNTER — TELEPHONE (OUTPATIENT)
Dept: PODIATRY | Facility: CLINIC | Age: 69
End: 2024-05-03
Payer: MEDICARE

## 2024-05-07 ENCOUNTER — TELEPHONE (OUTPATIENT)
Dept: FAMILY MEDICINE | Facility: CLINIC | Age: 69
End: 2024-05-07
Payer: MEDICARE

## 2024-05-07 NOTE — TELEPHONE ENCOUNTER
Pt is requesting refill of Tramadol 50 and Clonazepam 1mg sent to [facility], she saw Dr Perdue for the first time on April 11 but was not given a refill   please advise

## 2024-05-07 NOTE — TELEPHONE ENCOUNTER
----- Message from Mikki Kwong sent at 5/7/2024 11:20 AM CDT -----  Type:  Needs Medical Advice/medication     Who Called: pt     Would the patient rather a call back or a response via [facility]? Call     Best Call Back Number: [phone]    Additional Information: pt requesting a call back to discuss medication 2nd attempt.

## 2024-05-09 NOTE — TELEPHONE ENCOUNTER
Refill Routing Note   Medication(s) are not appropriate for processing by [facility] for the following reason(s):        No active prescription written by provider    [facility] action(s):  Defer               Appointments  past 12m or future 3m with PCP    Date Provider   Last Visit   4/11/2024 Bety Perdue MD   Next Visit   5/23/2024 Bety Perdue MD   ED visits in past 90 days: 0        Note composed:3:02 PM 05/09/2024

## 2024-05-09 NOTE — TELEPHONE ENCOUNTER
No care due was identified.  [facility] Embedded Care Due Messages. Reference number: [id number].   5/09/2024 11:48:37 AM CDT

## 2024-05-10 DIAGNOSIS — K44.9 HIATAL HERNIA: ICD-10-CM

## 2024-05-10 DIAGNOSIS — K21.9 GASTROESOPHAGEAL REFLUX DISEASE: ICD-10-CM

## 2024-05-10 RX ORDER — AMLODIPINE AND BENAZEPRIL HYDROCHLORIDE 10; 40 MG/1; MG/1
1 CAPSULE ORAL DAILY
Qty: 90 CAPSULE | Refills: 3 | Status: SHIPPED | OUTPATIENT
Start: 2024-05-10

## 2024-05-10 RX ORDER — METOPROLOL SUCCINATE 100 MG/1
100 TABLET, EXTENDED RELEASE ORAL DAILY
Qty: 90 TABLET | Refills: 3 | Status: SHIPPED | OUTPATIENT
Start: 2024-05-10

## 2024-05-10 RX ORDER — METOPROLOL SUCCINATE 100 MG/1
100 TABLET, EXTENDED RELEASE ORAL DAILY
Qty: 90 TABLET | Refills: 3 | OUTPATIENT
Start: 2024-05-10

## 2024-05-10 RX ORDER — AMLODIPINE AND BENAZEPRIL HYDROCHLORIDE 10; 40 MG/1; MG/1
1 CAPSULE ORAL DAILY
Qty: 90 CAPSULE | Refills: 3 | OUTPATIENT
Start: 2024-05-10

## 2024-05-10 NOTE — TELEPHONE ENCOUNTER
No care due was identified.  [facility] Embedded Care Due Messages. Reference number: [id number].   5/10/2024 2:27:36 PM CDT

## 2024-05-10 NOTE — TELEPHONE ENCOUNTER
Spoke with Pt regarding Tramadol and Klonopin refill. Explained that Dr Perdue is not in the office but will return on Tuesday. Nurse will relay message to Dr Perdue on Tuesday for refill. Nurse will call Pt back on Tuesday.

## 2024-05-11 NOTE — TELEPHONE ENCOUNTER
Refill Routing Note   Medication(s) are not appropriate for processing by [facility] for the following reason(s):        Outside of protocol  No active prescription written by provider    [facility] action(s):  Quick Discontinue  Route      Medication Therapy Plan: Lotrel and Toprol XL reordered 5/10/24; QDC// daily dose for omeprazole oop      Appointments  past 12m or future 3m with PCP    Date Provider   Last Visit   4/11/2024 Bety Perdue MD   Next Visit   5/23/2024 Bety Perdue MD   ED visits in past 90 days: 0        Note composed:9:30 PM 05/10/2024

## 2024-05-14 RX ORDER — OMEPRAZOLE 40 MG/1
40 CAPSULE, DELAYED RELEASE ORAL EVERY 12 HOURS
Qty: 180 CAPSULE | Refills: 3 | Status: SHIPPED | OUTPATIENT
Start: 2024-05-14

## 2024-05-14 RX ORDER — CLONAZEPAM 1 MG/1
1 TABLET ORAL DAILY PRN
Qty: 60 TABLET | Refills: 0 | Status: SHIPPED | OUTPATIENT
Start: 2024-05-14

## 2024-05-14 RX ORDER — TRAMADOL HYDROCHLORIDE 50 MG/1
50 TABLET ORAL EVERY 12 HOURS PRN
Qty: 60 TABLET | Refills: 0 | Status: SHIPPED | OUTPATIENT
Start: 2024-05-14

## 2024-05-23 ENCOUNTER — OFFICE VISIT (OUTPATIENT)
Dept: FAMILY MEDICINE | Facility: CLINIC | Age: 69
End: 2024-05-23
Payer: MEDICARE

## 2024-05-23 VITALS
WEIGHT: 257.69 LBS | DIASTOLIC BLOOD PRESSURE: 72 MMHG | HEIGHT: 63 IN | SYSTOLIC BLOOD PRESSURE: 112 MMHG | HEART RATE: 80 BPM | BODY MASS INDEX: 45.66 KG/M2 | OXYGEN SATURATION: 95 %

## 2024-05-23 DIAGNOSIS — K74.60 LIVER CIRRHOSIS SECONDARY TO NASH: ICD-10-CM

## 2024-05-23 DIAGNOSIS — E11.39 TYPE 2 DIABETES MELLITUS WITH OTHER OPHTHALMIC COMPLICATION, WITHOUT LONG-TERM CURRENT USE OF INSULIN: ICD-10-CM

## 2024-05-23 DIAGNOSIS — I15.2 HYPERTENSION ASSOCIATED WITH DIABETES: ICD-10-CM

## 2024-05-23 DIAGNOSIS — K75.81 LIVER CIRRHOSIS SECONDARY TO NASH: ICD-10-CM

## 2024-05-23 DIAGNOSIS — E66.01 CLASS 3 SEVERE OBESITY DUE TO EXCESS CALORIES WITHOUT SERIOUS COMORBIDITY WITH BODY MASS INDEX (BMI) OF 45.0 TO 49.9 IN ADULT: ICD-10-CM

## 2024-05-23 DIAGNOSIS — E11.59 HYPERTENSION ASSOCIATED WITH DIABETES: ICD-10-CM

## 2024-05-23 DIAGNOSIS — R07.89 LEFT-SIDED CHEST WALL PAIN: ICD-10-CM

## 2024-05-23 DIAGNOSIS — M62.838 MUSCLE SPASM: Primary | ICD-10-CM

## 2024-05-23 PROCEDURE — 1101F PT FALLS ASSESS-DOCD LE1/YR: CPT | Mod: HCNC,CPTII,S$GLB, | Performed by: FAMILY MEDICINE

## 2024-05-23 PROCEDURE — 99214 OFFICE O/P EST MOD 30 MIN: CPT | Mod: HCNC,S$GLB,, | Performed by: FAMILY MEDICINE

## 2024-05-23 PROCEDURE — 3074F SYST BP LT 130 MM HG: CPT | Mod: HCNC,CPTII,S$GLB, | Performed by: FAMILY MEDICINE

## 2024-05-23 PROCEDURE — 1159F MED LIST DOCD IN RCRD: CPT | Mod: HCNC,CPTII,S$GLB, | Performed by: FAMILY MEDICINE

## 2024-05-23 PROCEDURE — 3288F FALL RISK ASSESSMENT DOCD: CPT | Mod: HCNC,CPTII,S$GLB, | Performed by: FAMILY MEDICINE

## 2024-05-23 PROCEDURE — 93005 ELECTROCARDIOGRAM TRACING: CPT | Mod: HCNC,S$GLB,, | Performed by: FAMILY MEDICINE

## 2024-05-23 PROCEDURE — 3008F BODY MASS INDEX DOCD: CPT | Mod: HCNC,CPTII,S$GLB, | Performed by: FAMILY MEDICINE

## 2024-05-23 PROCEDURE — 3044F HG A1C LEVEL LT 7.0%: CPT | Mod: HCNC,CPTII,S$GLB, | Performed by: FAMILY MEDICINE

## 2024-05-23 PROCEDURE — 4010F ACE/ARB THERAPY RXD/TAKEN: CPT | Mod: HCNC,CPTII,S$GLB, | Performed by: FAMILY MEDICINE

## 2024-05-23 PROCEDURE — 1160F RVW MEDS BY RX/DR IN RCRD: CPT | Mod: HCNC,CPTII,S$GLB, | Performed by: FAMILY MEDICINE

## 2024-05-23 PROCEDURE — 93010 ELECTROCARDIOGRAM REPORT: CPT | Mod: HCNC,S$GLB,, | Performed by: INTERNAL MEDICINE

## 2024-05-23 PROCEDURE — 1157F ADVNC CARE PLAN IN RCRD: CPT | Mod: HCNC,CPTII,S$GLB, | Performed by: FAMILY MEDICINE

## 2024-05-23 PROCEDURE — 1125F AMNT PAIN NOTED PAIN PRSNT: CPT | Mod: HCNC,CPTII,S$GLB, | Performed by: FAMILY MEDICINE

## 2024-05-23 PROCEDURE — 99999 PR PBB SHADOW E&M-EST. PATIENT-LVL V: CPT | Mod: PBBFAC,HCNC,, | Performed by: FAMILY MEDICINE

## 2024-05-23 PROCEDURE — 3078F DIAST BP <80 MM HG: CPT | Mod: HCNC,CPTII,S$GLB, | Performed by: FAMILY MEDICINE

## 2024-05-23 NOTE — PROGRESS NOTES
Subjective:         Patient ID: Genesis Ugalde is a 68 y.o. female.    Chief Complaint: Follow-up    Patient Active Problem List   Diagnosis    Giant cell arteritis with polymyalgia rheumatica    Class 3 severe obesity without serious comorbidity with body mass index (BMI) of 45.0 to 49.9 in adult    Optic neuritis    History of kidney stones    Uveitis    Vitamin D deficiency    Hx of colonic polyps    Blindness and low vision    Cushing's disease    Genital herpes simplex    Hepatosplenomegaly    Fatty liver    Primary open angle glaucoma, both eyes    Lung nodule    Transient memory loss    Right sided sciatica    Diarrhea    Dysphagia    Immunosuppression    Loose stools    Hyperlipidemia associated with type 2 diabetes mellitus    Claustrophobia    Pain of right hip joint    LOYDA (obstructive sleep apnea)    Multinodular goiter    Ankle mass, right    Right calf pain    Liver mass    Family circumstance    Liver cirrhosis secondary to BELLO    Gastroesophageal reflux disease    Hiatal hernia    Long-term current use of steroids    Edema    Hypertension associated with diabetes    Other osteoporosis without current pathological fracture    Type 2 diabetes mellitus with ophthalmic complication, without long-term current use of insulin      HPI    Genesis is a 68 y.o. female who presents today for pain in her shoulder blade, under her left axilla area and at her anterior pec minor.   No dyspnea, no sweats.   Reproducible pain with rolling L shoulder back. Raising arm and having me apply direct pressure to medial scapula border and pec minor.     x 2 days - taking down curtains.     Mounjaro - covered, $50/month.   Curbed appetite.   She is unclear of side effects because Reclast taken at the same time.   Down 13 lbs per month    Feels generally that she has more aches and pains. Taking care of niece that just came home s/p knee replacement.    Review of Systems   All other systems reviewed and are negative.      "  Objective:     Vitals:    05/23/24 0934   BP: 112/72   BP Location: Left arm   Patient Position: Sitting   BP Method: Large (Manual)   Pulse: 80   SpO2: 95%   Weight: 116.9 kg (257 lb 11.5 oz)   Height: 5' 3" (1.6 m)         Physical Exam  Vitals and nursing note reviewed.   Constitutional:       General: She is not in acute distress.     Appearance: Normal appearance. She is not ill-appearing, toxic-appearing or diaphoretic.   HENT:      Head: Normocephalic and atraumatic.   Eyes:      General: No scleral icterus.     Conjunctiva/sclera: Conjunctivae normal.   Cardiovascular:      Rate and Rhythm: Normal rate.   Pulmonary:      Effort: Pulmonary effort is normal. No respiratory distress.   Musculoskeletal:      Comments: Reproducible pain with rolling L shoulder back. Raising arm and having me apply direct pressure to medial scapula border and pec minor.    Skin:     Coloration: Skin is not pale.   Neurological:      Mental Status: She is alert. Mental status is at baseline.   Psychiatric:         Attention and Perception: Attention and perception normal.         Mood and Affect: Mood and affect normal.         Speech: Speech normal.         Behavior: Behavior normal.         Cognition and Memory: Cognition and memory normal.         Judgment: Judgment normal.       Assessment:       1. Muscle spasm    2. Left-sided chest wall pain    3. Class 3 severe obesity due to excess calories without serious comorbidity with body mass index (BMI) of 45.0 to 49.9 in adult    4. Liver cirrhosis secondary to BELLO    5. Hypertension associated with diabetes    6. Type 2 diabetes mellitus with other ophthalmic complication, without long-term current use of insulin          Plan:   Recent relevant labs results reviewed with patient.         1. Muscle spasm  2. Left-sided chest wall pain  -     IN OFFICE EKG 12-LEAD (to Muse)  MSK in nature.   Local stretches and massage. Felt pain improved after exam and passive/active " stretching in office    3. Class 3 severe obesity due to excess calories without serious comorbidity with body mass index (BMI) of 45.0 to 49.9 in adult  4. Liver cirrhosis secondary to BELLO  Improvement in weight. Continue 2.5 mg of Mounjaro    5. Hypertension associated with diabetes  - Well controlled    6. Type 2 diabetes mellitus with other ophthalmic complication, without long-term current use of insulin  -     Hemoglobin A1C; Future; Expected date: 05/23/2024  Continue Mounjaro. Titrate up if weight stalls.     Patient's questions answered. Plan reviewed with patient at the end of visit. Relevant precautions to chief complaint and reasons to seek further medical care or to contact the office sooner reviewed with patient.     Follow up in about 3 months (around 8/23/2024) for Diabetes Follow-up.

## 2024-06-02 LAB
OHS QRS DURATION: 98 MS
OHS QTC CALCULATION: 467 MS

## 2024-06-03 DIAGNOSIS — M31.6 GCA (GIANT CELL ARTERITIS): ICD-10-CM

## 2024-06-03 RX ORDER — TOCILIZUMAB 180 MG/ML
162 INJECTION, SOLUTION SUBCUTANEOUS
Qty: 12 EACH | Refills: 0 | Status: ACTIVE | OUTPATIENT
Start: 2024-06-03

## 2024-06-14 ENCOUNTER — HOSPITAL ENCOUNTER (OUTPATIENT)
Dept: RADIOLOGY | Facility: HOSPITAL | Age: 69
Discharge: HOME | End: 2024-06-14
Attending: NURSE PRACTITIONER
Payer: MEDICARE

## 2024-06-14 DIAGNOSIS — E66.01 MORBID OBESITY: ICD-10-CM

## 2024-06-14 DIAGNOSIS — I10 PRIMARY HYPERTENSION: ICD-10-CM

## 2024-06-14 DIAGNOSIS — E11.9 TYPE 2 DIABETES MELLITUS WITHOUT COMPLICATION, WITHOUT LONG-TERM CURRENT USE OF INSULIN: ICD-10-CM

## 2024-06-14 DIAGNOSIS — K75.81 LIVER CIRRHOSIS SECONDARY TO NASH: ICD-10-CM

## 2024-06-14 DIAGNOSIS — K74.60 LIVER CIRRHOSIS SECONDARY TO NASH: ICD-10-CM

## 2024-06-14 DIAGNOSIS — E78.5 HYPERLIPIDEMIA, UNSPECIFIED HYPERLIPIDEMIA TYPE: ICD-10-CM

## 2024-06-14 PROCEDURE — 76700 US EXAM ABDOM COMPLETE: CPT | Mod: TC,HCNC

## 2024-06-14 PROCEDURE — 76700 US EXAM ABDOM COMPLETE: CPT | Mod: 26,HCNC,, | Performed by: RADIOLOGY

## 2024-06-28 ENCOUNTER — LAB VISIT (OUTPATIENT)
Dept: LAB | Facility: HOSPITAL | Age: 69
End: 2024-06-28
Attending: INTERNAL MEDICINE
Payer: MEDICARE

## 2024-06-28 DIAGNOSIS — E11.39 TYPE 2 DIABETES MELLITUS WITH OTHER OPHTHALMIC COMPLICATION, WITHOUT LONG-TERM CURRENT USE OF INSULIN: ICD-10-CM

## 2024-06-28 DIAGNOSIS — M31.6 GCA (GIANT CELL ARTERITIS): ICD-10-CM

## 2024-06-28 DIAGNOSIS — E11.9 TYPE 2 DIABETES MELLITUS WITHOUT COMPLICATION: ICD-10-CM

## 2024-06-28 LAB
ALBUMIN SERPL BCP-MCNC: 4.1 G/DL (ref 3.5–5.2)
ALBUMIN/CREAT UR: 9.2 UG/MG (ref 0–30)
ALP SERPL-CCNC: 51 U/L (ref 55–135)
ALT SERPL W/O P-5'-P-CCNC: 79 U/L (ref 10–44)
ANION GAP SERPL CALC-SCNC: 9 MMOL/L (ref 8–16)
AST SERPL-CCNC: 52 U/L (ref 10–40)
BASOPHILS # BLD AUTO: 0.04 K/UL (ref 0–0.2)
BASOPHILS NFR BLD: 0.3 % (ref 0–1.9)
BILIRUB SERPL-MCNC: 1.7 MG/DL (ref 0.1–1)
BUN SERPL-MCNC: 15 MG/DL (ref 8–23)
CALCIUM SERPL-MCNC: 9.6 MG/DL (ref 8.7–10.5)
CHLORIDE SERPL-SCNC: 110 MMOL/L (ref 95–110)
CO2 SERPL-SCNC: 22 MMOL/L (ref 23–29)
CREAT SERPL-MCNC: 0.9 MG/DL (ref 0.5–1.4)
CREAT UR-MCNC: 218 MG/DL (ref 15–325)
CRP SERPL-MCNC: 0.3 MG/L (ref 0–8.2)
DIFFERENTIAL METHOD BLD: ABNORMAL
EOSINOPHIL # BLD AUTO: 0.4 K/UL (ref 0–0.5)
EOSINOPHIL NFR BLD: 3.5 % (ref 0–8)
ERYTHROCYTE [DISTWIDTH] IN BLOOD BY AUTOMATED COUNT: 13.2 % (ref 11.5–14.5)
ERYTHROCYTE [SEDIMENTATION RATE] IN BLOOD BY PHOTOMETRIC METHOD: <2 MM/HR (ref 0–36)
EST. GFR  (NO RACE VARIABLE): >60 ML/MIN/1.73 M^2
ESTIMATED AVG GLUCOSE: 117 MG/DL (ref 68–131)
GLUCOSE SERPL-MCNC: 110 MG/DL (ref 70–110)
HBA1C MFR BLD: 5.7 % (ref 4–5.6)
HCT VFR BLD AUTO: 39.1 % (ref 37–48.5)
HGB BLD-MCNC: 13.7 G/DL (ref 12–16)
IMM GRANULOCYTES # BLD AUTO: 0.06 K/UL (ref 0–0.04)
IMM GRANULOCYTES NFR BLD AUTO: 0.5 % (ref 0–0.5)
LYMPHOCYTES # BLD AUTO: 2.4 K/UL (ref 1–4.8)
LYMPHOCYTES NFR BLD: 19.7 % (ref 18–48)
MCH RBC QN AUTO: 32.9 PG (ref 27–31)
MCHC RBC AUTO-ENTMCNC: 35 G/DL (ref 32–36)
MCV RBC AUTO: 94 FL (ref 82–98)
MICROALBUMIN UR DL<=1MG/L-MCNC: 20 UG/ML
MONOCYTES # BLD AUTO: 0.6 K/UL (ref 0.3–1)
MONOCYTES NFR BLD: 5.3 % (ref 4–15)
NEUTROPHILS # BLD AUTO: 8.6 K/UL (ref 1.8–7.7)
NEUTROPHILS NFR BLD: 70.7 % (ref 38–73)
NRBC BLD-RTO: 0 /100 WBC
PLATELET # BLD AUTO: 212 K/UL (ref 150–450)
PMV BLD AUTO: 12.1 FL (ref 9.2–12.9)
POTASSIUM SERPL-SCNC: 4.3 MMOL/L (ref 3.5–5.1)
PROT SERPL-MCNC: 6.7 G/DL (ref 6–8.4)
RBC # BLD AUTO: 4.17 M/UL (ref 4–5.4)
SODIUM SERPL-SCNC: 141 MMOL/L (ref 136–145)
WBC # BLD AUTO: 12.15 K/UL (ref 3.9–12.7)

## 2024-06-28 PROCEDURE — 80053 COMPREHEN METABOLIC PANEL: CPT | Mod: HCNC | Performed by: INTERNAL MEDICINE

## 2024-06-28 PROCEDURE — 85025 COMPLETE CBC W/AUTO DIFF WBC: CPT | Mod: HCNC | Performed by: INTERNAL MEDICINE

## 2024-06-28 PROCEDURE — 82570 ASSAY OF URINE CREATININE: CPT | Mod: HCNC | Performed by: FAMILY MEDICINE

## 2024-06-28 PROCEDURE — 83036 HEMOGLOBIN GLYCOSYLATED A1C: CPT | Mod: HCNC | Performed by: FAMILY MEDICINE

## 2024-06-28 PROCEDURE — 36415 COLL VENOUS BLD VENIPUNCTURE: CPT | Mod: HCNC,PO | Performed by: INTERNAL MEDICINE

## 2024-06-28 PROCEDURE — 85652 RBC SED RATE AUTOMATED: CPT | Mod: HCNC | Performed by: INTERNAL MEDICINE

## 2024-06-28 PROCEDURE — 86140 C-REACTIVE PROTEIN: CPT | Mod: HCNC | Performed by: INTERNAL MEDICINE

## 2024-07-22 ENCOUNTER — TELEPHONE (OUTPATIENT)
Dept: RHEUMATOLOGY | Facility: CLINIC | Age: 69
End: 2024-07-22
Payer: MEDICARE

## 2024-07-23 NOTE — TELEPHONE ENCOUNTER
----- Message from Yolanda Fisher sent at 7/23/2024  8:06 AM CDT -----  Regarding: Advise  Contact: [phone]  ANN WHITNEY calling regarding Patient Advice (message) for # pt is calling back today to speak with Graciela or someone in the clinic regarding an appt change pls call to advise

## 2024-07-26 ENCOUNTER — TELEPHONE (OUTPATIENT)
Dept: RHEUMATOLOGY | Facility: CLINIC | Age: 69
End: 2024-07-26
Payer: MEDICARE

## 2024-07-26 RX ORDER — PREDNISONE 5 MG/1
5 TABLET ORAL
Qty: 90 TABLET | Refills: 1 | Status: SHIPPED | OUTPATIENT
Start: 2024-07-26

## 2024-07-29 ENCOUNTER — OFFICE VISIT (OUTPATIENT)
Dept: RHEUMATOLOGY | Facility: CLINIC | Age: 69
End: 2024-07-29
Payer: MEDICARE

## 2024-07-29 VITALS
RESPIRATION RATE: 18 BRPM | BODY MASS INDEX: 44.68 KG/M2 | DIASTOLIC BLOOD PRESSURE: 70 MMHG | SYSTOLIC BLOOD PRESSURE: 137 MMHG | HEART RATE: 91 BPM | WEIGHT: 252.19 LBS | HEIGHT: 63 IN

## 2024-07-29 DIAGNOSIS — M31.6 GCA (GIANT CELL ARTERITIS): Primary | ICD-10-CM

## 2024-07-29 DIAGNOSIS — I77.6 ARTERITIS, UNSPECIFIED: ICD-10-CM

## 2024-07-29 PROCEDURE — 1157F ADVNC CARE PLAN IN RCRD: CPT | Mod: HCNC,CPTII,S$GLB, | Performed by: INTERNAL MEDICINE

## 2024-07-29 PROCEDURE — 3061F NEG MICROALBUMINURIA REV: CPT | Mod: HCNC,CPTII,S$GLB, | Performed by: INTERNAL MEDICINE

## 2024-07-29 PROCEDURE — 3288F FALL RISK ASSESSMENT DOCD: CPT | Mod: HCNC,CPTII,S$GLB, | Performed by: INTERNAL MEDICINE

## 2024-07-29 PROCEDURE — 4010F ACE/ARB THERAPY RXD/TAKEN: CPT | Mod: HCNC,CPTII,S$GLB, | Performed by: INTERNAL MEDICINE

## 2024-07-29 PROCEDURE — 99214 OFFICE O/P EST MOD 30 MIN: CPT | Mod: HCNC,S$GLB,, | Performed by: INTERNAL MEDICINE

## 2024-07-29 PROCEDURE — 1101F PT FALLS ASSESS-DOCD LE1/YR: CPT | Mod: HCNC,CPTII,S$GLB, | Performed by: INTERNAL MEDICINE

## 2024-07-29 PROCEDURE — 99999 PR PBB SHADOW E&M-EST. PATIENT-LVL IV: CPT | Mod: PBBFAC,HCNC,, | Performed by: INTERNAL MEDICINE

## 2024-07-29 PROCEDURE — 1125F AMNT PAIN NOTED PAIN PRSNT: CPT | Mod: HCNC,CPTII,S$GLB, | Performed by: INTERNAL MEDICINE

## 2024-07-29 PROCEDURE — 3066F NEPHROPATHY DOC TX: CPT | Mod: HCNC,CPTII,S$GLB, | Performed by: INTERNAL MEDICINE

## 2024-07-29 PROCEDURE — 1159F MED LIST DOCD IN RCRD: CPT | Mod: HCNC,CPTII,S$GLB, | Performed by: INTERNAL MEDICINE

## 2024-07-29 PROCEDURE — 3044F HG A1C LEVEL LT 7.0%: CPT | Mod: HCNC,CPTII,S$GLB, | Performed by: INTERNAL MEDICINE

## 2024-07-29 PROCEDURE — 3075F SYST BP GE 130 - 139MM HG: CPT | Mod: HCNC,CPTII,S$GLB, | Performed by: INTERNAL MEDICINE

## 2024-07-29 PROCEDURE — 3008F BODY MASS INDEX DOCD: CPT | Mod: HCNC,CPTII,S$GLB, | Performed by: INTERNAL MEDICINE

## 2024-07-29 PROCEDURE — 3078F DIAST BP <80 MM HG: CPT | Mod: HCNC,CPTII,S$GLB, | Performed by: INTERNAL MEDICINE

## 2024-07-29 RX ORDER — PREDNISONE 20 MG/1
60 TABLET ORAL DAILY
Qty: 90 TABLET | Refills: 1 | Status: SHIPPED | OUTPATIENT
Start: 2024-07-29 | End: 2024-08-03

## 2024-07-29 NOTE — PROGRESS NOTES
I have personally taken the history and examined the patient and agree with the resident,s note as stated above       Latest Reference Range & Units 06/28/24 10:17   WBC 3.90 - 12.70 K/uL 12.15   RBC 4.00 - 5.40 M/uL 4.17   Hemoglobin 12.0 - 16.0 g/dL 13.7   Hematocrit 37.0 - 48.5 % 39.1   MCV 82 - 98 fL 94   MCH 27.0 - 31.0 pg 32.9 (H)   MCHC 32.0 - 36.0 g/dL 35.0   RDW 11.5 - 14.5 % 13.2   Platelet Count 150 - 450 K/uL 212   MPV 9.2 - 12.9 fL 12.1   Gran % 38.0 - 73.0 % 70.7   Lymph % 18.0 - 48.0 % 19.7   Mono % 4.0 - 15.0 % 5.3   Eos % 0.0 - 8.0 % 3.5   Basophil % 0.0 - 1.9 % 0.3   Immature Granulocytes 0.0 - 0.5 % 0.5   Gran # (ANC) 1.8 - 7.7 K/uL 8.6 (H)   Lymph # 1.0 - 4.8 K/uL 2.4   Mono # 0.3 - 1.0 K/uL 0.6   Eos # 0.0 - 0.5 K/uL 0.4   Baso # 0.00 - 0.20 K/uL 0.04   Immature Grans (Abs) 0.00 - 0.04 K/uL 0.06 (H)   nRBC 0 /100 WBC 0   Differential Method  Automated   Sed Rate 0 - 36 mm/Hr <2   Sodium 136 - 145 mmol/L 141   Potassium 3.5 - 5.1 mmol/L 4.3   Chloride 95 - 110 mmol/L 110   CO2 23 - 29 mmol/L 22 (L)   Anion Gap 8 - 16 mmol/L 9   BUN 8 - 23 mg/dL 15   Creatinine 0.5 - 1.4 mg/dL 0.9   eGFR >60 mL/min/1.73 m^2 >60.0   Glucose 70 - 110 mg/dL 110   Calcium 8.7 - 10.5 mg/dL 9.6   ALP 55 - 135 U/L 51 (L)   PROTEIN TOTAL 6.0 - 8.4 g/dL 6.7   Albumin 3.5 - 5.2 g/dL 4.1   BILIRUBIN TOTAL 0.1 - 1.0 mg/dL 1.7 (H)   AST 10 - 40 U/L 52 (H)   ALT 10 - 44 U/L 79 (H)   CRP 0.0 - 8.2 mg/L 0.3   Hemoglobin A1C External 4.0 - 5.6 % 5.7 (H)   Estimated Avg Glucose 68 - 131 mg/dL 117   (H): Data is abnormally high  (L): Data is abnormally low     Latest Reference Range & Units 10/22/15 08:45   TTG IgA <20 UNITS 2      Latest Reference Range & Units 10/22/15 08:45   IgA Level 40 - 350 mg/dL 85       EXAMINATION:  XR FOOT COMPLETE 3 VIEW RIGHT     CLINICAL HISTORY:  . Unspecified injury of right foot, sequela     TECHNIQUE:  AP, lateral, and oblique views of the right foot were performed.     COMPARISON:  None      FINDINGS:  There appears to be an old healed fracture of the distal 5th metacarpal.  Mild degenerative changes seen at the 1st MP joint.  Plantar calcaneal spur is seen and vascular calcifications are noted.     Impression:     See above        Electronically signed by:Ted Mendoza MD  Date:                                            03/25/2024        Saw Dr. Marmolejo in Podiatry 4/23/24:    Genesis was seen today for foot injury.     Diagnoses and all orders for this visit:     Inflammation of foot joint - Right Foot     Foot injury, right, sequela  -     Ambulatory referral/consult to Podiatry  -     HME - OTHER     Other orders  -     methylPREDNISolone (MEDROL DOSEPACK) 4 mg tablet; use as directed        I counseled the patient on her conditions, their implications and medical management.     X-rays and medical records reviewed     Pt advised of old fx of 5th met on right views, no new fx or dislocations  Pt advised that her symptoms may be due to post traumatic arthritis or inflammation  Rx medrol  HME- short CAM boot dispensed     Pt is to f/u in 2 weeks if no improvement for MRI       ASSESSMENT:    GCA/PMR inceased headaches x 2 wks, with bilateral tender TAs on exam no bruits. Chest and cardiac  exams normal.  Chronic inflammatory  Optic neuritis with blindness stable  Primary open angle glaucoma  DXA 3/22/24: normal BMD but FRAX  1.4%  24% MOF, high fracture risk on prednisone 5mg daily .Last visit: Discussed zoledronate 5mg IV. Risks and benefits discussed including ONJ, AFF . ACR handout on bisphosphonates provided. Prefer to denosumab or PTH analogs as BMD normal, and the denosumab would need to be continued indefinitely. Oral bisphosphonate not possible with dysphagia severe GERD Received zoledronate 5mg IV 4/23/24  Vitamin D insufficiency  21  3/22/24   on vitamin D2 50,000 IU weekly   Cirrhosis secondary to BELLO, hepatomegaly saw Dr. Fitzgerald today  Dysphagia, GERD  odynophagia persists  needs to f/u with Dr. Dykes in  Gastro  EH  on metoprolol-XL 100mg daily, amlodipine-benazepril 10-40 daily   Class 3 Body mass index is 44.68 kg/m². On tirzepatide  2.5mg sc q 7 days  prediabetes Hb A1c 5.7  6/29/24  Hyperlipidemia LDL 78   3/22/24 on pravastatin 40mg nightly   Right dorsal foot pain      PLAN:    Recheck ESR, CRP  Increase prednisone to 60mg daily pending repeat imaging Message me in 7 days with status  TA US  MRA head GCA study  Continue vitamin D2 50,000 IU weekly  Continue  vitamin D 3 1000 IU daily OTC  *Covid vaccine and Fluad in the fall  Continue Tocilizumab 162mg sc weekly  Due for  F/u Dr. Ro in Ophthalmology    F/u Dr. Dykes in Gastro for dysphagia, ondynophagia and GERD  F/u Brooke Fitzgerald  8/202/4  3727-5944 contreras Mediterranean diet, discussed and reviewed  RTC 3 months with standing labs

## 2024-07-29 NOTE — PROGRESS NOTES
Patient ID:  Genesis Ugalde    YOB: 1955     MRN:  [id number]     Subjective:     Chief Complaint:  Disease Management     History of Present Illness:  Pt is a 68 y.o. female with glucocorticoid-induced osteoporosis who presents today for f/u. LCV was 4/2/24. At that time the patient was taking prednisone 5 mg daily and was unable to taper in the near future. She reported trouble swallowing, chest pain, and headaches. Discussion was had regarding commencement of zoledronate 5 mg IV and patient agreed to infusion.    Today: Patient reports she is doing okay overall but that she has had an increase in headaches and tenderness in her temples over the last 2 weeks. She states that she has headaches normally but they seem to have worsened somewhat recently. She completed her first zoledronate infusion on 4/23/24 and states she had a significant flu-like reaction to the infusion that lasted for 1 week. She states she did not take Tylenol before the infusion due to concerns about her liver function. Patient also reports some mouth sores over the last week that she attributes to eating food with peanut oil, and states these have mostly resolved. She also reports a flare of genital sores about 3 weeks ago but states these have since resolved. She endorses taking prednisone 5 mg daily, tocilizumab 162 mg sc weekly, vit D2 50,000 units weekly, and vit D3 1000 units daily. She also endorses taking tirzepatide 2.5 mg sc weekly and notes consistent weight loss.    Vaccines: COVID in fall  Diet: She limits her portions but does not follow the Mediterranean diet    Denies hair loss, dry eyes, vision changes, dry mouth, oral/nasal ulcers, trouble swallowing, new rashes, joint swelling, morning stiffness, or GI disturbances.      Review of Systems   Review of Systems   Constitutional:  Negative for fever and unexpected weight change.   HENT:  Positive for mouth sores. Negative for trouble swallowing.    Eyes:  Negative  for redness.   Respiratory:  Negative for cough and shortness of breath.    Cardiovascular:  Positive for chest pain.   Gastrointestinal:  Positive for constipation. Negative for diarrhea.   Genitourinary:  Positive for genital sores. Negative for dysuria.   Musculoskeletal:  Positive for myalgias.   Skin:  Negative for rash.   Neurological:  Positive for headaches.   Hematological:  Does not bruise/bleed easily.        Current Medications:    Current Outpatient Medications:     amLODIPine-benazepriL (LOTREL) 10-40 mg per capsule, Take 1 capsule by mouth once daily., Disp: 90 capsule, Rfl: 3    aspirin (ECOTRIN) 325 MG EC tablet, Take 325 mg by mouth once daily., Disp: , Rfl:     brimonidine 0.2% (ALPHAGAN) 0.2 % Drop, Place 1 drop into both eyes 3 (three) times daily., Disp: 10 mL, Rfl: 12    clonazePAM (KLONOPIN) 1 MG tablet, Take 1 tablet (1 mg total) by mouth daily as needed for Anxiety., Disp: 60 tablet, Rfl: 0    dorzolamide (TRUSOPT) 2 % ophthalmic solution, INSTILL 1 DROP INTO BOTH EYES 3 TIMES A DAY, Disp: 30 mL, Rfl: 4    ergocalciferol (VITAMIN D2) 50,000 unit Cap, TAKE 1 CAPSULE (50,000 UNITS TOTAL) BY MOUTH EVERY 7 DAYS., Disp: 12 capsule, Rfl: 3    latanoprost 0.005 % ophthalmic solution, Place 1 drop into both eyes every evening., Disp: 7.5 mL, Rfl: 4    metoprolol succinate (TOPROL-XL) 100 MG 24 hr tablet, Take 1 tablet (100 mg total) by mouth once daily., Disp: 90 tablet, Rfl: 3    omeprazole (PRILOSEC) 40 MG capsule, Take 1 capsule (40 mg total) by mouth every 12 (twelve) hours. Take one pill every morning 45 minutes before breakfast, Disp: 180 capsule, Rfl: 3    pravastatin (PRAVACHOL) 40 MG tablet, Take 1 tablet (40 mg total) by mouth every evening., Disp: 90 tablet, Rfl: 3    predniSONE (DELTASONE) 5 MG tablet, TAKE 1 TABLET BY MOUTH EVERY DAY, Disp: 90 tablet, Rfl: 1    tirzepatide (MOUNJARO) 2.5 mg/0.5 mL PnIj, Inject 2.5 mg into the skin every 7 days., Disp: 4 Pen, Rfl: 3    tocilizumab  (ACTEMRA) 162 mg/0.9 mL injection, Inject 0.9 mLs (162 mg total) into the skin every 7 days., Disp: 12 each, Rfl: 0    traMADoL (ULTRAM) 50 mg tablet, Take 1 tablet (50 mg total) by mouth every 12 (twelve) hours as needed for Pain., Disp: 60 tablet, Rfl: 0    acyclovir (ZOVIRAX) 400 MG tablet, Take 400 mg by mouth 2 (two) times daily. (Patient not taking: Reported on 5/23/2024), Disp: , Rfl:      Objective:     Vitals:    07/29/24 1419   BP: 137/70   Pulse: 91   Resp: 18      Body mass index is 44.68 kg/m².     Physical Exam   HENT:   Head: Normocephalic and atraumatic.   Right Ear: External ear normal.   Left Ear: External ear normal.   Mouth/Throat: Mucous membranes are moist.   Mouth/Throat: Healing sore on posterior upper lip  Cardiovascular: Normal rate, regular rhythm and normal heart sounds.   No murmur heard.  Pulmonary/Chest: Effort normal and breath sounds normal. She has no wheezes.   Musculoskeletal:         General: Tenderness (naveed temple area, posterior scalp) present. Normal range of motion.      Right shoulder: Tenderness present.      Left shoulder: Tenderness present.      Right elbow: Normal.      Left elbow: Normal.      Right wrist: Normal.      Left wrist: Normal.      Cervical back: Normal range of motion.      Right knee: Normal.      Left knee: Normal.      Right lower leg: Edema present.      Left lower leg: Edema present.   Lymphadenopathy:     She has no cervical adenopathy.   Neurological: She is alert. She displays no weakness.   Skin: Skin is warm and dry. No rash noted.   Psychiatric: Her behavior is normal. Mood normal.   Vitals reviewed.      Right Side Rheumatological Exam     Examination finds the elbow, wrist, knee, 1st PIP, 1st MCP, 2nd PIP, 2nd MCP, 3rd PIP, 3rd MCP, 4th PIP, 4th MCP, 5th PIP and 5th MCP normal.    The patient is tender to palpation of the shoulder    The patient has an enlarged ankle    Muscle Strength (0-5 scale):  Deltoid:  5  Biceps: 5/5   Triceps:   5  : 5/5   Iliopsoas: 5  Quadriceps:  5   Distal Lower Extremity: 5    Left Side Rheumatological Exam     Examination finds the elbow, wrist, knee, 1st PIP, 1st MCP, 2nd PIP, 2nd MCP, 3rd PIP, 3rd MCP, 4th PIP, 4th MCP, 5th PIP and 5th MCP normal.    The patient is tender to palpation of the shoulder.    The patient has an enlarged ankle.    Muscle Strength (0-5 scale):  Deltoid:  5  Biceps: 5/5   Triceps:  5  :  5/5   Iliopsoas: 5  Quadriceps:  5   Distal Lower Extremity: 5             7/13/2021 9/15/2023   Tender (MILLER-28) 0 / 28  26 / 28    Swollen (MILLER-28) 0 / 28  0 / 28    Provider Global -- --   Patient Global 50 mm 60 mm   ESR -- --   CRP 0.7 mg/L --   MILLER-28 (ESR) -- --   MILLER-28 (CRP) 1.85 (Remission) --   CDAI Score -- --             Assessment:     1. GCA (giant cell arteritis)    2. Arteritis, unspecified          GCA/PMR  Chronic inflammatory  Optic neuritis with blindness stable  Primary open angle glaucoma  DXA 3/22/24: normal BMD but FRAX  1.4%  24% MOF, high fracture risk on prednisone 5mg daily .Last visit: Discussed zoledronate 5mg IV. Risks and benefits discussed including ONJ, AFF . ACR handout on bisphosphonates provided. Prefer to denosumab or PTH analogs as BMD normal, and the denosumab would need to be continued indefinitely. Oral bisphosphonate not possible with dysphagia severe GERD Received zoledronate 5mg IV 4/23/24  Vitamin D insufficiency  21  3/22/24   on vitamin D2 50,000 IU weekly   Cirrhosis secondary to BELLO, hepatomegaly saw Dr. Fitzgerald today  Dysphagia, GERD  odynophagia persists needs to f/u with Dr. Dykes in  Gastro  EH  on metoprolol-XL 100mg daily, amlodipine-benazepril 10-40 daily   Class 3 Body mass index is 44.68 kg/m².   prediabetes Hb A1c 5.7  6/29/24  Hyperlipidemia LDL 78   3/22/24 on pravastatin 40mg nightly   Right dorsal foot pain  Plan:      Problem List Items Addressed This Visit    None  Visit Diagnoses       GCA (giant cell arteritis)    -   Primary    Relevant Orders    MRA Head for Temporal Arteritis W and WO Contrast    US Temporal Artery Bilateral    Arteritis, unspecified        Relevant Orders    US Temporal Artery Bilateral              TA US  MRA head GCA study  Continue vitamin D2 50,000 IU weekly  Continue  vitamin D 3 1000 IU daily OTC  *Covid vaccine and Fluad in the fall  Prednisone 5mg daily  Tocilizumab 162mg sc weekly  Due for  F/u Dr. Ro in Ophthalmology    F/u Dr. Dykes in Gastro for dysphagia, ondynophagia and GERD  F/u Brooke Fitzgerald  8/202/4  [id number] contreras Mediterranean diet, discussed and reviewed  RTC 3 months with standing labs     Kev Foote M.D.  PGY-1  [employer] [facility]

## 2024-07-29 NOTE — PATIENT INSTRUCTIONS
Recheck ESR, CRP  Increase prednisone to 60mg daily pending repeat imaging *Message me in 7 days with status  TA US  MRA head GCA study  Continue vitamin D2 50,000 IU weekly  Continue  vitamin D 3 1000 IU daily OTC  *Covid vaccine and Fluad in the fall  Continue Tocilizumab 162mg sc weekly  Due for  F/u Dr. oR in Ophthalmology    F/u Dr. Dykes in Gastro for dysphagia, ondynophagia and GERD  F/u Brooke Fitzgerald  8/202/4  [id number] contreras Mediterranean diet, discussed and reviewed  RTC 3 months with standing labs

## 2024-07-31 ENCOUNTER — HOSPITAL ENCOUNTER (OUTPATIENT)
Dept: RADIOLOGY | Facility: HOSPITAL | Age: 69
Discharge: HOME | End: 2024-07-31
Attending: INTERNAL MEDICINE
Payer: MEDICARE

## 2024-07-31 DIAGNOSIS — M31.6 GCA (GIANT CELL ARTERITIS): ICD-10-CM

## 2024-07-31 DIAGNOSIS — I77.6 ARTERITIS, UNSPECIFIED: ICD-10-CM

## 2024-07-31 PROCEDURE — 93880 EXTRACRANIAL BILAT STUDY: CPT | Mod: TC,HCNC

## 2024-08-01 ENCOUNTER — HOSPITAL ENCOUNTER (OUTPATIENT)
Dept: RADIOLOGY | Facility: HOSPITAL | Age: 69
Discharge: HOME | End: 2024-08-01
Attending: INTERNAL MEDICINE
Payer: MEDICARE

## 2024-08-01 DIAGNOSIS — M31.6 GCA (GIANT CELL ARTERITIS): ICD-10-CM

## 2024-08-01 DIAGNOSIS — I77.6 ARTERITIS, UNSPECIFIED: ICD-10-CM

## 2024-08-01 PROCEDURE — A9585 GADOBUTROL INJECTION: HCPCS | Mod: HCNC | Performed by: INTERNAL MEDICINE

## 2024-08-01 PROCEDURE — 70546 MR ANGIOGRAPH HEAD W/O&W/DYE: CPT | Mod: 26,HCNC,, | Performed by: RADIOLOGY

## 2024-08-01 PROCEDURE — 70542 MRI ORBIT/FACE/NECK W/DYE: CPT | Mod: TC,HCNC

## 2024-08-01 PROCEDURE — 25500020 PHARM REV CODE 255: Mod: HCNC | Performed by: INTERNAL MEDICINE

## 2024-08-01 RX ORDER — GADOBUTROL 604.72 MG/ML
10 INJECTION INTRAVENOUS
Status: COMPLETED | OUTPATIENT
Start: 2024-08-01 | End: 2024-08-01

## 2024-08-01 RX ADMIN — GADOBUTROL 10 ML: 604.72 INJECTION INTRAVENOUS at 08:08

## 2024-08-02 ENCOUNTER — TELEPHONE (OUTPATIENT)
Dept: RHEUMATOLOGY | Facility: CLINIC | Age: 69
End: 2024-08-02
Payer: MEDICARE

## 2024-08-02 NOTE — TELEPHONE ENCOUNTER
Called patient regarding abnormal results.  She reports significant improvement in headache since starting prednisone.  Reports that her vision has improved since starting prednisone.  Discussed with patient we can admit her to the hospital for pulse dose steroids but patient declines. Told her if her vision worsens to go to ED.

## 2024-08-03 ENCOUNTER — TELEPHONE (OUTPATIENT)
Dept: RHEUMATOLOGY | Facility: CLINIC | Age: 69
End: 2024-08-03
Payer: MEDICARE

## 2024-08-03 DIAGNOSIS — M31.6 GCA (GIANT CELL ARTERITIS): ICD-10-CM

## 2024-08-03 RX ORDER — PREDNISONE 20 MG/1
60 TABLET ORAL DAILY
Qty: 90 TABLET | Refills: 1 | Status: SHIPPED | OUTPATIENT
Start: 2024-08-03

## 2024-08-03 NOTE — PROGRESS NOTES
Genesis, actually both the temporal artery ultrasound and the MRA of the superficial scalp vessels show evidence of temporal /giant cell arteritis. So will need to continue prednisone 60mg daily for one week, the 50mg daily x wk, then 40mg daily x 1 wk, then 30 mg daily and continue and will ask my m.a. to schedule f/u appt  in 4 wks with labs. Please let me know how you are doing on the prednisone 60mg daily  BELIA

## 2024-08-03 NOTE — TELEPHONE ENCOUNTER
Please find out how pt is doing on prednisone 60mg daily . Please schedule f/u with me in 4 wks with cbc, cmp, esr and crp.Thank you BELIA

## 2024-08-04 ENCOUNTER — PATIENT MESSAGE (OUTPATIENT)
Dept: RHEUMATOLOGY | Facility: CLINIC | Age: 69
End: 2024-08-04
Payer: MEDICARE

## 2024-08-06 DIAGNOSIS — E11.39 TYPE 2 DIABETES MELLITUS WITH OTHER OPHTHALMIC COMPLICATION, WITHOUT LONG-TERM CURRENT USE OF INSULIN: ICD-10-CM

## 2024-08-06 DIAGNOSIS — K74.60 LIVER CIRRHOSIS SECONDARY TO NASH: ICD-10-CM

## 2024-08-06 DIAGNOSIS — K75.81 LIVER CIRRHOSIS SECONDARY TO NASH: ICD-10-CM

## 2024-08-06 DIAGNOSIS — E66.01 CLASS 3 SEVERE OBESITY DUE TO EXCESS CALORIES WITHOUT SERIOUS COMORBIDITY WITH BODY MASS INDEX (BMI) OF 45.0 TO 49.9 IN ADULT: ICD-10-CM

## 2024-08-06 RX ORDER — TIRZEPATIDE 2.5 MG/.5ML
2.5 INJECTION, SOLUTION SUBCUTANEOUS
Qty: 12 PEN | Refills: 1 | Status: SHIPPED | OUTPATIENT
Start: 2024-08-06

## 2024-08-08 ENCOUNTER — TELEPHONE (OUTPATIENT)
Dept: RHEUMATOLOGY | Facility: CLINIC | Age: 69
End: 2024-08-08
Payer: MEDICARE

## 2024-08-16 ENCOUNTER — TELEPHONE (OUTPATIENT)
Dept: RHEUMATOLOGY | Facility: CLINIC | Age: 69
End: 2024-08-16
Payer: MEDICARE

## 2024-08-23 ENCOUNTER — TELEPHONE (OUTPATIENT)
Dept: PHARMACY | Facility: CLINIC | Age: 69
End: 2024-08-23
Payer: MEDICARE

## 2024-08-23 ENCOUNTER — OFFICE VISIT (OUTPATIENT)
Dept: FAMILY MEDICINE | Facility: CLINIC | Age: 69
End: 2024-08-23
Payer: MEDICARE

## 2024-08-23 VITALS
BODY MASS INDEX: 44.65 KG/M2 | HEART RATE: 111 BPM | OXYGEN SATURATION: 96 % | DIASTOLIC BLOOD PRESSURE: 74 MMHG | SYSTOLIC BLOOD PRESSURE: 128 MMHG | HEIGHT: 63 IN | WEIGHT: 252 LBS

## 2024-08-23 DIAGNOSIS — E11.69 HYPERLIPIDEMIA ASSOCIATED WITH TYPE 2 DIABETES MELLITUS: ICD-10-CM

## 2024-08-23 DIAGNOSIS — Z86.010 HX OF COLONIC POLYPS: ICD-10-CM

## 2024-08-23 DIAGNOSIS — F43.21 ADJUSTMENT DISORDER WITH DEPRESSED MOOD: ICD-10-CM

## 2024-08-23 DIAGNOSIS — Z12.11 COLON CANCER SCREENING: ICD-10-CM

## 2024-08-23 DIAGNOSIS — K74.60 LIVER CIRRHOSIS SECONDARY TO NASH: ICD-10-CM

## 2024-08-23 DIAGNOSIS — E78.5 HYPERLIPIDEMIA ASSOCIATED WITH TYPE 2 DIABETES MELLITUS: ICD-10-CM

## 2024-08-23 DIAGNOSIS — E66.01 CLASS 3 SEVERE OBESITY DUE TO EXCESS CALORIES WITH SERIOUS COMORBIDITY AND BODY MASS INDEX (BMI) OF 40.0 TO 44.9 IN ADULT: ICD-10-CM

## 2024-08-23 DIAGNOSIS — E11.39 TYPE 2 DIABETES MELLITUS WITH OTHER OPHTHALMIC COMPLICATION, WITHOUT LONG-TERM CURRENT USE OF INSULIN: Primary | ICD-10-CM

## 2024-08-23 DIAGNOSIS — H54.10 BLINDNESS AND LOW VISION: ICD-10-CM

## 2024-08-23 DIAGNOSIS — I15.2 HYPERTENSION ASSOCIATED WITH DIABETES: ICD-10-CM

## 2024-08-23 DIAGNOSIS — Z63.6 CAREGIVER STRESS: ICD-10-CM

## 2024-08-23 DIAGNOSIS — F43.21 GRIEF REACTION: ICD-10-CM

## 2024-08-23 DIAGNOSIS — K75.81 LIVER CIRRHOSIS SECONDARY TO NASH: ICD-10-CM

## 2024-08-23 DIAGNOSIS — E11.59 HYPERTENSION ASSOCIATED WITH DIABETES: ICD-10-CM

## 2024-08-23 PROCEDURE — 99999 PR PBB SHADOW E&M-EST. PATIENT-LVL V: CPT | Mod: PBBFAC,HCNC,, | Performed by: FAMILY MEDICINE

## 2024-08-23 RX ORDER — CLONAZEPAM 1 MG/1
1 TABLET ORAL DAILY PRN
Qty: 60 TABLET | Refills: 0 | Status: SHIPPED | OUTPATIENT
Start: 2024-08-23

## 2024-08-23 RX ORDER — TIRZEPATIDE 2.5 MG/.5ML
2.5 INJECTION, SOLUTION SUBCUTANEOUS
Qty: 4 PEN | Refills: 0 | Status: SHIPPED | OUTPATIENT
Start: 2024-08-23

## 2024-08-23 RX ORDER — TIRZEPATIDE 5 MG/.5ML
5 INJECTION, SOLUTION SUBCUTANEOUS
Qty: 12 PEN | Refills: 0 | Status: SHIPPED | OUTPATIENT
Start: 2024-08-23

## 2024-08-23 SDOH — SOCIAL DETERMINANTS OF HEALTH (SDOH): DEPENDENT RELATIVE NEEDING CARE AT HOME: Z63.6

## 2024-08-23 NOTE — PROGRESS NOTES
"Subjective:         Patient ID: Genesis Ugalde is a 68 y.o. female.    Chief Complaint: Diabetes    Patient Active Problem List   Diagnosis    Giant cell arteritis with polymyalgia rheumatica    Class 3 severe obesity due to excess calories with serious comorbidity and body mass index (BMI) of 40.0 to 44.9 in adult    Optic neuritis    History of kidney stones    Uveitis    Vitamin D deficiency    Hx of colonic polyps    Blindness and low vision    Cushing's disease    Genital herpes simplex    Hepatosplenomegaly    Fatty liver    Primary open angle glaucoma, both eyes    Lung nodule    Transient memory loss    Right sided sciatica    Diarrhea    Dysphagia    Immunosuppression    Loose stools    Hyperlipidemia associated with type 2 diabetes mellitus    Claustrophobia    Pain of right hip joint    LOYDA (obstructive sleep apnea)    Multinodular goiter    Ankle mass, right    Right calf pain    Liver mass    Family circumstance    Liver cirrhosis secondary to BELLO    Gastroesophageal reflux disease    Hiatal hernia    Long-term current use of steroids    Edema    Hypertension associated with diabetes    Other osteoporosis without current pathological fracture    Type 2 diabetes mellitus with ophthalmic complication, without long-term current use of insulin      HPI    Genesis SANDHU is a 68 y.o. female who presents today for diabetes follow up.  Recent flare of GCA, managed by Rheum. On 4 weeks of prednisone and tapering off slowly.     Mounjaro did well with noted weight loss of almost 20 lbs over 4 months. Past week without injection. Hit donut hole. Unable to cover higher copay.     Review of Systems   All other systems reviewed and are negative.       Objective:     Vitals:    08/23/24 1340   BP: 128/74   BP Location: Left arm   Patient Position: Sitting   BP Method: X-Large (Manual)   Pulse: (!) 111   SpO2: 96%   Weight: 114.3 kg (251 lb 15.8 oz)   Height: 5' 3" (1.6 m)         Physical Exam  Vitals and nursing note " reviewed.   Constitutional:       General: She is not in acute distress.     Appearance: Normal appearance. She is not ill-appearing, toxic-appearing or diaphoretic.   HENT:      Head: Normocephalic and atraumatic.   Eyes:      General: No scleral icterus.     Conjunctiva/sclera: Conjunctivae normal.   Cardiovascular:      Rate and Rhythm: Normal rate.   Pulmonary:      Effort: Pulmonary effort is normal. No respiratory distress.   Skin:     Coloration: Skin is not pale.   Neurological:      Mental Status: She is alert. Mental status is at baseline.   Psychiatric:         Attention and Perception: Attention and perception normal.         Mood and Affect: Mood and affect normal.         Speech: Speech normal.         Behavior: Behavior normal.         Cognition and Memory: Cognition and memory normal.         Judgment: Judgment normal.           Assessment:       1. Type 2 diabetes mellitus with other ophthalmic complication, without long-term current use of insulin    2. Hypertension associated with diabetes    3. Hyperlipidemia associated with type 2 diabetes mellitus    4. Liver cirrhosis secondary to BELLO    5. Class 3 severe obesity due to excess calories with serious comorbidity and body mass index (BMI) of 40.0 to 44.9 in adult    6. Colon cancer screening    7. Hx of colonic polyps    8. Adjustment disorder with depressed mood    9. Grief reaction    10. Caregiver stress    11. Blindness and low vision          Plan:   Recent relevant labs results reviewed with patient.         1. Type 2 diabetes mellitus with other ophthalmic complication, without long-term current use of insulin  -     tirzepatide (MOUNJARO) 2.5 mg/0.5 mL PnIj; Inject 2.5 mg into the skin every 7 days.  Dispense: 4 Pen; Refill: 0  -     tirzepatide (MOUNJARO) 5 mg/0.5 mL PnIj; Inject 5 mg into the skin every 7 days.  Dispense: 12 Pen; Refill: 0  -     Ambulatory referral/consult to Pharmacy Assistance; Future; Expected date: 08/30/2024  -      Hemoglobin A1C; Future; Expected date: 08/23/2024  A1C well controlled.   Continue with Mounjaro if able to afford, otherwise can consider starting on oral agent for rest of year.     2. Hypertension associated with diabetes  - Chronic health condition is stable and controlled. Continue current medication regimen and relevant lifestyle modifications. Necessary medication refills addressed. Routine ongoing surveillance monitoring.     3. Hyperlipidemia associated with type 2 diabetes mellitus  Overview:  Controlled LDL at goal. Continue statin    4. Liver cirrhosis secondary to BELLO  -     tirzepatide (MOUNJARO) 2.5 mg/0.5 mL PnIj; Inject 2.5 mg into the skin every 7 days.  Dispense: 4 Pen; Refill: 0  -     tirzepatide (MOUNJARO) 5 mg/0.5 mL PnIj; Inject 5 mg into the skin every 7 days.  Dispense: 12 Pen; Refill: 0  -     Ambulatory referral/consult to Pharmacy Assistance; Future; Expected date: 08/30/2024  Per hepatology    5. Class 3 severe obesity due to excess calories with serious comorbidity and body mass index (BMI) of 40.0 to 44.9 in adult  -     tirzepatide (MOUNJARO) 2.5 mg/0.5 mL PnIj; Inject 2.5 mg into the skin every 7 days.  Dispense: 4 Pen; Refill: 0  -     tirzepatide (MOUNJARO) 5 mg/0.5 mL PnIj; Inject 5 mg into the skin every 7 days.  Dispense: 12 Pen; Refill: 0  -     Ambulatory referral/consult to Pharmacy Assistance; Future; Expected date: 08/30/2024  Improvement on GLP1a. Continue if able to have financial support    6. Colon cancer screening  7. Hx of colonic polyps  Orders:  -     Ambulatory referral/consult to Endo Procedure ; Future; Expected date: 08/24/2024    8. Adjustment disorder with depressed mood  9. Grief reaction  10. Caregiver stress  -     clonazePAM (KLONOPIN) 1 MG tablet; Take 1 tablet (1 mg total) by mouth daily as needed for Anxiety.  Dispense: 60 tablet; Refill: 0  HH order placed.   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 Needs help bathing,  cueing to feed.   Low vision, impairment. Further vision loss expected. Impairs her daily tasks in the home. Falls in past.     Patient's questions answered. Plan reviewed with patient at the end of visit. Relevant precautions to chief complaint and reasons to seek further medical care or to contact the office sooner reviewed with patient.     Follow up in about 3 months (around 11/23/2024) for Diabetes Follow-up, Weight Follow-up.        Part of this note was dictated using voice recognition software. Please excuse any typographical errors.

## 2024-08-23 NOTE — TELEPHONE ENCOUNTER
We have reviewed Ms. Aftab current medication list and/or insurance status. Unfortunately, The Pharmacy Patient Assistance Team is unable to assist at this time due to the following reasons      Mounjaro does not have a assistance program at this time. The manufacture company is only offering co-pay cards to patients who have private/commercial insurance                Sabi Mckinley  Pharmacy Patient Assistance Team

## 2024-08-27 ENCOUNTER — OFFICE VISIT (OUTPATIENT)
Dept: HEPATOLOGY | Facility: CLINIC | Age: 69
End: 2024-08-27
Payer: MEDICARE

## 2024-08-27 ENCOUNTER — TELEPHONE (OUTPATIENT)
Dept: HEPATOLOGY | Facility: CLINIC | Age: 69
End: 2024-08-27

## 2024-08-27 VITALS — HEIGHT: 63 IN | WEIGHT: 254.63 LBS | BODY MASS INDEX: 45.12 KG/M2

## 2024-08-27 DIAGNOSIS — E11.59 HYPERTENSION ASSOCIATED WITH DIABETES: ICD-10-CM

## 2024-08-27 DIAGNOSIS — I15.2 HYPERTENSION ASSOCIATED WITH DIABETES: ICD-10-CM

## 2024-08-27 DIAGNOSIS — Z85.05 ENCOUNTER FOR FOLLOW-UP SURVEILLANCE OF LIVER CANCER: ICD-10-CM

## 2024-08-27 DIAGNOSIS — E66.01 MORBID OBESITY: ICD-10-CM

## 2024-08-27 DIAGNOSIS — K74.60 LIVER CIRRHOSIS SECONDARY TO NASH: Primary | ICD-10-CM

## 2024-08-27 DIAGNOSIS — E11.39 TYPE 2 DIABETES MELLITUS WITH OTHER OPHTHALMIC COMPLICATION, WITHOUT LONG-TERM CURRENT USE OF INSULIN: ICD-10-CM

## 2024-08-27 DIAGNOSIS — E11.69 HYPERLIPIDEMIA ASSOCIATED WITH TYPE 2 DIABETES MELLITUS: ICD-10-CM

## 2024-08-27 DIAGNOSIS — K75.81 LIVER CIRRHOSIS SECONDARY TO NASH: Primary | ICD-10-CM

## 2024-08-27 DIAGNOSIS — Z08 ENCOUNTER FOR FOLLOW-UP SURVEILLANCE OF LIVER CANCER: ICD-10-CM

## 2024-08-27 DIAGNOSIS — D18.03 HEPATIC HEMANGIOMA: ICD-10-CM

## 2024-08-27 DIAGNOSIS — E78.5 HYPERLIPIDEMIA ASSOCIATED WITH TYPE 2 DIABETES MELLITUS: ICD-10-CM

## 2024-08-27 DIAGNOSIS — R74.8 ELEVATED LIVER ENZYMES: ICD-10-CM

## 2024-08-27 DIAGNOSIS — R16.0 HEPATOMEGALY: ICD-10-CM

## 2024-08-27 PROCEDURE — 3008F BODY MASS INDEX DOCD: CPT | Mod: HCNC,CPTII,S$GLB, | Performed by: NURSE PRACTITIONER

## 2024-08-27 PROCEDURE — 3044F HG A1C LEVEL LT 7.0%: CPT | Mod: HCNC,CPTII,S$GLB, | Performed by: NURSE PRACTITIONER

## 2024-08-27 PROCEDURE — 99214 OFFICE O/P EST MOD 30 MIN: CPT | Mod: HCNC,S$GLB,, | Performed by: NURSE PRACTITIONER

## 2024-08-27 PROCEDURE — 1125F AMNT PAIN NOTED PAIN PRSNT: CPT | Mod: HCNC,CPTII,S$GLB, | Performed by: NURSE PRACTITIONER

## 2024-08-27 PROCEDURE — 1101F PT FALLS ASSESS-DOCD LE1/YR: CPT | Mod: HCNC,CPTII,S$GLB, | Performed by: NURSE PRACTITIONER

## 2024-08-27 PROCEDURE — 1159F MED LIST DOCD IN RCRD: CPT | Mod: HCNC,CPTII,S$GLB, | Performed by: NURSE PRACTITIONER

## 2024-08-27 PROCEDURE — 1160F RVW MEDS BY RX/DR IN RCRD: CPT | Mod: HCNC,CPTII,S$GLB, | Performed by: NURSE PRACTITIONER

## 2024-08-27 PROCEDURE — 1157F ADVNC CARE PLAN IN RCRD: CPT | Mod: HCNC,CPTII,S$GLB, | Performed by: NURSE PRACTITIONER

## 2024-08-27 PROCEDURE — 3061F NEG MICROALBUMINURIA REV: CPT | Mod: HCNC,CPTII,S$GLB, | Performed by: NURSE PRACTITIONER

## 2024-08-27 PROCEDURE — 4010F ACE/ARB THERAPY RXD/TAKEN: CPT | Mod: HCNC,CPTII,S$GLB, | Performed by: NURSE PRACTITIONER

## 2024-08-27 PROCEDURE — 99999 PR PBB SHADOW E&M-EST. PATIENT-LVL III: CPT | Mod: PBBFAC,HCNC,, | Performed by: NURSE PRACTITIONER

## 2024-08-27 PROCEDURE — 3288F FALL RISK ASSESSMENT DOCD: CPT | Mod: HCNC,CPTII,S$GLB, | Performed by: NURSE PRACTITIONER

## 2024-08-27 PROCEDURE — 3066F NEPHROPATHY DOC TX: CPT | Mod: HCNC,CPTII,S$GLB, | Performed by: NURSE PRACTITIONER

## 2024-08-27 NOTE — Clinical Note
Please contact patient to schedule labs and US in 6 months with RTC approximately 1 week later to review results. Thanks!

## 2024-08-27 NOTE — Clinical Note
Please call patient to schedule US and labs in 6 months with RTC 1 week later for a f/u visit with me.

## 2024-08-27 NOTE — TELEPHONE ENCOUNTER
----- Message from Brooke Fitzgerald NP sent at 8/27/2024  3:16 PM CDT -----  Please call patient to schedule US and labs in 6 months with RTC 1 week later for a f/u visit with me.

## 2024-08-27 NOTE — PATIENT INSTRUCTIONS
Because you have cirrhosis, it is important to attend clinic visits every 6 months with an Ultrasound and blood tests every 6 months to screen for liver cancer (you are at risk of developing liver cancer due to scar tissue in the liver)    Signs and symptoms of worsening liver disease include jaundice, fluid in the belly (ascites), and confusion/disorientation/slowed thought processes due to hepatic encephalopathy (toxins building up because of liver problems).   You should seek medical attention if any of these things occur.    Also, possible bleeding from esophageal varices (blood vessels in the stomach and foodpipe can burst and cause fatal bleeding).  Therefore, if you have symptoms of vomiting blood, blood in your stool, dark or black stools or vomiting coffee ground vomit, YOU SHOULD GO TO THE EMERGENCY ROOM IMMEDIATELY.     Cirrhosis can increase the risk of liver cancer, liver failure, and death. However, we will watch your liver function score (MELD score) closely with each clinic visit. A normal MELD score is 6, highest is 40. Your last one was an 10. We will check this with every clinic visit. A MELD 15 or higher is when we start to consider transplant because MELD 15 or higher indicates that the liver is not functioning as well     Cirrhosis Counseling  - NO alcohol use (includes beer, wine, and/or liquor)  - avoid non-steroidal anti-inflammatory drugs (NSAIDs) such as ibuprofen, Motrin, naprosyn, Alleve due to the risk of kidney damage  - can take acetaminophen (Tylenol), no more than 2000 mg per day  - low sodium (salt) 2 gram per day diet  - high protein diet: 115 grams per day to prevent muscle mass loss. Drink at least 1 protein shake daily (Premier Protein is best option because it is very high protein and low sugar). Ok to use this as nighttime snack to fit it in   - resistance exercises for muscle strength  - avoid raw seafoods due to the risk of fatal Vibrio vulnificus infection  - ultrasound of  the liver every 6 months for liver cancer screening (you are at risk of developing liver cancer due to scar tissue in the liver)  - Upper endoscopy every 2-3 years to screen for varices in the stomach and foodpipe which can burst and cause fatal bleeding

## 2024-08-27 NOTE — PROGRESS NOTES
Subjective:       Patient ID: Genesis Ugalde is a 68 y.o. female.    Chief Complaint: Cirrhosis      HPI  I saw this 68 y.o. lady who presents in follow up. She was last seen in clinic by myself in 6/2023. 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   Fibroscan in 2016 was suggestive of stage 3 fibrosis, but Fibrosure was suggestive of stage 1 fibrosis. Repeat Fibroscan in 6/2020 was suggestive of stage 4 fibrosis (cirrhosis).  She also has a history of elevated LFTs, which had previously normalized with weight loss.    Reassuringly, her platelet counts remain normal. Her imaging has shown a small liver lesion, likely to be a hemangioma, but also showed fatty liver disease.  She also c/o intermittent RUQ pain, improved now. Abdominal US in 3/2022 showed hepatomegaly, with a nodular contour of the liver and no focal lesions; bile ducts appeared normal and spleen size was also normal.   Follow up US in 9/2022 for HCC surveillance again showed hepatomegaly (18.9 cm), due to fatty infiltration, with no focal liver lesions or ascites.  Viral hepatitis testing has been negative. Ferritin normal on prior labs in 2018.  EGD in 2016 showed a 2 cm hiatal hernia and esophagitis. There were no EV, GV or PHG visualized. Follow up EGD in 3/2023 showed:    Findings:        The examined esophagus was normal. Biopsies were obtained from the        proximal and distal esophagus with cold forceps for histology of        suspected eosinophilic esophagitis.        The Z-line was regular and was found 36 cm from the incisors.        The cardia and gastric fundus were normal on retroflexion.        Localized moderate inflammation characterized by congestion (edema)        and erythema was found in the gastric antrum. Biopsies were taken        with a cold forceps for histology.        No other significant abnormalities were identified in a careful        examination of the stomach.        Localized mildly erythematous  mucosa was found in the duodenal bulb.        The exam of the duodenum was otherwise normal.   Impression:     - Normal esophagus.                          - Z-line regular, 36 cm from the incisors.                          - Gastritis. Biopsied.                          - Erythematous duodenopathy.                          - Biopsies were taken with a cold forceps for                          evaluation of eosinophilic esophagitis.    Most recent abdominal US for HCC surveillance showed:    FINDINGS:    The pancreas, aorta, and IVC are unremarkable.  The gallbladder has been removed.  The bile duct measures 3 mm.  Liver measures 18.6 cm, the right kidney 11.7, the left kidney 11.5, and the spleen 9.2 cm.  Kidneys and spleen are normal.  There is a hypoechoic structure in the liver measuring 1.5 cm.     Impression:     No acute process seen.     Stable 1.5 cm lesion right lobe of the liver most likely a hemangioma.     She is now on Mounjaro per PCP, and had lost 20 lbs. She has been off of Mounjaro for the past 2 weeks, due to cost, and has gained back 3 lbs.    Body mass index is 45.11 kg/m².     MELD 3.0: 11 at 6/14/2024  9:34 AM  MELD-Na: 10 at 6/14/2024  9:34 AM  Calculated from:  Serum Creatinine: 0.8 mg/dL (Using min of 1 mg/dL) at 6/14/2024  9:34 AM  Serum Sodium: 145 mmol/L (Using max of 137 mmol/L) at 6/14/2024  9:34 AM  Total Bilirubin: 1.9 mg/dL at 6/14/2024  9:34 AM  Serum Albumin: 4.1 g/dL (Using max of 3.5 g/dL) at 6/14/2024  9:34 AM  INR(ratio): 1.1 at 6/14/2024  9:34 AM  Age at listing (hypothetical): 68 years  Sex: Female at 6/14/2024  9:34 AM    CT abdo: 5/25/2015  The liver is normal in size demonstrating diffuse hypoattenuation in keeping with hepatic steatosis. Additionally, there is a stable 1.7-cm area of hyperenhancement within the inferior right hepatic lobe which is nonspecific, however such stability over  time reflects a benign etiology, perhaps hemangioma. The portal, splenic, and  proximal mesenteric veins are patent. The patient is status post cholecystectomy. No evidence of biliary ductal dilatation.    MRI 12/21/2015  The posterior right lobe liver lesion is vascular but not a typical hemangioma. The second one is not seen. There has been no change. Spleen, stomach, duodenum, pancreas, bile ducts, and adrenal glands show nothing unusual. No adenopathy or masses seen. Lung bases are clear and the heart shows nothing unusual. No change in size     Abdo US: 6/19/20  Hepatic steatosis.  Previously seen lesion not evident on this examination.    PMH:  Hypertension  Giant cell arteritis- blindness  PMR  DM- 2012  Hysterectomy  Cholecystectomy- open- 1993    SH:  Non smoker  No alcohol    FH:  Nil liver  Ca stomach  IHD    Review of Systems   Constitutional: Negative for fever, chills, activity change, appetite change, fatigue and unexpected weight change.   HENT: Negative for ear pain, hearing loss, nosebleeds, sore throat and trouble swallowing.    Eyes: Negative for redness and visual disturbance.   Respiratory: Negative for cough, chest tightness, shortness of breath and wheezing.    Cardiovascular: Negative for chest pain and palpitations.   Gastrointestinal: Negative for blood in stool and abdominal distention. + Intermittent nausea and RUQ pain  Genitourinary: Negative for dysuria, urgency, frequency, hematuria and difficulty urinating.   Musculoskeletal: Negative for myalgias, back pain, joint swelling, arthralgias and gait problem.   Skin: Negative for rash.   Neurological: Negative for tremors, seizures, speech difficulty, weakness and headaches.   Hematological: Negative for adenopathy.   Psychiatric/Behavioral: Negative for confusion, sleep disturbance and decreased concentration. The patient is not nervous/anxious.        Lab Results   Component Value Date    ALT 79 (H) 06/28/2024    AST 52 (H) 06/28/2024    GGT 73 (H) 10/18/2021    ALKPHOS 51 (L) 06/28/2024    BILITOT 1.7 (H)  06/28/2024     Past Medical History:   Diagnosis Date    Anxiety     Asthma     Blind left eye     can see silhouettes in right eye    Cataract     Depression     Diabetes mellitus     Disease of immune system     Giant cell arteritis     Glaucoma     Hypertension     Polymyalgia rheumatica     Uveitis      Past Surgical History:   Procedure Laterality Date    ANKLE SURGERY      CHOLECYSTECTOMY      COLONOSCOPY N/A 9/28/2015    Procedure: COLONOSCOPY;  Surgeon: Alok Dykes MD;  Location: [facility] (4TH FLR);  Service: Endoscopy;  Laterality: N/A;    COLONOSCOPY N/A 9/6/2019    Procedure: COLONOSCOPY;  Surgeon: Alok Dykes MD;  [facility]  Service: Endoscopy;  Laterality: N/A;    CYST REMOVAL      right lung    ESOPHAGOGASTRODUODENOSCOPY N/A 3/28/2023    Procedure: EGD (ESOPHAGOGASTRODUODENOSCOPY);  Surgeon: Bharat Franklin MD;  [facility]  Service: Endoscopy;  Laterality: N/A;  cirrhosis-labs prior, varices surveillance  gastroparesis-full liquid diet x3 days, clear liquid diet x1 day prior to procedure  instructions handed to pt and sent to [facility]    HYSTERECTOMY       Current Outpatient Medications   Medication Sig    acyclovir (ZOVIRAX) 400 MG tablet Take 400 mg by mouth 2 (two) times daily.    amLODIPine-benazepriL (LOTREL) 10-40 mg per capsule Take 1 capsule by mouth once daily.    aspirin (ECOTRIN) 325 MG EC tablet Take 325 mg by mouth once daily.    brimonidine 0.2% (ALPHAGAN) 0.2 % Drop Place 1 drop into both eyes 3 (three) times daily.    clonazePAM (KLONOPIN) 1 MG tablet Take 1 tablet (1 mg total) by mouth daily as needed for Anxiety.    dorzolamide (TRUSOPT) 2 % ophthalmic solution INSTILL 1 DROP INTO BOTH EYES 3 TIMES A DAY    ergocalciferol (VITAMIN D2) 50,000 unit Cap TAKE 1 CAPSULE (50,000 UNITS TOTAL) BY MOUTH EVERY 7 DAYS.    latanoprost 0.005 % ophthalmic solution Place 1 drop into both eyes every evening.    metoprolol succinate  (TOPROL-XL) 100 MG 24 hr tablet Take 1 tablet (100 mg total) by mouth once daily.    omeprazole (PRILOSEC) 40 MG capsule Take 1 capsule (40 mg total) by mouth every 12 (twelve) hours. Take one pill every morning 45 minutes before breakfast    pravastatin (PRAVACHOL) 40 MG tablet Take 1 tablet (40 mg total) by mouth every evening.    predniSONE (DELTASONE) 20 MG tablet Take 3 tablets (60 mg total) by mouth once daily. After 1 wk, decrease to 2.5 tabs(50mg) x 1wk, then decrease to 2 tabs(40mg) daily x 1wk, then decrease to 3 tabs daily and continue until RTC in 4 wks.    tirzepatide (MOUNJARO) 2.5 mg/0.5 mL PnIj Inject 2.5 mg into the skin every 7 days.    tirzepatide (MOUNJARO) 5 mg/0.5 mL PnIj Inject 5 mg into the skin every 7 days.    tocilizumab (ACTEMRA) 162 mg/0.9 mL injection Inject 0.9 mLs (162 mg total) into the skin every 7 days.    traMADoL (ULTRAM) 50 mg tablet Take 1 tablet (50 mg total) by mouth every 12 (twelve) hours as needed for Pain.     No current facility-administered medications for this visit.     Objective:      Physical Exam   Constitutional: She appears well-nourished. No distress.    Head: Normocephalic.   Eyes: Anicteric sclera.  Neck: No obvious masses.  Pulmonary/Chest: Normal respiratory effort.  Abdominal: Soft, obese abdomen.  Neurological: She is alert. She is not disoriented.   Skin: No obvious rashes noted to exposed skin.      Assessment:       1. Liver cirrhosis secondary to BELLO    2. Hepatomegaly    3. Elevated liver enzymes    4. Hepatic hemangioma    5. Morbid obesity    6. Type 2 diabetes mellitus with other ophthalmic complication, without long-term current use of insulin    7. Hyperlipidemia associated with type 2 diabetes mellitus    8. Hypertension associated with diabetes    9. Encounter for follow-up surveillance of liver cancer          Plan:     Orders Placed This Encounter   Procedures    US Abdomen Complete     Standing Status:   Future     Standing Expiration Date:    12/31/2025    AFP Tumor Marker     Standing Status:   Future     Standing Expiration Date:   12/31/2025     Order Specific Question:   Release to patient     Answer:   Immediate    Comprehensive Metabolic Panel     Standing Status:   Future     Standing Expiration Date:   12/31/2025     Order Specific Question:   Send normal result to authorizing provider's In Basket if patient is active on MyChart:     Answer:   Yes    CBC Auto Differential     Standing Status:   Future     Standing Expiration Date:   12/31/2025     Order Specific Question:   Send normal result to authorizing provider's In Basket if patient is active on MyChart:     Answer:   Yes    Protime-INR     Standing Status:   Future     Standing Expiration Date:   12/31/2025     - Recommend MELD labs every 6 months to monitor liver function, next due 12/2024.  - Recommend abdominal imaging with AFP tumor marker every 6 months for HCC surveillance, next due 12/2024.  - Recommend Upper endoscopy every 2-3 years for variceal surveillance.  - Recommend additional weight loss of 25 lbs, through diet and exercise.  - Recommend low sodium (2 grams), high protein diet (115 grams per day).  - Recommend good control of blood pressure, blood sugar and lipids.  - Return to clinic in 6-12 months, sooner if needed.       Hepatology Nurse Practitioner  [facility]

## 2024-08-27 NOTE — TELEPHONE ENCOUNTER
Contacted patient. Scheduled labs, u/s and follow up visit in 6 months. Appointment letter mailed to patient.

## 2024-08-28 ENCOUNTER — TELEPHONE (OUTPATIENT)
Dept: FAMILY MEDICINE | Facility: CLINIC | Age: 69
End: 2024-08-28
Payer: MEDICARE

## 2024-08-28 NOTE — TELEPHONE ENCOUNTER
----- Message from Roxanna Almonte sent at 8/28/2024  1:47 PM CDT -----  Needs advice from nurse:      Who Called:pt  Regarding:unable to purchase Monjurno through [facility]//would appreciate any alternative help with diabetes medication  Would the patient rather a call back or VIA [facility]?  Best Call Back number: [phone]  Additional Info:

## 2024-08-30 ENCOUNTER — CLINICAL SUPPORT (OUTPATIENT)
Dept: ENDOSCOPY | Facility: HOSPITAL | Age: 69
End: 2024-08-30
Attending: FAMILY MEDICINE
Payer: MEDICARE

## 2024-08-30 ENCOUNTER — TELEPHONE (OUTPATIENT)
Dept: ENDOSCOPY | Facility: HOSPITAL | Age: 69
End: 2024-08-30

## 2024-08-30 DIAGNOSIS — K74.60 HEPATIC CIRRHOSIS, UNSPECIFIED HEPATIC CIRRHOSIS TYPE, UNSPECIFIED WHETHER ASCITES PRESENT: Primary | ICD-10-CM

## 2024-08-30 DIAGNOSIS — Z12.11 COLON CANCER SCREENING: ICD-10-CM

## 2024-08-30 DIAGNOSIS — Z86.010 HX OF COLONIC POLYPS: ICD-10-CM

## 2024-08-30 NOTE — TELEPHONE ENCOUNTER
Spoke to pt to schedule procedure(s) Colonoscopy       Physician to perform procedure(s) Dr. KISHORE Dykes  Date of Procedure (s) 11/14/24  Arrival Time 7:50 AM  Time of Procedure(s) 8:50 AM   Location of Procedure(s) [place] [address]  Type of Rx Prep sent to patient: PEG extended  Instructions provided to patient via [facility]    Patient was informed on the following information and verbalized understanding. Screening questionnaire reviewed with patient and complete. If procedure requires anesthesia, a responsible adult needs to be present to accompany the patient home, patient cannot drive after receiving anesthesia. Appointment details are tentative, especially check-in time. Patient will receive a prep-op call 7 days prior to confirm check-in time for procedure. If applicable the patient should contact their pharmacy to verify Rx for procedure prep is ready for pick-up. Patient was advised to call the scheduling department at [phone] if pharmacy states no Rx is available. Patient was advised to call the endoscopy scheduling department if any questions or concerns arise.       Endoscopy Scheduling Department    Patient denies taking any anticoagulants, antiplatelets, GLP-1 medications, or Adipex (Phentermine).

## 2024-09-04 ENCOUNTER — LAB VISIT (OUTPATIENT)
Dept: LAB | Facility: HOSPITAL | Age: 69
End: 2024-09-04
Attending: INTERNAL MEDICINE
Payer: MEDICARE

## 2024-09-04 DIAGNOSIS — E11.39 TYPE 2 DIABETES MELLITUS WITH OTHER OPHTHALMIC COMPLICATION, WITHOUT LONG-TERM CURRENT USE OF INSULIN: ICD-10-CM

## 2024-09-04 DIAGNOSIS — M31.6 GCA (GIANT CELL ARTERITIS): ICD-10-CM

## 2024-09-04 LAB
ALBUMIN SERPL BCP-MCNC: 3.9 G/DL (ref 3.5–5.2)
ALP SERPL-CCNC: 74 U/L (ref 55–135)
ALT SERPL W/O P-5'-P-CCNC: 120 U/L (ref 10–44)
ANION GAP SERPL CALC-SCNC: 9 MMOL/L (ref 8–16)
AST SERPL-CCNC: 68 U/L (ref 10–40)
BASOPHILS # BLD AUTO: 0.02 K/UL (ref 0–0.2)
BASOPHILS NFR BLD: 0.2 % (ref 0–1.9)
BILIRUB SERPL-MCNC: 1.6 MG/DL (ref 0.1–1)
BUN SERPL-MCNC: 21 MG/DL (ref 8–23)
CALCIUM SERPL-MCNC: 10.1 MG/DL (ref 8.7–10.5)
CHLORIDE SERPL-SCNC: 108 MMOL/L (ref 95–110)
CO2 SERPL-SCNC: 24 MMOL/L (ref 23–29)
CREAT SERPL-MCNC: 0.9 MG/DL (ref 0.5–1.4)
CRP SERPL-MCNC: 0.5 MG/L (ref 0–8.2)
DIFFERENTIAL METHOD BLD: ABNORMAL
EOSINOPHIL # BLD AUTO: 0 K/UL (ref 0–0.5)
EOSINOPHIL NFR BLD: 0.3 % (ref 0–8)
ERYTHROCYTE [DISTWIDTH] IN BLOOD BY AUTOMATED COUNT: 13.9 % (ref 11.5–14.5)
ERYTHROCYTE [SEDIMENTATION RATE] IN BLOOD BY PHOTOMETRIC METHOD: <2 MM/HR (ref 0–36)
EST. GFR  (NO RACE VARIABLE): >60 ML/MIN/1.73 M^2
ESTIMATED AVG GLUCOSE: 134 MG/DL (ref 68–131)
GLUCOSE SERPL-MCNC: 140 MG/DL (ref 70–110)
HBA1C MFR BLD: 6.3 % (ref 4–5.6)
HCT VFR BLD AUTO: 44.1 % (ref 37–48.5)
HGB BLD-MCNC: 14 G/DL (ref 12–16)
IMM GRANULOCYTES # BLD AUTO: 0.15 K/UL (ref 0–0.04)
IMM GRANULOCYTES NFR BLD AUTO: 1.3 % (ref 0–0.5)
LYMPHOCYTES # BLD AUTO: 1.9 K/UL (ref 1–4.8)
LYMPHOCYTES NFR BLD: 16.5 % (ref 18–48)
MCH RBC QN AUTO: 31.1 PG (ref 27–31)
MCHC RBC AUTO-ENTMCNC: 31.7 G/DL (ref 32–36)
MCV RBC AUTO: 98 FL (ref 82–98)
MONOCYTES # BLD AUTO: 0.4 K/UL (ref 0.3–1)
MONOCYTES NFR BLD: 3.9 % (ref 4–15)
NEUTROPHILS # BLD AUTO: 8.9 K/UL (ref 1.8–7.7)
NEUTROPHILS NFR BLD: 77.8 % (ref 38–73)
NRBC BLD-RTO: 0 /100 WBC
PLATELET # BLD AUTO: 180 K/UL (ref 150–450)
PMV BLD AUTO: 11.1 FL (ref 9.2–12.9)
POTASSIUM SERPL-SCNC: 4.6 MMOL/L (ref 3.5–5.1)
PROT SERPL-MCNC: 6.8 G/DL (ref 6–8.4)
RBC # BLD AUTO: 4.5 M/UL (ref 4–5.4)
SODIUM SERPL-SCNC: 141 MMOL/L (ref 136–145)
WBC # BLD AUTO: 11.42 K/UL (ref 3.9–12.7)

## 2024-09-04 PROCEDURE — 80053 COMPREHEN METABOLIC PANEL: CPT | Mod: HCNC | Performed by: INTERNAL MEDICINE

## 2024-09-04 PROCEDURE — 36415 COLL VENOUS BLD VENIPUNCTURE: CPT | Mod: HCNC,PO | Performed by: INTERNAL MEDICINE

## 2024-09-04 PROCEDURE — 85652 RBC SED RATE AUTOMATED: CPT | Mod: HCNC | Performed by: INTERNAL MEDICINE

## 2024-09-04 PROCEDURE — 86140 C-REACTIVE PROTEIN: CPT | Mod: HCNC | Performed by: INTERNAL MEDICINE

## 2024-09-04 PROCEDURE — 83036 HEMOGLOBIN GLYCOSYLATED A1C: CPT | Mod: HCNC | Performed by: FAMILY MEDICINE

## 2024-09-04 PROCEDURE — 85025 COMPLETE CBC W/AUTO DIFF WBC: CPT | Mod: HCNC | Performed by: INTERNAL MEDICINE

## 2024-09-05 PROCEDURE — G0180 MD CERTIFICATION HHA PATIENT: HCPCS | Mod: ,,, | Performed by: FAMILY MEDICINE

## 2024-09-06 ENCOUNTER — TELEPHONE (OUTPATIENT)
Dept: RHEUMATOLOGY | Facility: CLINIC | Age: 69
End: 2024-09-06
Payer: MEDICARE

## 2024-09-06 ENCOUNTER — PATIENT MESSAGE (OUTPATIENT)
Dept: RHEUMATOLOGY | Facility: CLINIC | Age: 69
End: 2024-09-06
Payer: MEDICARE

## 2024-09-06 ENCOUNTER — HOSPITAL ENCOUNTER (INPATIENT)
Facility: HOSPITAL | Age: 69
LOS: 4 days | Discharge: HOME HEALTH | DRG: 546 | End: 2024-09-10
Attending: STUDENT IN AN ORGANIZED HEALTH CARE EDUCATION/TRAINING PROGRAM | Admitting: FAMILY MEDICINE
Payer: MEDICARE

## 2024-09-06 DIAGNOSIS — H54.10 BLINDNESS AND LOW VISION: ICD-10-CM

## 2024-09-06 DIAGNOSIS — R07.9 CHEST PAIN: ICD-10-CM

## 2024-09-06 DIAGNOSIS — H53.9 VISION CHANGES: ICD-10-CM

## 2024-09-06 DIAGNOSIS — H46.9 OPTIC NEURITIS: ICD-10-CM

## 2024-09-06 DIAGNOSIS — M31.5 GIANT CELL ARTERITIS WITH POLYMYALGIA RHEUMATICA: Primary | ICD-10-CM

## 2024-09-06 DIAGNOSIS — M81.8 OTHER OSTEOPOROSIS WITHOUT CURRENT PATHOLOGICAL FRACTURE: ICD-10-CM

## 2024-09-06 DIAGNOSIS — R73.09 IMPAIRED GLUCOSE REGULATION: Chronic | ICD-10-CM

## 2024-09-06 PROBLEM — K75.81 LIVER CIRRHOSIS SECONDARY TO NASH: Chronic | Status: ACTIVE | Noted: 2022-07-19

## 2024-09-06 PROBLEM — E11.59 HYPERTENSION ASSOCIATED WITH DIABETES: Chronic | Status: ACTIVE | Noted: 2024-03-22

## 2024-09-06 PROBLEM — G47.33 OSA (OBSTRUCTIVE SLEEP APNEA): Chronic | Status: ACTIVE | Noted: 2017-07-10

## 2024-09-06 PROBLEM — K74.60 LIVER CIRRHOSIS SECONDARY TO NASH: Chronic | Status: ACTIVE | Noted: 2022-07-19

## 2024-09-06 PROBLEM — I10 ESSENTIAL HYPERTENSION: Chronic | Status: ACTIVE | Noted: 2024-03-22

## 2024-09-06 PROBLEM — I15.2 HYPERTENSION ASSOCIATED WITH DIABETES: Chronic | Status: ACTIVE | Noted: 2024-03-22

## 2024-09-06 PROBLEM — E11.39 TYPE 2 DIABETES MELLITUS WITH OPHTHALMIC COMPLICATION, WITHOUT LONG-TERM CURRENT USE OF INSULIN: Chronic | Status: ACTIVE | Noted: 2024-04-11

## 2024-09-06 PROBLEM — Z66 DNR (DO NOT RESUSCITATE): Chronic | Status: ACTIVE | Noted: 2024-09-06

## 2024-09-06 PROBLEM — Z79.899 CHRONIC PRESCRIPTION BENZODIAZEPINE USE: Chronic | Status: ACTIVE | Noted: 2024-09-06

## 2024-09-06 PROBLEM — B00.9 RECURRENT HSV (HERPES SIMPLEX VIRUS): Chronic | Status: ACTIVE | Noted: 2024-09-06

## 2024-09-06 LAB
ALBUMIN SERPL BCP-MCNC: 3.9 G/DL (ref 3.5–5.2)
ALLENS TEST: NORMAL
ALP SERPL-CCNC: 59 U/L (ref 55–135)
ALT SERPL W/O P-5'-P-CCNC: 106 U/L (ref 10–44)
ANION GAP SERPL CALC-SCNC: 10 MMOL/L (ref 8–16)
AST SERPL-CCNC: 51 U/L (ref 10–40)
BASOPHILS # BLD AUTO: 0.02 K/UL (ref 0–0.2)
BASOPHILS NFR BLD: 0.1 % (ref 0–1.9)
BILIRUB SERPL-MCNC: 1.7 MG/DL (ref 0.1–1)
BNP SERPL-MCNC: <10 PG/ML (ref 0–99)
BUN SERPL-MCNC: 16 MG/DL (ref 8–23)
CALCIUM SERPL-MCNC: 10 MG/DL (ref 8.7–10.5)
CHLORIDE SERPL-SCNC: 108 MMOL/L (ref 95–110)
CO2 SERPL-SCNC: 22 MMOL/L (ref 23–29)
CREAT SERPL-MCNC: 0.8 MG/DL (ref 0.5–1.4)
CRP SERPL-MCNC: 0.4 MG/L (ref 0–8.2)
DIFFERENTIAL METHOD BLD: ABNORMAL
EOSINOPHIL # BLD AUTO: 0 K/UL (ref 0–0.5)
EOSINOPHIL NFR BLD: 0.3 % (ref 0–8)
ERYTHROCYTE [DISTWIDTH] IN BLOOD BY AUTOMATED COUNT: 13.6 % (ref 11.5–14.5)
ERYTHROCYTE [SEDIMENTATION RATE] IN BLOOD BY PHOTOMETRIC METHOD: 2 MM/HR (ref 0–36)
EST. GFR  (NO RACE VARIABLE): >60 ML/MIN/1.73 M^2
GLUCOSE SERPL-MCNC: 96 MG/DL (ref 70–110)
HCT VFR BLD AUTO: 43.5 % (ref 37–48.5)
HCV AB SERPL QL IA: NORMAL
HGB BLD-MCNC: 14.6 G/DL (ref 12–16)
HIV 1+2 AB+HIV1 P24 AG SERPL QL IA: NORMAL
IMM GRANULOCYTES # BLD AUTO: 0.13 K/UL (ref 0–0.04)
IMM GRANULOCYTES NFR BLD AUTO: 0.9 % (ref 0–0.5)
LDH SERPL L TO P-CCNC: 1.3 MMOL/L (ref 0.5–2.2)
LYMPHOCYTES # BLD AUTO: 2.9 K/UL (ref 1–4.8)
LYMPHOCYTES NFR BLD: 20.6 % (ref 18–48)
MCH RBC QN AUTO: 32.1 PG (ref 27–31)
MCHC RBC AUTO-ENTMCNC: 33.6 G/DL (ref 32–36)
MCV RBC AUTO: 96 FL (ref 82–98)
MONOCYTES # BLD AUTO: 0.8 K/UL (ref 0.3–1)
MONOCYTES NFR BLD: 5.5 % (ref 4–15)
NEUTROPHILS # BLD AUTO: 10 K/UL (ref 1.8–7.7)
NEUTROPHILS NFR BLD: 72.6 % (ref 38–73)
NRBC BLD-RTO: 0 /100 WBC
PLATELET # BLD AUTO: 171 K/UL (ref 150–450)
PMV BLD AUTO: 10.4 FL (ref 9.2–12.9)
POTASSIUM SERPL-SCNC: 5.3 MMOL/L (ref 3.5–5.1)
PROT SERPL-MCNC: 7 G/DL (ref 6–8.4)
RBC # BLD AUTO: 4.55 M/UL (ref 4–5.4)
SAMPLE: NORMAL
SITE: NORMAL
SODIUM SERPL-SCNC: 140 MMOL/L (ref 136–145)
TROPONIN I SERPL DL<=0.01 NG/ML-MCNC: <0.006 NG/ML (ref 0–0.03)
WBC # BLD AUTO: 13.83 K/UL (ref 3.9–12.7)

## 2024-09-06 PROCEDURE — 25000003 PHARM REV CODE 250: Mod: HCNC | Performed by: STUDENT IN AN ORGANIZED HEALTH CARE EDUCATION/TRAINING PROGRAM

## 2024-09-06 PROCEDURE — 93005 ELECTROCARDIOGRAM TRACING: CPT | Mod: HCNC

## 2024-09-06 PROCEDURE — 99900035 HC TECH TIME PER 15 MIN (STAT): Mod: HCNC

## 2024-09-06 PROCEDURE — 63600175 PHARM REV CODE 636 W HCPCS: Mod: HCNC | Performed by: STUDENT IN AN ORGANIZED HEALTH CARE EDUCATION/TRAINING PROGRAM

## 2024-09-06 PROCEDURE — 87389 HIV-1 AG W/HIV-1&-2 AB AG IA: CPT | Mod: HCNC | Performed by: PHYSICIAN ASSISTANT

## 2024-09-06 PROCEDURE — 83880 ASSAY OF NATRIURETIC PEPTIDE: CPT | Mod: HCNC

## 2024-09-06 PROCEDURE — 27000190 HC CPAP FULL FACE MASK W/VALVE: Mod: HCNC

## 2024-09-06 PROCEDURE — 25000003 PHARM REV CODE 250: Mod: HCNC

## 2024-09-06 PROCEDURE — 94761 N-INVAS EAR/PLS OXIMETRY MLT: CPT | Mod: HCNC

## 2024-09-06 PROCEDURE — 27100171 HC OXYGEN HIGH FLOW UP TO 24 HOURS: Mod: HCNC

## 2024-09-06 PROCEDURE — 99285 EMERGENCY DEPT VISIT HI MDM: CPT | Mod: 25,HCNC

## 2024-09-06 PROCEDURE — 11000001 HC ACUTE MED/SURG PRIVATE ROOM: Mod: HCNC

## 2024-09-06 PROCEDURE — 83605 ASSAY OF LACTIC ACID: CPT | Mod: HCNC

## 2024-09-06 PROCEDURE — 85025 COMPLETE CBC W/AUTO DIFF WBC: CPT | Mod: HCNC

## 2024-09-06 PROCEDURE — 96365 THER/PROPH/DIAG IV INF INIT: CPT | Mod: HCNC

## 2024-09-06 PROCEDURE — 63600175 PHARM REV CODE 636 W HCPCS: Mod: HCNC

## 2024-09-06 PROCEDURE — 93010 ELECTROCARDIOGRAM REPORT: CPT | Mod: HCNC,,, | Performed by: INTERNAL MEDICINE

## 2024-09-06 PROCEDURE — 94660 CPAP INITIATION&MGMT: CPT | Mod: HCNC

## 2024-09-06 PROCEDURE — 85652 RBC SED RATE AUTOMATED: CPT | Mod: HCNC

## 2024-09-06 PROCEDURE — 80053 COMPREHEN METABOLIC PANEL: CPT | Mod: HCNC

## 2024-09-06 PROCEDURE — 96375 TX/PRO/DX INJ NEW DRUG ADDON: CPT | Mod: HCNC

## 2024-09-06 PROCEDURE — 86140 C-REACTIVE PROTEIN: CPT | Mod: HCNC

## 2024-09-06 PROCEDURE — 84484 ASSAY OF TROPONIN QUANT: CPT | Mod: HCNC

## 2024-09-06 PROCEDURE — 86803 HEPATITIS C AB TEST: CPT | Mod: HCNC | Performed by: PHYSICIAN ASSISTANT

## 2024-09-06 RX ORDER — PANTOPRAZOLE SODIUM 40 MG/1
40 TABLET, DELAYED RELEASE ORAL DAILY
Status: DISCONTINUED | OUTPATIENT
Start: 2024-09-07 | End: 2024-09-10 | Stop reason: HOSPADM

## 2024-09-06 RX ORDER — PANTOPRAZOLE SODIUM 40 MG/10ML
40 INJECTION, POWDER, LYOPHILIZED, FOR SOLUTION INTRAVENOUS ONCE
Status: COMPLETED | OUTPATIENT
Start: 2024-09-06 | End: 2024-09-06

## 2024-09-06 RX ORDER — ACETAMINOPHEN 325 MG/1
650 TABLET ORAL EVERY 8 HOURS PRN
Status: DISCONTINUED | OUTPATIENT
Start: 2024-09-06 | End: 2024-09-10 | Stop reason: HOSPADM

## 2024-09-06 RX ORDER — ACYCLOVIR 200 MG/1
400 CAPSULE ORAL 2 TIMES DAILY
Status: DISCONTINUED | OUTPATIENT
Start: 2024-09-06 | End: 2024-09-10 | Stop reason: HOSPADM

## 2024-09-06 RX ORDER — GLUCAGON 1 MG
1 KIT INJECTION
Status: DISCONTINUED | OUTPATIENT
Start: 2024-09-06 | End: 2024-09-10 | Stop reason: HOSPADM

## 2024-09-06 RX ORDER — ALUMINUM HYDROXIDE, MAGNESIUM HYDROXIDE, AND SIMETHICONE 1200; 120; 1200 MG/30ML; MG/30ML; MG/30ML
30 SUSPENSION ORAL 4 TIMES DAILY PRN
Status: DISCONTINUED | OUTPATIENT
Start: 2024-09-06 | End: 2024-09-10 | Stop reason: HOSPADM

## 2024-09-06 RX ORDER — IBUPROFEN 200 MG
16 TABLET ORAL
Status: DISCONTINUED | OUTPATIENT
Start: 2024-09-06 | End: 2024-09-10 | Stop reason: HOSPADM

## 2024-09-06 RX ORDER — TALC
6 POWDER (GRAM) TOPICAL NIGHTLY PRN
Status: DISCONTINUED | OUTPATIENT
Start: 2024-09-06 | End: 2024-09-10 | Stop reason: HOSPADM

## 2024-09-06 RX ORDER — ASPIRIN 325 MG
325 TABLET, DELAYED RELEASE (ENTERIC COATED) ORAL DAILY
Status: DISCONTINUED | OUTPATIENT
Start: 2024-09-07 | End: 2024-09-10 | Stop reason: HOSPADM

## 2024-09-06 RX ORDER — BRIMONIDINE TARTRATE 2 MG/ML
1 SOLUTION/ DROPS OPHTHALMIC 3 TIMES DAILY
Status: DISCONTINUED | OUTPATIENT
Start: 2024-09-06 | End: 2024-09-10 | Stop reason: HOSPADM

## 2024-09-06 RX ORDER — ONDANSETRON HYDROCHLORIDE 2 MG/ML
4 INJECTION, SOLUTION INTRAVENOUS
Status: COMPLETED | OUTPATIENT
Start: 2024-09-06 | End: 2024-09-06

## 2024-09-06 RX ORDER — SODIUM CHLORIDE 0.9 % (FLUSH) 0.9 %
1-10 SYRINGE (ML) INJECTION EVERY 12 HOURS PRN
Status: DISCONTINUED | OUTPATIENT
Start: 2024-09-06 | End: 2024-09-10 | Stop reason: HOSPADM

## 2024-09-06 RX ORDER — ONDANSETRON 8 MG/1
8 TABLET, ORALLY DISINTEGRATING ORAL EVERY 8 HOURS PRN
Status: DISCONTINUED | OUTPATIENT
Start: 2024-09-06 | End: 2024-09-10 | Stop reason: HOSPADM

## 2024-09-06 RX ORDER — NALOXONE HCL 0.4 MG/ML
0.02 VIAL (ML) INJECTION
Status: DISCONTINUED | OUTPATIENT
Start: 2024-09-06 | End: 2024-09-10 | Stop reason: HOSPADM

## 2024-09-06 RX ORDER — ACETAMINOPHEN 325 MG/1
650 TABLET ORAL EVERY 4 HOURS PRN
Status: DISCONTINUED | OUTPATIENT
Start: 2024-09-06 | End: 2024-09-10 | Stop reason: HOSPADM

## 2024-09-06 RX ORDER — METOPROLOL SUCCINATE 100 MG/1
100 TABLET, EXTENDED RELEASE ORAL DAILY
Status: DISCONTINUED | OUTPATIENT
Start: 2024-09-07 | End: 2024-09-10 | Stop reason: HOSPADM

## 2024-09-06 RX ORDER — CLONAZEPAM 0.5 MG/1
1 TABLET ORAL DAILY PRN
Status: DISCONTINUED | OUTPATIENT
Start: 2024-09-06 | End: 2024-09-10 | Stop reason: HOSPADM

## 2024-09-06 RX ORDER — ENOXAPARIN SODIUM 100 MG/ML
40 INJECTION SUBCUTANEOUS EVERY 24 HOURS
Status: DISCONTINUED | OUTPATIENT
Start: 2024-09-07 | End: 2024-09-08

## 2024-09-06 RX ORDER — LATANOPROST 50 UG/ML
1 SOLUTION/ DROPS OPHTHALMIC NIGHTLY
Status: DISCONTINUED | OUTPATIENT
Start: 2024-09-06 | End: 2024-09-10 | Stop reason: HOSPADM

## 2024-09-06 RX ORDER — SIMETHICONE 80 MG
1 TABLET,CHEWABLE ORAL 4 TIMES DAILY PRN
Status: DISCONTINUED | OUTPATIENT
Start: 2024-09-06 | End: 2024-09-10 | Stop reason: HOSPADM

## 2024-09-06 RX ORDER — PRAVASTATIN SODIUM 40 MG/1
40 TABLET ORAL NIGHTLY
Status: DISCONTINUED | OUTPATIENT
Start: 2024-09-06 | End: 2024-09-10 | Stop reason: HOSPADM

## 2024-09-06 RX ORDER — IBUPROFEN 200 MG
24 TABLET ORAL
Status: DISCONTINUED | OUTPATIENT
Start: 2024-09-06 | End: 2024-09-10 | Stop reason: HOSPADM

## 2024-09-06 RX ORDER — POLYETHYLENE GLYCOL 3350 17 G/17G
17 POWDER, FOR SOLUTION ORAL DAILY PRN
Status: DISCONTINUED | OUTPATIENT
Start: 2024-09-06 | End: 2024-09-10 | Stop reason: HOSPADM

## 2024-09-06 RX ORDER — AMLODIPINE AND BENAZEPRIL HYDROCHLORIDE 5; 20 MG/1; MG/1
2 CAPSULE ORAL DAILY
Status: DISCONTINUED | OUTPATIENT
Start: 2024-09-07 | End: 2024-09-10 | Stop reason: HOSPADM

## 2024-09-06 RX ADMIN — LATANOPROST 1 DROP: 50 SOLUTION OPHTHALMIC at 09:09

## 2024-09-06 RX ADMIN — METHYLPREDNISOLONE SODIUM SUCCINATE 1000 MG: 1 INJECTION INTRAMUSCULAR; INTRAVENOUS at 05:09

## 2024-09-06 RX ADMIN — ONDANSETRON 4 MG: 2 INJECTION INTRAMUSCULAR; INTRAVENOUS at 04:09

## 2024-09-06 RX ADMIN — PANTOPRAZOLE SODIUM 40 MG: 40 INJECTION, POWDER, LYOPHILIZED, FOR SOLUTION INTRAVENOUS at 04:09

## 2024-09-06 RX ADMIN — PRAVASTATIN SODIUM 40 MG: 40 TABLET ORAL at 09:09

## 2024-09-06 RX ADMIN — ACYCLOVIR 400 MG: 200 CAPSULE ORAL at 09:09

## 2024-09-06 NOTE — ED PROVIDER NOTES
Encounter Date: 9/6/2024       History     Chief Complaint   Patient presents with    Decreased Visual Acuity     Pt states MD sent her here d/t decreased vision from a flare up of giant cell arteritis.     HPI    Patient presents today with decreased visual acuity for the past 3 days.  History of pain for which she lost vision in her left eye.  Rheumatology told the patient into the emergency department.    Denies fall denies trauma, is endorsing some chest pain.  Pain she says is occurring at rest, better with counter antacids.  No dyspnea on exertion.      Review of patient's allergies indicates:   Allergen Reactions    Oxycontin [oxycodone] Hallucinations     Immobile     Percocet [oxycodone-acetaminophen] Shortness Of Breath    Percodan [oxycodone hcl-oxycodone-asa] Shortness Of Breath    Seroquel [quetiapine] Shortness Of Breath     Akathisia     Past Medical History:   Diagnosis Date    Anxiety     Asthma     Blind left eye     can see silhouettes in right eye    Cataract     Depression     Diabetes mellitus     Disease of immune system     Giant cell arteritis     Glaucoma     Hypertension     Polymyalgia rheumatica     Uveitis      Past Surgical History:   Procedure Laterality Date    ANKLE SURGERY      CHOLECYSTECTOMY      COLONOSCOPY N/A 9/28/2015    Procedure: COLONOSCOPY;  Surgeon: Alok Dykes MD;  Location: [address]);  Service: Endoscopy;  Laterality: N/A;    COLONOSCOPY N/A 9/6/2019    Procedure: COLONOSCOPY;  Surgeon: Alok Dykes MD;  Location: [address]);  Service: Endoscopy;  Laterality: N/A;    CYST REMOVAL      right lung    ESOPHAGOGASTRODUODENOSCOPY N/A 3/28/2023    Procedure: EGD (ESOPHAGOGASTRODUODENOSCOPY);  Surgeon: Bharat Franklin MD;  Location: [address]);  Service: Endoscopy;  Laterality: N/A;  cirrhosis-labs prior, varices surveillance  gastroparesis-full liquid diet x3 days, clear liquid diet x1 day prior to procedure  instructions handed to  pt and sent to [email or web address]    HYSTERECTOMY       Family History   Problem Relation Name Age of Onset    Coronary artery disease Mother      Diabetes Mother      Hypertension Mother      Stroke Mother      Alzheimer's disease Mother      Alzheimer's disease Father      Coronary artery disease Father      Emphysema Father      Diabetes Father      Diabetes Sister x2     Kidney disease Sister x2     Coronary artery disease Sister x2     Heart attack Sister x2     Heart attack Brother x4     Coronary artery disease Brother x4     Colon polyps Brother x4         1 foot of colon removed    Stroke Brother x4     Cancer Brother x4 58        stomach    Stomach cancer Brother x4     AVM Brother x4     Hypertension Brother x3     Intellectual disability Brother x3     Hydrocephalus Brother x3     No Known Problems Son x1     Hydrocephalus Paternal Cousin      Glaucoma Neg Hx      Asthma Neg Hx      Celiac disease Neg Hx      Colon cancer Neg Hx      Esophageal cancer Neg Hx      Inflammatory bowel disease Neg Hx      Rectal cancer Neg Hx      Ulcerative colitis Neg Hx      Macular degeneration Neg Hx      Retinal detachment Neg Hx      Strabismus Neg Hx      Amblyopia Neg Hx      Blindness Neg Hx      Cataracts Neg Hx       Social History     Tobacco Use    Smoking status: Never     Passive exposure: Never    Smokeless tobacco: Never   Substance Use Topics    Alcohol use: No     Alcohol/week: 0.0 standard drinks of alcohol    Drug use: Never     Review of Systems    Physical Exam     Initial Vitals [09/06/24 1425]   BP Pulse Resp Temp SpO2   (!) 147/87 85 20 97.8 °F (36.6 °C) 96 %      MAP       --         Physical Exam    Nursing note and vitals reviewed.  Constitutional: She appears well-developed and well-nourished.   HENT:   Head: Normocephalic and atraumatic.   Neck: Neck supple. No tracheal deviation present. No JVD present.   Cardiovascular:  Normal rate, regular rhythm, normal heart sounds and intact distal pulses.      Exam reveals no gallop and no friction rub.       No murmur heard.  Pulmonary/Chest: Breath sounds normal. No stridor. No respiratory distress. She has no wheezes. She has no rhonchi. She has no rales. She exhibits no tenderness.   Abdominal: Abdomen is soft. She exhibits no distension and no mass. There is no abdominal tenderness. There is no rebound and no guarding.   Musculoskeletal:         General: No tenderness or edema. Normal range of motion.      Cervical back: Neck supple.     Lymphadenopathy:     She has no cervical adenopathy.   Neurological: She is alert and oriented to person, place, and time. GCS score is 15. GCS eye subscore is 4. GCS verbal subscore is 5. GCS motor subscore is 6.   Visual acuity in right eye worse than 20/200.   Skin: Skin is warm and dry. Capillary refill takes less than 2 seconds.   Psychiatric: She has a normal mood and affect. Her behavior is normal. Judgment and thought content normal.         ED Course   Procedures  Labs Reviewed   COMPREHENSIVE METABOLIC PANEL - Abnormal       Result Value    Sodium 140      Potassium 5.3 (*)     Chloride 108      CO2 22 (*)     Glucose 96      BUN 16      Creatinine 0.8      Calcium 10.0      Total Protein 7.0      Albumin 3.9      Total Bilirubin 1.7 (*)     Alkaline Phosphatase 59      AST 51 (*)      (*)     eGFR >60.0      Anion Gap 10      Narrative:     add on [id number] per Regina Bravo MD  09/06/2024  17:04   Vaishali   CBC W/ AUTO DIFFERENTIAL - Abnormal    WBC 13.83 (*)     RBC 4.55      Hemoglobin 14.6      Hematocrit 43.5      MCV 96      MCH 32.1 (*)     MCHC 33.6      RDW 13.6      Platelets 171      MPV 10.4      Immature Granulocytes 0.9 (*)     Gran # (ANC) 10.0 (*)     Immature Grans (Abs) 0.13 (*)     Lymph # 2.9      Mono # 0.8      Eos # 0.0      Baso # 0.02      nRBC 0      Gran % 72.6      Lymph % 20.6      Mono % 5.5      Eosinophil % 0.3      Basophil % 0.1      Differential Method Automated       Narrative:     add on [id number] per Regina Bravo MD  09/06/2024  17:04   Vaishali   HIV 1 / 2 ANTIBODY    HIV 1/2 Ag/Ab Non-reactive      Narrative:     Release to patient->Immediate   HEPATITIS C ANTIBODY    Hepatitis C Ab Non-reactive      Narrative:     Release to patient->Immediate   TROPONIN I    Troponin I <0.006      Narrative:     add on [id number] per Regina Bravo MD  09/06/2024  17:04   Vaishali   B-TYPE NATRIURETIC PEPTIDE    BNP <10      Narrative:     add on [id number] per Regina Bravo MD  09/06/2024  17:04   Vaishali   SEDIMENTATION RATE    Sed Rate 2     C-REACTIVE PROTEIN   C-REACTIVE PROTEIN    CRP 0.4      Narrative:     add on [id number] per Regina Bravo MD  09/06/2024  17:04   Vaishali   ISTAT LACTATE    POC Lactate 1.30      Sample VENOUS      Site Other      Allens Test N/A            Imaging Results              X-Ray Chest AP Portable (Final result)  Result time 09/06/24 18:02:06      Final result by Pb Amaro MD (09/06/24 18:02:06)                   Impression:      No radiographic acute intrathoracic process seen on this single view.      Electronically signed by: Pb Amaro MD  Date:    09/06/2024  Time:    18:02               Narrative:    EXAMINATION:  XR CHEST AP PORTABLE    CLINICAL HISTORY:  Chest Pain;    TECHNIQUE:  Single frontal view of the chest was performed.    COMPARISON:  Chest radiograph 06/11/2019, chest CT 12/03/2020    FINDINGS:  Lungs are symmetrically normal to slightly hypoexpanded with bibasilar minimal platelike scarring versus atelectasis.  No consolidation, pleural effusion or pneumothorax.  Cardiomediastinal silhouette is midline and within normal limits for age, stable.  Pulmonary vasculature and hilar contours are within normal limits.  No acute osseous process seen.  PA and lateral views can be obtained.                                       Medications   acyclovir capsule 400 mg (400 mg Oral Given 9/6/24 2122)    aspirin EC tablet 325 mg (has no administration in time range)   amlodipine-benazepril 5-20 mg per capsule 2 capsule (has no administration in time range)   dorzolamide 2 % ophthalmic solution 1 drop (has no administration in time range)   brimonidine 0.2% ophthalmic solution 1 drop (has no administration in time range)   latanoprost 0.005 % ophthalmic solution 1 drop (1 drop Both Eyes Given 9/6/24 2122)   metoprolol succinate (TOPROL-XL) 24 hr tablet 100 mg (has no administration in time range)   pantoprazole EC tablet 40 mg (has no administration in time range)   pravastatin tablet 40 mg (40 mg Oral Given 9/6/24 2122)   clonazePAM tablet 1 mg (has no administration in time range)   sodium chloride 0.9% flush 1-10 mL (has no administration in time range)   melatonin tablet 6 mg (has no administration in time range)   ondansetron disintegrating tablet 8 mg (has no administration in time range)   polyethylene glycol packet 17 g (has no administration in time range)   acetaminophen tablet 650 mg (has no administration in time range)   simethicone chewable tablet 80 mg (has no administration in time range)   aluminum-magnesium hydroxide-simethicone 200-200-20 mg/5 mL suspension 30 mL (has no administration in time range)   acetaminophen tablet 650 mg (has no administration in time range)   naloxone 0.4 mg/mL injection 0.02 mg (has no administration in time range)   glucose chewable tablet 16 g (has no administration in time range)   glucose chewable tablet 24 g (has no administration in time range)   glucagon (human recombinant) injection 1 mg (has no administration in time range)   enoxaparin injection 40 mg (has no administration in time range)   methylPREDNISolone sodium succinate (SOLU-MEDROL) 1,000 mg in D5W 100 mL IVPB (has no administration in time range)   methylPREDNISolone sodium succinate (SOLU-MEDROL) 1,000 mg in D5W 100 mL IVPB (0 mg Intravenous Stopped 9/6/24 1811)   pantoprazole injection 40 mg (40 mg  Intravenous Given 9/6/24 1656)   ondansetron injection 4 mg (4 mg Intravenous Given 9/6/24 1656)     Medical Decision Making  Amount and/or Complexity of Data Reviewed  Labs: ordered.  Radiology: ordered.    Risk  Prescription drug management.  Decision regarding hospitalization.               ED Course as of 09/06/24 2245   Fri Sep 06, 2024   1614 Outpatient rheumatologist notified.  He recommended inpatient admission and IV Solu-Medrol. [KW]   1726 Chest x-ray independently interpreted no acute findings [KW]      ED Course User Index  [KW] Brenton Whipple MD                     Differential diagnosis in this patient includes GCA, diabetic retinopathy, retinal detachment, retinal hemorrhage.  GCA is most likely diagnosis at this point.  We ordered were a for GCA as well as additional studies.    Ophthalmology consulted concerning for acute decrease in vision.  I spoke with her rheumatologist gave me recs to start IV Solu-Medrol 1 g given her acute vision loss.    Rheumatology consulted for follow up as inpatient.  Ophthalmology consulted.  I discussed this patient with hospital medicine who agreed to admit the patient.    Hepatology note from 08/27 were reviewed patient had EGD at that [facility].  Primary care visit from 08/23/2024 reviewed and notable for diabetes without insulin use.    Chest x-ray independently interpreted with no acute findings.      Labs notable for mild elevation in potassium at 5.3 no EKG changes.  Dillon in AST without elevation ALP elevation in ALT.  Does not require acute intervention at this time.  Not 3 times normal.  Chest pain workup initiated for reported chest pain workup pending at time of admission.          Clinical Impression:  Final diagnoses:  [H53.9] Vision changes  [R07.9] Chest pain          ED Disposition Condition    Admit                 Brenton Whipple MD  Resident  09/06/24 2245

## 2024-09-06 NOTE — ASSESSMENT & PLAN NOTE
Patient with known GCA/PMR, on chronic Prednisone and Actemra. Has chronic vision changes associated with this, but presents with acute visual changes concerning for flare. ESR and CRP WNL. Rheumatology recommended initiation of Solumedrol 1g IV x 3 days; will initiate this and consult Rheumatology and Ophthalmology while here. Continue PPI for GERD. MRI head pending.

## 2024-09-06 NOTE — ASSESSMENT & PLAN NOTE
Chronic use of daily PRN benzos for anxiety, verified by PDMP. Will continue as to avoid withdrawal.

## 2024-09-06 NOTE — CONSULTS
"[facility]   Consult Service       Chief complaint/Reason for Consult: "Visual Acuity"      History of Present Illness: Genesis Ugalde is a 68 y.o. female who presents with decreased vision in right eye. Stated decreased vision started on 8/20 with worsening weakness of hips and shoulders. States she was previously able to see some details but now it appears "snowy". Additionally reported bilateral jaw pain and swelling. Denies flashes, floaters, curtain, diplopia.     Patient has history of Autoimmune Optic Neuropathy, Primary Open Angle Glaucoma vs Ocular HTN, GCA followed by Dr. Ro (Last seen 12/2023) and Rheumatogist Dr. Waldrop.     Patient contacted Dr. Waldrop on 9/6 with complaints of worsening vision in right eye. Suggested presentation to ED for admission for 3-5 key of Solu-Medrol. Rheumatology and Ophthalmology consulted    Past Ocular Hx: As Above     Current eye gtts: None      PMHx:  has a past medical history of Anxiety, Asthma, Blind left eye, Cataract, Depression, Diabetes mellitus, Disease of immune system, Giant cell arteritis, Glaucoma, Hypertension, Polymyalgia rheumatica, and Uveitis.     PSurgHx:  has a past surgical history that includes Cyst Removal; Hysterectomy; Cholecystectomy; Ankle surgery; Colonoscopy (N/A, 9/28/2015); Colonoscopy (N/A, 9/6/2019); and Esophagogastroduodenoscopy (N/A, 3/28/2023).     Home Medications:   Prior to Admission medications    Medication Sig Start Date End Date Taking? Authorizing Provider   acyclovir (ZOVIRAX) 400 MG tablet Take 400 mg by mouth 2 (two) times daily. 3/13/23   Provider, Historical   amLODIPine-benazepriL (LOTREL) 10-40 mg per capsule Take 1 capsule by mouth once daily. 5/10/24   Bety Perdue MD   aspirin (ECOTRIN) 325 MG EC tablet Take 325 mg by mouth once daily.    Provider, Historical   brimonidine 0.2% (ALPHAGAN) 0.2 % Drop Place 1 drop into both eyes 3 (three) times daily. 1/16/24   Tanna Ro MD   clonazePAM " (KLONOPIN) 1 MG tablet Take 1 tablet (1 mg total) by mouth daily as needed for Anxiety. 8/23/24   Bety Perdue MD   dorzolamide (TRUSOPT) 2 % ophthalmic solution INSTILL 1 DROP INTO BOTH EYES 3 TIMES A DAY 12/27/23   Janis Mitchell MD   ergocalciferol (VITAMIN D2) 50,000 unit Cap TAKE 1 CAPSULE (50,000 UNITS TOTAL) BY MOUTH EVERY 7 DAYS. 3/22/24   Forrest Waldrop MD   latanoprost 0.005 % ophthalmic solution Place 1 drop into both eyes every evening. 5/2/24   Tanna Ro MD   metoprolol succinate (TOPROL-XL) 100 MG 24 hr tablet Take 1 tablet (100 mg total) by mouth once daily. 5/10/24   Bety Perdue MD   omeprazole (PRILOSEC) 40 MG capsule Take 1 capsule (40 mg total) by mouth every 12 (twelve) hours. Take one pill every morning 45 minutes before breakfast 5/14/24   Bety Perdue MD   pravastatin (PRAVACHOL) 40 MG tablet Take 1 tablet (40 mg total) by mouth every evening. 3/22/24   Forrest Waldrop MD   predniSONE (DELTASONE) 20 MG tablet Take 3 tablets (60 mg total) by mouth once daily. After 1 wk, decrease to 2.5 tabs(50mg) x 1wk, then decrease to 2 tabs(40mg) daily x 1wk, then decrease to 3 tabs daily and continue until RTC in 4 wks. 8/3/24   Forrest Waldrop MD   tirzepatide (MOUNJARO) 2.5 mg/0.5 mL PnIj Inject 2.5 mg into the skin every 7 days.  Patient not taking: Reported on 8/30/2024 8/23/24   Bety Perdue MD   tirzepatide (MOUNJARO) 5 mg/0.5 mL PnIj Inject 5 mg into the skin every 7 days.  Patient not taking: Reported on 8/30/2024 8/23/24   Bety Perdue MD   tocilizumab (ACTEMRA) 162 mg/0.9 mL injection Inject 0.9 mLs (162 mg total) into the skin every 7 days. 6/3/24   Forrest Waldrop MD   traMADoL (ULTRAM) 50 mg tablet Take 1 tablet (50 mg total) by mouth every 12 (twelve) hours as needed for Pain. 5/14/24   Bety Perdue MD        Medications this encounter:    acyclovir  400 mg Oral BID    [START ON 9/7/2024] amlodipine-benazepril 5-20 mg  2 capsule Oral Daily    [START ON 9/7/2024]  aspirin  325 mg Oral Daily    brimonidine 0.2%  1 drop Both Eyes TID    dorzolamide  1 drop Both Eyes TID    latanoprost  1 drop Both Eyes QHS    methylPREDNISolone injection (PEDS and ADULTS)  1,000 mg Intravenous Once    [START ON 9/7/2024] metoprolol succinate  100 mg Oral Daily    [START ON 9/7/2024] pantoprazole  40 mg Oral Daily    pravastatin  40 mg Oral QHS       Allergies: is allergic to oxycontin [oxycodone], percocet [oxycodone-acetaminophen], percodan [oxycodone hcl-oxycodone-asa], and seroquel [quetiapine].     Social Hx:  reports that she has never smoked. She has never been exposed to tobacco smoke. She has never used smokeless tobacco. She reports that she does not drink alcohol and does not use drugs.     Family Hx: No family history of glaucoma. family history includes AVM in her brother; Alzheimer's disease in her father and mother; Cancer (age of onset: 58) in her brother; Colon polyps in her brother; Coronary artery disease in her brother, father, mother, and sister; Diabetes in her father, mother, and sister; Emphysema in her father; Heart attack in her brother and sister; Hydrocephalus in her brother and paternal cousin; Hypertension in her brother and mother; Intellectual disability in her brother; Kidney disease in her sister; No Known Problems in her son; Stomach cancer in her brother; Stroke in her brother and mother.     ROS: Negative x 10 except for complaints as described in HPI; negative for fever, chills, weight loss, nausea, vomiting, diarrhea, shortness of breath, nasal discharge, cough, abdominal pain, dyspnea, difficulty moving arms and legs, confusion, dysuria, palpitations, or chest pain     Ocular examination/Dilated fundus examination:  Base Eye Exam       Visual Acuity (Snellen - Linear)         Right Left    Dist sc HM at face LP              Tonometry (5:02 PM)         Right Left    Pressure 19 17              Pupils         Dark Light Shape React APD    Right 4 2 Round  Brisk None    Left 4 2 Round Brisk APD              Visual Fields         Right Left    Restrictions Total superior temporal, inferior temporal, superior nasal, inferior nasal deficiencies Total superior temporal, inferior temporal, superior nasal, inferior nasal deficiencies              Extraocular Movement         Right Left     Full Full   L XT             Neuro/Psych       Oriented x3: Yes    Mood/Affect: Normal              Dilation       Both eyes: 1.0% Mydriacyl @ 5:08 PM                  Slit Lamp and Fundus Exam       External Exam         Right Left    External Exophthalmos Exophthalmos              Pen Light Exam         Right Left    Lids/Lashes Normal Normal    Conjunctiva/Sclera White and quiet White and quiet    Cornea Clear Clear    Anterior Chamber Deep and quiet Deep and quiet    Iris Round and reactive Round and reactive    Lens 2+ Nuclear sclerosis 2+ Nuclear sclerosis              Fundus Exam         Right Left    Disc 4+ Pallor 4+ Pallor    C/D Ratio 0.9 0.9    Macula Normal Normal    Vessels Arteriolar narrowing Arteriolar narrowing    Periphery Normal Normal                    Assessment/Plan:   1. Autoimmune Optic Neuropathy [facility]  2. GCA with Polymyalgia Rheumatica   - OS 2004, OD 2012  - unknown antibody, unknown if biopsy proven ([facility] - [facility])   - Follows closely with Rheumatology, On Prednisone 60mg daily   - Several admissions for treatments with IV steroid and IVIG   - Baseline Vision: HM at face // LP   - Today's Vision: HM at face // LP ,   - Pupils with +APD OS   - Dilated exam without evidence of active nerve edema; However, difficult to assess secondary to extensive chronic optic disc changes. Does not exclude the possibility of non-ocular GCA or posterior optic ischemia.     2. POAG - mild vs OHT ou  - History of elevated IOP, Steroid response - on chronic steroid treatment per Rheumatology   - No history of Lasers, Surgery   - Tmax 27 ou  - Cup to Disc 0.9  with extensive pallor   - Mild narrowing on gonio   - Hx of Asthma (Avoid Timolol)   - Home Meds: Dorzolamide, Brimonidine, Latanoprost   - IOP today 19//17; Okay to continue home meds     Recommendations   - Admission for IV Steroids per Rheumatology recommendations   - ESR / CRP/ Platelet Count  - Consider Temporal Artery Ultrasound or MRA Head for TA w/ w/o Contrast   - Start Home Meds: Brimondine TID, Dorzolamide TID, Latanorpost qHS [facility]     Discussed patient's history, physical, assessment and plan with the attending.    Pranay Lema MD   [facility], PGY-2

## 2024-09-06 NOTE — ED TRIAGE NOTES
Pt arrives to ED sent by her PCP for a decrease in vision. Pt states she has no vision in the right eye and has been slowly losing vision in her left. She said this flare up she's having is worse than before.

## 2024-09-06 NOTE — ASSESSMENT & PLAN NOTE
Due to autoimmune disease, chronic steroid use, and Actemra. No evidence of opportunistic infection. 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 May consider chronic Bactrim ppx given steroid use.

## 2024-09-06 NOTE — ED NOTES
Assumed care of the patient. Report received from Tia RN. Pt vital signs taken intermittently. Pt in hospital gown, side rails up X2, bed low and locked, and call light is placed within reach. No family/visitors at bedside at this time. Pt denies any complaints or needs.

## 2024-09-06 NOTE — ASSESSMENT & PLAN NOTE
Body mass index is 44.99 kg/m².; chronic comorbidity affecting medical decision-making. Patient would greatly benefit from weight loss through proper diet and increased physical activity.

## 2024-09-06 NOTE — ED NOTES
Bed: FLEX 02  Expected date: 9/6/24  Expected time: 6:03 PM  Means of arrival:   Comments:  [place] Palomares

## 2024-09-06 NOTE — HPI
"Genesis Ugalde is a 68 y.o. female with Giant Cell Arteritis with Polymyalgia Rheumatica on chronic steroids, HTN, HLD, impaired glucose metabolism, BELLO cirrhosis, morbid obesity with LOYDA who presents today for vision changes.     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 Around August 20, she noticed a "flare up" of her GCA, described as worsening vision in the R eye along with increase in the intensity of her headaches.  For this reason, her prednisone was increased to 60 mg daily.  Despite the increase in steroid dosing, her vision in the right eye has unfortunately continued to decline.    She notified her Rheumatologist of her continued vision changes, and it was recommended that she be admitted for IV Solumedrol along with Ophthalmology and Rheumatology evaluation.     She does note some nausea which she attributes to poor appetite due to grieving for her stepson who was murdered last month.  Otherwise, she denies any significant new focal weakness, shortness for breath, chest pain, dizziness, or other significant symptoms.  "

## 2024-09-06 NOTE — ASSESSMENT & PLAN NOTE
Recent A1c 6.3%, likely due to steroid-induced chronic hyperglycemia. Not on insulin at home, therefore will not start SSI at this time; however, will monitor with BID accuchecks, and if glucose significantly rises, may consider initiation of SSI vs. low-dose long-acting insulin.

## 2024-09-06 NOTE — TELEPHONE ENCOUNTER
Message received pt had sudden loss of vision 8/20/24 right eye, LPO, while on prednisone 30mg at that time,  now on prednisone 20mg daily      Suggest pt go to ED now for admission to Medicine  and plan 3-5 day course of Solu-Medrol 1 g IV, and MRI brain, Ophthalmology consult.  Pt agrees. ED notified

## 2024-09-06 NOTE — ASSESSMENT & PLAN NOTE
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 No evidence of acute decompensation.   MELD-Na score calculated; MELD 3.0: 11 at 6/14/2024  9:34 AM  MELD-Na: 10 at 6/14/2024  9:34 AM  Calculated from:  Serum Creatinine: 0.8 mg/dL (Using min of 1 mg/dL) at 6/14/2024  9:34 AM  Serum Sodium: 145 mmol/L (Using max of 137 mmol/L) at 6/14/2024  9:34 AM  Total Bilirubin: 1.9 mg/dL at 6/14/2024  9:34 AM  Serum Albumin: 4.1 g/dL (Using max of 3.5 g/dL) at 6/14/2024  9:34 AM  INR(ratio): 1.1 at 6/14/2024  9:34 AM  Age at listing (hypothetical): 68 years  Sex: Female at 6/14/2024  9:34 AM

## 2024-09-07 LAB
ALBUMIN SERPL BCP-MCNC: 4.3 G/DL (ref 3.5–5.2)
ANION GAP SERPL CALC-SCNC: 15 MMOL/L (ref 8–16)
BUN SERPL-MCNC: 24 MG/DL (ref 8–23)
CALCIUM SERPL-MCNC: 10.1 MG/DL (ref 8.7–10.5)
CHLORIDE SERPL-SCNC: 106 MMOL/L (ref 95–110)
CO2 SERPL-SCNC: 17 MMOL/L (ref 23–29)
CREAT SERPL-MCNC: 1 MG/DL (ref 0.5–1.4)
ERYTHROCYTE [DISTWIDTH] IN BLOOD BY AUTOMATED COUNT: 13.2 % (ref 11.5–14.5)
EST. GFR  (NO RACE VARIABLE): >60 ML/MIN/1.73 M^2
GLUCOSE SERPL-MCNC: 222 MG/DL (ref 70–110)
HBV CORE AB SERPL QL IA: NORMAL
HBV SURFACE AB SER-ACNC: <3 MIU/ML
HBV SURFACE AB SER-ACNC: NORMAL M[IU]/ML
HBV SURFACE AG SERPL QL IA: NORMAL
HCT VFR BLD AUTO: 45 % (ref 37–48.5)
HGB BLD-MCNC: 14.8 G/DL (ref 12–16)
MAGNESIUM SERPL-MCNC: 2.1 MG/DL (ref 1.6–2.6)
MCH RBC QN AUTO: 31.1 PG (ref 27–31)
MCHC RBC AUTO-ENTMCNC: 32.9 G/DL (ref 32–36)
MCV RBC AUTO: 95 FL (ref 82–98)
OHS QRS DURATION: 100 MS
OHS QTC CALCULATION: 446 MS
PHOSPHATE SERPL-MCNC: 2.8 MG/DL (ref 2.7–4.5)
PLATELET # BLD AUTO: 209 K/UL (ref 150–450)
PMV BLD AUTO: 10.6 FL (ref 9.2–12.9)
POCT GLUCOSE: 164 MG/DL (ref 70–110)
POCT GLUCOSE: 219 MG/DL (ref 70–110)
POCT GLUCOSE: 230 MG/DL (ref 70–110)
POCT GLUCOSE: 262 MG/DL (ref 70–110)
POTASSIUM SERPL-SCNC: 4.2 MMOL/L (ref 3.5–5.1)
RBC # BLD AUTO: 4.76 M/UL (ref 4–5.4)
SODIUM SERPL-SCNC: 138 MMOL/L (ref 136–145)
WBC # BLD AUTO: 23.39 K/UL (ref 3.9–12.7)

## 2024-09-07 PROCEDURE — 99900035 HC TECH TIME PER 15 MIN (STAT): Mod: HCNC

## 2024-09-07 PROCEDURE — 85027 COMPLETE CBC AUTOMATED: CPT | Mod: HCNC

## 2024-09-07 PROCEDURE — 36415 COLL VENOUS BLD VENIPUNCTURE: CPT | Mod: HCNC | Performed by: STUDENT IN AN ORGANIZED HEALTH CARE EDUCATION/TRAINING PROGRAM

## 2024-09-07 PROCEDURE — 94660 CPAP INITIATION&MGMT: CPT | Mod: HCNC

## 2024-09-07 PROCEDURE — 83735 ASSAY OF MAGNESIUM: CPT | Mod: HCNC

## 2024-09-07 PROCEDURE — 94761 N-INVAS EAR/PLS OXIMETRY MLT: CPT | Mod: HCNC

## 2024-09-07 PROCEDURE — 11000001 HC ACUTE MED/SURG PRIVATE ROOM: Mod: HCNC

## 2024-09-07 PROCEDURE — 63600175 PHARM REV CODE 636 W HCPCS: Mod: HCNC

## 2024-09-07 PROCEDURE — 25000003 PHARM REV CODE 250: Mod: HCNC | Performed by: STUDENT IN AN ORGANIZED HEALTH CARE EDUCATION/TRAINING PROGRAM

## 2024-09-07 PROCEDURE — 63600175 PHARM REV CODE 636 W HCPCS: Mod: HCNC | Performed by: STUDENT IN AN ORGANIZED HEALTH CARE EDUCATION/TRAINING PROGRAM

## 2024-09-07 PROCEDURE — A9585 GADOBUTROL INJECTION: HCPCS | Mod: HCNC

## 2024-09-07 PROCEDURE — 86706 HEP B SURFACE ANTIBODY: CPT | Mod: HCNC | Performed by: STUDENT IN AN ORGANIZED HEALTH CARE EDUCATION/TRAINING PROGRAM

## 2024-09-07 PROCEDURE — 25500020 PHARM REV CODE 255: Mod: HCNC

## 2024-09-07 PROCEDURE — 86704 HEP B CORE ANTIBODY TOTAL: CPT | Mod: HCNC | Performed by: STUDENT IN AN ORGANIZED HEALTH CARE EDUCATION/TRAINING PROGRAM

## 2024-09-07 PROCEDURE — 36415 COLL VENOUS BLD VENIPUNCTURE: CPT | Mod: HCNC

## 2024-09-07 PROCEDURE — 87340 HEPATITIS B SURFACE AG IA: CPT | Mod: HCNC | Performed by: STUDENT IN AN ORGANIZED HEALTH CARE EDUCATION/TRAINING PROGRAM

## 2024-09-07 PROCEDURE — 27100171 HC OXYGEN HIGH FLOW UP TO 24 HOURS: Mod: HCNC

## 2024-09-07 PROCEDURE — 80069 RENAL FUNCTION PANEL: CPT | Mod: HCNC

## 2024-09-07 RX ORDER — GLUCAGON 1 MG
1 KIT INJECTION
Status: DISCONTINUED | OUTPATIENT
Start: 2024-09-07 | End: 2024-09-07

## 2024-09-07 RX ORDER — INSULIN ASPART 100 [IU]/ML
0-5 INJECTION, SOLUTION INTRAVENOUS; SUBCUTANEOUS
Status: DISCONTINUED | OUTPATIENT
Start: 2024-09-07 | End: 2024-09-10 | Stop reason: HOSPADM

## 2024-09-07 RX ORDER — IBUPROFEN 200 MG
24 TABLET ORAL
Status: DISCONTINUED | OUTPATIENT
Start: 2024-09-07 | End: 2024-09-07

## 2024-09-07 RX ORDER — IBUPROFEN 200 MG
16 TABLET ORAL
Status: DISCONTINUED | OUTPATIENT
Start: 2024-09-07 | End: 2024-09-07

## 2024-09-07 RX ORDER — GADOBUTROL 604.72 MG/ML
10 INJECTION INTRAVENOUS
Status: COMPLETED | OUTPATIENT
Start: 2024-09-07 | End: 2024-09-07

## 2024-09-07 RX ADMIN — BRIMONIDINE TARTRATE 1 DROP: 2 SOLUTION OPHTHALMIC at 02:09

## 2024-09-07 RX ADMIN — LATANOPROST 1 DROP: 50 SOLUTION OPHTHALMIC at 08:09

## 2024-09-07 RX ADMIN — METHYLPREDNISOLONE SODIUM SUCCINATE 1000 MG: 1 INJECTION INTRAMUSCULAR; INTRAVENOUS at 04:09

## 2024-09-07 RX ADMIN — ASPIRIN 325 MG: 325 TABLET, COATED ORAL at 09:09

## 2024-09-07 RX ADMIN — ENOXAPARIN SODIUM 40 MG: 40 INJECTION SUBCUTANEOUS at 04:09

## 2024-09-07 RX ADMIN — BRIMONIDINE TARTRATE 1 DROP: 2 SOLUTION OPHTHALMIC at 08:09

## 2024-09-07 RX ADMIN — PRAVASTATIN SODIUM 40 MG: 40 TABLET ORAL at 08:09

## 2024-09-07 RX ADMIN — INSULIN ASPART 1 UNITS: 100 INJECTION, SOLUTION INTRAVENOUS; SUBCUTANEOUS at 08:09

## 2024-09-07 RX ADMIN — PANTOPRAZOLE SODIUM 40 MG: 40 TABLET, DELAYED RELEASE ORAL at 09:09

## 2024-09-07 RX ADMIN — INSULIN ASPART 3 UNITS: 100 INJECTION, SOLUTION INTRAVENOUS; SUBCUTANEOUS at 01:09

## 2024-09-07 RX ADMIN — ACYCLOVIR 400 MG: 200 CAPSULE ORAL at 08:09

## 2024-09-07 RX ADMIN — GADOBUTROL 10 ML: 604.72 INJECTION INTRAVENOUS at 12:09

## 2024-09-07 RX ADMIN — BRIMONIDINE TARTRATE 1 DROP: 2 SOLUTION OPHTHALMIC at 01:09

## 2024-09-07 RX ADMIN — BRIMONIDINE TARTRATE 1 DROP: 2 SOLUTION OPHTHALMIC at 09:09

## 2024-09-07 RX ADMIN — AMLODIPINE BESYLATE AND BENAZEPRIL HYDROCHLORIDE 2 CAPSULE: 5; 20 CAPSULE ORAL at 09:09

## 2024-09-07 RX ADMIN — ACYCLOVIR 400 MG: 200 CAPSULE ORAL at 09:09

## 2024-09-07 RX ADMIN — METOPROLOL SUCCINATE 100 MG: 100 TABLET, EXTENDED RELEASE ORAL at 09:09

## 2024-09-07 RX ADMIN — CLONAZEPAM 1 MG: 0.5 TABLET ORAL at 09:09

## 2024-09-07 NOTE — ASSESSMENT & PLAN NOTE
Chronic use of daily PRN benzos for anxiety, verified by PDMP. Will continue as to avoid withdrawal

## 2024-09-07 NOTE — PLAN OF CARE
Went over plan of care - all questions answered. No falls or injuries. Meds per md order. Will continue to monitor.

## 2024-09-07 NOTE — SUBJECTIVE & OBJECTIVE
Interval History:  Seen and evaluated on general medical floor  No acute events overnight    Clinical status stable, unchanged  No new complaints this morning  She is tearful at bedside due to stress in her personal life and her new visual disturbance in the R eye  Vision has not improved since admission per pt    Review of Systems   Constitutional:  Negative for appetite change, diaphoresis, fatigue and fever.   HENT:  Negative for drooling, rhinorrhea, sinus pressure, sinus pain and trouble swallowing.    Eyes:  Positive for visual disturbance (R eye; permanent visual loss in L eye). Negative for photophobia, pain and redness.   Respiratory:  Negative for cough, chest tightness and shortness of breath.    Cardiovascular:  Negative for chest pain and palpitations.   Gastrointestinal:  Negative for abdominal pain, constipation, diarrhea, nausea and vomiting.   Genitourinary:  Negative for decreased urine volume, difficulty urinating, dysuria, flank pain, hematuria and urgency.   Musculoskeletal:  Negative for back pain.   Skin:  Negative for color change and rash.   Neurological:  Positive for headaches. Negative for syncope, facial asymmetry and numbness.   Psychiatric/Behavioral:  Negative for confusion.      Objective:     Vital Signs (Most Recent):  Temp: 98.9 °F (37.2 °C) (09/07/24 0805)  Pulse: 95 (09/07/24 0805)  Resp: 16 (09/07/24 0805)  BP: (!) 145/88 (09/07/24 0805)  SpO2: (!) 94 % (09/07/24 0805) Vital Signs (24h Range):  Temp:  [97.6 °F (36.4 °C)-98.9 °F (37.2 °C)] 98.9 °F (37.2 °C)  Pulse:  [80-95] 95  Resp:  [16-21] 16  SpO2:  [92 %-97 %] 94 %  BP: (112-147)/(68-95) 145/88     Weight: 115.2 kg (254 lb)  Body mass index is 44.99 kg/m².  No intake or output data in the 24 hours ending 09/07/24 0917      Physical Exam  Constitutional:       Appearance: Normal appearance. She is not ill-appearing.   HENT:      Head: Normocephalic and atraumatic.      Right Ear: External ear normal.      Left Ear: External  ear normal.      Nose: Nose normal. No congestion or rhinorrhea.      Mouth/Throat:      Mouth: Mucous membranes are moist.      Pharynx: Oropharynx is clear.   Eyes:      General:         Right eye: No discharge.         Left eye: No discharge.      Extraocular Movements: Extraocular movements intact.      Conjunctiva/sclera: Conjunctivae normal.      Pupils: Pupils are equal, round, and reactive to light.      Comments: Decreased vision in R eye  Permanent visual loss in L eye at baseline   Cardiovascular:      Rate and Rhythm: Normal rate and regular rhythm.      Pulses: Normal pulses.      Heart sounds: Normal heart sounds. No murmur heard.  Pulmonary:      Effort: Pulmonary effort is normal. No respiratory distress.      Breath sounds: Normal breath sounds. No wheezing.   Abdominal:      General: Abdomen is flat. Bowel sounds are normal.      Palpations: Abdomen is soft.      Tenderness: There is no abdominal tenderness.   Musculoskeletal:         General: No swelling. Normal range of motion.      Cervical back: Normal range of motion.   Skin:     General: Skin is warm and dry.      Coloration: Skin is not jaundiced.   Neurological:      General: No focal deficit present.      Mental Status: She is alert and oriented to person, place, and time.      Cranial Nerves: No cranial nerve deficit.   Psychiatric:         Mood and Affect: Mood normal.             Significant Labs: All pertinent labs within the past 24 hours have been reviewed.    Significant Imaging: I have reviewed all pertinent imaging results/findings within the past 24 hours.

## 2024-09-07 NOTE — SUBJECTIVE & OBJECTIVE
Past Medical History:   Diagnosis Date    Anxiety     Asthma     Blind left eye     can see silhouettes in right eye    Cataract     Depression     Diabetes mellitus     Disease of immune system     Giant cell arteritis     Glaucoma     Hypertension     Polymyalgia rheumatica     Uveitis        Past Surgical History:   Procedure Laterality Date    ANKLE SURGERY      CHOLECYSTECTOMY      COLONOSCOPY N/A 9/28/2015    Procedure: COLONOSCOPY;  Surgeon: Alok Dykes MD;  [facility]  Service: Endoscopy;  Laterality: N/A;    COLONOSCOPY N/A 9/6/2019    Procedure: COLONOSCOPY;  Surgeon: Alok Dykes MD;  [facility]  Service: Endoscopy;  Laterality: N/A;    CYST REMOVAL      right lung    ESOPHAGOGASTRODUODENOSCOPY N/A 3/28/2023    Procedure: EGD (ESOPHAGOGASTRODUODENOSCOPY);  Surgeon: Bharat Franklin MD;  [facility]  Service: Endoscopy;  Laterality: N/A;  cirrhosis-labs prior, varices surveillance  gastroparesis-full liquid diet x3 days, clear liquid diet x1 day prior to procedure  instructions handed to pt and sent to [id number]    HYSTERECTOMY         Review of patient's allergies indicates:   Allergen Reactions    Oxycontin [oxycodone] Hallucinations     Immobile     Percocet [oxycodone-acetaminophen] Shortness Of Breath    Percodan [oxycodone hcl-oxycodone-asa] Shortness Of Breath    Seroquel [quetiapine] Shortness Of Breath     Akathisia       No current facility-administered medications on file prior to encounter.     Current Outpatient Medications on File Prior to Encounter   Medication Sig    acyclovir (ZOVIRAX) 400 MG tablet Take 400 mg by mouth 2 (two) times daily.    amLODIPine-benazepriL (LOTREL) 10-40 mg per capsule Take 1 capsule by mouth once daily.    aspirin (ECOTRIN) 325 MG EC tablet Take 325 mg by mouth once daily.    brimonidine 0.2% (ALPHAGAN) 0.2 % Drop Place 1 drop into both eyes 3 (three) times daily.    clonazePAM  (KLONOPIN) 1 MG tablet Take 1 tablet (1 mg total) by mouth daily as needed for Anxiety.    dorzolamide (TRUSOPT) 2 % ophthalmic solution INSTILL 1 DROP INTO BOTH EYES 3 TIMES A DAY    ergocalciferol (VITAMIN D2) 50,000 unit Cap TAKE 1 CAPSULE (50,000 UNITS TOTAL) BY MOUTH EVERY 7 DAYS.    latanoprost 0.005 % ophthalmic solution Place 1 drop into both eyes every evening.    metoprolol succinate (TOPROL-XL) 100 MG 24 hr tablet Take 1 tablet (100 mg total) by mouth once daily.    omeprazole (PRILOSEC) 40 MG capsule Take 1 capsule (40 mg total) by mouth every 12 (twelve) hours. Take one pill every morning 45 minutes before breakfast    pravastatin (PRAVACHOL) 40 MG tablet Take 1 tablet (40 mg total) by mouth every evening.    predniSONE (DELTASONE) 20 MG tablet Take 3 tablets (60 mg total) by mouth once daily. After 1 wk, decrease to 2.5 tabs(50mg) x 1wk, then decrease to 2 tabs(40mg) daily x 1wk, then decrease to 3 tabs daily and continue until RTC in 4 wks.    tirzepatide (MOUNJARO) 2.5 mg/0.5 mL PnIj Inject 2.5 mg into the skin every 7 days. (Patient not taking: Reported on 8/30/2024)    tirzepatide (MOUNJARO) 5 mg/0.5 mL PnIj Inject 5 mg into the skin every 7 days. (Patient not taking: Reported on 8/30/2024)    tocilizumab (ACTEMRA) 162 mg/0.9 mL injection Inject 0.9 mLs (162 mg total) into the skin every 7 days.    traMADoL (ULTRAM) 50 mg tablet Take 1 tablet (50 mg total) by mouth every 12 (twelve) hours as needed for Pain.     Family History       Problem Relation (Age of Onset)    AVM Brother    Alzheimer's disease Mother, Father    Cancer Brother (58)    Colon polyps Brother    Coronary artery disease Mother, Father, Sister, Brother    Diabetes Mother, Father, Sister    Emphysema Father    Heart attack Sister, Brother    Hydrocephalus Brother, Paternal Cousin    Hypertension Mother, Brother    Intellectual disability Brother    Kidney disease Sister    No Known Problems Son    Stomach cancer Brother    Stroke  Mother, Brother          Tobacco Use    Smoking status: Never     Passive exposure: Never    Smokeless tobacco: Never   Substance and Sexual Activity    Alcohol use: No     Alcohol/week: 0.0 standard drinks of alcohol    Drug use: Never    Sexual activity: Not Currently     Review of Systems   All other systems reviewed and are negative.    Objective:     Vital Signs (Most Recent):  Temp: 97.6 °F (36.4 °C) (09/06/24 1800)  Pulse: 80 (09/06/24 1800)  Resp: 18 (09/06/24 1800)  BP: (!) 140/76 (09/06/24 1800)  SpO2: 95 % (09/06/24 1800) Vital Signs (24h Range):  Temp:  [97.6 °F (36.4 °C)-97.8 °F (36.6 °C)] 97.6 °F (36.4 °C)  Pulse:  [80-85] 80  Resp:  [18-20] 18  SpO2:  [95 %-96 %] 95 %  BP: (140-147)/(76-87) 140/76     Weight: 115.2 kg (254 lb)  Body mass index is 44.99 kg/m².     Physical Exam  Vitals and nursing note reviewed.   Constitutional:       General: She is not in acute distress.     Appearance: She is well-developed. She is obese. She is not diaphoretic.      Comments: Pleasant, well-versed in her medical history with good insight into disease process   HENT:      Head: Normocephalic and atraumatic.      Comments: Cushingoid facies  Eyes:      General: No scleral icterus.     Conjunctiva/sclera: Conjunctivae normal.      Comments: Disconjugate gaze   Neck:      Vascular: No JVD.   Cardiovascular:      Rate and Rhythm: Normal rate and regular rhythm.   Pulmonary:      Effort: Pulmonary effort is normal. No respiratory distress.   Musculoskeletal:      Right lower leg: No edema.      Left lower leg: No edema.   Skin:     Coloration: Skin is not jaundiced or pale.   Neurological:      Mental Status: She is alert and oriented to person, place, and time.      Motor: No abnormal muscle tone.   Psychiatric:         Behavior: Behavior normal.      Comments: Somewhat tearful affect, which is appropriate for her situation                Significant Labs: All pertinent labs within the past 24 hours have been  reviewed.  CBC:   Recent Labs   Lab 09/06/24  1648   WBC 13.83*   HGB 14.6   HCT 43.5        CMP:   Recent Labs   Lab 09/06/24  1648      K 5.3*      CO2 22*   GLU 96   BUN 16   CREATININE 0.8   CALCIUM 10.0   PROT 7.0   ALBUMIN 3.9   BILITOT 1.7*   ALKPHOS 59   AST 51*   *   ANIONGAP 10       Significant Imaging: I have reviewed all pertinent imaging results/findings within the past 24 hours.

## 2024-09-07 NOTE — ASSESSMENT & PLAN NOTE
Patient is a 67 yo F w/ a hx of cirrhosis 2/2 BELLO, HTN, glaucoma and GCA managed with tocilizumab and steroids here for recurrent symptoms of GCA despite steroid and tocilizumab use.    - Pt has had a diagnosis of GCA since 2004 and on tocilizumab since 2012 (appears to be more like 2015 via chart review). This hospitalization, recurrence began 7/29/2024. Had been treated with a methylpred pack, but despite this was having vision deficits 8/20 - had reached out to Dr. Waldrop and was sent into hospital 9/6.  - ESR/CRP have been negative for the past few months, even with evidence of active arterities on MRA/US 7/2024  - MRA this hospitalization normal, previous MRA/cranial US from 7/29/2024 were both abnormal bilaterally and consistent with GCA. No need for biopsy as this likely confirms that GCA is the culprit.  - Pt received first dose of a pulse 9/6 for planned 1g x3 days.  - Pt has had an infusion of tocilizumab in 2015  - 6mg/kg infusion of tocilizumab 9/9    Recommendations:  - Pt to complete pulse dose steroids 1g/day x3 days  today (Day 3/3) --> 60mg 9/9 until seen outpatient  - S/p tocilizumab infusion, well tolerated  - Will have follow up with Dr. Waldrop and I after discharge  - Will follow up lázaro bautista but does not need to result prior to discharge

## 2024-09-07 NOTE — PLAN OF CARE
Problem: Skin Injury Risk Increased  Goal: Skin Health and Integrity  Intervention: Optimize Skin Protection  Flowsheets (Taken 9/7/2024 0348)  Pressure Reduction Devices: positioning supports utilized  Skin Protection: protective footwear used  Activity Management: Arm raise - L1  Head of Bed (HOB) Positioning: HOB at 30-45 degrees     Problem: Infection  Goal: Absence of Infection Signs and Symptoms  Intervention: Prevent or Manage Infection  Flowsheets (Taken 9/7/2024 0348)  Fever Reduction/Comfort Measures: fluid intake increased  Infection Management: aseptic technique maintained  Isolation Precautions: contact

## 2024-09-07 NOTE — ASSESSMENT & PLAN NOTE
Advance Care Planning   I spent less than 16 minutes discussing code status with patient on admission. she states that she would never want CPR or intubation/mechanical ventilation in the event of cardiopulmonary arrest, in agreement with DNR status. Her son is her POA, but she states that they are currently not on good terms and she would like her other family members to decide her best course.

## 2024-09-07 NOTE — NURSING
Nurses Note -- 4 Eyes      9/7/2024   12:07 AM      Skin assessed during: Admit      [x] No Altered Skin Integrity Present    []Prevention Measures Documented      [] Yes- Altered Skin Integrity Present or Discovered   [] LDA Added if Not in Epic (Describe Wound)   [] New Altered Skin Integrity was Present on Admit and Documented in LDA   [] Wound Image Taken    Wound Care Consulted? No    Attending Nurse: Shannan White RN/Staff Member:   Nayeli

## 2024-09-07 NOTE — PROGRESS NOTES
"[facility] Medicine  Progress Note    Patient Name: Genesis Ugalde  MRN: [id number]  Patient Class: IP- Inpatient   Admission Date: 9/6/2024  Length of Stay: 1 days  Attending Physician: Jeffery Yanes DO  Primary Care Provider: Bety Perdue MD        Subjective:     Principal Problem:Giant cell arteritis with polymyalgia rheumatica        HPI:  Genesis Ugadle is a 68 y.o. female with Giant Cell Arteritis with Polymyalgia Rheumatica on chronic steroids, HTN, HLD, impaired glucose metabolism, BELLO cirrhosis, morbid obesity with LOYDA who presents today for vision changes.     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 Around August 20, she noticed a "flare up" of her GCA, described as worsening vision in the R eye along with increase in the intensity of her headaches.  For this reason, her prednisone was increased to 60 mg daily.  Despite the increase in steroid dosing, her vision in the right eye has unfortunately continued to decline.    She notified her Rheumatologist of her continued vision changes, and it was recommended that she be admitted for IV Solumedrol along with Ophthalmology and Rheumatology evaluation.     She does note some nausea which she attributes to poor appetite due to grieving for her stepson who was murdered last month.  Otherwise, she denies any significant new focal weakness, shortness for breath, chest pain, dizziness, or other significant symptoms.    Overview/Hospital Course:  Presented per her rheumatologist recommendation for worsening vision.  She has hx of GCA with L eye blindness.  On Aug 20 she began having a flair with tapering her steroid dose that affected vision in her R eye.  She states that it has progressively gotten worse since that time to the point that she cannot see details with her R eye, just blurry objects.  Her " rheumatologist recommended that she present for IV steroids and ophthalmology eval.  Optho recommended MRA which returned mostly unremarkable.  Rheum has recommended 1000mg solumedrol IV q24 hr.  Vision remains stable at this time without significant improvement.    Interval History:  Seen and evaluated on general medical floor  No acute events overnight    Clinical status stable, unchanged  No new complaints this morning  She is tearful at bedside due to stress in her personal life and her new visual disturbance in the R eye  Vision has not improved since admission per pt    Review of Systems   Constitutional:  Negative for appetite change, diaphoresis, fatigue and fever.   HENT:  Negative for drooling, rhinorrhea, sinus pressure, sinus pain and trouble swallowing.    Eyes:  Positive for visual disturbance (R eye; permanent visual loss in L eye). Negative for photophobia, pain and redness.   Respiratory:  Negative for cough, chest tightness and shortness of breath.    Cardiovascular:  Negative for chest pain and palpitations.   Gastrointestinal:  Negative for abdominal pain, constipation, diarrhea, nausea and vomiting.   Genitourinary:  Negative for decreased urine volume, difficulty urinating, dysuria, flank pain, hematuria and urgency.   Musculoskeletal:  Negative for back pain.   Skin:  Negative for color change and rash.   Neurological:  Positive for headaches. Negative for syncope, facial asymmetry and numbness.   Psychiatric/Behavioral:  Negative for confusion.      Objective:     Vital Signs (Most Recent):  Temp: 98.9 °F (37.2 °C) (09/07/24 0805)  Pulse: 95 (09/07/24 0805)  Resp: 16 (09/07/24 0805)  BP: (!) 145/88 (09/07/24 0805)  SpO2: (!) 94 % (09/07/24 0805) Vital Signs (24h Range):  Temp:  [97.6 °F (36.4 °C)-98.9 °F (37.2 °C)] 98.9 °F (37.2 °C)  Pulse:  [80-95] 95  Resp:  [16-21] 16  SpO2:  [92 %-97 %] 94 %  BP: (112-147)/(68-95) 145/88     Weight: 115.2 kg (254 lb)  Body mass index is 44.99 kg/m².  No  intake or output data in the 24 hours ending 09/07/24 0917      Physical Exam  Constitutional:       Appearance: Normal appearance. She is not ill-appearing.   HENT:      Head: Normocephalic and atraumatic.      Right Ear: External ear normal.      Left Ear: External ear normal.      Nose: Nose normal. No congestion or rhinorrhea.      Mouth/Throat:      Mouth: Mucous membranes are moist.      Pharynx: Oropharynx is clear.   Eyes:      General:         Right eye: No discharge.         Left eye: No discharge.      Extraocular Movements: Extraocular movements intact.      Conjunctiva/sclera: Conjunctivae normal.      Pupils: Pupils are equal, round, and reactive to light.      Comments: Decreased vision in R eye  Permanent visual loss in L eye at baseline   Cardiovascular:      Rate and Rhythm: Normal rate and regular rhythm.      Pulses: Normal pulses.      Heart sounds: Normal heart sounds. No murmur heard.  Pulmonary:      Effort: Pulmonary effort is normal. No respiratory distress.      Breath sounds: Normal breath sounds. No wheezing.   Abdominal:      General: Abdomen is flat. Bowel sounds are normal.      Palpations: Abdomen is soft.      Tenderness: There is no abdominal tenderness.   Musculoskeletal:         General: No swelling. Normal range of motion.      Cervical back: Normal range of motion.   Skin:     General: Skin is warm and dry.      Coloration: Skin is not jaundiced.   Neurological:      General: No focal deficit present.      Mental Status: She is alert and oriented to person, place, and time.      Cranial Nerves: No cranial nerve deficit.   Psychiatric:         Mood and Affect: Mood normal.             Significant Labs: All pertinent labs within the past 24 hours have been reviewed.    Significant Imaging: I have reviewed all pertinent imaging results/findings within the past 24 hours.    Assessment/Plan:      * Giant cell arteritis with polymyalgia rheumatica  Patient with known GCA/PMR, on  chronic Prednisone and Actemra  Has chronic vision changes associated with this, but presents with acute visual changes concerning for flare    ESR and CRP WNL  MRA showed no compelling evidence of vascular inflammation involving the superficial cranial arteries    Rheumatology recommended initiation of Solumedrol 1g IV x 3 days  Rheumatology consulted, appreciate recommendations  Ophthalmology consutled, appreciate recommendations    DNR (do not resuscitate)  Advance Care Planning  she states that she would never want CPR or intubation/mechanical ventilation in the event of cardiopulmonary arrest, in agreement with DNR status. Her son is her POA, but she states that they are currently not on good terms and she would like her other family members to decide her best course.         Recurrent HSV (herpes simplex virus)  Continue ppx Acyclovir given chronic immunosuppression.       Chronic prescription benzodiazepine use  Chronic use of daily PRN benzos for anxiety, verified by PDMP. Will continue as to avoid withdrawal    Impaired glucose regulation  Recent A1c 6.3%, likely due to steroid-induced chronic hyperglycemia  Not on insulin at home, therefore will not start SSI at this time; however, will monitor with BID accuchecks, and if glucose significantly rises, may consider initiation of SSI vs. 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 No evidence of acute decompensation.   MELD-Na score calculated; MELD 3.0: 11 at 6/14/2024  9:34 AM  MELD-Na: 10 at 6/14/2024  9:34 AM  Calculated from:  Serum Creatinine: 0.8 mg/dL (Using min of 1 mg/dL) at 6/14/2024  9:34 AM  Serum Sodium: 145 mmol/L (Using max of 137 mmol/L) at 6/14/2024  9:34 AM  Total Bilirubin: 1.9 mg/dL at 6/14/2024  9:34 AM  Serum Albumin:  4.1 g/dL (Using max of 3.5 g/dL) at 6/14/2024  9:34 AM  INR(ratio): 1.1 at 6/14/2024  9:34 AM  Age at listing (hypothetical): 68 years  Sex: Female at 6/14/2024  9:34 AM      LOYDA (obstructive sleep apnea)  Nightly CPAP    Other hyperlipidemia  Continue hospital formulary of home statin.       Immunosuppressed status  Due to autoimmune disease, chronic steroid use, and Actemra. No evidence of opportunistic infection. 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 May consider chronic Bactrim ppx given steroid use.       Morbid obesity  Body mass index is 44.99 kg/m².; chronic comorbidity affecting medical decision-making  Patient would greatly benefit from weight loss through proper diet and increased physical activity  She has been on mounjaro and would recommend continuing this      VTE Risk Mitigation (From admission, onward)           Ordered     enoxaparin injection 40 mg  Every 24 hours         09/06/24 1807     IP VTE HIGH RISK PATIENT  Once         09/06/24 1807     Place sequential compression device  Until discontinued         09/06/24 1807                    Discharge Planning   SULY:      Code Status: DNR   Is the patient medically ready for discharge?:     Reason for patient still in hospital (select all that apply): Patient trending condition and Treatment                     Jeffery Yanes DO  Department of Hospital Medicine   [facility]

## 2024-09-07 NOTE — H&P
"[facility] Medicine  History & Physical    Patient Name: Genesis Ugalde  MRN: [id number]  Patient Class: IP- Inpatient  Admission Date: 9/6/2024  Attending Physician: Pb Whitehead MD   Primary Care Provider: Bety Perdue MD         Patient information was obtained from patient and past medical records.     Subjective:     Principal Problem:Giant cell arteritis with polymyalgia rheumatica    Chief Complaint:   Chief Complaint   Patient presents with    Decreased Visual Acuity     Pt states MD sent her here d/t decreased vision from a flare up of giant cell arteritis.        HPI: Genesis Ugalde is a 68 y.o. female with Giant Cell Arteritis with Polymyalgia Rheumatica on chronic steroids, HTN, HLD, impaired glucose metabolism, BELLO cirrhosis, morbid obesity with LOYDA who presents today for vision changes.     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 Around August 20, she noticed a "flare up" of her GCA, described as worsening vision in the R eye along with increase in the intensity of her headaches.  For this reason, her prednisone was increased to 60 mg daily.  Despite the increase in steroid dosing, her vision in the right eye has unfortunately continued to decline.    She notified her Rheumatologist of her continued vision changes, and it was recommended that she be admitted for IV Solumedrol along with Ophthalmology and Rheumatology evaluation.     She does note some nausea which she attributes to poor appetite due to grieving for her stepson who was murdered last month.  Otherwise, she denies any significant new focal weakness, shortness for breath, chest pain, dizziness, or other significant symptoms.    Past Medical History:   Diagnosis Date    Anxiety     Asthma     Blind left eye     can see silhouettes in right eye    Cataract     Depression     " Diabetes mellitus     Disease of immune system     Giant cell arteritis     Glaucoma     Hypertension     Polymyalgia rheumatica     Uveitis        Past Surgical History:   Procedure Laterality Date    ANKLE SURGERY      CHOLECYSTECTOMY      COLONOSCOPY N/A 9/28/2015    Procedure: COLONOSCOPY;  Surgeon: Alok Dykes MD;  [facility]  Service: Endoscopy;  Laterality: N/A;    COLONOSCOPY N/A 9/6/2019    Procedure: COLONOSCOPY;  Surgeon: Alok Dykes MD;  [facility]  Service: Endoscopy;  Laterality: N/A;    CYST REMOVAL      right lung    ESOPHAGOGASTRODUODENOSCOPY N/A 3/28/2023    Procedure: EGD (ESOPHAGOGASTRODUODENOSCOPY);  Surgeon: Bharat Franklin MD;  [facility]  Service: Endoscopy;  Laterality: N/A;  cirrhosis-labs prior, varices surveillance  gastroparesis-full liquid diet x3 days, clear liquid diet x1 day prior to procedure  instructions handed to pt and sent to [id number]    HYSTERECTOMY         Review of patient's allergies indicates:   Allergen Reactions    Oxycontin [oxycodone] Hallucinations     Immobile     Percocet [oxycodone-acetaminophen] Shortness Of Breath    Percodan [oxycodone hcl-oxycodone-asa] Shortness Of Breath    Seroquel [quetiapine] Shortness Of Breath     Akathisia       No current facility-administered medications on file prior to encounter.     Current Outpatient Medications on File Prior to Encounter   Medication Sig    acyclovir (ZOVIRAX) 400 MG tablet Take 400 mg by mouth 2 (two) times daily.    amLODIPine-benazepriL (LOTREL) 10-40 mg per capsule Take 1 capsule by mouth once daily.    aspirin (ECOTRIN) 325 MG EC tablet Take 325 mg by mouth once daily.    brimonidine 0.2% (ALPHAGAN) 0.2 % Drop Place 1 drop into both eyes 3 (three) times daily.    clonazePAM (KLONOPIN) 1 MG tablet Take 1 tablet (1 mg total) by mouth daily as needed for Anxiety.    dorzolamide (TRUSOPT) 2 % ophthalmic solution INSTILL 1 DROP INTO  BOTH EYES 3 TIMES A DAY    ergocalciferol (VITAMIN D2) 50,000 unit Cap TAKE 1 CAPSULE (50,000 UNITS TOTAL) BY MOUTH EVERY 7 DAYS.    latanoprost 0.005 % ophthalmic solution Place 1 drop into both eyes every evening.    metoprolol succinate (TOPROL-XL) 100 MG 24 hr tablet Take 1 tablet (100 mg total) by mouth once daily.    omeprazole (PRILOSEC) 40 MG capsule Take 1 capsule (40 mg total) by mouth every 12 (twelve) hours. Take one pill every morning 45 minutes before breakfast    pravastatin (PRAVACHOL) 40 MG tablet Take 1 tablet (40 mg total) by mouth every evening.    predniSONE (DELTASONE) 20 MG tablet Take 3 tablets (60 mg total) by mouth once daily. After 1 wk, decrease to 2.5 tabs(50mg) x 1wk, then decrease to 2 tabs(40mg) daily x 1wk, then decrease to 3 tabs daily and continue until RTC in 4 wks.    tirzepatide (MOUNJARO) 2.5 mg/0.5 mL PnIj Inject 2.5 mg into the skin every 7 days. (Patient not taking: Reported on 8/30/2024)    tirzepatide (MOUNJARO) 5 mg/0.5 mL PnIj Inject 5 mg into the skin every 7 days. (Patient not taking: Reported on 8/30/2024)    tocilizumab (ACTEMRA) 162 mg/0.9 mL injection Inject 0.9 mLs (162 mg total) into the skin every 7 days.    traMADoL (ULTRAM) 50 mg tablet Take 1 tablet (50 mg total) by mouth every 12 (twelve) hours as needed for Pain.     Family History       Problem Relation (Age of Onset)    AVM Brother    Alzheimer's disease Mother, Father    Cancer Brother (58)    Colon polyps Brother    Coronary artery disease Mother, Father, Sister, Brother    Diabetes Mother, Father, Sister    Emphysema Father    Heart attack Sister, Brother    Hydrocephalus Brother, Paternal Cousin    Hypertension Mother, Brother    Intellectual disability Brother    Kidney disease Sister    No Known Problems Son    Stomach cancer Brother    Stroke Mother, Brother          Tobacco Use    Smoking status: Never     Passive exposure: Never    Smokeless tobacco: Never   Substance and Sexual Activity     Alcohol use: No     Alcohol/week: 0.0 standard drinks of alcohol    Drug use: Never    Sexual activity: Not Currently     Review of Systems   All other systems reviewed and are negative.    Objective:     Vital Signs (Most Recent):  Temp: 97.6 °F (36.4 °C) (09/06/24 1800)  Pulse: 80 (09/06/24 1800)  Resp: 18 (09/06/24 1800)  BP: (!) 140/76 (09/06/24 1800)  SpO2: 95 % (09/06/24 1800) Vital Signs (24h Range):  Temp:  [97.6 °F (36.4 °C)-97.8 °F (36.6 °C)] 97.6 °F (36.4 °C)  Pulse:  [80-85] 80  Resp:  [18-20] 18  SpO2:  [95 %-96 %] 95 %  BP: (140-147)/(76-87) 140/76     Weight: 115.2 kg (254 lb)  Body mass index is 44.99 kg/m².     Physical Exam  Vitals and nursing note reviewed.   Constitutional:       General: She is not in acute distress.     Appearance: She is well-developed. She is obese. She is not diaphoretic.      Comments: Pleasant, well-versed in her medical history with good insight into disease process   HENT:      Head: Normocephalic and atraumatic.      Comments: Cushingoid facies  Eyes:      General: No scleral icterus.     Conjunctiva/sclera: Conjunctivae normal.      Comments: Disconjugate gaze   Neck:      Vascular: No JVD.   Cardiovascular:      Rate and Rhythm: Normal rate and regular rhythm.   Pulmonary:      Effort: Pulmonary effort is normal. No respiratory distress.   Musculoskeletal:      Right lower leg: No edema.      Left lower leg: No edema.   Skin:     Coloration: Skin is not jaundiced or pale.   Neurological:      Mental Status: She is alert and oriented to person, place, and time.      Motor: No abnormal muscle tone.   Psychiatric:         Behavior: Behavior normal.      Comments: Somewhat tearful affect, which is appropriate for her situation                Significant Labs: All pertinent labs within the past 24 hours have been reviewed.  CBC:   Recent Labs   Lab 09/06/24  1648   WBC 13.83*   HGB 14.6   HCT 43.5        CMP:   Recent Labs   Lab 09/06/24  1648      K 5.3*   CL  108   CO2 22*   GLU 96   BUN 16   CREATININE 0.8   CALCIUM 10.0   PROT 7.0   ALBUMIN 3.9   BILITOT 1.7*   ALKPHOS 59   AST 51*   *   ANIONGAP 10       Significant Imaging: I have reviewed all pertinent imaging results/findings within the past 24 hours.  Assessment/Plan:     * Giant cell arteritis with polymyalgia rheumatica  Patient with known GCA/PMR, on chronic Prednisone and Actemra. Has chronic vision changes associated with this, but presents with acute visual changes concerning for flare. ESR and CRP WNL. Rheumatology recommended initiation of Solumedrol 1g IV x 3 days; will initiate this and consult Rheumatology and Ophthalmology while here. Continue PPI for GERD. MRI head pending.       DNR (do not resuscitate)  Advance Care Planning  I spent less than 16 minutes discussing code status with patient on admission. she states that she would never want CPR or intubation/mechanical ventilation in the event of cardiopulmonary arrest, in agreement with DNR status. Her son is her POA, but she states that they are currently not on good terms and she would like her other family members to decide her best course.         Recurrent HSV (herpes simplex virus)  Continue ppx Acyclovir given chronic immunosuppression.       Chronic prescription benzodiazepine use  Chronic use of daily PRN benzos for anxiety, verified by PDMP. Will continue as to avoid withdrawal.       Impaired glucose regulation  Recent A1c 6.3%, likely due to steroid-induced chronic hyperglycemia. Not on insulin at home, therefore will not start SSI at this time; however, will monitor with BID accuchecks, and if glucose significantly rises, may consider initiation of SSI vs. low-dose long-acting insulin.       Essential hypertension  Continue home antihypertensives while monitoring.       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 No  evidence of acute decompensation.   MELD-Na score calculated; MELD 3.0: 11 at 6/14/2024  9:34 AM  MELD-Na: 10 at 6/14/2024  9:34 AM  Calculated from:  Serum Creatinine: 0.8 mg/dL (Using min of 1 mg/dL) at 6/14/2024  9:34 AM  Serum Sodium: 145 mmol/L (Using max of 137 mmol/L) at 6/14/2024  9:34 AM  Total Bilirubin: 1.9 mg/dL at 6/14/2024  9:34 AM  Serum Albumin: 4.1 g/dL (Using max of 3.5 g/dL) at 6/14/2024  9:34 AM  INR(ratio): 1.1 at 6/14/2024  9:34 AM  Age at listing (hypothetical): 68 years  Sex: Female at 6/14/2024  9:34 AM      LOYDA (obstructive sleep apnea)  Nightly CPAP.      Other hyperlipidemia  Continue hospital formulary of home statin.       Immunosuppressed status  Due to autoimmune disease, chronic steroid use, and Actemra. No evidence of opportunistic infection. 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 May consider chronic Bactrim ppx given steroid use.       Morbid obesity  Body mass index is 44.99 kg/m².; chronic comorbidity affecting medical decision-making. Patient would greatly benefit from weight loss through proper diet and increased physical activity.          VTE Risk Mitigation (From admission, onward)           Ordered     enoxaparin injection 40 mg  Every 24 hours         09/06/24 1807     IP VTE HIGH RISK PATIENT  Once         09/06/24 1807     Place sequential compression device  Until discontinued         09/06/24 1807                                    Ben Bentley MD  Department of Hospital Medicine  [facility]

## 2024-09-07 NOTE — SUBJECTIVE & OBJECTIVE
Interval hx:  Pt feels that her vision is not worse, but is a bit disappointed that it isn't better. She feels that she is ready to go home today - per ophthalmology they still have some imaging they would like done, but she anticipates she will still be able to discharge.    Past Medical History:   Diagnosis Date    Anxiety     Asthma     Blind left eye     can see silhouettes in right eye    Cataract     Depression     Diabetes mellitus     Disease of immune system     Giant cell arteritis     Glaucoma     Hypertension     Polymyalgia rheumatica     Uveitis        Past Surgical History:   Procedure Laterality Date    ANKLE SURGERY      CHOLECYSTECTOMY      COLONOSCOPY N/A 9/28/2015    Procedure: COLONOSCOPY;  Surgeon: Alok Dykes MD;  Location: [facility] ([facility]);  Service: Endoscopy;  Laterality: N/A;    COLONOSCOPY N/A 9/6/2019    Procedure: COLONOSCOPY;  Surgeon: Alok Dykes MD;  Location: [facility] ([facility]);  Service: Endoscopy;  Laterality: N/A;    CYST REMOVAL      right lung    ESOPHAGOGASTRODUODENOSCOPY N/A 3/28/2023    Procedure: EGD (ESOPHAGOGASTRODUODENOSCOPY);  Surgeon: Bharat Franklin MD;  Location: [facility] ([facility]);  Service: Endoscopy;  Laterality: N/A;  cirrhosis-labs prior, varices surveillance  gastroparesis-full liquid diet x3 days, clear liquid diet x1 day prior to procedure  instructions handed to pt and sent to [facility]    HYSTERECTOMY         Immunization History   Administered Date(s) Administered    COVID-19, MRNA, LN-S, PF (MODERNA FULL 0.5 ML DOSE) 05/24/2021, 06/22/2021    Hepatitis A / Hepatitis B 12/19/2014, 11/23/2015, 12/23/2015, 05/23/2016, 07/11/2017, 08/11/2017, 01/11/2018    Influenza 11/21/2018    Influenza (FLUAD) - Quadrivalent - Adjuvanted - PF *Preferred* (65+) 09/25/2023    Influenza - High Dose - PF (65 years and older) 11/29/2016    Influenza - Quadrivalent - MDCK 10/27/2019    Influenza - Quadrivalent - MDCK - PF 12/02/2018    Influenza -  Trivalent (ADULT) 11/10/2008    Influenza - Trivalent - PF (ADULT) 12/12/2012    Influenza Split 09/19/2014    Pneumococcal Conjugate - 13 Valent 12/19/2014    Pneumococcal Polysaccharide - 23 Valent 11/10/2008, 11/29/2016, 07/28/2020    RSVpreF (Arexvy) 03/29/2024    Tdap 12/19/2014    Zoster Recombinant 07/13/2021, 09/10/2021       Review of patient's allergies indicates:   Allergen Reactions    Oxycontin [oxycodone] Hallucinations     Immobile     Percocet [oxycodone-acetaminophen] Shortness Of Breath    Percodan [oxycodone hcl-oxycodone-asa] Shortness Of Breath    Seroquel [quetiapine] Shortness Of Breath     Akathisia     Current Facility-Administered Medications   Medication Frequency    acetaminophen tablet 650 mg Q8H PRN    acetaminophen tablet 650 mg Q4H PRN    acyclovir capsule 400 mg BID    aluminum-magnesium hydroxide-simethicone 200-200-20 mg/5 mL suspension 30 mL QID PRN    amlodipine-benazepril 5-20 mg per capsule 2 capsule Daily    aspirin EC tablet 325 mg Daily    brimonidine 0.2% ophthalmic solution 1 drop TID    clonazePAM tablet 1 mg Daily PRN    dextrose 10% bolus 125 mL 125 mL PRN    dextrose 10% bolus 250 mL 250 mL PRN    enoxaparin injection 40 mg Q24H (prophylaxis, 1700)    glucagon (human recombinant) injection 1 mg PRN    glucose chewable tablet 16 g PRN    glucose chewable tablet 24 g PRN    insulin aspart U-100 pen 0-5 Units QID (AC + HS) PRN    latanoprost 0.005 % ophthalmic solution 1 drop QHS    melatonin tablet 6 mg Nightly PRN    methylPREDNISolone sodium succinate (SOLU-MEDROL) 1,000 mg in D5W 100 mL IVPB Q24H    metoprolol succinate (TOPROL-XL) 24 hr tablet 100 mg Daily    naloxone 0.4 mg/mL injection 0.02 mg PRN    ondansetron disintegrating tablet 8 mg Q8H PRN    pantoprazole EC tablet 40 mg Daily    Patient Supplied; Dorzolamide 2% opthalmic solution 1 gtt OU TID TID    polyethylene glycol packet 17 g Daily PRN    pravastatin tablet 40 mg QHS    simethicone chewable tablet 80  mg QID PRN    sodium chloride 0.9% flush 1-10 mL Q12H PRN     Family History       Problem Relation (Age of Onset)    AVM Brother    Alzheimer's disease Mother, Father    Cancer Brother (58)    Colon polyps Brother    Coronary artery disease Mother, Father, Sister, Brother    Diabetes Mother, Father, Sister    Emphysema Father    Heart attack Sister, Brother    Hydrocephalus Brother, Paternal Cousin    Hypertension Mother, Brother    Intellectual disability Brother    Kidney disease Sister    No Known Problems Son    Stomach cancer Brother    Stroke Mother, Brother          Tobacco Use    Smoking status: Never     Passive exposure: Never    Smokeless tobacco: Never   Substance and Sexual Activity    Alcohol use: No     Alcohol/week: 0.0 standard drinks of alcohol    Drug use: Never    Sexual activity: Not Currently     Review of Systems   Constitutional:  Negative for fatigue, fever and unexpected weight change.   HENT:  Negative for facial swelling.    Eyes:  Positive for visual disturbance.   Respiratory:  Negative for cough and shortness of breath.    Cardiovascular:  Negative for chest pain.   Gastrointestinal:  Negative for constipation and diarrhea.   Genitourinary:  Negative for dysuria.   Musculoskeletal:  Positive for arthralgias.   Skin:  Negative for rash.   Neurological:  Positive for headaches. Negative for weakness.   Hematological:  Negative for adenopathy.   Psychiatric/Behavioral:  Negative for behavioral problems.      Objective:     Vital Signs (Most Recent):  Temp: 99.1 °F (37.3 °C) (09/07/24 1215)  Pulse: 91 (09/07/24 1215)  Resp: 16 (09/07/24 1215)  BP: 111/72 (09/07/24 1215)  SpO2: (!) 93 % (09/07/24 1215) Vital Signs (24h Range):  Temp:  [97.6 °F (36.4 °C)-99.1 °F (37.3 °C)] 99.1 °F (37.3 °C)  Pulse:  [80-95] 91  Resp:  [16-21] 16  SpO2:  [92 %-97 %] 93 %  BP: (111-147)/(68-95) 111/72     Weight: 115.2 kg (254 lb) (09/06/24 1425)  Body mass index is 44.99 kg/m².  Body surface area is 2.26  meters squared.    No intake or output data in the 24 hours ending 09/07/24 1249      Physical Exam   Constitutional: She is oriented to person, place, and time. No distress.   HENT:   Head: Normocephalic and atraumatic.   Head: Improved bilateral temporal tenderness L > R - pulses 2+ bilaterally  Cardiovascular: Normal rate, regular rhythm and normal heart sounds.   Pulmonary/Chest: Effort normal and breath sounds normal. No respiratory distress.   Abdominal: Soft.   Musculoskeletal:         General: No swelling or tenderness. Normal range of motion.   Neurological: She is alert and oriented to person, place, and time.   Skin: Skin is warm and dry. No rash noted.   Psychiatric: Her behavior is normal. Judgment and thought content normal.        Significant Labs:  All pertinent lab results from the last 24 hours have been reviewed.    Significant Imaging:  Imaging results within the past 24 hours have been reviewed.

## 2024-09-07 NOTE — ASSESSMENT & PLAN NOTE
Patient with known GCA/PMR, on chronic Prednisone and Actemra  Has chronic vision changes associated with this, but presents with acute visual changes concerning for flare    ESR and CRP WNL  MRA showed no compelling evidence of vascular inflammation involving the superficial cranial arteries    Rheumatology recommended initiation of Solumedrol 1g IV x 3 days  Rheumatology consulted, appreciate recommendations  Ophthalmology consutled, appreciate recommendations

## 2024-09-07 NOTE — HOSPITAL COURSE
Presented per her rheumatologist recommendation for worsening vision.  She has hx of GCA with L eye blindness.  On Aug 20 she began having a flair with tapering her steroid dose that affected vision in her R eye.  She states that it has progressively gotten worse since that time to the point that she cannot see details with her R eye, just blurry objects.  Her rheumatologist recommended that she present for IV steroids and ophthalmology eval.  Optho recommended MRA which returned mostly unremarkable.  Received 1000mg solumedrol IV daily for 3 doses per rheum recommendations.  Tocilizumab infusion given 9.8.24.  Optho recommending MR orbits with and with contrast to ensure there are no other acute issues; MR returned unremarkable.  She is eager for discharge.

## 2024-09-07 NOTE — PLAN OF CARE
Problem: Adult Inpatient Plan of Care  Goal: Plan of Care Review  Outcome: Progressing  Goal: Patient-Specific Goal (Individualized)  Outcome: Progressing  Goal: Absence of Hospital-Acquired Illness or Injury  Outcome: Progressing  Goal: Optimal Comfort and Wellbeing  Outcome: Progressing  Goal: Readiness for Transition of Care  Outcome: Progressing     Problem: Bariatric Environmental Safety  Goal: Safety Maintained with Care  Outcome: Progressing     Problem: Skin Injury Risk Increased  Goal: Skin Health and Integrity  Outcome: Progressing  Intervention: Optimize Skin Protection  Flowsheets (Taken 9/7/2024 0348)  Pressure Reduction Devices: positioning supports utilized  Skin Protection: protective footwear used  Activity Management: Arm raise - L1  Head of Bed (HOB) Positioning: HOB at 30-45 degrees     Problem: Infection  Goal: Absence of Infection Signs and Symptoms  Outcome: Progressing  Intervention: Prevent or Manage Infection  Flowsheets (Taken 9/7/2024 0348)  Fever Reduction/Comfort Measures: fluid intake increased  Infection Management: aseptic technique maintained  Isolation Precautions: contact

## 2024-09-07 NOTE — ASSESSMENT & PLAN NOTE
Recent A1c 6.3%, likely due to steroid-induced chronic hyperglycemia  Not on insulin at home, therefore will not start SSI at this time; however, will monitor with BID accuchecks, and if glucose significantly rises, may consider initiation of SSI vs. low-dose long-acting insulin

## 2024-09-07 NOTE — ASSESSMENT & PLAN NOTE
Advance Care Planning   she states that she would never want CPR or intubation/mechanical ventilation in the event of cardiopulmonary arrest, in agreement with DNR status. Her son is her POA, but she states that they are currently not on good terms and she would like her other family members to decide her best course.

## 2024-09-07 NOTE — CONSULTS
[facility] - Med S[facility]  Rheumatology  Consult Note    Patient Name: Genesis Ugalde  MRN: [id number]  Admission Date: 9/6/2024  Hospital Length of Stay: 1 days  Code Status: DNR   Attending Provider: Jeffery Yanes DO  Primary Care Physician: Bety Perdue MD  Principal Problem:Giant cell arteritis with polymyalgia rheumatica    Inpatient consult to Rheumatology  Consult performed by: Alyx Gupta MD  Consult ordered by: Brenton Whipple MD        Subjective:     HPI: Patient is a 67 yo F w/ a hx of cirrhosis 2/2 BELLO, HTN, glaucoma and GCA managed with tocilizumab and steroids here for recurrent symptoms of GCA despite steroid and tocilizumab use.    Rheumatologic History  - Pt was initially diagnosed with GCA dating back to 2004, had a recurrence 2012 and has been on tocilizumab since. Has had multiple recurrences leading to increased prednisone doses.   - Previous treatments: CYC  - Current treatments: Weekly tocilizumab, was on 30mg prednisone prior to presentation  - Had last been seen by Dr. Waldrop 7/29/2024, at that point had ordered more imaging and increased steroids after having an increase in headaches and temple pain. Had imaging done at that time (MRA of head as well as bilateral temporal artery US) that had shown evidence of bilateral temporal arteritis. Plan was to continue escalated steroid with taper plan.    Current Hospitalization  - Pt sent here via Dr. Waldrop after having vision loss since 8/20 - had sent Dr. Waldrop a message 9/6 and was sent in for pulse dose steroids. Was being treated for recurrent GCA as above.  - MRA performed 9/6 w/o evidence of obvious temporal artery inflammation    Initial evaluation - pt tearful on exam. Confirmed that she has had symptoms since late July and vision loss since 8/25. She also admitted to having headaches, temporal pain and shoulder/hip pain. Stiffness is about the same as always. She denied missing any doses of her tocilizumab (besides yesterday's dose)  and does not have any symptoms to suggest infection. At the time of the vision loss, she was down to 30mg of prednisone.     Pt was initially diagnosed in 2004 and lost complete vision in her left eye. She has since had a course that was very resistant to treatment and had received cyclophosphamide back in 2012. She has been on tocilizumab since 2012.     Pt still is having symptoms - had a headache this morning. Still has vision loss. Describes being able to tell light/dark differentiation as well as some colors but cannot see details. She has vision issues at baseline in her right eye (and blindness in left) but usually this responds to steroids when she has flare ups.     Past Medical History:   Diagnosis Date    Anxiety     Asthma     Blind left eye     can see silhouettes in right eye    Cataract     Depression     Diabetes mellitus     Disease of immune system     Giant cell arteritis     Glaucoma     Hypertension     Polymyalgia rheumatica     Uveitis        Past Surgical History:   Procedure Laterality Date    ANKLE SURGERY      CHOLECYSTECTOMY      COLONOSCOPY N/A 9/28/2015    Procedure: COLONOSCOPY;  Surgeon: Alok Dykes MD;  Location: [facility] ([address] [place] [address]);  Service: Endoscopy;  Laterality: N/A;    COLONOSCOPY N/A 9/6/2019    Procedure: COLONOSCOPY;  Surgeon: Alok Dykes MD;  Location: [facility] ([address] [place] [address]);  Service: Endoscopy;  Laterality: N/A;    CYST REMOVAL      right lung    ESOPHAGOGASTRODUODENOSCOPY N/A 3/28/2023    Procedure: EGD (ESOPHAGOGASTRODUODENOSCOPY);  Surgeon: Bharat Franklin MD;  Location: [address]);  Service: Endoscopy;  Laterality: N/A;  cirrhosis-labs prior, varices surveillance  gastroparesis-full liquid diet x3 days, clear liquid diet x1 day prior to procedure  instructions handed to pt and sent to [facility]    HYSTERECTOMY         Immunization History   Administered Date(s) Administered    COVID-19, MRNA, LN-S, PF (MODERNA FULL 0.5 ML DOSE) 05/24/2021,  06/22/2021    Hepatitis A / Hepatitis B 12/19/2014, 11/23/2015, 12/23/2015, 05/23/2016, 07/11/2017, 08/11/2017, 01/11/2018    Influenza 11/21/2018    Influenza (FLUAD) - Quadrivalent - Adjuvanted - PF *Preferred* (65+) 09/25/2023    Influenza - High Dose - PF (65 years and older) 11/29/2016    Influenza - Quadrivalent - MDCK 10/27/2019    Influenza - Quadrivalent - MDCK - PF 12/02/2018    Influenza - Trivalent (ADULT) 11/10/2008    Influenza - Trivalent - PF (ADULT) 12/12/2012    Influenza Split 09/19/2014    Pneumococcal Conjugate - 13 Valent 12/19/2014    Pneumococcal Polysaccharide - 23 Valent 11/10/2008, 11/29/2016, 07/28/2020    RSVpreF (Arexvy) 03/29/2024    Tdap 12/19/2014    Zoster Recombinant 07/13/2021, 09/10/2021       Review of patient's allergies indicates:   Allergen Reactions    Oxycontin [oxycodone] Hallucinations     Immobile     Percocet [oxycodone-acetaminophen] Shortness Of Breath    Percodan [oxycodone hcl-oxycodone-asa] Shortness Of Breath    Seroquel [quetiapine] Shortness Of Breath     Akathisia     Current Facility-Administered Medications   Medication Frequency    acetaminophen tablet 650 mg Q8H PRN    acetaminophen tablet 650 mg Q4H PRN    acyclovir capsule 400 mg BID    aluminum-magnesium hydroxide-simethicone 200-200-20 mg/5 mL suspension 30 mL QID PRN    amlodipine-benazepril 5-20 mg per capsule 2 capsule Daily    aspirin EC tablet 325 mg Daily    brimonidine 0.2% ophthalmic solution 1 drop TID    clonazePAM tablet 1 mg Daily PRN    dextrose 10% bolus 125 mL 125 mL PRN    dextrose 10% bolus 250 mL 250 mL PRN    enoxaparin injection 40 mg Q24H (prophylaxis, 1700)    glucagon (human recombinant) injection 1 mg PRN    glucose chewable tablet 16 g PRN    glucose chewable tablet 24 g PRN    insulin aspart U-100 pen 0-5 Units QID (AC + HS) PRN    latanoprost 0.005 % ophthalmic solution 1 drop QHS    melatonin tablet 6 mg Nightly PRN    methylPREDNISolone sodium succinate (SOLU-MEDROL) 1,000  mg in D5W 100 mL IVPB Q24H    metoprolol succinate (TOPROL-XL) 24 hr tablet 100 mg Daily    naloxone 0.4 mg/mL injection 0.02 mg PRN    ondansetron disintegrating tablet 8 mg Q8H PRN    pantoprazole EC tablet 40 mg Daily    Patient Supplied; Dorzolamide 2% opthalmic solution 1 gtt OU TID TID    polyethylene glycol packet 17 g Daily PRN    pravastatin tablet 40 mg QHS    simethicone chewable tablet 80 mg QID PRN    sodium chloride 0.9% flush 1-10 mL Q12H PRN     Family History       Problem Relation (Age of Onset)    AVM Brother    Alzheimer's disease Mother, Father    Cancer Brother (58)    Colon polyps Brother    Coronary artery disease Mother, Father, Sister, Brother    Diabetes Mother, Father, Sister    Emphysema Father    Heart attack Sister, Brother    Hydrocephalus Brother, Paternal Cousin    Hypertension Mother, Brother    Intellectual disability Brother    Kidney disease Sister    No Known Problems Son    Stomach cancer Brother    Stroke Mother, Brother          Tobacco Use    Smoking status: Never     Passive exposure: Never    Smokeless tobacco: Never   Substance and Sexual Activity    Alcohol use: No     Alcohol/week: 0.0 standard drinks of alcohol    Drug use: Never    Sexual activity: Not Currently     Review of Systems   Constitutional:  Negative for fatigue, fever and unexpected weight change.   HENT:  Negative for facial swelling.    Eyes:  Positive for visual disturbance.   Respiratory:  Negative for cough and shortness of breath.    Cardiovascular:  Negative for chest pain.   Gastrointestinal:  Negative for constipation and diarrhea.   Genitourinary:  Negative for dysuria.   Musculoskeletal:  Positive for arthralgias.   Skin:  Negative for rash.   Neurological:  Positive for headaches. Negative for weakness.   Hematological:  Negative for adenopathy.   Psychiatric/Behavioral:  Negative for behavioral problems.      Objective:     Vital Signs (Most Recent):  Temp: 99.1 °F (37.3 °C) (09/07/24  1215)  Pulse: 91 (09/07/24 1215)  Resp: 16 (09/07/24 1215)  BP: 111/72 (09/07/24 1215)  SpO2: (!) 93 % (09/07/24 1215) Vital Signs (24h Range):  Temp:  [97.6 °F (36.4 °C)-99.1 °F (37.3 °C)] 99.1 °F (37.3 °C)  Pulse:  [80-95] 91  Resp:  [16-21] 16  SpO2:  [92 %-97 %] 93 %  BP: (111-147)/(68-95) 111/72     Weight: 115.2 kg (254 lb) (09/06/24 1425)  Body mass index is 44.99 kg/m².  Body surface area is 2.26 meters squared.    No intake or output data in the 24 hours ending 09/07/24 1249      Physical Exam   Constitutional: She is oriented to person, place, and time. No distress.   HENT:   Head: Normocephalic and atraumatic.   Head: Does have bilateral temporal tenderness - pulses 2+ bilaterally  Cardiovascular: Normal rate, regular rhythm and normal heart sounds.   Pulmonary/Chest: Effort normal and breath sounds normal. No respiratory distress.   Abdominal: Soft.   Musculoskeletal:         General: No swelling or tenderness. Normal range of motion.   Neurological: She is alert and oriented to person, place, and time.   Skin: Skin is warm and dry. No rash noted.   Psychiatric: Her behavior is normal. Judgment and thought content normal.        Significant Labs:  All pertinent lab results from the last 24 hours have been reviewed.    Significant Imaging:  Imaging results within the past 24 hours have been reviewed.    Assessment/Plan:     Immunology/Multi System  * Giant cell arteritis with polymyalgia rheumatica  Patient is a 69 yo F w/ a hx of cirrhosis 2/2 BELLO, HTN, glaucoma and GCA managed with tocilizumab and steroids here for recurrent symptoms of GCA despite steroid and tocilizumab use.    - Pt has had a diagnosis of GCA since 2004 and on tocilizumab since 2012 (appears to be more like 2015 via chart review). This hospitalization, recurrence began 7/29/2024. Had been treated with a methylpred pack, but despite this was having vision deficits 8/20 - had reached out to Dr. Waldrop and was sent into hospital 9/6.  -  ESR/CRP have been negative for the past few months, even with evidence of active arterities on MRA/US 7/2024  - MRA this hospitalization normal, previous MRA/cranial US from 7/29/2024 were both abnormal bilaterally. No need for biopsy as this likely confirms that GCA is the culprit.  - Pt received first dose of a pulse 9/6 for planned 1g x3 days.  - Pt has had an infusion of tocilizumab in 2015    Recommendations:  - Continue pulse dose steroids 1g/day x3 days (Day 2/3)  - Likely do not need to pursue biopsy at this time with +imaging 7/29  - Appreciate ophthalmology input  - Will get hepatitis B and quant gold with plans to pursue tocilizumab infusion while inpatient        Thank you for your consult. I will follow-up with patient. Please contact us if you have any additional questions.    Alyx Gupta MD  Rheumatology  [facility] - Med Surg

## 2024-09-07 NOTE — PLAN OF CARE
[facility] - Med Surg  Initial Discharge Assessment       Primary Care Provider: Bety Perdue MD    Admission Diagnosis: Vision changes [H53.9]  Chest pain [R07.9]    Admission Date: 9/6/2024  Expected Discharge Date: 9/8/2024    Transition of Care Barriers: None    Payor: HUMANA MANAGED MEDICARE / Plan: HU[employer]NA MEDICARE SELECT PARTNER / Product Type: Medicare Advantage /     Extended Emergency Contact Information  Primary Emergency Contact: Jaylyn Lewis  Address: [address] [place] [address]           [place] [address] [facility]  Home Phone: [phone]  Relation: Relative    Discharge Plan A: Home with family  Discharge Plan B: Home Health      [id number] - [place] - [address] [facility]  [address]  [place] [address]  Phone: [phone] Fax: [phone]    [facility] - [place], [place] - [address]  [address]  [place] [address]  Phone: [phone] Fax: [phone]      Initial Assessment (most recent)       Adult Discharge Assessment - 09/07/24 1504          Discharge Assessment    Assessment Type Discharge Planning Assessment     Confirmed/corrected address, phone number and insurance Yes     Confirmed Demographics Correct on Facesheet     Source of Information family     Does patient/caregiver understand observation status Yes     Communicated SULY with patient/caregiver Yes     Reason For Admission Vision changes     People in Home child(shanae), adult     Facility Arrived From: Home     Do you expect to return to your current living situation? Yes     Do you have help at home or someone to help you manage your care at home? Yes     Who are your caregiver(s) and their phone number(s)? Jaylyn Lewis     Prior to hospitilization cognitive status: Alert/Oriented;No Deficits     Current cognitive status: Unable to Assess     Walking or Climbing Stairs Difficulty no     Dressing/Bathing Difficulty no     Home Accessibility wheelchair accessible      Home Layout Able to live on 1st floor     Equipment Currently Used at Home none     Readmission within 30 days? No     Patient currently being followed by outpatient case management? No     Do you currently have service(s) that help you manage your care at home? No     Do you take prescription medications? Yes     Do you have prescription coverage? Yes     Do you have any problems affording any of your prescribed medications? No     Is the patient taking medications as prescribed? yes     Who is going to help you get home at discharge? Javid De La O     How do you get to doctors appointments? family or friend will provide     Are you on dialysis? No     Do you take coumadin? No     Discharge Plan A Home with family     Discharge Plan B Home Health     DME Needed Upon Discharge  none     Discharge Plan discussed with: Adult children     Transition of Care Barriers None        Physical Activity    On average, how many days per week do you engage in moderate to strenuous exercise (like a brisk walk)? 3 days     On average, how many minutes do you engage in exercise at this level? 30 min        Financial Resource Strain    How hard is it for you to pay for the very basics like food, housing, medical care, and heating? Not very hard        Housing Stability    In the last 12 months, was there a time when you were not able to pay the mortgage or rent on time? No     At any time in the past 12 months, were you homeless or living in a shelter (including now)? No        Transportation Needs    Has the lack of transportation kept you from medical appointments, meetings, work or from getting things needed for daily living? No        Food Insecurity    Within the past 12 months, you worried that your food would run out before you got the money to buy more. Never true     Within the past 12 months, the food you bought just didn't last and you didn't have money to get more. Never true        Stress    Do you feel stress - tense,  restless, nervous, or anxious, or unable to sleep at night because your mind is troubled all the time - these days? Only a little        Social Isolation    How often do you feel lonely or isolated from those around you?  Never        Alcohol Use    Q1: How often do you have a drink containing alcohol? Never     Q2: How many drinks containing alcohol do you have on a typical day when you are drinking? Patient does not drink     Q3: How often do you have six or more drinks on one occasion? Never        Utilities    In the past 12 months has the electric, gas, oil, or water company threatened to shut off services in your home? No        Health Literacy    How often do you need to have someone help you when you read instructions, pamphlets, or other written material from your doctor or pharmacy? Never        OTHER    Name(s) of People in Home Jaylyn Lewis [facility]                      Discharge Plan A and Plan B have been determined by review of patient's clinical status, future medical and therapeutic needs, and coverage/benefits for post-acute care in coordination with multidisciplinary team members.

## 2024-09-07 NOTE — HPI
Patient is a 67 yo F w/ a hx of cirrhosis 2/2 BELLO, HTN, glaucoma and GCA managed with tocilizumab and steroids here for recurrent symptoms of GCA despite steroid and tocilizumab use.    Rheumatologic History  - Pt was initially diagnosed with GCA dating back to 2004, had a recurrence 2012 and has been on tocilizumab since. Has had multiple recurrences leading to increased prednisone doses.   - Previous treatments: CYC  - Current treatments: Weekly tocilizumab, was on 30mg prednisone prior to presentation  - Had last been seen by Dr. Waldrop 7/29/2024, at that point had ordered more imaging and increased steroids after having an increase in headaches and temple pain. Had imaging done at that time (MRA of head as well as bilateral temporal artery US) that had shown evidence of bilateral temporal arteritis. Plan was to continue escalated steroid with taper plan.    Current Hospitalization  - Pt sent here via Dr. Waldrop after having vision loss since 8/20 - had sent Dr. Waldrop a message 9/6 and was sent in for pulse dose steroids. Was being treated for recurrent GCA as above.  - MRA performed 9/6 w/o evidence of obvious temporal artery inflammation  - Received 6mg/kg tocilizumab infusion 9/8, tolerated well    Initial evaluation - pt tearful on exam. Confirmed that she has had symptoms since late July and vision loss since 8/25. She also admitted to having headaches, temporal pain and shoulder/hip pain. Stiffness is about the same as always. She denied missing any doses of her tocilizumab (besides yesterday's dose) and does not have any symptoms to suggest infection. At the time of the vision loss, she was down to 30mg of prednisone.     Pt was initially diagnosed in 2004 and lost complete vision in her left eye. She has since had a course that was very resistant to treatment and had received cyclophosphamide back in 2012. She has been on tocilizumab since 2012. Pt still is having symptoms - had a headache this morning.  Still has vision loss. Describes being able to tell light/dark differentiation as well as some colors but cannot see details. She has vision issues at baseline in her right eye (and blindness in left) but usually this responds to steroids when she has flare ups.

## 2024-09-07 NOTE — ASSESSMENT & PLAN NOTE
Body mass index is 44.99 kg/m².; chronic comorbidity affecting medical decision-making  Patient would greatly benefit from weight loss through proper diet and increased physical activity  She has been on mounjaro and would recommend continuing this

## 2024-09-08 LAB
ALBUMIN SERPL BCP-MCNC: 3.6 G/DL (ref 3.5–5.2)
ANION GAP SERPL CALC-SCNC: 8 MMOL/L (ref 8–16)
BUN SERPL-MCNC: 29 MG/DL (ref 8–23)
CALCIUM SERPL-MCNC: 9.6 MG/DL (ref 8.7–10.5)
CHLORIDE SERPL-SCNC: 107 MMOL/L (ref 95–110)
CO2 SERPL-SCNC: 21 MMOL/L (ref 23–29)
CREAT SERPL-MCNC: 1 MG/DL (ref 0.5–1.4)
ERYTHROCYTE [DISTWIDTH] IN BLOOD BY AUTOMATED COUNT: 13.4 % (ref 11.5–14.5)
EST. GFR  (NO RACE VARIABLE): >60 ML/MIN/1.73 M^2
GLUCOSE SERPL-MCNC: 211 MG/DL (ref 70–110)
HCT VFR BLD AUTO: 38.4 % (ref 37–48.5)
HGB BLD-MCNC: 12.7 G/DL (ref 12–16)
MAGNESIUM SERPL-MCNC: 2.3 MG/DL (ref 1.6–2.6)
MCH RBC QN AUTO: 31.5 PG (ref 27–31)
MCHC RBC AUTO-ENTMCNC: 33.1 G/DL (ref 32–36)
MCV RBC AUTO: 95 FL (ref 82–98)
PHOSPHATE SERPL-MCNC: 3 MG/DL (ref 2.7–4.5)
PLATELET # BLD AUTO: 176 K/UL (ref 150–450)
PMV BLD AUTO: 11 FL (ref 9.2–12.9)
POCT GLUCOSE: 207 MG/DL (ref 70–110)
POCT GLUCOSE: 208 MG/DL (ref 70–110)
POCT GLUCOSE: 210 MG/DL (ref 70–110)
POCT GLUCOSE: 216 MG/DL (ref 70–110)
POTASSIUM SERPL-SCNC: 4.3 MMOL/L (ref 3.5–5.1)
RBC # BLD AUTO: 4.03 M/UL (ref 4–5.4)
SODIUM SERPL-SCNC: 136 MMOL/L (ref 136–145)
WBC # BLD AUTO: 24.44 K/UL (ref 3.9–12.7)

## 2024-09-08 PROCEDURE — 94761 N-INVAS EAR/PLS OXIMETRY MLT: CPT | Mod: HCNC

## 2024-09-08 PROCEDURE — 63600175 PHARM REV CODE 636 W HCPCS: Mod: HCNC | Performed by: STUDENT IN AN ORGANIZED HEALTH CARE EDUCATION/TRAINING PROGRAM

## 2024-09-08 PROCEDURE — 99900035 HC TECH TIME PER 15 MIN (STAT): Mod: HCNC

## 2024-09-08 PROCEDURE — 85027 COMPLETE CBC AUTOMATED: CPT | Mod: HCNC

## 2024-09-08 PROCEDURE — 25000003 PHARM REV CODE 250: Mod: HCNC | Performed by: STUDENT IN AN ORGANIZED HEALTH CARE EDUCATION/TRAINING PROGRAM

## 2024-09-08 PROCEDURE — 25000003 PHARM REV CODE 250: Mod: HCNC

## 2024-09-08 PROCEDURE — 63600175 PHARM REV CODE 636 W HCPCS: Mod: HCNC

## 2024-09-08 PROCEDURE — 11000001 HC ACUTE MED/SURG PRIVATE ROOM: Mod: HCNC

## 2024-09-08 PROCEDURE — 83735 ASSAY OF MAGNESIUM: CPT | Mod: HCNC

## 2024-09-08 PROCEDURE — 80069 RENAL FUNCTION PANEL: CPT | Mod: HCNC

## 2024-09-08 PROCEDURE — 99222 1ST HOSP IP/OBS MODERATE 55: CPT | Mod: HCNC,,, | Performed by: INTERNAL MEDICINE

## 2024-09-08 PROCEDURE — 36415 COLL VENOUS BLD VENIPUNCTURE: CPT | Mod: HCNC

## 2024-09-08 PROCEDURE — 5A09357 ASSISTANCE WITH RESPIRATORY VENTILATION, LESS THAN 24 CONSECUTIVE HOURS, CONTINUOUS POSITIVE AIRWAY PRESSURE: ICD-10-PCS | Performed by: STUDENT IN AN ORGANIZED HEALTH CARE EDUCATION/TRAINING PROGRAM

## 2024-09-08 RX ORDER — SODIUM CHLORIDE 0.9 % (FLUSH) 0.9 %
10 SYRINGE (ML) INJECTION
Status: CANCELLED | OUTPATIENT
Start: 2024-09-08

## 2024-09-08 RX ORDER — DIPHENHYDRAMINE HYDROCHLORIDE 50 MG/ML
25 INJECTION INTRAMUSCULAR; INTRAVENOUS ONCE
Status: COMPLETED | OUTPATIENT
Start: 2024-09-08 | End: 2024-09-08

## 2024-09-08 RX ORDER — HEPARIN 100 UNIT/ML
500 SYRINGE INTRAVENOUS
Status: DISCONTINUED | OUTPATIENT
Start: 2024-09-08 | End: 2024-09-10 | Stop reason: HOSPADM

## 2024-09-08 RX ORDER — DIPHENHYDRAMINE HYDROCHLORIDE 50 MG/ML
50 INJECTION INTRAMUSCULAR; INTRAVENOUS ONCE AS NEEDED
Status: CANCELLED | OUTPATIENT
Start: 2024-09-08

## 2024-09-08 RX ORDER — ACETAMINOPHEN 325 MG/1
650 TABLET ORAL ONCE
Status: CANCELLED | OUTPATIENT
Start: 2024-09-08

## 2024-09-08 RX ORDER — HEPARIN 100 UNIT/ML
500 SYRINGE INTRAVENOUS
OUTPATIENT
Start: 2024-10-06

## 2024-09-08 RX ORDER — NYSTATIN 100000 [USP'U]/G
POWDER TOPICAL 2 TIMES DAILY
Status: DISCONTINUED | OUTPATIENT
Start: 2024-09-08 | End: 2024-09-10 | Stop reason: HOSPADM

## 2024-09-08 RX ORDER — ENOXAPARIN SODIUM 100 MG/ML
40 INJECTION SUBCUTANEOUS EVERY 12 HOURS
Status: DISCONTINUED | OUTPATIENT
Start: 2024-09-08 | End: 2024-09-08

## 2024-09-08 RX ORDER — PREDNISONE 20 MG/1
60 TABLET ORAL DAILY
Status: DISCONTINUED | OUTPATIENT
Start: 2024-09-09 | End: 2024-09-10 | Stop reason: HOSPADM

## 2024-09-08 RX ORDER — EPINEPHRINE 1 MG/ML
0.3 INJECTION, SOLUTION INTRACARDIAC; INTRAMUSCULAR; INTRAVENOUS; SUBCUTANEOUS ONCE AS NEEDED
Status: DISCONTINUED | OUTPATIENT
Start: 2024-09-08 | End: 2024-09-08

## 2024-09-08 RX ORDER — EPINEPHRINE 0.3 MG/.3ML
0.3 INJECTION SUBCUTANEOUS ONCE AS NEEDED
Status: CANCELLED | OUTPATIENT
Start: 2024-09-08

## 2024-09-08 RX ORDER — EPINEPHRINE 0.3 MG/.3ML
0.3 INJECTION SUBCUTANEOUS ONCE AS NEEDED
OUTPATIENT
Start: 2024-10-06

## 2024-09-08 RX ORDER — DIPHENHYDRAMINE HYDROCHLORIDE 50 MG/ML
25 INJECTION INTRAMUSCULAR; INTRAVENOUS ONCE
Status: CANCELLED | OUTPATIENT
Start: 2024-09-08

## 2024-09-08 RX ORDER — HEPARIN 100 UNIT/ML
500 SYRINGE INTRAVENOUS
Status: CANCELLED | OUTPATIENT
Start: 2024-09-08

## 2024-09-08 RX ORDER — SODIUM CHLORIDE 0.9 % (FLUSH) 0.9 %
10 SYRINGE (ML) INJECTION
Status: DISCONTINUED | OUTPATIENT
Start: 2024-09-08 | End: 2024-09-10 | Stop reason: HOSPADM

## 2024-09-08 RX ORDER — DIPHENHYDRAMINE HYDROCHLORIDE 50 MG/ML
50 INJECTION INTRAMUSCULAR; INTRAVENOUS ONCE AS NEEDED
Status: DISCONTINUED | OUTPATIENT
Start: 2024-09-08 | End: 2024-09-10 | Stop reason: HOSPADM

## 2024-09-08 RX ORDER — EPINEPHRINE 1 MG/ML
0.3 INJECTION, SOLUTION INTRACARDIAC; INTRAMUSCULAR; INTRAVENOUS; SUBCUTANEOUS ONCE AS NEEDED
Status: DISCONTINUED | OUTPATIENT
Start: 2024-09-08 | End: 2024-09-10 | Stop reason: HOSPADM

## 2024-09-08 RX ORDER — DIPHENHYDRAMINE HYDROCHLORIDE 50 MG/ML
50 INJECTION INTRAMUSCULAR; INTRAVENOUS ONCE AS NEEDED
OUTPATIENT
Start: 2024-10-06

## 2024-09-08 RX ORDER — SODIUM CHLORIDE 0.9 % (FLUSH) 0.9 %
10 SYRINGE (ML) INJECTION
OUTPATIENT
Start: 2024-10-06

## 2024-09-08 RX ORDER — DEXTROMETHORPHAN HYDROBROMIDE, GUAIFENESIN 5; 100 MG/5ML; MG/5ML
650 LIQUID ORAL
Status: CANCELLED | OUTPATIENT
Start: 2024-09-08 | End: 2024-09-08

## 2024-09-08 RX ORDER — ENOXAPARIN SODIUM 100 MG/ML
40 INJECTION SUBCUTANEOUS EVERY 12 HOURS
Status: DISCONTINUED | OUTPATIENT
Start: 2024-09-08 | End: 2024-09-10 | Stop reason: HOSPADM

## 2024-09-08 RX ORDER — DIPHENHYDRAMINE HYDROCHLORIDE 50 MG/ML
25 INJECTION INTRAMUSCULAR; INTRAVENOUS ONCE
OUTPATIENT
Start: 2024-10-06

## 2024-09-08 RX ADMIN — PRAVASTATIN SODIUM 40 MG: 40 TABLET ORAL at 09:09

## 2024-09-08 RX ADMIN — INSULIN ASPART 2 UNITS: 100 INJECTION, SOLUTION INTRAVENOUS; SUBCUTANEOUS at 09:09

## 2024-09-08 RX ADMIN — DIPHENHYDRAMINE HYDROCHLORIDE 25 MG: 50 INJECTION, SOLUTION INTRAMUSCULAR; INTRAVENOUS at 02:09

## 2024-09-08 RX ADMIN — ACYCLOVIR 400 MG: 200 CAPSULE ORAL at 09:09

## 2024-09-08 RX ADMIN — METHYLPREDNISOLONE SODIUM SUCCINATE 1000 MG: 1 INJECTION INTRAMUSCULAR; INTRAVENOUS at 04:09

## 2024-09-08 RX ADMIN — BRIMONIDINE TARTRATE 1 DROP: 2 SOLUTION OPHTHALMIC at 02:09

## 2024-09-08 RX ADMIN — LATANOPROST 1 DROP: 50 SOLUTION OPHTHALMIC at 09:09

## 2024-09-08 RX ADMIN — TOCILIZUMAB 692 MG: 20 INJECTION, SOLUTION, CONCENTRATE INTRAVENOUS at 03:09

## 2024-09-08 RX ADMIN — ASPIRIN 325 MG: 325 TABLET, COATED ORAL at 09:09

## 2024-09-08 RX ADMIN — BRIMONIDINE TARTRATE 1 DROP: 2 SOLUTION OPHTHALMIC at 09:09

## 2024-09-08 RX ADMIN — INSULIN ASPART 2 UNITS: 100 INJECTION, SOLUTION INTRAVENOUS; SUBCUTANEOUS at 05:09

## 2024-09-08 RX ADMIN — METOPROLOL SUCCINATE 100 MG: 100 TABLET, EXTENDED RELEASE ORAL at 09:09

## 2024-09-08 RX ADMIN — ENOXAPARIN SODIUM 40 MG: 40 INJECTION SUBCUTANEOUS at 09:09

## 2024-09-08 RX ADMIN — AMLODIPINE BESYLATE AND BENAZEPRIL HYDROCHLORIDE 2 CAPSULE: 5; 20 CAPSULE ORAL at 09:09

## 2024-09-08 RX ADMIN — ENOXAPARIN SODIUM 40 MG: 40 INJECTION SUBCUTANEOUS at 10:09

## 2024-09-08 RX ADMIN — INSULIN ASPART 2 UNITS: 100 INJECTION, SOLUTION INTRAVENOUS; SUBCUTANEOUS at 01:09

## 2024-09-08 RX ADMIN — SODIUM CHLORIDE: 9 INJECTION, SOLUTION INTRAVENOUS at 03:09

## 2024-09-08 RX ADMIN — CLONAZEPAM 1 MG: 0.5 TABLET ORAL at 02:09

## 2024-09-08 RX ADMIN — INSULIN ASPART 1 UNITS: 100 INJECTION, SOLUTION INTRAVENOUS; SUBCUTANEOUS at 09:09

## 2024-09-08 RX ADMIN — NYSTATIN: 100000 POWDER TOPICAL at 01:09

## 2024-09-08 RX ADMIN — PANTOPRAZOLE SODIUM 40 MG: 40 TABLET, DELAYED RELEASE ORAL at 09:09

## 2024-09-08 RX ADMIN — NYSTATIN: 100000 POWDER TOPICAL at 09:09

## 2024-09-08 NOTE — PROGRESS NOTES
"[facility]   Consult Service   Inpatient progress note      Chief complaint/Reason for Consult: "Visual Acuity"      History of Present Illness: Genesis Ugalde is a 68 y.o. female who presents with decreased vision in right eye. Stated decreased vision started on 8/20 with worsening weakness of hips and shoulders. States she was previously able to see some details but now it appears "snowy". Additionally reported bilateral jaw pain and swelling. Denies flashes, floaters, curtain, diplopia.     Patient has history of Autoimmune Optic Neuropathy, Primary Open Angle Glaucoma vs Ocular HTN, GCA followed by Dr. Ro (Last seen 12/2023) and Rheumatogist Dr. Waldrop.     Patient contacted Dr. Waldrop on 9/6 with complaints of worsening vision in right eye. Suggested presentation to ED for admission for 3-5 key of Solu-Medrol. Rheumatology and Ophthalmology consulted    Interval history (9/8/2024): States improved vision, can see details better, although not at baseline yet. Also, still endorsing scalp and jaw tenderness, R>L. Overall, doing better and improving.    Past Ocular Hx: As Above     Current eye gtts: None      PMHx:  has a past medical history of Anxiety, Asthma, Blind left eye, Cataract, Depression, Diabetes mellitus, Disease of immune system, Giant cell arteritis, Glaucoma, Hypertension, Polymyalgia rheumatica, and Uveitis.     PSurgHx:  has a past surgical history that includes Cyst Removal; Hysterectomy; Cholecystectomy; Ankle surgery; Colonoscopy (N/A, 9/28/2015); Colonoscopy (N/A, 9/6/2019); and Esophagogastroduodenoscopy (N/A, 3/28/2023).     Home Medications:   Prior to Admission medications    Medication Sig Start Date End Date Taking? Authorizing Provider   acyclovir (ZOVIRAX) 400 MG tablet Take 400 mg by mouth 2 (two) times daily. 3/13/23   Provider, Historical   amLODIPine-benazepriL (LOTREL) 10-40 mg per capsule Take 1 capsule by mouth once daily. 5/10/24   Bety Perdue MD   aspirin " (ECOTRIN) 325 MG EC tablet Take 325 mg by mouth once daily.    Provider, Historical   brimonidine 0.2% (ALPHAGAN) 0.2 % Drop Place 1 drop into both eyes 3 (three) times daily. 1/16/24   Tanna Ro MD   clonazePAM (KLONOPIN) 1 MG tablet Take 1 tablet (1 mg total) by mouth daily as needed for Anxiety. 8/23/24   Bety Perdue MD   dorzolamide (TRUSOPT) 2 % ophthalmic solution INSTILL 1 DROP INTO BOTH EYES 3 TIMES A DAY 12/27/23   Janis Mitchell MD   ergocalciferol (VITAMIN D2) 50,000 unit Cap TAKE 1 CAPSULE (50,000 UNITS TOTAL) BY MOUTH EVERY 7 DAYS. 3/22/24   Forrest Waldrop MD   latanoprost 0.005 % ophthalmic solution Place 1 drop into both eyes every evening. 5/2/24   Tanna Ro MD   metoprolol succinate (TOPROL-XL) 100 MG 24 hr tablet Take 1 tablet (100 mg total) by mouth once daily. 5/10/24   Bety Perdue MD   omeprazole (PRILOSEC) 40 MG capsule Take 1 capsule (40 mg total) by mouth every 12 (twelve) hours. Take one pill every morning 45 minutes before breakfast 5/14/24   Bety Perdue MD   pravastatin (PRAVACHOL) 40 MG tablet Take 1 tablet (40 mg total) by mouth every evening. 3/22/24   Forrest Waldrop MD   predniSONE (DELTASONE) 20 MG tablet Take 3 tablets (60 mg total) by mouth once daily. After 1 wk, decrease to 2.5 tabs(50mg) x 1wk, then decrease to 2 tabs(40mg) daily x 1wk, then decrease to 3 tabs daily and continue until RTC in 4 wks. 8/3/24   Forrest Waldrop MD   tirzepatide (MOUNJARO) 2.5 mg/0.5 mL PnIj Inject 2.5 mg into the skin every 7 days.  Patient not taking: Reported on 8/30/2024 8/23/24   Bety Perdue MD   tirzepatide (MOUNJARO) 5 mg/0.5 mL PnIj Inject 5 mg into the skin every 7 days.  Patient not taking: Reported on 8/30/2024 8/23/24   Bety Perdue MD   tocilizumab (ACTEMRA) 162 mg/0.9 mL injection Inject 0.9 mLs (162 mg total) into the skin every 7 days. 6/3/24   Forrest Waldrop MD   traMADoL (ULTRAM) 50 mg tablet Take 1 tablet (50 mg total) by mouth every 12  (twelve) hours as needed for Pain. 5/14/24   Bety Perdue MD        Medications this encounter:    acyclovir  400 mg Oral BID    amlodipine-benazepril 5-20 mg  2 capsule Oral Daily    aspirin  325 mg Oral Daily    brimonidine 0.2%  1 drop Both Eyes TID    enoxparin  40 mg Subcutaneous Q12H (prophylaxis, 0900/2100)    latanoprost  1 drop Both Eyes QHS    metoprolol succinate  100 mg Oral Daily    nystatin   Topical (Top) BID    pantoprazole  40 mg Oral Daily    Patient Supplied; Dorzolamide 2% opthalmic solution 1 gtt OU TID  1 drop Both Eyes TID    pravastatin  40 mg Oral QHS    [START ON 9/9/2024] predniSONE  60 mg Oral Daily       Allergies: is allergic to oxycontin [oxycodone], percocet [oxycodone-acetaminophen], percodan [oxycodone hcl-oxycodone-asa], and seroquel [quetiapine].     Social Hx:  reports that she has never smoked. She has never been exposed to tobacco smoke. She has never used smokeless tobacco. She reports that she does not drink alcohol and does not use drugs.     Family Hx: No family history of glaucoma. family history includes AVM in her brother; Alzheimer's disease in her father and mother; Cancer (age of onset: 58) in her brother; Colon polyps in her brother; Coronary artery disease in her brother, father, mother, and sister; Diabetes in her father, mother, and sister; Emphysema in her father; Heart attack in her brother and sister; Hydrocephalus in her brother and paternal cousin; Hypertension in her brother and mother; Intellectual disability in her brother; Kidney disease in her sister; No Known Problems in her son; Stomach cancer in her brother; Stroke in her brother and mother.     ROS: Negative x 10 except for complaints as described in HPI; negative for fever, chills, weight loss, nausea, vomiting, diarrhea, shortness of breath, nasal discharge, cough, abdominal pain, dyspnea, difficulty moving arms and legs, confusion, dysuria, palpitations, or chest pain     Ocular  examination/Dilated fundus examination:  Base Eye Exam       Visual Acuity (Snellen - Linear)         Right Left    Dist sc HM at face LP              Tonometry (Tonopen, 5:02 PM)         Right Left    Pressure 8 9              Pupils         Dark Light Shape React APD    Right 4 2 Round Brisk None    Left 4 2 Round Brisk APD              Visual Fields         Right Left    Restrictions Total superior temporal, inferior temporal, superior nasal, inferior nasal deficiencies Total superior temporal, inferior temporal, superior nasal, inferior nasal deficiencies              Extraocular Movement         Right Left     Full Full   L XT             Neuro/Psych       Oriented x3: Yes    Mood/Affect: Normal                  Slit Lamp and Fundus Exam       External Exam         Right Left    External Exophthalmos Exophthalmos              Pen Light Exam         Right Left    Lids/Lashes Normal Normal    Conjunctiva/Sclera White and quiet White and quiet    Cornea Clear Clear    Anterior Chamber Deep and quiet Deep and quiet    Iris Round and reactive Round and reactive    Lens 2+ Nuclear sclerosis 2+ Nuclear sclerosis                    Assessment/Plan:   1. Autoimmune Optic Neuropathy OU  2. GCA with Polymyalgia Rheumatica   - OS 2004, OD 2012  - unknown antibody, unknown if biopsy proven (Had biopsies done at [facility] - no records)   - Follows closely with Rheumatology, On Prednisone 60mg daily   - Several admissions for treatments with IV steroid and IVIG   - Baseline Vision: HM at face // LP   - Today's Vision: HM at face // LP ,   - Pupils with +APD OS   - Dilated exam without evidence of active nerve edema; However, difficult to assess secondary to extensive chronic optic disc changes. Does not exclude the possibility of non-ocular GCA or posterior optic ischemia.   - 9/8/2024: [facility] still HM at face // LP, but patient states improved vision as able to see details better. Still endorsing scalp and jaw tenderness L>R, but  states it has also improved.    2. POAG - mild vs OHT ou  - History of elevated IOP, Steroid response - on chronic steroid treatment per Rheumatology   - No history of Lasers, Surgery   - Tmax 27 ou  - Cup to Disc 0.9 with extensive pallor   - Mild narrowing on gonio   - Hx of Asthma (Avoid Timolol)   - Home Meds: Dorzolamide, Brimonidine, Latanoprost   - IOP today 19//17; Okay to continue home meds     Recommendations   - MRI orbits with and without contrast  - Continue IV Steroids and transition to PO steroids per Rheumatology recommendations   - Continue Home Meds: Brimondine TID, Dorzolamide TID, Latanorpost qHS OU     Discussed with Dr. Shirley. Please call the ophthalmology resident on call for any questions or new concerns.    Ben Munoz MD   [facility], PGY-2

## 2024-09-08 NOTE — PROGRESS NOTES
"[facility] Medicine  Progress Note    Patient Name: Genesis Ugalde  MRN: [id number]  Patient Class: IP- Inpatient   Admission Date: 9/6/2024  Length of Stay: 2 days  Attending Physician: Jeffery Yanes DO  Primary Care Provider: Bety Perdue MD        Subjective:     Principal Problem:Giant cell arteritis with polymyalgia rheumatica        HPI:  Genesis Ugalde is a 68 y.o. female with Giant Cell Arteritis with Polymyalgia Rheumatica on chronic steroids, HTN, HLD, impaired glucose metabolism, BELLO cirrhosis, morbid obesity with LOYDA who presents today for vision changes.     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 Around August 20, she noticed a "flare up" of her GCA, described as worsening vision in the R eye along with increase in the intensity of her headaches.  For this reason, her prednisone was increased to 60 mg daily.  Despite the increase in steroid dosing, her vision in the right eye has unfortunately continued to decline.    She notified her Rheumatologist of her continued vision changes, and it was recommended that she be admitted for IV Solumedrol along with Ophthalmology and Rheumatology evaluation.     She does note some nausea which she attributes to poor appetite due to grieving for her stepson who was murdered last month.  Otherwise, she denies any significant new focal weakness, shortness for breath, chest pain, dizziness, or other significant symptoms.    Overview/Hospital Course:  Presented per her rheumatologist recommendation for worsening vision.  She has hx of GCA with L eye blindness.  On Aug 20 she began having a flair with tapering her steroid dose that affected vision in her R eye.  She states that it has progressively gotten worse since that time to the point that she cannot see details with her R eye, just blurry objects.  Her " rheumatologist recommended that she present for IV steroids and ophthalmology eval.  Optho recommended MRA which returned mostly unremarkable.  Rheum has recommended 1000mg solumedrol IV q24 hr.  Reportedly had improvement in vision this morning.  This is day 3/3 of pulse dose steroids.  Rheum considering tocilizumab inpatient.      Interval History:  Seen and evaluated on general medical floor  No acute events overnight    Clinical status improved  Vision improved some in last 24 hours  No new complaints, in good spirits this AM awaiting breakfast    Review of Systems   Constitutional:  Negative for appetite change, diaphoresis, fatigue and fever.   HENT:  Negative for drooling, rhinorrhea, sinus pressure, sinus pain and trouble swallowing.    Eyes:  Positive for visual disturbance (R eye vision improved; chronic L eye blindness). Negative for photophobia, pain and redness.   Respiratory:  Negative for cough, chest tightness and shortness of breath.    Cardiovascular:  Negative for chest pain and palpitations.   Gastrointestinal:  Negative for abdominal pain, constipation, diarrhea, nausea and vomiting.   Genitourinary:  Negative for decreased urine volume, difficulty urinating, dysuria, flank pain, hematuria and urgency.   Musculoskeletal:  Negative for back pain.   Skin:  Negative for color change and rash.   Neurological:  Positive for headaches (improved). Negative for syncope, facial asymmetry and numbness.   Psychiatric/Behavioral:  Negative for confusion.      Objective:     Vital Signs (Most Recent):  Temp: 97.9 °F (36.6 °C) (09/08/24 0811)  Pulse: 85 (09/08/24 0811)  Resp: 16 (09/08/24 0811)  BP: 131/81 (09/08/24 0811)  SpO2: 95 % (09/08/24 0811) Vital Signs (24h Range):  Temp:  [97.7 °F (36.5 °C)-99.1 °F (37.3 °C)] 97.9 °F (36.6 °C)  Pulse:  [72-91] 85  Resp:  [15-20] 16  SpO2:  [93 %-97 %] 95 %  BP: (105-131)/(66-81) 131/81     Weight: 115.2 kg (254 lb)  Body mass index is 44.99 kg/m².  No intake or  output data in the 24 hours ending 09/08/24 0908      Physical Exam  Constitutional:       Appearance: Normal appearance. She is obese. She is not ill-appearing.   HENT:      Head: Normocephalic and atraumatic.      Right Ear: External ear normal.      Left Ear: External ear normal.      Nose: Nose normal. No congestion or rhinorrhea.      Mouth/Throat:      Mouth: Mucous membranes are moist.      Pharynx: Oropharynx is clear.   Eyes:      General:         Right eye: No discharge.         Left eye: No discharge.      Extraocular Movements: Extraocular movements intact.      Conjunctiva/sclera: Conjunctivae normal.      Pupils: Pupils are equal, round, and reactive to light.      Comments: L eye blindness  Improved vision to R eye; still not making out details   Cardiovascular:      Rate and Rhythm: Normal rate and regular rhythm.      Pulses: Normal pulses.      Heart sounds: Normal heart sounds. No murmur heard.  Pulmonary:      Effort: Pulmonary effort is normal. No respiratory distress.      Breath sounds: Normal breath sounds. No wheezing.   Abdominal:      General: Abdomen is flat. Bowel sounds are normal.      Palpations: Abdomen is soft.      Tenderness: There is no abdominal tenderness.   Musculoskeletal:         General: No swelling. Normal range of motion.      Cervical back: Normal range of motion.   Skin:     General: Skin is warm and dry.      Coloration: Skin is not jaundiced.   Neurological:      General: No focal deficit present.      Mental Status: She is alert and oriented to person, place, and time.      Cranial Nerves: No cranial nerve deficit.   Psychiatric:         Mood and Affect: Mood normal.             Significant Labs: All pertinent labs within the past 24 hours have been reviewed.    Significant Imaging: I have reviewed all pertinent imaging results/findings within the past 24 hours.    Assessment/Plan:      * Giant cell arteritis with polymyalgia rheumatica  Patient with known GCA/PMR,  on chronic Prednisone and Actemra  Has chronic vision changes associated with this, but presents with acute visual changes concerning for flare    ESR and CRP WNL  MRA showed no compelling evidence of vascular inflammation involving the superficial cranial arteries    Rheumatology consulted, appreciate recommendations  Ophthalmology consutled, appreciate recommendations  Rheumatology recommended Solumedrol 1g IV x 3 days; this is day 3/3    DNR (do not resuscitate)  Advance Care Planning  she states that she would never want CPR or intubation/mechanical ventilation in the event of cardiopulmonary arrest, in agreement with DNR status. Her son is her POA, but she states that they are currently not on good terms and she would like her other family members to decide her best course.        Recurrent HSV (herpes simplex virus)  Continue ppx Acyclovir given chronic immunosuppression.       Chronic prescription benzodiazepine use  Chronic use of daily PRN benzos for anxiety, verified by PDMP. 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 No evidence of acute decompensation.   MELD-Na score calculated; MELD 3.0: 11 at 6/14/2024  9:34 AM  MELD-Na: 10 at 6/14/2024  9:34 AM  Calculated from:  Serum Creatinine: 0.8 mg/dL (Using min of 1 mg/dL) at 6/14/2024  9:34 AM  Serum Sodium: 145 mmol/L (Using max of 137 mmol/L) at 6/14/2024  9:34 AM  Total Bilirubin: 1.9 mg/dL at 6/14/2024  9:34 AM  Serum Albumin: 4.1 g/dL (Using max of 3.5 g/dL) at 6/14/2024  9:34 AM  INR(ratio): 1.1 at 6/14/2024  9:34 AM  Age at listing (hypothetical): 68  years  Sex: Female at 6/14/2024  9:34 AM      LOYDA (obstructive sleep apnea)  Nightly CPAP    Other hyperlipidemia  Continue hospital formulary of home statin.       Immunosuppressed status  Due to autoimmune disease, chronic steroid use, and Actemra. No evidence of opportunistic infection. 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 May consider chronic Bactrim ppx given steroid use.       Morbid obesity  Body mass index is 44.99 kg/m².; chronic comorbidity affecting medical decision-making  Patient would greatly benefit from weight loss through proper diet and increased physical activity  She has been on mounjaro and would recommend continuing this      The mobility limitation cannot be sufficiently resolved by the use of a cane. Patient's functional mobility deficit can be sufficiently resolved with the use of a (Rollator, Rolling Walker or Walker). Patient's mobility limitation significantly impairs their ability to participate in one of more activities of daily living. The use of a (Rollator, RW or Walker) will significantly improve the patient's ability to participate in MRADLS and the patient will use it on regular basis in the home.     VTE Risk Mitigation (From admission, onward)           Ordered     enoxaparin injection 40 mg  Every 24 hours         09/06/24 1807     IP VTE HIGH RISK PATIENT  Once         09/06/24 1807     Place sequential compression device  Until discontinued         09/06/24 1807                    Discharge Planning   SULY: 9/8/2024     Code Status: DNR   Is the patient medically ready for discharge?:     Reason for patient still in hospital (select all that apply): Patient trending condition and Treatment  Discharge Plan A: Home with family                  Jeffery Yanes DO  Department of Hospital Medicine   [facility]

## 2024-09-08 NOTE — PROGRESS NOTES
I have seen the patient, reviewed the blood work, imaging and other studies.I have personally interviewed and examined the patient at bedside.    Rheumatology Attending Note:        67 yo F  with cirrhosis 2/2 BELLO, HTN, long history of  GCA on weekly tocilizumab and prednisone 30mg a day admitted due to recurrent symptoms of GCA.   She has history of GCA dated since 2004 and has been on Tocilizumab since 2015.  Her most recent flare started around 7/29. She developed right sided visual loss and temporal headache bilaterally worse on the left.  She was started on prednisone 60mg a day and then began to have symptoms on prednisone 30mg a day.   MRA during this hospitalization normal but prior MRA and cranial US from 7/29 were both abnormal bilaterally.   -Finish pulse dose steroids today and plan to start prednisone 60mg a day on Monday  -follow up quant gold  -plan for Actemra IV 6mg/kg today

## 2024-09-08 NOTE — PROGRESS NOTES
Pharmacist Renal Dose Adjustment Note    Genesis Ugalde is a 68 y.o. female being treated with the medication Lovenox for DVT prophylaxis     Patient Data:    Vital Signs (Most Recent):  Temp: 97.9 °F (36.6 °C) (09/08/24 0811)  Pulse: 85 (09/08/24 0811)  Resp: 16 (09/08/24 0811)  BP: 131/81 (09/08/24 0811)  SpO2: 95 % (09/08/24 0811) Vital Signs (72h Range):  Temp:  [97.6 °F (36.4 °C)-99.1 °F (37.3 °C)]   Pulse:  [72-95]   Resp:  [15-21]   BP: (105-147)/(66-95)   SpO2:  [92 %-97 %]      Recent Labs   Lab 09/06/24  1648 09/07/24  0855 09/08/24  0552   CREATININE 0.8 1.0 1.0     Serum creatinine: 1 mg/dL 09/08/24 0552  Estimated creatinine clearance: 67.7 mL/min    Lovenox 40mg q24h will be changed to Lovenox 40mg q12h for a BMI >40     Pharmacist's Name: Maggi Garcia, PharmD  Pharmacist's Extension: [phone]

## 2024-09-08 NOTE — PLAN OF CARE
Problem: Adult Inpatient Plan of Care  Goal: Plan of Care Review  Flowsheets (Taken 9/8/2024 0315)  Plan of Care Reviewed With: patient     Problem: Skin Injury Risk Increased  Goal: Skin Health and Integrity  Intervention: Optimize Skin Protection  Flowsheets (Taken 9/8/2024 0315)  Skin Protection: protective footwear used  Activity Management:   Ambulated -L4   Rolling - L1  Head of Bed (HOB) Positioning: HOB at 30-45 degrees     Problem: Infection  Goal: Absence of Infection Signs and Symptoms  Intervention: Prevent or Manage Infection  Flowsheets (Taken 9/8/2024 0315)  Fever Reduction/Comfort Measures: fluid intake increased  Isolation Precautions: precautions maintained

## 2024-09-08 NOTE — PLAN OF CARE
Problem: Adult Inpatient Plan of Care  Goal: Plan of Care Review  9/8/2024 0320 by Shannan Bowden LPN  Outcome: Progressing  9/8/2024 0315 by Shannan Bowden LPN  Flowsheets (Taken 9/8/2024 0315)  Plan of Care Reviewed With: patient  Goal: Patient-Specific Goal (Individualized)  Outcome: Progressing  Goal: Absence of Hospital-Acquired Illness or Injury  Outcome: Progressing  Goal: Optimal Comfort and Wellbeing  Outcome: Progressing  Goal: Readiness for Transition of Care  Outcome: Progressing     Problem: Bariatric Environmental Safety  Goal: Safety Maintained with Care  Outcome: Progressing     Problem: Skin Injury Risk Increased  Goal: Skin Health and Integrity  Outcome: Progressing  Intervention: Optimize Skin Protection  Flowsheets (Taken 9/8/2024 0315)  Skin Protection: protective footwear used  Activity Management:   Ambulated -L4   Rolling - L1  Head of Bed (HOB) Positioning: HOB at 30-45 degrees     Problem: Infection  Goal: Absence of Infection Signs and Symptoms  Outcome: Progressing  Intervention: Prevent or Manage Infection  Flowsheets (Taken 9/8/2024 0315)  Fever Reduction/Comfort Measures: fluid intake increased  Isolation Precautions: precautions maintained

## 2024-09-08 NOTE — PLAN OF CARE
Went over plan of care - all questions answered. No falls or injuries . Meds per md order .will  continue to monitor

## 2024-09-08 NOTE — ASSESSMENT & PLAN NOTE
Patient with known GCA/PMR, on chronic Prednisone and Actemra  Has chronic vision changes associated with this, but presents with acute visual changes concerning for flare    ESR and CRP WNL  MRA showed no compelling evidence of vascular inflammation involving the superficial cranial arteries    Rheumatology consulted, appreciate recommendations  Ophthalmology consutled, appreciate recommendations  Rheumatology recommended Solumedrol 1g IV x 3 days; this is day 3/3

## 2024-09-08 NOTE — ASSESSMENT & PLAN NOTE
Recent A1c 6.3%, likely due to steroid-induced chronic hyperglycemia  Not on insulin at home  SSI on given hyperglycemia with pulse dose steroids

## 2024-09-08 NOTE — SUBJECTIVE & OBJECTIVE
Interval History:  Seen and evaluated on general medical floor  No acute events overnight    Clinical status improved  Vision improved some in last 24 hours  No new complaints, in good spirits this AM awaiting breakfast    Review of Systems   Constitutional:  Negative for appetite change, diaphoresis, fatigue and fever.   HENT:  Negative for drooling, rhinorrhea, sinus pressure, sinus pain and trouble swallowing.    Eyes:  Positive for visual disturbance (R eye vision improved; chronic L eye blindness). Negative for photophobia, pain and redness.   Respiratory:  Negative for cough, chest tightness and shortness of breath.    Cardiovascular:  Negative for chest pain and palpitations.   Gastrointestinal:  Negative for abdominal pain, constipation, diarrhea, nausea and vomiting.   Genitourinary:  Negative for decreased urine volume, difficulty urinating, dysuria, flank pain, hematuria and urgency.   Musculoskeletal:  Negative for back pain.   Skin:  Negative for color change and rash.   Neurological:  Positive for headaches (improved). Negative for syncope, facial asymmetry and numbness.   Psychiatric/Behavioral:  Negative for confusion.      Objective:     Vital Signs (Most Recent):  Temp: 97.9 °F (36.6 °C) (09/08/24 0811)  Pulse: 85 (09/08/24 0811)  Resp: 16 (09/08/24 0811)  BP: 131/81 (09/08/24 0811)  SpO2: 95 % (09/08/24 0811) Vital Signs (24h Range):  Temp:  [97.7 °F (36.5 °C)-99.1 °F (37.3 °C)] 97.9 °F (36.6 °C)  Pulse:  [72-91] 85  Resp:  [15-20] 16  SpO2:  [93 %-97 %] 95 %  BP: (105-131)/(66-81) 131/81     Weight: 115.2 kg (254 lb)  Body mass index is 44.99 kg/m².  No intake or output data in the 24 hours ending 09/08/24 0908      Physical Exam  Constitutional:       Appearance: Normal appearance. She is obese. She is not ill-appearing.   HENT:      Head: Normocephalic and atraumatic.      Right Ear: External ear normal.      Left Ear: External ear normal.      Nose: Nose normal. No congestion or rhinorrhea.       Mouth/Throat:      Mouth: Mucous membranes are moist.      Pharynx: Oropharynx is clear.   Eyes:      General:         Right eye: No discharge.         Left eye: No discharge.      Extraocular Movements: Extraocular movements intact.      Conjunctiva/sclera: Conjunctivae normal.      Pupils: Pupils are equal, round, and reactive to light.      Comments: L eye blindness  Improved vision to R eye; still not making out details   Cardiovascular:      Rate and Rhythm: Normal rate and regular rhythm.      Pulses: Normal pulses.      Heart sounds: Normal heart sounds. No murmur heard.  Pulmonary:      Effort: Pulmonary effort is normal. No respiratory distress.      Breath sounds: Normal breath sounds. No wheezing.   Abdominal:      General: Abdomen is flat. Bowel sounds are normal.      Palpations: Abdomen is soft.      Tenderness: There is no abdominal tenderness.   Musculoskeletal:         General: No swelling. Normal range of motion.      Cervical back: Normal range of motion.   Skin:     General: Skin is warm and dry.      Coloration: Skin is not jaundiced.   Neurological:      General: No focal deficit present.      Mental Status: She is alert and oriented to person, place, and time.      Cranial Nerves: No cranial nerve deficit.   Psychiatric:         Mood and Affect: Mood normal.             Significant Labs: All pertinent labs within the past 24 hours have been reviewed.    Significant Imaging: I have reviewed all pertinent imaging results/findings within the past 24 hours.

## 2024-09-08 NOTE — PROGRESS NOTES
[facility] - Med Surg  Rheumatology  Progress Note    Patient Name: Genesis Ugalde  MRN: [id number]  Admission Date: 9/6/2024  Hospital Length of Stay: 2 days  Code Status: DNR   Attending Provider: Jeffery Yanes DO  Primary Care Physician: Bety Perdue MD  Principal Problem: Giant cell arteritis with polymyalgia rheumatica    Subjective:     HPI: Patient is a 67 yo F w/ a hx of cirrhosis 2/2 BELLO, HTN, glaucoma and GCA managed with tocilizumab and steroids here for recurrent symptoms of GCA despite steroid and tocilizumab use.    Rheumatologic History  - Pt was initially diagnosed with GCA dating back to 2004, had a recurrence 2012 and has been on tocilizumab since. Has had multiple recurrences leading to increased prednisone doses.   - Previous treatments: CYC  - Current treatments: Weekly tocilizumab, was on 30mg prednisone prior to presentation  - Had last been seen by Dr. Waldrop 7/29/2024, at that point had ordered more imaging and increased steroids after having an increase in headaches and temple pain. Had imaging done at that time (MRA of head as well as bilateral temporal artery US) that had shown evidence of bilateral temporal arteritis. Plan was to continue escalated steroid with taper plan.    Current Hospitalization  - Pt sent here via Dr. Waldrop after having vision loss since 8/20 - had sent Dr. Waldrop a message 9/6 and was sent in for pulse dose steroids. Was being treated for recurrent GCA as above.  - MRA performed 9/6 w/o evidence of obvious temporal artery inflammation    Initial evaluation - pt tearful on exam. Confirmed that she has had symptoms since late July and vision loss since 8/25. She also admitted to having headaches, temporal pain and shoulder/hip pain. Stiffness is about the same as always. She denied missing any doses of her tocilizumab (besides yesterday's dose) and does not have any symptoms to suggest infection. At the time of the vision loss, she was down to 30mg of prednisone.      Pt was initially diagnosed in 2004 and lost complete vision in her left eye. She has since had a course that was very resistant to treatment and had received cyclophosphamide back in 2012. She has been on tocilizumab since 2012.     Pt still is having symptoms - had a headache this morning. Still has vision loss. Describes being able to tell light/dark differentiation as well as some colors but cannot see details. She has vision issues at baseline in her right eye (and blindness in left) but usually this responds to steroids when she has flare ups.     Interval hx:  Pt stated that since this AM she actually has some of her vision back. She also stated that she had a mild headache this AM, but that it may be because she hasn't been using her CPAP machine at home. Her temporal tenderness is still present and worse on the left side, but improved. She stated her joint pain is also better.     Pt is open to tocilizumab infusion and remembered having had it done before. Did admit to an acetaminophen allergy and acetaminophen was removed from the therapy plan.    Pt was consented for tocilizumab infusion (two physician consent due to vision impairment but pt was able to sign for herself).    Past Medical History:   Diagnosis Date    Anxiety     Asthma     Blind left eye     can see silhouettes in right eye    Cataract     Depression     Diabetes mellitus     Disease of immune system     Giant cell arteritis     Glaucoma     Hypertension     Polymyalgia rheumatica     Uveitis        Past Surgical History:   Procedure Laterality Date    ANKLE SURGERY      CHOLECYSTECTOMY      COLONOSCOPY N/A 9/28/2015    Procedure: COLONOSCOPY;  Surgeon: Alok Dykes MD;  Location: [address]);  Service: Endoscopy;  Laterality: N/A;    COLONOSCOPY N/A 9/6/2019    Procedure: COLONOSCOPY;  Surgeon: Alok Dykes MD;  Location: [address]);  Service: Endoscopy;  Laterality: N/A;    CYST REMOVAL      right lung     ESOPHAGOGASTRODUODENOSCOPY N/A 3/28/2023    Procedure: EGD (ESOPHAGOGASTRODUODENOSCOPY);  Surgeon: Bharat Franklin MD;  Location: [address]);  Service: Endoscopy;  Laterality: N/A;  cirrhosis-labs prior, varices surveillance  gastroparesis-full liquid diet x3 days, clear liquid diet x1 day prior to procedure  instructions handed to pt and sent to [email or web address]    HYSTERECTOMY         Immunization History   Administered Date(s) Administered    COVID-19, MRNA, LN-S, PF (MODERNA FULL 0.5 ML DOSE) 05/24/2021, 06/22/2021    Hepatitis A / Hepatitis B 12/19/2014, 11/23/2015, 12/23/2015, 05/23/2016, 07/11/2017, 08/11/2017, 01/11/2018    Influenza 11/21/2018    Influenza (FLUAD) - Quadrivalent - Adjuvanted - PF *Preferred* (65+) 09/25/2023    Influenza - High Dose - PF (65 years and older) 11/29/2016    Influenza - Quadrivalent - MDCK 10/27/2019    Influenza - Quadrivalent - MDCK - PF 12/02/2018    Influenza - Trivalent (ADULT) 11/10/2008    Influenza - Trivalent - PF (ADULT) 12/12/2012    Influenza Split 09/19/2014    Pneumococcal Conjugate - 13 Valent 12/19/2014    Pneumococcal Polysaccharide - 23 Valent 11/10/2008, 11/29/2016, 07/28/2020    RSVpreF (Arexvy) 03/29/2024    Tdap 12/19/2014    Zoster Recombinant 07/13/2021, 09/10/2021       Review of patient's allergies indicates:   Allergen Reactions    Oxycontin [oxycodone] Hallucinations     Immobile     Percocet [oxycodone-acetaminophen] Shortness Of Breath    Percodan [oxycodone hcl-oxycodone-asa] Shortness Of Breath    Seroquel [quetiapine] Shortness Of Breath     Akathisia     Current Facility-Administered Medications   Medication Frequency    acetaminophen tablet 650 mg Q8H PRN    acetaminophen tablet 650 mg Q4H PRN    acyclovir capsule 400 mg BID    aluminum-magnesium hydroxide-simethicone 200-200-20 mg/5 mL suspension 30 mL QID PRN    amlodipine-benazepril 5-20 mg per capsule 2 capsule Daily    aspirin EC tablet 325 mg Daily    brimonidine 0.2%  ophthalmic solution 1 drop TID    clonazePAM tablet 1 mg Daily PRN    dextrose 10% bolus 125 mL 125 mL PRN    dextrose 10% bolus 250 mL 250 mL PRN    enoxaparin injection 40 mg Q24H (prophylaxis, 1700)    glucagon (human recombinant) injection 1 mg PRN    glucose chewable tablet 16 g PRN    glucose chewable tablet 24 g PRN    insulin aspart U-100 pen 0-5 Units QID (AC + HS) PRN    latanoprost 0.005 % ophthalmic solution 1 drop QHS    melatonin tablet 6 mg Nightly PRN    methylPREDNISolone sodium succinate (SOLU-MEDROL) 1,000 mg in D5W 100 mL IVPB Q24H    metoprolol succinate (TOPROL-XL) 24 hr tablet 100 mg Daily    naloxone 0.4 mg/mL injection 0.02 mg PRN    ondansetron disintegrating tablet 8 mg Q8H PRN    pantoprazole EC tablet 40 mg Daily    Patient Supplied; Dorzolamide 2% opthalmic solution 1 gtt OU TID TID    polyethylene glycol packet 17 g Daily PRN    pravastatin tablet 40 mg QHS    simethicone chewable tablet 80 mg QID PRN    sodium chloride 0.9% flush 1-10 mL Q12H PRN     Family History       Problem Relation (Age of Onset)    AVM Brother    Alzheimer's disease Mother, Father    Cancer Brother (58)    Colon polyps Brother    Coronary artery disease Mother, Father, Sister, Brother    Diabetes Mother, Father, Sister    Emphysema Father    Heart attack Sister, Brother    Hydrocephalus Brother, Paternal Cousin    Hypertension Mother, Brother    Intellectual disability Brother    Kidney disease Sister    No Known Problems Son    Stomach cancer Brother    Stroke Mother, Brother          Tobacco Use    Smoking status: Never     Passive exposure: Never    Smokeless tobacco: Never   Substance and Sexual Activity    Alcohol use: No     Alcohol/week: 0.0 standard drinks of alcohol    Drug use: Never    Sexual activity: Not Currently     Review of Systems   Constitutional:  Negative for fatigue, fever and unexpected weight change.   HENT:  Negative for facial swelling.    Eyes:  Positive for visual disturbance.    Respiratory:  Negative for cough and shortness of breath.    Cardiovascular:  Negative for chest pain.   Gastrointestinal:  Negative for constipation and diarrhea.   Genitourinary:  Negative for dysuria.   Musculoskeletal:  Positive for arthralgias.   Skin:  Negative for rash.   Neurological:  Positive for headaches. Negative for weakness.   Hematological:  Negative for adenopathy.   Psychiatric/Behavioral:  Negative for behavioral problems.      Objective:     Vital Signs (Most Recent):  Temp: 99.1 °F (37.3 °C) (09/07/24 1215)  Pulse: 91 (09/07/24 1215)  Resp: 16 (09/07/24 1215)  BP: 111/72 (09/07/24 1215)  SpO2: (!) 93 % (09/07/24 1215) Vital Signs (24h Range):  Temp:  [97.6 °F (36.4 °C)-99.1 °F (37.3 °C)] 99.1 °F (37.3 °C)  Pulse:  [80-95] 91  Resp:  [16-21] 16  SpO2:  [92 %-97 %] 93 %  BP: (111-147)/(68-95) 111/72     Weight: 115.2 kg (254 lb) (09/06/24 1425)  Body mass index is 44.99 kg/m².  Body surface area is 2.26 meters squared.    No intake or output data in the 24 hours ending 09/07/24 1249      Physical Exam   Constitutional: She is oriented to person, place, and time. No distress.   HENT:   Head: Normocephalic and atraumatic.   Head: Improved bilateral temporal tenderness L > R - pulses 2+ bilaterally  Cardiovascular: Normal rate, regular rhythm and normal heart sounds.   Pulmonary/Chest: Effort normal and breath sounds normal. No respiratory distress.   Abdominal: Soft.   Musculoskeletal:         General: No swelling or tenderness. Normal range of motion.   Neurological: She is alert and oriented to person, place, and time.   Skin: Skin is warm and dry. No rash noted.   Psychiatric: Her behavior is normal. Judgment and thought content normal.        Significant Labs:  All pertinent lab results from the last 24 hours have been reviewed.    Significant Imaging:  Imaging results within the past 24 hours have been reviewed.  Assessment/Plan:     Immunology/Multi System  * Giant cell arteritis with  polymyalgia rheumatica  Patient is a 67 yo F w/ a hx of cirrhosis 2/2 BELLO, HTN, glaucoma and GCA managed with tocilizumab and steroids here for recurrent symptoms of GCA despite steroid and tocilizumab use.    - Pt has had a diagnosis of GCA since 2004 and on tocilizumab since 2012 (appears to be more like 2015 via chart review). This hospitalization, recurrence began 7/29/2024. Had been treated with a methylpred pack, but despite this was having vision deficits 8/20 - had reached out to Dr. Waldrop and was sent into hospital 9/6.  - ESR/CRP have been negative for the past few months, even with evidence of active arterities on MRA/US 7/2024  - MRA this hospitalization normal, previous MRA/cranial US from 7/29/2024 were both abnormal bilaterally and consistent with GCA. No need for biopsy as this likely confirms that GCA is the culprit.  - Pt received first dose of a pulse 9/6 for planned 1g x3 days.  - Pt has had an infusion of tocilizumab in 2015    Recommendations:  - Pt to complete pulse dose steroids 1g/day x3 days  today (Day 3/3) --> 60mg 9/9 until seen outpatient  - Pt consented and tocilizumab infusion ordered - will ideally get this today and will continue OP  - Will have follow up with Dr. Waldrop and I after discharge  - Will follow up quant gold (will be drawn 9/9 - not drawn on weekends)          Alyx Gupta MD  Rheumatology  [facility] - Med Surg

## 2024-09-09 LAB
ALBUMIN SERPL BCP-MCNC: 3.7 G/DL (ref 3.5–5.2)
ANION GAP SERPL CALC-SCNC: 11 MMOL/L (ref 8–16)
BUN SERPL-MCNC: 29 MG/DL (ref 8–23)
CALCIUM SERPL-MCNC: 9.3 MG/DL (ref 8.7–10.5)
CHLORIDE SERPL-SCNC: 108 MMOL/L (ref 95–110)
CO2 SERPL-SCNC: 22 MMOL/L (ref 23–29)
CREAT SERPL-MCNC: 1.1 MG/DL (ref 0.5–1.4)
ERYTHROCYTE [DISTWIDTH] IN BLOOD BY AUTOMATED COUNT: 13.4 % (ref 11.5–14.5)
EST. GFR  (NO RACE VARIABLE): 55.4 ML/MIN/1.73 M^2
GLUCOSE SERPL-MCNC: 248 MG/DL (ref 70–110)
HCT VFR BLD AUTO: 40.8 % (ref 37–48.5)
HGB BLD-MCNC: 13.7 G/DL (ref 12–16)
MAGNESIUM SERPL-MCNC: 2.4 MG/DL (ref 1.6–2.6)
MCH RBC QN AUTO: 32 PG (ref 27–31)
MCHC RBC AUTO-ENTMCNC: 33.6 G/DL (ref 32–36)
MCV RBC AUTO: 95 FL (ref 82–98)
PHOSPHATE SERPL-MCNC: 3.1 MG/DL (ref 2.7–4.5)
PLATELET # BLD AUTO: 185 K/UL (ref 150–450)
PMV BLD AUTO: 11.1 FL (ref 9.2–12.9)
POCT GLUCOSE: 215 MG/DL (ref 70–110)
POCT GLUCOSE: 224 MG/DL (ref 70–110)
POCT GLUCOSE: 229 MG/DL (ref 70–110)
POCT GLUCOSE: 307 MG/DL (ref 70–110)
POTASSIUM SERPL-SCNC: 4.3 MMOL/L (ref 3.5–5.1)
RBC # BLD AUTO: 4.28 M/UL (ref 4–5.4)
SODIUM SERPL-SCNC: 141 MMOL/L (ref 136–145)
WBC # BLD AUTO: 21.98 K/UL (ref 3.9–12.7)

## 2024-09-09 PROCEDURE — 25000003 PHARM REV CODE 250: Mod: HCNC | Performed by: STUDENT IN AN ORGANIZED HEALTH CARE EDUCATION/TRAINING PROGRAM

## 2024-09-09 PROCEDURE — 11000001 HC ACUTE MED/SURG PRIVATE ROOM: Mod: HCNC

## 2024-09-09 PROCEDURE — 85027 COMPLETE CBC AUTOMATED: CPT | Mod: HCNC

## 2024-09-09 PROCEDURE — 83735 ASSAY OF MAGNESIUM: CPT | Mod: HCNC

## 2024-09-09 PROCEDURE — 25500020 PHARM REV CODE 255: Mod: HCNC

## 2024-09-09 PROCEDURE — 63600175 PHARM REV CODE 636 W HCPCS: Mod: HCNC

## 2024-09-09 PROCEDURE — 80069 RENAL FUNCTION PANEL: CPT | Mod: HCNC

## 2024-09-09 PROCEDURE — 94660 CPAP INITIATION&MGMT: CPT | Mod: HCNC

## 2024-09-09 PROCEDURE — 94761 N-INVAS EAR/PLS OXIMETRY MLT: CPT | Mod: HCNC

## 2024-09-09 PROCEDURE — A9585 GADOBUTROL INJECTION: HCPCS | Mod: HCNC

## 2024-09-09 PROCEDURE — 63600175 PHARM REV CODE 636 W HCPCS: Mod: HCNC | Performed by: STUDENT IN AN ORGANIZED HEALTH CARE EDUCATION/TRAINING PROGRAM

## 2024-09-09 PROCEDURE — 99900035 HC TECH TIME PER 15 MIN (STAT): Mod: HCNC

## 2024-09-09 PROCEDURE — 36415 COLL VENOUS BLD VENIPUNCTURE: CPT | Mod: HCNC

## 2024-09-09 RX ORDER — GADOBUTROL 604.72 MG/ML
10 INJECTION INTRAVENOUS
Status: COMPLETED | OUTPATIENT
Start: 2024-09-09 | End: 2024-09-09

## 2024-09-09 RX ORDER — PREDNISONE 20 MG/1
60 TABLET ORAL DAILY
Qty: 90 TABLET | Refills: 0 | Status: SHIPPED | OUTPATIENT
Start: 2024-09-09

## 2024-09-09 RX ADMIN — ENOXAPARIN SODIUM 40 MG: 40 INJECTION SUBCUTANEOUS at 08:09

## 2024-09-09 RX ADMIN — INSULIN ASPART 1 UNITS: 100 INJECTION, SOLUTION INTRAVENOUS; SUBCUTANEOUS at 09:09

## 2024-09-09 RX ADMIN — PREDNISONE 60 MG: 20 TABLET ORAL at 09:09

## 2024-09-09 RX ADMIN — BRIMONIDINE TARTRATE 1 DROP: 2 SOLUTION OPHTHALMIC at 02:09

## 2024-09-09 RX ADMIN — ACYCLOVIR 400 MG: 200 CAPSULE ORAL at 08:09

## 2024-09-09 RX ADMIN — INSULIN ASPART 4 UNITS: 100 INJECTION, SOLUTION INTRAVENOUS; SUBCUTANEOUS at 11:09

## 2024-09-09 RX ADMIN — PANTOPRAZOLE SODIUM 40 MG: 40 TABLET, DELAYED RELEASE ORAL at 09:09

## 2024-09-09 RX ADMIN — ASPIRIN 325 MG: 325 TABLET, COATED ORAL at 09:09

## 2024-09-09 RX ADMIN — METOPROLOL SUCCINATE 100 MG: 100 TABLET, EXTENDED RELEASE ORAL at 09:09

## 2024-09-09 RX ADMIN — ENOXAPARIN SODIUM 40 MG: 40 INJECTION SUBCUTANEOUS at 09:09

## 2024-09-09 RX ADMIN — BRIMONIDINE TARTRATE 1 DROP: 2 SOLUTION OPHTHALMIC at 09:09

## 2024-09-09 RX ADMIN — NYSTATIN: 100000 POWDER TOPICAL at 09:09

## 2024-09-09 RX ADMIN — INSULIN ASPART 2 UNITS: 100 INJECTION, SOLUTION INTRAVENOUS; SUBCUTANEOUS at 09:09

## 2024-09-09 RX ADMIN — GADOBUTROL 10 ML: 604.72 INJECTION INTRAVENOUS at 10:09

## 2024-09-09 RX ADMIN — AMLODIPINE BESYLATE AND BENAZEPRIL HYDROCHLORIDE 2 CAPSULE: 5; 20 CAPSULE ORAL at 09:09

## 2024-09-09 RX ADMIN — ACYCLOVIR 400 MG: 200 CAPSULE ORAL at 09:09

## 2024-09-09 RX ADMIN — CLONAZEPAM 1 MG: 0.5 TABLET ORAL at 09:09

## 2024-09-09 RX ADMIN — LATANOPROST 1 DROP: 50 SOLUTION OPHTHALMIC at 09:09

## 2024-09-09 RX ADMIN — INSULIN ASPART 2 UNITS: 100 INJECTION, SOLUTION INTRAVENOUS; SUBCUTANEOUS at 03:09

## 2024-09-09 RX ADMIN — PRAVASTATIN SODIUM 40 MG: 40 TABLET ORAL at 08:09

## 2024-09-09 NOTE — PLAN OF CARE
Problem: Adult Inpatient Plan of Care  Goal: Plan of Care Review  Outcome: Progressing  Goal: Patient-Specific Goal (Individualized)  Outcome: Progressing  Goal: Absence of Hospital-Acquired Illness or Injury  Outcome: Progressing  Goal: Optimal Comfort and Wellbeing  Outcome: Progressing  Goal: Readiness for Transition of Care  Outcome: Progressing     Problem: Bariatric Environmental Safety  Goal: Safety Maintained with Care  Outcome: Progressing     Problem: Skin Injury Risk Increased  Goal: Skin Health and Integrity  Outcome: Progressing     Problem: Infection  Goal: Absence of Infection Signs and Symptoms  Outcome: Progressing     Problem: Skin Injury Risk Increased  Goal: Skin Health and Integrity  Outcome: Progressing

## 2024-09-09 NOTE — SUBJECTIVE & OBJECTIVE
Interval History:  Seen and evaluated on general medical floor  No acute events overnight    Clinical status improved  Vision slightly improved from yesterday  No new complaints  She is eager to go home  Optho wanting MR orbits with and without contrast prior to DC    Review of Systems   Constitutional:  Negative for appetite change, diaphoresis, fatigue and fever.   HENT:  Negative for drooling, rhinorrhea, sinus pressure, sinus pain and trouble swallowing.    Eyes:  Positive for visual disturbance (L eye blindness at BL; R eye visual disturbance, improved since admission). Negative for photophobia, pain and redness.   Respiratory:  Negative for cough, chest tightness and shortness of breath.    Cardiovascular:  Negative for chest pain and palpitations.   Gastrointestinal:  Negative for abdominal pain, constipation, diarrhea, nausea and vomiting.   Genitourinary:  Negative for decreased urine volume, difficulty urinating, dysuria, flank pain, hematuria and urgency.   Musculoskeletal:  Negative for back pain.   Skin:  Negative for color change and rash.   Neurological:  Negative for syncope, facial asymmetry and numbness.   Psychiatric/Behavioral:  Negative for confusion.      Objective:     Vital Signs (Most Recent):  Temp: 98 °F (36.7 °C) (09/09/24 1515)  Pulse: 70 (09/09/24 1515)  Resp: 18 (09/09/24 1515)  BP: 129/85 (09/09/24 1515)  SpO2: (!) 94 % (09/09/24 1515) Vital Signs (24h Range):  Temp:  [97.8 °F (36.6 °C)-98.3 °F (36.8 °C)] 98 °F (36.7 °C)  Pulse:  [66-79] 70  Resp:  [18-19] 18  SpO2:  [92 %-96 %] 94 %  BP: (109-141)/(60-85) 129/85     Weight: 115.2 kg (254 lb)  Body mass index is 44.99 kg/m².  No intake or output data in the 24 hours ending 09/09/24 1659      Physical Exam  Constitutional:       Appearance: Normal appearance. She is obese. She is not ill-appearing.   HENT:      Head: Normocephalic and atraumatic.      Right Ear: External ear normal.      Left Ear: External ear normal.      Nose: Nose  normal. No congestion or rhinorrhea.      Mouth/Throat:      Mouth: Mucous membranes are moist.      Pharynx: Oropharynx is clear.   Eyes:      General:         Right eye: No discharge.         Left eye: No discharge.      Extraocular Movements: Extraocular movements intact.      Conjunctiva/sclera: Conjunctivae normal.      Pupils: Pupils are equal, round, and reactive to light.      Comments: L eye blindness  R eye visual disturbance noted   Cardiovascular:      Rate and Rhythm: Normal rate and regular rhythm.      Pulses: Normal pulses.      Heart sounds: Normal heart sounds. No murmur heard.  Pulmonary:      Effort: Pulmonary effort is normal. No respiratory distress.      Breath sounds: Normal breath sounds. No wheezing.   Abdominal:      General: Abdomen is flat. Bowel sounds are normal.      Palpations: Abdomen is soft.      Tenderness: There is no abdominal tenderness.   Musculoskeletal:         General: No swelling. Normal range of motion.      Cervical back: Normal range of motion.   Skin:     General: Skin is warm and dry.      Coloration: Skin is not jaundiced.   Neurological:      General: No focal deficit present.      Mental Status: She is alert and oriented to person, place, and time.      Cranial Nerves: No cranial nerve deficit.   Psychiatric:         Mood and Affect: Mood normal.             Significant Labs: All pertinent labs within the past 24 hours have been reviewed.    Significant Imaging: I have reviewed all pertinent imaging results/findings within the past 24 hours.

## 2024-09-09 NOTE — PROGRESS NOTES
"[facility]  Progress Note    Patient Name: Genesis Ugalde  MRN: [id number]  Patient Class: IP- Inpatient   Admission Date: 9/6/2024  Length of Stay: 3 days  Attending Physician: Jeffery Yanes DO  Primary Care Provider: Bety Perdue MD        Subjective:     Principal Problem:Giant cell arteritis with polymyalgia rheumatica        HPI:  Genesis Ugalde is a 68 y.o. female with Giant Cell Arteritis with Polymyalgia Rheumatica on chronic steroids, HTN, HLD, impaired glucose metabolism, BELLO cirrhosis, morbid obesity with LOYDA who presents today for vision changes.     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 Around August 20, she noticed a "flare up" of her GCA, described as worsening vision in the R eye along with increase in the intensity of her headaches.  For this reason, her prednisone was increased to 60 mg daily.  Despite the increase in steroid dosing, her vision in the right eye has unfortunately continued to decline.    She notified her Rheumatologist of her continued vision changes, and it was recommended that she be admitted for IV Solumedrol along with Ophthalmology and Rheumatology evaluation.     She does note some nausea which she attributes to poor appetite due to grieving for her stepson who was murdered last month.  Otherwise, she denies any significant new focal weakness, shortness for breath, chest pain, dizziness, or other significant symptoms.    Overview/Hospital Course:  Presented per her rheumatologist recommendation for worsening vision.  She has hx of GCA with L eye blindness.  On Aug 20 she began having a flair with tapering her steroid dose that affected vision in her R eye.  She states that it has progressively gotten worse since that time to the point that she cannot see details with her R eye, just blurry objects.  Her " rheumatologist recommended that she present for IV steroids and ophthalmology eval.  Optho recommended MRA which returned mostly unremarkable.  Received 1000mg solumedrol IV daily for 3 doses per rheum recommendations.  Tocilizumab infusion given 9.8.24.  Optho recommending MR orbits with and with contrast to ensure there are no other acute issues.  This is the only barrier to discharge.    Interval History:  Seen and evaluated on general medical floor  No acute events overnight    Clinical status improved  Vision slightly improved from yesterday  No new complaints  She is eager to go home  Optho wanting MR orbits with and without contrast prior to DC    Review of Systems   Constitutional:  Negative for appetite change, diaphoresis, fatigue and fever.   HENT:  Negative for drooling, rhinorrhea, sinus pressure, sinus pain and trouble swallowing.    Eyes:  Positive for visual disturbance (L eye blindness at BL; R eye visual disturbance, improved since admission). Negative for photophobia, pain and redness.   Respiratory:  Negative for cough, chest tightness and shortness of breath.    Cardiovascular:  Negative for chest pain and palpitations.   Gastrointestinal:  Negative for abdominal pain, constipation, diarrhea, nausea and vomiting.   Genitourinary:  Negative for decreased urine volume, difficulty urinating, dysuria, flank pain, hematuria and urgency.   Musculoskeletal:  Negative for back pain.   Skin:  Negative for color change and rash.   Neurological:  Negative for syncope, facial asymmetry and numbness.   Psychiatric/Behavioral:  Negative for confusion.      Objective:     Vital Signs (Most Recent):  Temp: 98 °F (36.7 °C) (09/09/24 1515)  Pulse: 70 (09/09/24 1515)  Resp: 18 (09/09/24 1515)  BP: 129/85 (09/09/24 1515)  SpO2: (!) 94 % (09/09/24 1515) Vital Signs (24h Range):  Temp:  [97.8 °F (36.6 °C)-98.3 °F (36.8 °C)] 98 °F (36.7 °C)  Pulse:  [66-79] 70  Resp:  [18-19] 18  SpO2:  [92 %-96 %] 94 %  BP:  (109-141)/(60-85) 129/85     Weight: 115.2 kg (254 lb)  Body mass index is 44.99 kg/m².  No intake or output data in the 24 hours ending 09/09/24 1659      Physical Exam  Constitutional:       Appearance: Normal appearance. She is obese. She is not ill-appearing.   HENT:      Head: Normocephalic and atraumatic.      Right Ear: External ear normal.      Left Ear: External ear normal.      Nose: Nose normal. No congestion or rhinorrhea.      Mouth/Throat:      Mouth: Mucous membranes are moist.      Pharynx: Oropharynx is clear.   Eyes:      General:         Right eye: No discharge.         Left eye: No discharge.      Extraocular Movements: Extraocular movements intact.      Conjunctiva/sclera: Conjunctivae normal.      Pupils: Pupils are equal, round, and reactive to light.      Comments: L eye blindness  R eye visual disturbance noted   Cardiovascular:      Rate and Rhythm: Normal rate and regular rhythm.      Pulses: Normal pulses.      Heart sounds: Normal heart sounds. No murmur heard.  Pulmonary:      Effort: Pulmonary effort is normal. No respiratory distress.      Breath sounds: Normal breath sounds. No wheezing.   Abdominal:      General: Abdomen is flat. Bowel sounds are normal.      Palpations: Abdomen is soft.      Tenderness: There is no abdominal tenderness.   Musculoskeletal:         General: No swelling. Normal range of motion.      Cervical back: Normal range of motion.   Skin:     General: Skin is warm and dry.      Coloration: Skin is not jaundiced.   Neurological:      General: No focal deficit present.      Mental Status: She is alert and oriented to person, place, and time.      Cranial Nerves: No cranial nerve deficit.   Psychiatric:         Mood and Affect: Mood normal.             Significant Labs: All pertinent labs within the past 24 hours have been reviewed.    Significant Imaging: I have reviewed all pertinent imaging results/findings within the past 24 hours.    Assessment/Plan:      *  Giant cell arteritis with polymyalgia rheumatica  Patient with known GCA/PMR, on chronic Prednisone and Actemra  Has chronic vision changes associated with this, but presents with acute visual changes concerning for flare    ESR and CRP WNL  MRA showed no compelling evidence of vascular inflammation involving the superficial cranial arteries  Vision improved somewhat    Rheumatology consulted, appreciate recommendations  Ophthalmology consutled, appreciate recommendations  Rheumatology recommended Solumedrol 1g IV x 3 days; completed  Tocilizumab completed 9.8.24  Optho wanting MR orbits with and without contrast prior to DC      DNR (do not resuscitate)  Advance Care Planning  she states that she would never want CPR or intubation/mechanical ventilation in the event of cardiopulmonary arrest, in agreement with DNR status. Her son is her POA, but she states that they are currently not on good terms and she would like her other family members to decide her best course.        Recurrent HSV (herpes simplex virus)  Continue ppx Acyclovir given chronic immunosuppression.       Chronic prescription benzodiazepine use  Chronic use of daily PRN benzos for anxiety, verified by PDMP. 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 No evidence of acute decompensation.   MELD-Na score calculated; MELD 3.0: 11 at 6/14/2024  9:34 AM  MELD-Na: 10 at 6/14/2024  9:34 AM  Calculated from:  Serum Creatinine: 0.8 mg/dL (Using min of 1 mg/dL) at 6/14/2024  9:34 AM  Serum Sodium: 145 mmol/L (Using max of 137 mmol/L) at 6/14/2024  9:34  AM  Total Bilirubin: 1.9 mg/dL at 6/14/2024  9:34 AM  Serum Albumin: 4.1 g/dL (Using max of 3.5 g/dL) at 6/14/2024  9:34 AM  INR(ratio): 1.1 at 6/14/2024  9:34 AM  Age at listing (hypothetical): 68 years  Sex: Female at 6/14/2024  9:34 AM      LOYDA (obstructive sleep apnea)  Nightly CPAP    Other hyperlipidemia  Continue hospital formulary of home statin.       Immunosuppressed status  Due to autoimmune disease, chronic steroid use, and Actemra. No evidence of opportunistic infection. 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 May consider chronic Bactrim ppx given steroid use.       Morbid obesity  Body mass index is 44.99 kg/m².; chronic comorbidity affecting medical decision-making  Patient would greatly benefit from weight loss through proper diet and increased physical activity  She has been on mounjaro and would recommend continuing this      VTE Risk Mitigation (From admission, onward)           Ordered     heparin, porcine (PF) 100 unit/mL injection flush 500 Units  As needed (PRN)         09/08/24 1318     enoxaparin injection 40 mg  Every 12 hours         09/08/24 0948     IP VTE HIGH RISK PATIENT  Once         09/06/24 1807     Place sequential compression device  Until discontinued         09/06/24 1807                    Discharge Planning   SULY: 9/9/2024     Code Status: DNR   Is the patient medically ready for discharge?:     Reason for patient still in hospital (select all that apply): Imaging  Discharge Plan A: Home, Home with family   Discharge Delays: None known at this time              Jeffery Yanes DO  Department of Hospital Medicine   [facility] Surg

## 2024-09-09 NOTE — PLAN OF CARE
ИРИНА assigned to pt's care team.  SW met with pt to discuss discharge plan.  Pt reported she is blind and can only see contrast.  Pt reported she is ambulatory but has to hold on to items when ambulating.  Pt could benefit from a rolling walker.  Pt's niece will provide transportation home.      SW sent request for rolling walker to medical team.     12:14 PM  Request to process DME-Rolling Walker sent to ODME team.      Discharge Plan A and Plan B have been determined by review of patient's clinical status, future medical and therapeutic needs, and coverage/benefits for post-acute care in coordination with multidisciplinary team members.     09/09/24 1209   Post-Acute Status   Post-Acute Authorization HME;Other   HME Status Pending post-acute provider review/more information requested   Other Status No Post-Acute Service Needs   Discharge Delays None known at this time   Discharge Plan   Discharge Plan A Home;Home with family   Discharge Plan B Home     Dorothea Gray LMSW  Part-Time-  [facility]  Ext. [phone]

## 2024-09-09 NOTE — ASSESSMENT & PLAN NOTE
Patient with known GCA/PMR, on chronic Prednisone and Actemra  Has chronic vision changes associated with this, but presents with acute visual changes concerning for flare    ESR and CRP WNL  MRA showed no compelling evidence of vascular inflammation involving the superficial cranial arteries  Vision improved somewhat    Rheumatology consulted, appreciate recommendations  Ophthalmology consutled, appreciate recommendations  Rheumatology recommended Solumedrol 1g IV x 3 days; completed  Tocilizumab completed 9.8.24  Optho wanting MR orbits with and without contrast prior to DC

## 2024-09-10 ENCOUNTER — TELEPHONE (OUTPATIENT)
Dept: OPHTHALMOLOGY | Facility: CLINIC | Age: 69
End: 2024-09-10
Payer: MEDICARE

## 2024-09-10 VITALS
HEART RATE: 67 BPM | DIASTOLIC BLOOD PRESSURE: 68 MMHG | WEIGHT: 254 LBS | TEMPERATURE: 98 F | SYSTOLIC BLOOD PRESSURE: 140 MMHG | HEIGHT: 63 IN | OXYGEN SATURATION: 97 % | BODY MASS INDEX: 45 KG/M2 | RESPIRATION RATE: 18 BRPM

## 2024-09-10 LAB
POCT GLUCOSE: 186 MG/DL (ref 70–110)
POCT GLUCOSE: 206 MG/DL (ref 70–110)

## 2024-09-10 PROCEDURE — 25000003 PHARM REV CODE 250: Mod: HCNC | Performed by: STUDENT IN AN ORGANIZED HEALTH CARE EDUCATION/TRAINING PROGRAM

## 2024-09-10 PROCEDURE — 63600175 PHARM REV CODE 636 W HCPCS: Mod: HCNC

## 2024-09-10 PROCEDURE — 63600175 PHARM REV CODE 636 W HCPCS: Mod: HCNC | Performed by: STUDENT IN AN ORGANIZED HEALTH CARE EDUCATION/TRAINING PROGRAM

## 2024-09-10 RX ADMIN — BRIMONIDINE TARTRATE 1 DROP: 2 SOLUTION OPHTHALMIC at 08:09

## 2024-09-10 RX ADMIN — ASPIRIN 325 MG: 325 TABLET, COATED ORAL at 08:09

## 2024-09-10 RX ADMIN — BRIMONIDINE TARTRATE 1 DROP: 2 SOLUTION OPHTHALMIC at 03:09

## 2024-09-10 RX ADMIN — PANTOPRAZOLE SODIUM 40 MG: 40 TABLET, DELAYED RELEASE ORAL at 08:09

## 2024-09-10 RX ADMIN — INSULIN ASPART 2 UNITS: 100 INJECTION, SOLUTION INTRAVENOUS; SUBCUTANEOUS at 02:09

## 2024-09-10 RX ADMIN — AMLODIPINE BESYLATE AND BENAZEPRIL HYDROCHLORIDE 2 CAPSULE: 5; 20 CAPSULE ORAL at 08:09

## 2024-09-10 RX ADMIN — ENOXAPARIN SODIUM 40 MG: 40 INJECTION SUBCUTANEOUS at 08:09

## 2024-09-10 RX ADMIN — METOPROLOL SUCCINATE 100 MG: 100 TABLET, EXTENDED RELEASE ORAL at 08:09

## 2024-09-10 RX ADMIN — PREDNISONE 60 MG: 20 TABLET ORAL at 08:09

## 2024-09-10 NOTE — PROGRESS NOTES
"[facility]   Consult Service   Inpatient progress note      Chief complaint/Reason for Consult: "Visual Acuity"      History of Present Illness: Genesis Ugalde is a 68 y.o. female who presents with decreased vision in right eye. Stated decreased vision started on 8/20 with worsening weakness of hips and shoulders. States she was previously able to see some details but now it appears "snowy". Additionally reported bilateral jaw pain and swelling. Denies flashes, floaters, curtain, diplopia.     Patient has history of Autoimmune Optic Neuropathy, Primary Open Angle Glaucoma vs Ocular HTN, GCA followed by Dr. Ro (Last seen 12/2023) and Rheumatogist Dr. Waldrop.     Patient contacted Dr. Waldrop on 9/6 with complaints of worsening vision in right eye. Suggested presentation to ED for admission for 3-5 key of Solu-Medrol. Rheumatology and Ophthalmology consulted    Interval history (9/10/2024): Reports vision about the same for past 1-2 days. Overall, improved from prior to admission.    Past Ocular Hx: As Above     Current eye gtts: None      PMHx:  has a past medical history of Anxiety, Asthma, Blind left eye, Cataract, Depression, Diabetes mellitus, Disease of immune system, Giant cell arteritis, Glaucoma, Hypertension, Polymyalgia rheumatica, and Uveitis.     PSurgHx:  has a past surgical history that includes Cyst Removal; Hysterectomy; Cholecystectomy; Ankle surgery; Colonoscopy (N/A, 9/28/2015); Colonoscopy (N/A, 9/6/2019); and Esophagogastroduodenoscopy (N/A, 3/28/2023).     Home Medications:   Prior to Admission medications    Medication Sig Start Date End Date Taking? Authorizing Provider   acyclovir (ZOVIRAX) 400 MG tablet Take 400 mg by mouth 2 (two) times daily. 3/13/23   Provider, Historical   amLODIPine-benazepriL (LOTREL) 10-40 mg per capsule Take 1 capsule by mouth once daily. 5/10/24   Bety Perdue MD   aspirin (ECOTRIN) 325 MG EC tablet Take 325 mg by mouth once daily.    Provider, " Historical   brimonidine 0.2% (ALPHAGAN) 0.2 % Drop Place 1 drop into both eyes 3 (three) times daily. 1/16/24   Tanna Ro MD   clonazePAM (KLONOPIN) 1 MG tablet Take 1 tablet (1 mg total) by mouth daily as needed for Anxiety. 8/23/24   Bety Perdue MD   dorzolamide (TRUSOPT) 2 % ophthalmic solution INSTILL 1 DROP INTO BOTH EYES 3 TIMES A DAY 12/27/23   Janis Mitchell MD   ergocalciferol (VITAMIN D2) 50,000 unit Cap TAKE 1 CAPSULE (50,000 UNITS TOTAL) BY MOUTH EVERY 7 DAYS. 3/22/24   Forrest Waldrop MD   latanoprost 0.005 % ophthalmic solution Place 1 drop into both eyes every evening. 5/2/24   Tanna Ro MD   metoprolol succinate (TOPROL-XL) 100 MG 24 hr tablet Take 1 tablet (100 mg total) by mouth once daily. 5/10/24   Bety Perdue MD   omeprazole (PRILOSEC) 40 MG capsule Take 1 capsule (40 mg total) by mouth every 12 (twelve) hours. Take one pill every morning 45 minutes before breakfast 5/14/24   Bety Perdue MD   pravastatin (PRAVACHOL) 40 MG tablet Take 1 tablet (40 mg total) by mouth every evening. 3/22/24   Forrest Waldrop MD   predniSONE (DELTASONE) 20 MG tablet Take 3 tablets (60 mg total) by mouth once daily. After 1 wk, decrease to 2.5 tabs(50mg) x 1wk, then decrease to 2 tabs(40mg) daily x 1wk, then decrease to 3 tabs daily and continue until RTC in 4 wks. 8/3/24   Forrest Waldrop MD   tirzepatide (MOUNJARO) 2.5 mg/0.5 mL PnIj Inject 2.5 mg into the skin every 7 days.  Patient not taking: Reported on 8/30/2024 8/23/24   Bety Perdue MD   tirzepatide (MOUNJARO) 5 mg/0.5 mL PnIj Inject 5 mg into the skin every 7 days.  Patient not taking: Reported on 8/30/2024 8/23/24   Bety Perdue MD   tocilizumab (ACTEMRA) 162 mg/0.9 mL injection Inject 0.9 mLs (162 mg total) into the skin every 7 days. 6/3/24   Forrest Waldrop MD   traMADoL (ULTRAM) 50 mg tablet Take 1 tablet (50 mg total) by mouth every 12 (twelve) hours as needed for Pain. 5/14/24   Bety Perdue MD         Medications this encounter:    acyclovir  400 mg Oral BID    amlodipine-benazepril 5-20 mg  2 capsule Oral Daily    aspirin  325 mg Oral Daily    brimonidine 0.2%  1 drop Both Eyes TID    enoxparin  40 mg Subcutaneous Q12H (prophylaxis, 0900/2100)    latanoprost  1 drop Both Eyes QHS    metoprolol succinate  100 mg Oral Daily    nystatin   Topical (Top) BID    pantoprazole  40 mg Oral Daily    Patient Supplied; Dorzolamide 2% opthalmic solution 1 gtt OU TID  1 drop Both Eyes TID    pravastatin  40 mg Oral QHS    predniSONE  60 mg Oral Daily       Allergies: is allergic to oxycontin [oxycodone], percocet [oxycodone-acetaminophen], percodan [oxycodone hcl-oxycodone-asa], and seroquel [quetiapine].     Social Hx:  reports that she has never smoked. She has never been exposed to tobacco smoke. She has never used smokeless tobacco. She reports that she does not drink alcohol and does not use drugs.     Family Hx: No family history of glaucoma. family history includes AVM in her brother; Alzheimer's disease in her father and mother; Cancer (age of onset: 58) in her brother; Colon polyps in her brother; Coronary artery disease in her brother, father, mother, and sister; Diabetes in her father, mother, and sister; Emphysema in her father; Heart attack in her brother and sister; Hydrocephalus in her brother and paternal cousin; Hypertension in her brother and mother; Intellectual disability in her brother; Kidney disease in her sister; No Known Problems in her son; Stomach cancer in her brother; Stroke in her brother and mother.     ROS: Negative x 10 except for complaints as described in HPI; negative for fever, chills, weight loss, nausea, vomiting, diarrhea, shortness of breath, nasal discharge, cough, abdominal pain, dyspnea, difficulty moving arms and legs, confusion, dysuria, palpitations, or chest pain     Ocular examination/Dilated fundus examination:  Base Eye Exam       Visual Acuity (Snellen - Linear)          "Right Left    Dist sc HM at face LP              Tonometry (Tonopen, 1:20 PM)         Right Left    Pressure 10 8              Pupils         Dark Light Shape React APD    Right 4 2 Round Brisk None    Left 4 2 Round Brisk APD              Visual Fields         Right Left    Restrictions Total superior temporal, inferior temporal, superior nasal, inferior nasal deficiencies Total superior temporal, inferior temporal, superior nasal, inferior nasal deficiencies              Extraocular Movement         Right Left     Full Full   L XT             Neuro/Psych       Oriented x3: Yes    Mood/Affect: Normal                  Slit Lamp and Fundus Exam       External Exam         Right Left    External Exophthalmos Exophthalmos              Pen Light Exam         Right Left    Lids/Lashes Normal Normal    Conjunctiva/Sclera White and quiet White and quiet    Cornea Clear Clear    Anterior Chamber Deep and quiet Deep and quiet    Iris Round and reactive Round and reactive    Lens 2+ Nuclear sclerosis 2+ Nuclear sclerosis                  MRI head orbits w/wo contrast (9/10/2024):  Impression:     1. No acute intracranial process.  2. Involutional changes with chronic microvascular ischemic changes.  3. No acute process of the orbits or globes  4. See above comments also.  - "Orbits/Optic Nerves: No abnormal signal or enhancement of the right or left globe, pre-septal soft tissues, intra-orbital soft tissues, or optic pathway "    Assessment/Plan:   1. Autoimmune Optic Neuropathy OU  2. GCA with Polymyalgia Rheumatica   - OS 2004, OD 2012  - unknown antibody, unknown if biopsy proven (Had biopsies done at [facility] - no records)   - Follows closely with Rheumatology, On Prednisone 60mg daily   - Several admissions for treatments with IV steroid and IVIG   - Baseline Vision: HM at face // LP   - Today's Vision: HM at face // LP ,   - Pupils with +APD OS   - Dilated exam without evidence of active nerve edema; However, difficult to " assess secondary to extensive chronic optic disc changes. Does not exclude the possibility of non-ocular GCA or posterior optic ischemia.   - Completed 3 days IV steroids (9/6-9/8/2024) per rheumatology and transitioned to 60 mg PO daily until outpatient rheumatology follow-up  - MRI head orbit w/wo wnl  - 9/10/2024: [facility] still HM at face // LP, overall vision about same for past 1-2 days. Will be discharged today.    2. POAG - mild vs OHT ou  - History of elevated IOP, Steroid response - on chronic steroid treatment per Rheumatology   - No history of Lasers, Surgery   - Tmax 27 ou  - Cup to Disc 0.9 with extensive pallor   - Mild narrowing on gonio   - Hx of Asthma (Avoid Timolol)   - Home Meds: Dorzolamide, Brimonidine, Latanoprost   - IOP today 19//17; Okay to continue home meds     Recommendations   - Continue PO steroids as outpatient per Rheumatology  - Rheumatology clinic follow-up already scheduled  - Follow-up with neuro-ophthalmology clinic in 3-4 months (messaged schedulers)  - Continue Home Meds: Brimondine TID, Dorzolamide TID, Latanorpost qHS OU     Discussed with Dr. Shirley. Ophthalmology will be signing off. Please call the ophthalmology resident on call for any questions or new concerns.    Ben Munoz MD   [facility], PGY-2

## 2024-09-10 NOTE — CARE UPDATE
Unit [facility] Care Support Interaction      I have reviewed the chart of Genesis Ugalde who is hospitalized for Giant cell arteritis with polymyalgia rheumatica. The patient is currently located in the following unit: [facility]      I have seen and examined the patient and provided the following support:     Patient experience rounds - positive experience reported       ZAHRA MACEDO  Unit Based [facility]

## 2024-09-10 NOTE — NURSING
Patient discharged home via private car. IV removed, catheter intact. Home walker delivered bedside. Discharge education provided, patient demonstrated understanding.

## 2024-09-10 NOTE — PLAN OF CARE
[facility]  Discharge Final Note    Primary Care Provider: Bety Perdue MD    Expected Discharge Date: 9/10/2024    Pt discharged home with no needs.  Pt's niece to provide transportation home.     Future Appointments   Date Time Provider Department Center   9/19/2024  9:00 AM Ayad Shen MD [facility] MED [place]   10/29/2024  2:00 PM Forrest Waldrop MD [facility] RHEUM [facility]   11/14/2024  7:20 AM LAB, APPOINTMENT [place] [facility]   12/6/2024  2:20 PM Bety Perdue MD [facility] MED [place]   2/20/2025  8:45 AM KN US2 [facility]   2/20/2025  9:50 AM APPOINTMENT [facility], [facility] K[facility] LAB [facility]   2/27/2025  2:30 PM Brooke Fitzgerald, NP [facility] HEPAT [facility]          Discharge Plan A and Plan B have been determined by review of patient's clinical status, future medical and therapeutic needs, and coverage/benefits for post-acute care in coordination with multidisciplinary team members.      Final Discharge Note (most recent)       Final Note - 09/10/24 1447          Final Note    Assessment Type Final Discharge Note     Anticipated Discharge Disposition Home or Self Care        Post-Acute Status    Post-Acute Authorization Other;HME     HME Status Set-up Complete/Auth obtained     Other Status No Post-Acute Service Needs     Discharge Delays None known at this time                     Important Message from Medicare  Important Message from Medicare regarding Discharge Appeal Rights: Given to patient/caregiver, Signed/date by patient/caregiver, Explained to patient/caregiver     Date IMM was signed: 09/09/24  Time IMM was signed: [id number]    Dorothea Gray LMSW  Part-Time-  [facility]  Ext. [phone]

## 2024-09-10 NOTE — TELEPHONE ENCOUNTER
----- Message from Ben Munoz MD sent at 9/10/2024  3:27 PM CDT -----  Regarding: Inpatient follow-up  Hello,    This patient was seen as an inpatient for Giant cell arteritis. Can they please get follow-up in neuro-ophthalmology clinic in 3-4 months?    Best contact number: [phone]    Please let me know if unable to schedule this patient.    Thank you!    Ben Munoz MD  [facility] PGY2

## 2024-09-10 NOTE — DISCHARGE SUMMARY
"[facility]  Discharge Summary      Patient Name: Genesis Ugalde  MRN: [id number]  DMITRY: [id number]  Patient Class: IP- Inpatient  Admission Date: 9/6/2024  Hospital Length of Stay: 4 days  Discharge Date and Time:  09/10/2024 2:02 PM  Attending Physician: Jeffery Yanes DO   Discharging Provider: Jeffery Yanes DO  Primary Care Provider: Bety Perdue MD  [facility] Medicine Team: [facility] S Jeffery Yanes DO  Primary Care Team: [facility] S    HPI:   Genesis Ugalde is a 68 y.o. female with Giant Cell Arteritis with Polymyalgia Rheumatica on chronic steroids, HTN, HLD, impaired glucose metabolism, BELLO cirrhosis, morbid obesity with LOYDA who presents today for vision changes.     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 Around August 20, she noticed a "flare up" of her GCA, described as worsening vision in the R eye along with increase in the intensity of her headaches.  For this reason, her prednisone was increased to 60 mg daily.  Despite the increase in steroid dosing, her vision in the right eye has unfortunately continued to decline.    She notified her Rheumatologist of her continued vision changes, and it was recommended that she be admitted for IV Solumedrol along with Ophthalmology and Rheumatology evaluation.     She does note some nausea which she attributes to poor appetite due to grieving for her stepson who was murdered last month.  Otherwise, she denies any significant new focal weakness, shortness for breath, chest pain, dizziness, or other significant symptoms.    * No surgery found *      Hospital Course:   Presented per her rheumatologist recommendation for worsening vision.  She has hx of GCA with L eye blindness.  On Aug 20 she began having a flair with tapering her steroid dose that affected vision in her R eye.  She states that " it has progressively gotten worse since that time to the point that she cannot see details with her R eye, just blurry objects.  Her rheumatologist recommended that she present for IV steroids and ophthalmology eval.  Optho recommended MRA which returned mostly unremarkable.  Received 1000mg solumedrol IV daily for 3 doses per rheum recommendations.  Tocilizumab infusion given 9.8.24.  Optho recommending MR orbits with and with contrast to ensure there are no other acute issues; MR returned unremarkable.  She is eager for discharge.     Goals of Care Treatment Preferences:  Code Status: DNR      SDOH Screening:  The patient was screened for utility difficulties, food insecurity, transport difficulties, housing insecurity, and interpersonal safety and there were no concerns identified this admission.     Consults:   Consults (From admission, onward)          Status Ordering Provider     Inpatient consult to Rheumatology  Once        Provider:  (Not yet assigned)    Completed MCGEEADELINEH     Inpatient consult to Ophthalmology  Once        Provider:  (Not yet assigned)    Completed MCGEE, [place]            No new Assessment & Plan notes have been filed under this hospital service since the last note was generated.  Service: Hospital Medicine    Physical Exam  Constitutional:       Appearance: Normal appearance. She is obese. She is not ill-appearing.   HENT:      Head: Normocephalic and atraumatic.      Right Ear: External ear normal.      Left Ear: External ear normal.      Nose: Nose normal. No congestion or rhinorrhea.      Mouth/Throat:      Mouth: Mucous membranes are moist.      Pharynx: Oropharynx is clear.   Eyes:      General:         Right eye: No discharge.         Left eye: No discharge.      Extraocular Movements: Extraocular movements intact.      Conjunctiva/sclera: Conjunctivae normal.      Pupils: Pupils are equal, round, and reactive to light.      Comments: L eye blindness; R eye visual disturbance  noted, improved since admission  Cardiovascular:      Rate and Rhythm: Normal rate and regular rhythm.      Pulses: Normal pulses.      Heart sounds: Normal heart sounds. No murmur heard.  Pulmonary:      Effort: Pulmonary effort is normal. No respiratory distress.      Breath sounds: Normal breath sounds. No wheezing.   Abdominal:      General: Abdomen is flat. Bowel sounds are normal.      Palpations: Abdomen is soft.      Tenderness: There is no abdominal tenderness.   Musculoskeletal:         General: No swelling. Normal range of motion.      Cervical back: Normal range of motion.   Skin:     General: Skin is warm and dry.      Coloration: Skin is not jaundiced.   Neurological:      General: No focal deficit present.      Mental Status: She is alert and oriented to person, place, and time.      Cranial Nerves: No cranial nerve deficit.   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 Yes    Height: 5' 3" (1.6 m)    Weight: 115.2 kg (254 lb)    Length of need (1-99 months): 99    Does patient have medical equipment at home? none    Please check all that apply: Patient's condition impairs ambulation.      Diet diabetic     Activity as tolerated       Significant Diagnostic Studies: N/A    Pending Diagnostic Studies:       Procedure Component Value Units Date/Time    Quantiferon Gold TB [[id number]]     Order Status: Sent Lab Status: No result     Specimen: Blood            Medications:  Reconciled Home Medications:      Medication List        CHANGE how you take these medications      predniSONE 20 MG tablet  Commonly known as: DELTASONE  Take 3 tablets (60 mg total) by mouth once daily.  What changed: additional instructions            CONTINUE taking these medications      ACTEMRA 162 mg/0.9 mL injection  Generic drug: tocilizumab  Inject 0.9 mLs (162 mg total) into the skin every 7 days.     acyclovir 400 MG tablet  Commonly known as: ZOVIRAX  Take 400 mg by mouth 2 (two) times daily.     amLODIPine-benazepriL 10-40 mg per capsule  Commonly known as: LOTREL  Take 1 capsule by mouth once daily.     aspirin 325 MG EC tablet  Commonly known as: ECOTRIN  Take 325 mg by mouth once daily.     brimonidine 0.2% 0.2 % Drop  Commonly known as: ALPHAGAN  Place 1 drop into both eyes 3 (three) times daily.     clonazePAM 1 MG tablet  Commonly known as: KlonoPIN  Take 1 tablet (1 mg total) by mouth daily as needed for Anxiety.     dorzolamide 2 % ophthalmic solution  Commonly known as: TRUSOPT  INSTILL 1 DROP INTO BOTH EYES 3 TIMES A DAY     ergocalciferol 50,000 unit Cap  Commonly known as: VITAMIN D2  TAKE 1 CAPSULE (50,000 UNITS TOTAL) BY MOUTH EVERY 7 DAYS.     latanoprost 0.005 % ophthalmic solution  Place 1 drop into both eyes every evening.     metoprolol succinate 100 MG 24 hr tablet  Commonly known as: TOPROL-XL  Take 1 tablet (100 " mg total) by mouth once daily.     omeprazole 40 MG capsule  Commonly known as: PRILOSEC  Take 1 capsule (40 mg total) by mouth every 12 (twelve) hours. Take one pill every morning 45 minutes before breakfast     pravastatin 40 MG tablet  Commonly known as: PRAVACHOL  Take 1 tablet (40 mg total) by mouth every evening.     traMADoL 50 mg tablet  Commonly known as: ULTRAM  Take 1 tablet (50 mg total) by mouth every 12 (twelve) hours as needed for Pain.            STOP taking these medications      MOUNJARO 2.5 mg/0.5 mL Pnij  Generic drug: tirzepatide     MOUNJARO 5 mg/0.5 mL Pnij  Generic drug: tirzepatide              Indwelling Lines/Drains at time of discharge:   Lines/Drains/Airways       None                   Time spent on the discharge of patient: 35 minutes         Jeffery Yanes DO  Department of Hospital Medicine  [facility]

## 2024-09-11 ENCOUNTER — PATIENT OUTREACH (OUTPATIENT)
Dept: ADMINISTRATIVE | Facility: CLINIC | Age: 69
End: 2024-09-11
Payer: MEDICARE

## 2024-09-11 NOTE — PLAN OF CARE
SW received call from Maricarmen with [facility].  Pt was a new admission to their service prior to coming to [facility] recently.  Pt was d/c on 09/10 after an inpatient admission.  As per Maricarmen [facility] will need new orders.    ИРИНА reached out to pt attending via secure chat and requested home health orders      Noris Burt CD, MSW, LMSW, RSW   Case Management  [facility]  Email: [email or web address]

## 2024-09-11 NOTE — PROGRESS NOTES
C3 nurse attempted to contact Genesis Ugalde  for a TCC post hospital discharge follow up call. No answer. Left voicemail with callback information. The patient has a scheduled HOSFU appointment with Ayad Shen on 09/19/24 @ 0900.

## 2024-09-12 NOTE — PROGRESS NOTES
C3 nurse spoke with Genesis Ugalde for a TCC post hospital discharge follow up call. The patient has a scheduled HOSFU appointment with Ayad Shen on 09/19/24 @ 0900.

## 2024-09-19 ENCOUNTER — LAB VISIT (OUTPATIENT)
Dept: LAB | Facility: HOSPITAL | Age: 69
End: 2024-09-19
Attending: FAMILY MEDICINE
Payer: MEDICARE

## 2024-09-19 ENCOUNTER — OFFICE VISIT (OUTPATIENT)
Dept: FAMILY MEDICINE | Facility: CLINIC | Age: 69
End: 2024-09-19
Payer: MEDICARE

## 2024-09-19 VITALS
WEIGHT: 251 LBS | OXYGEN SATURATION: 97 % | HEIGHT: 63 IN | SYSTOLIC BLOOD PRESSURE: 120 MMHG | BODY MASS INDEX: 44.47 KG/M2 | DIASTOLIC BLOOD PRESSURE: 84 MMHG | HEART RATE: 93 BPM

## 2024-09-19 DIAGNOSIS — I15.2 HYPERTENSION ASSOCIATED WITH TYPE 2 DIABETES MELLITUS: ICD-10-CM

## 2024-09-19 DIAGNOSIS — N28.9 FUNCTION KIDNEY DECREASED: ICD-10-CM

## 2024-09-19 DIAGNOSIS — K76.0 FATTY LIVER: ICD-10-CM

## 2024-09-19 DIAGNOSIS — M31.5 GIANT CELL ARTERITIS WITH POLYMYALGIA RHEUMATICA: ICD-10-CM

## 2024-09-19 DIAGNOSIS — E11.59 HYPERTENSION ASSOCIATED WITH TYPE 2 DIABETES MELLITUS: ICD-10-CM

## 2024-09-19 DIAGNOSIS — D72.829 LEUKOCYTOSIS, UNSPECIFIED TYPE: ICD-10-CM

## 2024-09-19 DIAGNOSIS — Z23 NEEDS FLU SHOT: Primary | ICD-10-CM

## 2024-09-19 LAB
ALBUMIN SERPL BCP-MCNC: 3.7 G/DL (ref 3.5–5.2)
ANION GAP SERPL CALC-SCNC: 9 MMOL/L (ref 8–16)
BASOPHILS # BLD AUTO: 0.09 K/UL (ref 0–0.2)
BASOPHILS NFR BLD: 0.5 % (ref 0–1.9)
BUN SERPL-MCNC: 19 MG/DL (ref 8–23)
CALCIUM SERPL-MCNC: 9.4 MG/DL (ref 8.7–10.5)
CHLORIDE SERPL-SCNC: 105 MMOL/L (ref 95–110)
CO2 SERPL-SCNC: 24 MMOL/L (ref 23–29)
CREAT SERPL-MCNC: 1.2 MG/DL (ref 0.5–1.4)
CRP SERPL-MCNC: 2.6 MG/L (ref 0–8.2)
DIFFERENTIAL METHOD BLD: ABNORMAL
EOSINOPHIL # BLD AUTO: 0 K/UL (ref 0–0.5)
EOSINOPHIL NFR BLD: 0.1 % (ref 0–8)
ERYTHROCYTE [DISTWIDTH] IN BLOOD BY AUTOMATED COUNT: 13.9 % (ref 11.5–14.5)
ERYTHROCYTE [SEDIMENTATION RATE] IN BLOOD BY PHOTOMETRIC METHOD: <2 MM/HR (ref 0–36)
EST. GFR  (NO RACE VARIABLE): 49.9 ML/MIN/1.73 M^2
GLUCOSE SERPL-MCNC: 240 MG/DL (ref 70–110)
HCT VFR BLD AUTO: 43.3 % (ref 37–48.5)
HGB BLD-MCNC: 13.8 G/DL (ref 12–16)
IMM GRANULOCYTES # BLD AUTO: 0.44 K/UL (ref 0–0.04)
IMM GRANULOCYTES NFR BLD AUTO: 2.6 % (ref 0–0.5)
LYMPHOCYTES # BLD AUTO: 1.6 K/UL (ref 1–4.8)
LYMPHOCYTES NFR BLD: 9.5 % (ref 18–48)
MCH RBC QN AUTO: 31.2 PG (ref 27–31)
MCHC RBC AUTO-ENTMCNC: 31.9 G/DL (ref 32–36)
MCV RBC AUTO: 98 FL (ref 82–98)
MONOCYTES # BLD AUTO: 0.3 K/UL (ref 0.3–1)
MONOCYTES NFR BLD: 1.8 % (ref 4–15)
NEUTROPHILS # BLD AUTO: 14.7 K/UL (ref 1.8–7.7)
NEUTROPHILS NFR BLD: 85.5 % (ref 38–73)
NRBC BLD-RTO: 0 /100 WBC
PHOSPHATE SERPL-MCNC: 2.8 MG/DL (ref 2.7–4.5)
PLATELET # BLD AUTO: 209 K/UL (ref 150–450)
PMV BLD AUTO: 11.3 FL (ref 9.2–12.9)
POTASSIUM SERPL-SCNC: 5.1 MMOL/L (ref 3.5–5.1)
RBC # BLD AUTO: 4.43 M/UL (ref 4–5.4)
SODIUM SERPL-SCNC: 138 MMOL/L (ref 136–145)
WBC # BLD AUTO: 17.15 K/UL (ref 3.9–12.7)

## 2024-09-19 PROCEDURE — 85025 COMPLETE CBC W/AUTO DIFF WBC: CPT | Mod: HCNC | Performed by: FAMILY MEDICINE

## 2024-09-19 PROCEDURE — 99999 PR PBB SHADOW E&M-EST. PATIENT-LVL IV: CPT | Mod: PBBFAC,HCNC,, | Performed by: FAMILY MEDICINE

## 2024-09-19 PROCEDURE — 85652 RBC SED RATE AUTOMATED: CPT | Mod: HCNC | Performed by: FAMILY MEDICINE

## 2024-09-19 PROCEDURE — 86140 C-REACTIVE PROTEIN: CPT | Mod: HCNC | Performed by: FAMILY MEDICINE

## 2024-09-19 PROCEDURE — 80069 RENAL FUNCTION PANEL: CPT | Mod: HCNC | Performed by: FAMILY MEDICINE

## 2024-09-19 PROCEDURE — 36415 COLL VENOUS BLD VENIPUNCTURE: CPT | Mod: HCNC,PO | Performed by: FAMILY MEDICINE

## 2024-09-19 NOTE — PROGRESS NOTES
Subjective     Patient ID: Genesis Ugalde is a 68 y.o. female.    Chief Complaint: Hospital Follow Up and Hip Pain    68 years old female came to the clinic after recent hospitalization secondary to temporal arteritis.  Patient currently on high dose of steroids to help with the symptoms.  Last A1c was stable.  Patient reports hyperglycemia use of the steroids.  Patient with leukocytosis secondary to steroids.  Decreased kidney function during last hospitalization.    Hypertension  This is a chronic problem. The current episode started more than 1 year ago. The problem is controlled. Pertinent negatives include no chest pain, orthopnea, palpitations or peripheral edema. Agents associated with hypertension include steroids. Risk factors for coronary artery disease include sedentary lifestyle, obesity and diabetes mellitus. Past treatments include ACE inhibitors, calcium channel blockers and beta blockers. The current treatment provides significant improvement. Compliance problems include diet and exercise.  There is no history of angina. Identifiable causes of hypertension include a hypertension causing med. There is no history of a thyroid problem.   Diabetes  She presents for her follow-up diabetic visit. She has type 2 diabetes mellitus. Her disease course has been stable. There are no hypoglycemic associated symptoms. Pertinent negatives for diabetes include no chest pain, no polydipsia, no polyphagia and no polyuria. There are no hypoglycemic complications. Symptoms are stable. Risk factors for coronary artery disease include hypertension, diabetes mellitus, obesity, sedentary lifestyle and post-menopausal. Current diabetic treatment includes diet. She is compliant with treatment all of the time. She is following a generally healthy diet. She has not had a previous visit with a dietitian. She never participates in exercise. An ACE inhibitor/angiotensin II receptor blocker is being taken.     Review of Systems    Constitutional: Negative.    HENT: Negative.     Eyes: Negative.    Respiratory: Negative.     Cardiovascular:  Negative for chest pain, palpitations and orthopnea.   Gastrointestinal: Negative.    Endocrine: Negative for polydipsia, polyphagia and polyuria.   Genitourinary: Negative.    Musculoskeletal:  Positive for arthralgias.   Neurological: Negative.    Psychiatric/Behavioral: Negative.            Objective     Physical Exam  Constitutional:       General: She is not in acute distress.     Appearance: She is well-developed. She is not diaphoretic.   HENT:      Head: Normocephalic and atraumatic.      Right Ear: External ear normal.      Left Ear: External ear normal.      Nose: Nose normal.      Mouth/Throat:      Pharynx: No oropharyngeal exudate.   Eyes:      General: No scleral icterus.        Right eye: No discharge.         Left eye: No discharge.      Conjunctiva/sclera: Conjunctivae normal.      Pupils: Pupils are equal, round, and reactive to light.   Neck:      Thyroid: No thyromegaly.      Vascular: No JVD.      Trachea: No tracheal deviation.   Cardiovascular:      Rate and Rhythm: Normal rate and regular rhythm.      Heart sounds: Normal heart sounds. No murmur heard.     No friction rub. No gallop.   Pulmonary:      Effort: Pulmonary effort is normal. No respiratory distress.      Breath sounds: Normal breath sounds. No stridor. No wheezing or rales.   Chest:      Chest wall: No tenderness.   Abdominal:      General: Bowel sounds are normal. There is no distension.      Palpations: Abdomen is soft. There is no mass.      Tenderness: There is no abdominal tenderness. There is no guarding or rebound.   Musculoskeletal:         General: No tenderness. Normal range of motion.      Cervical back: Normal range of motion and neck supple.   Lymphadenopathy:      Cervical: No cervical adenopathy.   Skin:     General: Skin is warm and dry.      Coloration: Skin is not pale.      Findings: No erythema or  rash.   Neurological:      Mental Status: She is alert and oriented to person, place, and time.      Cranial Nerves: No cranial nerve deficit.      Motor: No abnormal muscle tone.      Coordination: Coordination abnormal.      Gait: Gait abnormal.      Deep Tendon Reflexes: Reflexes are normal and symmetric.   Psychiatric:         Behavior: Behavior normal.         Thought Content: Thought content normal.         Judgment: Judgment normal.            Assessment and Plan     1. Needs flu shot  -     influenza (adjuvanted) (Fluad) 45 mcg/0.5 mL IM vaccine (> or = 66 yo) 0.5 mL    2. Giant cell arteritis with polymyalgia rheumatica  Overview:       The MRA of the chest is normal, no evidence of large vessel vasculitis.[facility]          PACS Images     Show images for MRA Chest   Reviewed By Tonya Waldrop MD on 3/20/2018 14:21   Patient Result Comments     Viewed by Genesis Ugalde V on 3/20/2018  5:33 PM   Written by Forrest Waldrop MD on 3/20/2018  2:20 PM   The MRA of the chest is normal, no evidence of large vessel vasculitis.[facility]   External Result Report     External Result Report   Narrative     EXAMINATION:  MRA CHEST    CLINICAL HISTORY:  Aortic dz, non-traumatic, known or suspectPersonal history of other diseases of the circulatory system    TECHNIQUE:  Multiplanar, multisequence imaging was performed through the chest to evaluate the aorta.  This exam is performed prior to and following the administration of 14 mL of intravenous Gadavist.    COMPARISON:  CT chest 02/19/2016    FINDINGS:  The thoracic aorta is normal in caliber, contour, and course.  There are 3 branch vessels from the aortic arch.  No evidence of stenosis or aneurysm.  No evidence of abnormal wall thickening or surrounding inflammatory change.    The visualized lungs are without significant abnormality.  The heart is within normal limits.    The visualized upper abdominal structures demonstrate hepatomegaly.    The osseous structures  are without evidence for acute fracture.  Subcutaneous tissues are unremarkable.   Impression       Unremarkable MRA chest exam.    Electronically signed by resident: Jeannie Hdz  Date: 03/20/2018  Time: 10:36    Electronically signed by: Herrera Rodriguez MD  Date: 03/20/2018  Time: 11:16    Encounter     View Encounter          Signed by Resident   The CTA head and neck shows no evidence of active vasculitis. There is moderate atherosclerotic disease right carotid. Continue aspirin 325mg daily and pravastatin. There is incidentally noted thryoid nodule and some changes in the lung bases.Will need thyroid ultrasound and CT chest.Cristina will schedule. [facility]          PACS Images      Show images for CTA Head and Neck (xpd)   Reviewed By Tonya Miller MD on 6/28/2018 21:58   Result Notes for CTA Head and Neck (xpd)     Notes recorded by Forrest Waldrop MD on 6/30/2018 at 9:33 PM CDT  The CTA head and neck shows no evidence of active vasculitis. There is moderate atherosclerotic disease right carotid. Continue aspirin 325mg daily and pravastatin. There is incidentally noted thryoid nodule and some changes in the lung bases.Will need thyroid ultrasound and CT chest.Cristina will schedule. [employer]   CTA Head and Neck (xpd)   Order: [id number]   Status:  Final result   Visible to patient:  Yes (Patient Portal)   Next appt:  10/11/2018 at 07:30 PM in Sleep Medicine (LAB, SLEEP [place])   Dx:  Vasculitis, CNS; Giant cell arteritis...   Details     Reading Physician Reading Date Result Priority   Diomedes Morris,  6/28/2018       Narrative     EXAMINATION:  CTA HEAD AND NECK (XPD)    CLINICAL HISTORY:  Acute visual deficit;Vasculitis, CNS;h/o giant cell arteritis, r/o cervical vessel involvement;Arteritis, unspecified    TECHNIQUE:  5 mm axial images of the head pre and post contrast with 0.625 mm axial CTA images of the head neck postcontrast.  Coronal and sagittal MPR and MIP imaging was performed 100 ml of  Omnipaque 350 contrast was injected intravenously    COMPARISON:  CT head 03/27/2016    FINDINGS:  CT head with and  without contrast: There is no evidence for acute intracranial hemorrhage or sulcal effacement.  The ventricles are normal in size and configuration without evidence for hydrocephalus.  There is no midline shift or mass effect.  Allowing for CT technique there is no abnormal parenchymal enhancement.  The visualized paranasal sinuses and mastoid air cells are clear.    CTA head:    Anterior circulation: The bilateral distal cervical, petrous, cavernous, and supraclinoid segments of the ICAs are patent without significant focal stenosis or aneurysm.    The anterior middle cerebral arteries are patent without focal stenosis or aneurysm.    Posterior circulation: The distal vertebral arteries, basilar artery and posterior cerebral arteries are patent without focal stenosis or aneurysm.    CTA neck: Origin the right brachiocephalic, left common carotid and left subclavian arteries from the arch are within normal limits..    The right vertebral artery origin and proximal course is obscured by venous contamination.  The left vertebral artery origin is within normal limits.  Remaining vertebral arteries are patent throughout their course without focal stenosis...    Right carotid: The right common carotid artery, carotid bifurcation and extracranial portions of the internal carotid artery are patent there is atherosclerotic plaquing in the right carotid bifurcation and proximal ICA with less than 50% proximal ICA stenosis by NASCET criteria..    Left carotid: The left common carotid artery, carotid bifurcation and extracranial portions of the internal carotid arteries are patent without significant focal stenosis.    There is medialization the carotid arteries in the retropharyngeal soft tissues slightly greater on the right.    Pharynx/larynx: Evaluation limited by scatter artifact from dental metal and  motion allowing for limitation the nasopharynx, oropharynx, hypopharynx larynx and proximal trachea are within normal limits allowing for motion limitation    Oral cavity    Glands: Bilateral parotid and submandibular glands are within normal limits. Subcentimeter hypodense nodule medial aspect of the mid right thyroid measuring 5 mm overall indeterminate.    No evidence for adenopathy throughout the neck by size criteria.    There is grade 1 anterolisthesis of C3 on C4 and C4 on C5.  There is no evidence for acute fracture cervical spine..  Ill-defined ground-glass patchy opacification in the visualized lungs which may represent in part atelectasis underlying small airway disease to be considered.      Impression       CTA head: Unremarkable CTA of the head specifically without evidence for proximal significant stenosis or occlusion.    CTA neck: Atherosclerotic plaquing right carotid bifurcation and proximal ICA with less than 50% proximal ICA stenosis by NASCET criteria..    CT head: No evidence for acute intracranial hemorrhage or definite abnormal parenchymal enhancement.    Please see above for additional details.      Electronically signed by: Diomedes Morris DO  Date: 06/28/2018  Time: 09:33             Last Resulted: 06/28/18 09:33 Order Details View Encounter Lab and Collection Details Routing Result History            Patient Result Comments     Viewed by Genesis Ugalde on 7/1/2018  7:47 PM   Written by Forrest Waldrop MD on 6/30/2018  9:33 PM   The CTA head and neck shows no evidence of active vasculitis. There is moderate atherosclerotic disease right carotid. Continue aspirin 325mg daily and pravastatin. There is incidentally noted thryoid nodule and some changes in the lung bases.Will need thyroid ultrasound and CT chest.Cristina will schedule. RJQ   Notes recorded by Forrset Waldrop MD on 6/21/2018 at 2:52 PM CDT  The temporal artery ultrasound is normal [facility]          PACS Images      Show images  for US Temporal Artery Bilateral   Reviewed By Tonya Waldrop MD on 6/21/2018 14:52   Result Notes for US Temporal Artery Bilateral     Notes recorded by Forrest Waldrop MD on 6/21/2018 at 2:52 PM CDT  The temporal artery ultrasound is normal [facility] [place]   US Temporal Artery Bilateral   Order: [id number]   Status:  Final result   Visible to patient:  Yes (Patient Portal)   Next appt:  10/11/2018 at 07:30 PM in Sleep Medicine (LAB, SLEEP [place])   Dx:  Giant cell arteritis with polymyalgia...   Details     Reading Physician Reading Date Result Priority   Herrera Rodriguez MD 6/21/2018    Steven Rodrigues MD 6/21/2018       Narrative     EXAMINATION:  US TEMPORAL ARTERY BILATERAL    CLINICAL HISTORY:  Encounter for follow-up examination after completed treatment for conditions other than malignant neoplasm    TECHNIQUE:  Limited grayscale and color flow ultrasound evaluation of the bilateral temporal arteries.    COMPARISON:  None    FINDINGS:  Bilateral temporal arteries are normal in diameter without focal stenosis or dilatation.  No hypoechoic wall thickening (halo sign).      Impression       Normal temporal arteries without evidence to suggest temporal arteritis.    Electronically signed by resident: Steven Rodrigues  Date: 06/21/2018  Time: 14:27    Electronically signed by: Herrera Rodriguez MD  Date: 06/21/2018  Time: 14:37              The MRA of the chest is normal, no evidence of large vessel vasculitis.[facility] [place]          PACS Images      Show images for MRA Chest   Reviewed By Tonya Waldrop MD on 3/20/2018 14:21   Result Notes for MRA Chest     Notes recorded by Forrest Waldrop MD on 3/20/2018 at 2:20 PM CDT  The MRA of the chest is normal, no evidence of large vessel vasculitis.[facility] [place]   MRA Chest   Order: [id number]   Status:  Final result   Visible to patient:  Yes (Patient Portal)   Next appt:  10/11/2018 at 07:30 PM in Sleep Medicine (LAB, SLEEP [place])   Dx:  Aortic disease; Personal history of  o...   Details     Reading Physician Reading Date Result Priority   Jeannie Hdz MD 3/20/2018    Herrera Rodriguez MD 3/20/2018       Narrative     EXAMINATION:  MRA CHEST    CLINICAL HISTORY:  Aortic dz, non-traumatic, known or suspectPersonal history of other diseases of the circulatory system    TECHNIQUE:  Multiplanar, multisequence imaging was performed through the chest to evaluate the aorta.  This exam is performed prior to and following the administration of 14 mL of intravenous Gadavist.    COMPARISON:  CT chest 02/19/2016    FINDINGS:  The thoracic aorta is normal in caliber, contour, and course.  There are 3 branch vessels from the aortic arch.  No evidence of stenosis or aneurysm.  No evidence of abnormal wall thickening or surrounding inflammatory change.    The visualized lungs are without significant abnormality.  The heart is within normal limits.    The visualized upper abdominal structures demonstrate hepatomegaly.    The osseous structures are without evidence for acute fracture.  Subcutaneous tissues are unremarkable.      Impression       Unremarkable MRA chest exam.    Electronically signed by resident: Jeannie Hdz  Date: 03/20/2018  Time: 10:36    Electronically signed by: Herrera Rodriguez MD  Date: 03/20/2018  Time: 11:16                 Reading Physician Reading Date Result Priority   Delvin Martinez MD 3/7/2019       Narrative     EXAMINATION:  MRI BRAIN W WO CONTRAST    CLINICAL HISTORY:  Headache, temporal arteritis suspected;superficial vessel imaging, Dr. Aggarwal protocol;Headache.  Normal ESR and CRP.    TECHNIQUE:  3D time-of-flight noncontrast MRA of the intracranial vasculature.  Contrast enhanced MRA of the upper cervical and cranial vasculature.  MPR and MIP reconstructions.  10 min following the administration of 10 mL of Gadavist intravenous contrast, vessel wall imaging of the superficial cranial arteries performed via high-resolution thin section T1 fat sat  images.    COMPARISON:  No direct priors.  Correlation is made with a temporal artery ultrasound dated 02/22/2019.    FINDINGS:  The visualized internal carotid arteries are normal in course and caliber.  The vertebrobasilar system is unremarkable.  The ACAs, MCAs and PCAs appear within normal limits.  No high-grade stenosis, major branch occlusion, or intracranial aneurysm identified.    The frontal and parietal branches of the superficial temporal artery and the occipital arteries assessed bilaterally.  Vessel wall imaging demonstrates no  significant mural thickening or enhancement to specifically indicate active vascular inflammation.      Impression       No compelling MR evidence of vascular inflammation involving the superficial cranial arteries.      Electronically signed by: Delvin Martinez MD  Date: 03/07/2019  Time: 14:08     Notes recorded by Forrest Waldrop MD on 2/22/2019 at 5:04 PM CST  The temporal artery ultrasound is also negative. [facility]          PACS Images for [employer] Viewer      Show images for US Temporal Artery Bilateral   PACS Images for [facility] [place] Viewer (Includes [facility] and [facility] Images)      Show images for US Temporal Artery Bilateral   All Reviewers List     Forrest Waldrop MD on 2/22/2019 17:09   Result Notes for US Temporal Artery Bilateral     Notes recorded by Forrest Waldrop MD on 2/22/2019 at 5:09 PM CST  The temporal artery ultrasound is negative. Would also suggest new MRI brain technique to visualize the temporal arteries. Cristina malcolm schedule. [facility]  ------    Notes recorded by Forrest Waldrop MD on 2/22/2019 at 5:04 PM CST  The temporal artery ultrasound is also negative. [facility]   US Temporal Artery Bilateral   Order: [id number]   Status:  Final result   Visible to patient:  Yes (Patient Portal) Next appt:  07/05/2019 at 10:30 AM in Gastroenterology (Alok Dyeks MD) Dx:  GCA (giant cell arteritis); Transient...   Details     Reading  Physician Reading Date Result Priority   Herrera Rodriguez MD 2/22/2019    Larissa Collins MD 2/22/2019       Narrative     EXAMINATION:  US TEMPORAL ARTERY BILATERAL    CLINICAL HISTORY:  Transient visual loss, bilateral    TECHNIQUE:  Grayscale and Doppler were used to evaluate the temporal arteries for temporal arteritis.    COMPARISON:  None    FINDINGS:  No ultrasound evidence of temporal arteries wall thickening to suggest temporal arteritis.  The temporal arteries are patent.      Impression       No ultrasound evidence of temporal arteritis.    Electronically signed by resident: Larissa Collins  Date: 02/22/2019  Time: 16:43    Electronically signed by: Herrera Rodriguez MD  Date: 02/22/2019  Time: 16:48             Last Resulted: 02/22/19 16:48 Order Details View Encounter Lab and Collection Details Routing Result History - Result Edited            Patient Result Comments     Viewed by Genesis Ugalde on 2/22/2019  8:00 PM   Written by Forrest Waldrop MD on 2/22/2019  5:09 PM   The temporal artery ultrasound is also negative. RJQ   The temporal artery ultrasound is negative. Would also suggest new MRI brain technique to visualize the temporal arteries. Cristina will schedule. RJQ   External Result Report     External Result Report   Narrative     EXAMINATION:  US TEMPORAL ARTERY BILATERAL    CLINICAL HISTORY:  Transient visual loss, bilateral    TECHNIQUE:  Grayscale and Doppler were used to evaluate the temporal arteries for temporal arteritis.    COMPARISON:  None    FINDINGS:  No ultrasound evidence of temporal arteries wall thickening to suggest temporal arteritis.  The temporal arteries are patent.   Impression       No ultrasound evidence of temporal arteritis.    Electronically signed by resident: Larissa Collins  Date: 02/22/2019  Time: 16:43    Electronically signed by: Herrera Rodriguez MD  Date: 02/22/2019  Time: 16:48    Encounter             The MRA of the chest shows no evidence of large  vessel vasculitis as might be seen with giant cell/temporal arteritis. The hyperintense area in the liver is unchanged from 2016 and you will be seeing Dr. Blackwell in Hepatology to review as well in April. RJQ          PACS Images for [employer] Viewer      Show images for MRA Chest   PACS Images for Vi[facility] [place] Viewer (Includes [facility] and [facility] Images)      Show images for MRA Chest   All Reviewers List     Forrest Waldrop MD on 2/22/2019 17:03   Result Notes for MRA Chest     Notes recorded by Forrest Waldrop MD on 2/22/2019 at 5:03 PM CST  The MRA of the chest shows no evidence of large vessel vasculitis as might be seen with giant cell/temporal arteritis. The hyperintense area in the liver is unchanged from 2016 and you will be seeing Dr. Blackwell in Hepatology to review as well in April. RJQ   MRA Chest   Order: [id number]   Status:  Final result   Visible to patient:  Yes (Patient Portal) Next appt:  07/05/2019 at 10:30 AM in Gastroenterology (Alok Dykes MD) Dx:  GCA (giant cell arteritis); Aortic di...   Details     Reading Physician Reading Date Result Priority   Marco Thornton MD 2/22/2019       Narrative     EXAMINATION:  MRA CHEST    CLINICAL HISTORY:  Aortic dz, non-traumatic, known or suspect;Encounter for follow-up examination after completed treatment for conditions other than malignant neoplasm    TECHNIQUE:  Multiplanar, multisequence imaging was performed through the chest to evaluate the aorta.  This exam is performed prior to and following the administration of 10 mL of intravenous gadobutrol.    COMPARISON:  MRA chest 03/20/2018.  MRI abdomen 04/18/2016.    FINDINGS:  There is a left-sided aortic arch with 3 branch vessels.  The thoracic aorta tapers normally without atherosclerotic calcification.  No dissection.    Heart is normal in size.  Pulmonary arteries distribute normally.    No axillary or mediastinal lymph node enlargement.  Hilar  contours are unremarkable.    Esophagus is normal in caliber and course.    Visualized lung parenchyma demonstrates no gross abnormalities noting limited evaluation due to technique.    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  Subcentimeter left renal cysts identified.  Remaining visualized upper abdominal structures demonstrate no significant abnormalities.    Subcutaneous soft tissues are within normal limits.  There is fatty atrophy of the right rectus abdominus muscle.    No marrow signal abnormality to suggest an infiltrative process.      Impression       1. No aortic dissection.  2. 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      Electronically signed by: Marco Thornton MD  Date: 02/22/2019  Time: 16:49                    Orders:  -     CBC Auto Differential; Future; Expected date: 09/19/2024  -     Renal Function Panel; Future; Expected date: 09/19/2024  -     Sedimentation rate; Future; Expected date: 09/19/2024  -     C-Reactive Protein; Future; Expected date: 09/19/2024    3. Hypertension associated with type 2 diabetes mellitus  -     empagliflozin (JARDIANCE) 10 mg tablet; Take 1 tablet (10 mg total) by mouth once daily.  Dispense: 90 tablet; Refill: 3    4. Fatty liver  Overview:  Elroy Reddy MD 8/8/2018    Cooper Jansen MD 8/8/2018       Narrative     EXAMINATION:  MRI ABDOMEN W WO CONTRAST    CLINICAL HISTORY:  Liver lesion, >1cm, normal liver, no known malignancy;  Liver disease, unspecified    TECHNIQUE:  Multiplanar, multi-sequence MR imaging of the abdomen was performed before and after administration of 10 cc of Gadavist gadolinium-based intravenous contrast per the liver protocol.    COMPARISON:  MRI abdomen  with and without contrast dated 04/18/2016    FINDINGS:  Pulmonary Bases: No pleural effusion.  7 mm right lower lobe nodule (series 3, image 16).    Liver: The liver is grossly steatotic appears normal morphology.  There is a stable 1.3 cm hyperenhancing focus within the posterior right hepatic segment VI (series 11, image 39).  This focus demonstrates intrinsic T2 hyperintensity and demonstrates persistent enhancement on subsequent post-contrast sequences.  Hepatic vasculature is patent.    Bile Ducts: normal    Gallbladder: Surgically absent    Pancreas: normal.    Spleen: normal.    Proximal Gastrointestinal tract: normal.    Mesentery: normal    Adrenal Glands: normal.    Kidneys: normal.    Aorta and Abdominal Vasculature: normal.    Lymph nodes: no pathologically enlarged lymph nodes are seen.    Body wall: normal    Other: No free fluid.  No abnormal marrow enhancing lesion.      Impression       Findings suggestive of a small right hepatic lobe hemangioma, not significantly changed from the 04/18/2016 exam.  No new liver findings.    Hepatic steatosis.    Right basilar 7 mm nodule, better characterized on recent prior CT chest.    Electronically signed by resident: Cooper Jansen  Date: 08/08/2018  Time: 12:39    Electronically signed by: Elroy Reddy MD  Date: 08/08/2018  Time: 15:04     Results for EGBI, ANN V (MRN [id number]) as of 3/22/2021 08:42   Ref. Range 3/18/2021 14:00   Alkaline Phosphatase Latest Ref Range: 55 - 135 U/L 97   PROTEIN TOTAL Latest Ref Range: 6.0 - 8.4 g/dL 6.4   Albumin Latest Ref Range: 3.5 - 5.2 g/dL 3.9   BILIRUBIN TOTAL Latest Ref Range: 0.1 - 1.0 mg/dL 1.5 (H)   AST Latest Ref Range: 10 - 40 U/L 53 (H)   ALT Latest Ref Range: 10 - 44 U/L 72 (H)         5. Leukocytosis, unspecified type  -     CBC Auto Differential; Future; Expected date: 09/19/2024    6. Function kidney decreased  -     Renal Function Panel; Future; Expected date: 09/19/2024        I spent a total of 41  minutes on the day of the visit.This includes face to face time and non-face to face time preparing to see the patient (eg, review of tests), obtaining and/or reviewing separately obtained history, documenting clinical information in the electronic or other health record, independently interpreting results and communicating results to the patient/family/caregiver, or care coordinator.          Follow up if symptoms worsen or fail to improve.

## 2024-09-20 ENCOUNTER — PATIENT MESSAGE (OUTPATIENT)
Dept: FAMILY MEDICINE | Facility: CLINIC | Age: 69
End: 2024-09-20
Payer: MEDICARE

## 2024-09-20 DIAGNOSIS — N28.9 FUNCTION KIDNEY DECREASED: Primary | ICD-10-CM

## 2024-09-20 DIAGNOSIS — E78.5 TYPE 2 DIABETES MELLITUS WITH HYPERLIPIDEMIA: ICD-10-CM

## 2024-09-20 DIAGNOSIS — E11.69 TYPE 2 DIABETES MELLITUS WITH HYPERLIPIDEMIA: ICD-10-CM

## 2024-09-20 NOTE — PROGRESS NOTES
Please notify the patient.     Decreased kidney function but stable in comparison with previous reports .  Increase fluid intake.  Avoid over-the-counter medicines like naproxen or ibuprofen.     Follow-up appointment with the kidney doctor.

## 2024-09-23 ENCOUNTER — PATIENT MESSAGE (OUTPATIENT)
Dept: RHEUMATOLOGY | Facility: CLINIC | Age: 69
End: 2024-09-23
Payer: MEDICARE

## 2024-09-23 ENCOUNTER — PATIENT MESSAGE (OUTPATIENT)
Dept: FAMILY MEDICINE | Facility: CLINIC | Age: 69
End: 2024-09-23
Payer: MEDICARE

## 2024-09-23 DIAGNOSIS — M31.6 GCA (GIANT CELL ARTERITIS): ICD-10-CM

## 2024-09-23 RX ORDER — TOCILIZUMAB 180 MG/ML
162 INJECTION, SOLUTION SUBCUTANEOUS
Qty: 12 EACH | Refills: 0 | Status: SHIPPED | OUTPATIENT
Start: 2024-09-23

## 2024-09-23 NOTE — TELEPHONE ENCOUNTER
T/C to patient.  States she still has stomach pain that started on 9/10/24.  Also states pain level is about an 8 when it occurs.  Patient stated she had an office visit with Dr. Little on 9/19/24 and labs were drawn.  He is aware of her symptoms and she will follow up with Dr. Little today for stomach pain and blood sugar. Also will follow up with home health regarding her glucometer.  Patient states she got actemra infusion in the hospital on 9/8 that was ordered by Dr. Lobo and Fellow.  States she wants to follow up with Dr Waldrop for future infusions.  Patient advised to see seek urgent care or ER if pain continues.

## 2024-09-24 ENCOUNTER — OFFICE VISIT (OUTPATIENT)
Dept: FAMILY MEDICINE | Facility: CLINIC | Age: 69
End: 2024-09-24
Payer: MEDICARE

## 2024-09-24 ENCOUNTER — LAB VISIT (OUTPATIENT)
Dept: LAB | Facility: HOSPITAL | Age: 69
End: 2024-09-24
Attending: FAMILY MEDICINE
Payer: MEDICARE

## 2024-09-24 VITALS
HEIGHT: 63 IN | OXYGEN SATURATION: 96 % | BODY MASS INDEX: 44.53 KG/M2 | WEIGHT: 251.31 LBS | HEART RATE: 106 BPM | DIASTOLIC BLOOD PRESSURE: 76 MMHG | SYSTOLIC BLOOD PRESSURE: 120 MMHG

## 2024-09-24 DIAGNOSIS — R35.0 URINARY FREQUENCY: ICD-10-CM

## 2024-09-24 DIAGNOSIS — R10.13 EPIGASTRIC PAIN: ICD-10-CM

## 2024-09-24 DIAGNOSIS — R10.12 LEFT UPPER QUADRANT ABDOMINAL PAIN: Primary | ICD-10-CM

## 2024-09-24 DIAGNOSIS — R10.9 ABDOMINAL PAIN, UNSPECIFIED ABDOMINAL LOCATION: ICD-10-CM

## 2024-09-24 DIAGNOSIS — R10.12 LEFT UPPER QUADRANT ABDOMINAL PAIN: ICD-10-CM

## 2024-09-24 LAB
BACTERIA #/AREA URNS HPF: NORMAL /HPF
BILIRUB UR QL STRIP: NEGATIVE
CLARITY UR: CLEAR
COLOR UR: YELLOW
GLUCOSE UR QL STRIP: ABNORMAL
HGB UR QL STRIP: NEGATIVE
HYALINE CASTS #/AREA URNS LPF: 0 /LPF
KETONES UR QL STRIP: ABNORMAL
LEUKOCYTE ESTERASE UR QL STRIP: NEGATIVE
MICROSCOPIC COMMENT: NORMAL
NITRITE UR QL STRIP: NEGATIVE
PH UR STRIP: 6 [PH] (ref 5–8)
PROT UR QL STRIP: ABNORMAL
RBC #/AREA URNS HPF: 3 /HPF (ref 0–4)
SP GR UR STRIP: >1.03 (ref 1–1.03)
SQUAMOUS #/AREA URNS HPF: 1 /HPF
URN SPEC COLLECT METH UR: ABNORMAL
UROBILINOGEN UR STRIP-ACNC: NEGATIVE EU/DL
WBC #/AREA URNS HPF: 2 /HPF (ref 0–5)
YEAST URNS QL MICRO: NORMAL

## 2024-09-24 PROCEDURE — 1101F PT FALLS ASSESS-DOCD LE1/YR: CPT | Mod: CPTII,S$GLB,, | Performed by: FAMILY MEDICINE

## 2024-09-24 PROCEDURE — 1159F MED LIST DOCD IN RCRD: CPT | Mod: CPTII,S$GLB,, | Performed by: FAMILY MEDICINE

## 2024-09-24 PROCEDURE — 99214 OFFICE O/P EST MOD 30 MIN: CPT | Mod: S$GLB,,, | Performed by: FAMILY MEDICINE

## 2024-09-24 PROCEDURE — 3061F NEG MICROALBUMINURIA REV: CPT | Mod: CPTII,S$GLB,, | Performed by: FAMILY MEDICINE

## 2024-09-24 PROCEDURE — 3008F BODY MASS INDEX DOCD: CPT | Mod: CPTII,S$GLB,, | Performed by: FAMILY MEDICINE

## 2024-09-24 PROCEDURE — 1111F DSCHRG MED/CURRENT MED MERGE: CPT | Mod: CPTII,S$GLB,, | Performed by: FAMILY MEDICINE

## 2024-09-24 PROCEDURE — 3288F FALL RISK ASSESSMENT DOCD: CPT | Mod: CPTII,S$GLB,, | Performed by: FAMILY MEDICINE

## 2024-09-24 PROCEDURE — 3044F HG A1C LEVEL LT 7.0%: CPT | Mod: CPTII,S$GLB,, | Performed by: FAMILY MEDICINE

## 2024-09-24 PROCEDURE — 1125F AMNT PAIN NOTED PAIN PRSNT: CPT | Mod: CPTII,S$GLB,, | Performed by: FAMILY MEDICINE

## 2024-09-24 PROCEDURE — 87086 URINE CULTURE/COLONY COUNT: CPT | Performed by: FAMILY MEDICINE

## 2024-09-24 PROCEDURE — 3074F SYST BP LT 130 MM HG: CPT | Mod: CPTII,S$GLB,, | Performed by: FAMILY MEDICINE

## 2024-09-24 PROCEDURE — 99999 PR PBB SHADOW E&M-EST. PATIENT-LVL IV: CPT | Mod: PBBFAC,HCNC,, | Performed by: FAMILY MEDICINE

## 2024-09-24 PROCEDURE — 4010F ACE/ARB THERAPY RXD/TAKEN: CPT | Mod: CPTII,S$GLB,, | Performed by: FAMILY MEDICINE

## 2024-09-24 PROCEDURE — 1160F RVW MEDS BY RX/DR IN RCRD: CPT | Mod: CPTII,S$GLB,, | Performed by: FAMILY MEDICINE

## 2024-09-24 PROCEDURE — 3066F NEPHROPATHY DOC TX: CPT | Mod: CPTII,S$GLB,, | Performed by: FAMILY MEDICINE

## 2024-09-24 PROCEDURE — 3078F DIAST BP <80 MM HG: CPT | Mod: CPTII,S$GLB,, | Performed by: FAMILY MEDICINE

## 2024-09-24 PROCEDURE — 81000 URINALYSIS NONAUTO W/SCOPE: CPT | Performed by: FAMILY MEDICINE

## 2024-09-24 PROCEDURE — 1157F ADVNC CARE PLAN IN RCRD: CPT | Mod: CPTII,S$GLB,, | Performed by: FAMILY MEDICINE

## 2024-09-24 NOTE — PROGRESS NOTES
Subjective    Patient ID: Genesis Ugalde is a 67 yo F      Chief Complaint: LUQ abdominal pain radiating to the back  67 yo F with PMHx of asthma, DM, HTN, acid reflux and anxiety who presents to the clinic with persistent 8/10 LUQ abdominal pain radiating to her left back s/p recent hospitalization on 9/10 for temporal arteritis. Patient states she initially thought her symptoms were due to acid reflux and chronic steroid taking, however she reports this pain is different. She took tramadol last night with minimal relief. Patient notes nausea since yesterday but otherwise denies chest pain, SOB, vomiting, diarrhea. She does mention urinating more than usual and reports some dysuria since being discharged from the hospital and is concerned it is due to the steroid use causing her blood sugar levels to rise. Patient reports exacerbation of pain with inspiration and upon eating. Denies fever, chills, sore throat.     Review of Systems   Constitutional: Negative.    HENT: Negative.     Eyes: Negative.    Respiratory: Negative.     Cardiovascular: Negative.    Gastrointestinal:  Positive for abdominal pain.   Genitourinary: Negative.    Musculoskeletal:  Positive for back pain.   Skin: Negative.    Neurological: Negative.    Endo/Heme/Allergies: Negative.    Psychiatric/Behavioral: Negative.            Physical Exam  Constitutional:       General: She is in acute distress.      Appearance: She is obese.   HENT:      Head: Normocephalic and atraumatic.      Nose: Nose normal.   Eyes:      General: No scleral icterus.        Right eye: No discharge.         Left eye: No discharge.      Conjunctiva/sclera: Conjunctivae normal.      Pupils: Pupils are equal, round, and reactive to light.   Cardiovascular:      Rate and Rhythm: Tachycardia present.      Pulses: Normal pulses.   Pulmonary:      Effort: Pulmonary effort is normal.      Breath sounds: Normal breath sounds.   Abdominal:      Palpations: Abdomen is soft.       Tenderness: There is abdominal tenderness (LUQ).   Musculoskeletal:         General: Tenderness (Left upper back) present.      Cervical back: Normal range of motion and neck supple.   Skin:     General: Skin is warm and dry.   Neurological:      General: No focal deficit present.      Mental Status: She is alert.            Assessment & Plan    Abdominal pain   - schedule CT scan and labs     Blood sugar levels   -schedule labs

## 2024-09-24 NOTE — PROGRESS NOTES
Subjective     Patient ID: Genesis Ugalde is a 68 y.o. female.    Chief Complaint: Abdominal Pain and Fatigue    68 years old came to the clinic with epigastric and left upper pain worsening for the last week.  No fever or chills.  No nausea or vomiting.    Abdominal Pain  This is a chronic problem. The current episode started 1 to 4 weeks ago. The onset quality is gradual. The problem occurs daily. The problem has been unchanged. The pain is located in the LUQ. The pain is at a severity of 9/10. The pain is moderate. The quality of the pain is aching. The abdominal pain radiates to the LUQ. Pertinent negatives include no nausea or vomiting. The pain is aggravated by palpation. She has tried antacids for the symptoms. The treatment provided mild relief.   Fatigue  This is a chronic problem. The current episode started more than 1 year ago. The problem has been unchanged. Associated symptoms include abdominal pain and fatigue. Pertinent negatives include no nausea or vomiting. Nothing aggravates the symptoms. She has tried nothing for the symptoms. The treatment provided no relief.     Review of Systems   Constitutional:  Positive for fatigue.   HENT: Negative.     Eyes: Negative.    Respiratory: Negative.     Gastrointestinal:  Positive for abdominal pain. Negative for nausea and vomiting.   Genitourinary: Negative.    Musculoskeletal: Negative.    Neurological: Negative.    Psychiatric/Behavioral: Negative.            Objective     Physical Exam  Constitutional:       General: She is not in acute distress.     Appearance: She is well-developed. She is not diaphoretic.   HENT:      Head: Normocephalic and atraumatic.      Right Ear: External ear normal.      Left Ear: External ear normal.      Nose: Nose normal.      Mouth/Throat:      Pharynx: No oropharyngeal exudate.   Eyes:      General: No scleral icterus.        Right eye: No discharge.         Left eye: No discharge.      Conjunctiva/sclera: Conjunctivae  normal.      Pupils: Pupils are equal, round, and reactive to light.   Neck:      Thyroid: No thyromegaly.      Vascular: No JVD.      Trachea: No tracheal deviation.   Cardiovascular:      Rate and Rhythm: Normal rate and regular rhythm.      Heart sounds: Normal heart sounds. No murmur heard.     No friction rub. No gallop.   Pulmonary:      Effort: Pulmonary effort is normal. No respiratory distress.      Breath sounds: Normal breath sounds. No stridor. No wheezing or rales.   Chest:      Chest wall: No tenderness.   Abdominal:      General: Bowel sounds are normal. There is no distension.      Palpations: Abdomen is soft. There is no mass.      Tenderness: There is abdominal tenderness in the epigastric area and left upper quadrant. There is no guarding or rebound.   Musculoskeletal:         General: No tenderness. Normal range of motion.      Cervical back: Normal range of motion and neck supple.   Lymphadenopathy:      Cervical: No cervical adenopathy.   Skin:     General: Skin is warm and dry.      Coloration: Skin is not pale.      Findings: No erythema or rash.   Neurological:      Mental Status: She is alert and oriented to person, place, and time.      Cranial Nerves: No cranial nerve deficit.      Motor: No abnormal muscle tone.      Coordination: Coordination normal.      Deep Tendon Reflexes: Reflexes are normal and symmetric.   Psychiatric:         Behavior: Behavior normal.         Thought Content: Thought content normal.         Judgment: Judgment normal.            Assessment and Plan     1. Left upper quadrant abdominal pain  -     Urinalysis; Future  -     Comprehensive Metabolic Panel; Future; Expected date: 09/24/2024  -     CBC Auto Differential; Future; Expected date: 09/24/2024  -     Amylase; Future; Expected date: 09/24/2024  -     Lipase; Future; Expected date: 09/24/2024    2. Abdominal pain, unspecified abdominal location  -     CT Abdomen Pelvis With IV Contrast Routine Oral Contrast;  Future; Expected date: 09/24/2024  -     C-Reactive Protein; Future; Expected date: 09/24/2024  -     Sedimentation rate; Future; Expected date: 09/24/2024    3. Epigastric pain  -     CT Abdomen Pelvis With IV Contrast Routine Oral Contrast; Future; Expected date: 09/24/2024    4. Urinary frequency  -     Urine culture; Future                 I spent a total of 30 minutes on the day of the visit.This includes face to face time and non-face to face time preparing to see the patient (eg, review of tests), obtaining and/or reviewing separately obtained history, documenting clinical information in the electronic or other health record, independently interpreting results and communicating results to the patient/family/caregiver, or care coordinator.

## 2024-09-25 ENCOUNTER — PATIENT MESSAGE (OUTPATIENT)
Dept: RHEUMATOLOGY | Facility: CLINIC | Age: 69
End: 2024-09-25
Payer: MEDICARE

## 2024-09-25 ENCOUNTER — HOSPITAL ENCOUNTER (OUTPATIENT)
Dept: RADIOLOGY | Facility: HOSPITAL | Age: 69
Discharge: HOME | End: 2024-09-25
Attending: FAMILY MEDICINE
Payer: MEDICARE

## 2024-09-25 DIAGNOSIS — R10.9 ABDOMINAL PAIN, UNSPECIFIED ABDOMINAL LOCATION: ICD-10-CM

## 2024-09-25 DIAGNOSIS — R10.13 EPIGASTRIC PAIN: ICD-10-CM

## 2024-09-25 PROCEDURE — 74177 CT ABD & PELVIS W/CONTRAST: CPT | Mod: 26,,, | Performed by: RADIOLOGY

## 2024-09-25 PROCEDURE — 74177 CT ABD & PELVIS W/CONTRAST: CPT | Mod: TC

## 2024-09-25 PROCEDURE — 25500020 PHARM REV CODE 255: Performed by: FAMILY MEDICINE

## 2024-09-25 PROCEDURE — A9698 NON-RAD CONTRAST MATERIALNOC: HCPCS | Performed by: FAMILY MEDICINE

## 2024-09-25 RX ORDER — REPAGLINIDE 0.5 MG/1
0.5 TABLET ORAL
Qty: 270 TABLET | Refills: 3 | Status: SHIPPED | OUTPATIENT
Start: 2024-09-25 | End: 2025-09-25

## 2024-09-25 RX ADMIN — IOHEXOL 100 ML: 350 INJECTION, SOLUTION INTRAVENOUS at 05:09

## 2024-09-25 RX ADMIN — IOHEXOL 1000 ML: 12 SOLUTION ORAL at 04:09

## 2024-09-26 LAB
BACTERIA UR CULT: NORMAL
BACTERIA UR CULT: NORMAL

## 2024-09-27 ENCOUNTER — EXTERNAL HOME HEALTH (OUTPATIENT)
Dept: HOME HEALTH SERVICES | Facility: HOSPITAL | Age: 69
End: 2024-09-27
Payer: MEDICARE

## 2024-09-27 ENCOUNTER — TELEPHONE (OUTPATIENT)
Dept: FAMILY MEDICINE | Facility: CLINIC | Age: 69
End: 2024-09-27
Payer: MEDICARE

## 2024-09-27 ENCOUNTER — PATIENT OUTREACH (OUTPATIENT)
Dept: FAMILY MEDICINE | Facility: CLINIC | Age: 69
End: 2024-09-27
Payer: MEDICARE

## 2024-10-02 ENCOUNTER — HOSPITAL ENCOUNTER (OUTPATIENT)
Dept: RADIOLOGY | Facility: HOSPITAL | Age: 69
Discharge: HOME | End: 2024-10-02
Attending: STUDENT IN AN ORGANIZED HEALTH CARE EDUCATION/TRAINING PROGRAM
Payer: MEDICARE

## 2024-10-02 ENCOUNTER — OFFICE VISIT (OUTPATIENT)
Dept: RHEUMATOLOGY | Facility: CLINIC | Age: 69
End: 2024-10-02
Payer: MEDICARE

## 2024-10-02 VITALS
SYSTOLIC BLOOD PRESSURE: 114 MMHG | HEART RATE: 88 BPM | HEIGHT: 63 IN | DIASTOLIC BLOOD PRESSURE: 88 MMHG | WEIGHT: 253.5 LBS | BODY MASS INDEX: 44.92 KG/M2

## 2024-10-02 DIAGNOSIS — M25.511 CHRONIC RIGHT SHOULDER PAIN: ICD-10-CM

## 2024-10-02 DIAGNOSIS — G89.29 CHRONIC RIGHT SHOULDER PAIN: ICD-10-CM

## 2024-10-02 DIAGNOSIS — M31.5 GIANT CELL ARTERITIS WITH POLYMYALGIA RHEUMATICA: Primary | Chronic | ICD-10-CM

## 2024-10-02 DIAGNOSIS — M54.12 CERVICAL RADICULOPATHY: ICD-10-CM

## 2024-10-02 PROCEDURE — 3066F NEPHROPATHY DOC TX: CPT | Mod: HCNC,CPTII,GC,S$GLB | Performed by: STUDENT IN AN ORGANIZED HEALTH CARE EDUCATION/TRAINING PROGRAM

## 2024-10-02 PROCEDURE — 1111F DSCHRG MED/CURRENT MED MERGE: CPT | Mod: HCNC,CPTII,GC,S$GLB | Performed by: STUDENT IN AN ORGANIZED HEALTH CARE EDUCATION/TRAINING PROGRAM

## 2024-10-02 PROCEDURE — 3288F FALL RISK ASSESSMENT DOCD: CPT | Mod: HCNC,CPTII,GC,S$GLB | Performed by: STUDENT IN AN ORGANIZED HEALTH CARE EDUCATION/TRAINING PROGRAM

## 2024-10-02 PROCEDURE — 1101F PT FALLS ASSESS-DOCD LE1/YR: CPT | Mod: HCNC,CPTII,GC,S$GLB | Performed by: STUDENT IN AN ORGANIZED HEALTH CARE EDUCATION/TRAINING PROGRAM

## 2024-10-02 PROCEDURE — 72040 X-RAY EXAM NECK SPINE 2-3 VW: CPT | Mod: 26,HCNC,, | Performed by: RADIOLOGY

## 2024-10-02 PROCEDURE — 3008F BODY MASS INDEX DOCD: CPT | Mod: HCNC,CPTII,GC,S$GLB | Performed by: STUDENT IN AN ORGANIZED HEALTH CARE EDUCATION/TRAINING PROGRAM

## 2024-10-02 PROCEDURE — 4010F ACE/ARB THERAPY RXD/TAKEN: CPT | Mod: HCNC,CPTII,GC,S$GLB | Performed by: STUDENT IN AN ORGANIZED HEALTH CARE EDUCATION/TRAINING PROGRAM

## 2024-10-02 PROCEDURE — 73030 X-RAY EXAM OF SHOULDER: CPT | Mod: 26,HCNC,RT, | Performed by: RADIOLOGY

## 2024-10-02 PROCEDURE — 3079F DIAST BP 80-89 MM HG: CPT | Mod: HCNC,CPTII,GC,S$GLB | Performed by: STUDENT IN AN ORGANIZED HEALTH CARE EDUCATION/TRAINING PROGRAM

## 2024-10-02 PROCEDURE — 3074F SYST BP LT 130 MM HG: CPT | Mod: HCNC,CPTII,GC,S$GLB | Performed by: STUDENT IN AN ORGANIZED HEALTH CARE EDUCATION/TRAINING PROGRAM

## 2024-10-02 PROCEDURE — 99999 PR PBB SHADOW E&M-EST. PATIENT-LVL V: CPT | Mod: PBBFAC,HCNC,GC, | Performed by: STUDENT IN AN ORGANIZED HEALTH CARE EDUCATION/TRAINING PROGRAM

## 2024-10-02 PROCEDURE — 73030 X-RAY EXAM OF SHOULDER: CPT | Mod: TC,HCNC,RT

## 2024-10-02 PROCEDURE — 3044F HG A1C LEVEL LT 7.0%: CPT | Mod: HCNC,CPTII,GC,S$GLB | Performed by: STUDENT IN AN ORGANIZED HEALTH CARE EDUCATION/TRAINING PROGRAM

## 2024-10-02 PROCEDURE — 3061F NEG MICROALBUMINURIA REV: CPT | Mod: HCNC,CPTII,GC,S$GLB | Performed by: STUDENT IN AN ORGANIZED HEALTH CARE EDUCATION/TRAINING PROGRAM

## 2024-10-02 PROCEDURE — 1125F AMNT PAIN NOTED PAIN PRSNT: CPT | Mod: HCNC,CPTII,GC,S$GLB | Performed by: STUDENT IN AN ORGANIZED HEALTH CARE EDUCATION/TRAINING PROGRAM

## 2024-10-02 PROCEDURE — 1157F ADVNC CARE PLAN IN RCRD: CPT | Mod: HCNC,CPTII,GC,S$GLB | Performed by: STUDENT IN AN ORGANIZED HEALTH CARE EDUCATION/TRAINING PROGRAM

## 2024-10-02 PROCEDURE — 99213 OFFICE O/P EST LOW 20 MIN: CPT | Mod: HCNC,GC,S$GLB, | Performed by: STUDENT IN AN ORGANIZED HEALTH CARE EDUCATION/TRAINING PROGRAM

## 2024-10-02 PROCEDURE — 72040 X-RAY EXAM NECK SPINE 2-3 VW: CPT | Mod: TC,HCNC

## 2024-10-02 ASSESSMENT — ROUTINE ASSESSMENT OF PATIENT INDEX DATA (RAPID3)
AM STIFFNESS SCORE: 1, YES
WHEN YOU AWAKENED IN THE MORNING OVER THE LAST WEEK, PLEASE INDICATE THE AMOUNT OF TIME IT TAKES UNTIL YOU ARE AS LIMBER AS YOU WILL BE FOR THE DAY: 1
TOTAL RAPID3 SCORE: 4.33
PATIENT GLOBAL ASSESSMENT SCORE: 4
PSYCHOLOGICAL DISTRESS SCORE: 5.5
FATIGUE SCORE: 5
MDHAQ FUNCTION SCORE: 1.5
PAIN SCORE: 4

## 2024-10-02 NOTE — PROGRESS NOTES
I have personally reviewed the history, confirmed exam findings, and discussed assessment and plan with fellow.       Latest Reference Range & Units 09/24/24 16:53   WBC 3.90 - 12.70 K/uL 21.61 (H)   RBC 4.00 - 5.40 M/uL 4.43   Hemoglobin 12.0 - 16.0 g/dL 14.1   Hematocrit 37.0 - 48.5 % 41.5   MCV 82 - 98 fL 94   MCH 27.0 - 31.0 pg 31.8 (H)   MCHC 32.0 - 36.0 g/dL 34.0   RDW 11.5 - 14.5 % 13.6   Platelet Count 150 - 450 K/uL 201   MPV 9.2 - 12.9 fL 11.1   Gran % 38.0 - 73.0 % 88.0 (H)   Lymph % 18.0 - 48.0 % 6.8 (L)   Mono % 4.0 - 15.0 % 3.1 (L)   Eos % 0.0 - 8.0 % 0.0   Basophil % 0.0 - 1.9 % 0.3   Immature Granulocytes 0.0 - 0.5 % 1.8 (H)   Gran # (ANC) 1.8 - 7.7 K/uL 19.0 (H)   Lymph # 1.0 - 4.8 K/uL 1.5   Mono # 0.3 - 1.0 K/uL 0.7   Eos # 0.0 - 0.5 K/uL 0.0   Baso # 0.00 - 0.20 K/uL 0.06   Immature Grans (Abs) 0.00 - 0.04 K/uL 0.39 (H)   nRBC 0 /100 WBC 0   Differential Method  Automated   Sed Rate 0 - 36 mm/Hr <2   Sodium 136 - 145 mmol/L 142   Potassium 3.5 - 5.1 mmol/L 4.3   Chloride 95 - 110 mmol/L 107   CO2 23 - 29 mmol/L 21 (L)   Anion Gap 8 - 16 mmol/L 14   BUN 8 - 23 mg/dL 24 (H)   Creatinine 0.5 - 1.4 mg/dL 1.0   eGFR >60 mL/min/1.73 m^2 >60   Glucose 70 - 110 mg/dL 200 (H)   Calcium 8.7 - 10.5 mg/dL 10.1   ALP 55 - 135 U/L 109   PROTEIN TOTAL 6.0 - 8.4 g/dL 7.2   Albumin 3.5 - 5.2 g/dL 4.0   BILIRUBIN TOTAL 0.1 - 1.0 mg/dL 1.7 (H)   AST 10 - 40 U/L 80 (H)   ALT 10 - 44 U/L 187 (H)   Amylase Level 20 - 110 U/L 50   Lipase 4 - 60 U/L 24   CRP 0.0 - 8.2 mg/L 4.3   (H): Data is abnormally high  (L): Data is abnormally low        GCA in remission  s/p pulse Solu-Medrol in [facility], then prednisone 60mg daily, now tapered to 40mg daily. Also received tocilizumab 6mg/kg IV in hospital in place of her sc weekly tocilizumab  right cervical radiculopathy numbness and tingling right upper arm, reproduced by all neck ROM  Right rotator cuff tendinopathy and likely tear with AROM limited to 50 degrees abduction with  painful arc, + Neer + Ross-Tay and weakness with resisted abduction  Increased transaminases ?related to tocilizumab superimposed on NAFLD.cirrhosis  T2 DM worsened with increased prednisone dose      HFP today, message Brooke Fitzgerald in Hepatology  X-ray cervical spine and right shoulder  Ref to PT for right cervical radiculopathy and right rotator cuff tendinitis, Tear  Ref to Back and Spine for right cervical radiculopathy  Ref to Sports Medicine for right rotator cuff tendinitis, tear  Decrease prednisone 35mg daily x 1 wk, 30mg daily x 1 wk, 25mg daily x 1 wk, 20mg daily x 1 wk, 15mg daily x 1 wk, 12.5mg daily x 2 wks, then 10mg daily and continue  Schedule tocilizumab 6mg/kg IV monthly dose #2  10/8/24  Monthly cbc and cmp  RTC 2 months with standing labs

## 2024-10-02 NOTE — PROGRESS NOTES
9/30/2024     8:11 AM   Rapid3 Question Responses and Scores   MDHAQ Score 1.5   Psychologic Score 5.5   Pain Score 4   When you awakened in the morning OVER THE LAST WEEK, did you feel stiff? Yes   If Yes, please indicate the number of hours until you are as limber as you will be for the day 1   Fatigue Score 5   Global Health Score 4   RAPID3 Score 4.33        Answers submitted by the patient for this visit:  Rheumatology Questionnaire (Submitted on 9/30/2024)  fever: No  eye redness: Yes  mouth sores: No  headaches: Yes  shortness of breath: Yes  chest pain: Yes  trouble swallowing: Yes  diarrhea: No  constipation: No  unexpected weight change: No  genital sore: No  dysuria: Yes  During the last 3 days, have you had a skin rash?: No  Bruises or bleeds easily: No  cough: No

## 2024-10-02 NOTE — PROGRESS NOTES
Subjective:      Patient ID: Genesis Ugalde is a 68 y.o. 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     Chief Complaint: Disease Management and Follow-up    HPI    Initial hx:  - Pt was initially diagnosed with GCA dating back to 2004, had a recurrence 2012 and has been on tocilizumab since. Has had multiple recurrences leading to increased prednisone doses.   - Previous treatments: CYC  - Current treatments: Weekly tocilizumab, was on 30mg prednisone prior to presentation  - Had last been seen by Dr. Waldrop 7/29/2024, at that point had ordered more imaging and increased steroids after having an increase in headaches and temple pain. Had imaging done at that time (MRA of head as well as bilateral temporal artery US) that had shown evidence of bilateral temporal arteritis. Plan was to continue escalated steroid with taper plan.    Interval hx:  - Was hospitalized for recurrence of GCA    Pain level has been good. Right shoulder is very painful. Last injection was years ago. Feels that it's too hard to get dressed with how painful it is and is hoping that she can get an injection today.    Feels that it's stable. Can see but everything is blurry - can see light/dark contrast.     Still has some mild headaches - present but better. No other symptoms.     Discharged on 60 - decreased by 10mg every 2 weeks and is now at 40mg.     Has been having a lot of problems with stomach pain. Appears to be linked to her kidney.     Hasn't been on any medications for her diabetes. Is having difficulty getting medication for her diabetes.     Review of Systems   Constitutional:  Negative for fever and unexpected weight change.   HENT:  Positive for trouble swallowing. Negative for mouth sores.    Eyes:  Positive for redness.   Respiratory:  Positive for shortness of breath. Negative for  "cough.    Cardiovascular:  Positive for chest pain.   Gastrointestinal:  Negative for constipation and diarrhea.   Genitourinary:  Positive for dysuria. Negative for genital sores.   Skin:  Negative for rash.   Neurological:  Positive for headaches.   Hematological:  Does not bruise/bleed easily.        Objective:   /88 (BP Location: Right arm, Patient Position: Sitting)   Pulse 88   Ht 5' 3" (1.6 m)   Wt 115 kg (253 lb 8.5 oz)   BMI 44.91 kg/m²   Physical Exam   Constitutional: She is oriented to person, place, and time. No distress.   HENT:   Head: Normocephalic and atraumatic.   Cardiovascular: Normal rate, regular rhythm and normal heart sounds.   Pulmonary/Chest: Effort normal and breath sounds normal. No respiratory distress.   Abdominal: Soft.   Musculoskeletal:         General: No tenderness. Normal range of motion.   Neurological: She is alert and oriented to person, place, and time.   Skin: Skin is warm and dry. No rash noted.   Psychiatric: Her behavior is normal. Judgment and thought content normal.        7/13/2021 9/15/2023   Tender (MILLER-28) 0 / 28  26 / 28    Swollen (MILLER-28) 0 / 28  0 / 28    Provider Global -- --   Patient Global 50 / 100 60 / 100   ESR -- --   CRP 0.7 mg/L --   MILLER-28 (ESR) -- --   MILLER-28 (CRP) 1.85 (Remission) --   CDAI Score -- --        Assessment:   Genesis Ugalde is a 68 y.o. 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     1. Giant cell arteritis with polymyalgia rheumatica        GCA  Pt was initially diagnosed with GCA in 2004 and has been followed by Dr. Waldrop. She had been well managed on subq tocilizumab until she was hospitalized 9/6 after having some vision changes and signs of active disease on both US and MRA. She was re-pulsed and started on tocilizumab infusions.   Diagnosis/Important Hx: Imaging+, no " biopsy  Relevant serologies: Inflammatory markers have been normal  Previous Therapy: Tocilizumab injections  Current Therapy: Tocilizumab infusions, prednisone down to 40mg and tolerated  Imaging/Procedures:   9/2024  MRA: Abnormal mural enhancement associated with the superficial temporal arteries greater on the left concerning for active arteritis which corresponds to abnormality seen on ultrasound   Temporal US: Right temporal artery: Right temporal artery is small in caliber with positive halo sign.  Peak systolic velocity of 18 cm/sec.  Normal waveform. Right axillary artery: Vessel wall appears thickened with positive halo sign.  Peak systolic velocity of 115 cm/sec.  Normal waveform. Left temporal artery: Left temporal artery is small in caliber with positive halo sign.  Peak systolic velocity of 20 cm/sec.  Normal waveform. Left axillary artery: Vessel wall appears thickened with positive halo sign.  Peak systolic velocity of 112 cm/sec.  Normal waveform.  Other notes: Also has   PLAN  - Steroid taper: Decrease prednisone 35mg daily x 1 wk, 30mg daily x 1 wk, 25mg daily x 1 wk, 20mg daily x 1 wk, 15mg daily x 1 wk, 12.5mg daily x 2 wks, then 10mg daily and continue   - F/u w/ Dr. Ro (messaged)  - Bactrim ppx MWF  - F/u in 4 weeks    Right Shoulder pain  Pt has been very bothered by pain in her right shoulder. She stated she has a hx of arthritis and was hoping for an injection today. She is still taking high dose steroids for the GCA as above but does not feel it is helping.  Diagnosis/Important Hx: Hx of injections helping - has some mild DJD in shoulder as well as some cervical radiculopathy/tendonitis.  Imaging/Procedures:   10/2024   XR shoulder - Cortical hypertrophic change greater tuberosity, acromion with anterolateral spur formation, encroaches subacromial space. Limited DJD glenohumeral joint, under 2 cm size oval calcification posterosuperior humeral head, calcific bursitis change. Mild  DJD superior AC joint.    XR neck - Some straightening of usual lordotic curve suggesting the presence of neck muscle spasm. Calcified plaque right carotid bifurcation region. Airway normal. Minor levoscoliosis cervicothoracic junction, healed fracture deformity left mid clavicle. Asymmetrical facet arthropathy left C4-C6 and C2-C4 levels. Mild moderate DJD disc spondylosis C5-C7. Mild DJD at 2 C1-C2. Limited primary anterior subluxation at C4-C5.   Other notes:   PLAN  - PT  - XR shoulder/neck  - Spine surgery referral  - Sports med referral    Rheumatology Health Maintenance  Vaccinations: COVID once below 20mg of prednisone  Medication monitoring:    Pred - bactrim   Tocilizumab - holding  Osteoporosis:    DEXA - discuss next visit - may need IV options with swallowing difficulty   Treatment -       Problem List Items Addressed This Visit          Immunology/Multi System    Giant cell arteritis with polymyalgia rheumatica - Primary (Chronic)     This patient encounter was staffed and the plan was formulated with rheumatology attending Dr. Waldrop.    Alyx Gupta MD  Rheumatology Fellow, PGY-4

## 2024-10-02 NOTE — PATIENT INSTRUCTIONS
It was nice to see you again!    I'm sorry you're having some shoulder and neck pain. We will send you to physical therapy for both as well as back and spine to evaluate your neck, and sport's med for the shoulder. We will also get some x-rays.    For your steroid plan: we'll have you reduce to 35mg x7 days --> 30mg x7 days --> 25mg x7 days --> 20mg x7 days --> 15mg x7 days --> 12.5mg x14 days --> 10mg until seen by us!    We will plan for follow up in 8 weeks      Alyx Gupta MD  Rheumatology Fellow, PGY-4

## 2024-10-03 ENCOUNTER — PATIENT MESSAGE (OUTPATIENT)
Dept: RHEUMATOLOGY | Facility: HOSPITAL | Age: 69
End: 2024-10-03
Payer: MEDICARE

## 2024-10-03 DIAGNOSIS — M31.5 GIANT CELL ARTERITIS WITH POLYMYALGIA RHEUMATICA: Primary | ICD-10-CM

## 2024-10-03 DIAGNOSIS — M31.6 GCA (GIANT CELL ARTERITIS): Primary | ICD-10-CM

## 2024-10-03 DIAGNOSIS — I65.23 CALCIFICATION OF BOTH CAROTID ARTERIES: Primary | ICD-10-CM

## 2024-10-03 DIAGNOSIS — M31.6 GCA (GIANT CELL ARTERITIS): ICD-10-CM

## 2024-10-03 RX ORDER — PREDNISONE 5 MG/1
35 TABLET ORAL DAILY
Qty: 210 TABLET | Refills: 1 | Status: SHIPPED | OUTPATIENT
Start: 2024-10-03

## 2024-10-03 RX ORDER — PREDNISONE 5 MG/1
35 TABLET ORAL DAILY
Qty: 210 TABLET | Refills: 1 | Status: SHIPPED | OUTPATIENT
Start: 2024-10-03 | End: 2024-10-03 | Stop reason: SDUPTHER

## 2024-10-03 NOTE — PROGRESS NOTES
DATE: 10/17/2024  PATIENT: Genesis Ugalde    Supervising Physician: Jin Velasquez M.D.    CHIEF COMPLAINT: neck and shoulder pain    HISTORY:  Genesis Ugalde is a 68 y.o. female with pmhx of RA, osteoporosis and right shoulder rotator cuff tear here for initial evaluation of neck and right arm pain (Neck 2- 8, Arm - 2). The pain has been present for years, but increased recently after being hospitalized for a RA flare up. The patient describes the pain as aching in her neck and dull in the shoulder and arm. The pain is worse with any movement and improved by medications. There is associated numbness and tingling in the right lower arm and hand. There is subjective weakness. Prior treatments have included Tramadol, but no PT, RIZWANA or surgery. She is scheduled for a right shoulder injection in November.   The patient denies myelopathic symptoms such as handwriting changes or difficulty with buttons/coins/keys. Denies perineal paresthesias, bowel/bladder dysfunction.    PAST MEDICAL/SURGICAL HISTORY:  Past Medical History:   Diagnosis Date    Anxiety     Asthma     Blind left eye     can see silhouettes in right eye    Cataract     Depression     Diabetes mellitus     Disease of immune system     Giant cell arteritis     Glaucoma     Hypertension     Polymyalgia rheumatica     Uveitis      Past Surgical History:   Procedure Laterality Date    ANKLE SURGERY      CHOLECYSTECTOMY      COLONOSCOPY N/A 9/28/2015    Procedure: COLONOSCOPY;  Surgeon: Alok Dykes MD;  Location: [address]);  Service: Endoscopy;  Laterality: N/A;    COLONOSCOPY N/A 9/6/2019    Procedure: COLONOSCOPY;  Surgeon: Alok Dykes MD;  Location: [address]);  Service: Endoscopy;  Laterality: N/A;    CYST REMOVAL      right lung    ESOPHAGOGASTRODUODENOSCOPY N/A 3/28/2023    Procedure: EGD (ESOPHAGOGASTRODUODENOSCOPY);  Surgeon: Bharat Franklin MD;  Location: [facility] ([address] [place] [address]);  Service: Endoscopy;  Laterality: N/A;   cirrhosis-labs prior, varices surveillance  gastroparesis-full liquid diet x3 days, clear liquid diet x1 day prior to procedure  instructions handed to pt and sent to [facility]    HYSTERECTOMY         Medications:  Current Outpatient Medications on File Prior to Visit   Medication Sig Dispense Refill    amLODIPine-benazepriL (LOTREL) 10-40 mg per capsule Take 1 capsule by mouth once daily. 90 capsule 3    brimonidine 0.2% (ALPHAGAN) 0.2 % Drop Place 1 drop into both eyes 3 (three) times daily. 10 mL 12    clonazePAM (KLONOPIN) 1 MG tablet Take 1 tablet (1 mg total) by mouth daily as needed for Anxiety. 60 tablet 0    dorzolamide (TRUSOPT) 2 % ophthalmic solution INSTILL 1 DROP INTO BOTH EYES 3 TIMES A DAY 30 mL 4    ergocalciferol (VITAMIN D2) 50,000 unit Cap TAKE 1 CAPSULE (50,000 UNITS TOTAL) BY MOUTH EVERY 7 DAYS. 12 capsule 3    latanoprost 0.005 % ophthalmic solution Place 1 drop into both eyes every evening. 7.5 mL 4    metoprolol succinate (TOPROL-XL) 100 MG 24 hr tablet Take 1 tablet (100 mg total) by mouth once daily. 90 tablet 3    omeprazole (PRILOSEC) 40 MG capsule Take 1 capsule (40 mg total) by mouth every 12 (twelve) hours. Take one pill every morning 45 minutes before breakfast 180 capsule 3    pravastatin (PRAVACHOL) 40 MG tablet Take 1 tablet (40 mg total) by mouth every evening. 90 tablet 3    predniSONE (DELTASONE) 5 MG tablet Take 7 tablets (35 mg total) by mouth once daily. After one week decrease to   6 tablets(30mg) daily x 1 wk, then decrease to 5 tablets  (25mg) daily x 1 wk,  then decrease to 4 tablets(20mg) daily x 1 wk, then decrease to 3 tablets( 15mg0 daily x 1 wk,  then decrease to 2.5 tablets(12.5mg )daily x 2 wks, then decrease to 2 tablets (10mg )daily and continue 210 tablet 1    repaglinide (PRANDIN) 0.5 MG tablet Take 1 tablet (0.5 mg total) by mouth 3 (three) times daily before meals. 270 tablet 3    sulfamethoxazole-trimethoprim 800-160mg (BACTRIM DS) 800-160 mg Tab Take  1 tablet by mouth 3 (three) times a week. Please take this medicine on Mondays, Wednesdays and Fridays while on prednisone doses 20mg or greater a day. 30 tablet 1    tocilizumab (ACTEMRA) 162 mg/0.9 mL injection Inject 0.9 mLs (162 mg total) into the skin every 7 days. 12 each 0    traMADoL (ULTRAM) 50 mg tablet Take 1 tablet (50 mg total) by mouth every 12 (twelve) hours as needed for Pain. 60 tablet 0     No current facility-administered medications on file prior to visit.       Social History:   Social History     Socioeconomic History    Marital status: Single   Tobacco Use    Smoking status: Never     Passive exposure: Never    Smokeless tobacco: Never   Substance and Sexual Activity    Alcohol use: No     Alcohol/week: 0.0 standard drinks of alcohol    Drug use: Never    Sexual activity: Not Currently     Social Drivers of Health     Financial Resource Strain: Low Risk  (9/7/2024)    Overall Financial Resource Strain (CARDIA)     Difficulty of Paying Living Expenses: Not very hard   Food Insecurity: No Food Insecurity (9/7/2024)    Hunger Vital Sign     Worried About Running Out of Food in the Last Year: Never true     Ran Out of Food in the Last Year: Never true   Transportation Needs: No Transportation Needs (9/7/2024)    TRANSPORTATION NEEDS     Transportation : No   Physical Activity: Insufficiently Active (9/7/2024)    Exercise Vital Sign     Days of Exercise per Week: 3 days     Minutes of Exercise per Session: 30 min   Stress: No Stress Concern Present (9/7/2024)    [employer] [place] [employer] - Occupational Stress Questionnaire     Feeling of Stress : Only a little   Housing Stability: Low Risk  (9/7/2024)    Housing Stability Vital Sign     Unable to Pay for Housing in the Last Year: No     Homeless in the Last Year: No       REVIEW OF SYSTEMS:  Constitution: Negative. Negative for chills, fever and night sweats.   Cardiovascular: Negative for chest pain and syncope.   Respiratory:  "Negative for cough and shortness of breath.   Gastrointestinal: See HPI. Negative for nausea/vomiting. Negative for abdominal pain.  Genitourinary: See HPI. Negative for discoloration or dysuria.  Skin: Negative for dry skin, itching and rash.   Hematologic/Lymphatic: Negative for bleeding problem. Does not bruise/bleed easily.   Musculoskeletal: Negative for falls and muscle weakness.   Neurological: See HPI. No seizures.   Endocrine: Negative for polydipsia, polyphagia and polyuria.   Allergic/Immunologic: Negative for hives and persistent infections.  Psychiatric/Behavioral: Negative for depression and insomnia.         EXAM:  Ht 5' 3" (1.6 m)   Wt 114.8 kg (253 lb)   BMI 44.82 kg/m²     General: The patient is a 68 y.o. female in no apparent distress, the patient is oriented to person, place and time.  Psych: Normal mood and affect  HEENT: Vision grossly intact, hearing intact to the spoken word.  Lungs: Respirations unlabored.  Gait: Normal station and gait, no difficulty with toe or heel walk.   Skin: Cervical skin negative for rashes, lesions, hairy patches and surgical scars.  Range of motion: Cervical range of motion is limited. There is mild tenderness to palpation.  Spinal Balance: Global saggital and coronal spinal balance acceptable, no significant for scoliosis and kyphosis.  Musculoskeletal: pain with the range of motion of the right shoulder and elbow. Normal bulk and contour of the bilateral hands.  Vascular: Bilateral hands warm and well perfused, radial pulses 2+ bilaterally.  Neurological: Normal strength and tone in all major motor groups in the bilateral upper and lower extremities. Normal sensation to light touch in the C5-T1 and L2-S1 dermatomes bilaterally.  Deep tendon reflexes symmetric 2+ in the bilateral upper and lower extremities.  Negative Inverted Radial Reflex and Galeano's bilaterally. Negative Babinski bilaterally.     IMAGING:   Today I personally reviewed AP, Lat and Flex/Ex  " "upright C-spine films that demonstrate mild DDD from C5-7. Straightening of the lordotic curve.      Body mass index is 44.82 kg/m².    Hemoglobin A1C   Date Value Ref Range Status   09/04/2024 6.3 (H) 4.0 - 5.6 % Final     Comment:     ADA Screening Guidelines:  5.7-6.4%  Consistent with prediabetes  >or=6.5%  Consistent with diabetes    High levels of fetal hemoglobin interfere with the HbA1C  assay. Heterozygous hemoglobin variants (HbS, HgC, etc)do  not significantly interfere with this assay.   However, presence of multiple variants may affect accuracy.     06/28/2024 5.7 (H) 4.0 - 5.6 % Final     Comment:     ADA Screening Guidelines:  5.7-6.4%  Consistent with prediabetes  >or=6.5%  Consistent with diabetes    High levels of fetal hemoglobin interfere with the HbA1C  assay. Heterozygous hemoglobin variants (HbS, HgC, etc)do  not significantly interfere with this assay.   However, presence of multiple variants may affect accuracy.     03/22/2024 6.4 (H) 4.0 - 5.6 % Final     Comment:     ADA Screening Guidelines:  5.7-6.4%  Consistent with prediabetes  >or=6.5%  Consistent with diabetes    High levels of fetal hemoglobin interfere with the HbA1C  assay. Heterozygous hemoglobin variants (HbS, HgC, etc)do  not significantly interfere with this assay.   However, presence of multiple variants may affect accuracy.             ASSESSMENT/PLAN:    Genesis Greenwood I" was seen today for neck pain and shoulder pain.    Diagnoses and all orders for this visit:    Chronic right shoulder pain  -     [facility]ulatory referral[facility]    Cervical radiculopathy  -     Ambulatory referral[facility]consult [facility]  -     pregabalin (LYRICA) 75 MG capsule; Take 1 capsule (75 mg total) by mouth 2 (two) times daily.  -     MRI Cervical Spine Without Contrast; Future      Today we discussed at length all of the different treatment options including anti-inflammatories, acetaminophen, rest, ice, heat, " physical therapy including strengthening and stretching exercises, home exercises, ROM, aerobic conditioning, aqua therapy, other modalities including ultrasound, massage, and dry needling, epidural steroid injections and finally surgical intervention.  MRI ordered, I will call with results.

## 2024-10-03 NOTE — TELEPHONE ENCOUNTER
Calcification noted on x-ray - plan to get a bilateral carotid US. Messaged patient.    This patient encounter was staffed and the plan was formulated with rheumatology attending Dr. Waldrop.    Alyx Gupta MD  Rheumatology Fellow, PGY-4

## 2024-10-03 NOTE — TELEPHONE ENCOUNTER
LFTs elevated, will repeat 10/9 and hold off on tocilizumab dose for now.    This patient encounter was staffed and the plan was formulated with rheumatology attending Dr. Waldrop.    Alyx Gupta MD  Rheumatology Fellow, PGY-4

## 2024-10-04 ENCOUNTER — PATIENT MESSAGE (OUTPATIENT)
Dept: RHEUMATOLOGY | Facility: CLINIC | Age: 69
End: 2024-10-04
Payer: MEDICARE

## 2024-10-04 ENCOUNTER — OFFICE VISIT (OUTPATIENT)
Dept: ORTHOPEDICS | Facility: CLINIC | Age: 69
End: 2024-10-04
Payer: MEDICARE

## 2024-10-04 VITALS — WEIGHT: 253.5 LBS | HEIGHT: 63 IN | BODY MASS INDEX: 44.92 KG/M2

## 2024-10-04 DIAGNOSIS — M25.511 CHRONIC RIGHT SHOULDER PAIN: ICD-10-CM

## 2024-10-04 DIAGNOSIS — M75.31 CALCIFIC TENDINITIS OF RIGHT SHOULDER: Primary | ICD-10-CM

## 2024-10-04 DIAGNOSIS — G89.29 CHRONIC RIGHT SHOULDER PAIN: ICD-10-CM

## 2024-10-04 PROCEDURE — 99999 PR PBB SHADOW E&M-EST. PATIENT-LVL IV: CPT | Mod: PBBFAC,HCNC,, | Performed by: ORTHOPAEDIC SURGERY

## 2024-10-04 PROCEDURE — 3044F HG A1C LEVEL LT 7.0%: CPT | Mod: HCNC,CPTII,S$GLB, | Performed by: ORTHOPAEDIC SURGERY

## 2024-10-04 PROCEDURE — 99203 OFFICE O/P NEW LOW 30 MIN: CPT | Mod: HCNC,S$GLB,, | Performed by: ORTHOPAEDIC SURGERY

## 2024-10-04 PROCEDURE — 3066F NEPHROPATHY DOC TX: CPT | Mod: HCNC,CPTII,S$GLB, | Performed by: ORTHOPAEDIC SURGERY

## 2024-10-04 PROCEDURE — 1159F MED LIST DOCD IN RCRD: CPT | Mod: HCNC,CPTII,S$GLB, | Performed by: ORTHOPAEDIC SURGERY

## 2024-10-04 PROCEDURE — 1125F AMNT PAIN NOTED PAIN PRSNT: CPT | Mod: HCNC,CPTII,S$GLB, | Performed by: ORTHOPAEDIC SURGERY

## 2024-10-04 PROCEDURE — 1111F DSCHRG MED/CURRENT MED MERGE: CPT | Mod: HCNC,CPTII,S$GLB, | Performed by: ORTHOPAEDIC SURGERY

## 2024-10-04 PROCEDURE — 3288F FALL RISK ASSESSMENT DOCD: CPT | Mod: HCNC,CPTII,S$GLB, | Performed by: ORTHOPAEDIC SURGERY

## 2024-10-04 PROCEDURE — 1160F RVW MEDS BY RX/DR IN RCRD: CPT | Mod: HCNC,CPTII,S$GLB, | Performed by: ORTHOPAEDIC SURGERY

## 2024-10-04 PROCEDURE — 3061F NEG MICROALBUMINURIA REV: CPT | Mod: HCNC,CPTII,S$GLB, | Performed by: ORTHOPAEDIC SURGERY

## 2024-10-04 PROCEDURE — 4010F ACE/ARB THERAPY RXD/TAKEN: CPT | Mod: HCNC,CPTII,S$GLB, | Performed by: ORTHOPAEDIC SURGERY

## 2024-10-04 PROCEDURE — 3008F BODY MASS INDEX DOCD: CPT | Mod: HCNC,CPTII,S$GLB, | Performed by: ORTHOPAEDIC SURGERY

## 2024-10-04 PROCEDURE — 1157F ADVNC CARE PLAN IN RCRD: CPT | Mod: HCNC,CPTII,S$GLB, | Performed by: ORTHOPAEDIC SURGERY

## 2024-10-04 PROCEDURE — 1101F PT FALLS ASSESS-DOCD LE1/YR: CPT | Mod: HCNC,CPTII,S$GLB, | Performed by: ORTHOPAEDIC SURGERY

## 2024-10-04 RX ORDER — SULFAMETHOXAZOLE AND TRIMETHOPRIM 800; 160 MG/1; MG/1
1 TABLET ORAL
Qty: 30 TABLET | Refills: 1 | Status: SHIPPED | OUTPATIENT
Start: 2024-10-04

## 2024-10-09 ENCOUNTER — PATIENT MESSAGE (OUTPATIENT)
Dept: RHEUMATOLOGY | Facility: CLINIC | Age: 69
End: 2024-10-09
Payer: MEDICARE

## 2024-10-09 ENCOUNTER — TELEPHONE (OUTPATIENT)
Dept: OPHTHALMOLOGY | Facility: CLINIC | Age: 69
End: 2024-10-09
Payer: MEDICARE

## 2024-10-09 NOTE — TELEPHONE ENCOUNTER
I called pt and left a message for pt to call back to get scheduled for overdue follow up with . Pt also scheduled with neuro-ophthalmology per referral from rheumatology

## 2024-10-09 NOTE — TELEPHONE ENCOUNTER
----- Message from Tanna Ro MD sent at 10/8/2024  5:50 PM CDT -----  Can one of you schedule a appt for this pt in the near future.   Thanks KL  ----- Message -----  From: Alyx Gupta MD  Sent: 10/7/2024  12:00 AM CDT  To: Tanna Ro MD    Hi Dr. Ro!    I'm Alyx one of the rheumatology fellows at [facility]. We were hoping you would be able to schedule close follow up with this shared patient of ours - she recently had a flare that we treated in the hospital. I know she has multiple other diagnoses contributing to vision loss so we would appreciate you seeing her as well!    Thanks much, let me know if you have any questions!  Alyx

## 2024-10-10 ENCOUNTER — TELEPHONE (OUTPATIENT)
Dept: ORTHOPEDICS | Facility: CLINIC | Age: 69
End: 2024-10-10
Payer: MEDICARE

## 2024-10-10 ENCOUNTER — LAB VISIT (OUTPATIENT)
Dept: LAB | Facility: HOSPITAL | Age: 69
End: 2024-10-10
Attending: STUDENT IN AN ORGANIZED HEALTH CARE EDUCATION/TRAINING PROGRAM
Payer: MEDICARE

## 2024-10-10 DIAGNOSIS — M31.5 GIANT CELL ARTERITIS WITH POLYMYALGIA RHEUMATICA: ICD-10-CM

## 2024-10-10 PROCEDURE — 80053 COMPREHEN METABOLIC PANEL: CPT | Performed by: STUDENT IN AN ORGANIZED HEALTH CARE EDUCATION/TRAINING PROGRAM

## 2024-10-10 PROCEDURE — 36415 COLL VENOUS BLD VENIPUNCTURE: CPT | Mod: HCNC,PO | Performed by: STUDENT IN AN ORGANIZED HEALTH CARE EDUCATION/TRAINING PROGRAM

## 2024-10-10 NOTE — PROGRESS NOTES
Subjective:       Patient ID: Genesis Ugalde is a 68 y.o. female.    Chief Complaint:   Pain of the Right Shoulder  She comes in for chronic pain in the right shoulder.  She is functionally blind.  This is related to optic neuritis.  She has a history of giant cell arteritis and polymyalgia rheumatica.  She has had large doses of steroids chronically and recently.  Her right shoulder has been hurting for about 6 weeks, up to 4/10.  She did have a fall September 6th which seems to have made it somewhat worse.  Usually she gets relief from injections.  She was surprised at her recent steroids did not help.  She is not a good candidate for surgery because of multiple medical comorbidities as listed below.    Past Medical History:   Diagnosis Date    Anxiety     Asthma     Blind left eye     can see silhouettes in right eye    Cataract     Depression     Diabetes mellitus     Disease of immune system     Giant cell arteritis     Glaucoma     Hypertension     Polymyalgia rheumatica     Uveitis      Past Surgical History:   Procedure Laterality Date    ANKLE SURGERY      CHOLECYSTECTOMY      COLONOSCOPY N/A 9/28/2015    Procedure: COLONOSCOPY;  Surgeon: Alok Dykes MD;  Location: [facility] ([address] [place] [address]);  Service: Endoscopy;  Laterality: N/A;    COLONOSCOPY N/A 9/6/2019    Procedure: COLONOSCOPY;  Surgeon: Alok Dykes MD;  Location: [facility] ([address] [place] [address]);  Service: Endoscopy;  Laterality: N/A;    CYST REMOVAL      right lung    ESOPHAGOGASTRODUODENOSCOPY N/A 3/28/2023    Procedure: EGD (ESOPHAGOGASTRODUODENOSCOPY);  Surgeon: Bharat Franklin MD;  Location: [address]);  Service: Endoscopy;  Laterality: N/A;  cirrhosis-labs prior, varices surveillance  gastroparesis-full liquid diet x3 days, clear liquid diet x1 day prior to procedure  instructions handed to pt and sent to [facility]    HYSTERECTOMY       Family History   Problem Relation Name Age of Onset    Coronary artery disease Mother       Diabetes Mother      Hypertension Mother      Stroke Mother      Alzheimer's disease Mother      Alzheimer's disease Father      Coronary artery disease Father      Emphysema Father      Diabetes Father      Diabetes Sister x2     Kidney disease Sister x2     Coronary artery disease Sister x2     Heart attack Sister x2     Heart attack Brother x4     Coronary artery disease Brother x4     Colon polyps Brother x4         1 foot of colon removed    Stroke Brother x4     Cancer Brother x4 58        stomach    Stomach cancer Brother x4     AVM Brother x4     Hypertension Brother x3     Intellectual disability Brother x3     Hydrocephalus Brother x3     No Known Problems Son x1     Hydrocephalus Paternal Cousin      Glaucoma Neg Hx      Asthma Neg Hx      Celiac disease Neg Hx      Colon cancer Neg Hx      Esophageal cancer Neg Hx      Inflammatory bowel disease Neg Hx      Rectal cancer Neg Hx      Ulcerative colitis Neg Hx      Macular degeneration Neg Hx      Retinal detachment Neg Hx      Strabismus Neg Hx      Amblyopia Neg Hx      Blindness Neg Hx      Cataracts Neg Hx       Social History     Socioeconomic History    Marital status: Single   Tobacco Use    Smoking status: Never     Passive exposure: Never    Smokeless tobacco: Never   Substance and Sexual Activity    Alcohol use: No     Alcohol/week: 0.0 standard drinks of alcohol    Drug use: Never    Sexual activity: Not Currently     Social Drivers of Health     Financial Resource Strain: Low Risk  (9/7/2024)    Overall Financial Resource Strain (CARDIA)     Difficulty of Paying Living Expenses: Not very hard   Food Insecurity: No Food Insecurity (9/7/2024)    Hunger Vital Sign     Worried About Running Out of Food in the Last Year: Never true     Ran Out of Food in the Last Year: Never true   Transportation Needs: No Transportation Needs (9/7/2024)    TRANSPORTATION NEEDS     Transportation : No   Physical Activity: Insufficiently Active (9/7/2024)    Exercise  Vital Sign     Days of Exercise per Week: 3 days     Minutes of Exercise per Session: 30 min   Stress: No Stress Concern Present (9/7/2024)    [employer] [employer] - Occupational Stress Questionnaire     Feeling of Stress : Only a little   Housing Stability: Low Risk  (9/7/2024)    Housing Stability Vital Sign     Unable to Pay for Housing in the Last Year: No     Homeless in the Last Year: No       Current Outpatient Medications   Medication Sig Dispense Refill    amLODIPine-benazepriL (LOTREL) 10-40 mg per capsule Take 1 capsule by mouth once daily. 90 capsule 3    brimonidine 0.2% (ALPHAGAN) 0.2 % Drop Place 1 drop into both eyes 3 (three) times daily. 10 mL 12    clonazePAM (KLONOPIN) 1 MG tablet Take 1 tablet (1 mg total) by mouth daily as needed for Anxiety. 60 tablet 0    dorzolamide (TRUSOPT) 2 % ophthalmic solution INSTILL 1 DROP INTO BOTH EYES 3 TIMES A DAY 30 mL 4    ergocalciferol (VITAMIN D2) 50,000 unit Cap TAKE 1 CAPSULE (50,000 UNITS TOTAL) BY MOUTH EVERY 7 DAYS. 12 capsule 3    latanoprost 0.005 % ophthalmic solution Place 1 drop into both eyes every evening. 7.5 mL 4    metoprolol succinate (TOPROL-XL) 100 MG 24 hr tablet Take 1 tablet (100 mg total) by mouth once daily. 90 tablet 3    omeprazole (PRILOSEC) 40 MG capsule Take 1 capsule (40 mg total) by mouth every 12 (twelve) hours. Take one pill every morning 45 minutes before breakfast 180 capsule 3    pravastatin (PRAVACHOL) 40 MG tablet Take 1 tablet (40 mg total) by mouth every evening. 90 tablet 3    predniSONE (DELTASONE) 5 MG tablet Take 7 tablets (35 mg total) by mouth once daily. After one week decrease to   6 tablets(30mg) daily x 1 wk, then decrease to 5 tablets  (25mg) daily x 1 wk,  then decrease to 4 tablets(20mg) daily x 1 wk, then decrease to 3 tablets( 15mg0 daily x 1 wk,  then decrease to 2.5 tablets(12.5mg )daily x 2 wks, then decrease to 2 tablets (10mg )daily and continue 210 tablet 1    tocilizumab  "(ACTEMRA) 162 mg/0.9 mL injection Inject 0.9 mLs (162 mg total) into the skin every 7 days. 12 each 0    traMADoL (ULTRAM) 50 mg tablet Take 1 tablet (50 mg total) by mouth every 12 (twelve) hours as needed for Pain. 60 tablet 0    repaglinide (PRANDIN) 0.5 MG tablet Take 1 tablet (0.5 mg total) by mouth 3 (three) times daily before meals. (Patient not taking: Reported on 10/4/2024) 270 tablet 3    sulfamethoxazole-trimethoprim 800-160mg (BACTRIM DS) 800-160 mg Tab Take 1 tablet by mouth 3 (three) times a week. Please take this medicine on Mondays, Wednesdays and Fridays while on prednisone doses 20mg or greater a day. 30 tablet 1     No current facility-administered medications for this visit.     Review of patient's allergies indicates:   Allergen Reactions    Oxycontin [oxycodone] Hallucinations     Immobile     Percocet [oxycodone-acetaminophen] Shortness Of Breath    Percodan [oxycodone hcl-oxycodone-asa] Shortness Of Breath    Seroquel [quetiapine] Shortness Of Breath     Akathisia         Objective:      Vitals:    10/04/24 1055   Weight: 115 kg (253 lb 8.5 oz)   Height: 5' 3" (1.6 m)     Physical Exam  On exam she has diffuse tenderness about the right acromion.  She has pain with forward elevation above the shoulder level.  Positive empty can and positive drop-arm sign.  External rotation to 30° and internal to the belt line.  Normal range of motion of the elbow, wrist and hand.  Distal neurovascular intact.  Imaging Review:   X-rays of the shoulder were reviewed personally by me and I agree with the radiologist's interpretation as follows:  Cortical hypertrophic change greater tuberosity, acromion with anterolateral spur formation, encroaches subacromial space. Limited DJD glenohumeral joint, under 2 cm size oval calcification posterosuperior humeral head, calcific bursitis change. Mild DJD superior AC joint.   Assessment:   Calcific tendinitis, right shoulder  Plan:       She is referred to Dr. Calix, who " can do an ultrasound-guided injection.  The patient's pathophysiology was explained in detail with reference to x-rays, models, other visual aids as appropriate.  Treatment options were discussed in detail.  Questions were invited and answered to the patient's satisfaction.    Ted Chacon MD  Orthopaedic Surgery

## 2024-10-10 NOTE — TELEPHONE ENCOUNTER
Attempt to contact patient regarding an appointment for shoulder pain. Left message stating that her appointment is for 10/17/2024 and x-ray scheduled for 7:00 am at the [facility]. Also left number for patient to return call back to [phone]. Thanks

## 2024-10-11 ENCOUNTER — TELEPHONE (OUTPATIENT)
Dept: RHEUMATOLOGY | Facility: CLINIC | Age: 69
End: 2024-10-11
Payer: MEDICARE

## 2024-10-11 ENCOUNTER — PATIENT MESSAGE (OUTPATIENT)
Dept: RHEUMATOLOGY | Facility: CLINIC | Age: 69
End: 2024-10-11
Payer: MEDICARE

## 2024-10-11 DIAGNOSIS — R74.8 ELEVATED LIVER ENZYMES: Primary | ICD-10-CM

## 2024-10-11 DIAGNOSIS — N17.9 AKI (ACUTE KIDNEY INJURY): Primary | ICD-10-CM

## 2024-10-11 DIAGNOSIS — R74.01 HIGH TRANSAMINASE LEVELS: ICD-10-CM

## 2024-10-11 LAB
ALBUMIN SERPL BCP-MCNC: 3.9 G/DL (ref 3.5–5.2)
ALP SERPL-CCNC: 89 U/L (ref 55–135)
ALT SERPL W/O P-5'-P-CCNC: 100 U/L (ref 10–44)
ANION GAP SERPL CALC-SCNC: 11 MMOL/L (ref 8–16)
AST SERPL-CCNC: 40 U/L (ref 10–40)
BILIRUB SERPL-MCNC: 1.5 MG/DL (ref 0.1–1)
BUN SERPL-MCNC: 19 MG/DL (ref 8–23)
CALCIUM SERPL-MCNC: 9.9 MG/DL (ref 8.7–10.5)
CHLORIDE SERPL-SCNC: 106 MMOL/L (ref 95–110)
CO2 SERPL-SCNC: 22 MMOL/L (ref 23–29)
CREAT SERPL-MCNC: 1.1 MG/DL (ref 0.5–1.4)
EST. GFR  (NO RACE VARIABLE): 55.4 ML/MIN/1.73 M^2
GLUCOSE SERPL-MCNC: 226 MG/DL (ref 70–110)
POTASSIUM SERPL-SCNC: 4.4 MMOL/L (ref 3.5–5.1)
PROT SERPL-MCNC: 6.8 G/DL (ref 6–8.4)
SODIUM SERPL-SCNC: 139 MMOL/L (ref 136–145)

## 2024-10-11 NOTE — PROGRESS NOTES
Your glucose was 226 d/t high dose prednisone needed for flare. Were you fasting for the blood sample? Will copy Dr. Shen your new PCP Your bilirubin remains elevated but slightly improved. Your AST is back to normal. Your ALT is still significantly elevated but significantly improved. Your kidney function is moderately reduced but within range of previous. Will recheck liver and kidney tests in 1 wk again before we can administer next dose of Actemra IV. BELIA

## 2024-10-15 RX ORDER — TRAMADOL HYDROCHLORIDE 50 MG/1
50 TABLET ORAL EVERY 12 HOURS PRN
Qty: 60 TABLET | Refills: 0 | OUTPATIENT
Start: 2024-10-15

## 2024-10-15 NOTE — TELEPHONE ENCOUNTER
No care due was identified.  [facility] Embedded Care Due Messages. Reference number: [id number].   10/15/2024 9:38:52 AM CDT

## 2024-10-17 ENCOUNTER — HOSPITAL ENCOUNTER (OUTPATIENT)
Dept: RADIOLOGY | Facility: HOSPITAL | Age: 69
Discharge: HOME | End: 2024-10-17
Attending: FAMILY MEDICINE
Payer: MEDICARE

## 2024-10-17 ENCOUNTER — OFFICE VISIT (OUTPATIENT)
Dept: ORTHOPEDICS | Facility: CLINIC | Age: 69
End: 2024-10-17
Payer: MEDICARE

## 2024-10-17 VITALS — WEIGHT: 253 LBS | BODY MASS INDEX: 44.83 KG/M2 | HEIGHT: 63 IN

## 2024-10-17 DIAGNOSIS — M54.12 CERVICAL RADICULOPATHY: ICD-10-CM

## 2024-10-17 DIAGNOSIS — N28.9 FUNCTION KIDNEY DECREASED: ICD-10-CM

## 2024-10-17 DIAGNOSIS — M25.511 CHRONIC RIGHT SHOULDER PAIN: ICD-10-CM

## 2024-10-17 DIAGNOSIS — G89.29 CHRONIC RIGHT SHOULDER PAIN: ICD-10-CM

## 2024-10-17 PROCEDURE — 99999 PR PBB SHADOW E&M-EST. PATIENT-LVL III: CPT | Mod: PBBFAC,HCNC,, | Performed by: REGISTERED NURSE

## 2024-10-17 PROCEDURE — 72050 X-RAY EXAM NECK SPINE 4/5VWS: CPT | Mod: 26,HCNC,, | Performed by: RADIOLOGY

## 2024-10-17 PROCEDURE — 72050 X-RAY EXAM NECK SPINE 4/5VWS: CPT | Mod: TC,HCNC

## 2024-10-17 RX ORDER — PREGABALIN 75 MG/1
75 CAPSULE ORAL 2 TIMES DAILY
Qty: 60 CAPSULE | Refills: 5 | Status: SHIPPED | OUTPATIENT
Start: 2024-10-17 | End: 2025-04-17

## 2024-10-21 ENCOUNTER — LAB VISIT (OUTPATIENT)
Dept: LAB | Facility: HOSPITAL | Age: 69
End: 2024-10-21
Attending: INTERNAL MEDICINE
Payer: MEDICARE

## 2024-10-21 DIAGNOSIS — R74.01 HIGH TRANSAMINASE LEVELS: ICD-10-CM

## 2024-10-21 DIAGNOSIS — N17.9 AKI (ACUTE KIDNEY INJURY): ICD-10-CM

## 2024-10-21 LAB
ALBUMIN SERPL BCP-MCNC: 3.5 G/DL (ref 3.5–5.2)
ALP SERPL-CCNC: 70 U/L (ref 40–150)
ALT SERPL W/O P-5'-P-CCNC: 65 U/L (ref 10–44)
ANION GAP SERPL CALC-SCNC: 14 MMOL/L (ref 8–16)
AST SERPL-CCNC: 31 U/L (ref 10–40)
BILIRUB SERPL-MCNC: 1.2 MG/DL (ref 0.1–1)
BUN SERPL-MCNC: 12 MG/DL (ref 8–23)
CALCIUM SERPL-MCNC: 10 MG/DL (ref 8.7–10.5)
CHLORIDE SERPL-SCNC: 109 MMOL/L (ref 95–110)
CO2 SERPL-SCNC: 21 MMOL/L (ref 23–29)
CREAT SERPL-MCNC: 0.9 MG/DL (ref 0.5–1.4)
EST. GFR  (NO RACE VARIABLE): >60 ML/MIN/1.73 M^2
GLUCOSE SERPL-MCNC: 165 MG/DL (ref 70–110)
POTASSIUM SERPL-SCNC: 4.2 MMOL/L (ref 3.5–5.1)
PROT SERPL-MCNC: 6.6 G/DL (ref 6–8.4)
SODIUM SERPL-SCNC: 144 MMOL/L (ref 136–145)

## 2024-10-21 PROCEDURE — 36415 COLL VENOUS BLD VENIPUNCTURE: CPT | Mod: HCNC,PO | Performed by: INTERNAL MEDICINE

## 2024-10-21 PROCEDURE — 80053 COMPREHEN METABOLIC PANEL: CPT | Mod: HCNC | Performed by: INTERNAL MEDICINE

## 2024-10-21 NOTE — PROGRESS NOTES
Your glucose was 165, somewhat improved. Your bilirubin is improved. Your AST is normal and ALT back to your usual mild elevation. Kidney function is back to normal. Will check with Brooke Fitzgerald to see if she thinks OK to resume tocilizumab IV now. BELIA

## 2024-10-22 ENCOUNTER — PATIENT MESSAGE (OUTPATIENT)
Dept: RHEUMATOLOGY | Facility: CLINIC | Age: 69
End: 2024-10-22
Payer: MEDICARE

## 2024-10-23 ENCOUNTER — INFUSION (OUTPATIENT)
Dept: INFECTIOUS DISEASES | Facility: HOSPITAL | Age: 69
End: 2024-10-23
Payer: MEDICARE

## 2024-10-23 VITALS
DIASTOLIC BLOOD PRESSURE: 58 MMHG | WEIGHT: 254.31 LBS | HEIGHT: 63 IN | TEMPERATURE: 99 F | SYSTOLIC BLOOD PRESSURE: 127 MMHG | OXYGEN SATURATION: 95 % | HEART RATE: 89 BPM | BODY MASS INDEX: 45.06 KG/M2 | RESPIRATION RATE: 20 BRPM

## 2024-10-23 DIAGNOSIS — M81.8 OTHER OSTEOPOROSIS WITHOUT CURRENT PATHOLOGICAL FRACTURE: ICD-10-CM

## 2024-10-23 DIAGNOSIS — H46.9 OPTIC NEURITIS: ICD-10-CM

## 2024-10-23 DIAGNOSIS — M31.5 GIANT CELL ARTERITIS WITH POLYMYALGIA RHEUMATICA: Primary | ICD-10-CM

## 2024-10-23 PROCEDURE — 25000003 PHARM REV CODE 250: Mod: HCNC | Performed by: INTERNAL MEDICINE

## 2024-10-23 PROCEDURE — 96375 TX/PRO/DX INJ NEW DRUG ADDON: CPT | Mod: HCNC

## 2024-10-23 PROCEDURE — 96365 THER/PROPH/DIAG IV INF INIT: CPT | Mod: HCNC

## 2024-10-23 PROCEDURE — 63600175 PHARM REV CODE 636 W HCPCS: Mod: HCNC | Performed by: INTERNAL MEDICINE

## 2024-10-23 RX ORDER — DIPHENHYDRAMINE HYDROCHLORIDE 50 MG/ML
25 INJECTION INTRAMUSCULAR; INTRAVENOUS ONCE
OUTPATIENT
Start: 2024-11-06

## 2024-10-23 RX ORDER — HEPARIN 100 UNIT/ML
500 SYRINGE INTRAVENOUS
OUTPATIENT
Start: 2024-11-06

## 2024-10-23 RX ORDER — SODIUM CHLORIDE 0.9 % (FLUSH) 0.9 %
10 SYRINGE (ML) INJECTION
OUTPATIENT
Start: 2024-11-06

## 2024-10-23 RX ORDER — DIPHENHYDRAMINE HYDROCHLORIDE 50 MG/ML
50 INJECTION INTRAMUSCULAR; INTRAVENOUS ONCE AS NEEDED
OUTPATIENT
Start: 2024-11-06

## 2024-10-23 RX ORDER — EPINEPHRINE 0.3 MG/.3ML
0.3 INJECTION SUBCUTANEOUS ONCE AS NEEDED
OUTPATIENT
Start: 2024-11-06

## 2024-10-23 RX ORDER — HEPARIN 100 UNIT/ML
500 SYRINGE INTRAVENOUS
Status: DISCONTINUED | OUTPATIENT
Start: 2024-10-23 | End: 2024-10-23 | Stop reason: HOSPADM

## 2024-10-23 RX ORDER — SODIUM CHLORIDE 0.9 % (FLUSH) 0.9 %
10 SYRINGE (ML) INJECTION
Status: DISCONTINUED | OUTPATIENT
Start: 2024-10-23 | End: 2024-10-23 | Stop reason: HOSPADM

## 2024-10-23 RX ORDER — DIPHENHYDRAMINE HYDROCHLORIDE 50 MG/ML
50 INJECTION INTRAMUSCULAR; INTRAVENOUS ONCE AS NEEDED
Status: DISCONTINUED | OUTPATIENT
Start: 2024-10-23 | End: 2024-10-23 | Stop reason: HOSPADM

## 2024-10-23 RX ORDER — DIPHENHYDRAMINE HYDROCHLORIDE 50 MG/ML
25 INJECTION INTRAMUSCULAR; INTRAVENOUS ONCE
Status: COMPLETED | OUTPATIENT
Start: 2024-10-23 | End: 2024-10-23

## 2024-10-23 RX ORDER — EPINEPHRINE 0.3 MG/.3ML
0.3 INJECTION SUBCUTANEOUS ONCE AS NEEDED
Status: DISCONTINUED | OUTPATIENT
Start: 2024-10-23 | End: 2024-10-23 | Stop reason: HOSPADM

## 2024-10-23 RX ADMIN — SODIUM CHLORIDE 692 MG: 9 INJECTION, SOLUTION INTRAVENOUS at 11:10

## 2024-10-23 RX ADMIN — DIPHENHYDRAMINE HYDROCHLORIDE 25 MG: 50 INJECTION INTRAMUSCULAR; INTRAVENOUS at 10:10

## 2024-10-23 NOTE — PROGRESS NOTES
Pt arrived for 1st Actemra infusion. Pre med of Bendadryl 25mg IVP ordered and given 30 min prior to start of infusion. Infusion given as ordered over 60 min.     Limited head-to-toe assessment due to privacy issues and visit reason though the opportunity was given for patient to express any concerns    Next appt scheduled and given to Pt with print out.     Patient arrives for infusion of Actemra as ordered by Dr. Anyi Lobo. All benefits, risks, requirements, and alternatives should have been discussed with patient by ordering provider at the time the order was placed.

## 2024-10-24 ENCOUNTER — DOCUMENT SCAN (OUTPATIENT)
Dept: HOME HEALTH SERVICES | Facility: HOSPITAL | Age: 69
End: 2024-10-24
Payer: MEDICARE

## 2024-10-28 ENCOUNTER — OFFICE VISIT (OUTPATIENT)
Dept: FAMILY MEDICINE | Facility: CLINIC | Age: 69
End: 2024-10-28
Payer: MEDICARE

## 2024-10-28 VITALS
DIASTOLIC BLOOD PRESSURE: 70 MMHG | BODY MASS INDEX: 45.18 KG/M2 | OXYGEN SATURATION: 96 % | WEIGHT: 255 LBS | HEART RATE: 89 BPM | SYSTOLIC BLOOD PRESSURE: 100 MMHG | HEIGHT: 63 IN

## 2024-10-28 DIAGNOSIS — E66.01 MORBID OBESITY: Chronic | ICD-10-CM

## 2024-10-28 DIAGNOSIS — E78.5 TYPE 2 DIABETES MELLITUS WITH HYPERLIPIDEMIA: ICD-10-CM

## 2024-10-28 DIAGNOSIS — E11.69 TYPE 2 DIABETES MELLITUS WITH HYPERLIPIDEMIA: ICD-10-CM

## 2024-10-28 DIAGNOSIS — I10 ESSENTIAL HYPERTENSION: Primary | Chronic | ICD-10-CM

## 2024-10-28 DIAGNOSIS — Z23 NEED FOR COVID-19 VACCINE: ICD-10-CM

## 2024-10-28 DIAGNOSIS — F41.9 ANXIETY: ICD-10-CM

## 2024-10-28 PROCEDURE — 4010F ACE/ARB THERAPY RXD/TAKEN: CPT | Mod: HCNC,CPTII,S$GLB, | Performed by: FAMILY MEDICINE

## 2024-10-28 PROCEDURE — 1160F RVW MEDS BY RX/DR IN RCRD: CPT | Mod: HCNC,CPTII,S$GLB, | Performed by: FAMILY MEDICINE

## 2024-10-28 PROCEDURE — 3288F FALL RISK ASSESSMENT DOCD: CPT | Mod: HCNC,CPTII,S$GLB, | Performed by: FAMILY MEDICINE

## 2024-10-28 PROCEDURE — 3066F NEPHROPATHY DOC TX: CPT | Mod: HCNC,CPTII,S$GLB, | Performed by: FAMILY MEDICINE

## 2024-10-28 PROCEDURE — 1126F AMNT PAIN NOTED NONE PRSNT: CPT | Mod: HCNC,CPTII,S$GLB, | Performed by: FAMILY MEDICINE

## 2024-10-28 PROCEDURE — 99215 OFFICE O/P EST HI 40 MIN: CPT | Mod: HCNC,S$GLB,, | Performed by: FAMILY MEDICINE

## 2024-10-28 PROCEDURE — 3078F DIAST BP <80 MM HG: CPT | Mod: HCNC,CPTII,S$GLB, | Performed by: FAMILY MEDICINE

## 2024-10-28 PROCEDURE — 3008F BODY MASS INDEX DOCD: CPT | Mod: HCNC,CPTII,S$GLB, | Performed by: FAMILY MEDICINE

## 2024-10-28 PROCEDURE — 3074F SYST BP LT 130 MM HG: CPT | Mod: HCNC,CPTII,S$GLB, | Performed by: FAMILY MEDICINE

## 2024-10-28 PROCEDURE — 3044F HG A1C LEVEL LT 7.0%: CPT | Mod: HCNC,CPTII,S$GLB, | Performed by: FAMILY MEDICINE

## 2024-10-28 PROCEDURE — 90480 ADMN SARSCOV2 VAC 1/ONLY CMP: CPT | Mod: HCNC,S$GLB,, | Performed by: FAMILY MEDICINE

## 2024-10-28 PROCEDURE — 1159F MED LIST DOCD IN RCRD: CPT | Mod: HCNC,CPTII,S$GLB, | Performed by: FAMILY MEDICINE

## 2024-10-28 PROCEDURE — 91322 SARSCOV2 VAC 50 MCG/0.5ML IM: CPT | Mod: HCNC,S$GLB,, | Performed by: FAMILY MEDICINE

## 2024-10-28 PROCEDURE — 1101F PT FALLS ASSESS-DOCD LE1/YR: CPT | Mod: HCNC,CPTII,S$GLB, | Performed by: FAMILY MEDICINE

## 2024-10-28 PROCEDURE — 1157F ADVNC CARE PLAN IN RCRD: CPT | Mod: HCNC,CPTII,S$GLB, | Performed by: FAMILY MEDICINE

## 2024-10-28 PROCEDURE — 3061F NEG MICROALBUMINURIA REV: CPT | Mod: HCNC,CPTII,S$GLB, | Performed by: FAMILY MEDICINE

## 2024-10-28 PROCEDURE — 99999 PR PBB SHADOW E&M-EST. PATIENT-LVL IV: CPT | Mod: PBBFAC,HCNC,, | Performed by: FAMILY MEDICINE

## 2024-11-05 NOTE — ASSESSMENT & PLAN NOTE
Patient had labile mood on exam, from cheerful and laughing to sorrow and crying. Multifactorial, with progression of vision loss after missing treatment but also with family related strains that are affecting her ability to adhere to her treatment  - Contacted social work, social work will contact patient   [No Acute Distress] : no acute distress [No Respiratory Distress] : no respiratory distress  [Clear to Auscultation] : lungs were clear to auscultation bilaterally [Normal Rate] : normal rate  [Regular Rhythm] : with a regular rhythm [Normal Affect] : the affect was normal [Normal Mood] : the mood was normal [de-identified] : Left shoulder with +mild impingment, + tender anterior shoulder, no bony deformity, no swelling

## 2024-11-07 ENCOUNTER — OFFICE VISIT (OUTPATIENT)
Dept: SPORTS MEDICINE | Facility: CLINIC | Age: 69
End: 2024-11-07
Payer: MEDICARE

## 2024-11-07 VITALS
SYSTOLIC BLOOD PRESSURE: 116 MMHG | WEIGHT: 259.5 LBS | HEART RATE: 102 BPM | BODY MASS INDEX: 45.98 KG/M2 | DIASTOLIC BLOOD PRESSURE: 81 MMHG | HEIGHT: 63 IN

## 2024-11-07 DIAGNOSIS — G89.29 CHRONIC RIGHT SHOULDER PAIN: Primary | ICD-10-CM

## 2024-11-07 DIAGNOSIS — M25.511 CHRONIC RIGHT SHOULDER PAIN: Primary | ICD-10-CM

## 2024-11-07 DIAGNOSIS — M75.41 SUBACROMIAL IMPINGEMENT, RIGHT: ICD-10-CM

## 2024-11-07 DIAGNOSIS — M19.011 ARTHROSIS OF RIGHT ACROMIOCLAVICULAR JOINT: ICD-10-CM

## 2024-11-07 DIAGNOSIS — M75.31 CALCIFIC TENDINITIS OF RIGHT SHOULDER: ICD-10-CM

## 2024-11-07 PROCEDURE — 1101F PT FALLS ASSESS-DOCD LE1/YR: CPT | Mod: HCNC,CPTII,S$GLB, | Performed by: STUDENT IN AN ORGANIZED HEALTH CARE EDUCATION/TRAINING PROGRAM

## 2024-11-07 PROCEDURE — 3044F HG A1C LEVEL LT 7.0%: CPT | Mod: HCNC,CPTII,S$GLB, | Performed by: STUDENT IN AN ORGANIZED HEALTH CARE EDUCATION/TRAINING PROGRAM

## 2024-11-07 PROCEDURE — 1159F MED LIST DOCD IN RCRD: CPT | Mod: HCNC,CPTII,S$GLB, | Performed by: STUDENT IN AN ORGANIZED HEALTH CARE EDUCATION/TRAINING PROGRAM

## 2024-11-07 PROCEDURE — 1125F AMNT PAIN NOTED PAIN PRSNT: CPT | Mod: HCNC,CPTII,S$GLB, | Performed by: STUDENT IN AN ORGANIZED HEALTH CARE EDUCATION/TRAINING PROGRAM

## 2024-11-07 PROCEDURE — 3288F FALL RISK ASSESSMENT DOCD: CPT | Mod: HCNC,CPTII,S$GLB, | Performed by: STUDENT IN AN ORGANIZED HEALTH CARE EDUCATION/TRAINING PROGRAM

## 2024-11-07 PROCEDURE — 1157F ADVNC CARE PLAN IN RCRD: CPT | Mod: HCNC,CPTII,S$GLB, | Performed by: STUDENT IN AN ORGANIZED HEALTH CARE EDUCATION/TRAINING PROGRAM

## 2024-11-07 PROCEDURE — 3074F SYST BP LT 130 MM HG: CPT | Mod: HCNC,CPTII,S$GLB, | Performed by: STUDENT IN AN ORGANIZED HEALTH CARE EDUCATION/TRAINING PROGRAM

## 2024-11-07 PROCEDURE — 3061F NEG MICROALBUMINURIA REV: CPT | Mod: HCNC,CPTII,S$GLB, | Performed by: STUDENT IN AN ORGANIZED HEALTH CARE EDUCATION/TRAINING PROGRAM

## 2024-11-07 PROCEDURE — 99204 OFFICE O/P NEW MOD 45 MIN: CPT | Mod: 25,HCNC,S$GLB, | Performed by: STUDENT IN AN ORGANIZED HEALTH CARE EDUCATION/TRAINING PROGRAM

## 2024-11-07 PROCEDURE — 3066F NEPHROPATHY DOC TX: CPT | Mod: HCNC,CPTII,S$GLB, | Performed by: STUDENT IN AN ORGANIZED HEALTH CARE EDUCATION/TRAINING PROGRAM

## 2024-11-07 PROCEDURE — 99999 PR PBB SHADOW E&M-EST. PATIENT-LVL IV: CPT | Mod: PBBFAC,HCNC,, | Performed by: STUDENT IN AN ORGANIZED HEALTH CARE EDUCATION/TRAINING PROGRAM

## 2024-11-07 PROCEDURE — 3008F BODY MASS INDEX DOCD: CPT | Mod: HCNC,CPTII,S$GLB, | Performed by: STUDENT IN AN ORGANIZED HEALTH CARE EDUCATION/TRAINING PROGRAM

## 2024-11-07 PROCEDURE — 20611 DRAIN/INJ JOINT/BURSA W/US: CPT | Mod: HCNC,RT,S$GLB, | Performed by: STUDENT IN AN ORGANIZED HEALTH CARE EDUCATION/TRAINING PROGRAM

## 2024-11-07 PROCEDURE — 3079F DIAST BP 80-89 MM HG: CPT | Mod: HCNC,CPTII,S$GLB, | Performed by: STUDENT IN AN ORGANIZED HEALTH CARE EDUCATION/TRAINING PROGRAM

## 2024-11-07 PROCEDURE — 4010F ACE/ARB THERAPY RXD/TAKEN: CPT | Mod: HCNC,CPTII,S$GLB, | Performed by: STUDENT IN AN ORGANIZED HEALTH CARE EDUCATION/TRAINING PROGRAM

## 2024-11-07 PROCEDURE — 1160F RVW MEDS BY RX/DR IN RCRD: CPT | Mod: HCNC,CPTII,S$GLB, | Performed by: STUDENT IN AN ORGANIZED HEALTH CARE EDUCATION/TRAINING PROGRAM

## 2024-11-07 RX ADMIN — TRIAMCINOLONE ACETONIDE 40 MG: 40 INJECTION, SUSPENSION INTRA-ARTICULAR; INTRAMUSCULAR at 09:11

## 2024-11-07 NOTE — PROGRESS NOTES
"CC: right shoulder pain    68 y.o. Female presents today for CSI injection of her right shoulder. Pt was referred by Dr. Chacon. Pt localizes pain to superior shoulder and lateral upper arm. Pt reports pain is 3/10 today. Pt reports clicking and "loud" popping in her shoulder. Pt reports intermittent numbness/tingling down right arm and into right hand/fingers.     Attempted treatments: salonpas, tramadol (some relief), lyrica (made her sick)  Last Injection: "several years," with Rheumatology  Pain score: 3/10  History of trauma/injury: none since last visit  Affecting ADLs: yes      REVIEW OF SYSTEMS:   Constitution: Patient denies fever or chills.  Eyes: Patient denies eye pain or vision changes.  HEENT: Patient denies ear pain, sore throat, or nasal discharge.  CVS: Patient denies chest pain.  Lungs: Patient denies shortness of breath or cough.  Skin: Patient denies skin rash or itching.    Musculoskeletal: Patient denies recent falls. See HPI.  Psych: Patient denies any current anxiety or nervousness.    PAST MEDICAL HISTORY:   Past Medical History:   Diagnosis Date    Anxiety     Asthma     Blind left eye     can see silhouettes in right eye    Cataract     Depression     Diabetes mellitus     Disease of immune system     Giant cell arteritis     Glaucoma     Hypertension     Polymyalgia rheumatica     Uveitis        MEDICATIONS:     Current Outpatient Medications:     amLODIPine-benazepriL (LOTREL) 10-40 mg per capsule, Take 1 capsule by mouth once daily., Disp: 90 capsule, Rfl: 3    brimonidine 0.2% (ALPHAGAN) 0.2 % Drop, Place 1 drop into both eyes 3 (three) times daily., Disp: 10 mL, Rfl: 12    clonazePAM (KLONOPIN) 1 MG tablet, Take 1 tablet (1 mg total) by mouth daily as needed for Anxiety., Disp: 60 tablet, Rfl: 0    dorzolamide (TRUSOPT) 2 % ophthalmic solution, INSTILL 1 DROP INTO BOTH EYES 3 TIMES A DAY, Disp: 30 mL, Rfl: 4    ergocalciferol (VITAMIN D2) 50,000 unit Cap, TAKE 1 CAPSULE (50,000 UNITS " TOTAL) BY MOUTH EVERY 7 DAYS., Disp: 12 capsule, Rfl: 3    latanoprost 0.005 % ophthalmic solution, Place 1 drop into both eyes every evening., Disp: 7.5 mL, Rfl: 4    metoprolol succinate (TOPROL-XL) 100 MG 24 hr tablet, Take 1 tablet (100 mg total) by mouth once daily., Disp: 90 tablet, Rfl: 3    omeprazole (PRILOSEC) 40 MG capsule, Take 1 capsule (40 mg total) by mouth every 12 (twelve) hours. Take one pill every morning 45 minutes before breakfast, Disp: 180 capsule, Rfl: 3    pravastatin (PRAVACHOL) 40 MG tablet, Take 1 tablet (40 mg total) by mouth every evening., Disp: 90 tablet, Rfl: 3    predniSONE (DELTASONE) 5 MG tablet, Take 7 tablets (35 mg total) by mouth once daily. After one week decrease to   6 tablets(30mg) daily x 1 wk, then decrease to 5 tablets  (25mg) daily x 1 wk,  then decrease to 4 tablets(20mg) daily x 1 wk, then decrease to 3 tablets( 15mg0 daily x 1 wk,  then decrease to 2.5 tablets(12.5mg )daily x 2 wks, then decrease to 2 tablets (10mg )daily and continue, Disp: 210 tablet, Rfl: 1    repaglinide (PRANDIN) 0.5 MG tablet, Take 1 tablet (0.5 mg total) by mouth 3 (three) times daily before meals., Disp: 270 tablet, Rfl: 3    sulfamethoxazole-trimethoprim 800-160mg (BACTRIM DS) 800-160 mg Tab, Take 1 tablet by mouth 3 (three) times a week. Please take this medicine on Mondays, Wednesdays and Fridays while on prednisone doses 20mg or greater a day., Disp: 30 tablet, Rfl: 1    tocilizumab (ACTEMRA) 162 mg/0.9 mL injection, Inject 0.9 mLs (162 mg total) into the skin every 7 days., Disp: 12 each, Rfl: 0    traMADoL (ULTRAM) 50 mg tablet, Take 1 tablet (50 mg total) by mouth every 12 (twelve) hours as needed for Pain., Disp: 60 tablet, Rfl: 0    ALLERGIES:   Review of patient's allergies indicates:   Allergen Reactions    Oxycontin [oxycodone] Hallucinations     Immobile     Percocet [oxycodone-acetaminophen] Shortness Of Breath    Percodan [oxycodone hcl-oxycodone-asa] Shortness Of Breath     "Seroquel [quetiapine] Shortness Of Breath     Akathisia    Opioids - morphine analogues Other (See Comments)     Pt states it has no effect on her         PHYSICAL EXAMINATION:  /81   Pulse 102   Ht 5' 3" (1.6 m)   Wt 117.7 kg (259 lb 7.7 oz)   BMI 45.97 kg/m²   There are no signs of infection at the injection site, including no rubor, calor, or skin lesions.  Gen: NAD.  Psych: Affect & judgment nl.  Neuro: Grossly CNI. DALE.  HEENT: -Trach dev. -Eye d/c.   CV: Color nl. -E/C/C. WWPx4.  Pulm: -Dyspnea. -Cough.  Lymph: -Edema.  Int: -Rash/lesion noted. Skin is warm and dry    Right Shoulder:  INSPECTION / PALPATION:  -Deformity   -Ecchymosis   -Atrophy   -Sulcus sign   -No TTP over anatomy including clavicle, scapular spine  +TTP  over ACJ, acromion, , supraspinatus, infraspinatus, and bicipital groove     ROM (* = with pain):    Flex to 900° active, 120° passive   Int rot to waistline     STRENGTH:    Scaption 4/5   Flexion 4/5   Ext rot 4/5   Int rot 4/5    OTHER:   +Painful arc   +Drop arm above 90°  -Int lag  -Ext lag  +Neer's   +Ross-Tay   +[place] active compression   -Empty Can  +Full Can  -Speed's   -Yergason's  -Apprehension    NECK:   Grossly FROM & painless in neck flex, ext, B/L torsion, B/L lat flexion   -Spurling B/L    Hands NVI B/L      ASSESSMENT:      ICD-10-CM ICD-9-CM   1. Chronic right shoulder pain  M25.511 719.41    G89.29 338.29   2. Calcific tendinitis of right shoulder  M75.31 726.11   3. Arthrosis of right acromioclavicular joint  M19.011 715.91   4. Subacromial impingement, right  M75.41 726.19   5. BMI 45.0-49.9, adult  Z68.42 V85.42         PLAN:  Based on patient history, physical exam findings, and imaging I believe the patient's pain is multifactorial.  I believe issues are stemming from intra-articular, subacromial, and acromioclavicular.  We will start with a glenohumeral joint injection under ultrasound guidance with triamcinolone as I believe this will give us the " biggest chance to pain relief for her global shoulder pain.  Pending improvement, may move for with acromioclavicular joint injection or subacromial bursa injection.  Choosing to forego subacromial injection at today's visit as I am concerned that patient might have a rotator cuff tear.    Risks and benefits were discussed with patient prior to receiving injection.  Depending on injection type, risks include the possibility of infection, pain, disruptions in blood pressure and blood sugar, and cosmetic deformity at site of injection.    Risks and benefits were discussed with patient prior to receiving injection.  Depending on injection type, risks include the possibility of infection, pain, disruptions in blood pressure and blood sugar, and cosmetic deformity at site of injection.    All questions were answered to the best of my ability and all concerns were addressed at this time.    Follow up in-person in 6 weeks, or sooner if need be.    This note is dictated using the M*Modal Fluency Direct word recognition program. There are word recognition mistakes that are occasionally missed on review.

## 2024-11-07 NOTE — PROCEDURES
Large Joint Aspiration/Injection: R glenohumeral    Date/Time: 11/7/2024 9:30 AM    Performed by: Kiley Calix MD  Authorized by: Kiley Calix MD    Consent Done?:  Yes (Verbal)  Indications:  Arthritis and pain  Site marked: the procedure site was marked    Timeout: prior to procedure the correct patient, procedure, and site was verified      Local anesthesia used?: Yes    Anesthesia:  Local infiltration  Local anesthetic:  Co-phenylcaine spray    Details:  Needle Size:  22 G  Ultrasonic Guidance for needle placement?: Yes (Ultrasound guidance used to avoid neurovascular injury.)    Images are saved and documented.  Approach:  Posterior  Location:  Shoulder  Site:  R glenohumeral  Medications:  40 mg triamcinolone acetonide 40 mg/mL  Medications comment:  Ropivacaine 0.2% 2mL  Patient tolerance:  Patient tolerated the procedure well with no immediate complications     TECHNIQUE: Real time ultrasound examination of the right glenohumeral joint(s) was performed with SonoSite Edge 2, C1-5 MHz probe(s). Ultrasound guidance was used for needle localization. Images were saved and stored for documentation.  Dynamic visualization of the needle was continuous throughout the procedures and maintained in good position.

## 2024-11-09 RX ORDER — TRIAMCINOLONE ACETONIDE 40 MG/ML
40 INJECTION, SUSPENSION INTRA-ARTICULAR; INTRAMUSCULAR
Status: DISCONTINUED | OUTPATIENT
Start: 2024-11-07 | End: 2024-11-09 | Stop reason: HOSPADM

## 2024-11-13 ENCOUNTER — TELEPHONE (OUTPATIENT)
Dept: GASTROENTEROLOGY | Facility: CLINIC | Age: 69
End: 2024-11-13
Payer: MEDICARE

## 2024-11-13 ENCOUNTER — PATIENT MESSAGE (OUTPATIENT)
Dept: GASTROENTEROLOGY | Facility: CLINIC | Age: 69
End: 2024-11-13
Payer: MEDICARE

## 2024-11-13 NOTE — TELEPHONE ENCOUNTER
Spoke with patient.  Colonoscopy preps have been sent to patient through her portal.   She is scheduled for tomorrow.   Ada

## 2024-11-13 NOTE — TELEPHONE ENCOUNTER
----- Message from Norbert sent at 11/13/2024  2:56 PM CST -----  Regarding: Missed Call  Contact: [phone]  Type:  Patient Returning Call    Who Called:The pt   Who Left Message for Patient:Jasmine Blank  Does the patient know what this is regarding?:Prep Instructions   Would the patient rather a call back or a response via [facility]? Call back   Best Call Back Number: [phone]  Additional Information: Thank you.

## 2024-11-13 NOTE — TELEPHONE ENCOUNTER
----- Message from Ashley sent at 11/13/2024  2:25 PM CST -----  Contact: pt @[phone]  Genesis Ugalde calling regarding Patient Advice (message) for #pt is calling to speak with nurse concerning arrival time for procedure 11/14, asking for call back

## 2024-11-14 ENCOUNTER — ANESTHESIA EVENT (OUTPATIENT)
Dept: ENDOSCOPY | Facility: HOSPITAL | Age: 69
End: 2024-11-14
Payer: MEDICARE

## 2024-11-14 ENCOUNTER — ANESTHESIA (OUTPATIENT)
Dept: ENDOSCOPY | Facility: HOSPITAL | Age: 69
End: 2024-11-14
Payer: MEDICARE

## 2024-11-14 ENCOUNTER — HOSPITAL ENCOUNTER (OUTPATIENT)
Facility: HOSPITAL | Age: 69
Discharge: HOME | End: 2024-11-14
Attending: INTERNAL MEDICINE | Admitting: INTERNAL MEDICINE
Payer: MEDICARE

## 2024-11-14 VITALS
HEART RATE: 81 BPM | WEIGHT: 254 LBS | OXYGEN SATURATION: 97 % | TEMPERATURE: 98 F | RESPIRATION RATE: 16 BRPM | DIASTOLIC BLOOD PRESSURE: 84 MMHG | HEIGHT: 63 IN | SYSTOLIC BLOOD PRESSURE: 139 MMHG | BODY MASS INDEX: 45 KG/M2

## 2024-11-14 DIAGNOSIS — K63.5 COLON POLYPS: ICD-10-CM

## 2024-11-14 LAB — POCT GLUCOSE: 113 MG/DL (ref 70–110)

## 2024-11-14 PROCEDURE — 27200997: Mod: HCNC | Performed by: INTERNAL MEDICINE

## 2024-11-14 PROCEDURE — 27201089 HC SNARE, DISP (ANY): Mod: HCNC | Performed by: INTERNAL MEDICINE

## 2024-11-14 PROCEDURE — 45385 COLONOSCOPY W/LESION REMOVAL: CPT | Mod: 22,PT,HCNC, | Performed by: INTERNAL MEDICINE

## 2024-11-14 PROCEDURE — 37000008 HC ANESTHESIA 1ST 15 MINUTES: Mod: HCNC | Performed by: INTERNAL MEDICINE

## 2024-11-14 PROCEDURE — 88305 TISSUE EXAM BY PATHOLOGIST: CPT | Mod: 26,HCNC,, | Performed by: STUDENT IN AN ORGANIZED HEALTH CARE EDUCATION/TRAINING PROGRAM

## 2024-11-14 PROCEDURE — 88305 TISSUE EXAM BY PATHOLOGIST: CPT | Mod: HCNC | Performed by: STUDENT IN AN ORGANIZED HEALTH CARE EDUCATION/TRAINING PROGRAM

## 2024-11-14 PROCEDURE — 37000009 HC ANESTHESIA EA ADD 15 MINS: Mod: HCNC | Performed by: INTERNAL MEDICINE

## 2024-11-14 PROCEDURE — 45385 COLONOSCOPY W/LESION REMOVAL: CPT | Mod: PT,HCNC | Performed by: INTERNAL MEDICINE

## 2024-11-14 RX ORDER — SODIUM CHLORIDE 9 MG/ML
INJECTION, SOLUTION INTRAVENOUS CONTINUOUS
Status: DISCONTINUED | OUTPATIENT
Start: 2024-11-14 | End: 2024-11-14 | Stop reason: HOSPADM

## 2024-11-14 NOTE — PROVATION PATIENT INSTRUCTIONS
Discharge Summary/Instructions after an Endoscopic Procedure  Patient Name: Genesis Ugalde  Patient MRN: [id number]  Patient YOB: 1955 Thursday, November 14, 2024  Alok Dykes MD  Dear patient,  As a result of recent federal legislation (The Federal Cures Act), you may   receive lab or pathology results from your procedure in your [facility]   account before your physician is able to contact you. Your physician or   their representative will relay the results to you with their   recommendations at their soonest availability.  Thank you,  RESTRICTIONS:  During your procedure today, you received medications for sedation.  These   medications may affect your judgment, balance and coordination.  Therefore,   for 24 hours, you have the following restrictions:   - DO NOT drive a car, operate machinery, make legal/financial decisions,   sign important papers or drink alcohol.    ACTIVITY:  Today: no heavy lifting, straining or running due to procedural   sedation/anesthesia.  The following day: return to full activity including work.  DIET:  Eat and drink normally unless instructed otherwise.     TREATMENT FOR COMMON SIDE EFFECTS:  - Mild abdominal pain, nausea, belching, bloating or excessive gas:  rest,   eat lightly and use a heating pad.  - Sore Throat: treat with throat lozenges and/or gargle with warm salt   water.  - Because air was used during the procedure, expelling large amounts of air   from your rectum or belching is normal.  - If a bowel prep was taken, you may not have a bowel movement for 1-3 days.    This is normal.  SYMPTOMS TO WATCH FOR AND REPORT TO YOUR PHYSICIAN:  1. Abdominal pain or bloating, other than gas cramps.  2. Chest pain.  3. Back pain.  4. Signs of infection such as: chills or fever occurring within 24 hours   after the procedure.  5. Rectal bleeding, which would show as bright red, maroon, or black stools.   (A tablespoon of blood from the rectum is not serious, especially  if   hemorrhoids are present.)  6. Vomiting.  7. Weakness or dizziness.  GO DIRECTLY TO THE NEAREST EMERGENCY ROOM IF YOU HAVE ANY OF THE FOLLOWING:      Difficulty breathing              Chills and/or fever over 101 F   Persistent vomiting and/or vomiting blood   Severe abdominal pain   Severe chest pain   Black, tarry stools   Bleeding- more than one tablespoon   Any other symptom or condition that you feel may need urgent attention  Your doctor recommends these additional instructions:  If any biopsies were taken, your doctors clinic will contact you in 1 to 2   weeks with any results.  - Discharge patient to home.   - Await pathology results.   - Telephone endoscopist for pathology results in 3 weeks.   - Repeat colonoscopy in 3-4 months for surveillance of multiple polyps with   device assisted colonoscope due to looping to clear cecum.  - Return to GI clinic at the next available appointment.   - The findings and recommendations were discussed with the patient.  For questions, problems or results please call your physician - Alok Dykes MD at Work:  [phone].  [facility] [place], EMERGENCY ROOM PHONE NUMBER: [phone]  IF A COMPLICATION OR EMERGENCY SITUATION ARISES AND YOU ARE UNABLE TO REACH   YOUR PHYSICIAN - GO DIRECTLY TO THE EMERGENCY ROOM.  Alok Dykes MD  11/14/2024 10:11:11 AM  This report has been verified and signed electronically.  Dear patient,  As a result of recent federal legislation (The Federal Cures Act), you may   receive lab or pathology results from your procedure in your [facility]   account before your physician is able to contact you. Your physician or   their representative will relay the results to you with their   recommendations at their soonest availability.  Thank you,  PROVATION

## 2024-11-14 NOTE — ANESTHESIA PREPROCEDURE EVALUATION
[facility]  Anesthesia Pre-Operative Evaluation       Patient Name: Genesis Ugalde  YOB: 1955  MRN: [id number]  [facility]: [id number]      Code Status: Prior   Date of Procedure: 11/14/2024  Procedure: Procedure(s) (LRB):  COLONOSCOPY (N/A)  Anesthesia: Choice  Pre-Operative Diagnosis: Final diagnoses:  [K63.5] Colon polyps  Proceduralist/Surgeons and Role:     * Alok Dykes MD - Primary    SUBJECTIVE:   Genesis Ugalde is a 68 y.o. female w/ a significant PMHx listed below.    Patient now presents for the above procedure(s). Pt appropriately NPO.     LDA:        Peripheral IV - Single Lumen 11/14/24 0821 20 G 1 in No Right Hand (Active)   Number of days: 0      Anesthesia Evaluation      Airway   Mallampati: II  TM distance: Normal  Neck ROM: Normal ROM  Dental    (+) Intact        Pulmonary    (+) asthma, sleep apneaRecent URI: BELLO.  Cardiovascular   Exercise tolerance: good  (+) hypertension well controlled    ECG reviewed  Rate: Normal    Neuro/Psych    (+) neuromuscular disease, psychiatric history    GI/Hepatic/Renal    (+) hiatal hernia, GERD poorly controlled, liver disease, bowel prep    Endo/Other    (+) diabetes mellitus poorly controlled  Abdominal                     ALLERGIES:     Review of patient's allergies indicates:   Allergen Reactions    Oxycontin [oxycodone] Hallucinations     Immobile     Percocet [oxycodone-acetaminophen] Shortness Of Breath    Percodan [oxycodone hcl-oxycodone-asa] Shortness Of Breath    Seroquel [quetiapine] Shortness Of Breath     Akathisia    Opioids - morphine analogues Other (See Comments)     Pt states it has no effect on her      MEDICATIONS:     Current Outpatient Medications on File Prior to Encounter   Medication Sig Dispense Refill Last Dose/Taking    clonazePAM (KLONOPIN) 1 MG tablet Take 1 tablet (1 mg total) by mouth daily as needed for Anxiety. 60 tablet 0 Past Week    amLODIPine-benazepriL (LOTREL) 10-40 mg per capsule Take  1 capsule by mouth once daily. 90 capsule 3 11/12/2024    brimonidine 0.2% (ALPHAGAN) 0.2 % Drop Place 1 drop into both eyes 3 (three) times daily. 10 mL 12     dorzolamide (TRUSOPT) 2 % ophthalmic solution INSTILL 1 DROP INTO BOTH EYES 3 TIMES A DAY 30 mL 4     ergocalciferol (VITAMIN D2) 50,000 unit Cap TAKE 1 CAPSULE (50,000 UNITS TOTAL) BY MOUTH EVERY 7 DAYS. 12 capsule 3     latanoprost 0.005 % ophthalmic solution Place 1 drop into both eyes every evening. 7.5 mL 4     metoprolol succinate (TOPROL-XL) 100 MG 24 hr tablet Take 1 tablet (100 mg total) by mouth once daily. 90 tablet 3 11/12/2024    omeprazole (PRILOSEC) 40 MG capsule Take 1 capsule (40 mg total) by mouth every 12 (twelve) hours. Take one pill every morning 45 minutes before breakfast 180 capsule 3 11/12/2024    pravastatin (PRAVACHOL) 40 MG tablet Take 1 tablet (40 mg total) by mouth every evening. 90 tablet 3 11/12/2024    traMADoL (ULTRAM) 50 mg tablet Take 1 tablet (50 mg total) by mouth every 12 (twelve) hours as needed for Pain. 60 tablet 0 Unknown     Current Facility-Administered Medications   Medication Dose Route Frequency Provider Last Rate Last Admin    0.9%  NaCl infusion   Intravenous Continuous Alok Dykes MD              History:   There are no hospital problems to display for this patient.    Patient Active Problem List   Diagnosis    Giant cell arteritis with polymyalgia rheumatica    Morbid obesity    Optic neuritis    History of kidney stones    Uveitis    Vitamin D deficiency    Hx of colonic polyps    Blindness and low vision    Cushing's disease    Genital herpes simplex    Hepatomegaly    Fatty liver    Primary open angle glaucoma, both eyes    Lung nodule    Transient memory loss    Right sided sciatica    Diarrhea    Dysphagia    Immunosuppressed status    Loose stools    Other hyperlipidemia    Claustrophobia    Pain of right hip joint    LOYDA (obstructive sleep apnea)    Multinodular goiter    Ankle mass, right     Right calf pain    Liver mass    Family circumstance    Liver cirrhosis secondary to BELLO    Gastroesophageal reflux disease    Hiatal hernia    Long-term current use of steroids    Edema    Essential hypertension    Other osteoporosis without current pathological fracture    Impaired glucose regulation    Hepatic hemangioma    Elevated liver enzymes    Encounter for follow-up surveillance of liver cancer    Chronic prescription benzodiazepine use    Recurrent HSV (herpes simplex virus)    DNR (do not resuscitate)      Past Medical History:   Diagnosis Date    Anxiety     Asthma     Blind left eye     can see silhouettes in right eye    Cataract     Depression     Diabetes mellitus     Disease of immune system     Giant cell arteritis     Glaucoma     Hypertension     Polymyalgia rheumatica     Uveitis      Past Surgical History:   Procedure Laterality Date    ANKLE SURGERY      CHOLECYSTECTOMY      COLONOSCOPY N/A 9/28/2015    Procedure: COLONOSCOPY;  Surgeon: Alok Dykes MD;  [facility]  Service: Endoscopy;  Laterality: N/A;    COLONOSCOPY N/A 9/6/2019    Procedure: COLONOSCOPY;  Surgeon: Alok Dykes MD;  [facility]  Service: Endoscopy;  Laterality: N/A;    CYST REMOVAL      right lung    ESOPHAGOGASTRODUODENOSCOPY N/A 3/28/2023    Procedure: EGD (ESOPHAGOGASTRODUODENOSCOPY);  Surgeon: Bharat Franklin MD;  Location: [facility] ([address] [place] [address]);  Service: Endoscopy;  Laterality: N/A;  cirrhosis-labs prior, varices surveillance  gastroparesis-full liquid diet x3 days, clear liquid diet x1 day prior to procedure  instructions handed to pt and sent to [facility]    HYSTERECTOMY       Alcohol use:    Social History     Substance and Sexual Activity   Alcohol Use No    Alcohol/week: 0.0 standard drinks of alcohol          OBJECTIVE:   Last 3 sets of Vitals        10/28/2024     7:49 AM 11/7/2024     9:36 AM 11/14/2024     8:39 AM   Vitals - 1 value per visit   SYSTOLIC  "100 116 148   DIASTOLIC 70 81 72   Pulse 89 102 77   Temp   36.8 °C (98.2 °F)   Resp   18   SPO2 96 %  96 %   Weight (lb) 255 259.48 254   Weight (kg) 115.667 117.7 115.214   Height 5' 3" (1.6 m) 5' 3" (1.6 m) 5' 3" (1.6 m)   BMI (Calculated) 45.2 46 45   Pain Score Zero Three        Vital Signs (Most Recent):  Temp: 36.8 °C (98.2 °F) (11/14/24 0839)  Pulse: 77 (11/14/24 0839)  Resp: 18 (11/14/24 0839)  BP: (!) 148/72 (11/14/24 0839)  SpO2: 96 % (11/14/24 0839) Vital Signs Range (Last 24H):  Temp:  [36.8 °C (98.2 °F)]   Pulse:  [77]   Resp:  [18]   BP: (148)/(72)   SpO2:  [96 %]      No intake or output data in the 24 hours ending 11/14/24 0907    Body mass index is 44.99 kg/m².     Significant Labs:  Lab Results   Component Value Date    WBC 13.04 (H) 11/14/2024    HGB 12.6 11/14/2024    HCT 36.9 (L) 11/14/2024     11/14/2024    CHOL 159 03/22/2024    TRIG 130 03/22/2024    HDL 55 03/22/2024    ALT 65 (H) 10/21/2024    AST 31 10/21/2024     10/21/2024    K 4.2 10/21/2024     10/21/2024    CREATININE 0.9 10/21/2024    BUN 12 10/21/2024    CO2 21 (L) 10/21/2024    TSH 2.167 03/22/2024    INR 1.0 11/14/2024    GLUF 79 01/06/2011    HGBA1C 6.3 (H) 09/04/2024     No results found for: "PREGTESTUR", "PREGSERUM", "HCG", "HCGQUANT"   No LMP recorded. Patient has had a hysterectomy.    EKG:   Results for orders placed or performed during the hospital encounter of 09/06/24   EKG 12-lead    Collection Time: 09/06/24  4:45 PM   Result Value Ref Range    QRS Duration 100 ms    OHS QTC Calculation 446 ms    Narrative    Test Reason : H53.9,    Vent. Rate : 074 BPM     Atrial Rate : 074 BPM     P-R Int : 100 ms          QRS Dur : 100 ms      QT Int : 402 ms       P-R-T Axes : 000 002 043 degrees     QTc Int : 446 ms    Sinus rhythm with short SC  Otherwise normal ECG  When compared with ECG of 23-MAY-2024 08:30,  No significant change was found  Confirmed by Meeta CARBONE, Dorothy ([id number]) on 9/7/2024 10:43:13 " "AM    Referred By: [email or web address]   SELF           Confirmed By:Dorothy Tim MD       TTE:  No results found. However, due to the size of the patient record, not all encounters were searched. Please check Results Review for a complete set of results.  No results found for: "EF"  No results found. However, due to the size of the patient record, not all encounters were searched. Please check Results Review for a complete set of results.  CURRY:  No results found. However, due to the size of the patient record, not all encounters were searched. Please check Results Review for a complete set of results.  Stress Test:  No results found for this or any previous visit.     LHC:  No results found for this or any previous visit.     PFT:  No results found for: "FEV1", "FVC", "YKH3NSM", "TLC", "DLCO"     ASSESSMENT/PLAN:                                                                                                                11/14/2024  Genesis Ugalde is a 68 y.o., female.      Pre-op Assessment    I have reviewed the Patient Summary Reports.     I have reviewed the Nursing Notes. I have reviewed the NPO Status.   I have reviewed the Medications.     Review of Systems  Anesthesia Hx:  No problems with previous Anesthesia                Social:  Non-Smoker, No Alcohol Use       Hematology/Oncology:  Hematology Normal   Oncology Normal                                   EENT/Dental:  EENT/Dental Normal  Blind l          Chronic tonsillitis: Blind Left eye.    Cardiovascular:  Exercise tolerance: good   Hypertension, well controlled              ECG has been reviewed.                            Pulmonary:    Asthma mild  Recent URI: BELLO. Sleep Apnea                Renal/:  Renal/ Normal                 Hepatic/GI:  Bowel Prep.  Hiatal Hernia, GERD, poorly controlled Liver Disease,               Musculoskeletal:  Musculoskeletal Normal                Neurological:    Neuromuscular Disease,       CSI injection of her right " "shoulder. Pt was referred by Dr. Chacon. Pt localizes pain to superior shoulder and lateral upper arm. Pt reports pain is 3/10 today. Pt reports clicking and "loud" popping in her shoulder. Pt reports intermittent numbness/tingling down right arm and into right hand/fingers.                             Endocrine:  Diabetes, poorly controlled         Morbid Obesity / BMI > 40  Dermatological:  Skin Normal    Psych:  Psychiatric History anxiety                 Physical Exam  General: Well nourished, Cooperative, Alert and Oriented    Airway:  Mallampati: II   Mouth Opening: Normal  TM Distance: Normal  Tongue: Normal  Neck ROM: Normal ROM    Dental:  Intact    Chest/Lungs:  Clear to auscultation, Normal Respiratory Rate    Heart:  Rate: Normal  Rhythm: Regular Rhythm  Sounds: Normal      Anesthesia Plan  Type of Anesthesia, risks & benefits discussed:    Anesthesia Type: Gen Natural Airway  Intra-op Monitoring Plan: Standard ASA Monitors  Induction:  IV  Informed Consent: Informed consent signed with the Patient and all parties understand the risks and agree with anesthesia plan.  All questions answered.   ASA Score: 3  Day of Surgery Review of History & Physical: H&P Update referred to the surgeon/provider.    Ready For Surgery From Anesthesia Perspective.     .      "

## 2024-11-14 NOTE — H&P
[facility]  History & Physical    Subjective:      Chief Complaint/Reason for Admission:     Colonoscopy for surveillance history of Patient with colonoscopy at [facility] in                         1/2014. 5 colon polyps found one 10mm. All benign                         per patientAime Ugalde is a 68 y.o. female.    Past Medical History:   Diagnosis Date    Anxiety     Asthma     Blind left eye     can see silhouettes in right eye    Cataract     Depression     Diabetes mellitus     Disease of immune system     Giant cell arteritis     Glaucoma     Hypertension     Polymyalgia rheumatica     Uveitis      Past Surgical History:   Procedure Laterality Date    ANKLE SURGERY      CHOLECYSTECTOMY      COLONOSCOPY N/A 9/28/2015    Procedure: COLONOSCOPY;  Surgeon: Alok Dykes MD;  [facility]  Service: Endoscopy;  Laterality: N/A;    COLONOSCOPY N/A 9/6/2019    Procedure: COLONOSCOPY;  Surgeon: Alok Dykes MD;  [facility]  Service: Endoscopy;  Laterality: N/A;    CYST REMOVAL      right lung    ESOPHAGOGASTRODUODENOSCOPY N/A 3/28/2023    Procedure: EGD (ESOPHAGOGASTRODUODENOSCOPY);  Surgeon: Bharat Franklin MD;  Location: [facility] ([address] [place] [address]);  Service: Endoscopy;  Laterality: N/A;  cirrhosis-labs prior, varices surveillance  gastroparesis-full liquid diet x3 days, clear liquid diet x1 day prior to procedure  instructions handed to pt and sent to [facility]    HYSTERECTOMY       Social History     Tobacco Use    Smoking status: Never     Passive exposure: Never    Smokeless tobacco: Never   Substance Use Topics    Alcohol use: No     Alcohol/week: 0.0 standard drinks of alcohol    Drug use: Never       PTA Medications   Medication Sig    clonazePAM (KLONOPIN) 1 MG tablet Take 1 tablet (1 mg total) by mouth daily as needed for Anxiety.    amLODIPine-benazepriL (LOTREL) 10-40 mg per capsule Take 1 capsule by mouth once daily.     brimonidine 0.2% (ALPHAGAN) 0.2 % Drop Place 1 drop into both eyes 3 (three) times daily.    dorzolamide (TRUSOPT) 2 % ophthalmic solution INSTILL 1 DROP INTO BOTH EYES 3 TIMES A DAY    ergocalciferol (VITAMIN D2) 50,000 unit Cap TAKE 1 CAPSULE (50,000 UNITS TOTAL) BY MOUTH EVERY 7 DAYS.    latanoprost 0.005 % ophthalmic solution Place 1 drop into both eyes every evening.    metoprolol succinate (TOPROL-XL) 100 MG 24 hr tablet Take 1 tablet (100 mg total) by mouth once daily.    omeprazole (PRILOSEC) 40 MG capsule Take 1 capsule (40 mg total) by mouth every 12 (twelve) hours. Take one pill every morning 45 minutes before breakfast    pravastatin (PRAVACHOL) 40 MG tablet Take 1 tablet (40 mg total) by mouth every evening.    predniSONE (DELTASONE) 5 MG tablet Take 7 tablets (35 mg total) by mouth once daily. After one week decrease to   6 tablets(30mg) daily x 1 wk, then decrease to 5 tablets  (25mg) daily x 1 wk,  then decrease to 4 tablets(20mg) daily x 1 wk, then decrease to 3 tablets( 15mg0 daily x 1 wk,  then decrease to 2.5 tablets(12.5mg )daily x 2 wks, then decrease to 2 tablets (10mg )daily and continue    repaglinide (PRANDIN) 0.5 MG tablet Take 1 tablet (0.5 mg total) by mouth 3 (three) times daily before meals.    sulfamethoxazole-trimethoprim 800-160mg (BACTRIM DS) 800-160 mg Tab Take 1 tablet by mouth 3 (three) times a week. Please take this medicine on Mondays, Wednesdays and Fridays while on prednisone doses 20mg or greater a day.    tocilizumab (ACTEMRA) 162 mg/0.9 mL injection Inject 0.9 mLs (162 mg total) into the skin every 7 days.    traMADoL (ULTRAM) 50 mg tablet Take 1 tablet (50 mg total) by mouth every 12 (twelve) hours as needed for Pain.     Review of patient's allergies indicates:   Allergen Reactions    Oxycontin [oxycodone] Hallucinations     Immobile     Percocet [oxycodone-acetaminophen] Shortness Of Breath    Percodan [oxycodone hcl-oxycodone-asa] Shortness Of Breath    Seroquel  [quetiapine] Shortness Of Breath     Akathisia    Opioids - morphine analogues Other (See Comments)     Pt states it has no effect on her         Review of Systems   Constitutional:  Negative for fever.       Objective:      Vital Signs (Most Recent)  Temp: 98.2 °F (36.8 °C) (11/14/24 0839)  Pulse: 77 (11/14/24 0839)  Resp: 18 (11/14/24 0839)  BP: (!) 148/72 (11/14/24 0839)  SpO2: 96 % (11/14/24 0839)    Vital Signs Range (Last 24H):  Temp:  [98.2 °F (36.8 °C)]   Pulse:  [77]   Resp:  [18]   BP: (148)/(72)   SpO2:  [96 %]     Physical Exam  Cardiovascular:      Rate and Rhythm: Normal rate.   Pulmonary:      Effort: Pulmonary effort is normal.   Neurological:      Mental Status: She is alert and oriented to person, place, and time.             Assessment:        Colonoscopy for surveillance history of Patient with colonoscopy at [facility] in                         1/2014. 5 colon polyps found one 10mm. All benign                         per patient.       Plan:      Colonoscopy for surveillance history of Patient with colonoscopy at [facility] in                         1/2014. 5 colon polyps found one 10mm. All benign                         per patient.

## 2024-11-14 NOTE — TRANSFER OF CARE
"Anesthesia Transfer of Care Note    Patient: Genesis V Egbi    Procedure(s) Performed: Procedure(s) (LRB):  COLONOSCOPY (N/A)    Patient location: PACU    Anesthesia Type: general    Transport from OR: Transported from OR on 6-10 L/min O2 by face mask with adequate spontaneous ventilation    Post pain: adequate analgesia    Post assessment: no apparent anesthetic complications and tolerated procedure well    Post vital signs: stable    Level of consciousness: sedated    Nausea/Vomiting: no nausea/vomiting    Complications: none    Transfer of care protocol was followed      Last vitals: Visit Vitals  BP (!) 148/72 (BP Location: Left arm, Patient Position: Lying)   Pulse 77   Temp 36.8 °C (98.2 °F) (Temporal)   Resp 18   Ht 5' 3" (1.6 m)   Wt 115.2 kg (254 lb)   SpO2 96%   Breastfeeding No   BMI 44.99 kg/m²     "

## 2024-11-14 NOTE — ANESTHESIA POSTPROCEDURE EVALUATION
Anesthesia Post Evaluation    Patient: Genesis V Egbi    Procedure(s) Performed: Procedure(s) (LRB):  COLONOSCOPY (N/A)    Final Anesthesia Type: general      Patient location during evaluation: PACU  Patient participation: Yes- Able to Participate  Level of consciousness: awake and alert  Post-procedure vital signs: reviewed and stable  Pain management: adequate  Airway patency: patent    PONV status: None or treated.  Anesthetic complications: no      Cardiovascular status: hemodynamically stable  Respiratory status: spontaneous ventilation  Hydration status: euvolemic  Follow-up not needed.          Vitals Value Taken Time   /84 11/14/24 1020   Temp 36.9 °C (98.4 °F) 11/14/24 1005   Pulse 81 11/14/24 1020   Resp 16 11/14/24 1020   SpO2 97 % 11/14/24 1020         Event Time   Out of Recovery 10:48:39         Pain/Jose Elias Score: Jose Elias Score: 10 (11/14/2024 10:20 AM)

## 2024-11-16 ENCOUNTER — DOCUMENTATION ONLY (OUTPATIENT)
Dept: GASTROENTEROLOGY | Facility: CLINIC | Age: 69
End: 2024-11-16
Payer: MEDICARE

## 2024-11-16 ENCOUNTER — PATIENT MESSAGE (OUTPATIENT)
Dept: GASTROENTEROLOGY | Facility: CLINIC | Age: 69
End: 2024-11-16
Payer: MEDICARE

## 2024-11-16 LAB
FINAL PATHOLOGIC DIAGNOSIS: NORMAL
GROSS: NORMAL
Lab: NORMAL

## 2024-11-16 NOTE — PROGRESS NOTES
Procedure: Colonoscopy    Diagnosis: Surveillance colonoscopy - Hx of colon polyps    Procedure Timin-12 weeks    Location: Hospital Based ([facility],  endo, [place] [facility])    Additional Scheduling Information: Repeat colonoscopy in 3-4 months for surveillance of multiple colon adenomas polyps and history of advanced colon adenoma with balloon assisted colonoscope with Dr. Darryn Woodruff due to looping to clear cecum.  Request 60 minute scheduling slot..    Prep Specifications:Extended/Constipation prep, please draw CBC with platelets and INR Td case    Is the patient taking a GLP-1 Agonist:no    Have you attached a patient to this message: yes

## 2024-11-16 NOTE — PROGRESS NOTES
HPI    DLS: 12/01/2023    Pt here for Overdue Glaucoma Check;  Pt had  to be hospitalized for a flare up (9/2024) and has had even   further vision loss despite IV steroids while in hospital.     Pt was seen in the ED on 9/06/2024 for GCA inflammation and vision seemed   to appear snowy. Pt denies any eye pain or any changes in vision.       Meds;  Dorzolamide TID OU  Brimonidine TID OU  Latanoprost QHS OU    1) Autoimmune Optic Neuropathy OU   2) POAG vs OHT OU   3) GCA with PMR   4) Type 2 DM no DR   5) Steroid Responder   6) NS OU   7) APD OS     Last edited by Tanna Ro MD on 11/23/2024  2:32 PM.            Assessment /Plan     For exam results, see Encounter Report.    Inflammatory optic neuropathy    Giant cell arteritis with polymyalgia rheumatica    Primary open angle glaucoma of both eyes, mild stage    Steroid responder, bilateral    Nuclear sclerosis, bilateral    Type 2 diabetes mellitus without ophthalmic manifestations    Visual loss        Lost to F/U glaucoma / ophthalmology clinic from 12/1/2024 to 11/2024   Hospitalized 9/2024 with flare up of GCA and further loss of vision     1. Autoimmune optic neuropathy OU,   - OS 2004  - OD 2012  - unknown antibiody  - several treatments with IV steroid and IVIG  - vision and VF per [place] records  - PO steroid daily - 10 mg a day   - sural nerve and muscle biopsy done - results pending - pt to obtain results form [facility]  - xal qhs ou   - monitor  - rheum monitoring esr/crp  - all previous mri's and lp negative per rheum note  - sed rate and c-reat prot - monitored by rheumatology     Exacerbation - when did not get Actemra - may 2020 - had progression od - stabilized again 9/2020   Exacerbation - 9/2024    Additional loss of vision ou    Now HM at face od and NLP os     2.  Steroid responder in past  - on chronic steroids for #1  iop good today   Continue xal qhs ou    3. POAG - mild vs OHT ou      First HVF   2004   First photos   2004   Treatment /  "Drops started   2004           Family history    neg        Glaucoma meds    Latanoprost ou        H/O adverse rxn to glaucoma drops    none        LASERS    None         GLAUCOMA SURGERIES    none        OTHER EYE SURGERIES    none        CDR    0.9 / 0.9 - +++ pallor ou         Tbase    25-27 / 25-27          Tmax    27/27            Ttarget    20/20             HVF    12 test 2004 to 2012  - SALT / IAD od // Ext os                    4 10-2 stim V OD 2012 - 2014 - [facility]    (+ prog from 2012 to 2014 - when followed at [facility])    7- test-  10-2 stim V test done od 12/2014 to 2017 - dense SALT and IAD (+stable)         Gonio    +2-3 ou (very slight narrowing)         CCT    591/612        OCT    7 test 2006 to 2016 - RNFL - dec throughout od // dec thru out  os        HRT    6 test 2004 to 2018 - MR - nl od // off-center os // new baseline 1/25/2016 - nl ou -"too good to be true"        Disc photos    2004 - slides // 2008, 2010, 2012, , 2012, 2012, 2012, 2015, 2017   - OIS     - Ttoday   12/13   - Test done today - IOP    PLAN  CSM - IOP good     Sees Quinet   He is monitoring sed rate and c react prot   Cont actemra  (tocilizumab) 162 mg sc weekly  Cont prilosec (omeprazole)  20mg daily - to protect stomach   Prednisone and any other immunosuppressants per   [facility]     Cont brimonidine tid ou  Cont latanoprost qhs ou  Cont dorzolamide tid ou  - IOP was better     -would rec PI's prior to doing and slt's - has some mild  ant bowing and narrowing   - avoid BB - H/O asthma as a child     NO MORE VF testing - essentially  EXT ou      Photo file updated 4/18/2022    Insurance forms filled out again (yearly)  8/5/2019     F/U 6 months  - Jayjay - this is end stage and I don not think there is much that can be done with regards to the poor vision.   Ok to keep the appt she has with neuro ophth in January- made for pt when she was admitted to the hospital in September with flare up     "

## 2024-11-16 NOTE — PROGRESS NOTES
Genesis your colonoscopy pathology was benign but several of your colon polyps were adenomas those are benign colon polyps but the precancerous type of colon polyps but completely benign because of your difficult colon with looping I am going to set you up with 1 of my partners who has a ability to do device assisted colonoscopy to get a better look in the next 3-4 months referral has been placed.      Part 1  Colon, ascending, polyps x 2 (4 mm, 1 mm), biopsies:  Two tubular adenomas    Part 2  Colon, descending, polyps x2 (both 2 mm), biopsies:  One tubular adenoma and one suggestive of a hyperplastic polyp   Comment: Interp By Rcoio Whelan M.D., Signed on 11/16/2024

## 2024-11-20 ENCOUNTER — INFUSION (OUTPATIENT)
Dept: INFECTIOUS DISEASES | Facility: HOSPITAL | Age: 69
End: 2024-11-20
Payer: MEDICARE

## 2024-11-20 VITALS
BODY MASS INDEX: 46.52 KG/M2 | TEMPERATURE: 98 F | WEIGHT: 262.56 LBS | OXYGEN SATURATION: 98 % | HEART RATE: 79 BPM | HEIGHT: 63 IN | DIASTOLIC BLOOD PRESSURE: 67 MMHG | RESPIRATION RATE: 20 BRPM | SYSTOLIC BLOOD PRESSURE: 146 MMHG

## 2024-11-20 DIAGNOSIS — M81.8 OTHER OSTEOPOROSIS WITHOUT CURRENT PATHOLOGICAL FRACTURE: ICD-10-CM

## 2024-11-20 DIAGNOSIS — H46.9 OPTIC NEURITIS: ICD-10-CM

## 2024-11-20 DIAGNOSIS — M31.5 GIANT CELL ARTERITIS WITH POLYMYALGIA RHEUMATICA: Primary | ICD-10-CM

## 2024-11-20 PROCEDURE — 96374 THER/PROPH/DIAG INJ IV PUSH: CPT | Mod: HCNC

## 2024-11-20 PROCEDURE — 25000003 PHARM REV CODE 250: Mod: HCNC | Performed by: INTERNAL MEDICINE

## 2024-11-20 PROCEDURE — 63600175 PHARM REV CODE 636 W HCPCS: Mod: JG,HCNC | Performed by: INTERNAL MEDICINE

## 2024-11-20 PROCEDURE — 96365 THER/PROPH/DIAG IV INF INIT: CPT | Mod: HCNC

## 2024-11-20 RX ORDER — DIPHENHYDRAMINE HYDROCHLORIDE 50 MG/ML
25 INJECTION INTRAMUSCULAR; INTRAVENOUS ONCE
Status: COMPLETED | OUTPATIENT
Start: 2024-11-20 | End: 2024-11-20

## 2024-11-20 RX ORDER — HEPARIN 100 UNIT/ML
500 SYRINGE INTRAVENOUS
OUTPATIENT
Start: 2024-12-18

## 2024-11-20 RX ORDER — EPINEPHRINE 0.3 MG/.3ML
0.3 INJECTION SUBCUTANEOUS ONCE AS NEEDED
OUTPATIENT
Start: 2024-12-18

## 2024-11-20 RX ORDER — DIPHENHYDRAMINE HYDROCHLORIDE 50 MG/ML
25 INJECTION INTRAMUSCULAR; INTRAVENOUS ONCE
OUTPATIENT
Start: 2024-12-18

## 2024-11-20 RX ORDER — DIPHENHYDRAMINE HYDROCHLORIDE 50 MG/ML
50 INJECTION INTRAMUSCULAR; INTRAVENOUS ONCE AS NEEDED
OUTPATIENT
Start: 2024-12-18

## 2024-11-20 RX ORDER — SODIUM CHLORIDE 0.9 % (FLUSH) 0.9 %
10 SYRINGE (ML) INJECTION
OUTPATIENT
Start: 2024-12-18

## 2024-11-20 RX ADMIN — DIPHENHYDRAMINE HYDROCHLORIDE 25 MG: 50 INJECTION INTRAMUSCULAR; INTRAVENOUS at 10:11

## 2024-11-20 RX ADMIN — SODIUM CHLORIDE: 9 INJECTION, SOLUTION INTRAVENOUS at 10:11

## 2024-11-20 RX ADMIN — TOCILIZUMAB 714 MG: 20 INJECTION, SOLUTION, CONCENTRATE INTRAVENOUS at 10:11

## 2024-11-20 NOTE — PROGRESS NOTES
Pt arrived for Actemra infusion. Pre med of Bendadryl 25mg IVP ordered and given 30 min prior to start of infusion. Infusion given as ordered over 60 min.     Limited head-to-toe assessment due to privacy issues and visit reason though the opportunity was given for patient to express any concerns      Patient arrives for infusion of Actemra as ordered by Dr. Anyi Lobo. All benefits, risks, requirements, and alternatives should have been discussed with patient by ordering provider at the time the order was placed.

## 2024-11-21 ENCOUNTER — TELEPHONE (OUTPATIENT)
Dept: ENDOSCOPY | Facility: HOSPITAL | Age: 69
End: 2024-11-21
Payer: MEDICARE

## 2024-11-21 ENCOUNTER — OFFICE VISIT (OUTPATIENT)
Dept: OPHTHALMOLOGY | Facility: CLINIC | Age: 69
End: 2024-11-21
Payer: MEDICARE

## 2024-11-21 DIAGNOSIS — H40.043 STEROID RESPONDER, BILATERAL: ICD-10-CM

## 2024-11-21 DIAGNOSIS — H54.7 VISUAL LOSS: ICD-10-CM

## 2024-11-21 DIAGNOSIS — H40.1131 PRIMARY OPEN ANGLE GLAUCOMA OF BOTH EYES, MILD STAGE: ICD-10-CM

## 2024-11-21 DIAGNOSIS — E11.9 TYPE 2 DIABETES MELLITUS WITHOUT OPHTHALMIC MANIFESTATIONS: ICD-10-CM

## 2024-11-21 DIAGNOSIS — H46.9 INFLAMMATORY OPTIC NEUROPATHY: Primary | ICD-10-CM

## 2024-11-21 DIAGNOSIS — H25.13 NUCLEAR SCLEROSIS, BILATERAL: ICD-10-CM

## 2024-11-21 DIAGNOSIS — M31.5 GIANT CELL ARTERITIS WITH POLYMYALGIA RHEUMATICA: ICD-10-CM

## 2024-11-21 PROCEDURE — 99214 OFFICE O/P EST MOD 30 MIN: CPT | Mod: HCNC,S$GLB,, | Performed by: OPHTHALMOLOGY

## 2024-11-21 PROCEDURE — 3061F NEG MICROALBUMINURIA REV: CPT | Mod: HCNC,CPTII,S$GLB, | Performed by: OPHTHALMOLOGY

## 2024-11-21 PROCEDURE — 1101F PT FALLS ASSESS-DOCD LE1/YR: CPT | Mod: HCNC,CPTII,S$GLB, | Performed by: OPHTHALMOLOGY

## 2024-11-21 PROCEDURE — 3288F FALL RISK ASSESSMENT DOCD: CPT | Mod: HCNC,CPTII,S$GLB, | Performed by: OPHTHALMOLOGY

## 2024-11-21 PROCEDURE — 1126F AMNT PAIN NOTED NONE PRSNT: CPT | Mod: HCNC,CPTII,S$GLB, | Performed by: OPHTHALMOLOGY

## 2024-11-21 PROCEDURE — 1160F RVW MEDS BY RX/DR IN RCRD: CPT | Mod: HCNC,CPTII,S$GLB, | Performed by: OPHTHALMOLOGY

## 2024-11-21 PROCEDURE — 1159F MED LIST DOCD IN RCRD: CPT | Mod: HCNC,CPTII,S$GLB, | Performed by: OPHTHALMOLOGY

## 2024-11-21 PROCEDURE — 3066F NEPHROPATHY DOC TX: CPT | Mod: HCNC,CPTII,S$GLB, | Performed by: OPHTHALMOLOGY

## 2024-11-21 PROCEDURE — 99999 PR PBB SHADOW E&M-EST. PATIENT-LVL III: CPT | Mod: PBBFAC,HCNC,, | Performed by: OPHTHALMOLOGY

## 2024-11-21 PROCEDURE — 3044F HG A1C LEVEL LT 7.0%: CPT | Mod: HCNC,CPTII,S$GLB, | Performed by: OPHTHALMOLOGY

## 2024-11-21 PROCEDURE — 1157F ADVNC CARE PLAN IN RCRD: CPT | Mod: HCNC,CPTII,S$GLB, | Performed by: OPHTHALMOLOGY

## 2024-11-21 PROCEDURE — 4010F ACE/ARB THERAPY RXD/TAKEN: CPT | Mod: HCNC,CPTII,S$GLB, | Performed by: OPHTHALMOLOGY

## 2024-11-21 NOTE — Clinical Note
Unfortunately - patient lost even more vision during recent flare up in 9/2024. Her vision is now hand motion at face in the right eye and NLP (no light perception) in the left. There is nothing that can be done to recover any vision. And there is very little to try and preserve at this time.

## 2024-11-21 NOTE — TELEPHONE ENCOUNTER
Contacted the patient to schedule an endoscopy procedure(s) 11/21/24. The patient did not answer the call and left a voice message requesting a call back.

## 2024-11-21 NOTE — TELEPHONE ENCOUNTER
----- Message from Alok Dykes MD sent at 2024  4:34 PM CST -----  Procedure: Colonoscopy    Diagnosis: Surveillance colonoscopy - Hx of multiple colon adenomas    Procedure Timin-12 weeks    [place] Hospital [place]  [facility]    Additional Scheduling Information: Repeat colonoscopy in 3-4 months for surveillance of multiple colon adenomas polyps and history of advanced colon adenoma with balloon assisted colonoscope with Dr. Darryn Woodruff due to looping to clear cecum.  Request 60 minute scheduling slot..    Prep Specifications:Extended/Constipation prep, please draw CBC with platelets and INR Td case    Is the patient taking a GLP-1 Agonist:no    Have you attached a patient to this message: yes

## 2024-11-22 ENCOUNTER — TELEPHONE (OUTPATIENT)
Dept: ENDOSCOPY | Facility: HOSPITAL | Age: 69
End: 2024-11-22
Payer: MEDICARE

## 2024-11-22 NOTE — TELEPHONE ENCOUNTER
Spoke to pt about scheduling follow up colonoscopy with  as requested by . pt is not due until feb for procedure. Informed pt Feb schedule was not available yet. She verbalized understanding. Order from  inbasket deferred for 3 weeks to allow time to have Feb schedule open.

## 2024-11-22 NOTE — TELEPHONE ENCOUNTER
----- Message from Alok Dykes MD sent at 2024  4:34 PM CST -----  Procedure: Colonoscopy     Diagnosis: Surveillance colonoscopy - Hx of multiple colon adenomas     Procedure Timin-12 weeks     Location: Hospital Based ([facility] [place] [facility],  [facility], [facility])     Additional Scheduling Information: Repeat colonoscopy in 3-4 months for surveillance of multiple colon adenomas polyps and history of advanced colon adenoma with balloon assisted colonoscope with Dr. Darryn Woodruff due to looping to clear cecum.  Request 60 minute scheduling slot..     Prep Specifications:Extended/Constipation prep, please draw CBC with platelets and INR Td case     Is the patient taking a GLP-1 Agonist:no     Have you attached a patient to this message: yes

## 2024-11-23 ENCOUNTER — TELEPHONE (OUTPATIENT)
Dept: ENDOSCOPY | Facility: HOSPITAL | Age: 69
End: 2024-11-23
Payer: MEDICARE

## 2024-11-23 NOTE — TELEPHONE ENCOUNTER
Telephone patient to schedule screening colonoscopy procedure with no answer. Contact left to call back to schedule procedure

## 2024-11-26 ENCOUNTER — OFFICE VISIT (OUTPATIENT)
Dept: FAMILY MEDICINE | Facility: CLINIC | Age: 69
End: 2024-11-26
Payer: MEDICARE

## 2024-11-26 VITALS
DIASTOLIC BLOOD PRESSURE: 70 MMHG | BODY MASS INDEX: 46.78 KG/M2 | WEIGHT: 264 LBS | HEART RATE: 83 BPM | SYSTOLIC BLOOD PRESSURE: 130 MMHG | HEIGHT: 63 IN | OXYGEN SATURATION: 96 %

## 2024-11-26 DIAGNOSIS — I10 ESSENTIAL HYPERTENSION: ICD-10-CM

## 2024-11-26 DIAGNOSIS — J06.9 UPPER RESPIRATORY TRACT INFECTION, UNSPECIFIED TYPE: ICD-10-CM

## 2024-11-26 DIAGNOSIS — F43.21 ADJUSTMENT DISORDER WITH DEPRESSED MOOD: ICD-10-CM

## 2024-11-26 DIAGNOSIS — Z63.6 CAREGIVER STRESS: ICD-10-CM

## 2024-11-26 DIAGNOSIS — M31.5 GIANT CELL ARTERITIS WITH POLYMYALGIA RHEUMATICA: Primary | Chronic | ICD-10-CM

## 2024-11-26 DIAGNOSIS — F43.21 GRIEF REACTION: ICD-10-CM

## 2024-11-26 DIAGNOSIS — R05.1 ACUTE COUGH: ICD-10-CM

## 2024-11-26 LAB
CTP QC/QA: YES
FLUAV AG NPH QL: NEGATIVE
FLUBV AG NPH QL: NEGATIVE
S PYO RRNA THROAT QL PROBE: NEGATIVE
SARS-COV-2 RDRP RESP QL NAA+PROBE: NEGATIVE

## 2024-11-26 PROCEDURE — 99999 PR PBB SHADOW E&M-EST. PATIENT-LVL IV: CPT | Mod: PBBFAC,HCNC,, | Performed by: FAMILY MEDICINE

## 2024-11-26 PROCEDURE — 1101F PT FALLS ASSESS-DOCD LE1/YR: CPT | Mod: HCNC,CPTII,S$GLB, | Performed by: FAMILY MEDICINE

## 2024-11-26 PROCEDURE — 99215 OFFICE O/P EST HI 40 MIN: CPT | Mod: HCNC,S$GLB,, | Performed by: FAMILY MEDICINE

## 2024-11-26 PROCEDURE — 3044F HG A1C LEVEL LT 7.0%: CPT | Mod: HCNC,CPTII,S$GLB, | Performed by: FAMILY MEDICINE

## 2024-11-26 PROCEDURE — 4010F ACE/ARB THERAPY RXD/TAKEN: CPT | Mod: HCNC,CPTII,S$GLB, | Performed by: FAMILY MEDICINE

## 2024-11-26 PROCEDURE — 87635 SARS-COV-2 COVID-19 AMP PRB: CPT | Mod: QW,HCNC,S$GLB, | Performed by: FAMILY MEDICINE

## 2024-11-26 PROCEDURE — 3066F NEPHROPATHY DOC TX: CPT | Mod: HCNC,CPTII,S$GLB, | Performed by: FAMILY MEDICINE

## 2024-11-26 PROCEDURE — 3061F NEG MICROALBUMINURIA REV: CPT | Mod: HCNC,CPTII,S$GLB, | Performed by: FAMILY MEDICINE

## 2024-11-26 PROCEDURE — 1160F RVW MEDS BY RX/DR IN RCRD: CPT | Mod: HCNC,CPTII,S$GLB, | Performed by: FAMILY MEDICINE

## 2024-11-26 PROCEDURE — 3075F SYST BP GE 130 - 139MM HG: CPT | Mod: HCNC,CPTII,S$GLB, | Performed by: FAMILY MEDICINE

## 2024-11-26 PROCEDURE — 3008F BODY MASS INDEX DOCD: CPT | Mod: HCNC,CPTII,S$GLB, | Performed by: FAMILY MEDICINE

## 2024-11-26 PROCEDURE — 3288F FALL RISK ASSESSMENT DOCD: CPT | Mod: HCNC,CPTII,S$GLB, | Performed by: FAMILY MEDICINE

## 2024-11-26 PROCEDURE — 1157F ADVNC CARE PLAN IN RCRD: CPT | Mod: HCNC,CPTII,S$GLB, | Performed by: FAMILY MEDICINE

## 2024-11-26 PROCEDURE — 1159F MED LIST DOCD IN RCRD: CPT | Mod: HCNC,CPTII,S$GLB, | Performed by: FAMILY MEDICINE

## 2024-11-26 PROCEDURE — 87804 INFLUENZA ASSAY W/OPTIC: CPT | Mod: QW,HCNC,S$GLB, | Performed by: FAMILY MEDICINE

## 2024-11-26 PROCEDURE — 87880 STREP A ASSAY W/OPTIC: CPT | Mod: QW,HCNC,S$GLB, | Performed by: FAMILY MEDICINE

## 2024-11-26 PROCEDURE — 3078F DIAST BP <80 MM HG: CPT | Mod: HCNC,CPTII,S$GLB, | Performed by: FAMILY MEDICINE

## 2024-11-26 RX ORDER — LEVOCETIRIZINE DIHYDROCHLORIDE 5 MG/1
5 TABLET, FILM COATED ORAL NIGHTLY
Qty: 30 TABLET | Refills: 0 | Status: SHIPPED | OUTPATIENT
Start: 2024-11-26 | End: 2024-12-26

## 2024-11-26 RX ORDER — BENZONATATE 100 MG/1
100 CAPSULE ORAL 3 TIMES DAILY PRN
Qty: 30 CAPSULE | Refills: 0 | Status: SHIPPED | OUTPATIENT
Start: 2024-11-26 | End: 2024-12-26

## 2024-11-26 RX ORDER — TRAMADOL HYDROCHLORIDE 50 MG/1
50 TABLET ORAL EVERY 12 HOURS PRN
Qty: 60 TABLET | Refills: 0 | Status: SHIPPED | OUTPATIENT
Start: 2024-11-26

## 2024-11-26 RX ORDER — AMLODIPINE AND BENAZEPRIL HYDROCHLORIDE 10; 40 MG/1; MG/1
1 CAPSULE ORAL DAILY
Qty: 90 CAPSULE | Refills: 3 | Status: SHIPPED | OUTPATIENT
Start: 2024-11-26

## 2024-11-26 RX ORDER — CLONAZEPAM 1 MG/1
1 TABLET ORAL DAILY PRN
Qty: 60 TABLET | Refills: 0 | Status: SHIPPED | OUTPATIENT
Start: 2024-11-26

## 2024-11-26 RX ORDER — GUAIFENESIN 600 MG/1
600 TABLET, EXTENDED RELEASE ORAL 2 TIMES DAILY
Qty: 20 TABLET | Refills: 0 | Status: SHIPPED | OUTPATIENT
Start: 2024-11-26 | End: 2024-12-06

## 2024-11-26 SDOH — SOCIAL DETERMINANTS OF HEALTH (SDOH): DEPENDENT RELATIVE NEEDING CARE AT HOME: Z63.6

## 2024-11-26 NOTE — PROGRESS NOTES
Subjective     Patient ID: Genesis Ugalde is a 68 y.o. female.    Chief Complaint: Sore Throat, Cough, Nasal Congestion, Otalgia, and Medication Refill    68 years old female came to the clinic sore throat cough and congestion for the last week.  Patient is requesting a medicine to help her with stress and anxiety.  She was taking clonazepam.  Patient was also diagnosed with polymyalgia rheumatica using tramadol only as needed.  Blood pressure today was stable.  Patient with good compliance with medical regimen.  Patient with a BMI of 48 currently trying to lose weight.    Sore Throat   This is a new problem. The current episode started in the past 7 days. The problem has been unchanged. There has been no fever. The pain is mild. Associated symptoms include coughing and ear pain. Pertinent negatives include no abdominal pain, congestion, diarrhea, ear discharge, shortness of breath or vomiting. She has tried nothing for the symptoms. The treatment provided no relief.   Cough  This is a new problem. The current episode started in the past 7 days. The problem has been unchanged. The problem occurs every few minutes. Associated symptoms include ear pain and a sore throat. Pertinent negatives include no chest pain, fever, postnasal drip, rhinorrhea, shortness of breath or wheezing. Nothing aggravates the symptoms. She has tried nothing for the symptoms. The treatment provided no relief.   Otalgia   This is a new problem. The current episode started in the past 7 days. The problem has been unchanged. There has been no fever. Associated symptoms include coughing and a sore throat. Pertinent negatives include no abdominal pain, diarrhea, ear discharge, hearing loss, rhinorrhea or vomiting. She has tried nothing for the symptoms. The treatment provided mild relief.   Medication Refill  Associated symptoms include coughing and a sore throat. Pertinent negatives include no abdominal pain, arthralgias, chest pain, congestion,  fatigue, fever, nausea or vomiting. The treatment provided no relief.     Review of Systems   Constitutional: Negative.  Negative for activity change, fatigue and fever.   HENT:  Positive for ear pain and sore throat. Negative for nasal congestion, dental problem, ear discharge, hearing loss, postnasal drip, rhinorrhea and voice change.    Eyes: Negative.  Negative for pain, discharge, itching and visual disturbance.   Respiratory:  Positive for cough. Negative for chest tightness, shortness of breath and wheezing.    Cardiovascular:  Negative for chest pain and palpitations.   Gastrointestinal: Negative.  Negative for abdominal pain, blood in stool, diarrhea, nausea and vomiting.   Genitourinary: Negative.  Negative for difficulty urinating, dyspareunia, dysuria, hematuria, menstrual problem, pelvic pain, urgency, vaginal bleeding, vaginal discharge and vaginal pain.   Musculoskeletal: Negative.  Negative for arthralgias and gait problem.   Integumentary:  Negative for wound.   Neurological: Negative.  Negative for dizziness and seizures.   Hematological:  Negative for adenopathy. Does not bruise/bleed easily.   Psychiatric/Behavioral: Negative.  Negative for behavioral problems, decreased concentration, sleep disturbance and suicidal ideas. The patient is not nervous/anxious.           Objective     Physical Exam  Constitutional:       General: She is not in acute distress.     Appearance: She is well-developed. She is not diaphoretic.   HENT:      Head: Normocephalic and atraumatic.      Right Ear: External ear normal.      Left Ear: External ear normal.      Nose: Nose normal.      Mouth/Throat:      Pharynx: No oropharyngeal exudate.   Eyes:      General: No scleral icterus.        Right eye: No discharge.         Left eye: No discharge.      Conjunctiva/sclera: Conjunctivae normal.      Pupils: Pupils are equal, round, and reactive to light.   Neck:      Thyroid: No thyromegaly.      Vascular: No JVD.       Trachea: No tracheal deviation.   Cardiovascular:      Rate and Rhythm: Normal rate and regular rhythm.      Heart sounds: Normal heart sounds. No murmur heard.     No friction rub. No gallop.   Pulmonary:      Effort: Pulmonary effort is normal. No respiratory distress.      Breath sounds: Normal breath sounds. No stridor. No wheezing or rales.   Chest:      Chest wall: No tenderness.   Abdominal:      General: Bowel sounds are normal. There is no distension.      Palpations: Abdomen is soft. There is no mass.      Tenderness: There is no abdominal tenderness. There is no guarding or rebound.   Musculoskeletal:         General: No tenderness. Normal range of motion.      Cervical back: Normal range of motion and neck supple.   Lymphadenopathy:      Cervical: No cervical adenopathy.   Skin:     General: Skin is warm and dry.      Coloration: Skin is not pale.      Findings: No erythema or rash.   Neurological:      Mental Status: She is alert and oriented to person, place, and time.      Cranial Nerves: No cranial nerve deficit.      Motor: No abnormal muscle tone.      Coordination: Coordination normal.      Deep Tendon Reflexes: Reflexes are normal and symmetric.   Psychiatric:         Behavior: Behavior normal.         Thought Content: Thought content normal.         Judgment: Judgment normal.            Assessment and Plan     1. Giant cell arteritis with polymyalgia rheumatica  Overview:       The MRA of the chest is normal, no evidence of large vessel vasculitis.[facility]          PACS Images     Show images for MRA Chest   Reviewed By Tonya Waldrop MD on 3/20/2018 14:21   Patient Result Comments     Viewed by Genesis Ugalde V on 3/20/2018  5:33 PM   Written by Forrest Waldrop MD on 3/20/2018  2:20 PM   The MRA of the chest is normal, no evidence of large vessel vasculitis.[facility]   External Result Report     External Result Report   Narrative     EXAMINATION:  MRA CHEST    CLINICAL HISTORY:  Aortic dz,  non-traumatic, known or suspectPersonal history of other diseases of the circulatory system    TECHNIQUE:  Multiplanar, multisequence imaging was performed through the chest to evaluate the aorta.  This exam is performed prior to and following the administration of 14 mL of intravenous Gadavist.    COMPARISON:  CT chest 02/19/2016    FINDINGS:  The thoracic aorta is normal in caliber, contour, and course.  There are 3 branch vessels from the aortic arch.  No evidence of stenosis or aneurysm.  No evidence of abnormal wall thickening or surrounding inflammatory change.    The visualized lungs are without significant abnormality.  The heart is within normal limits.    The visualized upper abdominal structures demonstrate hepatomegaly.    The osseous structures are without evidence for acute fracture.  Subcutaneous tissues are unremarkable.   Impression       Unremarkable MRA chest exam.    Electronically signed by resident: Jeannie Hdz  Date: 03/20/2018  Time: 10:36    Electronically signed by: Herrera Rodriguez MD  Date: 03/20/2018  Time: 11:16    Encounter     View Encounter          Signed by Resident   The CTA head and neck shows no evidence of active vasculitis. There is moderate atherosclerotic disease right carotid. Continue aspirin 325mg daily and pravastatin. There is incidentally noted thryoid nodule and some changes in the lung bases.Will need thyroid ultrasound and CT chest.Cristina will schedule. [facility]          PACS Images      Show images for CTA Head and Neck (xpd)   Reviewed By Tonya Miller MD on 6/28/2018 21:58   Result Notes for CTA Head and Neck (xpd)     Notes recorded by Forrest Waldrop MD on 6/30/2018 at 9:33 PM CDT  The CTA head and neck shows no evidence of active vasculitis. There is moderate atherosclerotic disease right carotid. Continue aspirin 325mg daily and pravastatin. There is incidentally noted thryoid nodule and some changes in the lung bases.Will need thyroid  ultrasound and CT chest.Cristina will schedule. RJQ   CTA Head and Neck (xpd)   Order: [id number]   Status:  Final result   Visible to patient:  Yes (Patient Portal)   Next appt:  10/11/2018 at 07:30 PM in Sleep Medicine (LAB, SLEEP [place])   Dx:  Vasculitis, CNS; Giant cell arteritis...   Details     Reading Physician Reading Date Result Priority   Diomedes Morris, DO 6/28/2018       Narrative     EXAMINATION:  CTA HEAD AND NECK (XPD)    CLINICAL HISTORY:  Acute visual deficit;Vasculitis, CNS;h/o giant cell arteritis, r/o cervical vessel involvement;Arteritis, unspecified    TECHNIQUE:  5 mm axial images of the head pre and post contrast with 0.625 mm axial CTA images of the head neck postcontrast.  Coronal and sagittal MPR and MIP imaging was performed 100 ml of Omnipaque 350 contrast was injected intravenously    COMPARISON:  CT head 03/27/2016    FINDINGS:  CT head with and  without contrast: There is no evidence for acute intracranial hemorrhage or sulcal effacement.  The ventricles are normal in size and configuration without evidence for hydrocephalus.  There is no midline shift or mass effect.  Allowing for CT technique there is no abnormal parenchymal enhancement.  The visualized paranasal sinuses and mastoid air cells are clear.    CTA head:    Anterior circulation: The bilateral distal cervical, petrous, cavernous, and supraclinoid segments of the ICAs are patent without significant focal stenosis or aneurysm.    The anterior middle cerebral arteries are patent without focal stenosis or aneurysm.    Posterior circulation: The distal vertebral arteries, basilar artery and posterior cerebral arteries are patent without focal stenosis or aneurysm.    CTA neck: Origin the right brachiocephalic, left common carotid and left subclavian arteries from the arch are within normal limits..    The right vertebral artery origin and proximal course is obscured by venous contamination.  The left vertebral artery origin is  within normal limits.  Remaining vertebral arteries are patent throughout their course without focal stenosis...    Right carotid: The right common carotid artery, carotid bifurcation and extracranial portions of the internal carotid artery are patent there is atherosclerotic plaquing in the right carotid bifurcation and proximal ICA with less than 50% proximal ICA stenosis by NASCET criteria..    Left carotid: The left common carotid artery, carotid bifurcation and extracranial portions of the internal carotid arteries are patent without significant focal stenosis.    There is medialization the carotid arteries in the retropharyngeal soft tissues slightly greater on the right.    Pharynx/larynx: Evaluation limited by scatter artifact from dental metal and motion allowing for limitation the nasopharynx, oropharynx, hypopharynx larynx and proximal trachea are within normal limits allowing for motion limitation    Oral cavity    Glands: Bilateral parotid and submandibular glands are within normal limits. Subcentimeter hypodense nodule medial aspect of the mid right thyroid measuring 5 mm overall indeterminate.    No evidence for adenopathy throughout the neck by size criteria.    There is grade 1 anterolisthesis of C3 on C4 and C4 on C5.  There is no evidence for acute fracture cervical spine..  Ill-defined ground-glass patchy opacification in the visualized lungs which may represent in part atelectasis underlying small airway disease to be considered.      Impression       CTA head: Unremarkable CTA of the head specifically without evidence for proximal significant stenosis or occlusion.    CTA neck: Atherosclerotic plaquing right carotid bifurcation and proximal ICA with less than 50% proximal ICA stenosis by NASCET criteria..    CT head: No evidence for acute intracranial hemorrhage or definite abnormal parenchymal enhancement.    Please see above for additional details.      Electronically signed by: Diomedes Morris  DO  Date: 06/28/2018  Time: 09:33             Last Resulted: 06/28/18 09:33 Order Details View Encounter Lab and Collection Details Routing Result History            Patient Result Comments     Viewed by Genesis gUalde on 7/1/2018  7:47 PM   Written by Forrest Waldrop MD on 6/30/2018  9:33 PM   The CTA head and neck shows no evidence of active vasculitis. There is moderate atherosclerotic disease right carotid. Continue aspirin 325mg daily and pravastatin. There is incidentally noted thryoid nodule and some changes in the lung bases.Will need thyroid ultrasound and CT chest.Cristina will schedule. RJQ   Notes recorded by Forrest Waldrop MD on 6/21/2018 at 2:52 PM CDT  The temporal artery ultrasound is normal RJQ          PACS Images      Show images for US Temporal Artery Bilateral   Reviewed By Tonya Waldrop MD on 6/21/2018 14:52   Result Notes for US Temporal Artery Bilateral     Notes recorded by Forrest Waldrop MD on 6/21/2018 at 2:52 PM CDT  The temporal artery ultrasound is normal RJ   US Temporal Artery Bilateral   Order: [id number]   Status:  Final result   Visible to patient:  Yes (Patient Portal)   Next appt:  10/11/2018 at 07:30 PM in Sleep Medicine (LAB, SLEEP [place])   Dx:  Giant cell arteritis with polymyalgia...   Details     Reading Physician Reading Date Result Priority   Herrera Rodriguez MD 6/21/2018    Steven Rodrigues MD 6/21/2018       Narrative     EXAMINATION:  US TEMPORAL ARTERY BILATERAL    CLINICAL HISTORY:  Encounter for follow-up examination after completed treatment for conditions other than malignant neoplasm    TECHNIQUE:  Limited grayscale and color flow ultrasound evaluation of the bilateral temporal arteries.    COMPARISON:  None    FINDINGS:  Bilateral temporal arteries are normal in diameter without focal stenosis or dilatation.  No hypoechoic wall thickening (halo sign).      Impression       Normal temporal arteries without evidence to suggest temporal  arteritis.    Electronically signed by resident: Steven Rodrigues  Date: 06/21/2018  Time: 14:27    Electronically signed by: Herrera Rodriguez MD  Date: 06/21/2018  Time: 14:37              The MRA of the chest is normal, no evidence of large vessel vasculitis.[facility]          PACS Images      Show images for MRA Chest   Reviewed By List     Forrest Waldrop MD on 3/20/2018 14:21   Result Notes for MRA Chest     Notes recorded by Forrest Waldrop MD on 3/20/2018 at 2:20 PM CDT  The MRA of the chest is normal, no evidence of large vessel vasculitis.[facility]   MRA Chest   Order: [id number]   Status:  Final result   Visible to patient:  Yes (Patient Portal)   Next appt:  10/11/2018 at 07:30 PM in Sleep Medicine (LAB, SLEEP [place])   Dx:  Aortic disease; Personal history of o...   Details     Reading Physician Reading Date Result Priority   Jeannie Hdz MD 3/20/2018    Herrera Rodriguez MD 3/20/2018       Narrative     EXAMINATION:  MRA CHEST    CLINICAL HISTORY:  Aortic dz, non-traumatic, known or suspectPersonal history of other diseases of the circulatory system    TECHNIQUE:  Multiplanar, multisequence imaging was performed through the chest to evaluate the aorta.  This exam is performed prior to and following the administration of 14 mL of intravenous Gadavist.    COMPARISON:  CT chest 02/19/2016    FINDINGS:  The thoracic aorta is normal in caliber, contour, and course.  There are 3 branch vessels from the aortic arch.  No evidence of stenosis or aneurysm.  No evidence of abnormal wall thickening or surrounding inflammatory change.    The visualized lungs are without significant abnormality.  The heart is within normal limits.    The visualized upper abdominal structures demonstrate hepatomegaly.    The osseous structures are without evidence for acute fracture.  Subcutaneous tissues are unremarkable.      Impression       Unremarkable MRA chest exam.    Electronically signed by resident: Jeannie Hdz  Date: 03/20/2018  Time:  10:36    Electronically signed by: Herrera Rodriguez MD  Date: 03/20/2018  Time: 11:16                 Reading Physician Reading Date Result Priority   Delvin Martinez MD 3/7/2019       Narrative     EXAMINATION:  MRI BRAIN W WO CONTRAST    CLINICAL HISTORY:  Headache, temporal arteritis suspected;superficial vessel imaging, Dr. Aggarwal protocol;Headache.  Normal ESR and CRP.    TECHNIQUE:  3D time-of-flight noncontrast MRA of the intracranial vasculature.  Contrast enhanced MRA of the upper cervical and cranial vasculature.  MPR and MIP reconstructions.  10 min following the administration of 10 mL of Gadavist intravenous contrast, vessel wall imaging of the superficial cranial arteries performed via high-resolution thin section T1 fat sat images.    COMPARISON:  No direct priors.  Correlation is made with a temporal artery ultrasound dated 02/22/2019.    FINDINGS:  The visualized internal carotid arteries are normal in course and caliber.  The vertebrobasilar system is unremarkable.  The ACAs, MCAs and PCAs appear within normal limits.  No high-grade stenosis, major branch occlusion, or intracranial aneurysm identified.    The frontal and parietal branches of the superficial temporal artery and the occipital arteries assessed bilaterally.  Vessel wall imaging demonstrates no  significant mural thickening or enhancement to specifically indicate active vascular inflammation.      Impression       No compelling MR evidence of vascular inflammation involving the superficial cranial arteries.      Electronically signed by: Delvin Martinez MD  Date: 03/07/2019  Time: 14:08     Notes recorded by Forrest Waldrop MD on 2/22/2019 at 5:04 PM CST  The temporal artery ultrasound is also negative. RJQ          PACS Images for [employer] Viewer      Show images for US Temporal Artery Bilateral   PACS Images for [facility] [place] Viewer (Includes [facility] and [facility] Images)      Show images for US Temporal  Artery Bilateral   All Reviewers List     Forrest Waldrop MD on 2/22/2019 17:09   Result Notes for US Temporal Artery Bilateral     Notes recorded by Forrest Waldrop MD on 2/22/2019 at 5:09 PM CST  The temporal artery ultrasound is negative. Would also suggest new MRI brain technique to visualize the temporal arteries. Cristina malcolm schedule. BELIA  ------    Notes recorded by Forrest Waldrop MD on 2/22/2019 at 5:04 PM CST  The temporal artery ultrasound is also negative. RJQ   US Temporal Artery Bilateral   Order: [id number]   Status:  Final result   Visible to patient:  Yes (Patient Portal) Next appt:  07/05/2019 at 10:30 AM in Gastroenterology (Alok Dykes MD) Dx:  GCA (giant cell arteritis); Transient...   Details     Reading Physician Reading Date Result Priority   Herrera Rodriguez MD 2/22/2019    Larissa Collins MD 2/22/2019       Narrative     EXAMINATION:  US TEMPORAL ARTERY BILATERAL    CLINICAL HISTORY:  Transient visual loss, bilateral    TECHNIQUE:  Grayscale and Doppler were used to evaluate the temporal arteries for temporal arteritis.    COMPARISON:  None    FINDINGS:  No ultrasound evidence of temporal arteries wall thickening to suggest temporal arteritis.  The temporal arteries are patent.      Impression       No ultrasound evidence of temporal arteritis.    Electronically signed by resident: Larissa Collins  Date: 02/22/2019  Time: 16:43    Electronically signed by: Herrera Rodriguez MD  Date: 02/22/2019  Time: 16:48             Last Resulted: 02/22/19 16:48 Order Details View Encounter Lab and Collection Details Routing Result History - Result Edited            Patient Result Comments     Viewed by Genesis Ugalde on 2/22/2019  8:00 PM   Written by Forrest Waldrop MD on 2/22/2019  5:09 PM   The temporal artery ultrasound is also negative. RJROGER   The temporal artery ultrasound is negative. Would also suggest new MRI brain technique to visualize the temporal arteries. Cristina malcolm  schedule. RJQ   External Result Report     External Result Report   Narrative     EXAMINATION:  US TEMPORAL ARTERY BILATERAL    CLINICAL HISTORY:  Transient visual loss, bilateral    TECHNIQUE:  Grayscale and Doppler were used to evaluate the temporal arteries for temporal arteritis.    COMPARISON:  None    FINDINGS:  No ultrasound evidence of temporal arteries wall thickening to suggest temporal arteritis.  The temporal arteries are patent.   Impression       No ultrasound evidence of temporal arteritis.    Electronically signed by resident: Larissa Collins  Date: 02/22/2019  Time: 16:43    Electronically signed by: Herrera Rodriguez MD  Date: 02/22/2019  Time: 16:48    Encounter             The MRA of the chest shows no evidence of large vessel vasculitis as might be seen with giant cell/temporal arteritis. The hyperintense area in the liver is unchanged from 2016 and you will be seeing Dr. Blackwell in Hepatology to review as well in April. [facility]          PACS Images for [employer] Viewer      Show images for MRA Chest   PACS Images for [facility] [place] Viewer (Includes [facility] and [facility] Images)      Show images for MRA Chest   All Reviewers List     Forrest Waldrop MD on 2/22/2019 17:03   Result Notes for MRA Chest     Notes recorded by Forrest Waldrop MD on 2/22/2019 at 5:03 PM CST  The MRA of the chest shows no evidence of large vessel vasculitis as might be seen with giant cell/temporal arteritis. The hyperintense area in the liver is unchanged from 2016 and you will be seeing Dr. Blackwell in Hepatology to review as well in April. [facility]   MRA Chest   Order: [id number]   Status:  Final result   Visible to patient:  Yes (Patient Portal) Next appt:  07/05/2019 at 10:30 AM in Gastroenterology (Alok Dykes MD) Dx:  GCA (giant cell arteritis); Aortic di...   Details     Reading Physician Reading Date Result Priority   Marco Thornton MD 2/22/2019       Narrative      EXAMINATION:  MRA CHEST    CLINICAL HISTORY:  Aortic dz, non-traumatic, known or suspect;Encounter for follow-up examination after completed treatment for conditions other than malignant neoplasm    TECHNIQUE:  Multiplanar, multisequence imaging was performed through the chest to evaluate the aorta.  This exam is performed prior to and following the administration of 10 mL of intravenous gadobutrol.    COMPARISON:  MRA chest 03/20/2018.  MRI abdomen 04/18/2016.    FINDINGS:  There is a left-sided aortic arch with 3 branch vessels.  The thoracic aorta tapers normally without atherosclerotic calcification.  No dissection.    Heart is normal in size.  Pulmonary arteries distribute normally.    No axillary or mediastinal lymph node enlargement.  Hilar contours are unremarkable.    Esophagus is normal in caliber and course.    Visualized lung parenchyma demonstrates no gross abnormalities noting limited evaluation due to technique.    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  Subcentimeter left renal cysts identified.  Remaining visualized upper abdominal structures demonstrate no significant abnormalities.    Subcutaneous soft tissues are within normal limits.  There is fatty atrophy of the right rectus abdominus muscle.    No marrow signal abnormality to suggest an infiltrative process.      Impression       1. No aortic dissection.  2. 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      Electronically signed by: Marco Thornton MD  Date: 02/22/2019  Time: 16:49                    Orders:  -     traMADoL (ULTRAM) 50 mg tablet; Take 1 tablet (50 mg total) by mouth every 12 (twelve) hours as needed for Pain.  Dispense: 60 tablet; Refill: 0    2.  Adjustment disorder with depressed mood  -     clonazePAM (KLONOPIN) 1 MG tablet; Take 1 tablet (1 mg total) by mouth daily as needed for Anxiety.  Dispense: 60 tablet; Refill: 0    3. Grief reaction  -     clonazePAM (KLONOPIN) 1 MG tablet; Take 1 tablet (1 mg total) by mouth daily as needed for Anxiety.  Dispense: 60 tablet; Refill: 0    4. Caregiver stress  -     clonazePAM (KLONOPIN) 1 MG tablet; Take 1 tablet (1 mg total) by mouth daily as needed for Anxiety.  Dispense: 60 tablet; Refill: 0    5. Essential hypertension  -     amLODIPine-benazepriL (LOTREL) 10-40 mg per capsule; Take 1 capsule by mouth once daily.  Dispense: 90 capsule; Refill: 3    6. Acute cough  -     POCT COVID-19 Rapid Screening  -     POCT Influenza A/B  -     POCT Rapid Strep A    Continue monitoring blood pressure at home, low sodium diet.    Diet and physical activity to promote weight loss.     I spent a total of 41 minutes on the day of the visit.This includes face to face time and non-face to face time preparing to see the patient (eg, review of tests), obtaining and/or reviewing separately obtained history, documenting clinical information in the electronic or other health record, independently interpreting results and communicating results to the patient/family/caregiver, or care coordinator.          Follow up if symptoms worsen or fail to improve.

## 2024-12-03 NOTE — PROGRESS NOTES
"Subjective:      Patient ID: Genesis Ugalde is a 68 y.o. 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     Chief Complaint: Disease Management    HPI    Initial hx:  - Pt was initially diagnosed with GCA dating back to 2004, had a recurrence 2012 and has been on tocilizumab since. Has had multiple recurrences leading to increased prednisone doses.   - Previous treatments: CYC  - Current treatments: Weekly tocilizumab, was on 30mg prednisone prior to presentation  - Had last been seen by Dr. Waldrop 7/29/2024, at that point had ordered more imaging and increased steroids after having an increase in headaches and temple pain. Had imaging done at that time (MRA of head as well as bilateral temporal artery US) that had shown evidence of bilateral temporal arteritis.     Interval hx:  - Pt had acutely elevated LFTs - held a dose of actemra. Ended up back at baseline on repeat, received tocilizumab infusion 11/20  - Pt feels that her vision is better    GCA - vision is better - can make out shapes now where previously she only had light - dark differentiation. Has had some headaches. Describes them as "sledgehammer headaches" - very brief when they happen. Most often on the right side. Gets some form of a headache every day. Last five weeks has been more pressure and mostly on the left side that lasts up to an hour and goes away with pain medicine. Has been getting tenderness on both sides of her head that has not resolved since leaving the hospital. Feels the tenderness has never gone away but they are less severe. Does not notice a correlation with going down on prednisone. Does get fatigue with chewing as well still.     Has some swelling on the back of her head and upper back. Not painful to palpation.     Pt has been taking prednisone at 10mg for multiple weeks. ~4 weeks. "     For her vision she saw Dr. Ro who stated that she shouldn't be getting high dose steroids for her vision anymore as she will likely go blind either way.     Shoulder - did see orthopedics - feels better. She injected it and has another injection scheduled in a week.     Having pain in knees and hips. Shoulders and hips get stiff. Knees it's mostly pain and swelling. Worst in the mornings.    Review of Systems   Constitutional:  Negative for fever and unexpected weight change.   HENT:  Positive for trouble swallowing. Negative for mouth sores.    Eyes:  Positive for redness.   Respiratory:  Positive for shortness of breath. Negative for cough.    Cardiovascular:  Positive for chest pain.   Gastrointestinal:  Negative for constipation and diarrhea.   Genitourinary:  Positive for dysuria. Negative for genital sores.   Skin:  Negative for rash.   Neurological:  Positive for headaches.   Hematological:  Does not bruise/bleed easily.        Objective:   /84   Pulse 93   Wt 117.1 kg (258 lb 2.5 oz)   BMI 45.73 kg/m²   Physical Exam   Constitutional: She is oriented to person, place, and time. No distress.   HENT:   Head: Normocephalic and atraumatic.   Head: Bilateral temporal artery scalp tenderness, pulses normal  Cardiovascular: Normal rate, regular rhythm and normal heart sounds.   Pulmonary/Chest: Effort normal and breath sounds normal. No respiratory distress.   Abdominal: Soft.   Musculoskeletal:         General: No tenderness. Normal range of motion.      Comments: Still with shoulder pain (Improved from prior) but without any other MSK tenderness   Neurological: She is alert and oriented to person, place, and time.   Skin: Skin is warm and dry. No rash noted.   Psychiatric: Her behavior is normal. Judgment and thought content normal.        7/13/2021 9/15/2023   Tender (MILLER-28) 0 / 28  26 / 28    Swollen (MILLER-28) 0 / 28  0 / 28    Provider Global -- --   Patient Global 50 / 100 60 / 100   ESR  -- --   CRP 0.7 mg/L --   MILLER-28 (ESR) -- --   MILLER-28 (CRP) 1.85 (Remission) --   CDAI Score -- --        Assessment:   Genesis Ugalde is a 68 y.o. 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     No diagnosis found.    GCA  Pt was initially diagnosed with GCA in 2004 and has been followed by Dr. Waldrop. She had been well managed on subq tocilizumab until she was hospitalized 9/6 after having some vision changes and signs of active disease on both US and MRA. She was re-pulsed and started on tocilizumab infusions.   Diagnosis/Important Hx: Imaging+, no biopsy  Relevant serologies: Inflammatory markers have been normal  Previous Therapy: Tocilizumab injections  Current Therapy: Tocilizumab infusions, prednisone down to 10mg  Imaging/Procedures:   12/2024   Temporal US - No specific sonographic evidence of temporal arteritis, noting the right temporal artery remains somewhat small in caliber. Resolution of previously identified halo sign.   9/2024   MRA - w/o compelling evidence of active arteritis  8/2024   MRA: Abnormal mural enhancement associated with the superficial temporal arteries greater on the left concerning for active arteritis which corresponds to abnormality seen on ultrasound   7/2024   Temporal US: Right temporal artery: Right temporal artery is small in caliber with positive halo sign.  Peak systolic velocity of 18 cm/sec.  Normal waveform. Right axillary artery: Vessel wall appears thickened with positive halo sign.  Peak systolic velocity of 115 cm/sec.  Normal waveform. Left temporal artery: Left temporal artery is small in caliber with positive halo sign.  Peak systolic velocity of 20 cm/sec.  Normal waveform. Left axillary artery: Vessel wall appears thickened with positive halo sign.  Peak systolic velocity of 112 cm/sec.  Normal waveform.  Other notes:  Had issue with liver (hx of FLD) w/ concern for continuation of tocilizumab, this ended up resolving back to her baseline  PLAN  - Increased steroid back to 60mg w/ symptoms concerning for smoldering GCA  - Will f/u in one week to assess whether increase helped with symptoms   Can determine taper at that time   If prolonged, re-start bactrim  - Temporal artery US  - Large vessel studies - MRA C/A/P  - F/u in 6 weeks    Elevated LFTs  Hx of fatty liver disease followed by Ms. Fitzgerald. Had a brief elevation of enzymes into the 100s that has since improved back to baseline.   PLAN  - Can continue tocilizumab  - Will follow another CMP prior to next dose    Right Shoulder pain - improved  Pt has been very bothered by pain in her right shoulder. She stated she has a hx of arthritis and was hoping for an injection today. She is still taking high dose steroids for the GCA as above but does not feel it is helping.  Diagnosis/Important Hx: Hx of injections helping - has some mild DJD in shoulder as well as some cervical radiculopathy/tendonitis.  Imaging/Procedures:   10/2024   XR shoulder - Cortical hypertrophic change greater tuberosity, acromion with anterolateral spur formation, encroaches subacromial space. Limited DJD glenohumeral joint, under 2 cm size oval calcification posterosuperior humeral head, calcific bursitis change. Mild DJD superior AC joint.    XR neck - Some straightening of usual lordotic curve suggesting the presence of neck muscle spasm. Calcified plaque right carotid bifurcation region. Airway normal. Minor levoscoliosis cervicothoracic junction, healed fracture deformity left mid clavicle. Asymmetrical facet arthropathy left C4-C6 and C2-C4 levels. Mild moderate DJD disc spondylosis C5-C7. Mild DJD at 2 C1-C2. Limited primary anterior subluxation at C4-C5.   Other notes:   PLAN  - Continue management with ortho    Rheumatology Health Maintenance  Vaccinations: COVID once below 20mg of  prednisone  Medication monitoring:    Pred - brief 60, may need to restart bactrim   Tocilizumab   Osteoporosis:    DEXA - DEXA ordered   Treatment -       Problem List Items Addressed This Visit    None      This patient encounter was staffed and the plan was formulated with rheumatology attending Dr. Waldrop.    Alyx Gupta MD  Rheumatology Fellow, PGY-4

## 2024-12-04 ENCOUNTER — HOSPITAL ENCOUNTER (OUTPATIENT)
Dept: RADIOLOGY | Facility: HOSPITAL | Age: 69
Discharge: HOME | End: 2024-12-04
Attending: STUDENT IN AN ORGANIZED HEALTH CARE EDUCATION/TRAINING PROGRAM
Payer: MEDICARE

## 2024-12-04 ENCOUNTER — OFFICE VISIT (OUTPATIENT)
Dept: RHEUMATOLOGY | Facility: CLINIC | Age: 69
End: 2024-12-04
Payer: MEDICARE

## 2024-12-04 VITALS
BODY MASS INDEX: 45.73 KG/M2 | WEIGHT: 258.19 LBS | HEART RATE: 93 BPM | DIASTOLIC BLOOD PRESSURE: 84 MMHG | SYSTOLIC BLOOD PRESSURE: 136 MMHG

## 2024-12-04 DIAGNOSIS — M31.6 GCA (GIANT CELL ARTERITIS): Primary | ICD-10-CM

## 2024-12-04 DIAGNOSIS — I77.89 OTHER SPECIFIED DISORDERS OF ARTERIES AND ARTERIOLES: ICD-10-CM

## 2024-12-04 DIAGNOSIS — M31.6 GCA (GIANT CELL ARTERITIS): ICD-10-CM

## 2024-12-04 DIAGNOSIS — Z79.52 CURRENT USE OF STEROID MEDICATION: ICD-10-CM

## 2024-12-04 DIAGNOSIS — I77.6 ARTERITIS, UNSPECIFIED: ICD-10-CM

## 2024-12-04 DIAGNOSIS — Z09 ENCOUNTER FOR FOLLOW-UP EXAMINATION AFTER COMPLETED TREATMENT FOR CONDITIONS OTHER THAN MALIGNANT NEOPLASM: ICD-10-CM

## 2024-12-04 PROCEDURE — 3288F FALL RISK ASSESSMENT DOCD: CPT | Mod: HCNC,CPTII,GC,S$GLB | Performed by: INTERNAL MEDICINE

## 2024-12-04 PROCEDURE — 99214 OFFICE O/P EST MOD 30 MIN: CPT | Mod: HCNC,GC,S$GLB, | Performed by: INTERNAL MEDICINE

## 2024-12-04 PROCEDURE — 3044F HG A1C LEVEL LT 7.0%: CPT | Mod: HCNC,CPTII,GC,S$GLB | Performed by: INTERNAL MEDICINE

## 2024-12-04 PROCEDURE — 3061F NEG MICROALBUMINURIA REV: CPT | Mod: HCNC,CPTII,GC,S$GLB | Performed by: INTERNAL MEDICINE

## 2024-12-04 PROCEDURE — 1125F AMNT PAIN NOTED PAIN PRSNT: CPT | Mod: HCNC,CPTII,GC,S$GLB | Performed by: INTERNAL MEDICINE

## 2024-12-04 PROCEDURE — 3066F NEPHROPATHY DOC TX: CPT | Mod: HCNC,CPTII,GC,S$GLB | Performed by: INTERNAL MEDICINE

## 2024-12-04 PROCEDURE — 3079F DIAST BP 80-89 MM HG: CPT | Mod: HCNC,CPTII,GC,S$GLB | Performed by: INTERNAL MEDICINE

## 2024-12-04 PROCEDURE — 4010F ACE/ARB THERAPY RXD/TAKEN: CPT | Mod: HCNC,CPTII,GC,S$GLB | Performed by: INTERNAL MEDICINE

## 2024-12-04 PROCEDURE — 93880 EXTRACRANIAL BILAT STUDY: CPT | Mod: TC,HCNC

## 2024-12-04 PROCEDURE — 99999 PR PBB SHADOW E&M-EST. PATIENT-LVL IV: CPT | Mod: PBBFAC,HCNC,GC, | Performed by: STUDENT IN AN ORGANIZED HEALTH CARE EDUCATION/TRAINING PROGRAM

## 2024-12-04 PROCEDURE — 3008F BODY MASS INDEX DOCD: CPT | Mod: HCNC,CPTII,GC,S$GLB | Performed by: INTERNAL MEDICINE

## 2024-12-04 PROCEDURE — 1157F ADVNC CARE PLAN IN RCRD: CPT | Mod: HCNC,CPTII,GC,S$GLB | Performed by: INTERNAL MEDICINE

## 2024-12-04 PROCEDURE — 3075F SYST BP GE 130 - 139MM HG: CPT | Mod: HCNC,CPTII,GC,S$GLB | Performed by: INTERNAL MEDICINE

## 2024-12-04 PROCEDURE — 1101F PT FALLS ASSESS-DOCD LE1/YR: CPT | Mod: HCNC,CPTII,GC,S$GLB | Performed by: INTERNAL MEDICINE

## 2024-12-04 PROCEDURE — 1159F MED LIST DOCD IN RCRD: CPT | Mod: HCNC,CPTII,GC,S$GLB | Performed by: INTERNAL MEDICINE

## 2024-12-04 RX ORDER — PREDNISONE 20 MG/1
60 TABLET ORAL DAILY
Qty: 30 TABLET | Refills: 0 | Status: SHIPPED | OUTPATIENT
Start: 2024-12-04

## 2024-12-04 NOTE — PATIENT INSTRUCTIONS
It was nice to see you today!    [  ] Labs today  [  ] Increase steroid to 60mg daily and message us about how you're feeling in one week  [  ] Temporal artery ultrasound  [  ] Bone density scan    We will see you in 6 weeks    Alyx Gupta MD  Rheumatology Fellow, PGY-4

## 2024-12-04 NOTE — PROGRESS NOTES
11/27/2024     1:25 PM   Rapid3 Question Responses and Scores   MDHAQ Score 1.5   Psychologic Score 5.5   Pain Score 3   When you awakened in the morning OVER THE LAST WEEK, did you feel stiff? Yes   If Yes, please indicate the number of hours until you are as limber as you will be for the day 1   Fatigue Score 3   Global Health Score 5   RAPID3 Score 4.33   .  Answers submitted by the patient for this visit:  Rheumatology Questionnaire (Submitted on 11/27/2024)  fever: No  eye redness: No  mouth sores: No  headaches: Yes  shortness of breath: No  chest pain: No  trouble swallowing: Yes  diarrhea: No  constipation: No  unexpected weight change: No  genital sore: No  dysuria: No  During the last 3 days, have you had a skin rash?: No  Bruises or bleeds easily: No  cough: Yes

## 2024-12-04 NOTE — PROGRESS NOTES
I have personally reviewed the history, confirmed exam findings, and discussed assessment and plan with fellow.     Has tapered prednisone from 40mg to current dose of 10mg daily since [facility] 10/2/24. Overall headaches occurring daily but less severe and shorter in duration  Located in left and now right temporalis area  + jaw claudication  PMR symptoms may be somewhat worse with prednisone taper      EXAMINATION:  XR SHOULDER COMPLETE 2 OR MORE VIEWS RIGHT     CLINICAL HISTORY:  Pain in right shoulder     TECHNIQUE:  Two or three views of the right shoulder were performed.     COMPARISON:  Two thousand seven     FINDINGS:  Cortical hypertrophic change greater tuberosity, acromion with anterolateral spur formation, encroaches subacromial space.  Limited DJD glenohumeral joint, under 2 cm size oval calcification posterosuperior humeral head, calcific bursitis change.  Mild DJD superior AC joint.     Impression:     No fracture dislocation.  Additional findings above.        Electronically signed by:Rolando Li MD  Date:                                            10/02/2024  Time:                                           16:42      EXAMINATION:  XR CERVICAL SPINE AP LAT WITH FLEX EXTEN     CLINICAL HISTORY:  Disorder of kidney and ureter, unspecified     TECHNIQUE:  Three views of the cervical spine plus flexion and extension views were performed.     COMPARISON:  Cervical spine radiograph 10/02/2024 and MRI cervical spine 02/23/2011.     FINDINGS:  Straightening of the normal cervical lordosis.  Subtle retrolisthesis C4 of about 2 mm in extension view relative to the neutral and flexion view.     The vertebral body heights are well maintained.     Moderate disc space narrowing C5-6 and C6-C7.     Small anterior osteophyte C6 through C7.     No fracture, no osseous lesions.     The prevertebral soft tissues appear normal.     Calcification projecting over the region of the right carotid bifurcation often seen in  the setting of calcified carotid plaque.  The remainder of the soft tissues are unremarkable.     Remote healed fracture with deformity of the left midclavicle.     Impression:     Spondylosis of the cervical spine.     Electronically signed by resident: Therese Boo  Date:                                            10/17/2024  Time:                                           16:03     Electronically signed by:Macrina Flores MD  Date:                                            10/17/2024  Time:                                           16:27      ASSESSMENT:    GCA with ongoing symptoms   s/p pulse Solu-Medrol in [facility], then prednisone 60mg daily, now tapered to 10mg daily. Also received tocilizumab 6mg/kg IV in hospital in place of her sc weekly tocilizumab and most recent dose 11/20/24  right cervical radiculopathy numbness and tingling right upper arm, reproduced by all neck ROM  Right rotator cuff tendinopathy and likely tear with AROM limited to 50 degrees abduction with painful arc, + Neer + Ross-Tay and weakness with resisted abduction  Increased transaminases ?related to tocilizumab superimposed on NAFLD.cirrhosis  T2 DM worsened with increased prednisone dose        CBC, CMP, ESR, CRP today  Increase prednisone to 60mg daily for next week, then she will message us with status and will then adjust dose  TA US  May consider repeat MRA head small vessel study as well  Due for MRA chest/abd/pelvis for re-evaluate for LVV  Ref to PT for right cervical radiculopathy and right rotator cuff tendinitis, Tear  F/u with Michelle Hermosillo in [facility] for right cervical radiculopathy  F/u Dr. Calix in  Sports Medicine for right rotator cuff tendinitis, tear   tocilizumab 6mg/kg IV monthly  next dose scheduled for 12/18/24  Monthly cbc and cmp prior to each tocilizumab dose  RTC 6-8 wks with standing labs

## 2024-12-11 ENCOUNTER — TELEPHONE (OUTPATIENT)
Dept: ENDOSCOPY | Facility: HOSPITAL | Age: 69
End: 2024-12-11
Payer: MEDICARE

## 2024-12-11 DIAGNOSIS — D12.6 COLON ADENOMAS: ICD-10-CM

## 2024-12-11 DIAGNOSIS — K74.60 HEPATIC CIRRHOSIS, UNSPECIFIED HEPATIC CIRRHOSIS TYPE, UNSPECIFIED WHETHER ASCITES PRESENT: Primary | ICD-10-CM

## 2024-12-11 DIAGNOSIS — Z12.11 SPECIAL SCREENING FOR MALIGNANT NEOPLASMS, COLON: Primary | ICD-10-CM

## 2024-12-11 DIAGNOSIS — Z12.11 ENCOUNTER FOR COLONOSCOPY DUE TO HISTORY OF ADENOMATOUS COLONIC POLYPS: ICD-10-CM

## 2024-12-11 DIAGNOSIS — Z86.0101 ENCOUNTER FOR COLONOSCOPY DUE TO HISTORY OF ADENOMATOUS COLONIC POLYPS: ICD-10-CM

## 2024-12-11 RX ORDER — SODIUM, POTASSIUM,MAG SULFATES 17.5-3.13G
4 SOLUTION, RECONSTITUTED, ORAL ORAL DAILY
Qty: 1 KIT | Refills: 0 | Status: SHIPPED | OUTPATIENT
Start: 2024-12-11 | End: 2024-12-13

## 2024-12-11 NOTE — TELEPHONE ENCOUNTER
Daniella Thomson MA [facility] Endoscopy Schedulers  Caller: Unspecified (3 weeks ago)         Previous Messages       ----- Message -----  From: Mel Wilson MA  Sent: 2024  10:59 AM CST  To: Daniella Thomson MA      ----- Message -----  From: Alok Dykes MD  Sent: 2024   4:37 PM CST  To: Mel Wilson MA; Darryn Woodruff MD    Procedure: Colonoscopy    Diagnosis: Surveillance colonoscopy - Hx of multiple colon adenomas    Procedure Timin-12 weeks    Location: Hospital Based ([facility],  endo, [facility])    Additional Scheduling Information: Repeat colonoscopy in 3-4 months for surveillance of multiple colon adenomas polyps and history of advanced colon adenoma with balloon assisted colonoscope with Dr. Darryn Woodruff due to looping to clear cecum.  Request 60 minute scheduling slot..    Prep Specifications:Extended/Constipation prep, please draw CBC with platelets and INR Td case    Is the patient taking a GLP-1 Agonist:no    Have you attached a patient to this message: yes

## 2024-12-11 NOTE — TELEPHONE ENCOUNTER
Referral for procedure from Telephone call - direct access patient      Spoke to patient to schedule procedure(s) Colonoscopy       Physician to perform procedure(s) Dr. ADELINA Woodruff  Date of Procedure (s) 02/03/25  Arrival Time 10:00 AM  Time of Procedure(s) 11:00 AM   Location of Procedure(s) [place] 2nd Floor  Type of Rx Prep sent to patient: Suprep-extended  Instructions provided to patient via [facility]    Patient was informed on the following information and verbalized understanding. Screening questionnaire reviewed with patient and complete. If procedure requires anesthesia, a responsible adult needs to be present to accompany the patient home, patient cannot drive after receiving anesthesia. Appointment details are tentative, especially check-in time. Patient will receive a prep-op call 7 days prior to confirm check-in time for procedure. If applicable the patient should contact their pharmacy to verify Rx for procedure prep is ready for pick-up. Patient was advised to call the scheduling department at [phone] if pharmacy states no Rx is available. Patient was advised to call the endoscopy scheduling department if any questions or concerns arise.      [facility] Scheduling Department

## 2024-12-18 ENCOUNTER — INFUSION (OUTPATIENT)
Dept: INFECTIOUS DISEASES | Facility: HOSPITAL | Age: 69
End: 2024-12-18
Payer: MEDICARE

## 2024-12-18 VITALS
SYSTOLIC BLOOD PRESSURE: 143 MMHG | HEIGHT: 63 IN | RESPIRATION RATE: 19 BRPM | WEIGHT: 270.5 LBS | HEART RATE: 80 BPM | OXYGEN SATURATION: 95 % | BODY MASS INDEX: 47.93 KG/M2 | TEMPERATURE: 98 F | DIASTOLIC BLOOD PRESSURE: 63 MMHG

## 2024-12-18 DIAGNOSIS — H46.9 OPTIC NEURITIS: ICD-10-CM

## 2024-12-18 DIAGNOSIS — M31.5 GIANT CELL ARTERITIS WITH POLYMYALGIA RHEUMATICA: Primary | ICD-10-CM

## 2024-12-18 DIAGNOSIS — M81.8 OTHER OSTEOPOROSIS WITHOUT CURRENT PATHOLOGICAL FRACTURE: ICD-10-CM

## 2024-12-18 PROCEDURE — 25000003 PHARM REV CODE 250: Mod: HCNC | Performed by: INTERNAL MEDICINE

## 2024-12-18 PROCEDURE — 96375 TX/PRO/DX INJ NEW DRUG ADDON: CPT | Mod: HCNC

## 2024-12-18 PROCEDURE — 96365 THER/PROPH/DIAG IV INF INIT: CPT | Mod: HCNC

## 2024-12-18 PROCEDURE — 63600175 PHARM REV CODE 636 W HCPCS: Mod: HCNC | Performed by: INTERNAL MEDICINE

## 2024-12-18 RX ORDER — SODIUM CHLORIDE 0.9 % (FLUSH) 0.9 %
10 SYRINGE (ML) INJECTION
Status: DISCONTINUED | OUTPATIENT
Start: 2024-12-18 | End: 2024-12-18 | Stop reason: HOSPADM

## 2024-12-18 RX ORDER — DIPHENHYDRAMINE HYDROCHLORIDE 50 MG/ML
25 INJECTION INTRAMUSCULAR; INTRAVENOUS ONCE
Status: COMPLETED | OUTPATIENT
Start: 2024-12-18 | End: 2024-12-18

## 2024-12-18 RX ORDER — DIPHENHYDRAMINE HYDROCHLORIDE 50 MG/ML
25 INJECTION INTRAMUSCULAR; INTRAVENOUS ONCE
OUTPATIENT
Start: 2025-01-15

## 2024-12-18 RX ORDER — HEPARIN 100 UNIT/ML
500 SYRINGE INTRAVENOUS
OUTPATIENT
Start: 2025-01-15

## 2024-12-18 RX ORDER — SODIUM CHLORIDE 0.9 % (FLUSH) 0.9 %
10 SYRINGE (ML) INJECTION
OUTPATIENT
Start: 2025-01-15

## 2024-12-18 RX ORDER — EPINEPHRINE 0.3 MG/.3ML
0.3 INJECTION SUBCUTANEOUS ONCE AS NEEDED
OUTPATIENT
Start: 2025-01-15

## 2024-12-18 RX ORDER — DIPHENHYDRAMINE HYDROCHLORIDE 50 MG/ML
50 INJECTION INTRAMUSCULAR; INTRAVENOUS ONCE AS NEEDED
OUTPATIENT
Start: 2025-01-15

## 2024-12-18 RX ADMIN — SODIUM CHLORIDE: 9 INJECTION, SOLUTION INTRAVENOUS at 12:12

## 2024-12-18 RX ADMIN — TOCILIZUMAB 736 MG: 20 INJECTION, SOLUTION, CONCENTRATE INTRAVENOUS at 11:12

## 2024-12-18 RX ADMIN — DIPHENHYDRAMINE HYDROCHLORIDE 25 MG: 50 INJECTION INTRAMUSCULAR; INTRAVENOUS at 10:12

## 2024-12-18 NOTE — PROGRESS NOTES
Patient received Actemra infusion over 1 hour. Patient Pre med of Bendadryl 25 mg slow IVP, given 30 minutes prior to start of infusion.     Next appointment scheduled and given to patient.    Limited head-to-toe assessment due to privacy issues and visit reason though the opportunity was given for patient to express any concerns.

## 2024-12-20 ENCOUNTER — OFFICE VISIT (OUTPATIENT)
Dept: SPORTS MEDICINE | Facility: CLINIC | Age: 69
End: 2024-12-20
Payer: MEDICARE

## 2024-12-20 VITALS
HEIGHT: 63 IN | HEART RATE: 74 BPM | BODY MASS INDEX: 47.93 KG/M2 | WEIGHT: 270.5 LBS | DIASTOLIC BLOOD PRESSURE: 69 MMHG | SYSTOLIC BLOOD PRESSURE: 104 MMHG

## 2024-12-20 DIAGNOSIS — M19.011 ARTHROSIS OF RIGHT ACROMIOCLAVICULAR JOINT: ICD-10-CM

## 2024-12-20 DIAGNOSIS — G89.29 CHRONIC RIGHT SHOULDER PAIN: Primary | ICD-10-CM

## 2024-12-20 DIAGNOSIS — M75.31 CALCIFIC TENDINITIS OF RIGHT SHOULDER: ICD-10-CM

## 2024-12-20 DIAGNOSIS — M75.41 SUBACROMIAL IMPINGEMENT, RIGHT: ICD-10-CM

## 2024-12-20 DIAGNOSIS — M25.511 CHRONIC RIGHT SHOULDER PAIN: Primary | ICD-10-CM

## 2024-12-20 PROCEDURE — 99999 PR PBB SHADOW E&M-EST. PATIENT-LVL IV: CPT | Mod: PBBFAC,HCNC,, | Performed by: STUDENT IN AN ORGANIZED HEALTH CARE EDUCATION/TRAINING PROGRAM

## 2024-12-20 RX ORDER — TRIAMCINOLONE ACETONIDE 40 MG/ML
40 INJECTION, SUSPENSION INTRA-ARTICULAR; INTRAMUSCULAR
Status: DISCONTINUED | OUTPATIENT
Start: 2024-12-20 | End: 2024-12-20 | Stop reason: HOSPADM

## 2024-12-20 RX ADMIN — TRIAMCINOLONE ACETONIDE 40 MG: 40 INJECTION, SUSPENSION INTRA-ARTICULAR; INTRAMUSCULAR at 09:12

## 2024-12-20 NOTE — PROGRESS NOTES
CC: right shoulder pain    68 y.o. Female presents today for follow up evaluation of her right shoulder pain following CSI. Pt reports feeling improved since injection. Pt reports pain is 3/10 today. Pt notes continued issues with ROM. Pt reports catching in the shoulder. Pt reports intermittent numbness/tingling down right arm and into right hand/fingers.     Attempted treatments: glenohumeral CSI  Pain score: 3/10  History of trauma/injury: none since last visit  Affecting ADLs: yes     REVIEW OF SYSTEMS:   Constitution: Patient denies fever or chills.  Eyes: Patient denies eye pain or vision changes.  HEENT: Patient denies ear pain, sore throat, or nasal discharge.  CVS: Patient denies chest pain.  Lungs: Patient denies shortness of breath or cough.  Skin: Patient denies skin rash or itching.    Musculoskeletal: Patient denies recent falls. See HPI.  Psych: Patient denies any current anxiety or nervousness.    PAST MEDICAL HISTORY:   Past Medical History:   Diagnosis Date    Anxiety     Asthma     Blind left eye     can see silhouettes in right eye    Cataract     Depression     Diabetes mellitus     Disease of immune system     Giant cell arteritis     Glaucoma     Hypertension     Polymyalgia rheumatica     Uveitis        MEDICATIONS:     Current Outpatient Medications:     amLODIPine-benazepriL (LOTREL) 10-40 mg per capsule, Take 1 capsule by mouth once daily., Disp: 90 capsule, Rfl: 3    benzonatate (TESSALON) 100 MG capsule, Take 1 capsule (100 mg total) by mouth 3 (three) times daily as needed for Cough., Disp: 30 capsule, Rfl: 0    brimonidine 0.2% (ALPHAGAN) 0.2 % Drop, Place 1 drop into both eyes 3 (three) times daily., Disp: 10 mL, Rfl: 12    clonazePAM (KLONOPIN) 1 MG tablet, Take 1 tablet (1 mg total) by mouth daily as needed for Anxiety., Disp: 60 tablet, Rfl: 0    dorzolamide (TRUSOPT) 2 % ophthalmic solution, INSTILL 1 DROP INTO BOTH EYES 3 TIMES A DAY, Disp: 30 mL, Rfl: 4    ergocalciferol  (VITAMIN D2) 50,000 unit Cap, TAKE 1 CAPSULE (50,000 UNITS TOTAL) BY MOUTH EVERY 7 DAYS., Disp: 12 capsule, Rfl: 3    latanoprost 0.005 % ophthalmic solution, Place 1 drop into both eyes every evening., Disp: 7.5 mL, Rfl: 4    levocetirizine (XYZAL) 5 MG tablet, Take 1 tablet (5 mg total) by mouth every evening., Disp: 30 tablet, Rfl: 0    metoprolol succinate (TOPROL-XL) 100 MG 24 hr tablet, Take 1 tablet (100 mg total) by mouth once daily., Disp: 90 tablet, Rfl: 3    omeprazole (PRILOSEC) 40 MG capsule, Take 1 capsule (40 mg total) by mouth every 12 (twelve) hours. Take one pill every morning 45 minutes before breakfast, Disp: 180 capsule, Rfl: 3    pravastatin (PRAVACHOL) 40 MG tablet, Take 1 tablet (40 mg total) by mouth every evening., Disp: 90 tablet, Rfl: 3    predniSONE (DELTASONE) 20 MG tablet, Take 3 tablets (60 mg total) by mouth once daily., Disp: 30 tablet, Rfl: 0    predniSONE (DELTASONE) 5 MG tablet, Take 7 tablets (35 mg total) by mouth once daily. After one week decrease to   6 tablets(30mg) daily x 1 wk, then decrease to 5 tablets  (25mg) daily x 1 wk,  then decrease to 4 tablets(20mg) daily x 1 wk, then decrease to 3 tablets( 15mg0 daily x 1 wk,  then decrease to 2.5 tablets(12.5mg )daily x 2 wks, then decrease to 2 tablets (10mg )daily and continue, Disp: 210 tablet, Rfl: 1    repaglinide (PRANDIN) 0.5 MG tablet, Take 1 tablet (0.5 mg total) by mouth 3 (three) times daily before meals., Disp: 270 tablet, Rfl: 3    sulfamethoxazole-trimethoprim 800-160mg (BACTRIM DS) 800-160 mg Tab, Take 1 tablet by mouth 3 (three) times a week. Please take this medicine on Mondays, Wednesdays and Fridays while on prednisone doses 20mg or greater a day., Disp: 30 tablet, Rfl: 1    tocilizumab (ACTEMRA) 162 mg/0.9 mL injection, Inject 0.9 mLs (162 mg total) into the skin every 7 days., Disp: 12 each, Rfl: 0    traMADoL (ULTRAM) 50 mg tablet, Take 1 tablet (50 mg total) by mouth every 12 (twelve) hours as needed for  "Pain., Disp: 60 tablet, Rfl: 0    ALLERGIES:   Review of patient's allergies indicates:   Allergen Reactions    Oxycontin [oxycodone] Hallucinations     Immobile     Percocet [oxycodone-acetaminophen] Shortness Of Breath    Percodan [oxycodone hcl-oxycodone-asa] Shortness Of Breath    Seroquel [quetiapine] Shortness Of Breath     Akathisia    Opioids - morphine analogues Other (See Comments)     Pt states it has no effect on her         PHYSICAL EXAMINATION:  /69   Pulse 74   Ht 5' 3" (1.6 m)   Wt 122.7 kg (270 lb 8.1 oz)   BMI 47.92 kg/m²   Vitals signs and nursing note have been reviewed.    General: In no acute distress, well developed, well nourished, no diaphoresis  Eyes: EOM full and smooth, no eye redness or discharge  HENT: normocephalic and atraumatic, neck supple, trachea midline, no nasal discharge  Cardiovascular: no LE edema  Lungs: respirations non-labored, no conversational dyspnea   Neuro: AAOx3, CN2-12 grossly intact  Skin: No rashes, warm and dry  Psychiatric: cooperative, pleasant, mood and affect appropriate for age    Right Shoulder:  INSPECTION / PALPATION:  -Deformity   -Ecchymosis   -Atrophy   -Sulcus sign   -No TTP over anatomy including clavicle, scapular spine  +TTP  over ACJ, acromion, , supraspinatus, infraspinatus, and bicipital groove     ROM:    Flex to 120° active   Abduct to 120° active   Int rot to T10    ASSESSMENT:      ICD-10-CM ICD-9-CM   1. Chronic right shoulder pain  M25.511 719.41    G89.29 338.29   2. Arthrosis of right acromioclavicular joint  M19.011 715.91   3. Calcific tendinitis of right shoulder  M75.31 726.11   4. Subacromial impingement, right  M75.41 726.19   5. BMI 45.0-49.9, adult  Z68.42 V85.42         PLAN:  Overall patient does feel improved since receiving right glenohumeral joint injection, but continues to have pain.  She has improved range of motion and strength as compared to last visit.  We will move forward with right acromioclavicular joint " injection with triamcinolone under ultrasound guidance at today's visit for more pain relief.  We will follow-up on or after 02/07/2025 for possible repeat right glenohumeral joint injection and re-evaluation.    Risks and benefits were discussed with patient prior to receiving injection.  Depending on injection type, risks include the possibility of infection, pain, disruptions in blood pressure and blood sugar, and cosmetic deformity at site of injection.    All questions were answered to the best of my ability and all concerns were addressed at this time.    Follow up in-person on or after 2/7/25, or sooner if need be.    This note is dictated using the M*Modal Fluency Direct word recognition program. There are word recognition mistakes that are occasionally missed on review.

## 2024-12-20 NOTE — PROCEDURES
Intermediate Joint Aspiration/Injection: R acromioclavicular    Date/Time: 12/20/2024 9:15 AM    Performed by: Kiley Calix MD  Authorized by: Kiley Calix MD    Consent Done?:  Yes (Verbal)  Indications:  Pain, diagnostic evaluation and arthritis  Site marked: The procedure site was marked    Timeout: Prior to procedure the correct patient, procedure, and site was verified      Location:  Shoulder  Site:  R acromioclavicular  Ultrasonic Guidance for needle placement: Yes (Ultrasound guidance used to avoid neurovascular injury and to increase acuracy.)  Images are saved and documented.    Needle size:  22 G  Approach: Superolateral   Medications:  40 mg triamcinolone acetonide 40 mg/mL (Ropivacaine 0.2% 0.5mL)  Patient tolerance:  Patient tolerated the procedure well with no immediate complications       Additional Comments: TECHNIQUE: Real time ultrasound examination of the right acromioclavicular joint(s) was performed with SonoSite Edge 2, 9-L MHz linear probe(s). Ultrasound guidance was used for needle localization. Images were saved and stored for documentation. Dynamic visualization of the needle was continuous throughout the procedures and maintained in good position.

## 2024-12-21 DIAGNOSIS — J06.9 UPPER RESPIRATORY TRACT INFECTION, UNSPECIFIED TYPE: ICD-10-CM

## 2024-12-21 NOTE — TELEPHONE ENCOUNTER
Refill Routing Note   Medication(s) are not appropriate for processing by [facility] for the following reason(s):        New or recently adjusted medication    [facility] action(s):  Defer     Requires labs : Yes             Appointments  past 12m or future 3m with PCP    Date Provider   Last Visit   11/26/2024 Ayad Shen MD   Next Visit   2/11/2025 Ayad Shen MD   ED visits in past 90 days: 0        Note composed:5:31 PM 12/21/2024

## 2024-12-21 NOTE — TELEPHONE ENCOUNTER
Care Due:                  Date            Visit Type   Department     Provider  --------------------------------------------------------------------------------                                EP -                              PRIMARY      [facility]  Last Visit: 11-      CARE ([facility])   NINA Shen                              EP -                              PRIMARY      [facility]  Next Visit: 02-      CARE ([facility])   NINA Shen                                                            Last  Test          Frequency    Reason                     Performed    Due Date  --------------------------------------------------------------------------------    HBA1C.......  6 months...  repaglinide..............  09- 03-    [facility] Embedded Care Due Messages. Reference number: [id number].   12/21/2024 7:31:15 AM CST

## 2024-12-24 RX ORDER — LEVOCETIRIZINE DIHYDROCHLORIDE 5 MG/1
5 TABLET, FILM COATED ORAL NIGHTLY
Qty: 90 TABLET | Refills: 0 | Status: SHIPPED | OUTPATIENT
Start: 2024-12-24

## 2024-12-27 ENCOUNTER — HOSPITAL ENCOUNTER (OUTPATIENT)
Dept: RADIOLOGY | Facility: CLINIC | Age: 69
Discharge: HOME | End: 2024-12-27
Attending: STUDENT IN AN ORGANIZED HEALTH CARE EDUCATION/TRAINING PROGRAM
Payer: MEDICARE

## 2024-12-27 DIAGNOSIS — Z79.52 CURRENT USE OF STEROID MEDICATION: ICD-10-CM

## 2024-12-27 DIAGNOSIS — M31.6 GCA (GIANT CELL ARTERITIS): ICD-10-CM

## 2024-12-27 PROCEDURE — 77080 DXA BONE DENSITY AXIAL: CPT | Mod: TC,HCNC

## 2024-12-27 PROCEDURE — 77080 DXA BONE DENSITY AXIAL: CPT | Mod: 26,HCNC,, | Performed by: INTERNAL MEDICINE

## 2024-12-30 ENCOUNTER — TELEPHONE (OUTPATIENT)
Dept: RHEUMATOLOGY | Facility: CLINIC | Age: 69
End: 2024-12-30
Payer: MEDICARE

## 2024-12-30 NOTE — TELEPHONE ENCOUNTER
Nature of Call: Pt states This is in reference to  predniSONE (DELTASONE) 20 MG tablet.     Wanted to tell the reaction to the medication No headaches, No hip pains, Swelling and tenderness at temples subsiding, less joint and muscle pain and Improved visual acuity is stable.       Please find out how much prednisone she is taking currently and let me know so I can adjust dose. Thank you BELIA

## 2025-01-02 DIAGNOSIS — E66.01 CLASS 3 SEVERE OBESITY DUE TO EXCESS CALORIES WITH SERIOUS COMORBIDITY AND BODY MASS INDEX (BMI) OF 40.0 TO 44.9 IN ADULT: ICD-10-CM

## 2025-01-02 DIAGNOSIS — E11.39 TYPE 2 DIABETES MELLITUS WITH OTHER OPHTHALMIC COMPLICATION, WITHOUT LONG-TERM CURRENT USE OF INSULIN: ICD-10-CM

## 2025-01-02 DIAGNOSIS — K74.60 LIVER CIRRHOSIS SECONDARY TO NASH: ICD-10-CM

## 2025-01-02 DIAGNOSIS — K75.81 LIVER CIRRHOSIS SECONDARY TO NASH: ICD-10-CM

## 2025-01-02 DIAGNOSIS — E66.813 CLASS 3 SEVERE OBESITY DUE TO EXCESS CALORIES WITH SERIOUS COMORBIDITY AND BODY MASS INDEX (BMI) OF 40.0 TO 44.9 IN ADULT: ICD-10-CM

## 2025-01-02 RX ORDER — TIRZEPATIDE 2.5 MG/.5ML
2.5 INJECTION, SOLUTION SUBCUTANEOUS
Qty: 4 PEN | Refills: 0 | OUTPATIENT
Start: 2025-01-02

## 2025-01-02 NOTE — TELEPHONE ENCOUNTER
No care due was identified.  [facility] Embedded Care Due Messages. Reference number: [id number].   1/02/2025 8:35:33 AM CST

## 2025-01-03 NOTE — TELEPHONE ENCOUNTER
Quick DC. Inappropriate Request    Refill Authorization Note   Genesis Ugalde  is requesting a refill authorization.  Brief Assessment and Rationale for Refill:  Quick Discontinue  Medication Therapy Plan:  discontinued on 9/9/2024 by Jeffery Yanes DO for the following reason: Stop Taking at Discharge    Medication Reconciliation Completed:  No      Comments:     Note composed:11:16 PM 01/02/2025

## 2025-01-08 ENCOUNTER — TELEPHONE (OUTPATIENT)
Dept: OPHTHALMOLOGY | Facility: CLINIC | Age: 70
End: 2025-01-08
Payer: MEDICARE

## 2025-01-08 ENCOUNTER — OFFICE VISIT (OUTPATIENT)
Dept: NEPHROLOGY | Facility: CLINIC | Age: 70
End: 2025-01-08
Payer: MEDICARE

## 2025-01-08 VITALS
OXYGEN SATURATION: 96 % | WEIGHT: 274.25 LBS | HEIGHT: 63 IN | BODY MASS INDEX: 48.59 KG/M2 | HEART RATE: 103 BPM | RESPIRATION RATE: 18 BRPM | DIASTOLIC BLOOD PRESSURE: 89 MMHG | SYSTOLIC BLOOD PRESSURE: 138 MMHG

## 2025-01-08 DIAGNOSIS — N28.9 FUNCTION KIDNEY DECREASED: ICD-10-CM

## 2025-01-08 DIAGNOSIS — I10 HYPERTENSION, UNSPECIFIED TYPE: Primary | ICD-10-CM

## 2025-01-08 DIAGNOSIS — Z87.442 HISTORY OF KIDNEY STONES: ICD-10-CM

## 2025-01-08 DIAGNOSIS — H40.1131 PRIMARY OPEN ANGLE GLAUCOMA OF BOTH EYES, MILD STAGE: ICD-10-CM

## 2025-01-08 PROCEDURE — 3008F BODY MASS INDEX DOCD: CPT | Mod: HCNC,CPTII,S$GLB, | Performed by: INTERNAL MEDICINE

## 2025-01-08 PROCEDURE — 99204 OFFICE O/P NEW MOD 45 MIN: CPT | Mod: HCNC,S$GLB,, | Performed by: INTERNAL MEDICINE

## 2025-01-08 PROCEDURE — 3075F SYST BP GE 130 - 139MM HG: CPT | Mod: HCNC,CPTII,S$GLB, | Performed by: INTERNAL MEDICINE

## 2025-01-08 PROCEDURE — 1157F ADVNC CARE PLAN IN RCRD: CPT | Mod: HCNC,CPTII,S$GLB, | Performed by: INTERNAL MEDICINE

## 2025-01-08 PROCEDURE — 3288F FALL RISK ASSESSMENT DOCD: CPT | Mod: HCNC,CPTII,S$GLB, | Performed by: INTERNAL MEDICINE

## 2025-01-08 PROCEDURE — 1101F PT FALLS ASSESS-DOCD LE1/YR: CPT | Mod: HCNC,CPTII,S$GLB, | Performed by: INTERNAL MEDICINE

## 2025-01-08 PROCEDURE — 1159F MED LIST DOCD IN RCRD: CPT | Mod: HCNC,CPTII,S$GLB, | Performed by: INTERNAL MEDICINE

## 2025-01-08 PROCEDURE — 1125F AMNT PAIN NOTED PAIN PRSNT: CPT | Mod: HCNC,CPTII,S$GLB, | Performed by: INTERNAL MEDICINE

## 2025-01-08 PROCEDURE — 99999 PR PBB SHADOW E&M-EST. PATIENT-LVL IV: CPT | Mod: PBBFAC,HCNC,, | Performed by: INTERNAL MEDICINE

## 2025-01-08 PROCEDURE — 3079F DIAST BP 80-89 MM HG: CPT | Mod: HCNC,CPTII,S$GLB, | Performed by: INTERNAL MEDICINE

## 2025-01-08 PROCEDURE — 3066F NEPHROPATHY DOC TX: CPT | Mod: HCNC,CPTII,S$GLB, | Performed by: INTERNAL MEDICINE

## 2025-01-08 NOTE — PROGRESS NOTES
REASON FOR CONSULT: elevated creatinine     REFERRING PHYSICIAN: Ayad Shen MD      HISTORY OF PRESENT ILLNESS: 69 y.o. female who is new to me  has a past medical history of Anxiety, Asthma, Blind left eye, Cataract, Depression, Diabetes mellitus, Disease of immune system, Giant cell arteritis, Glaucoma, Hypertension, Polymyalgia rheumatica, and Uveitis. patient was referred here for abnormal renal function   The patient denies taking NSAIDs or new antibiotics, recreational drugs, recent episode of dehydration, diarrhea, nausea or vomiting, acute illness, hospitalization or exposure to IV radiocontrast.     ROS:  General: negative for chills, or fatigue  ENT: No epistaxis or headaches  Hematological and Lymphatic: No bleeding problems or blood clots.  Endocrine: No skin changes or temperature intolerance  Respiratory: No cough, shortness of breath, or wheezing  Cardiovascular: No chest pain or dyspnea   Gastrointestinal: No abdominal pain, change in bowel habits  Genito-Urinary: No dysuria, trouble voiding, or hematuria  Musculoskeletal: ROS: negative for - joint pain, joint stiffness, joint swelling, muscle pain or muscular weakness  Neurological: No focal weakness, no numbness  Dermatological: No rash or ulcers.    PAST MEDICAL HISTORY:  Past Medical History:   Diagnosis Date    Anxiety     Asthma     Blind left eye     can see silhouettes in right eye    Cataract     Depression     Diabetes mellitus     Disease of immune system     Giant cell arteritis     Glaucoma     Hypertension     Polymyalgia rheumatica     Uveitis        PAST SURGICAL HISTORY:  Past Surgical History:   Procedure Laterality Date    ANKLE SURGERY      CHOLECYSTECTOMY      COLONOSCOPY N/A 9/28/2015    Procedure: COLONOSCOPY;  Surgeon: Alok Dykes MD;  Location: [address];  Service: Endoscopy;  Laterality: N/A;    COLONOSCOPY N/A 9/6/2019    Procedure: COLONOSCOPY;  Surgeon: Alok Dykes MD;  Location: [facility]  ENDO (4TH FLR);  Service: Endoscopy;  Laterality: N/A;    COLONOSCOPY N/A 11/14/2024    Procedure: COLONOSCOPY;  Surgeon: Alok Dykes MD;  [facility]  Service: Endoscopy;  Laterality: N/A;  8/30 BMI: 45.1  Cirrhosis labs prior  Request Jania only, Jania pt  Pt no longer taking Mounjaro  Referral: Bety Perdue MD  extended PEG  inst portal  LW  11/6 Precall completed, pt will come early to have labs drawn.  She states that she is blind but will have her     CYST REMOVAL      right lung    ESOPHAGOGASTRODUODENOSCOPY N/A 3/28/2023    Procedure: EGD (ESOPHAGOGASTRODUODENOSCOPY);  Surgeon: Bharat Franklin MD;  [facility]  Service: Endoscopy;  Laterality: N/A;  cirrhosis-labs prior, varices surveillance  gastroparesis-full liquid diet x3 days, clear liquid diet x1 day prior to procedure  instructions handed to pt and [facility]    HYSTERECTOMY         FAMILY HISTORY:   Family History   Problem Relation Name Age of Onset    Coronary artery disease Mother      Diabetes Mother      Hypertension Mother      Stroke Mother      Alzheimer's disease Mother      Alzheimer's disease Father      Coronary artery disease Father      Emphysema Father      Diabetes Father      Diabetes Sister x2     Kidney disease Sister x2     Coronary artery disease Sister x2     Heart attack Sister x2     Heart attack Brother x4     Coronary artery disease Brother x4     Colon polyps Brother x4         1 foot of colon removed    Stroke Brother x4     Cancer Brother x4 58        stomach    Stomach cancer Brother x4     AVM Brother x4     Hypertension Brother x3     Intellectual disability Brother x3     Hydrocephalus Brother x3     No Known Problems Son x1     Hydrocephalus Paternal Cousin      Glaucoma Neg Hx      Asthma Neg Hx      Celiac disease Neg Hx      Colon cancer Neg Hx      Esophageal cancer Neg Hx      Inflammatory bowel disease Neg Hx      Rectal cancer Neg Hx       Ulcerative colitis Neg Hx      Macular degeneration Neg Hx      Retinal detachment Neg Hx      Strabismus Neg Hx      Amblyopia Neg Hx      Blindness Neg Hx      Cataracts Neg Hx         SOCIAL HISTORY:  Social History     Socioeconomic History    Marital status: Single   Tobacco Use    Smoking status: Never     Passive exposure: Never    Smokeless tobacco: Never   Substance and Sexual Activity    Alcohol use: No     Alcohol/week: 0.0 standard drinks of alcohol    Drug use: Never    Sexual activity: Not Currently     Social Drivers of Health     Financial Resource Strain: Low Risk  (12/25/2024)    Overall Financial Resource Strain (CARDIA)     Difficulty of Paying Living Expenses: Not very hard   Food Insecurity: No Food Insecurity (12/25/2024)    Hunger Vital Sign     Worried About Running Out of Food in the Last Year: Never true     Ran Out of Food in the Last Year: Never true   Transportation Needs: No Transportation Needs (9/7/2024)    TRANSPORTATION NEEDS     Transportation : No   Physical Activity: Insufficiently Active (12/25/2024)    Exercise Vital Sign     Days of Exercise per Week: 1 day     Minutes of Exercise per Session: 20 min   Stress: Stress Concern Present (12/25/2024)    [employer] [place] [employer] - Occupational Stress Questionnaire     Feeling of Stress : Very much   Housing Stability: Low Risk  (12/25/2024)    Housing Stability Vital Sign     Unable to Pay for Housing in the Last Year: No     Homeless in the Last Year: No       ALLERGIES:  Review of patient's allergies indicates:   Allergen Reactions    Oxycontin [oxycodone] Hallucinations     Immobile     Percocet [oxycodone-acetaminophen] Shortness Of Breath    Percodan [oxycodone hcl-oxycodone-asa] Shortness Of Breath    Seroquel [quetiapine] Shortness Of Breath     Akathisia    Opioids - morphine analogues Other (See Comments)     Pt states it has no effect on her        MEDICATIONS:    Current Outpatient Medications:      amLODIPine-benazepriL (LOTREL) 10-40 mg per capsule, Take 1 capsule by mouth once daily., Disp: 90 capsule, Rfl: 3    brimonidine 0.2% (ALPHAGAN) 0.2 % Drop, Place 1 drop into both eyes 3 (three) times daily., Disp: 10 mL, Rfl: 12    clonazePAM (KLONOPIN) 1 MG tablet, Take 1 tablet (1 mg total) by mouth daily as needed for Anxiety., Disp: 60 tablet, Rfl: 0    dorzolamide (TRUSOPT) 2 % ophthalmic solution, INSTILL 1 DROP INTO BOTH EYES 3 TIMES A DAY, Disp: 30 mL, Rfl: 4    ergocalciferol (VITAMIN D2) 50,000 unit Cap, TAKE 1 CAPSULE (50,000 UNITS TOTAL) BY MOUTH EVERY 7 DAYS., Disp: 12 capsule, Rfl: 3    latanoprost 0.005 % ophthalmic solution, Place 1 drop into both eyes every evening., Disp: 7.5 mL, Rfl: 4    levocetirizine (XYZAL) 5 MG tablet, TAKE 1 TABLET BY MOUTH EVERY DAY IN THE EVENING, Disp: 90 tablet, Rfl: 0    metoprolol succinate (TOPROL-XL) 100 MG 24 hr tablet, Take 1 tablet (100 mg total) by mouth once daily., Disp: 90 tablet, Rfl: 3    omeprazole (PRILOSEC) 40 MG capsule, Take 1 capsule (40 mg total) by mouth every 12 (twelve) hours. Take one pill every morning 45 minutes before breakfast, Disp: 180 capsule, Rfl: 3    pravastatin (PRAVACHOL) 40 MG tablet, Take 1 tablet (40 mg total) by mouth every evening., Disp: 90 tablet, Rfl: 3    predniSONE (DELTASONE) 20 MG tablet, Take 3 tablets (60 mg total) by mouth once daily., Disp: 30 tablet, Rfl: 0    predniSONE (DELTASONE) 5 MG tablet, Take 7 tablets (35 mg total) by mouth once daily. After one week decrease to   6 tablets(30mg) daily x 1 wk, then decrease to 5 tablets  (25mg) daily x 1 wk,  then decrease to 4 tablets(20mg) daily x 1 wk, then decrease to 3 tablets( 15mg0 daily x 1 wk,  then decrease to 2.5 tablets(12.5mg )daily x 2 wks, then decrease to 2 tablets (10mg )daily and continue, Disp: 210 tablet, Rfl: 1    repaglinide (PRANDIN) 0.5 MG tablet, Take 1 tablet (0.5 mg total) by mouth 3 (three) times daily before meals., Disp: 270 tablet, Rfl: 3     sulfamethoxazole-trimethoprim 800-160mg (BACTRIM DS) 800-160 mg Tab, Take 1 tablet by mouth 3 (three) times a week. Please take this medicine on Mondays, Wednesdays and Fridays while on prednisone doses 20mg or greater a day., Disp: 30 tablet, Rfl: 1    tocilizumab (ACTEMRA) 162 mg/0.9 mL injection, Inject 0.9 mLs (162 mg total) into the skin every 7 days., Disp: 12 each, Rfl: 0    traMADoL (ULTRAM) 50 mg tablet, Take 1 tablet (50 mg total) by mouth every 12 (twelve) hours as needed for Pain., Disp: 60 tablet, Rfl: 0   Medication List with Changes/Refills   Current Medications    AMLODIPINE-BENAZEPRIL (LOTREL) 10-40 MG PER CAPSULE    Take 1 capsule by mouth once daily.    BRIMONIDINE 0.2% (ALPHAGAN) 0.2 % DROP    Place 1 drop into both eyes 3 (three) times daily.    CLONAZEPAM (KLONOPIN) 1 MG TABLET    Take 1 tablet (1 mg total) by mouth daily as needed for Anxiety.    DORZOLAMIDE (TRUSOPT) 2 % OPHTHALMIC SOLUTION    INSTILL 1 DROP INTO BOTH EYES 3 TIMES A DAY    ERGOCALCIFEROL (VITAMIN D2) 50,000 UNIT CAP    TAKE 1 CAPSULE (50,000 UNITS TOTAL) BY MOUTH EVERY 7 DAYS.    LATANOPROST 0.005 % OPHTHALMIC SOLUTION    Place 1 drop into both eyes every evening.    LEVOCETIRIZINE (XYZAL) 5 MG TABLET    TAKE 1 TABLET BY MOUTH EVERY DAY IN THE EVENING    METOPROLOL SUCCINATE (TOPROL-XL) 100 MG 24 HR TABLET    Take 1 tablet (100 mg total) by mouth once daily.    OMEPRAZOLE (PRILOSEC) 40 MG CAPSULE    Take 1 capsule (40 mg total) by mouth every 12 (twelve) hours. Take one pill every morning 45 minutes before breakfast    PRAVASTATIN (PRAVACHOL) 40 MG TABLET    Take 1 tablet (40 mg total) by mouth every evening.    PREDNISONE (DELTASONE) 20 MG TABLET    Take 3 tablets (60 mg total) by mouth once daily.    PREDNISONE (DELTASONE) 5 MG TABLET    Take 7 tablets (35 mg total) by mouth once daily. After one week decrease to   6 tablets(30mg) daily x 1 wk, then decrease to 5 tablets  (25mg) daily x 1 wk,  then decrease to 4  tablets(20mg) daily x 1 wk, then decrease to 3 tablets( 15mg0 daily x 1 wk,  then decrease to 2.5 tablets(12.5mg )daily x 2 wks, then decrease to 2 tablets (10mg )daily and continue    REPAGLINIDE (PRANDIN) 0.5 MG TABLET    Take 1 tablet (0.5 mg total) by mouth 3 (three) times daily before meals.    SULFAMETHOXAZOLE-TRIMETHOPRIM 800-160MG (BACTRIM DS) 800-160 MG TAB    Take 1 tablet by mouth 3 (three) times a week. Please take this medicine on Mondays, Wednesdays and Fridays while on prednisone doses 20mg or greater a day.    TOCILIZUMAB (ACTEMRA) 162 MG/0.9 ML INJECTION    Inject 0.9 mLs (162 mg total) into the skin every 7 days.    TRAMADOL (ULTRAM) 50 MG TABLET    Take 1 tablet (50 mg total) by mouth every 12 (twelve) hours as needed for Pain.        PHYSICAL EXAM:  There were no vitals taken for this visit.    General: No distress, No fever or chills  Head: Normocephalic,atraumatic  Eyes: conjunctivae/corneas clear. PERRL, EOM's intact.  Nose: Nares normal. Mucosa normal. No drainage or sinus tenderness.  Neck: No adenopathy,no carotid bruit,no JVD  Lungs:Clear to auscultation bilaterally, No Crackles  Heart: Regular rate and rhythm, no murmur, gallops or rubs  Abdomen: Soft, no tenderness, bowel sounds normal  Extremities: Atraumatic, no edema in LE  Skin: Turgor normal. No rashes or ulcers  Neurologic: No focal weakness, oriented.          LABS:  Lab Results   Component Value Date    CREATININE 0.8 12/04/2024       Prot/Creat Ratio, Urine   Date Value Ref Range Status   06/28/2024 0.07 0.00 - 0.20 Final   03/15/2024 0.05 0.00 - 0.20 Final   12/14/2023 0.07 0.00 - 0.20 Final       Lab Results   Component Value Date     12/04/2024    K 4.4 12/04/2024    CO2 25 12/04/2024       last PTH   Lab Results   Component Value Date    PTH 63.0 06/17/2019    CALCIUM 9.9 12/04/2024    CAION 1.23 06/17/2019    PHOS 2.8 09/19/2024       Lab Results   Component Value Date    HGB 14.3 12/04/2024        Lab Results    Component Value Date    HGBA1C 6.3 (H) 09/04/2024       Lab Results   Component Value Date    LDLCALC 78.0 03/22/2024           ASSESSMENT:    1- kidney function assessment with elevated creatinine   - pt had elevated scr on 10/2024 1.1 and GFR 55. Labs from 12/4/24 scr 0.8 and GFR >60    2- GCA managed with tocilizumab and steroids     3-HTN : controlled   -Continue current BP meds regimen    4- kidney stone :   Last stone was in 11/2023  Stressed on water intake         RTC as needed      Thanks for allowing me to participate in the care of this patient.     1:59 PM    MARK MAN MD  NEPHROLOGY ATTENDING

## 2025-01-08 NOTE — TELEPHONE ENCOUNTER
----- Message from Elieser sent at 1/8/2025 10:04 AM CST -----  Regarding: appointment access  Contact: [phone]  Pt has an appointment scheduled for 01/15/2025 pt has rachel appoiintment scheduled for 10 am pt will for pt [employer] Visual Fields test be pushed up to 10 am as well because pt has another  appointment scheduled.     Genesis Ugalde   [phone]    Please contact pt

## 2025-01-09 DIAGNOSIS — M31.6 GCA (GIANT CELL ARTERITIS): ICD-10-CM

## 2025-01-09 RX ORDER — BRIMONIDINE TARTRATE 2 MG/ML
1 SOLUTION/ DROPS OPHTHALMIC 3 TIMES DAILY
Qty: 10 ML | Refills: 12 | Status: SHIPPED | OUTPATIENT
Start: 2025-01-09

## 2025-01-09 RX ORDER — DORZOLAMIDE HCL 20 MG/ML
1 SOLUTION/ DROPS OPHTHALMIC 3 TIMES DAILY
Qty: 30 ML | Refills: 4 | Status: SHIPPED | OUTPATIENT
Start: 2025-01-09

## 2025-01-09 RX ORDER — LATANOPROST 50 UG/ML
1 SOLUTION/ DROPS OPHTHALMIC NIGHTLY
Qty: 7.5 ML | Refills: 4 | Status: SHIPPED | OUTPATIENT
Start: 2025-01-09

## 2025-01-13 RX ORDER — PREDNISONE 20 MG/1
40 TABLET ORAL DAILY
Qty: 60 TABLET | Refills: 1 | Status: SHIPPED | OUTPATIENT
Start: 2025-01-13

## 2025-01-14 NOTE — PROGRESS NOTES
Subjective:      Patient ID: Genesis Ugalde is a 69 y.o. 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     Chief Complaint: Disease Management    HPI    Initial hx:  - Pt was initially diagnosed with GCA dating back to 2004, had a recurrence 2012 and has been on tocilizumab since. Has had multiple recurrences leading to increased prednisone doses.   - Previous treatments: CYC  - Current treatments: Weekly tocilizumab, was on 30mg prednisone prior to presentation  - Had last been seen by Dr. Waldrop 7/29/2024, at that point had ordered more imaging and increased steroids after having an increase in headaches and temple pain. Had imaging done at that time (MRA of head as well as bilateral temporal artery US) that had shown evidence of bilateral temporal arteritis.   - Increased the steroid dose 12/4 when she was having symptoms of headaches, claudication. Had a good response to the increased steroid dose.     Interval hx:  - Feels much better  - Had a sebaceous cyst that she has been draining on her forehead    Feels like she's much improved. Headaches are so much better - just a little bit of pressure. Feels she can see a little less today.     Does not have scalp tenderness, is still having chewing symptoms. Does have some chest pains/trouble breathing - thinks it's GERD. Is taking the omeprazole. Is not taking the bactrim antibiotic. Did stay on 60mg daily of prednisone (did not see My Chart message from Dr. Waldrop).     Feels like there's the residual of a cold. Was coughing but is much improved.     Getting hungry more often and feeling a little shaky while on the higher dose of steroids.     Tolerating infusions very well. Does not have money for the MRI.     Having a little thigh pain.     Review of Systems   Constitutional:  Negative for fever and unexpected weight  "change.   HENT:  Positive for trouble swallowing. Negative for mouth sores.    Eyes:  Positive for redness.   Respiratory:  Positive for shortness of breath. Negative for cough.    Cardiovascular:  Positive for chest pain.   Gastrointestinal:  Negative for constipation and diarrhea.   Genitourinary:  Positive for dysuria. Negative for genital sores.   Skin:  Negative for rash.   Neurological:  Positive for headaches.   Hematological:  Does not bruise/bleed easily.        Objective:   /83   Pulse 87   Ht 5' 3" (1.6 m)   Wt 126.2 kg (278 lb 3.5 oz)   BMI 49.28 kg/m²   Physical Exam   Constitutional: She is oriented to person, place, and time. No distress.   HENT:   Head: Normocephalic and atraumatic.   Head: Pulses normal, no scalp tenderness  Cardiovascular: Normal rate, regular rhythm and normal heart sounds.   Pulmonary/Chest: Effort normal and breath sounds normal. No respiratory distress.   Abdominal: Soft.   Musculoskeletal:         General: No tenderness. Normal range of motion.      Comments: Still with shoulder pain (Improved from prior) but without any other MSK tenderness   Neurological: She is alert and oriented to person, place, and time.   Skin: Skin is warm and dry. No rash noted.   Psychiatric: Her behavior is normal. Judgment and thought content normal.        7/13/2021 9/15/2023   Tender (MILLER-28) 0 / 28  26 / 28    Swollen (MILLER-28) 0 / 28  0 / 28    Provider Global -- --   Patient Global 50 / 100 60 / 100   ESR -- --   CRP 0.7 mg/L --   MILLER-28 (ESR) -- --   MILLER-28 (CRP) 1.85 (Remission) --   CDAI Score -- --        Assessment:   Genesis Ugalde is a 68 y.o. 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     1. Giant cell arteritis with polymyalgia rheumatica        GCA  Pt was initially diagnosed with GCA in 2004 and has been followed by Dr. Waldrop. She " had been well managed on subq tocilizumab until she was hospitalized 9/6 after having some vision changes and signs of active disease on both US and MRA. She was re-pulsed and started on tocilizumab infusions.   Diagnosis/Important Hx: Imaging+, no biopsy  Relevant serologies: Inflammatory markers have been normal  Previous Therapy: Tocilizumab injections  Current Therapy: Tocilizumab infusions, prednisone was increased back to 60mg daily 12/4/24  Imaging/Procedures:   12/2024   Temporal US - No specific sonographic evidence of temporal arteritis, noting the right temporal artery remains somewhat small in caliber. Resolution of previously identified halo sign.   9/2024   MRA - w/o compelling evidence of active arteritis  8/2024   MRA: Abnormal mural enhancement associated with the superficial temporal arteries greater on the left concerning for active arteritis which corresponds to abnormality seen on ultrasound   7/2024   Temporal US: Right temporal artery: Right temporal artery is small in caliber with positive halo sign.  Peak systolic velocity of 18 cm/sec.  Normal waveform. Right axillary artery: Vessel wall appears thickened with positive halo sign.  Peak systolic velocity of 115 cm/sec.  Normal waveform. Left temporal artery: Left temporal artery is small in caliber with positive halo sign.  Peak systolic velocity of 20 cm/sec.  Normal waveform. Left axillary artery: Vessel wall appears thickened with positive halo sign.  Peak systolic velocity of 112 cm/sec.  Normal waveform.  Other notes: Had issue with liver (hx of FLD) w/ concern for continuation of tocilizumab, this resolved but then recurred on 1/2025 lab work.   PLAN  - Plan to start GIACTA taper, provided to patient   Will reduce to 50mg of prednisone starting 1/16  - Restarted bactrim as prophylaxis  - Re-ordered large vessel studies as CTA for financial purposes  - Holding tocilizumab with elevated liver markers as below  - F/u in 3  months    Elevated LFTs  Hx of fatty liver disease followed by Ms. Fitzgerald. Had a brief elevation of enzymes into the 100s that has since improved back to baseline.   PLAN  - Hold tocilizumab while enzymes elevated  - Weekly CMP, resume once normalized  - Discussed with hepatology NP who will put her on the schedule    Right Shoulder pain - improved  Pt has been very bothered by pain in her right shoulder. She stated she has a hx of arthritis and was hoping for an injection today. She is still taking high dose steroids for the GCA as above but does not feel it is helping.  Diagnosis/Important Hx: Hx of injections helping - has some mild DJD in shoulder as well as some cervical radiculopathy/tendonitis.  Imaging/Procedures:   10/2024   XR shoulder - Cortical hypertrophic change greater tuberosity, acromion with anterolateral spur formation, encroaches subacromial space. Limited DJD glenohumeral joint, under 2 cm size oval calcification posterosuperior humeral head, calcific bursitis change. Mild DJD superior AC joint.    XR neck - Some straightening of usual lordotic curve suggesting the presence of neck muscle spasm. Calcified plaque right carotid bifurcation region. Airway normal. Minor levoscoliosis cervicothoracic junction, healed fracture deformity left mid clavicle. Asymmetrical facet arthropathy left C4-C6 and C2-C4 levels. Mild moderate DJD disc spondylosis C5-C7. Mild DJD at 2 C1-C2. Limited primary anterior subluxation at C4-C5.   Other notes:   PLAN  - Continue management with ortho    Rheumatology Health Maintenance  Vaccinations: COVID once below 20mg of prednisone  Medication monitoring:    Pred - 60, bactrim restarted   Tocilizumab   Osteoporosis:    DEXA - DEXA ordered   Treatment -       Problem List Items Addressed This Visit          Immunology/Multi System    Giant cell arteritis with polymyalgia rheumatica (Chronic)    Relevant Medications    sulfamethoxazole-trimethoprim 800-160mg  (BACTRIM DS) 800-160 mg Tab    predniSONE (DELTASONE) 10 MG tablet    Other Relevant Orders    CBC W/ AUTO DIFFERENTIAL (Completed)    COMPREHENSIVE METABOLIC PANEL (Completed)    CTA Chest Abdomen Pelvis    Sedimentation rate (Completed)       This patient encounter was staffed and the plan was formulated with rheumatology attending Dr. Waldrop.    Alyx Gupta MD  Rheumatology Fellow, PGY-4

## 2025-01-15 ENCOUNTER — OFFICE VISIT (OUTPATIENT)
Dept: RHEUMATOLOGY | Facility: CLINIC | Age: 70
End: 2025-01-15
Payer: MEDICARE

## 2025-01-15 ENCOUNTER — PATIENT MESSAGE (OUTPATIENT)
Dept: RHEUMATOLOGY | Facility: CLINIC | Age: 70
End: 2025-01-15

## 2025-01-15 ENCOUNTER — LAB VISIT (OUTPATIENT)
Dept: LAB | Facility: HOSPITAL | Age: 70
End: 2025-01-15
Attending: INTERNAL MEDICINE
Payer: MEDICARE

## 2025-01-15 VITALS
HEART RATE: 87 BPM | WEIGHT: 278.25 LBS | BODY MASS INDEX: 49.3 KG/M2 | SYSTOLIC BLOOD PRESSURE: 139 MMHG | HEIGHT: 63 IN | DIASTOLIC BLOOD PRESSURE: 83 MMHG

## 2025-01-15 DIAGNOSIS — M31.6 GCA (GIANT CELL ARTERITIS): Primary | ICD-10-CM

## 2025-01-15 DIAGNOSIS — M31.5 GIANT CELL ARTERITIS WITH POLYMYALGIA RHEUMATICA: Chronic | ICD-10-CM

## 2025-01-15 LAB
ALBUMIN SERPL BCP-MCNC: 3.9 G/DL (ref 3.5–5.2)
ALP SERPL-CCNC: 108 U/L (ref 40–150)
ALT SERPL W/O P-5'-P-CCNC: 116 U/L (ref 10–44)
ANION GAP SERPL CALC-SCNC: 12 MMOL/L (ref 8–16)
AST SERPL-CCNC: 46 U/L (ref 10–40)
BASOPHILS # BLD AUTO: 0.08 K/UL (ref 0–0.2)
BASOPHILS NFR BLD: 0.6 % (ref 0–1.9)
BILIRUB SERPL-MCNC: 1.7 MG/DL (ref 0.1–1)
BUN SERPL-MCNC: 18 MG/DL (ref 8–23)
CALCIUM SERPL-MCNC: 9.3 MG/DL (ref 8.7–10.5)
CHLORIDE SERPL-SCNC: 104 MMOL/L (ref 95–110)
CO2 SERPL-SCNC: 25 MMOL/L (ref 23–29)
CREAT SERPL-MCNC: 1 MG/DL (ref 0.5–1.4)
DIFFERENTIAL METHOD BLD: ABNORMAL
EOSINOPHIL # BLD AUTO: 0 K/UL (ref 0–0.5)
EOSINOPHIL NFR BLD: 0 % (ref 0–8)
ERYTHROCYTE [DISTWIDTH] IN BLOOD BY AUTOMATED COUNT: 12.7 % (ref 11.5–14.5)
ERYTHROCYTE [SEDIMENTATION RATE] IN BLOOD BY PHOTOMETRIC METHOD: 3 MM/HR (ref 0–36)
EST. GFR  (NO RACE VARIABLE): >60 ML/MIN/1.73 M^2
GLUCOSE SERPL-MCNC: 222 MG/DL (ref 70–110)
HCT VFR BLD AUTO: 44.3 % (ref 37–48.5)
HGB BLD-MCNC: 14.5 G/DL (ref 12–16)
IMM GRANULOCYTES # BLD AUTO: 0.4 K/UL (ref 0–0.04)
IMM GRANULOCYTES NFR BLD AUTO: 2.9 % (ref 0–0.5)
LYMPHOCYTES # BLD AUTO: 1.8 K/UL (ref 1–4.8)
LYMPHOCYTES NFR BLD: 12.7 % (ref 18–48)
MCH RBC QN AUTO: 32.5 PG (ref 27–31)
MCHC RBC AUTO-ENTMCNC: 32.7 G/DL (ref 32–36)
MCV RBC AUTO: 99 FL (ref 82–98)
MONOCYTES # BLD AUTO: 0.3 K/UL (ref 0.3–1)
MONOCYTES NFR BLD: 2.3 % (ref 4–15)
NEUTROPHILS # BLD AUTO: 11.4 K/UL (ref 1.8–7.7)
NEUTROPHILS NFR BLD: 81.5 % (ref 38–73)
NRBC BLD-RTO: 0 /100 WBC
PLATELET # BLD AUTO: 173 K/UL (ref 150–450)
PMV BLD AUTO: 10.4 FL (ref 9.2–12.9)
POTASSIUM SERPL-SCNC: 4.5 MMOL/L (ref 3.5–5.1)
PROT SERPL-MCNC: 7.1 G/DL (ref 6–8.4)
RBC # BLD AUTO: 4.46 M/UL (ref 4–5.4)
SODIUM SERPL-SCNC: 141 MMOL/L (ref 136–145)
WBC # BLD AUTO: 13.95 K/UL (ref 3.9–12.7)

## 2025-01-15 PROCEDURE — 85652 RBC SED RATE AUTOMATED: CPT | Performed by: STUDENT IN AN ORGANIZED HEALTH CARE EDUCATION/TRAINING PROGRAM

## 2025-01-15 PROCEDURE — 85025 COMPLETE CBC W/AUTO DIFF WBC: CPT | Performed by: STUDENT IN AN ORGANIZED HEALTH CARE EDUCATION/TRAINING PROGRAM

## 2025-01-15 PROCEDURE — 99999 PR PBB SHADOW E&M-EST. PATIENT-LVL III: CPT | Mod: PBBFAC,GC,, | Performed by: STUDENT IN AN ORGANIZED HEALTH CARE EDUCATION/TRAINING PROGRAM

## 2025-01-15 PROCEDURE — 80053 COMPREHEN METABOLIC PANEL: CPT | Performed by: STUDENT IN AN ORGANIZED HEALTH CARE EDUCATION/TRAINING PROGRAM

## 2025-01-15 PROCEDURE — 36415 COLL VENOUS BLD VENIPUNCTURE: CPT | Performed by: STUDENT IN AN ORGANIZED HEALTH CARE EDUCATION/TRAINING PROGRAM

## 2025-01-15 RX ORDER — PREDNISONE 10 MG/1
TABLET ORAL
Qty: 119 TABLET | Refills: 0 | Status: SHIPPED | OUTPATIENT
Start: 2025-01-15 | End: 2025-02-19

## 2025-01-15 RX ORDER — SULFAMETHOXAZOLE AND TRIMETHOPRIM 800; 160 MG/1; MG/1
1 TABLET ORAL
Qty: 30 TABLET | Refills: 1 | Status: SHIPPED | OUTPATIENT
Start: 2025-01-15

## 2025-01-15 ASSESSMENT — ROUTINE ASSESSMENT OF PATIENT INDEX DATA (RAPID3)
FATIGUE SCORE: 2.5
PAIN SCORE: 4
MDHAQ FUNCTION SCORE: 1.5
AM STIFFNESS SCORE: 1, YES
WHEN YOU AWAKENED IN THE MORNING OVER THE LAST WEEK, PLEASE INDICATE THE AMOUNT OF TIME IT TAKES UNTIL YOU ARE AS LIMBER AS YOU WILL BE FOR THE DAY: 1
TOTAL RAPID3 SCORE: 4
PSYCHOLOGICAL DISTRESS SCORE: 5.5
PATIENT GLOBAL ASSESSMENT SCORE: 3

## 2025-01-15 NOTE — PROGRESS NOTES
I have personally reviewed the history, confirmed exam findings, and discussed assessment and plan with fellow.    EXAMINATION:  US TEMPORAL ARTERY BILATERAL     CLINICAL HISTORY:  Other giant cell arteritis     TECHNIQUE:  Limited sonographic evaluation of the temporal arteries for evaluation of possible temporal arteritis.     COMPARISON:  Ultrasound 07/31/2024     FINDINGS:  There is no halo sign identified about either temporal or axillary artery.     Right temporal artery is somewhat small in caliber, improved from prior, but with mildly high resistance flow pattern with velocity measuring 28 cm/sec.     Right axillary artery velocity measures 124 cm/sec.     Left temporal artery velocity measures 43 cm/sec.  Left axillary artery velocity measures 114 cm/sec.     Impression:     No specific sonographic evidence of temporal arteritis, noting the right temporal artery remains somewhat small in caliber.  Resolution of previously identified halo sign.     Electronically signed by resident: Yolanda Black  Date:                                            12/04/2024  Time:                                           17:55     Electronically signed by:Raudel Felton  Date:                                            12/05/2024  Time:                                           08:11    ASSESSMENT:    GCA in remission, headaches essentially resolved, PMR symptoms resolved, vision improved OD all with increase of prednisone to 60mg daily from 10mg daily last visit d/t recurrent symptoms. . Did not read portal message from 12/31/24 to reduce prednisone to 50mg daily x 1 wk, then 40mg daily until this visit  Next tocilizumab 6mg/kg IV q 4 wks is tomorrow  Unable to afford MRA chest/abd/pelvis as ordered last visit  right cervical radiculopathy numbness and tingling right upper arm, reproduced by all neck ROM  Right rotator cuff tendinopathy and likely tear with AROM limited to 50 degrees abduction with painful arc, + Neer +  Ross-Tay and weakness with resisted abduction  Increased transaminases ?related to tocilizumab superimposed on NAFLD.cirrhosis  T2 DM worsened with increased prednisone dose      PLAN:    CBC, CMP, ESR, CRP today  Decrease prednisone to 50mg daily for next week, then follow Giacta 26 wk taper  Due for MRA chest/abd/pelvis for re-evaluate for LVV as can't afford co-pay will see if CTA of same with lower co=pay  Ref to PT for right cervical radiculopathy and right rotator cuff tendinitis, Tear  F/u with Michelle Hermosillo in [facility] for right cervical radiculopathy  F/u Dr. Calix in  Sports Medicine for right rotator cuff tendinitis, tear   tocilizumab 6mg/kg IV monthly  next dose scheduled for 1/16/25, tomorrow  Monthly cbc and cmp prior to each tocilizumab dose  RTC 3 months with standing labs

## 2025-01-15 NOTE — TELEPHONE ENCOUNTER
Liver enzymes elevated. Will hold tocilizumab and get weekly lab work until it improves. Then will restart tocilizumab infusions.     This patient encounter was staffed and the plan was formulated with rheumatology attending Dr. Waldrop.    Alyx Gupta MD  Rheumatology Fellow, PGY-4

## 2025-01-15 NOTE — PROGRESS NOTES
1/14/2025     9:49 PM   Rapid3 Question Responses and Scores   MDHAQ Score 1.5   Psychologic Score 5.5   Pain Score 4   When you awakened in the morning OVER THE LAST WEEK, did you feel stiff? Yes   If Yes, please indicate the number of hours until you are as limber as you will be for the day 1   Fatigue Score 2.5   Global Health Score 3   RAPID3 Score 4    Answers submitted by the patient for this visit:  Rheumatology Questionnaire (Submitted on 1/14/2025)  fever: No  eye redness: No  mouth sores: Yes  headaches: Yes  shortness of breath: No  chest pain: Yes  trouble swallowing: Yes  diarrhea: No  constipation: No  unexpected weight change: No  genital sore: No  dysuria: No  During the last 3 days, have you had a skin rash?: No  Bruises or bleeds easily: No  cough: Yes

## 2025-01-15 NOTE — PATIENT INSTRUCTIONS
It was nice to see you again!    We will start our taper - I have printed the schedule for you and sent more to [facility]. Please restart the three times weekly antibiotic until you're less than 20mg of steroid.    We will also try to get a CT scan to check for other blood vessel disease!    Please reach out if symptoms come back and let us know how we can help. We will see you in three months!      Alyx Gupta MD  Rheumatology Fellow, PGY-4

## 2025-01-16 ENCOUNTER — INFUSION (OUTPATIENT)
Dept: INFECTIOUS DISEASES | Facility: HOSPITAL | Age: 70
End: 2025-01-16
Payer: MEDICARE

## 2025-01-16 VITALS
RESPIRATION RATE: 20 BRPM | TEMPERATURE: 98 F | WEIGHT: 275.81 LBS | BODY MASS INDEX: 48.87 KG/M2 | HEART RATE: 102 BPM | HEIGHT: 63 IN | SYSTOLIC BLOOD PRESSURE: 174 MMHG | OXYGEN SATURATION: 97 % | DIASTOLIC BLOOD PRESSURE: 83 MMHG

## 2025-01-16 DIAGNOSIS — H46.9 OPTIC NEURITIS: ICD-10-CM

## 2025-01-16 DIAGNOSIS — M31.5 GIANT CELL ARTERITIS WITH POLYMYALGIA RHEUMATICA: Primary | ICD-10-CM

## 2025-01-16 DIAGNOSIS — M81.8 OTHER OSTEOPOROSIS WITHOUT CURRENT PATHOLOGICAL FRACTURE: ICD-10-CM

## 2025-01-16 PROCEDURE — 63600175 PHARM REV CODE 636 W HCPCS: Performed by: INTERNAL MEDICINE

## 2025-01-16 PROCEDURE — 96374 THER/PROPH/DIAG INJ IV PUSH: CPT

## 2025-01-16 PROCEDURE — 99211 OFF/OP EST MAY X REQ PHY/QHP: CPT | Mod: 25

## 2025-01-16 RX ORDER — DIPHENHYDRAMINE HYDROCHLORIDE 50 MG/ML
50 INJECTION INTRAMUSCULAR; INTRAVENOUS ONCE AS NEEDED
OUTPATIENT
Start: 2025-02-13

## 2025-01-16 RX ORDER — SODIUM CHLORIDE 0.9 % (FLUSH) 0.9 %
10 SYRINGE (ML) INJECTION
OUTPATIENT
Start: 2025-02-13

## 2025-01-16 RX ORDER — DIPHENHYDRAMINE HYDROCHLORIDE 50 MG/ML
25 INJECTION INTRAMUSCULAR; INTRAVENOUS ONCE
Status: COMPLETED | OUTPATIENT
Start: 2025-01-16 | End: 2025-01-16

## 2025-01-16 RX ORDER — EPINEPHRINE 0.3 MG/.3ML
0.3 INJECTION SUBCUTANEOUS ONCE AS NEEDED
OUTPATIENT
Start: 2025-02-13

## 2025-01-16 RX ORDER — DIPHENHYDRAMINE HYDROCHLORIDE 50 MG/ML
25 INJECTION INTRAMUSCULAR; INTRAVENOUS ONCE
OUTPATIENT
Start: 2025-02-13

## 2025-01-16 RX ORDER — HEPARIN 100 UNIT/ML
500 SYRINGE INTRAVENOUS
OUTPATIENT
Start: 2025-02-13

## 2025-01-16 RX ORDER — HEPARIN 100 UNIT/ML
500 SYRINGE INTRAVENOUS
Status: DISCONTINUED | OUTPATIENT
Start: 2025-01-16 | End: 2025-01-16

## 2025-01-16 RX ORDER — SODIUM CHLORIDE 0.9 % (FLUSH) 0.9 %
10 SYRINGE (ML) INJECTION
Status: DISCONTINUED | OUTPATIENT
Start: 2025-01-16 | End: 2025-01-16

## 2025-01-16 RX ADMIN — DIPHENHYDRAMINE HYDROCHLORIDE 25 MG: 50 INJECTION INTRAMUSCULAR; INTRAVENOUS at 10:01

## 2025-01-16 NOTE — PROGRESS NOTES
Patient arrived for actemra infusion. Infusion not given because of elevated liver enzymes. Per Dr. Waldrop, hold actemra infusion until liver enzymes are back to normal. Patient will call to reschedule pending lab results that are due next week. Patient verbalized understanding and given telephone number to infusion clinic.

## 2025-01-27 ENCOUNTER — LAB VISIT (OUTPATIENT)
Dept: LAB | Facility: HOSPITAL | Age: 70
End: 2025-01-27
Attending: STUDENT IN AN ORGANIZED HEALTH CARE EDUCATION/TRAINING PROGRAM
Payer: MEDICARE

## 2025-01-27 DIAGNOSIS — M31.6 GCA (GIANT CELL ARTERITIS): ICD-10-CM

## 2025-01-27 LAB
ALBUMIN SERPL BCP-MCNC: 3.8 G/DL (ref 3.5–5.2)
ALP SERPL-CCNC: 97 U/L (ref 40–150)
ALT SERPL W/O P-5'-P-CCNC: 94 U/L (ref 10–44)
ANION GAP SERPL CALC-SCNC: 11 MMOL/L (ref 8–16)
AST SERPL-CCNC: 32 U/L (ref 10–40)
BILIRUB SERPL-MCNC: 1.3 MG/DL (ref 0.1–1)
BUN SERPL-MCNC: 20 MG/DL (ref 8–23)
CALCIUM SERPL-MCNC: 9.5 MG/DL (ref 8.7–10.5)
CHLORIDE SERPL-SCNC: 104 MMOL/L (ref 95–110)
CO2 SERPL-SCNC: 24 MMOL/L (ref 23–29)
CREAT SERPL-MCNC: 1.1 MG/DL (ref 0.5–1.4)
EST. GFR  (NO RACE VARIABLE): 55.1 ML/MIN/1.73 M^2
GLUCOSE SERPL-MCNC: 216 MG/DL (ref 70–110)
POTASSIUM SERPL-SCNC: 4.9 MMOL/L (ref 3.5–5.1)
PROT SERPL-MCNC: 7.1 G/DL (ref 6–8.4)
SODIUM SERPL-SCNC: 139 MMOL/L (ref 136–145)

## 2025-01-27 PROCEDURE — 36415 COLL VENOUS BLD VENIPUNCTURE: CPT | Mod: HCNC,PO | Performed by: STUDENT IN AN ORGANIZED HEALTH CARE EDUCATION/TRAINING PROGRAM

## 2025-01-27 PROCEDURE — 80053 COMPREHEN METABOLIC PANEL: CPT | Mod: HCNC | Performed by: STUDENT IN AN ORGANIZED HEALTH CARE EDUCATION/TRAINING PROGRAM

## 2025-02-02 DIAGNOSIS — H40.1131 PRIMARY OPEN ANGLE GLAUCOMA OF BOTH EYES, MILD STAGE: ICD-10-CM

## 2025-02-03 ENCOUNTER — TELEPHONE (OUTPATIENT)
Dept: ENDOSCOPY | Facility: HOSPITAL | Age: 70
End: 2025-02-03
Payer: MEDICARE

## 2025-02-03 RX ORDER — BRIMONIDINE TARTRATE 2 MG/ML
SOLUTION/ DROPS OPHTHALMIC
Qty: 30 ML | Refills: 3 | Status: SHIPPED | OUTPATIENT
Start: 2025-02-03

## 2025-02-03 NOTE — TELEPHONE ENCOUNTER
Called patient x 2. Patient did not answer the calls. Left voice message that her colonoscopy has been canceled and phone number provided for a callback to reschedule.

## 2025-02-03 NOTE — TELEPHONE ENCOUNTER
Received voicemail to [facility] scheduling main line.     Monday 2/3/25 6:09 AM My name is Genesis Ugalde. I have a procedure with Dr. Darryn Woodruff this morning. I think I have the flu, so I will not be coming in.

## 2025-02-05 ENCOUNTER — LAB VISIT (OUTPATIENT)
Dept: LAB | Facility: HOSPITAL | Age: 70
End: 2025-02-05
Attending: FAMILY MEDICINE
Payer: MEDICARE

## 2025-02-05 DIAGNOSIS — I10 ESSENTIAL HYPERTENSION: Chronic | ICD-10-CM

## 2025-02-05 DIAGNOSIS — E11.69 TYPE 2 DIABETES MELLITUS WITH HYPERLIPIDEMIA: ICD-10-CM

## 2025-02-05 DIAGNOSIS — E78.5 TYPE 2 DIABETES MELLITUS WITH HYPERLIPIDEMIA: ICD-10-CM

## 2025-02-05 DIAGNOSIS — E66.01 MORBID OBESITY: Chronic | ICD-10-CM

## 2025-02-05 LAB
ALBUMIN SERPL BCP-MCNC: 3.4 G/DL (ref 3.5–5.2)
ALP SERPL-CCNC: 89 U/L (ref 40–150)
ALT SERPL W/O P-5'-P-CCNC: 74 U/L (ref 10–44)
ANION GAP SERPL CALC-SCNC: 15 MMOL/L (ref 8–16)
AST SERPL-CCNC: 31 U/L (ref 10–40)
BILIRUB SERPL-MCNC: 1.2 MG/DL (ref 0.1–1)
BUN SERPL-MCNC: 9 MG/DL (ref 8–23)
CALCIUM SERPL-MCNC: 9 MG/DL (ref 8.7–10.5)
CHLORIDE SERPL-SCNC: 107 MMOL/L (ref 95–110)
CHOLEST SERPL-MCNC: 198 MG/DL (ref 120–199)
CHOLEST/HDLC SERPL: 2.2 {RATIO} (ref 2–5)
CO2 SERPL-SCNC: 22 MMOL/L (ref 23–29)
CREAT SERPL-MCNC: 0.8 MG/DL (ref 0.5–1.4)
EST. GFR  (NO RACE VARIABLE): >60 ML/MIN/1.73 M^2
ESTIMATED AVG GLUCOSE: 160 MG/DL (ref 68–131)
GLUCOSE SERPL-MCNC: 94 MG/DL (ref 70–110)
HBA1C MFR BLD: 7.2 % (ref 4–5.6)
HDLC SERPL-MCNC: 90 MG/DL (ref 40–75)
HDLC SERPL: 45.5 % (ref 20–50)
LDLC SERPL CALC-MCNC: 74.2 MG/DL (ref 63–159)
NONHDLC SERPL-MCNC: 108 MG/DL
POTASSIUM SERPL-SCNC: 3.4 MMOL/L (ref 3.5–5.1)
PROT SERPL-MCNC: 6.9 G/DL (ref 6–8.4)
SODIUM SERPL-SCNC: 144 MMOL/L (ref 136–145)
TRIGL SERPL-MCNC: 169 MG/DL (ref 30–150)

## 2025-02-05 PROCEDURE — 83036 HEMOGLOBIN GLYCOSYLATED A1C: CPT | Mod: HCNC | Performed by: FAMILY MEDICINE

## 2025-02-05 PROCEDURE — 36415 COLL VENOUS BLD VENIPUNCTURE: CPT | Mod: HCNC,PO | Performed by: FAMILY MEDICINE

## 2025-02-05 PROCEDURE — 80053 COMPREHEN METABOLIC PANEL: CPT | Mod: HCNC | Performed by: FAMILY MEDICINE

## 2025-02-05 PROCEDURE — 80061 LIPID PANEL: CPT | Mod: HCNC | Performed by: FAMILY MEDICINE

## 2025-02-06 ENCOUNTER — TELEPHONE (OUTPATIENT)
Dept: SPORTS MEDICINE | Facility: CLINIC | Age: 70
End: 2025-02-06
Payer: MEDICARE

## 2025-02-06 NOTE — TELEPHONE ENCOUNTER
----- Message from Len sent at 2/6/2025  4:51 PM CST -----  Type: General Call Back     Name of Caller:pt   Reason pt needs to move injection scheduled on 02/20 pt needs that appt move to march   Would the patient rather a call back or a response via [facility]? Call   Best Call Back Number: [phone]  Additional Information:

## 2025-02-11 ENCOUNTER — OFFICE VISIT (OUTPATIENT)
Dept: FAMILY MEDICINE | Facility: CLINIC | Age: 70
End: 2025-02-11
Payer: MEDICARE

## 2025-02-11 ENCOUNTER — LAB VISIT (OUTPATIENT)
Dept: LAB | Facility: HOSPITAL | Age: 70
End: 2025-02-11
Attending: FAMILY MEDICINE
Payer: MEDICARE

## 2025-02-11 VITALS
OXYGEN SATURATION: 96 % | SYSTOLIC BLOOD PRESSURE: 118 MMHG | DIASTOLIC BLOOD PRESSURE: 70 MMHG | BODY MASS INDEX: 49.96 KG/M2 | WEIGHT: 282 LBS | HEIGHT: 63 IN | HEART RATE: 110 BPM

## 2025-02-11 DIAGNOSIS — K75.81 LIVER CIRRHOSIS SECONDARY TO NASH: ICD-10-CM

## 2025-02-11 DIAGNOSIS — E66.01 MORBID OBESITY: ICD-10-CM

## 2025-02-11 DIAGNOSIS — E87.6 HYPOKALEMIA: ICD-10-CM

## 2025-02-11 DIAGNOSIS — E24.0 CUSHING'S DISEASE: ICD-10-CM

## 2025-02-11 DIAGNOSIS — F43.21 ADJUSTMENT DISORDER WITH DEPRESSED MOOD: ICD-10-CM

## 2025-02-11 DIAGNOSIS — E11.69 TYPE 2 DIABETES MELLITUS WITH HYPERLIPIDEMIA: ICD-10-CM

## 2025-02-11 DIAGNOSIS — M31.6 GCA (GIANT CELL ARTERITIS): ICD-10-CM

## 2025-02-11 DIAGNOSIS — Z63.6 CAREGIVER STRESS: ICD-10-CM

## 2025-02-11 DIAGNOSIS — K74.60 LIVER CIRRHOSIS SECONDARY TO NASH: ICD-10-CM

## 2025-02-11 DIAGNOSIS — B35.6 TINEA CRURIS: Primary | ICD-10-CM

## 2025-02-11 DIAGNOSIS — F43.21 GRIEF REACTION: ICD-10-CM

## 2025-02-11 DIAGNOSIS — Z23 NEED FOR COVID-19 VACCINE: ICD-10-CM

## 2025-02-11 DIAGNOSIS — M31.5 GIANT CELL ARTERITIS WITH POLYMYALGIA RHEUMATICA: Chronic | ICD-10-CM

## 2025-02-11 DIAGNOSIS — E78.5 TYPE 2 DIABETES MELLITUS WITH HYPERLIPIDEMIA: ICD-10-CM

## 2025-02-11 LAB
ALBUMIN SERPL BCP-MCNC: 3.5 G/DL (ref 3.5–5.2)
ALP SERPL-CCNC: 76 U/L (ref 40–150)
ALT SERPL W/O P-5'-P-CCNC: 45 U/L (ref 10–44)
ANION GAP SERPL CALC-SCNC: 12 MMOL/L (ref 8–16)
AST SERPL-CCNC: 22 U/L (ref 10–40)
BILIRUB SERPL-MCNC: 0.8 MG/DL (ref 0.1–1)
BUN SERPL-MCNC: 14 MG/DL (ref 8–23)
CALCIUM SERPL-MCNC: 10.1 MG/DL (ref 8.7–10.5)
CHLORIDE SERPL-SCNC: 108 MMOL/L (ref 95–110)
CO2 SERPL-SCNC: 22 MMOL/L (ref 23–29)
CREAT SERPL-MCNC: 0.9 MG/DL (ref 0.5–1.4)
EST. GFR  (NO RACE VARIABLE): >60 ML/MIN/1.73 M^2
GLUCOSE SERPL-MCNC: 130 MG/DL (ref 70–110)
POTASSIUM SERPL-SCNC: 4.4 MMOL/L (ref 3.5–5.1)
PROT SERPL-MCNC: 7 G/DL (ref 6–8.4)
SODIUM SERPL-SCNC: 142 MMOL/L (ref 136–145)

## 2025-02-11 PROCEDURE — 3066F NEPHROPATHY DOC TX: CPT | Mod: HCNC,CPTII,S$GLB, | Performed by: FAMILY MEDICINE

## 2025-02-11 PROCEDURE — 36415 COLL VENOUS BLD VENIPUNCTURE: CPT | Mod: HCNC,PO | Performed by: STUDENT IN AN ORGANIZED HEALTH CARE EDUCATION/TRAINING PROGRAM

## 2025-02-11 PROCEDURE — 80053 COMPREHEN METABOLIC PANEL: CPT | Mod: HCNC | Performed by: STUDENT IN AN ORGANIZED HEALTH CARE EDUCATION/TRAINING PROGRAM

## 2025-02-11 PROCEDURE — 3288F FALL RISK ASSESSMENT DOCD: CPT | Mod: HCNC,CPTII,S$GLB, | Performed by: FAMILY MEDICINE

## 2025-02-11 PROCEDURE — 99215 OFFICE O/P EST HI 40 MIN: CPT | Mod: HCNC,S$GLB,, | Performed by: FAMILY MEDICINE

## 2025-02-11 PROCEDURE — 3078F DIAST BP <80 MM HG: CPT | Mod: HCNC,CPTII,S$GLB, | Performed by: FAMILY MEDICINE

## 2025-02-11 PROCEDURE — 3008F BODY MASS INDEX DOCD: CPT | Mod: HCNC,CPTII,S$GLB, | Performed by: FAMILY MEDICINE

## 2025-02-11 PROCEDURE — 1159F MED LIST DOCD IN RCRD: CPT | Mod: HCNC,CPTII,S$GLB, | Performed by: FAMILY MEDICINE

## 2025-02-11 PROCEDURE — 1126F AMNT PAIN NOTED NONE PRSNT: CPT | Mod: HCNC,CPTII,S$GLB, | Performed by: FAMILY MEDICINE

## 2025-02-11 PROCEDURE — 1101F PT FALLS ASSESS-DOCD LE1/YR: CPT | Mod: HCNC,CPTII,S$GLB, | Performed by: FAMILY MEDICINE

## 2025-02-11 PROCEDURE — 3051F HG A1C>EQUAL 7.0%<8.0%: CPT | Mod: HCNC,CPTII,S$GLB, | Performed by: FAMILY MEDICINE

## 2025-02-11 PROCEDURE — 1160F RVW MEDS BY RX/DR IN RCRD: CPT | Mod: HCNC,CPTII,S$GLB, | Performed by: FAMILY MEDICINE

## 2025-02-11 PROCEDURE — 1157F ADVNC CARE PLAN IN RCRD: CPT | Mod: HCNC,CPTII,S$GLB, | Performed by: FAMILY MEDICINE

## 2025-02-11 PROCEDURE — 90480 ADMN SARSCOV2 VAC 1/ONLY CMP: CPT | Mod: HCNC,S$GLB,, | Performed by: FAMILY MEDICINE

## 2025-02-11 PROCEDURE — 3074F SYST BP LT 130 MM HG: CPT | Mod: HCNC,CPTII,S$GLB, | Performed by: FAMILY MEDICINE

## 2025-02-11 PROCEDURE — 99999 PR PBB SHADOW E&M-EST. PATIENT-LVL IV: CPT | Mod: PBBFAC,HCNC,, | Performed by: FAMILY MEDICINE

## 2025-02-11 PROCEDURE — 3060F POS MICROALBUMINURIA REV: CPT | Mod: HCNC,CPTII,S$GLB, | Performed by: FAMILY MEDICINE

## 2025-02-11 PROCEDURE — 91322 SARSCOV2 VAC 50 MCG/0.5ML IM: CPT | Mod: HCNC,S$GLB,, | Performed by: FAMILY MEDICINE

## 2025-02-11 RX ORDER — TRAMADOL HYDROCHLORIDE 50 MG/1
50 TABLET ORAL EVERY 12 HOURS PRN
Qty: 60 TABLET | Refills: 0 | Status: SHIPPED | OUTPATIENT
Start: 2025-02-11

## 2025-02-11 RX ORDER — POTASSIUM CHLORIDE 20 MEQ/1
20 TABLET, EXTENDED RELEASE ORAL DAILY
Qty: 90 TABLET | Refills: 3 | Status: SHIPPED | OUTPATIENT
Start: 2025-02-11 | End: 2026-02-11

## 2025-02-11 RX ORDER — CLONAZEPAM 1 MG/1
1 TABLET ORAL DAILY PRN
Qty: 30 TABLET | Refills: 0 | Status: SHIPPED | OUTPATIENT
Start: 2025-02-11

## 2025-02-11 RX ORDER — KETOCONAZOLE 20 MG/G
CREAM TOPICAL 2 TIMES DAILY
Qty: 60 G | Refills: 1 | Status: SHIPPED | OUTPATIENT
Start: 2025-02-11 | End: 2025-02-21

## 2025-02-11 SDOH — SOCIAL DETERMINANTS OF HEALTH (SDOH): DEPENDENT RELATIVE NEEDING CARE AT HOME: Z63.6

## 2025-02-11 NOTE — PROGRESS NOTES
Subjective     Patient ID: Genesis Ugalde is a 69 y.o. female.    Chief Complaint: No chief complaint on file.    69 years old female came to the clinic with under breasts rash associated with itching.  Patient was using over-the-counter medicines with no significant improvement.  Patient with a BMI of 49 currently trying to lose weight.  Patient older chronic steroid treatment associated with Cushing's disease.  She is currently treated for giant cell arteritis.  No flare ups of liver disease.  Patient with a BMI of 49 currently trying to lose weight.  Patient is doing better with her adjustment disorder.  She is due for her COVID vaccine.      Review of Systems   Constitutional: Negative.    HENT: Negative.     Eyes: Negative.    Respiratory: Negative.     Cardiovascular: Negative.    Gastrointestinal: Negative.    Genitourinary: Negative.    Musculoskeletal: Negative.    Neurological: Negative.    Psychiatric/Behavioral: Negative.            Objective     Physical Exam  Constitutional:       General: She is not in acute distress.     Appearance: She is well-developed. She is not diaphoretic.   HENT:      Head: Normocephalic and atraumatic.      Right Ear: External ear normal.      Left Ear: External ear normal.      Nose: Nose normal.      Mouth/Throat:      Pharynx: No oropharyngeal exudate.   Eyes:      General: No scleral icterus.        Right eye: No discharge.         Left eye: No discharge.      Conjunctiva/sclera: Conjunctivae normal.      Pupils: Pupils are equal, round, and reactive to light.   Neck:      Thyroid: No thyromegaly.      Vascular: No JVD.      Trachea: No tracheal deviation.   Cardiovascular:      Rate and Rhythm: Normal rate and regular rhythm.      Heart sounds: Normal heart sounds. No murmur heard.     No friction rub. No gallop.   Pulmonary:      Effort: Pulmonary effort is normal. No respiratory distress.      Breath sounds: Normal breath sounds. No stridor. No wheezing or rales.    Chest:      Chest wall: No tenderness.   Abdominal:      General: Bowel sounds are normal. There is no distension.      Palpations: Abdomen is soft. There is no mass.      Tenderness: There is no abdominal tenderness. There is no guarding or rebound.   Musculoskeletal:         General: No tenderness. Normal range of motion.      Cervical back: Normal range of motion and neck supple.   Lymphadenopathy:      Cervical: No cervical adenopathy.   Skin:     General: Skin is warm and dry.      Coloration: Skin is not pale.      Findings: No erythema or rash.   Neurological:      Mental Status: She is alert and oriented to person, place, and time.      Cranial Nerves: No cranial nerve deficit.      Motor: No abnormal muscle tone.      Coordination: Coordination normal.      Deep Tendon Reflexes: Reflexes are normal and symmetric.   Psychiatric:         Behavior: Behavior normal.         Thought Content: Thought content normal.         Judgment: Judgment normal.            Assessment and Plan     1. Tinea cruris  -     ketoconazole (NIZORAL) 2 % cream; Apply topically 2 (two) times daily. for 10 days  Dispense: 60 g; Refill: 1    2. Morbid obesity  -     Lipid Panel; Future; Expected date: 2025  -     tirzepatide 7.5 mg/0.5 mL PnIj; Inject 7.5 mg into the skin every 7 days.  Dispense: 6 mL; Refill: 3    3. Cushing's disease  Overview:  The bone density remains normal but has decreased compared with prior. Would therefore continue alendronate(Fosamax) until prednisone dose much lower. Q   External Result Report     External Result Report   Narrative     : 1955 ORDERING PHYSICIAN: JOSE Bonilla LOCATION: [facility]    HISTORY: 62 y/o female with a hx of several fractures. She had surgical menopause at 39 y/o. Pts Mother had a wrist fx. Pt is taking Calcium, Vit D, Fosamax and 17.5mg of Prednisone. She is taking 50,000 units of Vit D once a week. She has a hx of Rheumatoid Arthritis, Asthma, Diabetes and  Kidney Stones. She does not exercise or smoke.    TECHNIQUE: Bone Mineral Density performed using Hologic Horizon A (S/N [id number]) reveals good positioning of lumbar spine and hip.    BONE MINERAL DENSITY RESULTS:  Lumbar Spine: Lumbar bone mineral density L1-L4 is 0.933g/cm2, which is a t-score of -1.0. The z-score is 0.3.    Total Hip: The total hip bone mineral density is 0.900g/cm2.  The t-score is -0.3, and the z-score is 0.5.  Femoral neck BMD is 0.748g/cm2 and the t-score is -0.9.    COMPARISONS:  Date Location BMD T-score  12/19/14 L-spine 0.944 -0.9  Total Hip 0.949 0.1   Impression      Normal BMD of the hip with a significant decrease of 5.1% compared with the prior study. Normal BMD of the lumbar spine without significant change compared with the prior study. The estimated 10 year risk of hip fracture is 0.84%(low risk)  and of a major osteoporotic fracture, 13.8%(moderate risk) respectively using FRAX to decide on GIOP therapy.    RECOMMENDATIONS:  1. Adequate calcium and Vitamin D, 1200 mg/day(elemental dietary only, not tablets,  given h/o kidney stones) and 800 units/day, respectively.  2. Consider continuing alendronate based on FRAX if no contraindications. Keep prednisone dose to a minimum.  3. Repeat BMD in 2 years.    EXPLANATION OF RESULTS:  The t-score compares this results to the bone density of a 25 year old of the same gender. The z-score compares this result to the average bone density to people of the same age and gender. The amounts indicate the number of standard deviations above or below the mean.  * Osteoporosis is generally defined as having a t-score between less than -2.5.  * Osteopenia is generally defined as having a t-score between -1 and -2.5.  * The normal range is generally defined as having a t-score between -1 to 1.      Electronically signed by: AKHIL GARCIA MD  Date: 12/27/16  Time: 12:51     Encounter     View Encounter                Orders:  -     Comprehensive  Metabolic Panel; Future; Expected date: 02/11/2025    4. 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 Type 2 diabetes mellitus with hyperlipidemia  -     Microalbumin/Creatinine Ratio, Urine; Future; Expected date: 02/11/2025  -     Lipid Panel; Future; Expected date: 02/11/2025  -     Hemoglobin A1C; Future; Expected date: 02/11/2025  -     Comprehensive Metabolic Panel; Future; Expected date: 02/11/2025  -     tirzepatide 7.5 mg/0.5 mL PnIj; Inject 7.5 mg into the skin every 7 days.  Dispense: 6 mL; Refill: 3    6. Giant cell arteritis with polymyalgia rheumatica  Overview:       The MRA of the chest is normal, no evidence of large vessel vasculitis.[facility]          PACS Images     Show images for MRA Chest   Reviewed By Tonya Waldrop MD on 3/20/2018 14:21   Patient Result Comments     Viewed by Genesis Ugalde V on 3/20/2018  5:33 PM   Written by Forrest Waldrop MD on 3/20/2018  2:20 PM   The MRA of the chest is normal, no evidence of large vessel vasculitis.[facility]   External Result Report     External Result Report   Narrative     EXAMINATION:  MRA CHEST    CLINICAL HISTORY:  Aortic dz, non-traumatic, known or suspectPersonal history of other diseases of the circulatory system    TECHNIQUE:  Multiplanar, multisequence imaging was performed through the chest to evaluate the aorta.  This exam is performed prior to and following the administration of 14 mL of intravenous Gadavist.    COMPARISON:  CT chest 02/19/2016    FINDINGS:  The thoracic aorta is normal in caliber, contour, and course.  There are 3 branch vessels from the aortic arch.  No evidence of stenosis or aneurysm.  No evidence of abnormal wall thickening or surrounding inflammatory change.    The visualized lungs are without significant abnormality.  The heart is within normal limits.    The visualized upper abdominal structures demonstrate hepatomegaly.    The osseous structures  are without evidence for acute fracture.  Subcutaneous tissues are unremarkable.   Impression       Unremarkable MRA chest exam.    Electronically signed by resident: Jeannie Hdz  Date: 03/20/2018  Time: 10:36    Electronically signed by: Herrera Rodriguez MD  Date: 03/20/2018  Time: 11:16    Encounter     View Encounter          Signed by Resident   The CTA head and neck shows no evidence of active vasculitis. There is moderate atherosclerotic disease right carotid. Continue aspirin 325mg daily and pravastatin. There is incidentally noted thryoid nodule and some changes in the lung bases.Will need thyroid ultrasound and CT chest.Cristina will schedule. [facility]          PACS Images      Show images for CTA Head and Neck (xpd)   Reviewed By Tonya Miller MD on 6/28/2018 21:58   Result Notes for CTA Head and Neck (xpd)     Notes recorded by Forrest Waldrop MD on 6/30/2018 at 9:33 PM CDT  The CTA head and neck shows no evidence of active vasculitis. There is moderate atherosclerotic disease right carotid. Continue aspirin 325mg daily and pravastatin. There is incidentally noted thryoid nodule and some changes in the lung bases.Will need thyroid ultrasound and CT chest.Cristina will schedule. [employer]   CTA Head and Neck (xpd)   Order: [id number]   Status:  Final result   Visible to patient:  Yes (Patient Portal)   Next appt:  10/11/2018 at 07:30 PM in Sleep Medicine (LAB, SLEEP [place])   Dx:  Vasculitis, CNS; Giant cell arteritis...   Details     Reading Physician Reading Date Result Priority   Diomedes Morris,  6/28/2018       Narrative     EXAMINATION:  CTA HEAD AND NECK (XPD)    CLINICAL HISTORY:  Acute visual deficit;Vasculitis, CNS;h/o giant cell arteritis, r/o cervical vessel involvement;Arteritis, unspecified    TECHNIQUE:  5 mm axial images of the head pre and post contrast with 0.625 mm axial CTA images of the head neck postcontrast.  Coronal and sagittal MPR and MIP imaging was performed 100 ml of  Omnipaque 350 contrast was injected intravenously    COMPARISON:  CT head 03/27/2016    FINDINGS:  CT head with and  without contrast: There is no evidence for acute intracranial hemorrhage or sulcal effacement.  The ventricles are normal in size and configuration without evidence for hydrocephalus.  There is no midline shift or mass effect.  Allowing for CT technique there is no abnormal parenchymal enhancement.  The visualized paranasal sinuses and mastoid air cells are clear.    CTA head:    Anterior circulation: The bilateral distal cervical, petrous, cavernous, and supraclinoid segments of the ICAs are patent without significant focal stenosis or aneurysm.    The anterior middle cerebral arteries are patent without focal stenosis or aneurysm.    Posterior circulation: The distal vertebral arteries, basilar artery and posterior cerebral arteries are patent without focal stenosis or aneurysm.    CTA neck: Origin the right brachiocephalic, left common carotid and left subclavian arteries from the arch are within normal limits..    The right vertebral artery origin and proximal course is obscured by venous contamination.  The left vertebral artery origin is within normal limits.  Remaining vertebral arteries are patent throughout their course without focal stenosis...    Right carotid: The right common carotid artery, carotid bifurcation and extracranial portions of the internal carotid artery are patent there is atherosclerotic plaquing in the right carotid bifurcation and proximal ICA with less than 50% proximal ICA stenosis by NASCET criteria..    Left carotid: The left common carotid artery, carotid bifurcation and extracranial portions of the internal carotid arteries are patent without significant focal stenosis.    There is medialization the carotid arteries in the retropharyngeal soft tissues slightly greater on the right.    Pharynx/larynx: Evaluation limited by scatter artifact from dental metal and  motion allowing for limitation the nasopharynx, oropharynx, hypopharynx larynx and proximal trachea are within normal limits allowing for motion limitation    Oral cavity    Glands: Bilateral parotid and submandibular glands are within normal limits. Subcentimeter hypodense nodule medial aspect of the mid right thyroid measuring 5 mm overall indeterminate.    No evidence for adenopathy throughout the neck by size criteria.    There is grade 1 anterolisthesis of C3 on C4 and C4 on C5.  There is no evidence for acute fracture cervical spine..  Ill-defined ground-glass patchy opacification in the visualized lungs which may represent in part atelectasis underlying small airway disease to be considered.      Impression       CTA head: Unremarkable CTA of the head specifically without evidence for proximal significant stenosis or occlusion.    CTA neck: Atherosclerotic plaquing right carotid bifurcation and proximal ICA with less than 50% proximal ICA stenosis by NASCET criteria..    CT head: No evidence for acute intracranial hemorrhage or definite abnormal parenchymal enhancement.    Please see above for additional details.      Electronically signed by: Diomedes Morris DO  Date: 06/28/2018  Time: 09:33             Last Resulted: 06/28/18 09:33 Order Details View Encounter Lab and Collection Details Routing Result History            Patient Result Comments     Viewed by Genesis Ugalde on 7/1/2018  7:47 PM   Written by Forrest Waldrop MD on 6/30/2018  9:33 PM   The CTA head and neck shows no evidence of active vasculitis. There is moderate atherosclerotic disease right carotid. Continue aspirin 325mg daily and pravastatin. There is incidentally noted thryoid nodule and some changes in the lung bases.Will need thyroid ultrasound and CT chest.Cristina will schedule. RJQ   Notes recorded by Forrest Waldrop MD on 6/21/2018 at 2:52 PM CDT  The temporal artery ultrasound is normal [facility]          PACS Images      Show images  for US Temporal Artery Bilateral   Reviewed By Tonya Waldrop MD on 6/21/2018 14:52   Result Notes for US Temporal Artery Bilateral     Notes recorded by Forrest Waldrop MD on 6/21/2018 at 2:52 PM CDT  The temporal artery ultrasound is normal [facility]   US Temporal Artery Bilateral   Order: [id number]   Status:  Final result   Visible to patient:  Yes (Patient Portal)   Next appt:  10/11/2018 at 07:30 PM in Sleep Medicine (LAB, SLEEP [place])   Dx:  Giant cell arteritis with polymyalgia...   Details     Reading Physician Reading Date Result Priority   Herrera Rodriguez MD 6/21/2018    Steven Rodrigues MD 6/21/2018       Narrative     EXAMINATION:  US TEMPORAL ARTERY BILATERAL    CLINICAL HISTORY:  Encounter for follow-up examination after completed treatment for conditions other than malignant neoplasm    TECHNIQUE:  Limited grayscale and color flow ultrasound evaluation of the bilateral temporal arteries.    COMPARISON:  None    FINDINGS:  Bilateral temporal arteries are normal in diameter without focal stenosis or dilatation.  No hypoechoic wall thickening (halo sign).      Impression       Normal temporal arteries without evidence to suggest temporal arteritis.    Electronically signed by resident: Steven Rodrigues  Date: 06/21/2018  Time: 14:27    Electronically signed by: Herrera Rodriguez MD  Date: 06/21/2018  Time: 14:37              The MRA of the chest is normal, no evidence of large vessel vasculitis.[facility]          PACS Images      Show images for MRA Chest   Reviewed By Tonya Waldrop MD on 3/20/2018 14:21   Result Notes for MRA Chest     Notes recorded by Forrest Waldrop MD on 3/20/2018 at 2:20 PM CDT  The MRA of the chest is normal, no evidence of large vessel vasculitis.[facility]   MRA Chest   Order: [id number]   Status:  Final result   Visible to patient:  Yes (Patient Portal)   Next appt:  10/11/2018 at 07:30 PM in Sleep Medicine (LAB, SLEEP [place])   Dx:  Aortic disease; Personal history of  o...   Details     Reading Physician Reading Date Result Priority   Jeannie Hdz MD 3/20/2018    Herrera Rodriguez MD 3/20/2018       Narrative     EXAMINATION:  MRA CHEST    CLINICAL HISTORY:  Aortic dz, non-traumatic, known or suspectPersonal history of other diseases of the circulatory system    TECHNIQUE:  Multiplanar, multisequence imaging was performed through the chest to evaluate the aorta.  This exam is performed prior to and following the administration of 14 mL of intravenous Gadavist.    COMPARISON:  CT chest 02/19/2016    FINDINGS:  The thoracic aorta is normal in caliber, contour, and course.  There are 3 branch vessels from the aortic arch.  No evidence of stenosis or aneurysm.  No evidence of abnormal wall thickening or surrounding inflammatory change.    The visualized lungs are without significant abnormality.  The heart is within normal limits.    The visualized upper abdominal structures demonstrate hepatomegaly.    The osseous structures are without evidence for acute fracture.  Subcutaneous tissues are unremarkable.      Impression       Unremarkable MRA chest exam.    Electronically signed by resident: Jeannie Hdz  Date: 03/20/2018  Time: 10:36    Electronically signed by: Herrera Rodriguez MD  Date: 03/20/2018  Time: 11:16                 Reading Physician Reading Date Result Priority   Delvin Martinez MD 3/7/2019       Narrative     EXAMINATION:  MRI BRAIN W WO CONTRAST    CLINICAL HISTORY:  Headache, temporal arteritis suspected;superficial vessel imaging, Dr. Aggarwal protocol;Headache.  Normal ESR and CRP.    TECHNIQUE:  3D time-of-flight noncontrast MRA of the intracranial vasculature.  Contrast enhanced MRA of the upper cervical and cranial vasculature.  MPR and MIP reconstructions.  10 min following the administration of 10 mL of Gadavist intravenous contrast, vessel wall imaging of the superficial cranial arteries performed via high-resolution thin section T1 fat sat  images.    COMPARISON:  No direct priors.  Correlation is made with a temporal artery ultrasound dated 02/22/2019.    FINDINGS:  The visualized internal carotid arteries are normal in course and caliber.  The vertebrobasilar system is unremarkable.  The ACAs, MCAs and PCAs appear within normal limits.  No high-grade stenosis, major branch occlusion, or intracranial aneurysm identified.    The frontal and parietal branches of the superficial temporal artery and the occipital arteries assessed bilaterally.  Vessel wall imaging demonstrates no  significant mural thickening or enhancement to specifically indicate active vascular inflammation.      Impression       No compelling MR evidence of vascular inflammation involving the superficial cranial arteries.      Electronically signed by: Delvin Martinez MD  Date: 03/07/2019  Time: 14:08     Notes recorded by Forrest Waldrop MD on 2/22/2019 at 5:04 PM CST  The temporal artery ultrasound is also negative. [facility]          PACS Images for [employer] Viewer      Show images for US Temporal Artery Bilateral   PACS Images for [facility] [place] Viewer (Includes [facility] and [facility] Images)      Show images for US Temporal Artery Bilateral   All Reviewers List     Forrest Waldrop MD on 2/22/2019 17:09   Result Notes for US Temporal Artery Bilateral     Notes recorded by Forrest Waldrop MD on 2/22/2019 at 5:09 PM CST  The temporal artery ultrasound is negative. Would also suggest new MRI brain technique to visualize the temporal arteries. Cristina malcolm schedule. [facility]  ------    Notes recorded by Forrest Waldrop MD on 2/22/2019 at 5:04 PM CST  The temporal artery ultrasound is also negative. [facility]   US Temporal Artery Bilateral   Order: [id number]   Status:  Final result   Visible to patient:  Yes (Patient Portal) Next appt:  07/05/2019 at 10:30 AM in Gastroenterology (Alok Dykes MD) Dx:  GCA (giant cell arteritis); Transient...   Details     Reading  Physician Reading Date Result Priority   Herrera Rodriguez MD 2/22/2019    Larissa Collins MD 2/22/2019       Narrative     EXAMINATION:  US TEMPORAL ARTERY BILATERAL    CLINICAL HISTORY:  Transient visual loss, bilateral    TECHNIQUE:  Grayscale and Doppler were used to evaluate the temporal arteries for temporal arteritis.    COMPARISON:  None    FINDINGS:  No ultrasound evidence of temporal arteries wall thickening to suggest temporal arteritis.  The temporal arteries are patent.      Impression       No ultrasound evidence of temporal arteritis.    Electronically signed by resident: Larissa Collins  Date: 02/22/2019  Time: 16:43    Electronically signed by: Herrera Rodriguez MD  Date: 02/22/2019  Time: 16:48             Last Resulted: 02/22/19 16:48 Order Details View Encounter Lab and Collection Details Routing Result History - Result Edited            Patient Result Comments     Viewed by Genesis Ugalde on 2/22/2019  8:00 PM   Written by Forrest Waldrop MD on 2/22/2019  5:09 PM   The temporal artery ultrasound is also negative. RJQ   The temporal artery ultrasound is negative. Would also suggest new MRI brain technique to visualize the temporal arteries. Cristina will schedule. RJQ   External Result Report     External Result Report   Narrative     EXAMINATION:  US TEMPORAL ARTERY BILATERAL    CLINICAL HISTORY:  Transient visual loss, bilateral    TECHNIQUE:  Grayscale and Doppler were used to evaluate the temporal arteries for temporal arteritis.    COMPARISON:  None    FINDINGS:  No ultrasound evidence of temporal arteries wall thickening to suggest temporal arteritis.  The temporal arteries are patent.   Impression       No ultrasound evidence of temporal arteritis.    Electronically signed by resident: Larissa Collins  Date: 02/22/2019  Time: 16:43    Electronically signed by: Herrera Rodriguez MD  Date: 02/22/2019  Time: 16:48    Encounter             The MRA of the chest shows no evidence of large  vessel vasculitis as might be seen with giant cell/temporal arteritis. The hyperintense area in the liver is unchanged from 2016 and you will be seeing Dr. Blackwell in Hepatology to review as well in April. RJQ          PACS Images for [employer] Viewer      Show images for MRA Chest   PACS Images for [facility] [place] Viewer (Includes [facility] and [facility] Images)      Show images for MRA Chest   All Reviewers List     Forrest Waldrop MD on 2/22/2019 17:03   Result Notes for MRA Chest     Notes recorded by Forrest Waldrop MD on 2/22/2019 at 5:03 PM CST  The MRA of the chest shows no evidence of large vessel vasculitis as might be seen with giant cell/temporal arteritis. The hyperintense area in the liver is unchanged from 2016 and you will be seeing Dr. Blackwell in Hepatology to review as well in April. RJQ   MRA Chest   Order: [id number]   Status:  Final result   Visible to patient:  Yes (Patient Portal) Next appt:  07/05/2019 at 10:30 AM in Gastroenterology (Alok Dykes MD) Dx:  GCA (giant cell arteritis); Aortic di...   Details     Reading Physician Reading Date Result Priority   Marco Thornton MD 2/22/2019       Narrative     EXAMINATION:  MRA CHEST    CLINICAL HISTORY:  Aortic dz, non-traumatic, known or suspect;Encounter for follow-up examination after completed treatment for conditions other than malignant neoplasm    TECHNIQUE:  Multiplanar, multisequence imaging was performed through the chest to evaluate the aorta.  This exam is performed prior to and following the administration of 10 mL of intravenous gadobutrol.    COMPARISON:  MRA chest 03/20/2018.  MRI abdomen 04/18/2016.    FINDINGS:  There is a left-sided aortic arch with 3 branch vessels.  The thoracic aorta tapers normally without atherosclerotic calcification.  No dissection.    Heart is normal in size.  Pulmonary arteries distribute normally.    No axillary or mediastinal lymph node enlargement.  Hilar  contours are unremarkable.    Esophagus is normal in caliber and course.    Visualized lung parenchyma demonstrates no gross abnormalities noting limited evaluation due to technique.    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  Subcentimeter left renal cysts identified.  Remaining visualized upper abdominal structures demonstrate no significant abnormalities.    Subcutaneous soft tissues are within normal limits.  There is fatty atrophy of the right rectus abdominus muscle.    No marrow signal abnormality to suggest an infiltrative process.      Impression       1. No aortic dissection.  2. 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      Electronically signed by: Marco Thornton MD  Date: 02/22/2019  Time: 16:49                    Orders:  -     traMADoL (ULTRAM) 50 mg tablet; Take 1 tablet (50 mg total) by mouth every 12 (twelve) hours as needed for Pain.  Dispense: 60 tablet; Refill: 0    7. Adjustment disorder with depressed mood  -     clonazePAM (KLONOPIN) 1 MG tablet; Take 1 tablet (1 mg total) by mouth daily as needed for Anxiety.  Dispense: 30 tablet; Refill: 0    8. Grief reaction  -     clonazePAM (KLONOPIN) 1 MG tablet; Take 1 tablet (1 mg total) by mouth daily as needed for Anxiety.  Dispense: 30 tablet; Refill: 0    9. Caregiver stress  -     clonazePAM (KLONOPIN) 1 MG tablet; Take 1 tablet (1 mg total) by mouth daily as needed for Anxiety.  Dispense: 30 tablet; Refill: 0    10. Need for COVID-19 vaccine  -     COVID-19 (Moderna) 50 mcg/0.5 mL IM vaccine (>/= 11 yo) 0.5 mL        I spent a total of 42 minutes on the day of the visit.This includes face to face time and non-face to face time preparing to see the patient (eg, review  of tests), obtaining and/or reviewing separately obtained history, documenting clinical information in the electronic or other health record, independently interpreting results and communicating results to the patient/family/caregiver, or care coordinator.          Follow up in about 6 months (around 8/11/2025), or if symptoms worsen or fail to improve.

## 2025-02-17 DIAGNOSIS — J06.9 UPPER RESPIRATORY TRACT INFECTION, UNSPECIFIED TYPE: ICD-10-CM

## 2025-02-17 NOTE — TELEPHONE ENCOUNTER
No care due was identified.  Health [facility] Embedded Care Due Messages. Reference number: [id number].   2/17/2025 11:30:01 AM CST

## 2025-02-18 RX ORDER — LEVOCETIRIZINE DIHYDROCHLORIDE 5 MG/1
5 TABLET, FILM COATED ORAL
Qty: 90 TABLET | Refills: 0 | Status: SHIPPED | OUTPATIENT
Start: 2025-02-18

## 2025-02-18 NOTE — TELEPHONE ENCOUNTER
Refill Routing Note   Medication(s) are not appropriate for processing by [facility] for the following reason(s):        New or recently adjusted medication    [facility] action(s):  Defer               Appointments  past 12m or future 3m with PCP    Date Provider   Last Visit   2/11/2025 Ayad Shen MD   Next Visit   8/11/2025 Ayad Shen MD   ED visits in past 90 days: 0        Note composed:10:12 PM 02/17/2025

## 2025-02-22 DIAGNOSIS — M31.5 GIANT CELL ARTERITIS WITH POLYMYALGIA RHEUMATICA: ICD-10-CM

## 2025-02-23 NOTE — PLAN OF CARE
Continue amlodipine  Also on propranolol for history of essential tremors   Vision impaired bilateral eyes

## 2025-02-24 DIAGNOSIS — Z00.00 ENCOUNTER FOR MEDICARE ANNUAL WELLNESS EXAM: ICD-10-CM

## 2025-02-25 DIAGNOSIS — K44.9 HIATAL HERNIA: ICD-10-CM

## 2025-02-25 DIAGNOSIS — K21.9 GASTROESOPHAGEAL REFLUX DISEASE: ICD-10-CM

## 2025-02-25 RX ORDER — METOPROLOL SUCCINATE 100 MG/1
100 TABLET, EXTENDED RELEASE ORAL DAILY
Qty: 90 TABLET | Refills: 3 | Status: SHIPPED | OUTPATIENT
Start: 2025-02-25 | End: 2025-02-25

## 2025-02-25 RX ORDER — PRAVASTATIN SODIUM 40 MG/1
40 TABLET ORAL NIGHTLY
Qty: 90 TABLET | Refills: 3 | Status: SHIPPED | OUTPATIENT
Start: 2025-02-25

## 2025-02-25 NOTE — TELEPHONE ENCOUNTER
Refill Routing Note   Medication(s) are not appropriate for processing by [facility] for the following reason(s):        Outside of protocol  No active prescription written by provider    [facility] action(s):  Defer  Route             Appointments  past 12m or future 3m with PCP    Date Provider   Last Visit   2/11/2025 Ayad Shen MD   Next Visit   8/11/2025 Ayad Shen MD   ED visits in past 90 days: 0        Note composed:2:54 PM 02/25/2025

## 2025-02-25 NOTE — TELEPHONE ENCOUNTER
No care due was identified.  [facility] Embedded Care Due Messages. Reference number: [id number].   2/25/2025 2:23:35 PM CST

## 2025-02-26 RX ORDER — METOPROLOL SUCCINATE 100 MG/1
100 TABLET, EXTENDED RELEASE ORAL DAILY
Qty: 90 TABLET | Refills: 3 | Status: SHIPPED | OUTPATIENT
Start: 2025-02-26

## 2025-02-26 RX ORDER — OMEPRAZOLE 40 MG/1
40 CAPSULE, DELAYED RELEASE ORAL EVERY 12 HOURS
Qty: 180 CAPSULE | Refills: 3 | Status: SHIPPED | OUTPATIENT
Start: 2025-02-26

## 2025-03-03 ENCOUNTER — INFUSION (OUTPATIENT)
Dept: INFECTIOUS DISEASES | Facility: HOSPITAL | Age: 70
End: 2025-03-03
Payer: MEDICARE

## 2025-03-03 VITALS
HEART RATE: 110 BPM | DIASTOLIC BLOOD PRESSURE: 83 MMHG | OXYGEN SATURATION: 95 % | SYSTOLIC BLOOD PRESSURE: 137 MMHG | BODY MASS INDEX: 47.88 KG/M2 | HEIGHT: 64 IN | RESPIRATION RATE: 18 BRPM | WEIGHT: 280.44 LBS | TEMPERATURE: 99 F

## 2025-03-03 DIAGNOSIS — M31.5 GIANT CELL ARTERITIS WITH POLYMYALGIA RHEUMATICA: Primary | ICD-10-CM

## 2025-03-03 DIAGNOSIS — H46.9 OPTIC NEURITIS: ICD-10-CM

## 2025-03-03 DIAGNOSIS — M81.8 OTHER OSTEOPOROSIS WITHOUT CURRENT PATHOLOGICAL FRACTURE: ICD-10-CM

## 2025-03-03 PROCEDURE — 63600175 PHARM REV CODE 636 W HCPCS: Mod: JZ,TB,HCNC | Performed by: INTERNAL MEDICINE

## 2025-03-03 PROCEDURE — 96375 TX/PRO/DX INJ NEW DRUG ADDON: CPT | Mod: HCNC

## 2025-03-03 PROCEDURE — 96365 THER/PROPH/DIAG IV INF INIT: CPT | Mod: HCNC

## 2025-03-03 PROCEDURE — 25000003 PHARM REV CODE 250: Mod: HCNC | Performed by: INTERNAL MEDICINE

## 2025-03-03 RX ORDER — SODIUM CHLORIDE 0.9 % (FLUSH) 0.9 %
10 SYRINGE (ML) INJECTION
OUTPATIENT
Start: 2025-03-10

## 2025-03-03 RX ORDER — EPINEPHRINE 0.3 MG/.3ML
0.3 INJECTION SUBCUTANEOUS ONCE AS NEEDED
OUTPATIENT
Start: 2025-03-10

## 2025-03-03 RX ORDER — HEPARIN 100 UNIT/ML
500 SYRINGE INTRAVENOUS
OUTPATIENT
Start: 2025-03-10

## 2025-03-03 RX ORDER — DIPHENHYDRAMINE HYDROCHLORIDE 50 MG/ML
50 INJECTION INTRAMUSCULAR; INTRAVENOUS ONCE AS NEEDED
OUTPATIENT
Start: 2025-03-10

## 2025-03-03 RX ORDER — DIPHENHYDRAMINE HYDROCHLORIDE 50 MG/ML
25 INJECTION INTRAMUSCULAR; INTRAVENOUS ONCE
Status: COMPLETED | OUTPATIENT
Start: 2025-03-03 | End: 2025-03-03

## 2025-03-03 RX ORDER — DIPHENHYDRAMINE HYDROCHLORIDE 50 MG/ML
25 INJECTION INTRAMUSCULAR; INTRAVENOUS ONCE
OUTPATIENT
Start: 2025-03-10

## 2025-03-03 RX ADMIN — SODIUM CHLORIDE: 9 INJECTION, SOLUTION INTRAVENOUS at 11:03

## 2025-03-03 RX ADMIN — DIPHENHYDRAMINE HYDROCHLORIDE 25 MG: 50 INJECTION INTRAMUSCULAR; INTRAVENOUS at 10:03

## 2025-03-03 RX ADMIN — TOCILIZUMAB 764 MG: 20 INJECTION, SOLUTION, CONCENTRATE INTRAVENOUS at 10:03

## 2025-03-03 NOTE — PROGRESS NOTES
Patient received Actemra infusion over 1 hour. Patient Pre med of Benadryl 25 mg slow IVP, given 30 minutes prior to start of infusion.     Next appointment scheduled and given to patient.    Limited head-to-toe assessment due to privacy issues and visit reason though the opportunity was given for patient to express any concerns.

## 2025-03-18 ENCOUNTER — TELEPHONE (OUTPATIENT)
Dept: HEPATOLOGY | Facility: CLINIC | Age: 70
End: 2025-03-18
Payer: MEDICARE

## 2025-03-18 NOTE — TELEPHONE ENCOUNTER
----- Message from Ashley sent at 3/18/2025 12:52 PM CDT -----  Regarding: needs lab/US order prior to appt 5/20  Contact: Patient can be contacted @# [phone]  PT is sched for f/u on 5/20. Will need new orders placed for US & labs be done week before appt. Please call to sched or notify when orders placed so she can call and sched.Patient can be contacted @# [phone]

## 2025-03-20 ENCOUNTER — OFFICE VISIT (OUTPATIENT)
Dept: SPORTS MEDICINE | Facility: CLINIC | Age: 70
End: 2025-03-20
Payer: MEDICARE

## 2025-03-20 VITALS
WEIGHT: 280.31 LBS | HEIGHT: 64 IN | DIASTOLIC BLOOD PRESSURE: 77 MMHG | HEART RATE: 101 BPM | BODY MASS INDEX: 47.86 KG/M2 | SYSTOLIC BLOOD PRESSURE: 126 MMHG

## 2025-03-20 DIAGNOSIS — M25.511 CHRONIC RIGHT SHOULDER PAIN: Primary | ICD-10-CM

## 2025-03-20 DIAGNOSIS — G89.29 CHRONIC RIGHT SHOULDER PAIN: Primary | ICD-10-CM

## 2025-03-20 PROCEDURE — 99999 PR PBB SHADOW E&M-EST. PATIENT-LVL IV: CPT | Mod: PBBFAC,,, | Performed by: STUDENT IN AN ORGANIZED HEALTH CARE EDUCATION/TRAINING PROGRAM

## 2025-03-20 RX ORDER — TRIAMCINOLONE ACETONIDE 40 MG/ML
40 INJECTION, SUSPENSION INTRA-ARTICULAR; INTRAMUSCULAR
Status: DISCONTINUED | OUTPATIENT
Start: 2025-03-20 | End: 2025-03-20 | Stop reason: HOSPADM

## 2025-03-20 RX ADMIN — TRIAMCINOLONE ACETONIDE 40 MG: 40 INJECTION, SUSPENSION INTRA-ARTICULAR; INTRAMUSCULAR at 08:03

## 2025-03-20 NOTE — PROCEDURES
Large Joint Aspiration/Injection: R glenohumeral    Date/Time: 3/20/2025 8:00 AM    Performed by: Kliey Calix MD  Authorized by: Kiley Calix MD    Consent Done?:  Yes (Verbal)  Indications:  Arthritis and pain  Site marked: the procedure site was marked    Timeout: prior to procedure the correct patient, procedure, and site was verified      Local anesthesia used?: Yes    Anesthesia:  Local infiltration  Local anesthetic:  Co-phenylcaine spray    Details:  Needle Size:  22 G  Ultrasonic Guidance for needle placement?: Yes (Ultrasound guidance used to avoid neurovascular injury.)    Images are saved and documented.  Approach:  Posterior  Location:  Shoulder  Site:  R glenohumeral  Medications:  40 mg triamcinolone acetonide 40 mg/mL  Medications comment:  Ropivacaine 0.2% 2mL  Patient tolerance:  Patient tolerated the procedure well with no immediate complications     TECHNIQUE: Real time ultrasound examination of the right glenohumeral joint(s) was performed with SonoSite Edge 2, C1-5 MHz probe(s). Ultrasound guidance was used for needle localization. Images were saved and stored for documentation.  Dynamic visualization of the needle was continuous throughout the procedures and maintained in good position.

## 2025-03-20 NOTE — PROGRESS NOTES
"CC: right shoulder pain    69 y.o. Female presents today for CSI injection of her right acromioclavicular joint. Pt reports only 4 weeks of relief with injection.  Which patient interested in surgical options.    Attempted treatments: pain patches, salonpas, HEP  Last Injection: 12/20/24  Pain score: 2/10   History of trauma/injury: increased left shoulder pain due to compensation using walker, states pain is 2/10 today  Affecting ADLs: yes      REVIEW OF SYSTEMS:   Constitution: Patient denies fever or chills.  Eyes: Patient denies eye pain or vision changes.  HEENT: Patient denies ear pain, sore throat, or nasal discharge.  CVS: Patient denies chest pain.  Lungs: Patient denies shortness of breath or cough.  Skin: Patient denies itching.  Pt reports skin rash.  Musculoskeletal: Patient denies recent falls. See HPI.  Psych: Patient denies any current anxiety or nervousness.    PAST MEDICAL HISTORY:   Past Medical History:   Diagnosis Date    Anxiety     Asthma     Blind left eye     can see silhouettes in right eye    Cataract     Depression     Diabetes mellitus     Disease of immune system     Giant cell arteritis     Glaucoma     Hypertension     Polymyalgia rheumatica     Uveitis        MEDICATIONS:   Current Medications[1]    ALLERGIES:   Review of patient's allergies indicates:   Allergen Reactions    Oxycontin [oxycodone] Hallucinations     Immobile     Percocet [oxycodone-acetaminophen] Shortness Of Breath    Percodan [oxycodone hcl-oxycodone-asa] Shortness Of Breath    Seroquel [quetiapine] Shortness Of Breath     Akathisia    Opioids - morphine analogues Other (See Comments)     Pt states it has no effect on her         PHYSICAL EXAMINATION:  /77 (Patient Position: Sitting)   Pulse 101   Ht 5' 4" (1.626 m)   Wt 127.2 kg (280 lb 5 oz)   BMI 48.12 kg/m²   There are no signs of infection at the injection site, including no rubor, calor, or skin lesions.  Gen: NAD.  Psych: Affect & judgment " nl.  Neuro: Grossly CNI. DALE.  HEENT: -Trach dev. -Eye d/c.   CV: Color nl. -E/C/C. WWPx4.  Pulm: -Dyspnea. -Cough.  Lymph: -Edema.  Int: -Rash/lesion noted. Skin is warm and dry    ASSESSMENT:      ICD-10-CM ICD-9-CM   1. Chronic right shoulder pain  M25.511 719.41    G89.29 338.29   2. BMI 45.0-49.9, adult  Z68.42 V85.42         PLAN:  Lengthy discussion had with patient regarding her shoulder pain at today's visit.  Patient explained that there are no surgical options for her shoulder pain at this time as she is not dealing with anything significant.  It was further explained to patient that we previously have good results following glenohumeral joint injection in the acromioclavicular injection.  As he is also explained to patient that our expectation is not to be pain-free but to feel improved.  Patient had questions whether or not she was a surgical candidate, and it was explained to her today that again there is no definite surgical options for her but also due to her BMI, she is not a surgical candidate.  Patient voiced understanding.  Ultrasound-guided injection of the right acromioclavicular joint with triamcinolone performed at visit today.  We will bring patient back in 6-8 weeks for right acromioclavicular joint injection.    Future planning includes - Continue exercise program    Risks and benefits were discussed with patient prior to receiving injection.  Depending on injection type, risks include the possibility of infection, pain, disruptions in blood pressure and blood sugar, and cosmetic deformity at site of injection.    All questions were answered to the best of my ability and all concerns were addressed at this time.    Follow up for above.     This note is dictated using the M*Modal Fluency Direct word recognition program. There are word recognition mistakes that are occasionally missed on review.           [1]   Current Outpatient Medications:     amLODIPine-benazepriL (LOTREL) 10-40 mg per  capsule, Take 1 capsule by mouth once daily., Disp: 90 capsule, Rfl: 3    brimonidine 0.2% (ALPHAGAN) 0.2 % Drop, INSTILL 1 DROP INTO BOTH EYES 3 TIMES A DAY, Disp: 30 mL, Rfl: 3    clonazePAM (KLONOPIN) 1 MG tablet, Take 1 tablet (1 mg total) by mouth daily as needed for Anxiety., Disp: 30 tablet, Rfl: 0    dorzolamide (TRUSOPT) 2 % ophthalmic solution, Place 1 drop into both eyes 3 (three) times daily., Disp: 30 mL, Rfl: 4    ergocalciferol (VITAMIN D2) 50,000 unit Cap, TAKE 1 CAPSULE (50,000 UNITS TOTAL) BY MOUTH EVERY 7 DAYS., Disp: 12 capsule, Rfl: 3    latanoprost 0.005 % ophthalmic solution, Place 1 drop into both eyes every evening., Disp: 7.5 mL, Rfl: 4    levocetirizine (XYZAL) 5 MG tablet, Take 1 tablet (5 mg total) by mouth as needed for Allergies., Disp: 90 tablet, Rfl: 0    metoprolol succinate (TOPROL-XL) 100 MG 24 hr tablet, Take 1 tablet (100 mg total) by mouth once daily., Disp: 90 tablet, Rfl: 3    omeprazole (PRILOSEC) 40 MG capsule, Take 1 capsule (40 mg total) by mouth every 12 (twelve) hours. Take one pill every morning 45 minutes before breakfast, Disp: 180 capsule, Rfl: 3    potassium chloride SA (K-DUR,KLOR-CON) 20 MEQ tablet, Take 1 tablet (20 mEq total) by mouth once daily., Disp: 90 tablet, Rfl: 3    pravastatin (PRAVACHOL) 40 MG tablet, TAKE 1 TABLET BY MOUTH EVERY DAY IN THE EVENING, Disp: 90 tablet, Rfl: 3    predniSONE (DELTASONE) 20 MG tablet, Take 2 tablets (40 mg total) by mouth once daily., Disp: 60 tablet, Rfl: 1    repaglinide (PRANDIN) 0.5 MG tablet, Take 1 tablet (0.5 mg total) by mouth 3 (three) times daily before meals., Disp: 270 tablet, Rfl: 3    tirzepatide 7.5 mg/0.5 mL PnIj, Inject 7.5 mg into the skin every 7 days., Disp: 6 mL, Rfl: 3    tocilizumab (ACTEMRA) 162 mg/0.9 mL injection, Inject 0.9 mLs (162 mg total) into the skin every 7 days., Disp: 12 each, Rfl: 0    traMADoL (ULTRAM) 50 mg tablet, Take 1 tablet (50 mg total) by mouth every 12 (twelve) hours as needed  for Pain., Disp: 60 tablet, Rfl: 0    ketoconazole (NIZORAL) 2 % cream, Apply topically 2 (two) times daily. for 10 days, Disp: 60 g, Rfl: 1

## 2025-03-25 ENCOUNTER — LAB VISIT (OUTPATIENT)
Dept: LAB | Facility: HOSPITAL | Age: 70
End: 2025-03-25
Attending: INTERNAL MEDICINE
Payer: MEDICARE

## 2025-03-25 DIAGNOSIS — Z12.11 ENCOUNTER FOR COLONOSCOPY DUE TO HISTORY OF ADENOMATOUS COLONIC POLYPS: ICD-10-CM

## 2025-03-25 DIAGNOSIS — I77.6 VASCULITIS: ICD-10-CM

## 2025-03-25 DIAGNOSIS — M31.6 GCA (GIANT CELL ARTERITIS): ICD-10-CM

## 2025-03-25 DIAGNOSIS — Z86.0101 ENCOUNTER FOR COLONOSCOPY DUE TO HISTORY OF ADENOMATOUS COLONIC POLYPS: ICD-10-CM

## 2025-03-25 DIAGNOSIS — K74.60 HEPATIC CIRRHOSIS, UNSPECIFIED HEPATIC CIRRHOSIS TYPE, UNSPECIFIED WHETHER ASCITES PRESENT: ICD-10-CM

## 2025-03-25 DIAGNOSIS — Z12.11 SPECIAL SCREENING FOR MALIGNANT NEOPLASMS, COLON: ICD-10-CM

## 2025-03-25 PROCEDURE — 85652 RBC SED RATE AUTOMATED: CPT | Mod: HCNC

## 2025-03-25 PROCEDURE — 36415 COLL VENOUS BLD VENIPUNCTURE: CPT | Mod: HCNC,PO

## 2025-03-25 PROCEDURE — 85610 PROTHROMBIN TIME: CPT | Mod: HCNC

## 2025-03-25 PROCEDURE — 85025 COMPLETE CBC W/AUTO DIFF WBC: CPT | Mod: HCNC

## 2025-03-25 PROCEDURE — 80053 COMPREHEN METABOLIC PANEL: CPT | Mod: HCNC

## 2025-03-25 PROCEDURE — 81003 URINALYSIS AUTO W/O SCOPE: CPT | Mod: HCNC

## 2025-03-25 PROCEDURE — 86140 C-REACTIVE PROTEIN: CPT | Mod: HCNC

## 2025-03-26 ENCOUNTER — RESULTS FOLLOW-UP (OUTPATIENT)
Dept: RHEUMATOLOGY | Facility: CLINIC | Age: 70
End: 2025-03-26

## 2025-03-26 ENCOUNTER — TELEPHONE (OUTPATIENT)
Dept: RHEUMATOLOGY | Facility: CLINIC | Age: 70
End: 2025-03-26
Payer: MEDICARE

## 2025-03-26 ENCOUNTER — HOSPITAL ENCOUNTER (OUTPATIENT)
Dept: RADIOLOGY | Facility: HOSPITAL | Age: 70
Discharge: HOME | End: 2025-03-26
Attending: STUDENT IN AN ORGANIZED HEALTH CARE EDUCATION/TRAINING PROGRAM
Payer: MEDICARE

## 2025-03-26 DIAGNOSIS — R74.01 HIGH TRANSAMINASE LEVELS: Primary | ICD-10-CM

## 2025-03-26 DIAGNOSIS — M31.5 GIANT CELL ARTERITIS WITH POLYMYALGIA RHEUMATICA: Chronic | ICD-10-CM

## 2025-03-26 LAB
ABSOLUTE EOSINOPHIL (OHS): 0.1 K/UL
ABSOLUTE MONOCYTE (OHS): 0.6 K/UL (ref 0.3–1)
ABSOLUTE NEUTROPHIL COUNT (OHS): 11.55 K/UL (ref 1.8–7.7)
ALBUMIN SERPL BCP-MCNC: 4.2 G/DL (ref 3.5–5.2)
ALP SERPL-CCNC: 74 UNIT/L (ref 40–150)
ALT SERPL W/O P-5'-P-CCNC: 117 UNIT/L (ref 10–44)
ANION GAP (OHS): 14 MMOL/L (ref 8–16)
AST SERPL-CCNC: 103 UNIT/L (ref 11–45)
BASOPHILS # BLD AUTO: 0.05 K/UL
BASOPHILS NFR BLD AUTO: 0.3 %
BILIRUB SERPL-MCNC: 1.8 MG/DL (ref 0.1–1)
BILIRUB UR QL STRIP.AUTO: NEGATIVE
BUN SERPL-MCNC: 20 MG/DL (ref 8–23)
CALCIUM SERPL-MCNC: 10.9 MG/DL (ref 8.7–10.5)
CHLORIDE SERPL-SCNC: 108 MMOL/L (ref 95–110)
CLARITY UR: ABNORMAL
CO2 SERPL-SCNC: 18 MMOL/L (ref 23–29)
COLOR UR AUTO: YELLOW
CREAT SERPL-MCNC: 0.9 MG/DL (ref 0.5–1.4)
CRP SERPL-MCNC: <0.3 MG/L
ERYTHROCYTE [DISTWIDTH] IN BLOOD BY AUTOMATED COUNT: 13.7 % (ref 11.5–14.5)
ERYTHROCYTE [SEDIMENTATION RATE] IN BLOOD BY PHOTOMETRIC METHOD: <2 MM/HR
GFR SERPLBLD CREATININE-BSD FMLA CKD-EPI: >60 ML/MIN/1.73/M2
GLUCOSE SERPL-MCNC: 178 MG/DL (ref 70–110)
GLUCOSE UR QL STRIP: NEGATIVE
HCT VFR BLD AUTO: 46.7 % (ref 37–48.5)
HGB BLD-MCNC: 14.7 GM/DL (ref 12–16)
HGB UR QL STRIP: NEGATIVE
IMM GRANULOCYTES # BLD AUTO: 0.12 K/UL (ref 0–0.04)
IMM GRANULOCYTES NFR BLD AUTO: 0.8 % (ref 0–0.5)
INR PPP: 1 (ref 0.8–1.2)
KETONES UR QL STRIP: NEGATIVE
LEUKOCYTE ESTERASE UR QL STRIP: NEGATIVE
LYMPHOCYTES # BLD AUTO: 2.47 K/UL (ref 1–4.8)
MCH RBC QN AUTO: 31.8 PG (ref 27–50)
MCHC RBC AUTO-ENTMCNC: 31.5 G/DL (ref 32–36)
MCV RBC AUTO: 101 FL (ref 82–98)
NITRITE UR QL STRIP: NEGATIVE
NUCLEATED RBC (/100WBC) (OHS): 0 /100 WBC
PH UR STRIP: 6 [PH]
PLATELET # BLD AUTO: 265 K/UL (ref 150–450)
PMV BLD AUTO: 10.9 FL (ref 9.2–12.9)
POTASSIUM SERPL-SCNC: 4.5 MMOL/L (ref 3.5–5.1)
PROT SERPL-MCNC: 7.1 GM/DL (ref 6–8.4)
PROT UR QL STRIP: ABNORMAL
PROTHROMBIN TIME: 11.2 SECONDS (ref 9–12.5)
RBC # BLD AUTO: 4.62 M/UL (ref 4–5.4)
RELATIVE EOSINOPHIL (OHS): 0.7 %
RELATIVE LYMPHOCYTE (OHS): 16.6 % (ref 18–48)
RELATIVE MONOCYTE (OHS): 4 % (ref 4–15)
RELATIVE NEUTROPHIL (OHS): 77.6 % (ref 38–73)
SODIUM SERPL-SCNC: 140 MMOL/L (ref 136–145)
SP GR UR STRIP: >=1.03
UROBILINOGEN UR STRIP-ACNC: NEGATIVE EU/DL
WBC # BLD AUTO: 14.89 K/UL (ref 3.9–12.7)

## 2025-03-26 PROCEDURE — 25500020 PHARM REV CODE 255: Mod: HCNC | Performed by: STUDENT IN AN ORGANIZED HEALTH CARE EDUCATION/TRAINING PROGRAM

## 2025-03-26 PROCEDURE — 71275 CT ANGIOGRAPHY CHEST: CPT | Mod: 26,HCNC,, | Performed by: INTERNAL MEDICINE

## 2025-03-26 PROCEDURE — 74174 CTA ABD&PLVS W/CONTRAST: CPT | Mod: 26,HCNC,, | Performed by: INTERNAL MEDICINE

## 2025-03-26 PROCEDURE — 71275 CT ANGIOGRAPHY CHEST: CPT | Mod: TC,HCNC

## 2025-03-26 RX ADMIN — IOHEXOL 100 ML: 350 INJECTION, SOLUTION INTRAVENOUS at 07:03

## 2025-03-26 NOTE — PROGRESS NOTES
Your urinalysis is normal. Your glucose was 178. Were you fasting? Your AST and ALT are very elevated.  Will need to recheck the liver tests in one week and reschedule the Actemra until the AST and ALT improve. Your WBC is elevated. Your sed rate and crp are both normal.

## 2025-03-27 ENCOUNTER — RESULTS FOLLOW-UP (OUTPATIENT)
Dept: RHEUMATOLOGY | Facility: CLINIC | Age: 70
End: 2025-03-27

## 2025-04-02 DIAGNOSIS — J06.9 UPPER RESPIRATORY TRACT INFECTION, UNSPECIFIED TYPE: ICD-10-CM

## 2025-04-02 RX ORDER — LEVOCETIRIZINE DIHYDROCHLORIDE 5 MG/1
5 TABLET, FILM COATED ORAL NIGHTLY
Qty: 90 TABLET | Refills: 3 | Status: SHIPPED | OUTPATIENT
Start: 2025-04-02

## 2025-04-02 NOTE — TELEPHONE ENCOUNTER
No care due was identified.  Health [facility] Embedded Care Due Messages. Reference number: [id number].   4/02/2025 4:20:01 PM CDT

## 2025-04-02 NOTE — TELEPHONE ENCOUNTER
Refill Decision Note   Genesis Ugalde  is requesting a refill authorization.  Brief Assessment and Rationale for Refill:  Approve     Medication Therapy Plan:        Comments:     Note composed:5:30 PM 04/02/2025

## 2025-04-03 ENCOUNTER — LAB VISIT (OUTPATIENT)
Dept: LAB | Facility: HOSPITAL | Age: 70
End: 2025-04-03
Attending: INTERNAL MEDICINE
Payer: MEDICARE

## 2025-04-03 DIAGNOSIS — R74.01 HIGH TRANSAMINASE LEVELS: ICD-10-CM

## 2025-04-03 DIAGNOSIS — M31.6 GCA (GIANT CELL ARTERITIS): ICD-10-CM

## 2025-04-03 PROCEDURE — 80053 COMPREHEN METABOLIC PANEL: CPT | Mod: HCNC

## 2025-04-03 PROCEDURE — 82248 BILIRUBIN DIRECT: CPT | Mod: HCNC

## 2025-04-03 PROCEDURE — 36415 COLL VENOUS BLD VENIPUNCTURE: CPT | Mod: HCNC,PO

## 2025-04-04 LAB
ALBUMIN SERPL BCP-MCNC: 3.9 G/DL (ref 3.5–5.2)
ALP SERPL-CCNC: 71 UNIT/L (ref 40–150)
ALT SERPL W/O P-5'-P-CCNC: 82 UNIT/L (ref 10–44)
ANION GAP (OHS): 10 MMOL/L (ref 8–16)
AST SERPL-CCNC: 52 UNIT/L (ref 11–45)
BILIRUB DIRECT SERPL-MCNC: 0.5 MG/DL (ref 0.1–0.3)
BILIRUB SERPL-MCNC: 1.4 MG/DL (ref 0.1–1)
BUN SERPL-MCNC: 23 MG/DL (ref 8–23)
CALCIUM SERPL-MCNC: 9.9 MG/DL (ref 8.7–10.5)
CHLORIDE SERPL-SCNC: 108 MMOL/L (ref 95–110)
CO2 SERPL-SCNC: 23 MMOL/L (ref 23–29)
CREAT SERPL-MCNC: 0.9 MG/DL (ref 0.5–1.4)
GFR SERPLBLD CREATININE-BSD FMLA CKD-EPI: >60 ML/MIN/1.73/M2
GLUCOSE SERPL-MCNC: 75 MG/DL (ref 70–110)
POTASSIUM SERPL-SCNC: 4 MMOL/L (ref 3.5–5.1)
PROT SERPL-MCNC: 6.6 GM/DL (ref 6–8.4)
SODIUM SERPL-SCNC: 141 MMOL/L (ref 136–145)

## 2025-04-05 ENCOUNTER — RESULTS FOLLOW-UP (OUTPATIENT)
Dept: RHEUMATOLOGY | Facility: CLINIC | Age: 70
End: 2025-04-05

## 2025-04-07 DIAGNOSIS — M31.6 GCA (GIANT CELL ARTERITIS): ICD-10-CM

## 2025-04-07 RX ORDER — TOCILIZUMAB 180 MG/ML
162 INJECTION, SOLUTION SUBCUTANEOUS
Qty: 3.6 ML | Refills: 6 | Status: ACTIVE | OUTPATIENT
Start: 2025-04-07

## 2025-04-07 NOTE — TELEPHONE ENCOUNTER
Covering for Dr. Gupta. D/w Dr. Waldrop, who suggest changing tocilizumab IV to sc. D/w patient, who agrees with plan. All questions were answered. Rx for tocilizumab sc sent to [facility]

## 2025-04-08 DIAGNOSIS — Z79.899 HIGH RISK MEDICATION USE: Primary | ICD-10-CM

## 2025-04-15 NOTE — PROGRESS NOTES
Subjective:      Patient ID: Genesis Ugalde is a 69 y.o. 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     Chief Complaint: Disease Management    HPI    Initial hx:  - Pt was initially diagnosed with GCA dating back to 2004, had a recurrence 2012 and has been on tocilizumab since. Has had multiple recurrences leading to increased prednisone doses.   - Previous treatments: CYC  - Current treatments: Weekly tocilizumab, was on 30mg prednisone prior to presentation  - Had last been seen by Dr. Waldrop 7/29/2024, at that point had ordered more imaging and increased steroids after having an increase in headaches and temple pain. Had imaging done at that time (MRA of head as well as bilateral temporal artery US) that had shown evidence of bilateral temporal arteritis.   - Increased the steroid dose 12/4 when she was having symptoms of headaches, claudication. Had a good response to the increased steroid dose.     Interval hx:  - Had a high fever, no upper respiratory symptoms, tested negative for COVID. Only fevers and myalgias. Did have a sore throat. Body aches. Also had some burning pain in her urine. Took some left over bactrim and it went away - took for three days (three times a day for 3 days).   - Has been on 10mg for at least a few weeks. Had followed the giacta taper the last few months. Feels okay - getting some joint stiffness and hip pains. Mild headaches - some mild pressure. No swelling at the temples. Vision is stable.   - Getting chest pains from reflux  - Gets mouth sores from eating potatoes  - Last dose of tocilizumab was 3/3  - Restarted monjaro in January, has not lost any weight. Stable at about 275-280.  - Shoulders are much better - left is sore from overuse.     Review of Systems   Constitutional:  Negative for fever and unexpected weight change.   HENT:   "Positive for trouble swallowing. Negative for mouth sores.    Eyes:  Positive for redness.   Respiratory:  Positive for shortness of breath. Negative for cough.    Cardiovascular:  Positive for chest pain.   Gastrointestinal:  Negative for constipation and diarrhea.   Genitourinary:  Positive for dysuria. Negative for genital sores.   Skin:  Negative for rash.   Neurological:  Positive for headaches.   Hematological:  Does not bruise/bleed easily.        Objective:   /81 (BP Location: Left forearm, Patient Position: Sitting)   Pulse 102   Ht 5' 4" (1.626 m)   Wt 128 kg (282 lb 3 oz)   BMI 48.44 kg/m²   Physical Exam   Constitutional: She is oriented to person, place, and time. No distress.   HENT:   Head: Normocephalic and atraumatic.   Head: Pulses normal, no scalp tenderness  Cardiovascular: Normal rate, regular rhythm and normal heart sounds.   Pulmonary/Chest: Effort normal and breath sounds normal. No respiratory distress.   Abdominal: Soft.   Musculoskeletal:         General: No tenderness. Normal range of motion.   Neurological: She is alert and oriented to person, place, and time.   Skin: Skin is warm and dry. No rash noted.   Psychiatric: Her behavior is normal. Judgment and thought content normal.        7/13/2021 9/15/2023   Tender (MILLER-28) 0 / 28  26 / 28    Swollen (MILLER-28) 0 / 28  0 / 28    Provider Global -- --   Patient Global 50 / 100 60 / 100   ESR -- --   CRP 0.7 mg/L --   MILLER-28 (ESR) -- --   MILLER-28 (CRP) 1.85 (Remission) --   CDAI Score -- --        Assessment:   Genesis Ugalde is a 68 y.o. 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     1. GCA (giant cell arteritis)    2. High transaminase levels    3. Fatty liver    4. Morbid obesity with BMI of 40.0-44.9, adult    5. Other osteoporosis without current pathological fracture          GCA  Pt " was initially diagnosed with GCA in 2004 and has been followed by Dr. Waldrop. She had been well managed on subq tocilizumab until she was hospitalized 9/6 after having some vision changes and signs of active disease on both US and MRA. She was re-pulsed and started on tocilizumab infusions.   Diagnosis/Important Hx: Imaging+, no biopsy  Relevant serologies: Inflammatory markers have been normal  Previous Therapy: Tocilizumab injections  Current Therapy: Tocilizumab infusions, prednisone was increased back to 60mg daily 12/4/24 - current dose 10mg daily x3 weeks  Imaging/Procedures:   1/2025   CTA C/A/P - History of giant cell arteritis.  No arterial wall thickening, stenosis, or dilatation. New 0.9 cm solid nodular opacity in the right lower lobe, which is likely inflammatory or infectious.  Follow-up chest CT in 3 months is recommended. 2.0 cm hepatic hemangioma.  12/2024   Temporal US - No specific sonographic evidence of temporal arteritis, noting the right temporal artery remains somewhat small in caliber. Resolution of previously identified halo sign.   9/2024   MRA - w/o compelling evidence of active arteritis  8/2024   MRA: Abnormal mural enhancement associated with the superficial temporal arteries greater on the left concerning for active arteritis which corresponds to abnormality seen on ultrasound   7/2024   Temporal US: Right temporal artery: Right temporal artery is small in caliber with positive halo sign.  Peak systolic velocity of 18 cm/sec.  Normal waveform. Right axillary artery: Vessel wall appears thickened with positive halo sign.  Peak systolic velocity of 115 cm/sec.  Normal waveform. Left temporal artery: Left temporal artery is small in caliber with positive halo sign.  Peak systolic velocity of 20 cm/sec.  Normal waveform. Left axillary artery: Vessel wall appears thickened with positive halo sign.  Peak systolic velocity of 112 cm/sec.  Normal waveform.  Other notes: Had issue with liver  (hx of FLD) w/ concern for continuation of tocilizumab, this resolved but then recurred on 1/2025 lab work.   PLAN  - At 10mg daily pred on GIACTA taper   Will continue starting at 9mg   9mg x7 days --> 8mg x7 days --> 7mg x7 days --> 6mg x14 days --> 5mg x14 days --> 4mg x14 days --> 3mg x14 days --> 2mg x14 days --> 1mg x14 days --> off   1mg tablets sent to pharmacy  - Tocilizumab --> subq from infusions  - F/u in 3 months    Elevated LFTs  Hx of fatty liver disease followed by Ms. Fitzgerald. Had a brief elevation of enzymes into the 100s that has since improved back to baseline.   PLAN  - Switch to subQ tocilizumab  - Weekly CMP, resume once normalized  - Discussed with hepatology NP who will put her on the schedule    Right Shoulder pain - much improved  Pt has been very bothered by pain in her right shoulder. She stated she has a hx of arthritis and was hoping for an injection today. PT and injections with Dr. Calix have greatly improved the pain.   Diagnosis/Important Hx: Hx of injections helping - has some mild DJD in shoulder as well as some cervical radiculopathy/tendonitis.  Imaging/Procedures:   10/2024   XR shoulder - Cortical hypertrophic change greater tuberosity, acromion with anterolateral spur formation, encroaches subacromial space. Limited DJD glenohumeral joint, under 2 cm size oval calcification posterosuperior humeral head, calcific bursitis change. Mild DJD superior AC joint.    XR neck - Some straightening of usual lordotic curve suggesting the presence of neck muscle spasm. Calcified plaque right carotid bifurcation region. Airway normal. Minor levoscoliosis cervicothoracic junction, healed fracture deformity left mid clavicle. Asymmetrical facet arthropathy left C4-C6 and C2-C4 levels. Mild moderate DJD disc spondylosis C5-C7. Mild DJD at 2 C1-C2. Limited primary anterior subluxation at C4-C5.   Other notes:   PLAN  - [facility] with [facility]    Rheumatology [facility]  Maintenance  Vaccinations: Due for prevnar   Medication monitoring:    Pred - 10, bactrim restarted   Tocilizumab subq  Osteoporosis:    DEXA - DEXA nl bone density   Treatment - Reclast given 4/2024 - will hold off on treatment as she is now on 10mg of prednisone and about to reduce      Problem List Items Addressed This Visit          Endocrine    Other osteoporosis without current pathological fracture       GI    Fatty liver     Other Visit Diagnoses         GCA (giant cell arteritis)    -  Primary    Relevant Medications    predniSONE (DELTASONE) 1 MG tablet      High transaminase levels          Morbid obesity with BMI of 40.0-44.9, adult                  This patient encounter was staffed and the plan was formulated with rheumatology attending Dr. Waldrop.    Alyx Gupta MD  Rheumatology Fellow, PGY-4

## 2025-04-16 ENCOUNTER — TELEPHONE (OUTPATIENT)
Dept: OPHTHALMOLOGY | Facility: CLINIC | Age: 70
End: 2025-04-16
Payer: MEDICARE

## 2025-04-16 ENCOUNTER — OFFICE VISIT (OUTPATIENT)
Dept: RHEUMATOLOGY | Facility: CLINIC | Age: 70
End: 2025-04-16
Payer: MEDICARE

## 2025-04-16 VITALS
WEIGHT: 282.19 LBS | DIASTOLIC BLOOD PRESSURE: 81 MMHG | SYSTOLIC BLOOD PRESSURE: 125 MMHG | HEART RATE: 102 BPM | BODY MASS INDEX: 48.18 KG/M2 | HEIGHT: 64 IN

## 2025-04-16 DIAGNOSIS — M31.6 GCA (GIANT CELL ARTERITIS): Primary | ICD-10-CM

## 2025-04-16 DIAGNOSIS — K76.0 FATTY LIVER: ICD-10-CM

## 2025-04-16 DIAGNOSIS — E66.01 MORBID OBESITY WITH BMI OF 40.0-44.9, ADULT: ICD-10-CM

## 2025-04-16 DIAGNOSIS — R74.01 HIGH TRANSAMINASE LEVELS: ICD-10-CM

## 2025-04-16 DIAGNOSIS — M81.8 OTHER OSTEOPOROSIS WITHOUT CURRENT PATHOLOGICAL FRACTURE: ICD-10-CM

## 2025-04-16 PROCEDURE — 99999 PR PBB SHADOW E&M-EST. PATIENT-LVL IV: CPT | Mod: PBBFAC,HCNC,GC, | Performed by: STUDENT IN AN ORGANIZED HEALTH CARE EDUCATION/TRAINING PROGRAM

## 2025-04-16 RX ORDER — PREDNISONE 1 MG/1
TABLET ORAL
Qty: 200 TABLET | Refills: 2 | Status: SHIPPED | OUTPATIENT
Start: 2025-04-16

## 2025-04-16 NOTE — PROGRESS NOTES
4/16/2025     8:25 AM   Rapid3 Question Responses and Scores   MDHAQ Score 1.4   Psychologic Score 5.5   Pain Score 3   When you awakened in the morning OVER THE LAST WEEK, did you feel stiff? Yes   If Yes, please indicate the number of hours until you are as limber as you will be for the day 1   Fatigue Score 2   Global Health Score 5   RAPID3 Score 4.22        Answers submitted by the patient for this visit:  Rheumatology Questionnaire (Submitted on 4/16/2025)  fever: No  eye redness: No  mouth sores: Yes  headaches: Yes  shortness of breath: No  chest pain: Yes  trouble swallowing: Yes  diarrhea: No  constipation: No  unexpected weight change: No  genital sore: No  dysuria: No  During the last 3 days, have you had a skin rash?: Yes  Bruises or bleeds easily: No  cough: No

## 2025-04-16 NOTE — PROGRESS NOTES
I have personally reviewed the history, confirmed exam findings, and discussed assessment and plan with fellow.     Latest Reference Range & Units 03/25/25 13:34   WBC 3.90 - 12.70 K/uL 14.89 (H)   RBC 4.00 - 5.40 M/uL 4.62   Hemoglobin 12.0 - 16.0 gm/dL 14.7   Hematocrit 37.0 - 48.5 % 46.7   MCV 82 - 98 fL 101 (H)   MCH 27.0 - 50.0 pg 31.8   MCHC 32.0 - 36.0 g/dL 31.5 (L)   RDW 11.5 - 14.5 % 13.7   Platelet Count 150 - 450 K/uL 265   MPV 9.2 - 12.9 fL 10.9   Neut % 38 - 73 % 77.6 (H)   Lymph % 18 - 48 % 16.6 (L)   Mono % 4 - 15 % 4.0   Eos % <=8 % 0.7   Basophil % <=1.9 % 0.3   Immature Granulocytes 0.0 - 0.5 % 0.8 (H)   Gran # (ANC) 1.8 - 7.7 K/uL 11.55 (H)   Lymph # 1 - 4.8 K/uL 2.47   Mono # 0.3 - 1 K/uL 0.60   Eos # <=0.5 K/uL 0.10   Baso # <=0.2 K/uL 0.05   Immature Grans (Abs) 0.00 - 0.04 K/uL 0.12 (H)   nRBC <=0 /100 WBC 0   Sed Rate <=36 mm/hr <2   PT 9.0 - 12.5 seconds 11.2   INR 0.8 - 1.2  1.0   Sodium 136 - 145 mmol/L 140   Potassium 3.5 - 5.1 mmol/L 4.5   Chloride 95 - 110 mmol/L 108   CO2 23 - 29 mmol/L 18 (L)   Anion Gap 8 - 16 mmol/L 14   BUN 8 - 23 mg/dL 20   Creatinine 0.5 - 1.4 mg/dL 0.9   eGFR >60 mL/min/1.73/m2 >60   Glucose 70 - 110 mg/dL 178 (H)   Calcium 8.7 - 10.5 mg/dL 10.9 (H)   ALP 40 - 150 unit/L 74   PROTEIN TOTAL 6.0 - 8.4 gm/dL 7.1   Albumin 3.5 - 5.2 g/dL 4.2   BILIRUBIN TOTAL 0.1 - 1.0 mg/dL 1.8 (H)   AST 11 - 45 unit/L 103 (H)   ALT 10 - 44 unit/L 117 (H)   CRP <=8.2 mg/L <0.3   (H): Data is abnormally high  (L): Data is abnormally low      EXAMINATION:  US TEMPORAL ARTERY BILATERAL     CLINICAL HISTORY:  Other giant cell arteritis     TECHNIQUE:  Limited sonographic evaluation of the temporal arteries for evaluation of possible temporal arteritis.     COMPARISON:  Ultrasound 07/31/2024     FINDINGS:  There is no halo sign identified about either temporal or axillary artery.     Right temporal artery is somewhat small in caliber, improved from prior, but with mildly high resistance  flow pattern with velocity measuring 28 cm/sec.     Right axillary artery velocity measures 124 cm/sec.     Left temporal artery velocity measures 43 cm/sec.  Left axillary artery velocity measures 114 cm/sec.     Impression:     No specific sonographic evidence of temporal arteritis, noting the right temporal artery remains somewhat small in caliber.  Resolution of previously identified halo sign.     Electronically signed by resident: Yolanda Black  Date:                                            12/04/2024  Time:                                           17:55     Electronically signed by:Raudel Felton  Date:                                            12/05/2024  Time:                                           08:11       EXAMINATION:  CTA CHEST ABDOMEN PELVIS (XPD)     CLINICAL HISTORY:  Hx of GCA - assess for other large vessel disease; Giant cell arteritis with polymyalgia rheumatica     TECHNIQUE:  Images were obtained from the base of the neck through the pelvis before and after the administration of 100 cc of  Omnipaque 350 IV contrast.Delayed phase images of the abdomen and pelvis also done. Axial, coronal, and sagittal reconstructions were created from the source data, including MIP reconstructions for the arterial phase images.     COMPARISON:  CT abdomen pelvis with 09/25/2024, CT chest without 12/03/2020 07/09/2018 additional others     FINDINGS:  Evaluation degraded by extensive streak artifact due to the patient's arms overlying the field of view.     VASCULATURE     The aorta is normal in course and caliber.  There is no high-grade stenosis of the large aortic arch branches.  No significant wall thickening.     The celiac artery, SMA, KATHERINE are widely patent without high-grade stenosis.     CHEST, ABDOMEN, PELVIS     THORACIC SOFT TISSUES: No axillary or subpectoral lymphadenopathy. Subcentimeter right thyroid lobe nodule.     HEART AND MEDIASTINUM: No mediastinal or hilar lymphadenopathy. Heart and  pericardium are within normal limits. Left-sided aortic arch. The main pulmonary artery is normal in size.     PLEURA: No pleural effusion or pneumothorax.     LUNGS AND AIRWAYS: New 0.9 cm solid nodular opacity in the right lower lobe (4:362).  No pleural effusion or pneumothorax.     HEPATOBILIARY: Enlarged liver with steatosis.  Unchanged 2 cm enhancing lesion in the right hepatic lobe, previously characterized as a hemangioma.  No biliary ductal dilatation.  Status post cholecystectomy.     SPLEEN: No splenomegaly.     PANCREAS: No focal masses or ductal dilatation.     ADRENALS: No adrenal nodules.     KIDNEYS/URETERS: No hydronephrosis, stones, or solid mass lesions.  Subcentimeter low attenuating left lower pole hypodensity, too small to characterize but unchanged.     PELVIC ORGANS/BLADDER: Status post hysterectomy, otherwise unremarkable.     PERITONEUM / RETROPERITONEUM: No free air or fluid.     LYMPH NODES: No lymphadenopathy.     GI TRACT: No distention or wall thickening. Normal appendix.     SOFT TISSUES: No significant abnormality.     BONES: No fractures or aggressive osseous lesions.     Impression:     History of giant cell arteritis.  No arterial wall thickening, stenosis, or dilatation.     New 0.9 cm solid nodular opacity in the right lower lobe, which is likely inflammatory or infectious.  Follow-up chest CT in 3 months is recommended.     2.0 cm hepatic hemangioma.     Other findings as described.     Electronically signed by resident: Sukh Alford  Date:                                            03/27/2025  Time:                                           10:27     Electronically signed by:Wellington Renee  Date:                                            03/27/2025  Time:                                           12:10      ASSESSMENT:     GCA in remission, headaches essentially much improved, PMR symptoms resolved, vision improved OD all with increase of prednisone to 60mg daily from 10mg daily  last visit d/t recurrent symptoms. .   right cervical radiculopathy numbness and tingling right upper arm, reproduced by all neck ROM  Right rotator cuff tendinopathy and likely tear with AROM limited to 50 degrees abduction with painful arc, + Neer + Ross-Tay and weakness with resisted abduction  Saw Dr. Calix in Sports Medicine 3/20/25: Ultrasound-guided injection of the right acromioclavicular joint with triamcinolone performed at visit today.  We will bring patient back in 6-8 weeks for right acromioclavicular joint injection.  Much improved since   Increased transaminases ?related to tocilizumab superimposed on NAFLD.cirrhosis  T2 DM worsened with increased prednisone dose   DXA  12/27/24 nl  but FRAX MOF 14%  hip 1.3% moderate risk and on prednisone   received zoledronate 4/23/24(intolerant, severe pain x 1 wk). Will see if we can taper off prednisone and avoid having to start denosumab with indefinite endpoint     PLAN:     HFP prior to resuming Actemra sc and biweekly until stable monthly cbc, cmp  Decrease prednisone to 9mg daily and follow Giacta 26 wk taper  Actemra IV stopped d/t worsening AST ALT which did not occur to same degree with sc.  Reordered Actemra 162mg sc weekly  Rx sent to [facility]  Ref to PT for right cervical radiculopathy and right rotator cuff tendinitis, Tear  F/u with Michelle Hermosillo in [facility] for right cervical radiculopathy  F/u Dr. Calix in  Sports Medicine for right rotator cuff tendinitis, tear  RTC 3 months with standing labs HRCT chest to f/u RLL 0.9 cm nodule

## 2025-04-16 NOTE — PATIENT INSTRUCTIONS
You are due for prevnar 20 pneumonia vaccine.     Prednisone taper:  26 weeks (with tocilizumab)  9mg x7 days --> 8mg x7 days --> 7mg x7 days --> 6mg x14 days --> 5mg x14 days --> 4mg x14 days --> 3mg x14 days --> 2mg x14 days --> 1mg x14 days --> off    We will switch you over to subcutaneous (shot) actemra weekly.     We will change you over to prolia from reclast for your bone density. This medicine should be better tolerated, but is once every 6 months.    You are continuing steroids for an inflammatory condition. We often use this medicine (in the forms of prednisone, methylprednisolone, rarely dexamethasone) to reduce the inflammation in your body. It can be extremely effective, but it also comes with many potential side effects that we will discuss below.     Day to Day side effects  Higher glucose  If you have diabetes, this medicine can increase your blood sugar and you may need an increase in your regimen while on high doses  Please monitor your glucose more frequently than you normally would, and let the provider that manages your diabetes know that you are starting steroids  Hunger  Sleep  This medicine can negatively impact your sleep  When able, it is best to take in the morning  If this issue is severely impacting your life, we or your primary care can sometimes prescribe sleep aids to help until you are on a low enough dose that it no longer affects your sleep  Anxiety  Potassium  If you begin getting new muscle cramps, you should alert your provider as prednisone can sometimes cause drops in potassium levels     Long term side effects  Lowered immune system  Vaccines can be less effective at doses >20mg  You are at higher risk of infections while on steroids, however they should never be abruptly stopped without speaking with your provider, even in situations when you are dealing with an infection  Weight gain  Patients can often gain weight on this medicine  It can come in the form of increased  "weight in your stomach, back and face  Many patients also gain water weight in their legs with swelling that leaves an in-dent with socks or mild pressure on the skin  This weight tends to go away as we slowly take you off of steroids, but can be distressing for patients  Thin bones  Some patients that are on steroids for a long time are at higher risk of osteoporosis, even if they have osteopenia (thin bones, but not meeting criteria for osteoporosis)  When we expect you will be on steroids for a long time and are at higher risk for osteoporosis - we will prescribe a medication for you to be on while we work on getting you off of steroids  This is with the intent of preventing major fractures  Adrenal insufficiency  Steroids are naturally produced by your body in your adrenal glands. When we give you steroids by mouth, it tends to turn off your natural production. This will slowly be regained as we wean you off  This process can take longer in some patients than others - we will sometimes check lab work (an AM cortisol) to assess whether your body is re-adjusting once we get below 5mg of prednisone  Avascular necrosis  When patients are on steroids for a long time, it increases the risk of a condition called "avascular necrosis". This is when there is a lack of blood flow to major joints, most commonly the hips.  If you have a new aching in your groin or buttock that gets worse with activity since being on steroids, please let us know     High doses  Higher doses of steroids increase your risk of and potential severity all of the above side effects  Are you on 20mg of prednisone or higher?  This increases your risk of bleeding in your gut. If you are not already taking a proton pump inhibitor (we usually prescribe pantoprazole) - please get some over the counter in order to help protect the lining of your stomach from a bleed  You may also be at risk of an infection that only occurs in patients on " immunosuppression. We will sometimes prescribe a medicine to help protect you from this infection based on your other medications and anticipated duration of steroid use.     Getting off of steroids  It is very important to get off of steroids as fast as possible as safely as possible. Based on how your inflammatory disease is doing, sometimes your lab work, as well as studies we reference that are specific for your disease, we will come up with a plan with you for how to lower your dose and eventually get you off of steroids  It is important to note that we almost never want you to suddenly stop steroids unless we had discussed it. It can be dangerous to stop suddenly, especially if you have been on steroids for a long time. If you are run out of steroids, please alert us as soon as possible.  There are some conditions, such as gout, where we only want you on steroids for a short term (often 5-7 days). In these situations, it is okay to go from higher doses to sudden cessation  If ever you have a question about your taper/steroid weaning plan, please do not hesitate to reach out to your provider.     We understand that this medicine can come along with a lot of stress and side effects. Although we try to discuss the most important aspects of being on steroids during our office visits, we are not usually able to cover the amount of detail as above. If you have any further questions or concerns about the above, please feel free to reach out.

## 2025-04-17 ENCOUNTER — TELEPHONE (OUTPATIENT)
Dept: OPHTHALMOLOGY | Facility: CLINIC | Age: 70
End: 2025-04-17
Payer: MEDICARE

## 2025-04-17 ENCOUNTER — PATIENT MESSAGE (OUTPATIENT)
Dept: OPHTHALMOLOGY | Facility: CLINIC | Age: 70
End: 2025-04-17
Payer: MEDICARE

## 2025-04-17 NOTE — TELEPHONE ENCOUNTER
Left message this is my third attempt calling to reschedule 5/1/25 appointment with , she will not in the office. I am going to schedule new appointment on the portal.

## 2025-05-01 ENCOUNTER — OFFICE VISIT (OUTPATIENT)
Dept: SPORTS MEDICINE | Facility: CLINIC | Age: 70
End: 2025-05-01
Payer: MEDICARE

## 2025-05-01 VITALS
SYSTOLIC BLOOD PRESSURE: 124 MMHG | HEART RATE: 108 BPM | HEIGHT: 64 IN | BODY MASS INDEX: 48.44 KG/M2 | DIASTOLIC BLOOD PRESSURE: 79 MMHG

## 2025-05-01 DIAGNOSIS — M25.511 CHRONIC RIGHT SHOULDER PAIN: Primary | ICD-10-CM

## 2025-05-01 DIAGNOSIS — M19.011 ARTHROSIS OF RIGHT ACROMIOCLAVICULAR JOINT: ICD-10-CM

## 2025-05-01 DIAGNOSIS — G89.29 CHRONIC RIGHT SHOULDER PAIN: Primary | ICD-10-CM

## 2025-05-01 PROCEDURE — 99999 PR PBB SHADOW E&M-EST. PATIENT-LVL IV: CPT | Mod: PBBFAC,HCNC,, | Performed by: STUDENT IN AN ORGANIZED HEALTH CARE EDUCATION/TRAINING PROGRAM

## 2025-05-01 RX ORDER — TRIAMCINOLONE ACETONIDE 40 MG/ML
40 INJECTION, SUSPENSION INTRA-ARTICULAR; INTRAMUSCULAR
Status: DISCONTINUED | OUTPATIENT
Start: 2025-05-01 | End: 2025-05-01 | Stop reason: HOSPADM

## 2025-05-01 RX ADMIN — TRIAMCINOLONE ACETONIDE 40 MG: 40 INJECTION, SUSPENSION INTRA-ARTICULAR; INTRAMUSCULAR at 03:05

## 2025-05-01 NOTE — PROGRESS NOTES
"CC: right shoulder pain    69 y.o. Female presents today for CSI injection of her right acromioclavicular joint. Pt reports minimal relief with glenohumeral injection at last visit.      Attempted treatments: none since last visit  Last Injection: 3/20/25 glenohumeral  Pain score: 0/10 today; 3/10 at worst  History of trauma/injury: none since last visit  Affecting ADLs: "not as much as it used to"     REVIEW OF SYSTEMS:   Constitution: Patient denies fever or chills.  Eyes: Patient denies vision changes. Pt reports eye pain.  HEENT: Patient denies ear pain, sore throat, or nasal discharge.  CVS: Patient denies chest pain.  Lungs: Patient denies shortness of breath or cough.  Skin: Patient denies skin rash or itching.    Musculoskeletal: Patient denies recent falls. See HPI.  Psych: Patient denies any current anxiety or nervousness.    PAST MEDICAL HISTORY:   Past Medical History:   Diagnosis Date    Anxiety     Asthma     Blind left eye     can see silhouettes in right eye    Cataract     Depression     Diabetes mellitus     Disease of immune system     Giant cell arteritis     Glaucoma     Hypertension     Polymyalgia rheumatica     Uveitis        MEDICATIONS:   Current Medications[1]    ALLERGIES:   Review of patient's allergies indicates:   Allergen Reactions    Oxycontin [oxycodone] Hallucinations     Immobile     Percocet [oxycodone-acetaminophen] Shortness Of Breath    Percodan [oxycodone hcl-oxycodone-asa] Shortness Of Breath    Seroquel [quetiapine] Shortness Of Breath     Akathisia    Opioids - morphine analogues Other (See Comments)     Pt states it has no effect on her         PHYSICAL EXAMINATION:  /79   Pulse 108   Ht 5' 4" (1.626 m)   BMI 48.44 kg/m²   There are no signs of infection at the injection site, including no rubor, calor, or skin lesions.  Gen: NAD.  Psych: Affect & judgment nl.  Neuro: Grossly CNI. DALE.  HEENT: -Trach dev. -Eye d/c.   CV: Color nl. -E/C/C. WWPx4.  Pulm: -Dyspnea. " -Cough.  Lymph: -Edema.  Int: -Rash/lesion noted. Skin is warm and dry    ASSESSMENT:      ICD-10-CM ICD-9-CM   1. Chronic right shoulder pain  M25.511 719.41    G89.29 338.29   2. Arthrosis of right acromioclavicular joint  M19.011 715.91   3. BMI 45.0-49.9, adult  Z68.42 V85.42         PLAN:  Ultrasound-guided injection of the right ACJ with triamcinolone performed at visit today. Repeat injection on or after 8/1/25.    Future planning includes - Continue exercise program    Risks and benefits were discussed with patient prior to receiving injection.  Depending on injection type, risks include the possibility of infection, pain, disruptions in blood pressure and blood sugar, and cosmetic deformity at site of injection.    All questions were answered to the best of my ability and all concerns were addressed at this time.    Follow up for above.     This note is dictated using the M*Modal Fluency Direct word recognition program. There are word recognition mistakes that are occasionally missed on review.             [1]   Current Outpatient Medications:     amLODIPine-benazepriL (LOTREL) 10-40 mg per capsule, Take 1 capsule by mouth once daily., Disp: 90 capsule, Rfl: 3    brimonidine 0.2% (ALPHAGAN) 0.2 % Drop, INSTILL 1 DROP INTO BOTH EYES 3 TIMES A DAY, Disp: 30 mL, Rfl: 3    clonazePAM (KLONOPIN) 1 MG tablet, Take 1 tablet (1 mg total) by mouth daily as needed for Anxiety., Disp: 30 tablet, Rfl: 0    dorzolamide (TRUSOPT) 2 % ophthalmic solution, Place 1 drop into both eyes 3 (three) times daily., Disp: 30 mL, Rfl: 4    ergocalciferol (VITAMIN D2) 50,000 unit Cap, TAKE 1 CAPSULE (50,000 UNITS TOTAL) BY MOUTH EVERY 7 DAYS., Disp: 12 capsule, Rfl: 3    latanoprost 0.005 % ophthalmic solution, Place 1 drop into both eyes every evening., Disp: 7.5 mL, Rfl: 4    levocetirizine (XYZAL) 5 MG tablet, Take 1 tablet (5 mg total) by mouth every evening., Disp: 90 tablet, Rfl: 3    metoprolol succinate (TOPROL-XL) 100 MG 24  hr tablet, Take 1 tablet (100 mg total) by mouth once daily., Disp: 90 tablet, Rfl: 3    omeprazole (PRILOSEC) 40 MG capsule, Take 1 capsule (40 mg total) by mouth every 12 (twelve) hours. Take one pill every morning 45 minutes before breakfast, Disp: 180 capsule, Rfl: 3    potassium chloride SA (K-DUR,KLOR-CON) 20 MEQ tablet, Take 1 tablet (20 mEq total) by mouth once daily., Disp: 90 tablet, Rfl: 3    pravastatin (PRAVACHOL) 40 MG tablet, TAKE 1 TABLET BY MOUTH EVERY DAY IN THE EVENING, Disp: 90 tablet, Rfl: 3    predniSONE (DELTASONE) 1 MG tablet, 9mg x7 days --> 8mg x7 days --> 7mg x7 days --> 6mg x14 days --> 5mg x14 days --> 4mg x14 days --> 3mg x14 days --> 2mg x14 days --> 1mg x14 days --> off, Disp: 200 tablet, Rfl: 2    repaglinide (PRANDIN) 0.5 MG tablet, Take 1 tablet (0.5 mg total) by mouth 3 (three) times daily before meals., Disp: 270 tablet, Rfl: 3    tirzepatide 7.5 mg/0.5 mL PnIj, Inject 7.5 mg into the skin every 7 days., Disp: 6 mL, Rfl: 3    tocilizumab (ACTEMRA) 162 mg/0.9 mL injection, Inject 0.9 mLs (162 mg total) into the skin every 7 days., Disp: 3.6 mL, Rfl: 6    traMADoL (ULTRAM) 50 mg tablet, Take 1 tablet (50 mg total) by mouth every 12 (twelve) hours as needed for Pain., Disp: 60 tablet, Rfl: 0    ketoconazole (NIZORAL) 2 % cream, Apply topically 2 (two) times daily. for 10 days, Disp: 60 g, Rfl: 1

## 2025-05-01 NOTE — PROCEDURES
Intermediate Joint Aspiration/Injection: R acromioclavicular    Date/Time: 5/1/2025 3:30 PM    Performed by: Kiley Calix MD  Authorized by: Kiley Calix MD    Consent Done?:  Yes (Verbal)  Indications:  Pain, diagnostic evaluation and arthritis  Site marked: The procedure site was marked    Timeout: Prior to procedure the correct patient, procedure, and site was verified      Location:  Shoulder  Site:  R acromioclavicular  Ultrasonic Guidance for needle placement: Yes (Ultrasound guidance used to avoid neurovascular injury and to increase acuracy.)  Images are saved and documented.    Needle size:  22 G  Approach: Superolateral   Medications:  40 mg triamcinolone acetonide 40 mg/mL (Ropivacaine 0.2% 0.5mL)  Patient tolerance:  Patient tolerated the procedure well with no immediate complications       Additional Comments: TECHNIQUE: Real time ultrasound examination of the right acromioclavicular joint(s) was performed with SonoSite Edge 2, 9-L MHz linear probe(s). Ultrasound guidance was used for needle localization. Images were saved and stored for documentation. Dynamic visualization of the needle was continuous throughout the procedures and maintained in good position.

## 2025-05-05 DIAGNOSIS — M31.5 GIANT CELL ARTERITIS WITH POLYMYALGIA RHEUMATICA: Chronic | ICD-10-CM

## 2025-05-05 DIAGNOSIS — F43.20 GRIEF REACTION: ICD-10-CM

## 2025-05-05 DIAGNOSIS — Z63.6 CAREGIVER STRESS: ICD-10-CM

## 2025-05-05 DIAGNOSIS — F43.21 ADJUSTMENT DISORDER WITH DEPRESSED MOOD: ICD-10-CM

## 2025-05-05 SDOH — SOCIAL DETERMINANTS OF HEALTH (SDOH): DEPENDENT RELATIVE NEEDING CARE AT HOME: Z63.6

## 2025-05-05 NOTE — TELEPHONE ENCOUNTER
No care due was identified.  [facility] Embedded Care Due Messages. Reference number: [id number].   5/05/2025 4:34:35 PM CDT

## 2025-05-06 DIAGNOSIS — F11.90 CHRONIC, CONTINUOUS USE OF OPIOIDS: Primary | ICD-10-CM

## 2025-05-06 RX ORDER — TRAMADOL HYDROCHLORIDE 50 MG/1
50 TABLET, FILM COATED ORAL EVERY 12 HOURS PRN
Qty: 60 TABLET | Refills: 0 | Status: SHIPPED | OUTPATIENT
Start: 2025-05-06

## 2025-05-06 RX ORDER — CLONAZEPAM 1 MG/1
1 TABLET ORAL DAILY PRN
Qty: 30 TABLET | Refills: 0 | Status: SHIPPED | OUTPATIENT
Start: 2025-05-06

## 2025-05-06 NOTE — PROGRESS NOTES
Prescription was sent to the pharmacy.    Patient on chronic opioid treatment and also benzodiazepines.    I ordered follow-up with pain management for evaluation and other alternatives because new regulation from the government for pain management.    Please notify the patient.

## 2025-05-15 NOTE — PROGRESS NOTES
"Date:  5/19/2025    ?  Referring Provider:   Ben Munoz MD    Copies of Letters to the Following:   Ben Munoz MD    Chief Complaint:  I saw Genesis Ugalde at the [facility] for neuro-ophthalmic evaluation.   She is a 69 y.o. female with a history of HTN, HLD, HSV, obesity on Tirzepatide, T2DM, LOYDA, POAG OU, who presents for evaluation of vision in context of GCA.    History:     HPI    Referred: Dr. Munoz    70 y/o female present to Neuro-ophthalmic clinic for GCA evaluation. She   present to clinic for concerns of vision loss OD>OS. She has daily   headaches, mostly right eye related. She is continuing Prednisone 5 mg.   Had episodes of floaters and flashes of lights since resolved. She has   sleep apnea, does not sleep with CPAP anymore. She has jaw claudication   and scalp tenderness. She use an hand magnifier at home. She did go to   [employer].       Eyemeds  Dorzolamide TID OU  Brimonidine TID OU  Latanoprost QHS OU      Last edited by Octavio Dutton on 5/19/2025 12:08 PM.          4/16/2025 Rheumatology  "Initial hx:  - Pt was initially diagnosed with GCA dating back to 2004, had a recurrence 2012 and has been on tocilizumab since. Has had multiple recurrences leading to increased prednisone doses.   - Previous treatments: CYC  - Current treatments: Weekly tocilizumab, was on 30mg prednisone prior to presentation  - Had last been seen by Dr. Waldrop 7/29/2024, at that point had ordered more imaging and increased steroids after having an increase in headaches and temple pain. Had imaging done at that time (MRA of head as well as bilateral temporal artery US) that had shown evidence of bilateral temporal arteritis.   - Increased the steroid dose 12/4 when she was having symptoms of headaches, claudication. Had a good response to the increased steroid dose.      Interval hx:  - Had a high fever, no upper respiratory symptoms, tested negative for COVID. Only fevers and myalgias. " "Did have a sore throat. Body aches. Also had some burning pain in her urine. Took some left over bactrim and it went away - took for three days (three times a day for 3 days).   - Has been on 10mg for at least a few weeks. Had followed the giacta taper the last few months. Feels okay - getting some joint stiffness and hip pains. Mild headaches - some mild pressure. No swelling at the temples. Vision is stable.   - Getting chest pains from reflux  - Gets mouth sores from eating potatoes  - Last dose of tocilizumab was 3/3  - Restarted monjaro in January, has not lost any weight. Stable at about 275-280.  - Shoulders are much better - left is sore from overuse.    "  Jayjay 11/2024  "Lost to F/U glaucoma / ophthalmology clinic from 12/1/2024 to 11/2024   Hospitalized 9/2024 with flare up of GCA and further loss of vision      1. Autoimmune optic neuropathy OU,   - OS 2004  - OD 2012  - unknown antibiody  - several treatments with IV steroid and IVIG  - vision and VF per [place] records  - PO steroid daily - 10 mg a day   - sural nerve and muscle biopsy done - results pending - pt to obtain results form [facility]  - xal qhs ou   - monitor  - rheum monitoring esr/crp  - all previous mri's and lp negative per rheum note  - sed rate and c-reat prot - monitored by rheumatology      Exacerbation - when did not get Actemra - may 2020 - had progression od - stabilized again 9/2020   Exacerbation - 9/2024               Additional loss of vision ou                          Now HM at face od and NLP os      2.  Steroid responder in past  - on chronic steroids for #1  iop good today   Continue xal qhs ou     3. POAG - mild vs OHT ou                            First HVF   2004              First photos   2004              Treatment / Drops started   2004           Family history    neg        Glaucoma meds    Latanoprost ou        H/O adverse rxn to glaucoma drops    none        LASERS    None         GLAUCOMA SURGERIES    none        " "OTHER EYE SURGERIES    none        CDR    0.9 / 0.9 - +++ pallor ou         Tbase    25-27 / 25-27          Tmax    27/27            Ttarget    20/20             HVF    12 test 2004 to 2012  - SALT / IAD od // Ext os                    4 10-2 stim V OD 2012 - 2014 - [facility]               (+ prog from 2012 to 2014 - when followed at [facility])               7- test-  10-2 stim V test done od 12/2014 to 2017 - dense SALT and IAD (+stable)         Gonio    +2-3 ou (very slight narrowing)         CCT    591/612        OCT    7 test 2006 to 2016 - RNFL - dec throughout od // dec thru out  os        HRT    6 test 2004 to 2018 - MR - nl od // off-center os // new baseline 1/25/2016 - nl ou -"too good to be true"        Disc photos    2004 - slides // 2008, 2010, 2012, , 2012, 2012, 2012, 2015, 2017   - OIS      - Ttoday   12/13   - Test done today - IOP     PLAN  CSM - IOP good      Sees Qu[facility]   He is monitoring sed rate and c react prot   Cont actemra  (tocilizumab) 162 mg sc weekly  Cont prilosec (omeprazole)  20mg daily - to protect stomach   Prednisone and any other immunosuppressants per   [facility]      Cont brimonidine tid ou  Cont latanoprost qhs ou  Cont dorzolamide tid ou  - IOP was better      -would rec PI's prior to doing and slt's - has some mild  ant bowing and narrowing   - avoid BB - H/O asthma as a child      NO MORE VF testing - essentially  EXT ou       Photo file updated 4/18/2022     Insurance forms filled out again (yearly)  8/5/2019      F/U 6 months  - [facility] - this is end stage and I don not think there is much that can be done with regards to the poor vision.   Ok to keep the appt she has with neuro ophth in January- made for pt when she was admitted to the hospital in September with flare up    "?  2013 Neuro  "HPI: Genesis Ugalde is a 57 y.o.RH C female with past medical history of HTN, migraine, optic neuritis, presented on 10/23/2013 for neurology opinion of her vision " "problem.  The patient had sudden vision loss of her left eye in 2004, she was diagnosed with optic neuritis, she was put on prednisone since 2004. In 2011, she had sudden right eye vision loss.She had plasmapheresis 10 times in 4 weeks and was put on cyclophosphamide chemo therapy for two rounds after she failed MTX/azathriprine/predisone treatment. Chemo therapy was discontinued after 2 rounds since she started to have bloody urine. She said the treatment did not make any difference for her right eye but she did noticed that after the treatment she have 4 clear spots in her left eye. At the time, she had MRI, which only shows inflammation of her left eye not right eye. She had extensive work up by multiple sclerosis specialist Dr. Velázquez in [facility] and was ruled out multiple sclerosis, she is not sure if she had NMO antibody checked before. She had bilateral temporal artery biopsy before, which were negative. She had one LP and MRI before. LP is negative for multiple sclerosis and MRI only show T2 changes likely 2/2 chronic microvascular disease. Patient has been working with rheumatology, ophthalmology and neurology but still not know the exact etiology of her condition.   According to the patient, she has flu like symptom, with whole body pain, slurred speech every 6-8 weeks, usually vision is not affected. For her lab her ESR is already been elevated around 50's.  Other associated symptoms: tingling of feet and hand, pain of left foot and fatigue. Her symptoms are not affected by heat. Bending her neck does not elicit electrical like shock sensation.  Denies headache, vertigo, incontinence. "  Current Medications[1]  Review of patient's allergies indicates:   Allergen Reactions    Oxycontin [oxycodone] Hallucinations     Immobile     Percocet [oxycodone-acetaminophen] Shortness Of Breath    Percodan [oxycodone hcl-oxycodone-asa] Shortness Of Breath    Seroquel [quetiapine] Shortness Of Breath     Akathisia    " Opioids - morphine analogues Other (See Comments)     Pt states it has no effect on her      Past Medical History:   Diagnosis Date    Anxiety     Asthma     Blind left eye     can see silhouettes in right eye    Cataract     Depression     Diabetes mellitus     Disease of immune system     Giant cell arteritis     Glaucoma     Hypertension     Polymyalgia rheumatica     Uveitis      Past Surgical History:   Procedure Laterality Date    ANKLE SURGERY      CHOLECYSTECTOMY      COLONOSCOPY N/A 9/28/2015    Procedure: COLONOSCOPY;  Surgeon: Alok Dykes MD;  Location: [facility] (4TH FLR);  Service: Endoscopy;  Laterality: N/A;    COLONOSCOPY N/A 9/6/2019    Procedure: COLONOSCOPY;  Surgeon: Alok Dykes MD;  Location: [facility] (4TH FLR);  Service: Endoscopy;  Laterality: N/A;    COLONOSCOPY N/A 11/14/2024    Procedure: COLONOSCOPY;  Surgeon: Alok Dykes MD;  Location: [facility] (4TH FLR);  Service: Endoscopy;  Laterality: N/A;  8/30 BMI: 45.1  Cirrhosis labs prior  Request Jania only, Jania pt  Pt no longer taking Mounjaro  Referral: Bety Perdue MD  extended PEG  inst portal  LW  11/6 Precall completed, pt will come early to have labs drawn.  She states that she is blind but will have her     CYST REMOVAL      right lung    ESOPHAGOGASTRODUODENOSCOPY N/A 3/28/2023    Procedure: EGD (ESOPHAGOGASTRODUODENOSCOPY);  Surgeon: Bharat Franklin MD;  [facility]  Service: Endoscopy;  Laterality: N/A;  cirrhosis-labs prior, varices surveillance  gastroparesis-full liquid diet x3 days, clear liquid diet x1 day prior to procedure  instructions handed to pt and sent to [facility]    HYSTERECTOMY       Family History   Problem Relation Name Age of Onset    Coronary artery disease Mother      Diabetes Mother      Hypertension Mother      Stroke Mother      Alzheimer's disease Mother      Alzheimer's disease Father      Coronary artery disease Father      Emphysema Father       Diabetes Father      Diabetes Sister x2     Kidney disease Sister x2     Coronary artery disease Sister x2     Heart attack Sister x2     Heart attack Brother x4     Coronary artery disease Brother x4     Colon polyps Brother x4         1 foot of colon removed    Stroke Brother x4     Cancer Brother x4 58        stomach    Stomach cancer Brother x4     AVM Brother x4     Hypertension Brother x3     Intellectual disability Brother x3     Hydrocephalus Brother x3     No Known Problems Son x1     Hydrocephalus Paternal Cousin      Glaucoma Neg Hx      Asthma Neg Hx      Celiac disease Neg Hx      Colon cancer Neg Hx      Esophageal cancer Neg Hx      Inflammatory bowel disease Neg Hx      Rectal cancer Neg Hx      Ulcerative colitis Neg Hx      Macular degeneration Neg Hx      Retinal detachment Neg Hx      Strabismus Neg Hx      Amblyopia Neg Hx      Blindness Neg Hx      Cataracts Neg Hx       Social History[2]    Examination:  She was well-appearing. She was alert and oriented. Attention span and concentration were normal. Speech, language, memory, and general knowledge were intact.      Her distance visual acuity without correction was CF @face  in the right eye and LP  in the left eye.  Her near visual acuity without correction was 20/800 in the right eye     She perceived 0/8 OD and 0/8 OS Ishihara color plates correctly. Pupils were brisk to light with a dense Left APD. Ocular ductions were full. Sensory exotropia. There was no nystagmus. Lids were symmetric.     Optic discs appeared pallorous and flat OU. No enlarged cup to disc ratio on my exam OU. Pupillary dilation was not necessary for visualization of the optic disc today.       Laboratories Reviewed:      Latest Reference Range & Units Most Recent   ANDREW Neg <1:160  Negative  1/20/12 10:20   ANDREW Screen Negative <1:160  Negative <1:160  12/4/14 13:12   ds DNA Ab Negative 1:10  Negative 1:10  12/4/14 13:12   Anti-SSA Antibody 0.00 - 19.99 EU 2.24  12/4/14  "13:12   Anti-SSA Interpretation Negative  Negative  12/4/14 13:12   Anti-SSB Antibody 0.00 - 19.99 EU 0.37  12/4/14 13:12   Anti-SSB Interpretation Negative  Negative  12/4/14 13:12   TTG IgA <20 UNITS 2  10/22/15 08:45   Proteinase-3 Ab <0.4 (Negative) U <0.2  9/13/19 08:17   Cytoplasmic Neutrophilic Ab <1:20 Titer <1:20  9/13/19 08:17   MPO <=20 UNITS 3  9/13/19 08:17   MPO Ab NEGATIVE  NEGATIVE  2/24/12 10:30   Perinuclear (P-ANCA) <1:20 Titer <1:20  9/13/19 08:17   CCP Antibodies <5.0 U/mL <0.5  12/4/14 13:12   Complement (C-3) 50 - 180 mg/dL 167  12/4/14 13:12   Complement (C-4) 11 - 44 mg/dL 30  12/4/14 13:12   IgG 650 - 1600 mg/dL 710  12/4/14 13:12   IgM 50 - 300 mg/dL 138  12/4/14 13:12   IgA Level 40 - 350 mg/dL 85  10/22/15 08:45   Protein, Serum 6.0 - 8.0 gm/dl 7.4  10/22/07 09:08   Albumin grams/dl 3.50 - 5.5 gm/dl 4.45  10/22/07 09:08   Alpha-1 grams/dl 0.11 - 0.32 gm/dl 0.16  10/22/07 09:08   Alpha-2 0.50 - 0.95 gm/dl 0.92  10/22/07 09:08   Beta 0.58 - 1.1 gm/dl 0.99  10/22/07 09:08   Gamma 0.50 - 1.50 gm/dl 0.88  10/22/07 09:08   Pathologist Interpretation MELY  REVIEWED  6/17/19 10:42   Immunofix Interp.  SEE COMMENT  6/17/19 10:42   SPE Interp.  See comment for interpretation  10/22/07 09:08   Interleukin 6 pg/mL <2.0  1/27/04 16:59   Mag Ab, Western Blot  See report under "Other Labs".  1/20/12 10:21   NMO Interpretive Comments  See Comments  1/20/12 10:21   NMO/AQP4-IgG LONG <1.6 U/mL <1.6  1/20/12 10:21   NMO-IgG, Serum Negative  Negative  3/4/11 09:24   Rheumatoid Factor 0.0 - 15.0 IU/mL 10.0  12/4/14 13:12       I personally reviewed the above labs.  ?  Neuroimaging Reviewed:     9/2024 MRI head and orbits w/wo contrast  Orbits/Optic Nerves:  No abnormal signal or enhancement of the right or left globe, pre-septal soft tissues, intra-orbital soft tissues, or optic pathway.     Remainder of the Intracranial Compartment:     Ventricles and sulci are normal in size for age without evidence of " hydrocephalus. No extra-axial blood or fluid collections.     Moderate involutional changes with chronic microvascular ischemic changes in the periventricular white matter.  Small focus of susceptibility in the right parietal subcortical white matter suggesting minimal remote blood products.  No acute hemorrhage.  No significant susceptibility elsewhere.     No mass lesion, acute hemorrhage, edema, or acute infarct. No abnormal enhancement.     Normal vascular flow voids are preserved.     Recommend ophthalmology follow-up if symptoms persist.     Skull/Extracranial Contents (limited evaluation): Bone marrow signal intensity is normal.     Impression:     1. No acute intracranial process.  2. Involutional changes with chronic microvascular ischemic changes.  3. No acute process of the orbits or globes  4. See above comments also.     9/2024 MRA temporal artery protocol  FINDINGS:  The visualized internal carotid arteries are normal in course and caliber.     The visualized vertebrobasilar system is unremarkable.     The ACAs, MCAs and PCAs appear within normal limits.  No high-grade stenosis, major branch occlusion, or intracranial aneurysm identified.     Vessel wall imaging demonstrates no significant mural thickening or enhancement of the superficial temporal arteries to specifically indicate active vascular inflammation.     Impression:     No compelling MR evidence of vascular inflammation involving the superficial cranial arteries.    8/2024 MRA head for temporal artery  MRA head:     Anterior circulation:  The bilateral distal cervical, [place], cavernous and supraclinoid segments of the ICA's and the visualized bilateral anterior and middle cerebral arteries are patent without evidence for significant focal stenosis or aneurysm.     Posterior circulation: Distal vertebral arteries, basilar artery and posterior cerebral arteries are patent without significant focal stenosis.     The superficial temporal  arteries are identified bilaterally the the right is somewhat diffusely small in caliber..  There is component of abnormal mural enhancement involving the supratentorial temporal arteries which is more robust on the left measuring approximately 1.4 mm in thickness image 38 series 2.  Overall concerning for active temporal arteritis in light of history.     Impression:     Abnormal mural enhancement associated with the superficial temporal arteries greater on the left concerning for active arteritis which corresponds to abnormality seen on ultrasound.  Clinical correlation advised    7/2024 US temporal artery  Right temporal artery: Right temporal artery is small in caliber with positive halo sign.  Peak systolic velocity of 18 cm/sec.  Normal waveform.     Right axillary artery: Vessel wall appears thickened with positive halo sign.  Peak systolic velocity of 115 cm/sec.  Normal waveform.     Left temporal artery: Left temporal artery is small in caliber with positive halo sign.  Peak systolic velocity of 20 cm/sec.  Normal waveform.     Left axillary artery: Vessel wall appears thickened with positive halo sign.  Peak systolic velocity of 112 cm/sec.  Normal waveform.     Impression:     Sonographic evidence of temporal arteritis, as above.  ?  Ocular Imaging, Photos, Records Reviewed:     OCT RNFL Today 5/19/2025:   Right Eye - Average RNFL 35 global thinning   Left Eye - Average RNFL 28 global thinning     Normal macular architecture OU, thinning OU    Visual Field Test 24-2 OU Today 5/19/2025: Right Eye - fixation losses 0/15, false positives 0%, false negatives n/a%, MD -28.76dB, Impression OD: dense generalized depression with small island of vision just inferior to central fixation . Left Eye -unable, NLP  ?  Impression:  Genesis Ugalde has history of HTN, HLD, HSV, obesity on Tirzepatide, T2DM, LOYDA, POAG OU, who presents for evaluation of vision in context of GCA on prednisone and tocilizumab. They report  in 2004 having painless rapid vision loss of the left eye over 1-2 days. There was concern at that time for possible demyelinating optic neuritis but workup for MS, NMO, MOG has been negative. Since then she was diagnosed with temporal arteritis which is strongly evidenced by her recent flare in 9/2024 with positive halo sign on temporal artery ultrasound. She has followed with Dr. Waldrop for management of her temporal arteritis and she is currently on tocilizumab and prednisone. Neuro-ophthalmologic examination was notable for CF at face OD and LPO OS visual acuities, absent color vision, left APD, sensory exotropia. OCT with global peripapillary thinning OU, unchanged since 2018 on review of prior testing.     She reports that her highest IOP has been 20 or low 20s in the past. Per Dr. Ro's note, perhaps 27 tmax. She has been managed on three IOP lowering drops. Interestingly, on my assessment I do not appreciate any significant cupping of the optic discs. She would like a second opinion on her glaucoma diagnosis from another provider which is reasonable.     Her optic nerves are flat and pallorous today.     Her first episode of vision loss OS in 2004 is somewhat suspicious for NAION by report. She has LOYDA but is not currently on CPAP. I encouraged her to get her CPAP machine replaced (had been recalled 2 years ago) and wear it for LOYDA management.   ?  Plan:  1. Follow up in glaucoma clinic ([facility] on return from maternity leave)  2. Follow up in rheumatology clinic as planned    Follow-up:  I will see her in follow-up in 6 months  OCT and HVF  ?  Visit Checklist (as applicable):  1. Status of new and prior symptoms discussed? yes  2. Neuroimaging reviewed/ ordered as appropriate? yes  3. Ocular imaging and photos reviewed/ ordered as appropriate? yes  4. Plan for work-up and treatment discussed with patient? yes  5. Potential medication side-effects and monitoring plan discussed? yes  6. Review of outside  medical records was performed and pertinent details are summarized in the HPI above? yes    Time spent on this encounter: 45 minutes. This includes face to face time and non-face to face time preparing to see the patient (eg, review of tests), obtaining and/or reviewing separately obtained history, documenting clinical information in the electronic or other health record, independently interpreting results and communicating results to the patient/family/caregiver, or care coordinator.      ZAIRA Burris  Neuro-Ophthalmology Consultant         [1]   Current Outpatient Medications   Medication Sig Dispense Refill    amLODIPine-benazepriL (LOTREL) 10-40 mg per capsule Take 1 capsule by mouth once daily. 90 capsule 3    brimonidine 0.2% (ALPHAGAN) 0.2 % Drop INSTILL 1 DROP INTO BOTH EYES 3 TIMES A DAY 30 mL 3    clonazePAM (KLONOPIN) 1 MG tablet Take 1 tablet (1 mg total) by mouth daily as needed for Anxiety. 30 tablet 0    dorzolamide (TRUSOPT) 2 % ophthalmic solution Place 1 drop into both eyes 3 (three) times daily. 30 mL 4    ergocalciferol (VITAMIN D2) 50,000 unit Cap TAKE 1 CAPSULE (50,000 UNITS TOTAL) BY MOUTH EVERY 7 DAYS. 12 capsule 3    ketoconazole (NIZORAL) 2 % cream Apply topically 2 (two) times daily. for 10 days 60 g 1    latanoprost 0.005 % ophthalmic solution Place 1 drop into both eyes every evening. 7.5 mL 4    levocetirizine (XYZAL) 5 MG tablet Take 1 tablet (5 mg total) by mouth every evening. 90 tablet 3    metoprolol succinate (TOPROL-XL) 100 MG 24 hr tablet Take 1 tablet (100 mg total) by mouth once daily. 90 tablet 3    omeprazole (PRILOSEC) 40 MG capsule Take 1 capsule (40 mg total) by mouth every 12 (twelve) hours. Take one pill every morning 45 minutes before breakfast 180 capsule 3    potassium chloride SA (K-DUR,KLOR-CON) 20 MEQ tablet Take 1 tablet (20 mEq total) by mouth once daily. 90 tablet 3    pravastatin (PRAVACHOL) 40 MG tablet TAKE 1 TABLET BY MOUTH EVERY DAY IN THE EVENING 90  tablet 3    predniSONE (DELTASONE) 1 MG tablet 9mg x7 days --> 8mg x7 days --> 7mg x7 days --> 6mg x14 days --> 5mg x14 days --> 4mg x14 days --> 3mg x14 days --> 2mg x14 days --> 1mg x14 days --> off 200 tablet 2    repaglinide (PRANDIN) 0.5 MG tablet Take 1 tablet (0.5 mg total) by mouth 3 (three) times daily before meals. 270 tablet 3    tirzepatide 7.5 mg/0.5 mL PnIj Inject 7.5 mg into the skin every 7 days. 6 mL 3    tocilizumab (ACTEMRA) 162 mg/0.9 mL injection Inject 0.9 mLs (162 mg total) into the skin every 7 days. 3.6 mL 6    traMADoL (ULTRAM) 50 mg tablet Take 1 tablet (50 mg total) by mouth every 12 (twelve) hours as needed for Pain. 60 tablet 0     No current facility-administered medications for this visit.   [2]   Social History  Socioeconomic History    Marital status: Single   Tobacco Use    Smoking status: Never     Passive exposure: Never    Smokeless tobacco: Never   Substance and Sexual Activity    Alcohol use: No     Alcohol/week: 0.0 standard drinks of alcohol    Drug use: Never    Sexual activity: Not Currently     Social Drivers of Health     Financial Resource Strain: Low Risk  (12/25/2024)    Overall Financial Resource Strain (CARDIA)     Difficulty of Paying Living Expenses: Not very hard   Food Insecurity: No Food Insecurity (12/25/2024)    Hunger Vital Sign     Worried About Running Out of Food in the Last Year: Never true     Ran Out of Food in the Last Year: Never true   Transportation Needs: No Transportation Needs (9/7/2024)    TRANSPORTATION NEEDS     Transportation : No   Physical Activity: Insufficiently Active (12/25/2024)    Exercise Vital Sign     Days of Exercise per Week: 1 day     Minutes of Exercise per Session: 20 min   Stress: Stress Concern Present (12/25/2024)    [employer] - Occupational Stress Questionnaire     Feeling of Stress : Very much   Housing Stability: Unknown (12/25/2024)    Housing Stability Vital Sign     Unable to Pay for  Housing in the Last Year: No     Homeless in the Last Year: No

## 2025-05-17 DIAGNOSIS — E66.01 MORBID OBESITY WITH BMI OF 40.0-44.9, ADULT: ICD-10-CM

## 2025-05-17 DIAGNOSIS — H54.10 BLINDNESS AND LOW VISION: ICD-10-CM

## 2025-05-17 DIAGNOSIS — E55.9 VITAMIN D DEFICIENCY: ICD-10-CM

## 2025-05-17 DIAGNOSIS — H20.9 UVEITIS: ICD-10-CM

## 2025-05-17 DIAGNOSIS — H46.9 OPTIC NEURITIS: ICD-10-CM

## 2025-05-17 DIAGNOSIS — K76.0 FATTY LIVER: ICD-10-CM

## 2025-05-17 DIAGNOSIS — M31.5 GIANT CELL ARTERITIS WITH POLYMYALGIA RHEUMATICA: ICD-10-CM

## 2025-05-17 DIAGNOSIS — R10.13 EPIGASTRIC ABDOMINAL PAIN: ICD-10-CM

## 2025-05-17 DIAGNOSIS — H54.7 VISUAL LOSS: ICD-10-CM

## 2025-05-19 ENCOUNTER — CLINICAL SUPPORT (OUTPATIENT)
Dept: OPHTHALMOLOGY | Facility: CLINIC | Age: 70
End: 2025-05-19
Payer: MEDICARE

## 2025-05-19 ENCOUNTER — OFFICE VISIT (OUTPATIENT)
Dept: OPHTHALMOLOGY | Facility: CLINIC | Age: 70
End: 2025-05-19
Payer: MEDICARE

## 2025-05-19 DIAGNOSIS — H40.1131 PRIMARY OPEN ANGLE GLAUCOMA OF BOTH EYES, MILD STAGE: ICD-10-CM

## 2025-05-19 DIAGNOSIS — M31.5 GIANT CELL ARTERITIS WITH POLYMYALGIA RHEUMATICA: Primary | ICD-10-CM

## 2025-05-19 DIAGNOSIS — H54.7 VISUAL LOSS: ICD-10-CM

## 2025-05-19 PROCEDURE — 1157F ADVNC CARE PLAN IN RCRD: CPT | Mod: CPTII,HCNC,S$GLB, | Performed by: STUDENT IN AN ORGANIZED HEALTH CARE EDUCATION/TRAINING PROGRAM

## 2025-05-19 PROCEDURE — 3051F HG A1C>EQUAL 7.0%<8.0%: CPT | Mod: CPTII,HCNC,S$GLB, | Performed by: STUDENT IN AN ORGANIZED HEALTH CARE EDUCATION/TRAINING PROGRAM

## 2025-05-19 PROCEDURE — 92133 CPTRZD OPH DX IMG PST SGM ON: CPT | Mod: HCNC,S$GLB,, | Performed by: STUDENT IN AN ORGANIZED HEALTH CARE EDUCATION/TRAINING PROGRAM

## 2025-05-19 PROCEDURE — 1101F PT FALLS ASSESS-DOCD LE1/YR: CPT | Mod: CPTII,HCNC,S$GLB, | Performed by: STUDENT IN AN ORGANIZED HEALTH CARE EDUCATION/TRAINING PROGRAM

## 2025-05-19 PROCEDURE — 1126F AMNT PAIN NOTED NONE PRSNT: CPT | Mod: CPTII,HCNC,S$GLB, | Performed by: STUDENT IN AN ORGANIZED HEALTH CARE EDUCATION/TRAINING PROGRAM

## 2025-05-19 PROCEDURE — 92083 EXTENDED VISUAL FIELD XM: CPT | Mod: HCNC,S$GLB,, | Performed by: STUDENT IN AN ORGANIZED HEALTH CARE EDUCATION/TRAINING PROGRAM

## 2025-05-19 PROCEDURE — 99999 PR PBB SHADOW E&M-EST. PATIENT-LVL II: CPT | Mod: PBBFAC,HCNC,, | Performed by: STUDENT IN AN ORGANIZED HEALTH CARE EDUCATION/TRAINING PROGRAM

## 2025-05-19 PROCEDURE — 3060F POS MICROALBUMINURIA REV: CPT | Mod: CPTII,HCNC,S$GLB, | Performed by: STUDENT IN AN ORGANIZED HEALTH CARE EDUCATION/TRAINING PROGRAM

## 2025-05-19 PROCEDURE — 1160F RVW MEDS BY RX/DR IN RCRD: CPT | Mod: CPTII,HCNC,S$GLB, | Performed by: STUDENT IN AN ORGANIZED HEALTH CARE EDUCATION/TRAINING PROGRAM

## 2025-05-19 PROCEDURE — 1159F MED LIST DOCD IN RCRD: CPT | Mod: CPTII,HCNC,S$GLB, | Performed by: STUDENT IN AN ORGANIZED HEALTH CARE EDUCATION/TRAINING PROGRAM

## 2025-05-19 PROCEDURE — 3066F NEPHROPATHY DOC TX: CPT | Mod: CPTII,HCNC,S$GLB, | Performed by: STUDENT IN AN ORGANIZED HEALTH CARE EDUCATION/TRAINING PROGRAM

## 2025-05-19 PROCEDURE — 99215 OFFICE O/P EST HI 40 MIN: CPT | Mod: HCNC,S$GLB,, | Performed by: STUDENT IN AN ORGANIZED HEALTH CARE EDUCATION/TRAINING PROGRAM

## 2025-05-19 PROCEDURE — 3288F FALL RISK ASSESSMENT DOCD: CPT | Mod: CPTII,HCNC,S$GLB, | Performed by: STUDENT IN AN ORGANIZED HEALTH CARE EDUCATION/TRAINING PROGRAM

## 2025-05-19 PROCEDURE — 4010F ACE/ARB THERAPY RXD/TAKEN: CPT | Mod: CPTII,HCNC,S$GLB, | Performed by: STUDENT IN AN ORGANIZED HEALTH CARE EDUCATION/TRAINING PROGRAM

## 2025-05-19 NOTE — PROGRESS NOTES
oct/hvf ou done/hvf od only/ou/rel/fix/coop.good patient chart checked for allergies/ou plano/ej.        Assessment /Plan     For exam results, see Encounter Report.    There are no diagnoses linked to this encounter.

## 2025-05-20 RX ORDER — ERGOCALCIFEROL 1.25 MG/1
50000 CAPSULE ORAL
Qty: 12 CAPSULE | Refills: 3 | Status: SHIPPED | OUTPATIENT
Start: 2025-05-20

## 2025-05-29 ENCOUNTER — TELEPHONE (OUTPATIENT)
Dept: PHARMACY | Facility: CLINIC | Age: 70
End: 2025-05-29
Payer: MEDICARE

## 2025-05-30 NOTE — TELEPHONE ENCOUNTER
[facility]/Population Health Chart Review & Patient Outreach Details For Medication Adherence Project    Reason for Outreach Encounter: 3rd Party payor non-compliance report ([employer], [employer], C, etc)  2.  Patient Outreach Method: Reviewed patient chart  and InfoReach message  3.   Medication in question:    Diabetes Medications              repaglinide (PRANDIN) 0.5 MG tablet Take 1 tablet (0.5 mg total) by mouth 3 (three) times daily before meals.    tirzepatide 7.5 mg/0.5 mL PnIj Inject 7.5 mg into the skin every 7 days.                 repaglinide  last filled  2/22 for 90 day supply      4.  Reviewed and or Updates Made To: Patient Chart  5. Outreach Outcomes and/or actions taken: Sent inquiry to patient: Waiting for response  Additional Notes:

## 2025-06-02 DIAGNOSIS — I10 ESSENTIAL HYPERTENSION: ICD-10-CM

## 2025-06-02 NOTE — TELEPHONE ENCOUNTER
Care Due:                  Date            Visit Type   Department     Provider  --------------------------------------------------------------------------------                                EP -                              PRIMARY      [facility]  Last Visit: 02-      CARE ([facility])   NINA Shen                              EP -                              PRIMARY      [facility]  Next Visit: 08-      CARE ([facility])   NINA Shen                                                            Last  Test          Frequency    Reason                     Performed    Due Date  --------------------------------------------------------------------------------    HBA1C.......  6 months...  repaglinide, tirzepatide.  02- 08-    [facility] Embedded Care Due Messages. Reference number: [id number].   6/02/2025 1:33:09 PM CDT

## 2025-06-03 RX ORDER — AMLODIPINE AND BENAZEPRIL HYDROCHLORIDE 10; 40 MG/1; MG/1
1 CAPSULE ORAL DAILY
Qty: 90 CAPSULE | Refills: 2 | Status: SHIPPED | OUTPATIENT
Start: 2025-06-03

## 2025-06-03 NOTE — TELEPHONE ENCOUNTER
Provider Staff:  Action required for this patient    Requires labs      Please see care gap opportunities below in Care Due Message.    Thanks!  [facility]     Appointments      Date Provider   Last Visit   2/11/2025 Ayad Shen MD   Next Visit   8/11/2025 Ayad Shen MD     Refill Decision Note   Genesis Ugalde  is requesting a refill authorization.  Brief Assessment and Rationale for Refill:  Approve     Medication Therapy Plan:         Comments:     Note composed:7:15 AM 06/03/2025

## 2025-06-13 DIAGNOSIS — B35.6 TINEA CRURIS: ICD-10-CM

## 2025-06-13 NOTE — TELEPHONE ENCOUNTER
Refill Routing Note   Medication(s) are not appropriate for processing by [facility] for the following reason(s):        Outside of protocol    [facility] action(s):  Route               Appointments  past 12m or future 3m with PCP    Date Provider   Last Visit   2/11/2025 Ayad Shen MD   Next Visit   8/11/2025 Ayad Shen MD   ED visits in past 90 days: 0        Note composed:5:12 PM 06/13/2025

## 2025-06-14 RX ORDER — KETOCONAZOLE 20 MG/G
CREAM TOPICAL 2 TIMES DAILY
Qty: 60 G | Refills: 1 | Status: SHIPPED | OUTPATIENT
Start: 2025-06-14 | End: 2025-06-24

## 2025-06-18 ENCOUNTER — LAB VISIT (OUTPATIENT)
Dept: LAB | Facility: HOSPITAL | Age: 70
End: 2025-06-18
Attending: INTERNAL MEDICINE
Payer: MEDICARE

## 2025-06-18 ENCOUNTER — RESULTS FOLLOW-UP (OUTPATIENT)
Dept: RHEUMATOLOGY | Facility: CLINIC | Age: 70
End: 2025-06-18

## 2025-06-18 DIAGNOSIS — M31.6 GCA (GIANT CELL ARTERITIS): ICD-10-CM

## 2025-06-18 LAB
ABSOLUTE EOSINOPHIL (OHS): 0.26 K/UL
ABSOLUTE MONOCYTE (OHS): 0.7 K/UL (ref 0.3–1)
ABSOLUTE NEUTROPHIL COUNT (OHS): 5.36 K/UL (ref 1.8–7.7)
ALBUMIN SERPL BCP-MCNC: 4.3 G/DL (ref 3.5–5.2)
ALP SERPL-CCNC: 47 UNIT/L (ref 40–150)
ALT SERPL W/O P-5'-P-CCNC: 64 UNIT/L (ref 10–44)
ANION GAP (OHS): 7 MMOL/L (ref 8–16)
AST SERPL-CCNC: 67 UNIT/L (ref 11–45)
BASOPHILS # BLD AUTO: 0.04 K/UL
BASOPHILS NFR BLD AUTO: 0.4 %
BILIRUB SERPL-MCNC: 2.3 MG/DL (ref 0.1–1)
BUN SERPL-MCNC: 9 MG/DL (ref 8–23)
CALCIUM SERPL-MCNC: 9.9 MG/DL (ref 8.7–10.5)
CHLORIDE SERPL-SCNC: 112 MMOL/L (ref 95–110)
CO2 SERPL-SCNC: 22 MMOL/L (ref 23–29)
CREAT SERPL-MCNC: 0.8 MG/DL (ref 0.5–1.4)
CRP SERPL-MCNC: <0.3 MG/L
ERYTHROCYTE [DISTWIDTH] IN BLOOD BY AUTOMATED COUNT: 14 % (ref 11.5–14.5)
ERYTHROCYTE [SEDIMENTATION RATE] IN BLOOD BY PHOTOMETRIC METHOD: <2 MM/HR
GFR SERPLBLD CREATININE-BSD FMLA CKD-EPI: >60 ML/MIN/1.73/M2
GLUCOSE SERPL-MCNC: 104 MG/DL (ref 70–110)
HCT VFR BLD AUTO: 43.5 % (ref 37–48.5)
HGB BLD-MCNC: 14.3 GM/DL (ref 12–16)
IMM GRANULOCYTES # BLD AUTO: 0.05 K/UL (ref 0–0.04)
IMM GRANULOCYTES NFR BLD AUTO: 0.5 % (ref 0–0.5)
LYMPHOCYTES # BLD AUTO: 2.69 K/UL (ref 1–4.8)
MCH RBC QN AUTO: 31 PG (ref 27–31)
MCHC RBC AUTO-ENTMCNC: 32.9 G/DL (ref 32–36)
MCV RBC AUTO: 94 FL (ref 82–98)
NUCLEATED RBC (/100WBC) (OHS): 0 /100 WBC
PLATELET # BLD AUTO: 205 K/UL (ref 150–450)
PMV BLD AUTO: 11.5 FL (ref 9.2–12.9)
POTASSIUM SERPL-SCNC: 3.9 MMOL/L (ref 3.5–5.1)
PROT SERPL-MCNC: 6.5 GM/DL (ref 6–8.4)
RBC # BLD AUTO: 4.61 M/UL (ref 4–5.4)
RELATIVE EOSINOPHIL (OHS): 2.9 %
RELATIVE LYMPHOCYTE (OHS): 29.6 % (ref 18–48)
RELATIVE MONOCYTE (OHS): 7.7 % (ref 4–15)
RELATIVE NEUTROPHIL (OHS): 58.9 % (ref 38–73)
SODIUM SERPL-SCNC: 141 MMOL/L (ref 136–145)
WBC # BLD AUTO: 9.1 K/UL (ref 3.9–12.7)

## 2025-06-18 PROCEDURE — 80053 COMPREHEN METABOLIC PANEL: CPT | Mod: HCNC

## 2025-06-18 PROCEDURE — 85025 COMPLETE CBC W/AUTO DIFF WBC: CPT | Mod: HCNC

## 2025-06-18 PROCEDURE — 36415 COLL VENOUS BLD VENIPUNCTURE: CPT | Mod: HCNC,PO

## 2025-06-18 PROCEDURE — 86140 C-REACTIVE PROTEIN: CPT | Mod: HCNC

## 2025-06-18 PROCEDURE — 85652 RBC SED RATE AUTOMATED: CPT | Mod: HCNC

## 2025-06-20 ENCOUNTER — TELEPHONE (OUTPATIENT)
Dept: SPORTS MEDICINE | Facility: CLINIC | Age: 70
End: 2025-06-20
Payer: MEDICARE

## 2025-06-20 ENCOUNTER — TELEPHONE (OUTPATIENT)
Dept: HEPATOLOGY | Facility: CLINIC | Age: 70
End: 2025-06-20
Payer: MEDICARE

## 2025-06-20 DIAGNOSIS — E11.39 TYPE 2 DIABETES MELLITUS WITH OTHER OPHTHALMIC COMPLICATION, WITHOUT LONG-TERM CURRENT USE OF INSULIN: ICD-10-CM

## 2025-06-20 DIAGNOSIS — K74.60 LIVER CIRRHOSIS SECONDARY TO NASH: Primary | ICD-10-CM

## 2025-06-20 DIAGNOSIS — E66.01 MORBID OBESITY: ICD-10-CM

## 2025-06-20 DIAGNOSIS — D18.03 HEPATIC HEMANGIOMA: ICD-10-CM

## 2025-06-20 DIAGNOSIS — Z85.05 ENCOUNTER FOR FOLLOW-UP SURVEILLANCE OF LIVER CANCER: ICD-10-CM

## 2025-06-20 DIAGNOSIS — R74.8 ELEVATED LIVER ENZYMES: ICD-10-CM

## 2025-06-20 DIAGNOSIS — K75.81 LIVER CIRRHOSIS SECONDARY TO NASH: Primary | ICD-10-CM

## 2025-06-20 DIAGNOSIS — I15.2 HYPERTENSION ASSOCIATED WITH DIABETES: ICD-10-CM

## 2025-06-20 DIAGNOSIS — Z08 ENCOUNTER FOR FOLLOW-UP SURVEILLANCE OF LIVER CANCER: ICD-10-CM

## 2025-06-20 DIAGNOSIS — E78.5 HYPERLIPIDEMIA ASSOCIATED WITH TYPE 2 DIABETES MELLITUS: ICD-10-CM

## 2025-06-20 DIAGNOSIS — E11.59 HYPERTENSION ASSOCIATED WITH DIABETES: ICD-10-CM

## 2025-06-20 DIAGNOSIS — R16.0 HEPATOMEGALY: ICD-10-CM

## 2025-06-20 DIAGNOSIS — E11.69 HYPERLIPIDEMIA ASSOCIATED WITH TYPE 2 DIABETES MELLITUS: ICD-10-CM

## 2025-06-20 NOTE — TELEPHONE ENCOUNTER
Called patient and scheduled follow up US on 8/4 at [facility] at 9:30am. Patient verbalized that she agree with her appointment. Mailed appointment information.

## 2025-06-20 NOTE — TELEPHONE ENCOUNTER
Called pt to advise to arrive 15 mins early for appt due to clinic move to 4th floor. LVM.    Maria D Miranda MS, OTC  Clinical Assistant to Dr. Kiley Calix

## 2025-06-20 NOTE — TELEPHONE ENCOUNTER
Copied from CRM #[id number]. Topic: Appointments - Appointment Scheduling  >> Jun 20, 2025 10:53 AM Manuel wrote:  Consult/Advisory     Name Of Caller: pt         Contact Preference: [phone]       Nature of call: Patient is scheduled for 9/18 and would like to know if US and labs are needed prior too appt. Please call to advise thank you

## 2025-06-23 NOTE — PROGRESS NOTES
[facility] - New Patient Evaluation    Referred by: Dr. Ayad Sims*   Reason for referral: Chronic, continuous use of opioids     CC:   Chief Complaint   Patient presents with    Shoulder Pain     Right     Headache    Low-back Pain     Right side           No data to display                Subjective 06/23/2025:   Genesis Ugalde is a 69 y.o. female who presents complaining of headache, neck, right shoulder, hips back, liver pain.    CHIEF COMPLAINT:  Patient presents for initial consultation regarding chronic pain management.    HPI:  Patient presents to the clinic with multiple medical issues, including Stage IV liver disease, giant cell arteritis, and right shoulder pain. She reports being referred by her primary care physician, Dr. Micah Palma, though she is unsure of the exact reason for the referral.    She describes severe daily headaches due to giant cell arteritis, with visible swelling on the side of her head where blood vessels are constricted. Intensity varies. She takes tramadol sparingly for pain management, primarily for these headaches and liver pain. She receives a 45-pill prescription that lasts 60 to 90 days, taking one or two tablets a day, not every day.    She reports right shoulder pain from a torn rotator cuff, for which she sees Dr. Moran for injections. She also mentions spinal damage from a fall, with some developing arthritis.    She has experienced significant vision loss since September, when she was hospitalized due to a flare-up affecting her kidneys and liver. She can now only see silhouettes without details.    In the past six months, she was prescribed Lyrica but found it too strong, causing her to experience balance issues even when taken at night. She was also given gabapentin, which she reports had no effect.    She currently takes clonazepam (Klonopin) as needed for anxiety, prescribed by Dr. Micah Palma. She takes it infrequently, usually  in quarter-tablet doses when she feels anxious.    She mentions receiving Xgeva injections and using Mounjaro, though the reasons for these treatments are not specified.    She denies using Xanax. She denies any history of alcoholism despite family history.    PREVIOUS TREATMENTS:  Patient has received shoulder injections, which have been beneficial.    IMAGING:  An MRA of the neck was performed, showing no stenosis.    MEDICATIONS:  Patient is on Tramadol, taking 45 tablets every 60-90 days, typically 1 tablet per day or less frequently for pain management, which alleviates her pain. She discontinued Lyrica due to side effects, specifically balance issues. Gabapentin was ineffective for her. She takes Clonazepam as needed for anxiety, infrequently in quarter-tablet doses. Patient is also on Xgeva (denosumab) and Mounjaro (tirzepatide).    FAMILY HISTORY:  Family history is significant for alcoholism.    SOCIAL HISTORY:  Patient comes from a family with a history of alcoholism. She attributes her Stage IV liver disease to dietary habits. Patient reports no current alcohol use. She is a [employer].    ROS:  General: -fever, -chills, -fatigue, -weight gain, -weight loss  Eyes: -vision changes, -redness, -discharge, +loss of vision  ENT: -ear pain, -nasal congestion, -sore throat  Cardiovascular: -chest pain, -palpitations, -lower extremity edema  Respiratory: -cough, -shortness of breath  Gastrointestinal: +abdominal pain, -nausea, -vomiting, -diarrhea, -constipation, -blood in stool  Genitourinary: -dysuria, -hematuria, -frequency  Musculoskeletal: +joint pain, -muscle pain, +limb pain, +back pain  Skin: -rash, -lesion  Neurological: +headache, -dizziness, -numbness, -tingling  Psychiatric: +anxiety, -depression, -sleep difficulty     Initial Pain Assessment:  Location: headache, neck, right shoulder, hips back, liver pain   Onset (Approx Date Pain started): 2004  Current Pain Score: 2/10  Daily Pain of Range:  2-7/10  Quality: Aching, Throbbing, Deep, and Sharp  Radiation: head , neck, right shoulder , hip,  back  Worsened by: nothing in particular  Improved by: nothing     Patient denies night fever/night sweats, urinary incontinence, bowel incontinence, significant weight loss, significant motor weakness, and loss of sensations.      Previous Interventions:  -     Previous Therapies:  PT/OT: no   Chiropractor:   HEP:   Relevant Surgery: no     Previous Medications:   - Tylenol or NSAIDS:   - Muscle Relaxants:    - TCAs:   - SNRIs:   - Topicals:   - Anticonvulsants:    - Opioids: Tramadol  - Adjuvants:     Current Pain Medications:  Tramadol     Anticoagulation:     History:  Current medications, allergies, medical history, surgical history,   family history, and social history were reviewed in the chart as marked.    Full Medication List:  Current Medications[1]     Allergies:  Oxycontin [oxycodone], Percocet [oxycodone-acetaminophen], Percodan [oxycodone hcl-oxycodone-asa], Seroquel [quetiapine], and Opioids - morphine analogues     Medical History:   has a past medical history of Anxiety, Asthma, Blind left eye, Cataract, Depression, Diabetes mellitus, Disease of immune system, Giant cell arteritis, Glaucoma, Hypertension, Polymyalgia rheumatica, and Uveitis.    Surgical History:   has a past surgical history that includes Cyst Removal; Hysterectomy; Cholecystectomy; Ankle surgery; Colonoscopy (N/A, 9/28/2015); Colonoscopy (N/A, 9/6/2019); Esophagogastroduodenoscopy (N/A, 3/28/2023); and Colonoscopy (N/A, 11/14/2024).    Family History:  family history includes AVM in her brother; Alzheimer's disease in her father and mother; Cancer (age of onset: 58) in her brother; Colon polyps in her brother; Coronary artery disease in her brother, father, mother, and sister; Diabetes in her father, mother, and sister; Emphysema in her father; Heart attack in her brother and sister; Hydrocephalus in her brother and paternal cousin;  "Hypertension in her brother and mother; Intellectual disability in her brother; Kidney disease in her sister; No Known Problems in her son; Stomach cancer in her brother; Stroke in her brother and mother.    Social History:   reports that she has never smoked. She has never been exposed to tobacco smoke. She has never used smokeless tobacco. She reports that she does not drink alcohol and does not use drugs.    Physical Exam:  Vitals:    06/24/25 1001   BP: 133/83   Pulse: 109   Weight: 125.3 kg (276 lb 3.8 oz)   Height: 5' 4" (1.626 m)   PainSc:   2   PainLoc: Back       GEN: No acute distress. Calm, comfortable  HENT: Normocephalic, atraumatic, moist mucous membranes  EYE: Anicteric sclera, non-injected.   CV: Non-diaphoretic.   RESP: Breathing comfortably. Chest expansion symmetric.  PSYCH: Pleasant mood and appropriate affect. Recent and remote memory intact.     Imaging:      Labs:  BMP  Lab Results   Component Value Date     06/18/2025    K 3.9 06/18/2025     (H) 06/18/2025    CO2 22 (L) 06/18/2025    BUN 9 06/18/2025    CREATININE 0.8 06/18/2025    CALCIUM 9.9 06/18/2025    ANIONGAP 7 (L) 06/18/2025    EGFRNORACEVR >60 06/18/2025     Lab Results   Component Value Date    ALT 64 (H) 06/18/2025    AST 67 (H) 06/18/2025    GGT 73 (H) 10/18/2021    ALKPHOS 47 06/18/2025    BILITOT 2.3 (H) 06/18/2025     Lab Results   Component Value Date    WBC 9.10 06/18/2025    HGB 14.3 06/18/2025    HCT 43.5 06/18/2025    MCV 94 06/18/2025     06/18/2025           Assessment:  Problem List Items Addressed This Visit    None  Visit Diagnoses         Chronic bilateral low back pain without sciatica    -  Primary    Relevant Orders    X-Ray Lumbar Spine Ap Lateral w/Flex Ext      Chronic, continuous use of opioids        Relevant Orders    X-Ray Lumbar Spine Ap Lateral w/Flex Ext              06/23/2025 - Genesis RIDLEY Aftab is a 69 y.o. female who  has a past medical history of Anxiety, Asthma, Blind left eye, " Cataract, Depression, Diabetes mellitus, Disease of immune system, Giant cell arteritis, Glaucoma, Hypertension, Polymyalgia rheumatica, and Uveitis.  By history and examination this patient has chronic headaches, right shoulder pain and low back pain right side  without radiculopathy.  The underlying cause is facet arthritis, degenerative disc disease, muscle dysfunction, muscles strain, and deconditioning.  Pathology is confirmed by imaging.  We discussed the underlying diagnoses and multiple treatment options including non-opioid medications, interventional procedures, physical therapy, home exercise, core muscle enhancement, activity modification, weight loss, and temporary rest.  The risks and benefits of each treatment option were discussed and all questions were answered.      Long discussion regarding chronic opioid therapy our office this not support the use of benzodiazepines and chronic opioids explain this to the patient she reports that her PCP is currently providing her tramadol 45 tablets which lasts for 60-90 days.  During our clinic visit she began to get teary-eyed and sad not sure what initiated these feelings she does have a history of anxiety why she is currently taking the clonazepam.  Pain score today was only 2/10 I am not recommending pain interventions moving forward for this patient unless progressive pathology has been shown on images.    Treatment Plan  Procedures:  No procedures recommended at this time  PT/OT/HEP: I have stressed the importance of physical activity and a home exercise plan to help with pain and improve health.  Medications:  Continue current medications patient and I did discuss the use of clonazepam and opioids at how I am recommending against the combination of these 2 medications as it could cause opioid induced respiratory depression.  Her primary care currently provides her with tramadol she reports only getting 45 tablets which can last her 60-90 days.     -   Reviewed and consistent with medication use as prescribed.  Imaging:  X-rays of the lumbar spine    Follow Up:  4 months or sooner if needed for ongoing care.      Disclaimer: This note was partly generated using dictation software which may occasionally result in transcription errors.         [1]   Current Outpatient Medications:     amLODIPine-benazepriL (LOTREL) 10-40 mg per capsule, Take 1 capsule by mouth once daily., Disp: 90 capsule, Rfl: 2    brimonidine 0.2% (ALPHAGAN) 0.2 % Drop, INSTILL 1 DROP INTO BOTH EYES 3 TIMES A DAY, Disp: 30 mL, Rfl: 3    clonazePAM (KLONOPIN) 1 MG tablet, Take 1 tablet (1 mg total) by mouth daily as needed for Anxiety., Disp: 30 tablet, Rfl: 0    dorzolamide (TRUSOPT) 2 % ophthalmic solution, Place 1 drop into both eyes 3 (three) times daily., Disp: 30 mL, Rfl: 4    ergocalciferol (ERGOCALCIFEROL) 50,000 unit Cap, TAKE 1 CAPSULE BY MOUTH ONE TIME PER WEEK, Disp: 12 capsule, Rfl: 3    ketoconazole (NIZORAL) 2 % cream, APPLY TOPICALLY 2 (TWO) TIMES DAILY. FOR 10 DAYS, Disp: 60 g, Rfl: 1    latanoprost 0.005 % ophthalmic solution, Place 1 drop into both eyes every evening., Disp: 7.5 mL, Rfl: 4    levocetirizine (XYZAL) 5 MG tablet, Take 1 tablet (5 mg total) by mouth every evening., Disp: 90 tablet, Rfl: 3    metoprolol succinate (TOPROL-XL) 100 MG 24 hr tablet, Take 1 tablet (100 mg total) by mouth once daily., Disp: 90 tablet, Rfl: 3    omeprazole (PRILOSEC) 40 MG capsule, Take 1 capsule (40 mg total) by mouth every 12 (twelve) hours. Take one pill every morning 45 minutes before breakfast, Disp: 180 capsule, Rfl: 3    potassium chloride SA (K-DUR,KLOR-CON) 20 MEQ tablet, Take 1 tablet (20 mEq total) by mouth once daily., Disp: 90 tablet, Rfl: 3    pravastatin (PRAVACHOL) 40 MG tablet, TAKE 1 TABLET BY MOUTH EVERY DAY IN THE EVENING, Disp: 90 tablet, Rfl: 3    predniSONE (DELTASONE) 1 MG tablet, 9mg x7 days --> 8mg x7 days --> 7mg x7 days --> 6mg x14 days --> 5mg x14 days --> 4mg  x14 days --> 3mg x14 days --> 2mg x14 days --> 1mg x14 days --> off, Disp: 200 tablet, Rfl: 2    repaglinide (PRANDIN) 0.5 MG tablet, Take 1 tablet (0.5 mg total) by mouth 3 (three) times daily before meals., Disp: 270 tablet, Rfl: 3    tirzepatide 7.5 mg/0.5 mL PnIj, Inject 7.5 mg into the skin every 7 days., Disp: 6 mL, Rfl: 3    tocilizumab (ACTEMRA) 162 mg/0.9 mL injection, Inject 0.9 mLs (162 mg total) into the skin every 7 days., Disp: 3.6 mL, Rfl: 6    traMADoL (ULTRAM) 50 mg tablet, Take 1 tablet (50 mg total) by mouth every 12 (twelve) hours as needed for Pain., Disp: 60 tablet, Rfl: 0

## 2025-06-24 ENCOUNTER — OFFICE VISIT (OUTPATIENT)
Dept: PAIN MEDICINE | Facility: CLINIC | Age: 70
End: 2025-06-24
Payer: MEDICARE

## 2025-06-24 ENCOUNTER — HOSPITAL ENCOUNTER (OUTPATIENT)
Dept: RADIOLOGY | Facility: HOSPITAL | Age: 70
Discharge: HOME | End: 2025-06-24
Attending: NURSE PRACTITIONER
Payer: MEDICARE

## 2025-06-24 ENCOUNTER — TELEPHONE (OUTPATIENT)
Dept: PAIN MEDICINE | Facility: CLINIC | Age: 70
End: 2025-06-24

## 2025-06-24 VITALS
DIASTOLIC BLOOD PRESSURE: 83 MMHG | BODY MASS INDEX: 47.16 KG/M2 | WEIGHT: 276.25 LBS | HEIGHT: 64 IN | SYSTOLIC BLOOD PRESSURE: 133 MMHG | HEART RATE: 109 BPM

## 2025-06-24 DIAGNOSIS — M54.50 CHRONIC BILATERAL LOW BACK PAIN WITHOUT SCIATICA: Primary | ICD-10-CM

## 2025-06-24 DIAGNOSIS — G89.29 CHRONIC BILATERAL LOW BACK PAIN WITHOUT SCIATICA: Primary | ICD-10-CM

## 2025-06-24 DIAGNOSIS — F11.90 CHRONIC, CONTINUOUS USE OF OPIOIDS: ICD-10-CM

## 2025-06-24 PROCEDURE — 1125F AMNT PAIN NOTED PAIN PRSNT: CPT | Mod: CPTII,HCNC,S$GLB, | Performed by: NURSE PRACTITIONER

## 2025-06-24 PROCEDURE — 99204 OFFICE O/P NEW MOD 45 MIN: CPT | Mod: HCNC,S$GLB,, | Performed by: NURSE PRACTITIONER

## 2025-06-24 PROCEDURE — 3066F NEPHROPATHY DOC TX: CPT | Mod: CPTII,HCNC,S$GLB, | Performed by: NURSE PRACTITIONER

## 2025-06-24 PROCEDURE — 99999 PR PBB SHADOW E&M-EST. PATIENT-LVL V: CPT | Mod: PBBFAC,HCNC,, | Performed by: NURSE PRACTITIONER

## 2025-06-24 PROCEDURE — 3008F BODY MASS INDEX DOCD: CPT | Mod: CPTII,HCNC,S$GLB, | Performed by: NURSE PRACTITIONER

## 2025-06-24 PROCEDURE — 3060F POS MICROALBUMINURIA REV: CPT | Mod: CPTII,HCNC,S$GLB, | Performed by: NURSE PRACTITIONER

## 2025-06-24 PROCEDURE — 72110 X-RAY EXAM L-2 SPINE 4/>VWS: CPT | Mod: TC,HCNC,FY

## 2025-06-24 PROCEDURE — 1160F RVW MEDS BY RX/DR IN RCRD: CPT | Mod: CPTII,HCNC,S$GLB, | Performed by: NURSE PRACTITIONER

## 2025-06-24 PROCEDURE — 1159F MED LIST DOCD IN RCRD: CPT | Mod: CPTII,HCNC,S$GLB, | Performed by: NURSE PRACTITIONER

## 2025-06-24 PROCEDURE — 1157F ADVNC CARE PLAN IN RCRD: CPT | Mod: CPTII,HCNC,S$GLB, | Performed by: NURSE PRACTITIONER

## 2025-06-24 PROCEDURE — 4010F ACE/ARB THERAPY RXD/TAKEN: CPT | Mod: CPTII,HCNC,S$GLB, | Performed by: NURSE PRACTITIONER

## 2025-06-24 PROCEDURE — 72110 X-RAY EXAM L-2 SPINE 4/>VWS: CPT | Mod: 26,HCNC,, | Performed by: RADIOLOGY

## 2025-06-24 PROCEDURE — 3075F SYST BP GE 130 - 139MM HG: CPT | Mod: CPTII,HCNC,S$GLB, | Performed by: NURSE PRACTITIONER

## 2025-06-24 PROCEDURE — 3079F DIAST BP 80-89 MM HG: CPT | Mod: CPTII,HCNC,S$GLB, | Performed by: NURSE PRACTITIONER

## 2025-06-24 PROCEDURE — G2211 COMPLEX E/M VISIT ADD ON: HCPCS | Mod: HCNC,S$GLB,, | Performed by: NURSE PRACTITIONER

## 2025-06-24 PROCEDURE — 3051F HG A1C>EQUAL 7.0%<8.0%: CPT | Mod: CPTII,HCNC,S$GLB, | Performed by: NURSE PRACTITIONER

## 2025-06-24 NOTE — TELEPHONE ENCOUNTER
Called the patient to find out the reason for her visit today and to let her know that we are interventional pain. So if she is looking to get an injection we can certainly see her.

## 2025-06-25 ENCOUNTER — TELEPHONE (OUTPATIENT)
Dept: SPORTS MEDICINE | Facility: CLINIC | Age: 70
End: 2025-06-25
Payer: MEDICARE

## 2025-06-25 NOTE — TELEPHONE ENCOUNTER
Called and LVM regarding r/s the pt's appt from tomorrow to another day. Stated to give the clinic a call back and provided clinic number.    Copied from CRM #[id number]. Topic: Appointments - Appointment Rescheduling  >> Jun 25, 2025  1:51 PM Rere wrote:  Type: Rescheduling Appointment    Caller     Callback phone number: Confirmed contact info below:  Contact Name: Genesis Ugalde  Phone Number: [phone]      Current apppointment date/time:6/26/2025 at 3:30     Next available:NA    Provider:  Dr. Calix     Reason for rescheduling: Pt is not able to make the appt     Additional information

## 2025-06-25 NOTE — TELEPHONE ENCOUNTER
Spoke with pt to r/s her appt for tomorrow. Pt requested it to be on a Monday or Tuesday. Pt agreed with the r/s appt at the [place] location and verbalized understanding.    Copied from CRM #[id number]. Topic: General Inquiry - Return Call  >> Jun 25, 2025  4:11 PM Rere wrote:  Returned Call: From [place]     Name of Caller: Ms. Ugalde    Nature of Call: Pt's appt tomorrow      Best Call Back Number: Confirmed contact info below:  Contact Name: Genesis Ugalde  Phone Number: [phone]      Additional Information: Pt wants to reschedule her injection

## 2025-07-07 ENCOUNTER — OFFICE VISIT (OUTPATIENT)
Dept: SPORTS MEDICINE | Facility: CLINIC | Age: 70
End: 2025-07-07
Payer: MEDICARE

## 2025-07-07 VITALS
DIASTOLIC BLOOD PRESSURE: 70 MMHG | HEIGHT: 64 IN | SYSTOLIC BLOOD PRESSURE: 110 MMHG | WEIGHT: 270.63 LBS | HEART RATE: 88 BPM | BODY MASS INDEX: 46.2 KG/M2

## 2025-07-07 DIAGNOSIS — G89.29 CHRONIC RIGHT SHOULDER PAIN: Primary | ICD-10-CM

## 2025-07-07 DIAGNOSIS — M25.511 CHRONIC RIGHT SHOULDER PAIN: Primary | ICD-10-CM

## 2025-07-07 PROCEDURE — 99999 PR PBB SHADOW E&M-EST. PATIENT-LVL IV: CPT | Mod: PBBFAC,HCNC,, | Performed by: STUDENT IN AN ORGANIZED HEALTH CARE EDUCATION/TRAINING PROGRAM

## 2025-07-07 RX ORDER — TRIAMCINOLONE ACETONIDE 40 MG/ML
40 INJECTION, SUSPENSION INTRA-ARTICULAR; INTRAMUSCULAR
Status: DISCONTINUED | OUTPATIENT
Start: 2025-07-07 | End: 2025-07-07 | Stop reason: HOSPADM

## 2025-07-07 RX ADMIN — TRIAMCINOLONE ACETONIDE 40 MG: 40 INJECTION, SUSPENSION INTRA-ARTICULAR; INTRAMUSCULAR at 10:07

## 2025-07-07 NOTE — PROGRESS NOTES
"CC: right shoulder pain    69 y.o. Female presents today for CSI injection of her right shoulder joint.     Attempted treatments: None  Pain score: 3  History of trauma/injury: No  Affecting ADLs: Yes     REVIEW OF SYSTEMS:   Constitution: Patient denies fever or chills.  Eyes: Patient denies eye pain or vision changes.  HEENT: Patient denies ear pain, sore throat, or nasal discharge.  CVS: Patient denies chest pain.  Lungs: Patient denies shortness of breath or cough.  Skin: Patient denies skin rash or itching.    Musculoskeletal: Patient denies recent falls. See HPI.  Psych: Patient denies any current anxiety or nervousness.    PAST MEDICAL HISTORY:   Past Medical History:   Diagnosis Date    Anxiety     Asthma     Blind left eye     can see silhouettes in right eye    Cataract     Depression     Diabetes mellitus     Disease of immune system     Giant cell arteritis     Glaucoma     Hypertension     Polymyalgia rheumatica     Uveitis        MEDICATIONS:   Current Medications[1]    ALLERGIES:   Review of patient's allergies indicates:   Allergen Reactions    Oxycontin [oxycodone] Hallucinations     Immobile     Percocet [oxycodone-acetaminophen] Shortness Of Breath    Percodan [oxycodone hcl-oxycodone-asa] Shortness Of Breath    Seroquel [quetiapine] Shortness Of Breath     Akathisia    Opioids - morphine analogues Other (See Comments)     Pt states it has no effect on her         PHYSICAL EXAMINATION:  /70 (BP Location: Left forearm, Patient Position: Sitting)   Pulse 88   Ht 5' 4" (1.626 m)   Wt 122.7 kg (270 lb 9.8 oz)   BMI 46.45 kg/m²   There are no signs of infection at the injection site, including no rubor, calor, or skin lesions.  Gen: NAD.  Psych: Affect & judgment nl.  Neuro: Grossly CNI. DALE.  HEENT: -Trach dev. -Eye d/c.   CV: Color nl. -E/C/C. WWPx4.  Pulm: -Dyspnea. -Cough.  Lymph: -Edema.  Int: -Rash/lesion noted. Skin is warm and dry    ASSESSMENT:      ICD-10-CM ICD-9-CM   1. Chronic " right shoulder pain  M25.511 719.41    G89.29 338.29   2. BMI 45.0-49.9, adult  Z68.42 V85.42         PLAN:  Ultrasound-guided injection of the right glenohumeral joint with triamcinolone performed at visit today. Patient with satisfactory results following corticosteroid injection to the right glenohumeral joint.  Plan is to repeat CSI on or after 10/7/25.       Future planning includes - Continue exercise program    Risks and benefits were discussed with patient prior to receiving injection.  Depending on injection type, risks include the possibility of infection, pain, disruptions in blood pressure and blood sugar, and cosmetic deformity at site of injection.    All questions were answered to the best of my ability and all concerns were addressed at this time.    Follow up in-person for above.    This note is dictated using the M*Modal Fluency Direct word recognition program. There are word recognition mistakes that are occasionally missed on review.            [1]   Current Outpatient Medications:     amLODIPine-benazepriL (LOTREL) 10-40 mg per capsule, Take 1 capsule by mouth once daily., Disp: 90 capsule, Rfl: 2    brimonidine 0.2% (ALPHAGAN) 0.2 % Drop, INSTILL 1 DROP INTO BOTH EYES 3 TIMES A DAY, Disp: 30 mL, Rfl: 3    clonazePAM (KLONOPIN) 1 MG tablet, Take 1 tablet (1 mg total) by mouth daily as needed for Anxiety., Disp: 30 tablet, Rfl: 0    dorzolamide (TRUSOPT) 2 % ophthalmic solution, Place 1 drop into both eyes 3 (three) times daily., Disp: 30 mL, Rfl: 4    ergocalciferol (ERGOCALCIFEROL) 50,000 unit Cap, TAKE 1 CAPSULE BY MOUTH ONE TIME PER WEEK, Disp: 12 capsule, Rfl: 3    latanoprost 0.005 % ophthalmic solution, Place 1 drop into both eyes every evening., Disp: 7.5 mL, Rfl: 4    levocetirizine (XYZAL) 5 MG tablet, Take 1 tablet (5 mg total) by mouth every evening., Disp: 90 tablet, Rfl: 3    metoprolol succinate (TOPROL-XL) 100 MG 24 hr tablet, Take 1 tablet (100 mg total) by mouth once daily.,  Disp: 90 tablet, Rfl: 3    omeprazole (PRILOSEC) 40 MG capsule, Take 1 capsule (40 mg total) by mouth every 12 (twelve) hours. Take one pill every morning 45 minutes before breakfast, Disp: 180 capsule, Rfl: 3    potassium chloride SA (K-DUR,KLOR-CON) 20 MEQ tablet, Take 1 tablet (20 mEq total) by mouth once daily., Disp: 90 tablet, Rfl: 3    pravastatin (PRAVACHOL) 40 MG tablet, TAKE 1 TABLET BY MOUTH EVERY DAY IN THE EVENING, Disp: 90 tablet, Rfl: 3    predniSONE (DELTASONE) 1 MG tablet, 9mg x7 days --> 8mg x7 days --> 7mg x7 days --> 6mg x14 days --> 5mg x14 days --> 4mg x14 days --> 3mg x14 days --> 2mg x14 days --> 1mg x14 days --> off, Disp: 200 tablet, Rfl: 2    repaglinide (PRANDIN) 0.5 MG tablet, Take 1 tablet (0.5 mg total) by mouth 3 (three) times daily before meals., Disp: 270 tablet, Rfl: 3    tirzepatide 7.5 mg/0.5 mL PnIj, Inject 7.5 mg into the skin every 7 days., Disp: 6 mL, Rfl: 3    tocilizumab (ACTEMRA) 162 mg/0.9 mL injection, Inject 0.9 mLs (162 mg total) into the skin every 7 days., Disp: 3.6 mL, Rfl: 6    traMADoL (ULTRAM) 50 mg tablet, Take 1 tablet (50 mg total) by mouth every 12 (twelve) hours as needed for Pain., Disp: 60 tablet, Rfl: 0    ketoconazole (NIZORAL) 2 % cream, APPLY TOPICALLY 2 (TWO) TIMES DAILY. FOR 10 DAYS, Disp: 60 g, Rfl: 1

## 2025-07-07 NOTE — PROCEDURES
Large Joint Aspiration/Injection: R glenohumeral    Date/Time: 7/7/2025 10:15 AM    Performed by: Kiley Calix MD  Authorized by: Kiley Calix MD    Consent Done?:  Yes (Verbal)  Indications:  Arthritis and pain  Site marked: the procedure site was marked    Timeout: prior to procedure the correct patient, procedure, and site was verified      Local anesthesia used?: Yes    Anesthesia:  Local infiltration  Local anesthetic:  Co-phenylcaine spray    Details:  Needle Size:  22 G  Ultrasonic Guidance for needle placement?: Yes (Ultrasound guidance used to avoid neurovascular injury.)    Images are saved and documented.  Approach:  Posterior  Location:  Shoulder  Site:  R glenohumeral  Medications:  40 mg triamcinolone acetonide 40 mg/mL  Medications comment:  Ropivacaine 0.2% 2mL  Patient tolerance:  Patient tolerated the procedure well with no immediate complications     TECHNIQUE: Real time ultrasound examination of the right glenohumeral joint(s) was performed with SonoSite Edge 2, C1-5 MHz probe(s). Ultrasound guidance was used for needle localization. Images were saved and stored for documentation.  Dynamic visualization of the needle was continuous throughout the procedures and maintained in good position.

## 2025-07-24 ENCOUNTER — TELEPHONE (OUTPATIENT)
Dept: SPORTS MEDICINE | Facility: CLINIC | Age: 70
End: 2025-07-24
Payer: MEDICARE

## 2025-07-24 NOTE — TELEPHONE ENCOUNTER
Called and LVM regarding the pt's appt with Dr. Calix at the [place] location in [facility]. Stated to arrive 15 minutes prior to her appt time to ensure enough time to check-in and do intake on the 1st floor before being directed to the 4th floor of the same building to be seen by Dr. Calix.

## 2025-07-28 ENCOUNTER — LAB VISIT (OUTPATIENT)
Dept: LAB | Facility: HOSPITAL | Age: 70
End: 2025-07-28
Attending: FAMILY MEDICINE
Payer: MEDICARE

## 2025-07-28 DIAGNOSIS — Z79.899 HIGH RISK MEDICATION USE: ICD-10-CM

## 2025-07-28 PROCEDURE — 36415 COLL VENOUS BLD VENIPUNCTURE: CPT | Mod: HCNC,PO

## 2025-07-28 PROCEDURE — 86480 TB TEST CELL IMMUN MEASURE: CPT | Mod: HCNC

## 2025-07-29 ENCOUNTER — TELEPHONE (OUTPATIENT)
Dept: SPORTS MEDICINE | Facility: CLINIC | Age: 70
End: 2025-07-29
Payer: MEDICARE

## 2025-07-29 NOTE — TELEPHONE ENCOUNTER
Returned call, pt elected to r/s to 8/11 at [facility].    Maria D Miranda MS, OTC  Clinical Assistant to Dr. Kiley Calix      Copied from CRM #[id number]. Topic: Appointments - Appointment Access  >> Jul 29, 2025  3:16 PM Corina wrote:  Name of Caller: The PT      Nature of Call: requesting a rescheduling     Best Call Back Number:[phone]      Additional Information: The PT is stating that she is needing to get a time change to a earlier time for the injection appt on Friday or if so anytime on Mondays and Tuesdays. Please call back to assist

## 2025-07-30 LAB
MITOGEN MINUS NIL (OHS): 9.97
NIL TB SYNCED (OHS): 0.03
QUANTIFERON GOLD INTERP (OHS): NEGATIVE
TB1 AG MINUS NIL (OHS): 0.18
TB2 AG MINUS NIL (OHS): 0.22

## 2025-08-01 DIAGNOSIS — M31.5 GIANT CELL ARTERITIS WITH POLYMYALGIA RHEUMATICA: Chronic | ICD-10-CM

## 2025-08-01 RX ORDER — TRAMADOL HYDROCHLORIDE 50 MG/1
50 TABLET, FILM COATED ORAL EVERY 12 HOURS PRN
Qty: 60 TABLET | Refills: 0 | Status: SHIPPED | OUTPATIENT
Start: 2025-08-01

## 2025-08-01 NOTE — TELEPHONE ENCOUNTER
No care due was identified.  [facility] Embedded Care Due Messages. Reference number: [id number].   8/01/2025 10:59:47 AM CDT

## 2025-08-01 NOTE — TELEPHONE ENCOUNTER
Called pt informing her appt 8/11 will need to be rescheduled due to provider being out. Pt scheduled 8/11 same day with NATE De Santiago.

## 2025-08-04 ENCOUNTER — APPOINTMENT (OUTPATIENT)
Dept: LAB | Facility: HOSPITAL | Age: 70
End: 2025-08-04
Attending: FAMILY MEDICINE
Payer: MEDICARE

## 2025-08-11 ENCOUNTER — OFFICE VISIT (OUTPATIENT)
Dept: FAMILY MEDICINE | Facility: CLINIC | Age: 70
End: 2025-08-11
Payer: MEDICARE

## 2025-08-11 ENCOUNTER — OFFICE VISIT (OUTPATIENT)
Dept: SPORTS MEDICINE | Facility: CLINIC | Age: 70
End: 2025-08-11
Payer: MEDICARE

## 2025-08-11 ENCOUNTER — TELEPHONE (OUTPATIENT)
Dept: ADMINISTRATIVE | Facility: OTHER | Age: 70
End: 2025-08-11
Payer: MEDICARE

## 2025-08-11 VITALS
HEIGHT: 64 IN | BODY MASS INDEX: 44.22 KG/M2 | DIASTOLIC BLOOD PRESSURE: 77 MMHG | HEART RATE: 83 BPM | WEIGHT: 259 LBS | SYSTOLIC BLOOD PRESSURE: 132 MMHG

## 2025-08-11 VITALS
OXYGEN SATURATION: 96 % | HEIGHT: 64 IN | WEIGHT: 259.38 LBS | HEART RATE: 89 BPM | BODY MASS INDEX: 44.28 KG/M2 | SYSTOLIC BLOOD PRESSURE: 100 MMHG | DIASTOLIC BLOOD PRESSURE: 60 MMHG

## 2025-08-11 DIAGNOSIS — E11.59 HYPERTENSION ASSOCIATED WITH DIABETES: ICD-10-CM

## 2025-08-11 DIAGNOSIS — M31.5 GIANT CELL ARTERITIS WITH POLYMYALGIA RHEUMATICA: ICD-10-CM

## 2025-08-11 DIAGNOSIS — K74.60 LIVER CIRRHOSIS SECONDARY TO NASH: ICD-10-CM

## 2025-08-11 DIAGNOSIS — E66.01 MORBID OBESITY WITH BMI OF 40.0-44.9, ADULT: ICD-10-CM

## 2025-08-11 DIAGNOSIS — Z23 NEED FOR PNEUMOCOCCAL 20-VALENT CONJUGATE VACCINATION: ICD-10-CM

## 2025-08-11 DIAGNOSIS — K21.9 GASTROESOPHAGEAL REFLUX DISEASE, UNSPECIFIED WHETHER ESOPHAGITIS PRESENT: ICD-10-CM

## 2025-08-11 DIAGNOSIS — F43.21 ADJUSTMENT DISORDER WITH DEPRESSED MOOD: ICD-10-CM

## 2025-08-11 DIAGNOSIS — E11.69 HYPERLIPIDEMIA ASSOCIATED WITH TYPE 2 DIABETES MELLITUS: ICD-10-CM

## 2025-08-11 DIAGNOSIS — E11.39 TYPE 2 DIABETES MELLITUS WITH OTHER OPHTHALMIC COMPLICATION, WITHOUT LONG-TERM CURRENT USE OF INSULIN: Primary | ICD-10-CM

## 2025-08-11 DIAGNOSIS — G89.29 CHRONIC RIGHT SHOULDER PAIN: Primary | ICD-10-CM

## 2025-08-11 DIAGNOSIS — K75.81 LIVER CIRRHOSIS SECONDARY TO NASH: ICD-10-CM

## 2025-08-11 DIAGNOSIS — I15.2 HYPERTENSION ASSOCIATED WITH DIABETES: ICD-10-CM

## 2025-08-11 DIAGNOSIS — M25.511 CHRONIC RIGHT SHOULDER PAIN: Primary | ICD-10-CM

## 2025-08-11 DIAGNOSIS — M19.011 ARTHROSIS OF RIGHT ACROMIOCLAVICULAR JOINT: ICD-10-CM

## 2025-08-11 DIAGNOSIS — E78.5 HYPERLIPIDEMIA ASSOCIATED WITH TYPE 2 DIABETES MELLITUS: ICD-10-CM

## 2025-08-11 PROCEDURE — 20606 DRAIN/INJ JOINT/BURSA W/US: CPT | Mod: HCNC,RT,S$GLB, | Performed by: STUDENT IN AN ORGANIZED HEALTH CARE EDUCATION/TRAINING PROGRAM

## 2025-08-11 PROCEDURE — 99999 PR PBB SHADOW E&M-EST. PATIENT-LVL IV: CPT | Mod: PBBFAC,HCNC,, | Performed by: STUDENT IN AN ORGANIZED HEALTH CARE EDUCATION/TRAINING PROGRAM

## 2025-08-11 PROCEDURE — 99999 PR PBB SHADOW E&M-EST. PATIENT-LVL IV: CPT | Mod: PBBFAC,HCNC,, | Performed by: NURSE PRACTITIONER

## 2025-08-11 PROCEDURE — 99499 UNLISTED E&M SERVICE: CPT | Mod: HCNC,S$GLB,, | Performed by: STUDENT IN AN ORGANIZED HEALTH CARE EDUCATION/TRAINING PROGRAM

## 2025-08-11 RX ORDER — TRIAMCINOLONE ACETONIDE 40 MG/ML
40 INJECTION, SUSPENSION INTRA-ARTICULAR; INTRAMUSCULAR
Status: DISCONTINUED | OUTPATIENT
Start: 2025-08-11 | End: 2025-08-11 | Stop reason: HOSPADM

## 2025-08-11 RX ADMIN — TRIAMCINOLONE ACETONIDE 40 MG: 40 INJECTION, SUSPENSION INTRA-ARTICULAR; INTRAMUSCULAR at 03:08

## 2025-09-02 ENCOUNTER — OFFICE VISIT (OUTPATIENT)
Dept: OPHTHALMOLOGY | Facility: CLINIC | Age: 70
End: 2025-09-02
Payer: MEDICARE

## 2025-09-02 DIAGNOSIS — H40.1131 PRIMARY OPEN ANGLE GLAUCOMA OF BOTH EYES, MILD STAGE: Primary | ICD-10-CM

## 2025-09-02 DIAGNOSIS — H25.13 NUCLEAR SCLEROSIS, BILATERAL: ICD-10-CM

## 2025-09-02 DIAGNOSIS — H40.043 STEROID RESPONDER, BILATERAL: ICD-10-CM

## 2025-09-02 DIAGNOSIS — H46.9 INFLAMMATORY OPTIC NEUROPATHY: ICD-10-CM

## 2025-09-02 PROCEDURE — 4010F ACE/ARB THERAPY RXD/TAKEN: CPT | Mod: CPTII,HCNC,S$GLB, | Performed by: STUDENT IN AN ORGANIZED HEALTH CARE EDUCATION/TRAINING PROGRAM

## 2025-09-02 PROCEDURE — 1159F MED LIST DOCD IN RCRD: CPT | Mod: CPTII,HCNC,S$GLB, | Performed by: STUDENT IN AN ORGANIZED HEALTH CARE EDUCATION/TRAINING PROGRAM

## 2025-09-02 PROCEDURE — 99999 PR PBB SHADOW E&M-EST. PATIENT-LVL III: CPT | Mod: PBBFAC,HCNC,, | Performed by: STUDENT IN AN ORGANIZED HEALTH CARE EDUCATION/TRAINING PROGRAM

## 2025-09-02 PROCEDURE — 3288F FALL RISK ASSESSMENT DOCD: CPT | Mod: CPTII,HCNC,S$GLB, | Performed by: STUDENT IN AN ORGANIZED HEALTH CARE EDUCATION/TRAINING PROGRAM

## 2025-09-02 PROCEDURE — 92020 GONIOSCOPY: CPT | Mod: HCNC,S$GLB,, | Performed by: STUDENT IN AN ORGANIZED HEALTH CARE EDUCATION/TRAINING PROGRAM

## 2025-09-02 PROCEDURE — 3044F HG A1C LEVEL LT 7.0%: CPT | Mod: CPTII,HCNC,S$GLB, | Performed by: STUDENT IN AN ORGANIZED HEALTH CARE EDUCATION/TRAINING PROGRAM

## 2025-09-02 PROCEDURE — G2211 COMPLEX E/M VISIT ADD ON: HCPCS | Mod: HCNC,S$GLB,, | Performed by: STUDENT IN AN ORGANIZED HEALTH CARE EDUCATION/TRAINING PROGRAM

## 2025-09-02 PROCEDURE — 3060F POS MICROALBUMINURIA REV: CPT | Mod: CPTII,HCNC,S$GLB, | Performed by: STUDENT IN AN ORGANIZED HEALTH CARE EDUCATION/TRAINING PROGRAM

## 2025-09-02 PROCEDURE — 1157F ADVNC CARE PLAN IN RCRD: CPT | Mod: CPTII,HCNC,S$GLB, | Performed by: STUDENT IN AN ORGANIZED HEALTH CARE EDUCATION/TRAINING PROGRAM

## 2025-09-02 PROCEDURE — 3066F NEPHROPATHY DOC TX: CPT | Mod: CPTII,HCNC,S$GLB, | Performed by: STUDENT IN AN ORGANIZED HEALTH CARE EDUCATION/TRAINING PROGRAM

## 2025-09-02 PROCEDURE — 1101F PT FALLS ASSESS-DOCD LE1/YR: CPT | Mod: CPTII,HCNC,S$GLB, | Performed by: STUDENT IN AN ORGANIZED HEALTH CARE EDUCATION/TRAINING PROGRAM

## 2025-09-02 PROCEDURE — 99214 OFFICE O/P EST MOD 30 MIN: CPT | Mod: HCNC,S$GLB,, | Performed by: STUDENT IN AN ORGANIZED HEALTH CARE EDUCATION/TRAINING PROGRAM

## 2025-09-02 PROCEDURE — 1126F AMNT PAIN NOTED NONE PRSNT: CPT | Mod: CPTII,HCNC,S$GLB, | Performed by: STUDENT IN AN ORGANIZED HEALTH CARE EDUCATION/TRAINING PROGRAM
